# Patient Record
Sex: MALE | Race: WHITE | Employment: OTHER | URBAN - METROPOLITAN AREA
[De-identification: names, ages, dates, MRNs, and addresses within clinical notes are randomized per-mention and may not be internally consistent; named-entity substitution may affect disease eponyms.]

---

## 2017-01-03 ENCOUNTER — TELEPHONE (OUTPATIENT)
Dept: CARDIOLOGY CLINIC | Age: 82
End: 2017-01-03

## 2017-01-10 ENCOUNTER — TELEPHONE (OUTPATIENT)
Dept: CARDIOLOGY CLINIC | Age: 82
End: 2017-01-10

## 2017-01-24 ENCOUNTER — TELEPHONE (OUTPATIENT)
Dept: CARDIOLOGY CLINIC | Age: 82
End: 2017-01-24

## 2017-05-03 ENCOUNTER — OFFICE VISIT (OUTPATIENT)
Dept: CARDIOLOGY CLINIC | Age: 82
End: 2017-05-03

## 2017-05-03 ENCOUNTER — PROCEDURE VISIT (OUTPATIENT)
Dept: CARDIOLOGY CLINIC | Age: 82
End: 2017-05-03

## 2017-05-03 VITALS
HEART RATE: 64 BPM | SYSTOLIC BLOOD PRESSURE: 138 MMHG | DIASTOLIC BLOOD PRESSURE: 86 MMHG | WEIGHT: 178.2 LBS | BODY MASS INDEX: 27.01 KG/M2 | HEIGHT: 68 IN

## 2017-05-03 VITALS — SYSTOLIC BLOOD PRESSURE: 152 MMHG | DIASTOLIC BLOOD PRESSURE: 100 MMHG | HEART RATE: 64 BPM

## 2017-05-03 DIAGNOSIS — Z95.0 CARDIAC PACEMAKER: ICD-10-CM

## 2017-05-03 DIAGNOSIS — I10 ESSENTIAL HYPERTENSION: Primary | ICD-10-CM

## 2017-05-03 DIAGNOSIS — I49.5 SICK SINUS SYNDROME (HCC): ICD-10-CM

## 2017-05-03 DIAGNOSIS — E78.2 MIXED HYPERLIPIDEMIA: ICD-10-CM

## 2017-05-03 DIAGNOSIS — Z95.0 CARDIAC PACEMAKER IN SITU: Primary | ICD-10-CM

## 2017-05-03 DIAGNOSIS — I48.0 PAF (PAROXYSMAL ATRIAL FIBRILLATION) (HCC): ICD-10-CM

## 2017-05-03 DIAGNOSIS — I71.40 ABDOMINAL AORTIC ANEURYSM (AAA) WITHOUT RUPTURE: ICD-10-CM

## 2017-05-03 PROCEDURE — 99213 OFFICE O/P EST LOW 20 MIN: CPT | Performed by: INTERNAL MEDICINE

## 2017-05-23 ENCOUNTER — TELEPHONE (OUTPATIENT)
Dept: CARDIOLOGY CLINIC | Age: 82
End: 2017-05-23

## 2017-05-25 ENCOUNTER — HOSPITAL ENCOUNTER (OUTPATIENT)
Dept: ULTRASOUND IMAGING | Age: 82
Discharge: HOME | End: 2017-05-25
Attending: UROLOGY | Admitting: UROLOGY

## 2017-05-25 DIAGNOSIS — C65.9 MALIGNANT NEOPLASM OF RENAL PELVIS (HCC): ICD-10-CM

## 2017-05-25 DIAGNOSIS — C65.1 MALIGNANT NEOPLASM OF RENAL PELVIS, RIGHT (HCC): ICD-10-CM

## 2017-07-20 PROBLEM — N39.0 UTI (URINARY TRACT INFECTION): Status: ACTIVE | Noted: 2017-07-20

## 2017-09-25 ENCOUNTER — HOSPITAL ENCOUNTER (OUTPATIENT)
Dept: CT IMAGING | Age: 82
Discharge: HOME | End: 2017-09-25
Attending: THORACIC SURGERY (CARDIOTHORACIC VASCULAR SURGERY) | Admitting: THORACIC SURGERY (CARDIOTHORACIC VASCULAR SURGERY)

## 2017-09-25 DIAGNOSIS — I71.40 ABDOMINAL AORTIC ANEURYSM WITHOUT RUPTURE: ICD-10-CM

## 2017-09-25 DIAGNOSIS — I71.40 ABDOMINAL AORTIC ANEURYSM (AAA) WITHOUT RUPTURE: ICD-10-CM

## 2017-09-25 LAB
GFR AFRICAN AMERICAN: 58 ML/MIN/1.73M2
GFR NON-AFRICAN AMERICAN: 48 ML/MIN/1.73M2
POC CREATININE: 1.4 MG/DL (ref 0.9–1.3)

## 2017-09-25 RX ORDER — SODIUM CHLORIDE 0.9 % (FLUSH) 0.9 %
10 SYRINGE (ML) INJECTION ONCE
Status: COMPLETED | OUTPATIENT
Start: 2017-09-25 | End: 2017-09-25

## 2017-09-25 RX ADMIN — Medication 10 ML: at 13:20

## 2017-09-28 ENCOUNTER — TELEPHONE (OUTPATIENT)
Dept: CARDIOLOGY CLINIC | Age: 82
End: 2017-09-28

## 2017-10-19 ENCOUNTER — TELEPHONE (OUTPATIENT)
Dept: CARDIOLOGY CLINIC | Age: 82
End: 2017-10-19

## 2017-11-15 ENCOUNTER — INITIAL CONSULT (OUTPATIENT)
Dept: CARDIOLOGY CLINIC | Age: 82
End: 2017-11-15

## 2017-11-15 ENCOUNTER — PROCEDURE VISIT (OUTPATIENT)
Dept: CARDIOLOGY CLINIC | Age: 82
End: 2017-11-15

## 2017-11-15 VITALS — SYSTOLIC BLOOD PRESSURE: 102 MMHG | HEART RATE: 70 BPM | DIASTOLIC BLOOD PRESSURE: 60 MMHG

## 2017-11-15 DIAGNOSIS — T82.110A PACEMAKER LEAD MALFUNCTION, INITIAL ENCOUNTER: Primary | ICD-10-CM

## 2017-11-15 DIAGNOSIS — Z95.0 CARDIAC PACEMAKER IN SITU: ICD-10-CM

## 2017-11-15 DIAGNOSIS — R00.0 HEART RATE FAST: Primary | ICD-10-CM

## 2017-11-15 PROCEDURE — 93280 PM DEVICE PROGR EVAL DUAL: CPT | Performed by: INTERNAL MEDICINE

## 2017-11-15 PROCEDURE — 99204 OFFICE O/P NEW MOD 45 MIN: CPT | Performed by: INTERNAL MEDICINE

## 2017-11-16 ENCOUNTER — TELEPHONE (OUTPATIENT)
Dept: CARDIOLOGY CLINIC | Age: 82
End: 2017-11-16

## 2017-11-16 NOTE — TELEPHONE ENCOUNTER
Patient was seen for device check on 11/15/17. Patient has lead fracture. Dr Chuck Azar will place a new RV lead the week after thanksgiving. Message to [facility] to schedule patient for procedure. Patient is on Eliquis. I advised patient that he would get a call from [facility] and he would have to come into the office to sign consent and get instructions. Patient voiced understanding.

## 2017-11-20 ENCOUNTER — TELEPHONE (OUTPATIENT)
Dept: CARDIOLOGY CLINIC | Age: 82
End: 2017-11-20

## 2017-11-20 NOTE — TELEPHONE ENCOUNTER
Spoke with patient advised him that per Dr Tiffanie Taylor we scheduled procedure for 11/29 @ 4:30 pm arrival of 2:30 pm. Please come in on Monday 11/27 to sign consents pt voiced understanding.

## 2017-11-20 NOTE — LETTER
? Notify Dr. Nelia Bustos of abnormal lab results. ? Chest Prep> Clip hair anterior chest.        Physician Signature:________________________________Date:_____________                                       [facility] ([facility]) Informed Consent for Anesthesia/Sedation, Surgery, Invasive Procedures, and other High-risk Interventions and Medication use      *This consent is applicable for 30 days following patient signature*    Procedure(s)   I, [facility] authorize, Dr. Deborah Lane    and the associate(s) or assistant(s) of his/her choice, to perform the following procedure(s): Right Ventricular Lead Replacement    I know that unexpected conditions may require additional or different procedures than those above. I authorize the above named practitioner(s) perform these as necessary and desirable. This is based on the practitioners professional judgment. The above named practitioner has discussed the above procedure(s) with me, including:  ? Potential benefits, including likelihood of success of the procedure(s) goals  ? Risks  ? Side effects, risk of death, and risk of infection  ? Any potential problems that might occur during recuperation or healing post-procedure  ? Reasonable alternatives  ? Risks of NOT performing the procedure(s)    I acknowledge that no warranty or guarantee has been made to the results the procedure(s). I consent to the above named practitioner(s) providing additional services to me as deemed reasonable and necessary, including but not limited to:    ? Use of medications for anesthesia or sedation. ? All anesthesia and sedation carry risks. My practitioner has discussed my anticipated anesthesia and/or sedation and the risks of using, risk of not using, benefits, side effects, and alternatives. ? Use of pathology  ? I authorize [facility] ([facility]) to dispose of tissues, specimens or organs when pathology is complete. ?  Use of radiology ? A contrast agent may be required for radiology procedures. My practitioner has advised me of the risks of using, risks of not using, benefits, side effects, and alternatives. ? Observers or use of photography, video/audio recording, or televising of the procedure(s). This is for medical, scientific, or educational purposes. This includes appropriate portions of my body. My identity will not be revealed. ? I consent to release of my social security number and other identifying information to [employer] (FDA), and the supplier/, if I receive tissue, a device, or implant. This is to track the tissue, device, or implant for defect, recall, infection, etc.     ? Use of blood and/or blood products, if needed, through my hospital stay. My practitioner has advised me of the risks of using, risks of not using, benefits, side effects, and alternatives. ___ I do NOT want Blood or Blood products given. (Complete separate  refusal form)    Code Status (rossy one):  ___ I do NOT HAVE a DNR order. I am a Full-code.   I will receive CPR, intubation,  chest compressions, medications, and/or other life saving measures if I have a  cardiac or respiratory arrest.    ___ I have a Do Not Resuscitate (DNR)order.   (rossy one below)  ___  I rescind my DNR for surgery and immediate post-operative period through Phase 2 recovery. This means, for that time period, I will be a Full-code and receive CPR, intubation, chest compressions, medications, and/or other life saving measures, if I have a cardiac or respiratory arrest.    ___ I WANT to keep my DNR in effect during my procedure(s) and immediate post-operative recovery period through Phase 2 recovery. (Complete separate refusal form)     This form has been fully explained to me. I understand its contents.     Patients Signature: ___________________________Date: ________  Time: ________ If patient unable to sign, has engaged the [employer], is a minor, or has a court-appointed Guardian:  [facility] Representative Name (Print):  ____________________________________      Relationship ([place] one):    Guardian   Parent    Spouse    HCPOA   Child   Sibling  Next-of-Kin Friend    Patients Representative Signature: _______________________________________              Date: ______________  Time: __________    An  was used.  name/ID: _________________________________      [facility]) Witness________________________  Date: ________   Time: _________    Physician/Practitioner _______________________  Date: ________   Time: _________           Revision 2017          [facility]     Dr. Traci Hussein     PATIENT NAME:    [facility]                               :   3/23/1930     PROCEDURE: Right Ventricular Lead Replacement    DATE OF PROCEDURE: 17    DIAGNOSIS:   Bad Lead                       ? PLEASE CALL ABNORMAL RESULTS TO THE REQUESTING PHYSICIAN? ATTENTION PATIENTS:  You do not have to fast for the lab work. You must go to the [facility] LAB at [address] [place] [address] to have the lab work done.      Phone: [phone]   Hours: 7:00 am to 5:00 pm    7:00 am to 12:00 pm         Physician Signature:_____________________________________Date:_____________                                MARTINEZ (CREEK) [facility] PHYSICAL [facility]     Dr. Rajesh Tobin:    PROCEDURE: Right Ventricular Lead Replacement    Patient Name: Sunday Swan   : 3/23/1930   MRN# [id number]    Home Phone Number: [phone]   Weight:    Wt Readings from Last 3 Encounters:   10/14/17 178 lb (80.7 kg)   10/04/17 171 lb (77.6 kg)   17 175 lb (79.4 kg)        Insurance: Payor: Caroline Allen / Plan: Caroline Allen / Product Type: *No Product type* /     Date of Procedure: 17 Time: 4:30 Arrival Time: 2:30 pm Diagnosis:  Bad Lead   Allergies:    Allergies   Allergen Reactions    Codeine Anaphylaxis        o Call [facility] scheduling [phone]) or Instant Message  o CONFIRMED WITH:__________________________PHONE      OR INSTANT MESSAGE    o PREAUTHORIZATION NUMBER:_________________ Spoke to:____________________  o From date:_________________ expiration date:____________________                    [place] [facility]

## 2017-11-25 ASSESSMENT — ENCOUNTER SYMPTOMS
BACK PAIN: 1
COUGH: 0
CHEST TIGHTNESS: 0
VOMITING: 0
ABDOMINAL PAIN: 0
WHEEZING: 0
PHOTOPHOBIA: 0
BLOOD IN STOOL: 0
NAUSEA: 0
COLOR CHANGE: 0
EYE PAIN: 0
SHORTNESS OF BREATH: 1

## 2017-11-25 NOTE — PROGRESS NOTES
Electrophysiology Consult Note      Reason for consultation: Pacemaker problem    Chief complaint : some times muscle twitch in the right shoulder area    Referring physician:  Adarsh Hightower      Primary care physician: Caryn Rodriguez MD      History of Present Illness:     Patient with hx of complete heart block sp pacemaker presented here for device check. Patient reports off and on muscle twitches and does not happen every day. Patient has shortness of breath with exertion. Denies chest pain. Denies fever or chills  Denies syncope      Past medical history:   Past Medical History:   Diagnosis Date    AAA (abdominal aortic aneurysm) [facility])     Surgery scheduled for 7/1/2014 with Dr. Kenisha Daley.  Arthritis     generalized    AV block, 1st degree     Back pain     BPH (benign prostatic hyperplasia)     Bradycardia     Colon polyps     Colostomy in place [facility])     Full incontinence of feces     colostomy    Glaucoma     H/O cardiovascular stress test 12/27/2013    cardiolite-EF56%, apical hypokinesis is seen, cannot exlude apical Tyler ischemia    H/O echocardiogram 02/11/13    EF60% Mildly hypertropied LV. Mild MR & TR. Mild Pulm HTN. Mild Aortic insuff.  H/O echocardiogram 10/27/2016    Carotid  LV hypertrophy with moderately reduced LV systolic function in addition to diastolic dysfunction. Mil pulmonary htn, mild tricuspid regurg. Mild Mitral regurg.  History of cardiovascular stress test 3/5/10    No angina or ischemic EKG changes are noted with Lexiscan. The cardiolite study demonstrated normal perfusion in all segments of the myocardium with an intact left ventricular systolic function. The resting sestamibi dose is 10.8, stress is 32.5. EF 66%    History of chest x-ray 3/16/10    The chest is considered nonacute. There is cardiomegaly noted. COPD.     History of complete electrocardiogram 2/11/13    History of Doppler ultrasound 3/25/10    venous doppler- Technically good venous ultrasound study negative for DVT in both lower extremities. Normal compressibility,color flow doppler pattern and spectral doppler pattern demonstrated thoughout.  History of echocardiogram 3/18/10    [place]erline LV dilatation with concentric hypertrophy. Low normal systolic and abnormal diastolic function. Mild mitral and trace tricuspid regurgitation. Mild aortic root and bilateral dilatation.  Holter monitor, abnormal 11/10/10    11/10/2010- 24 HR- Abnormal holter revealing some significant brasycardia's and wenckebach phenomenon. Therefore, clinical  correlation is recommend.  Hyperlipidemia     Hypertension     Ileus ([address]     Pacemaker     PAF (paroxysmal atrial fibrillation) (HCC)     Peritonitis ([address]     Personal history of colonic polyps     Poor historian     Rectal bleeding     Skin cancer     face    Ulcerative (chronic) proctosigmoiditis (HCC)     Wears glasses        Surgical history :   Past Surgical History:   Procedure Laterality Date    ABDOMINAL AORTIC ANEURYSM REPAIR, ENDOVASCULAR  7/1/14    ABDOMINAL EXPLORATION SURGERY  2/4/2013    exp lap, left hemicolectomy, umbilectomy    APPENDECTOMY  2/4/2013    APPENDECTOMY  2/4/2013    COLONOSCOPY  2/4/2013    COLOSTOMY  2/4/2013    CYSTOSCOPY  2/03/2014    TURP    HEMORRHOID SURGERY  2011    HERNIA REPAIR Bilateral 1940 & 1958    Ing hernia    KNEE SURGERY Right 1980s    PACEMAKER PLACEMENT Right 2/25/2013       Family history:   Family History   Problem Relation Age of Onset    Stroke Mother      CVA    Heart Disease Mother     Cancer Brother      prostate    Cancer Brother      skin    Cancer Daughter      colon    Substance Abuse Son      Tobacco       Social history :  reports that he has never smoked. He has never used smokeless tobacco. He reports that he does not drink alcohol or use drugs.     Allergies   Allergen Reactions    Codeine Anaphylaxis       Current Outpatient Prescriptions on File Prior to Visit   Medication Sig Dispense Refill    apixaban (ELIQUIS) 2.5 MG TABS tablet Take 1 tablet by mouth 2 times daily 28 tablet 0    finasteride (PROSCAR) 5 MG tablet Take 5 mg by mouth nightly      diltiazem (CARDIZEM 12 HR) 120 MG extended release capsule Take 120 mg by mouth daily      polyethyl glycol-propyl glycol 0.4-0.3 % (SYSTANE) 0.4-0.3 % ophthalmic solution 1 drop as needed for Dry Eyes      lisinopril-hydrochlorothiazide (PRINZIDE;ZESTORETIC) 20-25 MG per tablet Take 0.5 tablets by mouth daily 90 tablet 3    simvastatin (ZOCOR) 40 MG tablet Take 1 tablet by mouth nightly 90 tablet 3    sertraline (ZOLOFT) 50 MG tablet Take 50 mg by mouth daily      tamsulosin (FLOMAX) 0.4 MG capsule Take 1 capsule by mouth daily 10 capsule 0    ascorbic acid (VITAMIN C) 500 MG tablet Take 500 mg by mouth daily.  Brimonidine Tartrate-Timolol (COMBIGAN OP) Place  into both eyes nightly.  Bimatoprost (LUMIGAN OP) Place  into both eyes 2 times daily.  omeprazole (PRILOSEC) 20 MG capsule Take 20 mg by mouth daily. No current facility-administered medications on file prior to visit. Review of Systems:   Review of Systems   Constitutional: Positive for activity change and fatigue. Negative for chills and fever. HENT: Negative for congestion, ear pain and tinnitus. Eyes: Negative for photophobia, pain and visual disturbance. Respiratory: Positive for shortness of breath. Negative for cough, chest tightness and wheezing. Cardiovascular: Negative for chest pain, palpitations and leg swelling. Gastrointestinal: Negative for abdominal pain, blood in stool, nausea and vomiting. Endocrine: Negative for cold intolerance and heat intolerance. Genitourinary: Negative for dysuria, flank pain and hematuria. Musculoskeletal: Positive for arthralgias and back pain. Negative for myalgias and neck stiffness. Skin: Negative for color change and rash. Allergic/Immunologic: Negative for food allergies. Neurological: Negative for dizziness, light-headedness, numbness and headaches. Hematological: Does not bruise/bleed easily. Psychiatric/Behavioral: Negative for agitation and confusion. Physical Examination:    /60   Pulse 70    Wt Readings from Last 3 Encounters:   10/14/17 178 lb (80.7 kg)   10/04/17 171 lb (77.6 kg)   07/18/17 175 lb (79.4 kg)     There is no height or weight on file to calculate BMI. Physical Exam   Constitutional: He is oriented to person, place, and time and well-developed, well-nourished, and in no distress. HENT:   Head: Normocephalic and atraumatic. Eyes: Conjunctivae and EOM are normal. Pupils are equal, round, and reactive to light. Right eye exhibits no discharge. Neck: Normal range of motion. No JVD present. No thyromegaly present. Cardiovascular: Normal rate and regular rhythm. Exam reveals no friction rub. Murmur (grade 2/6 systolic murmur) heard. Pulmonary/Chest: Effort normal and breath sounds normal. No stridor. No respiratory distress. He has no wheezes. Abdominal: Soft. Bowel sounds are normal. He exhibits no distension. There is no tenderness. Musculoskeletal: Normal range of motion. He exhibits no edema. Neurological: He is alert and oriented to person, place, and time. No cranial nerve deficit. Skin: Skin is warm and dry. No rash noted. No erythema.    Psychiatric: Mood and affect normal.           CBC:   Lab Results   Component Value Date    WBC 6.0 10/14/2017    HGB 12.4 10/14/2017    HCT 35.5 10/14/2017     10/14/2017     Lipids:   Lab Results   Component Value Date    CHOL 160 07/22/2014    TRIG 117 07/22/2014    HDL 38 (L) 07/22/2014    LDLCALC 99 07/22/2014     PT/INR:   Lab Results   Component Value Date    INR 1.08 07/17/2017        BMP:    Lab Results   Component Value Date     (L) 10/14/2017    K 3.7 10/14/2017    CL 92 (L) 10/14/2017    CO2 24 10/14/2017    BUN 22 10/14/2017     CMP:   Lab Results   Component Value Date    AST 21 10/14/2017    PROT 6.7 10/14/2017    BILITOT 0.6 10/14/2017    ALKPHOS 94 10/14/2017     TSH:  No results found for: TSH    EKGINTERPRETATION - EKG Interpretation:        IMPRESSION / RECOMMENDATIONS:     1. RV pacemaker lead malfunction  2. Moderate LV systolic dysfunction  3. Complete heart block sp pacemaker  4. HTN  5. HLD  6. PAF    Patient pacemaker is evaluated. Patient has adapta pacemaker dual chamber  Device evaluated. 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 Will get right subclavian venogram and if not obstructed then will consider upgrade to BIV pacer from Right side    Will try to schedule at the earliest and discussed risks with patient  Patient wants to wait till thanksgiving is done prior to the procedure. Unipolar capture still present. Patient understands risk      Thanks again for allowing me to participate in care of this patient. Please call me if you have any questions. With best regards.       Juan R Ferro MD

## 2017-11-27 ENCOUNTER — OFFICE VISIT (OUTPATIENT)
Dept: CARDIOLOGY CLINIC | Age: 82
End: 2017-11-27

## 2017-11-27 ENCOUNTER — HOSPITAL ENCOUNTER (OUTPATIENT)
Dept: GENERAL RADIOLOGY | Age: 82
Discharge: HOME | End: 2017-11-27
Attending: INTERNAL MEDICINE | Admitting: INTERNAL MEDICINE

## 2017-11-27 ENCOUNTER — TELEPHONE (OUTPATIENT)
Dept: CARDIOLOGY CLINIC | Age: 82
End: 2017-11-27

## 2017-11-27 VITALS
SYSTOLIC BLOOD PRESSURE: 106 MMHG | BODY MASS INDEX: 25.61 KG/M2 | DIASTOLIC BLOOD PRESSURE: 60 MMHG | HEART RATE: 78 BPM | WEIGHT: 169 LBS | HEIGHT: 68 IN

## 2017-11-27 DIAGNOSIS — I48.0 PAF (PAROXYSMAL ATRIAL FIBRILLATION) (HCC): Primary | ICD-10-CM

## 2017-11-27 DIAGNOSIS — I49.5 SICK SINUS SYNDROME (HCC): ICD-10-CM

## 2017-11-27 DIAGNOSIS — E78.2 MIXED HYPERLIPIDEMIA: ICD-10-CM

## 2017-11-27 DIAGNOSIS — Z95.0 CARDIAC PACEMAKER: ICD-10-CM

## 2017-11-27 DIAGNOSIS — Z01.818 PRE-OP EXAMINATION: ICD-10-CM

## 2017-11-27 DIAGNOSIS — I71.40 ABDOMINAL AORTIC ANEURYSM (AAA) WITHOUT RUPTURE: ICD-10-CM

## 2017-11-27 DIAGNOSIS — I10 ESSENTIAL HYPERTENSION: ICD-10-CM

## 2017-11-27 LAB
ANION GAP SERPL CALCULATED.3IONS-SCNC: 13 MMOL/L (ref 4–16)
APTT: 32.5 SECONDS (ref 21.2–33)
BUN BLDV-MCNC: 26 MG/DL (ref 6–23)
CALCIUM SERPL-MCNC: 8.9 MG/DL (ref 8.3–10.6)
CHLORIDE BLD-SCNC: 95 MMOL/L (ref 99–110)
CO2: 28 MMOL/L (ref 21–32)
CREAT SERPL-MCNC: 1.3 MG/DL (ref 0.9–1.3)
GFR AFRICAN AMERICAN: >60 ML/MIN/1.73M2
GFR NON-AFRICAN AMERICAN: 52 ML/MIN/1.73M2
GLUCOSE BLD-MCNC: 88 MG/DL (ref 70–99)
HCT VFR BLD CALC: 37.4 % (ref 42–52)
HEMOGLOBIN: 12.9 GM/DL (ref 13.5–18)
INR BLD: 1.24 INDEX
MAGNESIUM: 2.1 MG/DL (ref 1.8–2.4)
MCH RBC QN AUTO: 33.9 PG (ref 27–31)
MCHC RBC AUTO-ENTMCNC: 34.5 % (ref 32–36)
MCV RBC AUTO: 98.4 FL (ref 78–100)
PDW BLD-RTO: 13 % (ref 11.7–14.9)
PHOSPHORUS: 2.6 MG/DL (ref 2.5–4.9)
PLATELET # BLD: 213 K/CU MM (ref 140–440)
PMV BLD AUTO: 9.5 FL (ref 7.5–11.1)
POTASSIUM SERPL-SCNC: 3.8 MMOL/L (ref 3.5–5.1)
PROTHROMBIN TIME: 14.2 SECONDS (ref 9.12–12.5)
RBC # BLD: 3.8 M/CU MM (ref 4.6–6.2)
SODIUM BLD-SCNC: 136 MMOL/L (ref 135–145)
WBC # BLD: 6.1 K/CU MM (ref 4–10.5)

## 2017-11-27 PROCEDURE — 99213 OFFICE O/P EST LOW 20 MIN: CPT | Performed by: INTERNAL MEDICINE

## 2017-11-27 RX ORDER — LANOLIN ALCOHOL/MO/W.PET/CERES
500 CREAM (GRAM) TOPICAL NIGHTLY
COMMUNITY
End: 2018-03-13

## 2017-11-27 RX ORDER — LISINOPRIL AND HYDROCHLOROTHIAZIDE 12.5; 1 MG/1; MG/1
1 TABLET ORAL DAILY
COMMUNITY
End: 2019-08-26 | Stop reason: SDUPTHER

## 2017-11-27 NOTE — LETTER
Cardiology [address] [place] [address] [address] [facility] [address]  Phone: [phone]  Fax: [phone]    Julissa Munoz MD        November 27, 2017     Fallon Nina MD  [address] [place] [id number]    Patient: Mary Christensen  MR Number: [id number]  YOB: 1930  Date of Visit: 11/27/2017    Dear Dr. Fallon Nina: Thank you for the request for consultation for Jermaine Donaldson to me for the evaluation of SSS. Below are the relevant portions of my assessment and plan of care. If you have questions, please do not hesitate to call me. I look forward to following Bismark Crooks along with you.     Sincerely,        Julissa Munoz MD

## 2017-11-27 NOTE — PATIENT INSTRUCTIONS
If you have any questions, please call our office at [phone]  CAD:No   HTN:well controlled on current medical regimen, see list above. - changes in  treatment:   no   CARDIOMYOPATHY: None known    VHD: No significant VHD noted  DYSLIPIDEMIA: Patient's profile is at / near Goal.yes,                                 HDL is low                                Tolerating current medical regimen wellyes,                                                         See most recent Lab values in Labs section above. OTHER RELEVANT DIAGNOSIS:as noted in patient's active problem list:AAA F/U per   TESTS ORDERED: None this visit                                    All previously ordered tests reviewed. ARRHYTHMIAS: SSS                                Patient has H/O Atrial fibrillation                                He is rate controlled & on anticoagulation. MEDICATIONS: CPM   Office f/u in six months. Device check per protocol. Primary/secondary prevention is the goal by aggressive risk modification, healthy and therapeutic life style changes for cardiovascular risk reduction. Various goals are discussed and multiple questions answered.

## 2017-11-27 NOTE — TELEPHONE ENCOUNTER
Left message for patient need him to come in and sign consents. Ask for Gayen Court if any questions.

## 2017-11-27 NOTE — PROGRESS NOTES
Patient here in office and educated on RV lead replacement , schedule for 11/29/17 @ 4:30, with arrival @ 2;30, @ [facility]; risk explained; and consents signed. Also copy of orders given for labs and CXR due 11/27/17 at [facility]. Instruction given to patient to :  NPO after midnight the night before procedure; call hospital at [phone] to pre-register. May take rest of morning meds of procedure. Hold Eliquis evening dose the night before procedure and morning dose of the procedure. Patient voiced understanding. Copies of consent & info sheet given to [facility] for scanning.

## 2017-11-27 NOTE — TELEPHONE ENCOUNTER
Jeb Ayala calling regarding an expedited case that she has not received any clinicals on, please return her call at # [phone] Opt 3, cardiac, and then Opt 3 again for devices.

## 2017-11-27 NOTE — PROGRESS NOTES
daily      niacin 500 MG extended release capsule Take 500 mg by mouth nightly      apixaban (ELIQUIS) 2.5 MG TABS tablet Take 1 tablet by mouth 2 times daily 28 tablet 0    finasteride (PROSCAR) 5 MG tablet Take 5 mg by mouth nightly      diltiazem (CARDIZEM 12 HR) 120 MG extended release capsule Take 120 mg by mouth daily      polyethyl glycol-propyl glycol 0.4-0.3 % (SYSTANE) 0.4-0.3 % ophthalmic solution 1 drop as needed for Dry Eyes      simvastatin (ZOCOR) 40 MG tablet Take 1 tablet by mouth nightly 90 tablet 3    sertraline (ZOLOFT) 50 MG tablet Take 50 mg by mouth daily      tamsulosin (FLOMAX) 0.4 MG capsule Take 1 capsule by mouth daily 10 capsule 0    ascorbic acid (VITAMIN C) 500 MG tablet Take 500 mg by mouth daily.  Brimonidine Tartrate-Timolol (COMBIGAN OP) Place  into both eyes nightly.  Bimatoprost (LUMIGAN OP) Place  into both eyes 2 times daily.  omeprazole (PRILOSEC) 20 MG capsule Take 20 mg by mouth daily. No current facility-administered medications for this visit. Allergies: Codeine  Past Medical History:   Diagnosis Date    AAA (abdominal aortic aneurysm) [facility])     Surgery scheduled for 7/1/2014 with Dr. Marianne Mandujano.  Arthritis     generalized    AV block, 1st degree     Back pain     BPH (benign prostatic hyperplasia)     Bradycardia     Colon polyps     Colostomy in place [facility])     Full incontinence of feces     colostomy    Glaucoma     H/O cardiovascular stress test 12/27/2013    cardiolite-EF56%, apical hypokinesis is seen, cannot exlude apical Tyler ischemia    H/O echocardiogram 02/11/13    EF60% Mildly hypertropied LV. Mild MR & TR. Mild Pulm HTN. Mild Aortic insuff.  H/O echocardiogram 10/27/2016    Carotid  LV hypertrophy with moderately reduced LV systolic function in addition to diastolic dysfunction. Mil pulmonary htn, mild tricuspid regurg. Mild Mitral regurg.      History of cardiovascular stress test 3/5/10    No angina or ischemic EKG changes are noted with Lexiscan. The cardiolite study demonstrated normal perfusion in all segments of the myocardium with an intact left ventricular systolic function. The resting sestamibi dose is 10.8, stress is 32.5. EF 66%    History of chest x-ray 3/16/10    The chest is considered nonacute. There is cardiomegaly noted. COPD.  History of complete electrocardiogram 2/11/13    History of Doppler ultrasound 3/25/10    venous doppler- Technically good venous ultrasound study negative for DVT in both lower extremities. Normal compressibility,color flow doppler pattern and spectral doppler pattern demonstrated thoughout.  History of echocardiogram 3/18/10    Boarderline LV dilatation with concentric hypertrophy. Low normal systolic and abnormal diastolic function. Mild mitral and trace tricuspid regurgitation. Mild aortic root and bilateral dilatation.  Holter monitor, abnormal 11/10/10    11/10/2010- 24 HR- Abnormal holter revealing some significant brasycardia's and wenckebach phenomenon. Therefore, clinical  correlation is recommend.     Hyperlipidemia     Hypertension     Ileus ([address]     Pacemaker     PAF (paroxysmal atrial fibrillation) (HCC)     Peritonitis ([address]     Personal history of colonic polyps     Poor historian     Rectal bleeding     Skin cancer     face    Ulcerative (chronic) proctosigmoiditis ([address]     Wears glasses      Past Surgical History:   Procedure Laterality Date    ABDOMINAL AORTIC ANEURYSM REPAIR, ENDOVASCULAR  7/1/14    ABDOMINAL EXPLORATION SURGERY  2/4/2013    exp lap, left hemicolectomy, umbilectomy    APPENDECTOMY  2/4/2013    APPENDECTOMY  2/4/2013    COLONOSCOPY  2/4/2013    COLOSTOMY  2/4/2013    CYSTOSCOPY  2/03/2014    TURP    HEMORRHOID SURGERY  2011    HERNIA REPAIR Bilateral 1940 & 1958    Ing hernia    KNEE SURGERY Right 1980s    PACEMAKER PLACEMENT Right 2/25/2013      As reviewed   Family History   Problem Relation Age of Onset Abdomen  No masses, tenderness, or organomegaly. Musculoskeletal  No significant edema. No joint deformities. No muscle wasting. Neurologic  Cranial nerves II through XII are grossly intact. There were no gross focal neurologic abnormalities. Lab Review   Lab Results   Component Value Date    CKTOTAL 148 05/13/2016    TROPONINI 0.016 02/15/2013     BNP:    Lab Results   Component Value Date     02/07/2013     PT/INR:    Lab Results   Component Value Date    INR 1.08 07/17/2017     No results found for: LABA1C  Lab Results   Component Value Date    WBC 6.0 10/14/2017    HCT 35.5 (L) 10/14/2017    MCV 98.6 10/14/2017     10/14/2017     Lab Results   Component Value Date    CHOL 160 07/22/2014    TRIG 117 07/22/2014    HDL 38 (L) 07/22/2014    LDLCALC 99 07/22/2014     Lab Results   Component Value Date    ALT 14 10/14/2017    AST 21 10/14/2017     BMP:    Lab Results   Component Value Date     10/14/2017    K 3.7 10/14/2017    CL 92 10/14/2017    CO2 24 10/14/2017    BUN 22 10/14/2017    CREATININE 1.4 10/14/2017     CMP:   Lab Results   Component Value Date     10/14/2017    K 3.7 10/14/2017    CL 92 10/14/2017    CO2 24 10/14/2017    BUN 22 10/14/2017    PROT 6.7 10/14/2017    PROT 5.2 02/24/2013     TSH:  No results found for: TSH    QUALITY MEASURES REVIEWED:  1.CAD:Patient is taking anti platelet agent:No, patient on Eliquis  2. DYSLIPIDEMIA: Patient is on cholesterol lowering medication:Yes  3. Beta-Blocker therapy for CAD, if prior Myocardial Infarction:No  4. Atrial fibrillation & anticoagulation therapy Yes      Impression:    1. PAF (paroxysmal atrial fibrillation) ([facility]    2. Sick sinus syndrome ([facility]    3. Essential hypertension    4. Mixed hyperlipidemia    5. Cardiac pacemaker    6.  Abdominal aortic aneurysm (AAA) without rupture [facility])       Patient Active Problem List   Diagnosis Code    Hyperlipidemia E78.5    Hypertension I10    Perforated viscus R19.8    Anemia

## 2017-11-28 ENCOUNTER — TELEPHONE (OUTPATIENT)
Dept: CARDIOLOGY CLINIC | Age: 82
End: 2017-11-28

## 2017-11-28 NOTE — TELEPHONE ENCOUNTER
Called Patients son back, advised him I spoke with Dr Kenrick Miller and we can reschedule since patient is not feeling well. He states actually patient is eating and feels better he does not want to reschedule. Patient would like to have this done ASAP and son is due to go back to [place] soon so would like to have it done before he leaves. I advised him I would speak with Dr Kenrick Miller if there's a problem I will call him back . **Spoke with Dr Shalonda Hatfield in EP lab per Dr Kenrick Miller he would like to reschedule due to patient being dependent on device. He wants him free from viral or otherwise any kind of infection.  Patient voiced understanding and rescheduled him for 12/13 @ 8 am PATIENT NEEDS TO SIGN NEW CONSENT FOR PROCEDURE

## 2017-11-28 NOTE — TELEPHONE ENCOUNTER
Pt son called and stated pt has a very bad cold, pt is achy, he was not sure if we wanted pt to come in and be seen today to make sure he is okay for surgery tomorrow or if they should just reschedule it.    Please call son Melody Penaloza back @ [phone]

## 2017-11-30 ENCOUNTER — TELEPHONE (OUTPATIENT)
Dept: CARDIOLOGY CLINIC | Age: 82
End: 2017-11-30

## 2017-11-30 NOTE — TELEPHONE ENCOUNTER
Spoke with patients daughter advised her FMLA forms are completed she can pick them up at . She asked me to fax and I told her I could not because she had not signed them.

## 2017-12-13 PROBLEM — Z95.0 S/P BIVENTRICULAR CARDIAC PACEMAKER PROCEDURE: Status: ACTIVE | Noted: 2017-12-13

## 2017-12-22 ENCOUNTER — TELEPHONE (OUTPATIENT)
Dept: CARDIOLOGY CLINIC | Age: 82
End: 2017-12-22

## 2017-12-22 ENCOUNTER — PROCEDURE VISIT (OUTPATIENT)
Dept: CARDIOLOGY CLINIC | Age: 82
End: 2017-12-22

## 2017-12-22 VITALS — TEMPERATURE: 98.2 F

## 2017-12-22 DIAGNOSIS — Z95.0 STATUS POST PLACEMENT OF CARDIAC PACEMAKER: Primary | ICD-10-CM

## 2017-12-22 NOTE — TELEPHONE ENCOUNTER
Patient seen 12/22 for site check, status post pacer 12/13. Please schedule for 1st post device. Message to [place] to schedule.

## 2017-12-26 ENCOUNTER — TELEPHONE (OUTPATIENT)
Dept: CARDIOLOGY CLINIC | Age: 82
End: 2017-12-26

## 2018-01-05 ENCOUNTER — TELEPHONE (OUTPATIENT)
Dept: CARDIOLOGY CLINIC | Age: 83
End: 2018-01-05

## 2018-01-05 ENCOUNTER — OFFICE VISIT (OUTPATIENT)
Dept: CARDIOLOGY CLINIC | Age: 83
End: 2018-01-05

## 2018-01-05 VITALS
SYSTOLIC BLOOD PRESSURE: 128 MMHG | HEART RATE: 82 BPM | DIASTOLIC BLOOD PRESSURE: 88 MMHG | WEIGHT: 175.2 LBS | HEIGHT: 67 IN | BODY MASS INDEX: 27.5 KG/M2

## 2018-01-05 DIAGNOSIS — Z45.018 BIVENTRICULAR PACEMAKER CHECK: Primary | ICD-10-CM

## 2018-01-05 PROCEDURE — 93288 INTERROG EVL PM/LDLS PM IP: CPT | Performed by: INTERNAL MEDICINE

## 2018-01-05 PROCEDURE — 99024 POSTOP FOLLOW-UP VISIT: CPT | Performed by: INTERNAL MEDICINE

## 2018-01-05 RX ORDER — FUROSEMIDE 20 MG/1
20 TABLET ORAL DAILY
Qty: 5 TABLET | Refills: 0 | Status: SHIPPED | OUTPATIENT
Start: 2018-01-05 | End: 2018-03-13

## 2018-01-05 RX ORDER — POTASSIUM CHLORIDE 750 MG/1
10 TABLET, EXTENDED RELEASE ORAL EVERY OTHER DAY
Qty: 3 TABLET | Refills: 0 | Status: SHIPPED | OUTPATIENT
Start: 2018-01-05 | End: 2018-03-13

## 2018-01-05 NOTE — LETTER
Cardiology [address] [place] [id number]  Phone: [phone]  Fax: [phone]            January 5, 2018    [address]  [address] [id number]      Dear Leonel Rabago: This is your CAREL[facility] schedule. Please rossy your calendar with these dates. You can do your checks anytime during the scheduled day. Since we do not do reminder calls, it will be your responsibility to perform the checks on the day it is scheduled. If you have any questions or concerns, please call and ask for [place] at [phone].

## 2018-01-31 ENCOUNTER — TELEPHONE (OUTPATIENT)
Dept: CARDIOLOGY CLINIC | Age: 83
End: 2018-01-31

## 2018-01-31 NOTE — TELEPHONE ENCOUNTER
Patient brought in denial letter from [facility] [place] no [place] for BIV pacemaker upgrade. I spoke to [facility] and pulled authorizations for procedure. Valid authorization for upgrade and lead replacement. Spoke to [place] at [facility], authorization # [id number] valid 11/29/17-01/28/18 valid for procedure and claim will be reprocessed. Called patient to let him know of situation, no voicemail available. Will try patient again tomorrow morning.

## 2018-02-01 ENCOUNTER — TELEPHONE (OUTPATIENT)
Dept: CARDIOLOGY CLINIC | Age: 83
End: 2018-02-01

## 2018-02-26 NOTE — TELEPHONE ENCOUNTER
Patient stopped in to see if he could get some samples of eliquis 2.5. Told him he needs to call next time and it takes 24 hours to get them ready. Told him I would go see if anyone was able to do it now.

## 2018-02-26 NOTE — TELEPHONE ENCOUNTER
Pt given one box of Eliquis 5mg as we are out of 2.5. Explained to pt to take 1/2 tablet twice daily and call next week to see if we have 2.5 mg.  Pt verbalized understanding

## 2018-03-13 PROBLEM — K56.609 SBO (SMALL BOWEL OBSTRUCTION) (HCC): Status: ACTIVE | Noted: 2018-03-13

## 2018-03-16 ENCOUNTER — TELEPHONE (OUTPATIENT)
Dept: CARDIOLOGY CLINIC | Age: 83
End: 2018-03-16

## 2018-04-12 ENCOUNTER — TELEPHONE (OUTPATIENT)
Dept: CARDIOLOGY CLINIC | Age: 83
End: 2018-04-12

## 2018-04-12 RX ORDER — METOPROLOL SUCCINATE 25 MG/1
25 TABLET, EXTENDED RELEASE ORAL DAILY
Qty: 30 TABLET | Refills: 6 | Status: SHIPPED | OUTPATIENT
Start: 2018-04-12 | End: 2019-06-26 | Stop reason: SDUPTHER

## 2018-04-16 ENCOUNTER — TELEPHONE (OUTPATIENT)
Dept: CARDIOLOGY CLINIC | Age: 83
End: 2018-04-16

## 2018-04-18 ENCOUNTER — HOSPITAL ENCOUNTER (OUTPATIENT)
Dept: CT IMAGING | Age: 83
Discharge: HOME | End: 2018-04-18
Attending: UROLOGY | Admitting: UROLOGY

## 2018-04-18 DIAGNOSIS — R31.0 CLOT HEMATURIA: ICD-10-CM

## 2018-04-18 LAB
GFR AFRICAN AMERICAN: 57 ML/MIN/1.73M2
GFR NON-AFRICAN AMERICAN: 47 ML/MIN/1.73M2
POC CREATININE: 1.4 MG/DL (ref 0.9–1.3)

## 2018-04-18 RX ORDER — SODIUM CHLORIDE 0.9 % (FLUSH) 0.9 %
10 SYRINGE (ML) INJECTION
Status: COMPLETED | OUTPATIENT
Start: 2018-04-18 | End: 2018-04-18

## 2018-04-18 RX ADMIN — Medication 10 ML: at 12:43

## 2018-05-04 ENCOUNTER — PROCEDURE VISIT (OUTPATIENT)
Dept: CARDIOLOGY CLINIC | Age: 83
End: 2018-05-04

## 2018-05-04 ENCOUNTER — TELEPHONE (OUTPATIENT)
Dept: CARDIOLOGY CLINIC | Age: 83
End: 2018-05-04

## 2018-05-04 DIAGNOSIS — I49.5 SICK SINUS SYNDROME (HCC): ICD-10-CM

## 2018-05-04 DIAGNOSIS — Z95.0 BIVENTRICULAR CARDIAC PACEMAKER IN SITU: Primary | ICD-10-CM

## 2018-05-04 PROCEDURE — 93294 REM INTERROG EVL PM/LDLS PM: CPT | Performed by: INTERNAL MEDICINE

## 2018-05-04 PROCEDURE — 93296 REM INTERROG EVL PM/IDS: CPT | Performed by: INTERNAL MEDICINE

## 2018-05-29 ENCOUNTER — OFFICE VISIT (OUTPATIENT)
Dept: CARDIOLOGY CLINIC | Age: 83
End: 2018-05-29

## 2018-05-29 VITALS
DIASTOLIC BLOOD PRESSURE: 70 MMHG | HEIGHT: 68 IN | WEIGHT: 176.2 LBS | SYSTOLIC BLOOD PRESSURE: 126 MMHG | BODY MASS INDEX: 26.7 KG/M2 | HEART RATE: 82 BPM

## 2018-05-29 DIAGNOSIS — I49.5 SICK SINUS SYNDROME (HCC): ICD-10-CM

## 2018-05-29 DIAGNOSIS — I71.40 ABDOMINAL AORTIC ANEURYSM (AAA) WITHOUT RUPTURE: ICD-10-CM

## 2018-05-29 DIAGNOSIS — Z95.0 CARDIAC PACEMAKER: ICD-10-CM

## 2018-05-29 DIAGNOSIS — Z95.0 S/P BIVENTRICULAR CARDIAC PACEMAKER PROCEDURE: ICD-10-CM

## 2018-05-29 DIAGNOSIS — E78.2 MIXED HYPERLIPIDEMIA: ICD-10-CM

## 2018-05-29 DIAGNOSIS — I10 ESSENTIAL HYPERTENSION: Primary | ICD-10-CM

## 2018-05-29 DIAGNOSIS — R07.9 CHEST PAIN, UNSPECIFIED TYPE: ICD-10-CM

## 2018-05-29 DIAGNOSIS — I44.2 COMPLETE HEART BLOCK (HCC): ICD-10-CM

## 2018-05-29 DIAGNOSIS — I48.0 PAF (PAROXYSMAL ATRIAL FIBRILLATION) (HCC): ICD-10-CM

## 2018-05-29 PROCEDURE — 93000 ELECTROCARDIOGRAM COMPLETE: CPT | Performed by: INTERNAL MEDICINE

## 2018-05-29 PROCEDURE — 99213 OFFICE O/P EST LOW 20 MIN: CPT | Performed by: INTERNAL MEDICINE

## 2018-06-08 ENCOUNTER — TELEPHONE (OUTPATIENT)
Dept: CARDIOLOGY CLINIC | Age: 83
End: 2018-06-08

## 2018-07-23 ENCOUNTER — TELEPHONE (OUTPATIENT)
Dept: CARDIOLOGY CLINIC | Age: 83
End: 2018-07-23

## 2018-08-13 ENCOUNTER — TELEPHONE (OUTPATIENT)
Dept: CARDIOLOGY CLINIC | Age: 83
End: 2018-08-13

## 2018-08-14 ENCOUNTER — PROCEDURE VISIT (OUTPATIENT)
Dept: CARDIOLOGY CLINIC | Age: 83
End: 2018-08-14

## 2018-08-14 DIAGNOSIS — Z95.0 BIVENTRICULAR CARDIAC PACEMAKER IN SITU: Primary | ICD-10-CM

## 2018-08-14 DIAGNOSIS — I49.5 SICK SINUS SYNDROME (HCC): ICD-10-CM

## 2018-08-14 PROCEDURE — 93296 REM INTERROG EVL PM/IDS: CPT | Performed by: INTERNAL MEDICINE

## 2018-08-14 PROCEDURE — 93294 REM INTERROG EVL PM/LDLS PM: CPT | Performed by: INTERNAL MEDICINE

## 2018-08-30 ENCOUNTER — TELEPHONE (OUTPATIENT)
Dept: CARDIOLOGY CLINIC | Age: 83
End: 2018-08-30

## 2018-08-30 NOTE — TELEPHONE ENCOUNTER
Attempted to get tier exception for Eliquis 2.5 mg.  Per [facility] medicationis at lowest tier available. Medication $42.00 for 30 day supply and $135.00 for 90 day supply. Reference number [id number]. Attemted to reach patient to advise. Unable to leave message.

## 2018-09-17 ENCOUNTER — TELEPHONE (OUTPATIENT)
Dept: CARDIOLOGY CLINIC | Age: 83
End: 2018-09-17

## 2018-09-26 PROBLEM — N39.0 UTI (URINARY TRACT INFECTION): Status: RESOLVED | Noted: 2017-07-20 | Resolved: 2018-09-26

## 2018-09-27 ENCOUNTER — TELEPHONE (OUTPATIENT)
Dept: CARDIOLOGY CLINIC | Age: 83
End: 2018-09-27

## 2018-10-22 ENCOUNTER — TELEPHONE (OUTPATIENT)
Dept: CARDIOLOGY CLINIC | Age: 83
End: 2018-10-22

## 2018-11-20 ENCOUNTER — PROCEDURE VISIT (OUTPATIENT)
Dept: CARDIOLOGY CLINIC | Age: 83
End: 2018-11-20
Payer: COMMERCIAL

## 2018-11-20 ENCOUNTER — TELEPHONE (OUTPATIENT)
Dept: CARDIOLOGY CLINIC | Age: 83
End: 2018-11-20

## 2018-11-20 ENCOUNTER — PROCEDURE VISIT (OUTPATIENT)
Dept: CARDIOLOGY CLINIC | Age: 83
End: 2018-11-20

## 2018-11-20 VITALS — DIASTOLIC BLOOD PRESSURE: 74 MMHG | SYSTOLIC BLOOD PRESSURE: 132 MMHG | HEART RATE: 68 BPM

## 2018-11-20 DIAGNOSIS — Z95.0 BIVENTRICULAR CARDIAC PACEMAKER IN SITU: Primary | ICD-10-CM

## 2018-11-20 DIAGNOSIS — I49.5 SICK SINUS SYNDROME (HCC): ICD-10-CM

## 2018-11-20 PROCEDURE — 93294 REM INTERROG EVL PM/LDLS PM: CPT | Performed by: INTERNAL MEDICINE

## 2018-11-20 PROCEDURE — 93296 REM INTERROG EVL PM/IDS: CPT | Performed by: INTERNAL MEDICINE

## 2018-11-20 PROCEDURE — 93297 REM INTERROG DEV EVAL ICPMS: CPT | Performed by: INTERNAL MEDICINE

## 2018-11-20 PROCEDURE — 99999 PR OFFICE/OUTPT VISIT,PROCEDURE ONLY: CPT | Performed by: INTERNAL MEDICINE

## 2018-12-03 ENCOUNTER — APPOINTMENT (OUTPATIENT)
Dept: CT IMAGING | Age: 83
DRG: 389 | End: 2018-12-03
Payer: COMMERCIAL

## 2018-12-03 ENCOUNTER — HOSPITAL ENCOUNTER (INPATIENT)
Age: 83
LOS: 3 days | Discharge: HOME | DRG: 389 | End: 2018-12-06
Attending: EMERGENCY MEDICINE | Admitting: HOSPITALIST
Payer: COMMERCIAL

## 2018-12-03 ENCOUNTER — APPOINTMENT (OUTPATIENT)
Dept: GENERAL RADIOLOGY | Age: 83
DRG: 389 | End: 2018-12-03
Payer: COMMERCIAL

## 2018-12-03 DIAGNOSIS — K43.6 VENTRAL HERNIA WITH OBSTRUCTION AND WITHOUT GANGRENE: ICD-10-CM

## 2018-12-03 DIAGNOSIS — R53.81 MALAISE: ICD-10-CM

## 2018-12-03 DIAGNOSIS — K56.609 SBO (SMALL BOWEL OBSTRUCTION) (HCC): Primary | ICD-10-CM

## 2018-12-03 DIAGNOSIS — I71.40 ABDOMINAL AORTIC ANEURYSM (AAA) WITHOUT RUPTURE: ICD-10-CM

## 2018-12-03 DIAGNOSIS — K80.20 CALCULUS OF GALLBLADDER WITHOUT CHOLECYSTITIS WITHOUT OBSTRUCTION: ICD-10-CM

## 2018-12-03 DIAGNOSIS — R11.2 NAUSEA AND VOMITING, INTRACTABILITY OF VOMITING NOT SPECIFIED, UNSPECIFIED VOMITING TYPE: ICD-10-CM

## 2018-12-03 LAB
ALBUMIN SERPL-MCNC: 4.3 GM/DL (ref 3.4–5)
ALP BLD-CCNC: 108 IU/L (ref 40–129)
ALT SERPL-CCNC: 10 U/L (ref 10–40)
ANION GAP SERPL CALCULATED.3IONS-SCNC: 12 MMOL/L (ref 4–16)
AST SERPL-CCNC: 23 IU/L (ref 15–37)
BACTERIA: ABNORMAL /HPF
BASOPHILS ABSOLUTE: 0 K/CU MM
BASOPHILS RELATIVE PERCENT: 0.3 % (ref 0–1)
BILIRUB SERPL-MCNC: 0.7 MG/DL (ref 0–1)
BILIRUBIN URINE: NEGATIVE MG/DL
BLOOD, URINE: ABNORMAL
BUN BLDV-MCNC: 23 MG/DL (ref 6–23)
CALCIUM SERPL-MCNC: 9.3 MG/DL (ref 8.3–10.6)
CHLORIDE BLD-SCNC: 94 MMOL/L (ref 99–110)
CLARITY: CLEAR
CO2: 31 MMOL/L (ref 21–32)
COLOR: YELLOW
CREAT SERPL-MCNC: 1.1 MG/DL (ref 0.9–1.3)
DIFFERENTIAL TYPE: ABNORMAL
EOSINOPHILS ABSOLUTE: 0.1 K/CU MM
EOSINOPHILS RELATIVE PERCENT: 0.9 % (ref 0–3)
GFR AFRICAN AMERICAN: >60 ML/MIN/1.73M2
GFR NON-AFRICAN AMERICAN: >60 ML/MIN/1.73M2
GLUCOSE BLD-MCNC: 126 MG/DL (ref 70–99)
GLUCOSE, URINE: NEGATIVE MG/DL
HCT VFR BLD CALC: 43.3 % (ref 42–52)
HEMOGLOBIN: 14.6 GM/DL (ref 13.5–18)
IMMATURE NEUTROPHIL %: 0.1 % (ref 0–0.43)
KETONES, URINE: ABNORMAL MG/DL
LACTATE: 1 MMOL/L (ref 0.4–2)
LEUKOCYTE ESTERASE, URINE: ABNORMAL
LIPASE: 25 IU/L (ref 13–60)
LYMPHOCYTES ABSOLUTE: 0.5 K/CU MM
LYMPHOCYTES RELATIVE PERCENT: 6.4 % (ref 24–44)
MAGNESIUM: 1.8 MG/DL (ref 1.8–2.4)
MCH RBC QN AUTO: 33.7 PG (ref 27–31)
MCHC RBC AUTO-ENTMCNC: 33.7 % (ref 32–36)
MCV RBC AUTO: 100 FL (ref 78–100)
MONOCYTES ABSOLUTE: 0.3 K/CU MM
MONOCYTES RELATIVE PERCENT: 3.9 % (ref 0–4)
NITRITE URINE, QUANTITATIVE: NEGATIVE
NUCLEATED RBC %: 0 %
PDW BLD-RTO: 12.9 % (ref 11.7–14.9)
PH, URINE: 6 (ref 5–8)
PLATELET # BLD: 209 K/CU MM (ref 140–440)
PMV BLD AUTO: 9.6 FL (ref 7.5–11.1)
POTASSIUM SERPL-SCNC: 4.1 MMOL/L (ref 3.5–5.1)
PRO-BNP: 639.5 PG/ML
PROTEIN UA: NEGATIVE MG/DL
RBC # BLD: 4.33 M/CU MM (ref 4.6–6.2)
RBC URINE: 28 /HPF (ref 0–3)
SEGMENTED NEUTROPHILS ABSOLUTE COUNT: 6.6 K/CU MM
SEGMENTED NEUTROPHILS RELATIVE PERCENT: 88.4 % (ref 36–66)
SODIUM BLD-SCNC: 137 MMOL/L (ref 135–145)
SPECIFIC GRAVITY UA: 1.04 (ref 1–1.03)
SQUAMOUS EPITHELIAL: <1 /HPF
TOTAL CK: 110 IU/L (ref 38–174)
TOTAL IMMATURE NEUTOROPHIL: 0.01 K/CU MM
TOTAL NUCLEATED RBC: 0 K/CU MM
TOTAL PROTEIN: 7.5 GM/DL (ref 6.4–8.2)
TRICHOMONAS: ABNORMAL /HPF
TROPONIN T: <0.01 NG/ML
TROPONIN T: <0.01 NG/ML
UROBILINOGEN, URINE: NORMAL MG/DL (ref 0.2–1)
WBC # BLD: 7.5 K/CU MM (ref 4–10.5)
WBC UA: 4 /HPF (ref 0–2)

## 2018-12-03 PROCEDURE — 84484 ASSAY OF TROPONIN QUANT: CPT

## 2018-12-03 PROCEDURE — 83880 ASSAY OF NATRIURETIC PEPTIDE: CPT

## 2018-12-03 PROCEDURE — 2580000003 HC RX 258: Performed by: HOSPITALIST

## 2018-12-03 PROCEDURE — 83735 ASSAY OF MAGNESIUM: CPT

## 2018-12-03 PROCEDURE — S0028 INJECTION, FAMOTIDINE, 20 MG: HCPCS | Performed by: EMERGENCY MEDICINE

## 2018-12-03 PROCEDURE — 94761 N-INVAS EAR/PLS OXIMETRY MLT: CPT

## 2018-12-03 PROCEDURE — 6370000000 HC RX 637 (ALT 250 FOR IP): Performed by: HOSPITALIST

## 2018-12-03 PROCEDURE — 81001 URINALYSIS AUTO W/SCOPE: CPT

## 2018-12-03 PROCEDURE — 96374 THER/PROPH/DIAG INJ IV PUSH: CPT

## 2018-12-03 PROCEDURE — 2580000003 HC RX 258: Performed by: EMERGENCY MEDICINE

## 2018-12-03 PROCEDURE — 2500000003 HC RX 250 WO HCPCS: Performed by: EMERGENCY MEDICINE

## 2018-12-03 PROCEDURE — 2700000000 HC OXYGEN THERAPY PER DAY

## 2018-12-03 PROCEDURE — 6360000002 HC RX W HCPCS: Performed by: EMERGENCY MEDICINE

## 2018-12-03 PROCEDURE — 6360000002 HC RX W HCPCS: Performed by: HOSPITALIST

## 2018-12-03 PROCEDURE — 6370000000 HC RX 637 (ALT 250 FOR IP): Performed by: PHYSICIAN ASSISTANT

## 2018-12-03 PROCEDURE — 80053 COMPREHEN METABOLIC PANEL: CPT

## 2018-12-03 PROCEDURE — 2060000000 HC ICU INTERMEDIATE R&B

## 2018-12-03 PROCEDURE — 93005 ELECTROCARDIOGRAM TRACING: CPT | Performed by: EMERGENCY MEDICINE

## 2018-12-03 PROCEDURE — 85025 COMPLETE CBC W/AUTO DIFF WBC: CPT

## 2018-12-03 PROCEDURE — 96375 TX/PRO/DX INJ NEW DRUG ADDON: CPT

## 2018-12-03 PROCEDURE — 96376 TX/PRO/DX INJ SAME DRUG ADON: CPT

## 2018-12-03 PROCEDURE — 83605 ASSAY OF LACTIC ACID: CPT

## 2018-12-03 PROCEDURE — 6370000000 HC RX 637 (ALT 250 FOR IP): Performed by: EMERGENCY MEDICINE

## 2018-12-03 PROCEDURE — 74177 CT ABD & PELVIS W/CONTRAST: CPT

## 2018-12-03 PROCEDURE — 36415 COLL VENOUS BLD VENIPUNCTURE: CPT

## 2018-12-03 PROCEDURE — 93010 ELECTROCARDIOGRAM REPORT: CPT | Performed by: INTERNAL MEDICINE

## 2018-12-03 PROCEDURE — 6360000004 HC RX CONTRAST MEDICATION: Performed by: EMERGENCY MEDICINE

## 2018-12-03 PROCEDURE — 83690 ASSAY OF LIPASE: CPT

## 2018-12-03 PROCEDURE — 71045 X-RAY EXAM CHEST 1 VIEW: CPT

## 2018-12-03 PROCEDURE — 82550 ASSAY OF CK (CPK): CPT

## 2018-12-03 PROCEDURE — 99221 1ST HOSP IP/OBS SF/LOW 40: CPT | Performed by: INTERNAL MEDICINE

## 2018-12-03 PROCEDURE — 99291 CRITICAL CARE FIRST HOUR: CPT

## 2018-12-03 RX ORDER — TAMSULOSIN HYDROCHLORIDE 0.4 MG/1
0.4 CAPSULE ORAL DAILY
Status: DISCONTINUED | OUTPATIENT
Start: 2018-12-03 | End: 2018-12-06 | Stop reason: HOSPADM

## 2018-12-03 RX ORDER — SODIUM CHLORIDE 0.9 % (FLUSH) 0.9 %
10 SYRINGE (ML) INJECTION PRN
Status: DISCONTINUED | OUTPATIENT
Start: 2018-12-03 | End: 2018-12-06 | Stop reason: HOSPADM

## 2018-12-03 RX ORDER — PROMETHAZINE HYDROCHLORIDE 25 MG/ML
25 INJECTION, SOLUTION INTRAMUSCULAR; INTRAVENOUS ONCE
Status: COMPLETED | OUTPATIENT
Start: 2018-12-03 | End: 2018-12-03

## 2018-12-03 RX ORDER — POTASSIUM CHLORIDE 7.45 MG/ML
10 INJECTION INTRAVENOUS PRN
Status: DISCONTINUED | OUTPATIENT
Start: 2018-12-03 | End: 2018-12-06 | Stop reason: HOSPADM

## 2018-12-03 RX ORDER — MAGNESIUM SULFATE 1 G/100ML
1 INJECTION INTRAVENOUS PRN
Status: DISCONTINUED | OUTPATIENT
Start: 2018-12-03 | End: 2018-12-06 | Stop reason: HOSPADM

## 2018-12-03 RX ORDER — METOPROLOL TARTRATE 5 MG/5ML
2.5 INJECTION INTRAVENOUS EVERY 6 HOURS PRN
Status: DISCONTINUED | OUTPATIENT
Start: 2018-12-03 | End: 2018-12-06 | Stop reason: HOSPADM

## 2018-12-03 RX ORDER — SIMVASTATIN 40 MG
40 TABLET ORAL NIGHTLY
Status: DISCONTINUED | OUTPATIENT
Start: 2018-12-03 | End: 2018-12-06 | Stop reason: HOSPADM

## 2018-12-03 RX ORDER — OXYMETAZOLINE HYDROCHLORIDE 0.05 G/100ML
2 SPRAY NASAL ONCE
Status: DISCONTINUED | OUTPATIENT
Start: 2018-12-03 | End: 2018-12-06 | Stop reason: HOSPADM

## 2018-12-03 RX ORDER — ONDANSETRON 2 MG/ML
4 INJECTION INTRAMUSCULAR; INTRAVENOUS EVERY 30 MIN PRN
Status: DISCONTINUED | OUTPATIENT
Start: 2018-12-03 | End: 2018-12-03

## 2018-12-03 RX ORDER — 0.9 % SODIUM CHLORIDE 0.9 %
10 VIAL (ML) INJECTION PRN
Status: DISCONTINUED | OUTPATIENT
Start: 2018-12-03 | End: 2018-12-06 | Stop reason: HOSPADM

## 2018-12-03 RX ORDER — SODIUM CHLORIDE 9 MG/ML
INJECTION, SOLUTION INTRAVENOUS CONTINUOUS
Status: DISCONTINUED | OUTPATIENT
Start: 2018-12-03 | End: 2018-12-06

## 2018-12-03 RX ORDER — 0.9 % SODIUM CHLORIDE 0.9 %
1000 INTRAVENOUS SOLUTION INTRAVENOUS ONCE
Status: COMPLETED | OUTPATIENT
Start: 2018-12-03 | End: 2018-12-03

## 2018-12-03 RX ORDER — OMEPRAZOLE 20 MG/1
20 CAPSULE, DELAYED RELEASE ORAL DAILY
COMMUNITY
End: 2019-06-26 | Stop reason: SDUPTHER

## 2018-12-03 RX ORDER — ONDANSETRON 2 MG/ML
4 INJECTION INTRAMUSCULAR; INTRAVENOUS EVERY 6 HOURS PRN
Status: DISCONTINUED | OUTPATIENT
Start: 2018-12-03 | End: 2018-12-06 | Stop reason: HOSPADM

## 2018-12-03 RX ORDER — SODIUM CHLORIDE 0.9 % (FLUSH) 0.9 %
10 SYRINGE (ML) INJECTION EVERY 12 HOURS SCHEDULED
Status: DISCONTINUED | OUTPATIENT
Start: 2018-12-03 | End: 2018-12-06 | Stop reason: HOSPADM

## 2018-12-03 RX ORDER — ACETAMINOPHEN 500 MG
500 TABLET ORAL 3 TIMES DAILY
COMMUNITY
End: 2018-12-17

## 2018-12-03 RX ADMIN — ONDANSETRON 4 MG: 2 INJECTION INTRAMUSCULAR; INTRAVENOUS at 06:34

## 2018-12-03 RX ADMIN — CEFTRIAXONE 1 G: 1 INJECTION, POWDER, FOR SOLUTION INTRAMUSCULAR; INTRAVENOUS at 12:41

## 2018-12-03 RX ADMIN — ONDANSETRON 4 MG: 2 INJECTION INTRAMUSCULAR; INTRAVENOUS at 07:18

## 2018-12-03 RX ADMIN — IOPAMIDOL 75 ML: 755 INJECTION, SOLUTION INTRAVENOUS at 07:21

## 2018-12-03 RX ADMIN — FAMOTIDINE 20 MG: 10 INJECTION, SOLUTION INTRAVENOUS at 06:34

## 2018-12-03 RX ADMIN — SODIUM CHLORIDE 1000 ML: 9 INJECTION, SOLUTION INTRAVENOUS at 06:34

## 2018-12-03 RX ADMIN — TOPICAL ANESTHETIC: 200 SPRAY DENTAL; PERIODONTAL at 10:27

## 2018-12-03 RX ADMIN — PROMETHAZINE HYDROCHLORIDE 25 MG: 25 INJECTION INTRAMUSCULAR; INTRAVENOUS at 08:28

## 2018-12-03 RX ADMIN — SODIUM CHLORIDE, PRESERVATIVE FREE 10 ML: 5 INJECTION INTRAVENOUS at 12:42

## 2018-12-03 RX ADMIN — SODIUM CHLORIDE: 9 INJECTION, SOLUTION INTRAVENOUS at 12:41

## 2018-12-03 RX ADMIN — SIMVASTATIN 40 MG: 40 TABLET, FILM COATED ORAL at 20:21

## 2018-12-03 RX ADMIN — LIDOCAINE HYDROCHLORIDE: 20 JELLY TOPICAL at 10:26

## 2018-12-03 ASSESSMENT — PAIN SCALES - GENERAL
PAINLEVEL_OUTOF10: 0

## 2018-12-03 NOTE — ED PROVIDER NOTES
of cardiovascular stress test (3/5/10); History of chest x-ray (3/16/10); History of complete electrocardiogram (2/11/13); History of Doppler ultrasound (3/25/10); History of echocardiogram (3/18/10); Holter monitor, abnormal (11/10/10); Hyperlipidemia; Hypertension; Ileus ([address] Pacemaker; PAF (paroxysmal atrial fibrillation) ([address] Peritonitis ([address] Personal history of colonic polyps; Poor historian; Rectal bleeding; Skin cancer; Ulcerative (chronic) proctosigmoiditis ([address] and Wears glasses. Past surgical history:  has a past surgical history that includes hernia repair (Bilateral, 0 [facility]); Hemorrhoid surgery (2011); Colonoscopy (2/4/2013); knee surgery (Right, 1980s); Abdominal exploration surgery (2/4/2013); Appendectomy (2/4/2013); pacemaker placement (Right, 02/25/2013); Appendectomy (2/4/2013); colostomy (2/4/2013); Cystocopy (2/03/2014); AAA repair, endovascular (7/1/14); and Pacemaker insertion (Right, 12/13/2017). Home medications:   Prior to Admission medications    Medication Sig Start Date End Date Taking?  Authorizing Provider   apixaban (ELIQUIS) 2.5 MG TABS tablet Take 1 tablet by mouth 2 times daily 10/24/18   Laverne Espinoza MD   apixaban Charm Huguenin) 2.5 MG TABS tablet Take 1 tablet by mouth 2 times daily 7/23/18   Laverne Espinoza MD   ibuprofen (IBU) 600 MG tablet Take 1 tablet by mouth every 8 hours as needed for Pain 7/15/18 7/20/18  Stevie Coronado DO   metoprolol succinate (TOPROL XL) 25 MG extended release tablet Take 1 tablet by mouth daily 4/12/18   Laverne Espinoza MD   diltiazem (CARDIZEM 12 HR) 120 MG extended release capsule Take 120 mg by mouth daily    Historical Provider, MD   lisinopril-hydrochlorothiazide (PRINZIDE;ZESTORETIC) 10-12.5 MG per tablet Take 1 tablet by mouth daily    Historical Provider, MD   polyethyl glycol-propyl glycol 0.4-0.3 % (SYSTANE) 0.4-0.3 % ophthalmic solution 1 drop as needed for Dry Eyes    Historical Provider, MD   simvastatin (ZOCOR) 40 MG tablet Take 1 tablet by mouth nightly 10/21/16   Laverne Espinoza MD   sertraline (ZOLOFT) 50 MG tablet Take 50 mg by mouth daily    Historical Provider, MD   tamsulosin [facility]) 0.4 MG capsule Take 1 capsule by mouth daily 5/27/16   Angie Torres PA-C       Social history:  reports that he has never smoked. He has never used smokeless tobacco. He reports that he does not drink alcohol or use drugs. Family history:    Family History   Problem Relation Age of Onset    Stroke Mother         CVA    Heart Disease Mother     Cancer Brother         prostate    Cancer Brother         skin    Cancer Daughter         colon    Substance Abuse Son         Tobacco         Exam  BP (!) 161/98   Pulse 88   Temp 98.9 °F (37.2 °C) (Oral)   Resp 18   SpO2 96%   Nursing note and vitals reviewed. Constitutional: Well developed, well nourished. No acute distress. HENT:      Head: Normocephalic and atraumatic. Ears: External ears normal.      Nose: Nose normal.     Mouth: Membrane mucosa moist and pink. No posterior oropharynx erythema or tonsillar edema. Green bile noted on beard. Eyes: Anicteric sclera. No discharge, PERRL  Neck: Supple. Trachea midline. Cardiovascular: RRR, no murmurs, rubs, or gallops, radial pulses 2+ bilaterally. Pacemaker noted in right anterior chest wall. Pulmonary/Chest: Effort normal. No respiratory distress. CTAB. No stridor. No wheezes. No rales. Abdominal: Soft. Mild distention noted, nontender, no obvious hernia. No guarding, rebound tenderness, or evidence of ascites. : No CVA tenderness. Musculoskeletal: Moves all extremities. No gross deformity. Neurological: Alert and oriented to person, place, and time. Normal muscle tone. Skin: Warm and dry. No rash. Psychiatric: Normal mood and affect. Behavior is normal.      EKG   Please see Dr. Strauss note for EKG read.       Radiographs (if obtained):  [] The following radiograph was interpreted by

## 2018-12-03 NOTE — H&P
hours as needed for Pain 15 tablet 0    metoprolol succinate (TOPROL XL) 25 MG extended release tablet Take 1 tablet by mouth daily 30 tablet 6    diltiazem (CARDIZEM 12 HR) 120 MG extended release capsule Take 120 mg by mouth daily      lisinopril-hydrochlorothiazide (PRINZIDE;ZESTORETIC) 10-12.5 MG per tablet Take 1 tablet by mouth daily      polyethyl glycol-propyl glycol 0.4-0.3 % (SYSTANE) 0.4-0.3 % ophthalmic solution 1 drop as needed for Dry Eyes      simvastatin (ZOCOR) 40 MG tablet Take 1 tablet by mouth nightly 90 tablet 3    sertraline (ZOLOFT) 50 MG tablet Take 50 mg by mouth daily      tamsulosin (FLOMAX) 0.4 MG capsule Take 1 capsule by mouth daily 10 capsule 0         Allergies:  Codeine and Fentanyl    Social History:   Social History     Social History    Marital status:       Spouse name: N/A    Number of children: 3    Years of education: N/A     Occupational History    Retired      Social History Main Topics    Smoking status: Never Smoker    Smokeless tobacco: Never Used    Alcohol use No      Comment:      CAFFEINE: 1 Dr. Perez Doing daily    Drug use: No    Sexual activity: Yes     Partners: Female      Comment:      Other Topics Concern    Not on file     Social History Narrative    No narrative on file         Family History:   Family History   Problem Relation Age of Onset    Stroke Mother         CVA    Heart Disease Mother     Cancer Brother         prostate    Cancer Brother         skin    Cancer Daughter         colon    Substance Abuse Son         Tobacco       REVIEW OF SYSTEMS:  CONSTITUTIONAL:  negative  EYES:  negative  HEENT:  negative  RESPIRATORY:  negative  CARDIOVASCULAR:  negative  GASTROINTESTINAL:  positive for nausea and vomiting  ALLERGIC/IMMUNOLOGIC:  negative  ENDOCRINE:  negative  MUSCULOSKELETAL:  negative  NEUROLOGICAL:  negative  BEHAVIOR/PSYCH:  negative  PHYSICAL EXAM:    Vitals:  BP (!) 161/98   Pulse 88   Temp 98.9 °F (37.2 °C)

## 2018-12-03 NOTE — ED NOTES
This nurse informed patient we still needed a urine sample and he stated he would continue to try to provide a urine sample with the urinal      Kuldip Rosario RN  12/03/18 [id number]

## 2018-12-04 ENCOUNTER — APPOINTMENT (OUTPATIENT)
Dept: NUCLEAR MEDICINE | Age: 83
DRG: 389 | End: 2018-12-04
Payer: COMMERCIAL

## 2018-12-04 ENCOUNTER — APPOINTMENT (OUTPATIENT)
Dept: GENERAL RADIOLOGY | Age: 83
DRG: 389 | End: 2018-12-04
Payer: COMMERCIAL

## 2018-12-04 LAB
ANION GAP SERPL CALCULATED.3IONS-SCNC: 13 MMOL/L (ref 4–16)
BUN BLDV-MCNC: 25 MG/DL (ref 6–23)
CALCIUM SERPL-MCNC: 8.3 MG/DL (ref 8.3–10.6)
CHLORIDE BLD-SCNC: 99 MMOL/L (ref 99–110)
CO2: 29 MMOL/L (ref 21–32)
CREAT SERPL-MCNC: 1.2 MG/DL (ref 0.9–1.3)
GFR AFRICAN AMERICAN: >60 ML/MIN/1.73M2
GFR NON-AFRICAN AMERICAN: 57 ML/MIN/1.73M2
GLUCOSE BLD-MCNC: 89 MG/DL (ref 70–99)
HCT VFR BLD CALC: 37.7 % (ref 42–52)
HEMOGLOBIN: 12.7 GM/DL (ref 13.5–18)
LV EF: 48 %
LVEF MODALITY: NORMAL
MAGNESIUM: 1.9 MG/DL (ref 1.8–2.4)
MCH RBC QN AUTO: 33.8 PG (ref 27–31)
MCHC RBC AUTO-ENTMCNC: 33.7 % (ref 32–36)
MCV RBC AUTO: 100.3 FL (ref 78–100)
PDW BLD-RTO: 13.2 % (ref 11.7–14.9)
PLATELET # BLD: 183 K/CU MM (ref 140–440)
PMV BLD AUTO: 9.5 FL (ref 7.5–11.1)
POTASSIUM SERPL-SCNC: 3.5 MMOL/L (ref 3.5–5.1)
RBC # BLD: 3.76 M/CU MM (ref 4.6–6.2)
SODIUM BLD-SCNC: 141 MMOL/L (ref 135–145)
WBC # BLD: 5.2 K/CU MM (ref 4–10.5)

## 2018-12-04 PROCEDURE — 80048 BASIC METABOLIC PNL TOTAL CA: CPT

## 2018-12-04 PROCEDURE — 83735 ASSAY OF MAGNESIUM: CPT

## 2018-12-04 PROCEDURE — 78452 HT MUSCLE IMAGE SPECT MULT: CPT

## 2018-12-04 PROCEDURE — 6360000002 HC RX W HCPCS: Performed by: HOSPITALIST

## 2018-12-04 PROCEDURE — 6360000002 HC RX W HCPCS: Performed by: INTERNAL MEDICINE

## 2018-12-04 PROCEDURE — 2060000000 HC ICU INTERMEDIATE R&B

## 2018-12-04 PROCEDURE — 85027 COMPLETE CBC AUTOMATED: CPT

## 2018-12-04 PROCEDURE — 3430000000 HC RX DIAGNOSTIC RADIOPHARMACEUTICAL: Performed by: INTERNAL MEDICINE

## 2018-12-04 PROCEDURE — 2700000000 HC OXYGEN THERAPY PER DAY

## 2018-12-04 PROCEDURE — A9500 TC99M SESTAMIBI: HCPCS | Performed by: INTERNAL MEDICINE

## 2018-12-04 PROCEDURE — 74019 RADEX ABDOMEN 2 VIEWS: CPT

## 2018-12-04 PROCEDURE — 6370000000 HC RX 637 (ALT 250 FOR IP): Performed by: HOSPITALIST

## 2018-12-04 PROCEDURE — 36415 COLL VENOUS BLD VENIPUNCTURE: CPT

## 2018-12-04 PROCEDURE — 6370000000 HC RX 637 (ALT 250 FOR IP): Performed by: INTERNAL MEDICINE

## 2018-12-04 PROCEDURE — 93017 CV STRESS TEST TRACING ONLY: CPT

## 2018-12-04 PROCEDURE — 94761 N-INVAS EAR/PLS OXIMETRY MLT: CPT

## 2018-12-04 PROCEDURE — 99233 SBSQ HOSP IP/OBS HIGH 50: CPT | Performed by: INTERNAL MEDICINE

## 2018-12-04 PROCEDURE — 2580000003 HC RX 258: Performed by: HOSPITALIST

## 2018-12-04 RX ORDER — DILTIAZEM HYDROCHLORIDE 120 MG/1
120 CAPSULE, COATED, EXTENDED RELEASE ORAL DAILY
Status: DISCONTINUED | OUTPATIENT
Start: 2018-12-05 | End: 2018-12-06 | Stop reason: HOSPADM

## 2018-12-04 RX ORDER — LISINOPRIL 10 MG/1
10 TABLET ORAL DAILY
Status: DISCONTINUED | OUTPATIENT
Start: 2018-12-05 | End: 2018-12-05

## 2018-12-04 RX ORDER — METOPROLOL SUCCINATE 25 MG/1
25 TABLET, EXTENDED RELEASE ORAL DAILY
Status: DISCONTINUED | OUTPATIENT
Start: 2018-12-04 | End: 2018-12-06 | Stop reason: HOSPADM

## 2018-12-04 RX ADMIN — Medication 30 MILLICURIE: at 14:30

## 2018-12-04 RX ADMIN — SODIUM CHLORIDE: 9 INJECTION, SOLUTION INTRAVENOUS at 12:15

## 2018-12-04 RX ADMIN — REGADENOSON 0.4 MG: 0.08 INJECTION, SOLUTION INTRAVENOUS at 09:37

## 2018-12-04 RX ADMIN — TAMSULOSIN HYDROCHLORIDE 0.4 MG: 0.4 CAPSULE ORAL at 12:14

## 2018-12-04 RX ADMIN — SIMVASTATIN 40 MG: 40 TABLET, FILM COATED ORAL at 21:27

## 2018-12-04 RX ADMIN — Medication 10 MILLICURIE: at 14:31

## 2018-12-04 RX ADMIN — SODIUM CHLORIDE, PRESERVATIVE FREE 10 ML: 5 INJECTION INTRAVENOUS at 21:27

## 2018-12-04 RX ADMIN — CEFTRIAXONE 1 G: 1 INJECTION, POWDER, FOR SOLUTION INTRAMUSCULAR; INTRAVENOUS at 12:15

## 2018-12-04 RX ADMIN — METOPROLOL SUCCINATE 25 MG: 25 TABLET, EXTENDED RELEASE ORAL at 17:44

## 2018-12-04 ASSESSMENT — PAIN SCALES - GENERAL: PAINLEVEL_OUTOF10: 0

## 2018-12-04 NOTE — CONSULTS
Pt off of floor at present. Discussed care with Mariaa Meehan RN. Pt has existing ostomy for approximately 5 years, denies needs at this time.   Juan Carlos Myers RN
smoked. He has never used smokeless tobacco. He reports that he does not drink alcohol or use drugs. Family history:  As Reviewed family history includes Cancer in his brother, brother, and daughter; Heart Disease in his mother; Stroke in his mother; Substance Abuse in his son.     Allergies   Allergen Reactions    Codeine Anaphylaxis    Fentanyl Other (See Comments)     Hypotension          sodium chloride (PF) 0.9 % injection 10 mL PRN   oxymetazoline (AFRIN) 0.05 % nasal spray 2 spray Once   simvastatin (ZOCOR) tablet 40 mg Nightly   tamsulosin (FLOMAX) capsule 0.4 mg Daily   sodium chloride flush 0.9 % injection 10 mL 2 times per day   sodium chloride flush 0.9 % injection 10 mL PRN   ondansetron (ZOFRAN) injection 4 mg Q6H PRN   metoprolol (LOPRESSOR) injection 2.5 mg Q6H PRN   0.9 % sodium chloride infusion Continuous   magnesium sulfate 1 g in dextrose 5% 100 mL IVPB PRN   potassium chloride 10 mEq/100 mL IVPB (Peripheral Line) PRN   cefTRIAXone (ROCEPHIN) 1 g in dextrose 5 % 50 mL IVPB Q24H       Review of Systems:   · Constitutional: No Fever or Weight Loss  · Eyes: No Decreased Vision  · ENT: No Headaches, Hearing Loss or Vertigo  · Cardiovascular: No chest pain, dyspnea on exertion, palpitations or loss of consciousness  · Respiratory: No cough or wheezing    · Gastrointestinal: No abdominal pain, appetite loss, blood in stools, constipation, c/o diarrhea N/V  · Genitourinary: No dysuria, trouble voiding, or hematuria  · Musculoskeletal:  No gait disturbance, weakness or joint complaints  · Integumentary: No rash or pruritis  · Neurological: No TIA or stroke symptoms  · Psychiatric: No anxiety or depression  · Endocrine: No malaise, fatigue or temperature intolerance  · Hematologic/Lymphatic: No bleeding problems, blood clots or swollen lymph nodes  · Allergic/Immunologic: No nasal congestion or hives    Physical Examination:    BP (!) 145/89   Pulse 83   Temp 97.9 °F (36.6 °C) (Oral)   Resp 16
appropriate with exam  HEENT:  Has non-icteric sclerae, no JVD, NG light bilious-odorous  CTA bilaterally, with good excursion  RRR, no murmurs appreciated  ABDOMEN:  Soft, liver edge and spleen edge not palpable, NT/ND, stoma pink/viable-large parastomal hernia, soft-non-tender, stool in appliance and at os  Ext:  Warm    Imaging:  CT Scan:  Yes, today, films and report reviewed  Impression   1. Small bowel obstruction related to left ventral hernia which contains   dilated loops of small bowel.  Additionally, there is a short segment of   small bowel wall thickening raising the possibility for infectious or   inflammatory enteritis. 2. Status post partial colectomy.  Right lower quadrant colostomy is present. 3. There is right ventral hernia containing knuckle of the transverse colon. 4. Fusiform infrarenal abdominal aortic aneurysm measures approximately 6.3 x   5.2 cm with postsurgical changes from aorto bi-iliac endograft repair. 5. Cholelithiasis. Labs:  CBC:    Lab Results   Component Value Date    WBC 7.5 12/03/2018    HGB 14.6 12/03/2018    HCT 43.3 12/03/2018     12/03/2018     BMP:    Lab Results   Component Value Date     12/03/2018    K 4.1 12/03/2018    CL 94 12/03/2018    CO2 31 12/03/2018    BUN 23 12/03/2018    CREATININE 1.1 12/03/2018    CALCIUM 9.3 12/03/2018     PT/INR:    Lab Results   Component Value Date    PROTIME 14.1 03/13/2018    PROTIME 11.3 02/06/2012    INR 1.22 03/13/2018     LFT:    Lab Results   Component Value Date    LABALBU 4.3 12/03/2018    BILITOT 0.7 12/03/2018    AST 23 12/03/2018    ALT 10 12/03/2018    ALKPHOS 108 12/03/2018       Assessment and Plan:  The patient presents with signs and symptoms consistent with partial small bowel obstruction and dehydration. Patient has NG tube and will continue decompression, IV hydration, and continue to assess. If failure to improve may need exploration and parastomal hernia repair. Will follow.

## 2018-12-05 LAB
ANION GAP SERPL CALCULATED.3IONS-SCNC: 7 MMOL/L (ref 4–16)
BUN BLDV-MCNC: 20 MG/DL (ref 6–23)
CALCIUM SERPL-MCNC: 8 MG/DL (ref 8.3–10.6)
CHLORIDE BLD-SCNC: 104 MMOL/L (ref 99–110)
CO2: 31 MMOL/L (ref 21–32)
CREAT SERPL-MCNC: 1.1 MG/DL (ref 0.9–1.3)
GFR AFRICAN AMERICAN: >60 ML/MIN/1.73M2
GFR NON-AFRICAN AMERICAN: >60 ML/MIN/1.73M2
GLUCOSE BLD-MCNC: 96 MG/DL (ref 70–99)
HCT VFR BLD CALC: 35.3 % (ref 42–52)
HEMOGLOBIN: 11.6 GM/DL (ref 13.5–18)
MCH RBC QN AUTO: 33.5 PG (ref 27–31)
MCHC RBC AUTO-ENTMCNC: 32.9 % (ref 32–36)
MCV RBC AUTO: 102 FL (ref 78–100)
PDW BLD-RTO: 13.3 % (ref 11.7–14.9)
PLATELET # BLD: 162 K/CU MM (ref 140–440)
PMV BLD AUTO: 9.6 FL (ref 7.5–11.1)
POTASSIUM SERPL-SCNC: 3.8 MMOL/L (ref 3.5–5.1)
RBC # BLD: 3.46 M/CU MM (ref 4.6–6.2)
SODIUM BLD-SCNC: 142 MMOL/L (ref 135–145)
WBC # BLD: 4.7 K/CU MM (ref 4–10.5)

## 2018-12-05 PROCEDURE — 6370000000 HC RX 637 (ALT 250 FOR IP): Performed by: INTERNAL MEDICINE

## 2018-12-05 PROCEDURE — 6360000002 HC RX W HCPCS: Performed by: HOSPITALIST

## 2018-12-05 PROCEDURE — 6370000000 HC RX 637 (ALT 250 FOR IP): Performed by: HOSPITALIST

## 2018-12-05 PROCEDURE — 80048 BASIC METABOLIC PNL TOTAL CA: CPT

## 2018-12-05 PROCEDURE — 36415 COLL VENOUS BLD VENIPUNCTURE: CPT

## 2018-12-05 PROCEDURE — 2580000003 HC RX 258: Performed by: HOSPITALIST

## 2018-12-05 PROCEDURE — 85027 COMPLETE CBC AUTOMATED: CPT

## 2018-12-05 PROCEDURE — 99232 SBSQ HOSP IP/OBS MODERATE 35: CPT | Performed by: INTERNAL MEDICINE

## 2018-12-05 PROCEDURE — 94761 N-INVAS EAR/PLS OXIMETRY MLT: CPT

## 2018-12-05 PROCEDURE — 1200000000 HC SEMI PRIVATE

## 2018-12-05 PROCEDURE — 99211 OFF/OP EST MAY X REQ PHY/QHP: CPT

## 2018-12-05 RX ORDER — LISINOPRIL AND HYDROCHLOROTHIAZIDE 12.5; 1 MG/1; MG/1
1 TABLET ORAL DAILY
Status: DISCONTINUED | OUTPATIENT
Start: 2018-12-05 | End: 2018-12-06 | Stop reason: HOSPADM

## 2018-12-05 RX ADMIN — CEFTRIAXONE 1 G: 1 INJECTION, POWDER, FOR SOLUTION INTRAMUSCULAR; INTRAVENOUS at 10:02

## 2018-12-05 RX ADMIN — SERTRALINE HYDROCHLORIDE 50 MG: 50 TABLET ORAL at 12:18

## 2018-12-05 RX ADMIN — DILTIAZEM HYDROCHLORIDE 120 MG: 120 CAPSULE, COATED, EXTENDED RELEASE ORAL at 10:02

## 2018-12-05 RX ADMIN — LISINOPRIL AND HYDROCHLOROTHIAZIDE 1 TABLET: 12.5; 1 TABLET ORAL at 12:18

## 2018-12-05 RX ADMIN — APIXABAN 2.5 MG: 2.5 TABLET, FILM COATED ORAL at 20:27

## 2018-12-05 RX ADMIN — METOPROLOL SUCCINATE 25 MG: 25 TABLET, EXTENDED RELEASE ORAL at 10:02

## 2018-12-05 RX ADMIN — SIMVASTATIN 40 MG: 40 TABLET, FILM COATED ORAL at 20:27

## 2018-12-05 RX ADMIN — SODIUM CHLORIDE: 9 INJECTION, SOLUTION INTRAVENOUS at 10:01

## 2018-12-05 RX ADMIN — TAMSULOSIN HYDROCHLORIDE 0.4 MG: 0.4 CAPSULE ORAL at 10:02

## 2018-12-05 ASSESSMENT — PAIN SCALES - GENERAL
PAINLEVEL_OUTOF10: 0

## 2018-12-05 NOTE — CARE COORDINATION
Attempted to meet with patient; wound care is providing care. CM will revisit. [address]    3962 Met with patent; he is awake and able to participate. Patient reports that he is from home; independent prior to admission. He is able to drive and has working vehicle. His daughter assist with needs. He has a PCP and insurance that assist with Rx when needed. Patient denies needs at this time. Reinforced CM role; will remain available should needs arise.  Jean Paul Hu RN

## 2018-12-05 NOTE — PROGRESS NOTES
Cardiolite reveals ? IMI but no ischemia. LVEF is 48 %.
Z95.0    Pacemaker lead malfunction T82.110A    Complete heart block (HCC) I44.2    SBO (small bowel obstruction) ([facility]) K56.609    Bowel obstruction ([address] K56.609    Pre-op evaluation Z01.818       Assessment/ PLAN:  See Summary & Plans as described above.

## 2018-12-06 VITALS
OXYGEN SATURATION: 93 % | WEIGHT: 176 LBS | HEIGHT: 62 IN | DIASTOLIC BLOOD PRESSURE: 80 MMHG | HEART RATE: 78 BPM | TEMPERATURE: 97.9 F | RESPIRATION RATE: 18 BRPM | BODY MASS INDEX: 32.39 KG/M2 | SYSTOLIC BLOOD PRESSURE: 163 MMHG

## 2018-12-06 LAB
ANION GAP SERPL CALCULATED.3IONS-SCNC: 8 MMOL/L (ref 4–16)
BUN BLDV-MCNC: 19 MG/DL (ref 6–23)
CALCIUM SERPL-MCNC: 8 MG/DL (ref 8.3–10.6)
CHLORIDE BLD-SCNC: 100 MMOL/L (ref 99–110)
CO2: 29 MMOL/L (ref 21–32)
CREAT SERPL-MCNC: 1.2 MG/DL (ref 0.9–1.3)
GFR AFRICAN AMERICAN: >60 ML/MIN/1.73M2
GFR NON-AFRICAN AMERICAN: 57 ML/MIN/1.73M2
GLUCOSE BLD-MCNC: 117 MG/DL (ref 70–99)
HCT VFR BLD CALC: 35.7 % (ref 42–52)
HEMOGLOBIN: 11.4 GM/DL (ref 13.5–18)
MAGNESIUM: 1.8 MG/DL (ref 1.8–2.4)
MCH RBC QN AUTO: 33.2 PG (ref 27–31)
MCHC RBC AUTO-ENTMCNC: 31.9 % (ref 32–36)
MCV RBC AUTO: 104.1 FL (ref 78–100)
PDW BLD-RTO: 13.2 % (ref 11.7–14.9)
PLATELET # BLD: 166 K/CU MM (ref 140–440)
PMV BLD AUTO: 9.5 FL (ref 7.5–11.1)
POTASSIUM SERPL-SCNC: 3.4 MMOL/L (ref 3.5–5.1)
RBC # BLD: 3.43 M/CU MM (ref 4.6–6.2)
SODIUM BLD-SCNC: 137 MMOL/L (ref 135–145)
WBC # BLD: 4.7 K/CU MM (ref 4–10.5)

## 2018-12-06 PROCEDURE — 6370000000 HC RX 637 (ALT 250 FOR IP): Performed by: INTERNAL MEDICINE

## 2018-12-06 PROCEDURE — 2580000003 HC RX 258: Performed by: HOSPITALIST

## 2018-12-06 PROCEDURE — 6370000000 HC RX 637 (ALT 250 FOR IP): Performed by: HOSPITALIST

## 2018-12-06 PROCEDURE — 85027 COMPLETE CBC AUTOMATED: CPT

## 2018-12-06 PROCEDURE — 6360000002 HC RX W HCPCS: Performed by: HOSPITALIST

## 2018-12-06 PROCEDURE — 36415 COLL VENOUS BLD VENIPUNCTURE: CPT

## 2018-12-06 PROCEDURE — 80048 BASIC METABOLIC PNL TOTAL CA: CPT

## 2018-12-06 PROCEDURE — 83735 ASSAY OF MAGNESIUM: CPT

## 2018-12-06 PROCEDURE — 94761 N-INVAS EAR/PLS OXIMETRY MLT: CPT

## 2018-12-06 RX ADMIN — DILTIAZEM HYDROCHLORIDE 120 MG: 120 CAPSULE, COATED, EXTENDED RELEASE ORAL at 10:29

## 2018-12-06 RX ADMIN — SERTRALINE HYDROCHLORIDE 50 MG: 50 TABLET ORAL at 10:29

## 2018-12-06 RX ADMIN — APIXABAN 2.5 MG: 2.5 TABLET, FILM COATED ORAL at 10:29

## 2018-12-06 RX ADMIN — METOPROLOL SUCCINATE 25 MG: 25 TABLET, EXTENDED RELEASE ORAL at 14:31

## 2018-12-06 RX ADMIN — LISINOPRIL AND HYDROCHLOROTHIAZIDE 1 TABLET: 12.5; 1 TABLET ORAL at 14:31

## 2018-12-06 RX ADMIN — TAMSULOSIN HYDROCHLORIDE 0.4 MG: 0.4 CAPSULE ORAL at 10:29

## 2018-12-06 RX ADMIN — CEFTRIAXONE 1 G: 1 INJECTION, POWDER, FOR SOLUTION INTRAMUSCULAR; INTRAVENOUS at 10:32

## 2018-12-06 ASSESSMENT — PAIN SCALES - GENERAL: PAINLEVEL_OUTOF10: 0

## 2018-12-07 LAB
EKG ATRIAL RATE: 80 BPM
EKG DIAGNOSIS: NORMAL
EKG P AXIS: 64 DEGREES
EKG P-R INTERVAL: 176 MS
EKG Q-T INTERVAL: 446 MS
EKG QRS DURATION: 148 MS
EKG QTC CALCULATION (BAZETT): 514 MS
EKG R AXIS: 232 DEGREES
EKG T AXIS: 68 DEGREES
EKG VENTRICULAR RATE: 80 BPM

## 2018-12-17 ENCOUNTER — OFFICE VISIT (OUTPATIENT)
Dept: CARDIOLOGY CLINIC | Age: 83
End: 2018-12-17
Payer: COMMERCIAL

## 2018-12-17 VITALS
SYSTOLIC BLOOD PRESSURE: 132 MMHG | HEART RATE: 86 BPM | HEIGHT: 67 IN | DIASTOLIC BLOOD PRESSURE: 80 MMHG | BODY MASS INDEX: 27.78 KG/M2 | WEIGHT: 177 LBS

## 2018-12-17 DIAGNOSIS — I10 ESSENTIAL HYPERTENSION: ICD-10-CM

## 2018-12-17 DIAGNOSIS — E78.2 MIXED HYPERLIPIDEMIA: ICD-10-CM

## 2018-12-17 DIAGNOSIS — I50.22 CHRONIC SYSTOLIC HEART FAILURE (HCC): ICD-10-CM

## 2018-12-17 DIAGNOSIS — I48.0 PAF (PAROXYSMAL ATRIAL FIBRILLATION) (HCC): Primary | ICD-10-CM

## 2018-12-17 DIAGNOSIS — Z95.0 CARDIAC PACEMAKER: ICD-10-CM

## 2018-12-17 PROCEDURE — 99214 OFFICE O/P EST MOD 30 MIN: CPT | Performed by: NURSE PRACTITIONER

## 2018-12-17 NOTE — PROGRESS NOTES
Continue withRemote monitoring     HTN    Controlled  Current medications include: lisinopril/HCTZ   He is to continue current medications   advised low salt diet     Hyperlipidemia  At or near goal Yes per patient- labs are done with PCP  Current medications include: zocor  He is to continue current medications      Paroxysmal Atrial fib    Rate controlled yes. He is  on anticoagulation. CHADSVASC 3  Current medications include: eliquis Cardizem toprol  He is to continue current medications      Chronic Systolic HF    EF per echo 12/13/17 35-40%  Stable - appears well compenstated    Patient is encouraged to exercise even a brisk walk for 30 minutes at least 3 to 4 times a week. Lifestyle and risk factor modificatons discussed. Various goals are discussed and questions answered. Continue current medications. Appropriate prescriptions are addressed. Questions answered and patient verbalizes understanding. Call for any problems, questions, or concerns.   Ov 3 mo

## 2018-12-20 ENCOUNTER — TELEPHONE (OUTPATIENT)
Dept: CARDIOLOGY CLINIC | Age: 83
End: 2018-12-20

## 2019-01-25 DIAGNOSIS — I48.0 PAF (PAROXYSMAL ATRIAL FIBRILLATION) (HCC): Primary | ICD-10-CM

## 2019-02-19 ENCOUNTER — TELEPHONE (OUTPATIENT)
Dept: CARDIOLOGY CLINIC | Age: 84
End: 2019-02-19

## 2019-02-19 DIAGNOSIS — I48.0 PAF (PAROXYSMAL ATRIAL FIBRILLATION) (HCC): ICD-10-CM

## 2019-02-28 ENCOUNTER — PROCEDURE VISIT (OUTPATIENT)
Dept: CARDIOLOGY CLINIC | Age: 84
End: 2019-02-28
Payer: COMMERCIAL

## 2019-02-28 DIAGNOSIS — Z95.0 BIVENTRICULAR CARDIAC PACEMAKER IN SITU: Primary | ICD-10-CM

## 2019-02-28 DIAGNOSIS — I49.5 SICK SINUS SYNDROME (HCC): ICD-10-CM

## 2019-02-28 PROCEDURE — 93294 REM INTERROG EVL PM/LDLS PM: CPT | Performed by: INTERNAL MEDICINE

## 2019-02-28 PROCEDURE — 93296 REM INTERROG EVL PM/IDS: CPT | Performed by: INTERNAL MEDICINE

## 2019-03-28 DIAGNOSIS — I48.0 PAF (PAROXYSMAL ATRIAL FIBRILLATION) (HCC): ICD-10-CM

## 2019-05-03 ENCOUNTER — OFFICE VISIT (OUTPATIENT)
Dept: CARDIOLOGY CLINIC | Age: 84
End: 2019-05-03
Payer: COMMERCIAL

## 2019-05-03 VITALS
HEART RATE: 80 BPM | DIASTOLIC BLOOD PRESSURE: 82 MMHG | BODY MASS INDEX: 27.69 KG/M2 | HEIGHT: 67 IN | SYSTOLIC BLOOD PRESSURE: 130 MMHG | WEIGHT: 176.4 LBS

## 2019-05-03 DIAGNOSIS — I71.40 ABDOMINAL AORTIC ANEURYSM (AAA) WITHOUT RUPTURE: Primary | ICD-10-CM

## 2019-05-03 DIAGNOSIS — Z95.0 CARDIAC PACEMAKER: ICD-10-CM

## 2019-05-03 DIAGNOSIS — I50.22 CHRONIC SYSTOLIC HEART FAILURE (HCC): ICD-10-CM

## 2019-05-03 DIAGNOSIS — I10 ESSENTIAL HYPERTENSION: ICD-10-CM

## 2019-05-03 DIAGNOSIS — I48.0 PAF (PAROXYSMAL ATRIAL FIBRILLATION) (HCC): ICD-10-CM

## 2019-05-03 DIAGNOSIS — E78.2 MIXED HYPERLIPIDEMIA: ICD-10-CM

## 2019-05-03 PROCEDURE — 99213 OFFICE O/P EST LOW 20 MIN: CPT | Performed by: NURSE PRACTITIONER

## 2019-05-03 RX ORDER — DILTIAZEM HYDROCHLORIDE 120 MG/1
120 CAPSULE, EXTENDED RELEASE ORAL DAILY
Qty: 30 CAPSULE | Refills: 5 | Status: SHIPPED | OUTPATIENT
Start: 2019-05-03 | End: 2019-06-26 | Stop reason: SDUPTHER

## 2019-05-03 NOTE — PROGRESS NOTES
Quinten Ly  [address], [address] [facility],Third Floor  Phone: [phone]    Fax [phone]                  Delano Richey MD, Shan Denver, MD, [address] [facility], MD, Asuncion Calender, MD Francies Rathke, MD Mendel Boroughs, MD  Tomás Newtono, [facility], APRN      5/3/2019    RE: Nile Mallory  (3/23/1930)                               TO:  Dr. Woody Curry MD  The primary cardiologist is Dr. Emil Rodriguez    CC: chronic HFrEF  HTN PAF  BiVpacemaker      HPI:    Thank you for involving me in taking care of your patient Nile Mallory. He is a 80y.o. year old male with a history of chronic HFrEF  HTN PAF  Pacemaker/ BiV and is being seen in the office today. He reports that he is feeling well. There is no chest pain or SOB. He denies any PND or orthopnea. He denies any palpitations or dizziness. He is active and works in his yard. He has not taken the Caredizem or prinzide as he ran out. His right sided pacer pocket is intact. There is a good radial pulse.          Vitals:    05/03/19 1333   BP: 130/82   Pulse: 80       Current Outpatient Medications   Medication Sig Dispense Refill    apixaban (ELIQUIS) 2.5 MG TABS tablet Take 1 tablet by mouth 2 times daily 56 tablet 0    omeprazole (PRILOSEC) 20 MG delayed release capsule Take 20 mg by mouth daily      metoprolol succinate (TOPROL XL) 25 MG extended release tablet Take 1 tablet by mouth daily 30 tablet 6    polyethyl glycol-propyl glycol 0.4-0.3 % (SYSTANE) 0.4-0.3 % ophthalmic solution 1 drop as needed for Dry Eyes      simvastatin (ZOCOR) 40 MG tablet Take 1 tablet by mouth nightly 90 tablet 3    sertraline (ZOLOFT) 50 MG tablet Take 50 mg by mouth daily      tamsulosin (FLOMAX) 0.4 MG capsule Take 1 capsule by mouth daily 10 capsule 0    diltiazem (CARDIZEM 12 HR) 120 MG extended release capsule Take 120 mg by mouth daily  lisinopril-hydrochlorothiazide (PRINZIDE;ZESTORETIC) 10-12.5 MG per tablet Take 1 tablet by mouth daily       No current facility-administered medications for this visit. Allergies: Codeine and Fentanyl  Past Medical History:   Diagnosis Date    AAA (abdominal aortic aneurysm) [facility])     Surgery scheduled for 7/1/2014 with Dr. Morteza Medellin.  Arthritis     generalized    AV block, 1st degree     Back pain     BPH (benign prostatic hyperplasia)     Bradycardia     Colon polyps     Colostomy in place [facility])     Full incontinence of feces     colostomy    Glaucoma     H/O cardiovascular stress test 12/27/2013    cardiolite-EF56%, apical hypokinesis is seen, cannot exlude apical Tyler ischemia    H/O echocardiogram 02/11/13    EF60% Mildly hypertropied LV. Mild MR & TR. Mild Pulm HTN. Mild Aortic insuff.  H/O echocardiogram 10/27/2016      LV hypertrophy with moderately reduced LV systolic function in addition to diastolic dysfunction. Mil pulmonary htn, mild tricuspid regurg. Mild Mitral regurg.  History of cardiovascular stress test 3/5/10    No angina or ischemic EKG changes are noted with Lexiscan. The cardiolite study demonstrated normal perfusion in all segments of the myocardium with an intact left ventricular systolic function. The resting sestamibi dose is 10.8, stress is 32.5. EF 66%    History of chest x-ray 3/16/10    The chest is considered nonacute. There is cardiomegaly noted. COPD.  History of complete electrocardiogram 2/11/13    History of Doppler ultrasound 3/25/10    venous doppler- Technically good venous ultrasound study negative for DVT in both lower extremities. Normal compressibility,color flow doppler pattern and spectral doppler pattern demonstrated thoughout.  History of echocardiogram 3/18/10    Boarderline LV dilatation with concentric hypertrophy. Low normal systolic and abnormal diastolic function. Mild mitral and trace tricuspid regurgitation.  Mild aortic root loss, no blood in stools, constipation, or diarrhea, No heartburn  · Genitourinary: No dysuria, trouble voiding, or hematuria  · Musculoskeletal:  back pain- No: myalgia No,  Arthralgia- Yes  · Integumentary: No rash or pruritis  · Neurological: No TIA or stroke symptoms  · Psychiatric: Anxiety- No: depression-  No   · Endocrine: No malaise-No, fatigue No,- no temperature intolerance  · Hematologic/Lymphatic: No bleeding problems, blood clots or swollen lymph nodes  · Allergic/Immunologic: No nasal congestion or hives    Objective:      Physical Exam:  /82   Pulse 80   Ht 5' 7\" (1.702 m)   Wt 176 lb 6.4 oz (80 kg)   BMI 27.63 kg/m²   Wt Readings from Last 3 Encounters:   05/03/19 176 lb 6.4 oz (80 kg)   12/17/18 177 lb (80.3 kg)   12/04/18 176 lb (79.8 kg)     Body mass index is 27.63 kg/m². GENERAL - Alert, oriented, pleasant, in no apparent distress. Head unremarkable  Eyes - pupils equal and reactive to light - bilateral conjunctiva are pink: sclera are white   ENT - external ears intact, nose is intact:  tongue is midline pink and moist  Neck - Supple. No jugular venous distention noted. No carotid bruits appreciated. Cardiovascular - Normal S1 and S2:  murmur appreciated No, No gallop. Regular rate- Yes,  rhythm regular-Yes. Extremities - No cyanosis, clubbing, no edema to lower legs. Pulmonary - No respiratory distress. No wheezes or rales. Chest is clear  Pulses: Bilateral radial and pedal pulses normal  Abdomen -  Soft no tenderness, non distended   Musculoskeletal - Normal movement of all extremities   Neurologic - alert and oriented: There are no gross focal neurologic abnormalities.    Skin-  No rash: No echymosis   Affect- normal mood and pleasant     DATA:  Lab Results   Component Value Date    CKTOTAL 110 12/03/2018    TROPONINI 0.016 02/15/2013     BNP:    Lab Results   Component Value Date     02/07/2013     PT/INR:  No results found for: PTINR  No results found for: LABA1C  Lab Results   Component Value Date    CHOL 160 07/22/2014    TRIG 117 07/22/2014    HDL 38 (L) 07/22/2014    LDLCALC 99 07/22/2014     Lab Results   Component Value Date    ALT 10 12/03/2018    AST 23 12/03/2018     TSH:  No results found for: TSH      Assessment/ Plan:    Patient seen, interviewed and examined. Testing was reviewed. HTN    Controlled  Current medications include: toprol   He is to continue current medications   advised low salt diet   Testing ordered:  no    Hyperlipidemia  At or near goal Yes  Current medications include: zocor  He is to continue current medications      Atrial fib   H/o Pacemaker -doing remote monitoring  - pacer check today shows increase in atrial fib burden-    Rate controlled yes. He is  on anticoagulation. Current medications include: eliquis 2.5 mg bid  Toprol   He is to continue current medications   Will resume his Cardizem -to assist with his rate control / atrial fib burden. CHF  Appears euvolemic   Cont with  B blocker- he has not taken his ACEI/diuretic therapy -will re-initiate on next OV     Monitor weight and I/O's     Patient is encouraged to exercise. Lifestyle and risk factor modificatons discussed. Various goals are discussed and questions answered. Continue current medications. Appropriate prescriptions are addressed. Questions answered and patient verbalizes understanding. Call for any problems, questions, or concerns.

## 2019-05-22 ENCOUNTER — TELEPHONE (OUTPATIENT)
Dept: CARDIOLOGY CLINIC | Age: 84
End: 2019-05-22

## 2019-05-22 DIAGNOSIS — I48.0 PAF (PAROXYSMAL ATRIAL FIBRILLATION) (HCC): ICD-10-CM

## 2019-06-04 ENCOUNTER — PROCEDURE VISIT (OUTPATIENT)
Dept: CARDIOLOGY CLINIC | Age: 84
End: 2019-06-04
Payer: COMMERCIAL

## 2019-06-04 ENCOUNTER — TELEPHONE (OUTPATIENT)
Dept: CARDIOLOGY CLINIC | Age: 84
End: 2019-06-04

## 2019-06-04 DIAGNOSIS — I49.5 SICK SINUS SYNDROME (HCC): ICD-10-CM

## 2019-06-04 DIAGNOSIS — Z95.0 BIVENTRICULAR CARDIAC PACEMAKER IN SITU: Primary | ICD-10-CM

## 2019-06-04 PROCEDURE — 93296 REM INTERROG EVL PM/IDS: CPT | Performed by: INTERNAL MEDICINE

## 2019-06-04 PROCEDURE — 93294 REM INTERROG EVL PM/LDLS PM: CPT | Performed by: INTERNAL MEDICINE

## 2019-06-04 NOTE — LETTER
Cardiology [address]  Phone: [phone]  Fax: [phone]    6/4/2019        Austin Luque  [place] [address]            Dear Marissa Mcgowan: This is your [facility] schedule. You can rossy your calendar with these dates. Remember that your device is wireless and should automatically do these checks while you are sleeping. If for any reason I do not get your transmission then I will call you and ask that you send a manual transmission. If you have any questions or concerns, please call and ask for Arcenio Drew at [phone]. Thank you.

## 2019-06-07 RX ORDER — ACETAMINOPHEN 500 MG
500 TABLET ORAL EVERY 6 HOURS PRN
COMMUNITY
End: 2019-08-27

## 2019-06-07 RX ORDER — LATANOPROST 50 UG/ML
1 SOLUTION/ DROPS OPHTHALMIC NIGHTLY
COMMUNITY
End: 2019-06-26

## 2019-06-18 ENCOUNTER — TELEPHONE (OUTPATIENT)
Dept: CARDIOLOGY CLINIC | Age: 84
End: 2019-06-18

## 2019-06-18 DIAGNOSIS — I48.0 PAF (PAROXYSMAL ATRIAL FIBRILLATION) (HCC): ICD-10-CM

## 2019-06-26 ENCOUNTER — OFFICE VISIT (OUTPATIENT)
Dept: FAMILY MEDICINE CLINIC | Age: 84
End: 2019-06-26
Payer: COMMERCIAL

## 2019-06-26 VITALS
WEIGHT: 174 LBS | HEART RATE: 80 BPM | HEIGHT: 63 IN | BODY MASS INDEX: 30.83 KG/M2 | SYSTOLIC BLOOD PRESSURE: 120 MMHG | DIASTOLIC BLOOD PRESSURE: 78 MMHG

## 2019-06-26 DIAGNOSIS — I50.22 CHRONIC SYSTOLIC HEART FAILURE (HCC): ICD-10-CM

## 2019-06-26 DIAGNOSIS — I10 ESSENTIAL HYPERTENSION: Primary | ICD-10-CM

## 2019-06-26 DIAGNOSIS — D64.9 ANEMIA, UNSPECIFIED TYPE: ICD-10-CM

## 2019-06-26 DIAGNOSIS — E78.00 PURE HYPERCHOLESTEROLEMIA: ICD-10-CM

## 2019-06-26 DIAGNOSIS — F32.A DEPRESSION, UNSPECIFIED DEPRESSION TYPE: ICD-10-CM

## 2019-06-26 PROCEDURE — 99214 OFFICE O/P EST MOD 30 MIN: CPT | Performed by: FAMILY MEDICINE

## 2019-06-26 RX ORDER — METOPROLOL SUCCINATE 25 MG/1
25 TABLET, EXTENDED RELEASE ORAL DAILY
Qty: 90 TABLET | Refills: 1 | Status: SHIPPED | OUTPATIENT
Start: 2019-06-26 | End: 2019-07-24 | Stop reason: SDUPTHER

## 2019-06-26 RX ORDER — TAMSULOSIN HYDROCHLORIDE 0.4 MG/1
0.4 CAPSULE ORAL DAILY
Qty: 90 CAPSULE | Refills: 1 | Status: SHIPPED | OUTPATIENT
Start: 2019-06-26 | End: 2019-09-25

## 2019-06-26 RX ORDER — DILTIAZEM HYDROCHLORIDE 120 MG/1
120 CAPSULE, EXTENDED RELEASE ORAL DAILY
Qty: 90 CAPSULE | Refills: 1 | Status: ON HOLD | OUTPATIENT
Start: 2019-06-26 | End: 2019-09-06 | Stop reason: HOSPADM

## 2019-06-26 RX ORDER — SIMVASTATIN 40 MG
40 TABLET ORAL NIGHTLY
Qty: 90 TABLET | Refills: 1 | Status: SHIPPED | OUTPATIENT
Start: 2019-06-26 | End: 2019-07-24 | Stop reason: SDUPTHER

## 2019-06-26 RX ORDER — MULTIVIT WITH MINERALS/LUTEIN
1000 TABLET ORAL DAILY
COMMUNITY

## 2019-06-26 RX ORDER — OMEPRAZOLE 20 MG/1
20 CAPSULE, DELAYED RELEASE ORAL DAILY
Qty: 90 CAPSULE | Refills: 1 | Status: ON HOLD | OUTPATIENT
Start: 2019-06-26 | End: 2019-09-06 | Stop reason: HOSPADM

## 2019-06-26 RX ORDER — BRIMONIDINE TARTRATE, TIMOLOL MALEATE 2; 5 MG/ML; MG/ML
1 SOLUTION/ DROPS OPHTHALMIC EVERY 12 HOURS
COMMUNITY

## 2019-06-26 ASSESSMENT — ENCOUNTER SYMPTOMS
ABDOMINAL PAIN: 0
SHORTNESS OF BREATH: 0
EYES NEGATIVE: 1
SORE THROAT: 0
SINUS PRESSURE: 0
CHEST TIGHTNESS: 0
ALLERGIC/IMMUNOLOGIC NEGATIVE: 1
COUGH: 0
DIARRHEA: 0
RHINORRHEA: 0
GASTROINTESTINAL NEGATIVE: 1
RESPIRATORY NEGATIVE: 1
CONSTIPATION: 0

## 2019-06-26 ASSESSMENT — PATIENT HEALTH QUESTIONNAIRE - PHQ9
2. FEELING DOWN, DEPRESSED OR HOPELESS: 0
1. LITTLE INTEREST OR PLEASURE IN DOING THINGS: 0
SUM OF ALL RESPONSES TO PHQ9 QUESTIONS 1 & 2: 0
SUM OF ALL RESPONSES TO PHQ QUESTIONS 1-9: 0
SUM OF ALL RESPONSES TO PHQ QUESTIONS 1-9: 0

## 2019-06-26 NOTE — PROGRESS NOTES
6/26/2019    [address] [facility]    No chief complaint on file. HPI  History was obtained from patient. Nuria Copeland is a 80 y.o. male who presents today after 4 months. He doesn't check his blood pressures at home much, will start to take them more often. Reading in office today was great. Doesn't take any iron for his Anemia, denies any black stools or abdominal pain. He wears tall socks with elastic, helps edema. Nuria Copeland goes to the grocery store about once a week, he buys fruits and healthy foods. Admits to eating his cookies and ice cream. Other [facility]. Denies any extra salt. Follows with Cardiology for his AAA. Sertraline for Depression is doing well. Refills sent today. REVIEW OF SYMPTOMS    Review of Systems   Constitutional: Negative. Negative for chills and fever. HENT: Negative. Negative for rhinorrhea, sinus pressure and sore throat. Eyes: Negative. Respiratory: Negative. Negative for cough, chest tightness and shortness of breath. Cardiovascular: Negative. Gastrointestinal: Negative. Negative for abdominal pain, constipation and diarrhea. Endocrine: Negative. Genitourinary: Negative. Negative for dysuria and frequency. Musculoskeletal: Negative. Negative for myalgias. Skin: Negative. Allergic/Immunologic: Negative. Neurological: Negative. Hematological: Negative. Psychiatric/Behavioral: Negative. PAST MEDICAL HISTORY  Past Medical History:   Diagnosis Date    AAA [facility]abdominal aortic aneurysm[facility])     Surgery scheduled for 7/1/2014 with Dr. Bj Stroud.     Arthritis     generalized    AV block, 1st degree     Back pain     Bradycardia     Colon polyps     [facility]     Glaucoma     H/O cardiovascular stress test 12/27/2013    cardiolite-EF56%, apical hypokinesis is seen, cannot exlude apical Tyler ischemia    History of cardiovascular stress test 3/5/10    No angina or ischemic EKG changes are noted with Lexiscan. The cardiolite study demonstrated normal perfusion in all segments of the myocardium with an intact left ventricular systolic function. The resting sestamibi dose is 10.8, stress is 32.5. EF 66%    History of chest x-ray 3/16/10    The chest is considered nonacute. There is cardiomegaly noted. COPD.  History of Doppler ultrasound 3/25/10    venous doppler- Technically good venous ultrasound study negative for DVT in both lower extremities. Normal compressibility,color flow doppler pattern and spectral doppler pattern demonstrated thoughout.  History of echocardiogram 3/18/10    Boarderline LV dilatation with concentric hypertrophy. Low normal systolic and abnormal diastolic function. Mild mitral and trace tricuspid regurgitation. Mild aortic root and bilateral dilatation.  Holter monitor, abnormal 11/10/10    11/10/2010- 24 HR- Abnormal holter revealing some significant brasycardia's and wenckebach phenomenon. Therefore, clinical  correlation is recommend.  Ileus ([address]     Pacemaker     PAF (paroxysmal atrial fibrillation) (HCC)     Peritonitis ([address]     Personal history of colonic polyps     Poor historian     Skin cancer     face    Ulcerative (chronic) proctosigmoiditis ([address]     Wears glasses        FAMILY HISTORY  Family History   Problem Relation Age of Onset    Stroke Mother         CVA    Heart Disease Mother     Cancer Brother         prostate    Cancer Brother         skin    Cancer Daughter         colon    Substance Abuse Son         Tobacco       SOCIAL HISTORY  Social History     Socioeconomic History    Marital status:       Spouse name: None    Number of children: 3    Years of education: None    Highest education level: None   Occupational History    Occupation: Retired   Social Needs    Financial resource strain: None    Food insecurity:     Worry: None     Inability: None    Transportation needs:     Medical: None Skin: Skin is warm and dry. No rash noted. He is not diaphoretic. No erythema. No pallor. Psychiatric: He has a normal mood and affect. Thought content normal.   Nursing note and vitals reviewed. ASSESSMENT & PLAN    1. Essential hypertension  Continue meds. Refill meds  - diltiazem (CARDIZEM 12 HR) 120 MG extended release capsule; Take 1 capsule by mouth daily  Dispense: 90 capsule; Refill: 1  - metoprolol succinate (TOPROL XL) 25 MG extended release tablet; Take 1 tablet by mouth daily  Dispense: 90 tablet; Refill: 1    2. Chronic systolic heart failure ([address]  Patient asymptomatic without signs of excess fluid. Continue present meds. 3. Anemia, unspecified type  Will check a CBC on follow-up. 4. Pure hypercholesterolemia  We will check labs on follow-up continue the same for now. Patient is doing well  - simvastatin (ZOCOR) 40 MG tablet; Take 1 tablet by mouth nightly  Dispense: 90 tablet; Refill: 1    5. Depression, unspecified depression type  Same  - sertraline (ZOLOFT) 50 MG tablet; Take 1 tablet by mouth daily  Dispense: 90 tablet; Refill: 1        Return in about 6 months (around 12/26/2019). Electronically signed by Tianna Harrison on 6/26/2019      Scribe Authentication Statement  I, Cassy Martínez, scribed portions of this documentation for and in the presence of Vandana Keys MD on 6/26/19 at 3:02 PM.     Camryn Burroughs MD, personally performed the service described in this documentation as scribed by Cassy Martínez MA in my presence and it is both accurate and complete.

## 2019-07-15 ENCOUNTER — TELEPHONE (OUTPATIENT)
Dept: CARDIOLOGY CLINIC | Age: 84
End: 2019-07-15

## 2019-07-15 DIAGNOSIS — I48.0 PAF (PAROXYSMAL ATRIAL FIBRILLATION) (HCC): ICD-10-CM

## 2019-07-24 ENCOUNTER — TELEPHONE (OUTPATIENT)
Dept: FAMILY MEDICINE CLINIC | Age: 84
End: 2019-07-24

## 2019-07-24 DIAGNOSIS — E78.00 PURE HYPERCHOLESTEROLEMIA: ICD-10-CM

## 2019-07-24 DIAGNOSIS — I10 ESSENTIAL HYPERTENSION: ICD-10-CM

## 2019-07-24 RX ORDER — SIMVASTATIN 40 MG
40 TABLET ORAL NIGHTLY
Qty: 90 TABLET | Refills: 1 | Status: SHIPPED | OUTPATIENT
Start: 2019-07-24 | End: 2019-09-25

## 2019-07-24 RX ORDER — METOPROLOL SUCCINATE 25 MG/1
25 TABLET, EXTENDED RELEASE ORAL DAILY
Qty: 90 TABLET | Refills: 1 | Status: SHIPPED | OUTPATIENT
Start: 2019-07-24 | End: 2019-08-26 | Stop reason: SDUPTHER

## 2019-08-12 ENCOUNTER — TELEPHONE (OUTPATIENT)
Dept: CARDIOLOGY CLINIC | Age: 84
End: 2019-08-12

## 2019-08-12 DIAGNOSIS — I48.0 PAF (PAROXYSMAL ATRIAL FIBRILLATION) (HCC): ICD-10-CM

## 2019-08-16 ENCOUNTER — HOSPITAL ENCOUNTER (INPATIENT)
Age: 84
LOS: 3 days | Discharge: HOME HEALTH | DRG: 394 | End: 2019-08-19
Attending: EMERGENCY MEDICINE | Admitting: HOSPITALIST
Payer: COMMERCIAL

## 2019-08-16 ENCOUNTER — APPOINTMENT (OUTPATIENT)
Dept: CT IMAGING | Age: 84
DRG: 394 | End: 2019-08-16
Payer: COMMERCIAL

## 2019-08-16 DIAGNOSIS — N39.0 URINARY TRACT INFECTION WITHOUT HEMATURIA, SITE UNSPECIFIED: ICD-10-CM

## 2019-08-16 DIAGNOSIS — K56.609 SBO (SMALL BOWEL OBSTRUCTION) (HCC): ICD-10-CM

## 2019-08-16 DIAGNOSIS — K43.3 PARASTOMAL HERNIA WITH OBSTRUCTION AND WITHOUT GANGRENE: Primary | ICD-10-CM

## 2019-08-16 DIAGNOSIS — R11.2 NAUSEA AND VOMITING, INTRACTABILITY OF VOMITING NOT SPECIFIED, UNSPECIFIED VOMITING TYPE: ICD-10-CM

## 2019-08-16 DIAGNOSIS — I50.22 CHRONIC SYSTOLIC HEART FAILURE (HCC): ICD-10-CM

## 2019-08-16 LAB
ALBUMIN SERPL-MCNC: 4.2 GM/DL (ref 3.4–5)
ALP BLD-CCNC: 114 IU/L (ref 40–129)
ALT SERPL-CCNC: 11 U/L (ref 10–40)
ANION GAP SERPL CALCULATED.3IONS-SCNC: 12 MMOL/L (ref 4–16)
AST SERPL-CCNC: 20 IU/L (ref 15–37)
BACTERIA: ABNORMAL /HPF
BASOPHILS ABSOLUTE: 0 K/CU MM
BASOPHILS RELATIVE PERCENT: 0.2 % (ref 0–1)
BILIRUB SERPL-MCNC: 0.8 MG/DL (ref 0–1)
BILIRUBIN URINE: NEGATIVE MG/DL
BLOOD, URINE: ABNORMAL
BUN BLDV-MCNC: 27 MG/DL (ref 6–23)
CALCIUM SERPL-MCNC: 9.6 MG/DL (ref 8.3–10.6)
CHLORIDE BLD-SCNC: 97 MMOL/L (ref 99–110)
CLARITY: CLEAR
CO2: 33 MMOL/L (ref 21–32)
COLOR: YELLOW
CREAT SERPL-MCNC: 1.2 MG/DL (ref 0.9–1.3)
DIFFERENTIAL TYPE: ABNORMAL
EOSINOPHILS ABSOLUTE: 0 K/CU MM
EOSINOPHILS RELATIVE PERCENT: 0 % (ref 0–3)
GFR AFRICAN AMERICAN: >60 ML/MIN/1.73M2
GFR NON-AFRICAN AMERICAN: 57 ML/MIN/1.73M2
GLUCOSE BLD-MCNC: 171 MG/DL (ref 70–99)
GLUCOSE, URINE: NEGATIVE MG/DL
HCT VFR BLD CALC: 41.8 % (ref 42–52)
HEMOGLOBIN: 14.1 GM/DL (ref 13.5–18)
IMMATURE NEUTROPHIL %: 0.4 % (ref 0–0.43)
KETONES, URINE: ABNORMAL MG/DL
LACTATE: 1.2 MMOL/L (ref 0.4–2)
LACTATE: ABNORMAL MMOL/L (ref 0.4–2)
LACTIC ACID, SEPSIS: 1 MMOL/L (ref 0.5–1.9)
LACTIC ACID, SEPSIS: 1 MMOL/L (ref 0.5–1.9)
LEUKOCYTE ESTERASE, URINE: ABNORMAL
LIPASE: 19 IU/L (ref 13–60)
LYMPHOCYTES ABSOLUTE: 0.2 K/CU MM
LYMPHOCYTES RELATIVE PERCENT: 2.4 % (ref 24–44)
MCH RBC QN AUTO: 33.7 PG (ref 27–31)
MCHC RBC AUTO-ENTMCNC: 33.7 % (ref 32–36)
MCV RBC AUTO: 99.8 FL (ref 78–100)
MONOCYTES ABSOLUTE: 0.5 K/CU MM
MONOCYTES RELATIVE PERCENT: 4.8 % (ref 0–4)
MUCUS: ABNORMAL HPF
NITRITE URINE, QUANTITATIVE: POSITIVE
NUCLEATED RBC %: 0 %
PDW BLD-RTO: 13 % (ref 11.7–14.9)
PH, URINE: 7 (ref 5–8)
PLATELET # BLD: 205 K/CU MM (ref 140–440)
PMV BLD AUTO: 9.7 FL (ref 7.5–11.1)
POTASSIUM SERPL-SCNC: 3.5 MMOL/L (ref 3.5–5.1)
PROTEIN UA: 30 MG/DL
RBC # BLD: 4.19 M/CU MM (ref 4.6–6.2)
RBC URINE: 34 /HPF (ref 0–3)
SEGMENTED NEUTROPHILS ABSOLUTE COUNT: 8.7 K/CU MM
SEGMENTED NEUTROPHILS RELATIVE PERCENT: 92.2 % (ref 36–66)
SODIUM BLD-SCNC: 142 MMOL/L (ref 135–145)
SPECIFIC GRAVITY UA: 1.02 (ref 1–1.03)
SQUAMOUS EPITHELIAL: 1 /HPF
TOTAL IMMATURE NEUTOROPHIL: 0.04 K/CU MM
TOTAL NUCLEATED RBC: 0 K/CU MM
TOTAL PROTEIN: 7.3 GM/DL (ref 6.4–8.2)
TRICHOMONAS: ABNORMAL /HPF
TROPONIN T: <0.01 NG/ML
UROBILINOGEN, URINE: 1 MG/DL (ref 0.2–1)
WBC # BLD: 9.4 K/CU MM (ref 4–10.5)
WBC CLUMP: ABNORMAL /HPF
WBC UA: 49 /HPF (ref 0–2)

## 2019-08-16 PROCEDURE — 87077 CULTURE AEROBIC IDENTIFY: CPT

## 2019-08-16 PROCEDURE — 96361 HYDRATE IV INFUSION ADD-ON: CPT

## 2019-08-16 PROCEDURE — 2580000003 HC RX 258: Performed by: NURSE PRACTITIONER

## 2019-08-16 PROCEDURE — 74177 CT ABD & PELVIS W/CONTRAST: CPT

## 2019-08-16 PROCEDURE — 80053 COMPREHEN METABOLIC PANEL: CPT

## 2019-08-16 PROCEDURE — 6360000002 HC RX W HCPCS: Performed by: PHYSICIAN ASSISTANT

## 2019-08-16 PROCEDURE — 1200000000 HC SEMI PRIVATE

## 2019-08-16 PROCEDURE — 6370000000 HC RX 637 (ALT 250 FOR IP): Performed by: NURSE PRACTITIONER

## 2019-08-16 PROCEDURE — 93010 ELECTROCARDIOGRAM REPORT: CPT | Performed by: INTERNAL MEDICINE

## 2019-08-16 PROCEDURE — 87186 SC STD MICRODIL/AGAR DIL: CPT

## 2019-08-16 PROCEDURE — 6360000002 HC RX W HCPCS: Performed by: NURSE PRACTITIONER

## 2019-08-16 PROCEDURE — 83605 ASSAY OF LACTIC ACID: CPT

## 2019-08-16 PROCEDURE — 2580000003 HC RX 258: Performed by: PHYSICIAN ASSISTANT

## 2019-08-16 PROCEDURE — 36415 COLL VENOUS BLD VENIPUNCTURE: CPT

## 2019-08-16 PROCEDURE — 83690 ASSAY OF LIPASE: CPT

## 2019-08-16 PROCEDURE — 84484 ASSAY OF TROPONIN QUANT: CPT

## 2019-08-16 PROCEDURE — C9113 INJ PANTOPRAZOLE SODIUM, VIA: HCPCS | Performed by: NURSE PRACTITIONER

## 2019-08-16 PROCEDURE — 81001 URINALYSIS AUTO W/SCOPE: CPT

## 2019-08-16 PROCEDURE — 96365 THER/PROPH/DIAG IV INF INIT: CPT

## 2019-08-16 PROCEDURE — 87086 URINE CULTURE/COLONY COUNT: CPT

## 2019-08-16 PROCEDURE — 99285 EMERGENCY DEPT VISIT HI MDM: CPT

## 2019-08-16 PROCEDURE — 93005 ELECTROCARDIOGRAM TRACING: CPT | Performed by: PHYSICIAN ASSISTANT

## 2019-08-16 PROCEDURE — 85025 COMPLETE CBC W/AUTO DIFF WBC: CPT

## 2019-08-16 PROCEDURE — 96375 TX/PRO/DX INJ NEW DRUG ADDON: CPT

## 2019-08-16 PROCEDURE — 6360000004 HC RX CONTRAST MEDICATION: Performed by: PHYSICIAN ASSISTANT

## 2019-08-16 RX ORDER — ONDANSETRON 2 MG/ML
4 INJECTION INTRAMUSCULAR; INTRAVENOUS EVERY 30 MIN PRN
Status: DISCONTINUED | OUTPATIENT
Start: 2019-08-16 | End: 2019-08-16 | Stop reason: ALTCHOICE

## 2019-08-16 RX ORDER — LATANOPROST 50 UG/ML
1 SOLUTION/ DROPS OPHTHALMIC NIGHTLY
COMMUNITY
End: 2019-09-25

## 2019-08-16 RX ORDER — ACETAMINOPHEN 325 MG/1
650 TABLET ORAL EVERY 4 HOURS PRN
Status: DISCONTINUED | OUTPATIENT
Start: 2019-08-16 | End: 2019-08-19 | Stop reason: HOSPADM

## 2019-08-16 RX ORDER — ONDANSETRON 2 MG/ML
4 INJECTION INTRAMUSCULAR; INTRAVENOUS EVERY 6 HOURS PRN
Status: DISCONTINUED | OUTPATIENT
Start: 2019-08-16 | End: 2019-08-19 | Stop reason: HOSPADM

## 2019-08-16 RX ORDER — LATANOPROST 50 UG/ML
1 SOLUTION/ DROPS OPHTHALMIC NIGHTLY
Status: DISCONTINUED | OUTPATIENT
Start: 2019-08-16 | End: 2019-08-19 | Stop reason: HOSPADM

## 2019-08-16 RX ORDER — LISINOPRIL AND HYDROCHLOROTHIAZIDE 12.5; 1 MG/1; MG/1
1 TABLET ORAL DAILY
Status: CANCELLED | OUTPATIENT
Start: 2019-08-16

## 2019-08-16 RX ORDER — 0.9 % SODIUM CHLORIDE 0.9 %
1000 INTRAVENOUS SOLUTION INTRAVENOUS ONCE
Status: COMPLETED | OUTPATIENT
Start: 2019-08-16 | End: 2019-08-16

## 2019-08-16 RX ORDER — ASCORBIC ACID 500 MG
1000 TABLET ORAL DAILY
Status: CANCELLED | OUTPATIENT
Start: 2019-08-16

## 2019-08-16 RX ORDER — METOPROLOL SUCCINATE 25 MG/1
25 TABLET, EXTENDED RELEASE ORAL DAILY
Status: CANCELLED | OUTPATIENT
Start: 2019-08-16

## 2019-08-16 RX ORDER — PANTOPRAZOLE SODIUM 40 MG/1
40 TABLET, DELAYED RELEASE ORAL
Status: CANCELLED | OUTPATIENT
Start: 2019-08-17

## 2019-08-16 RX ORDER — SODIUM CHLORIDE 0.9 % (FLUSH) 0.9 %
10 SYRINGE (ML) INJECTION PRN
Status: DISCONTINUED | OUTPATIENT
Start: 2019-08-16 | End: 2019-08-19 | Stop reason: HOSPADM

## 2019-08-16 RX ORDER — PANTOPRAZOLE SODIUM 40 MG/10ML
40 INJECTION, POWDER, LYOPHILIZED, FOR SOLUTION INTRAVENOUS DAILY
Status: DISCONTINUED | OUTPATIENT
Start: 2019-08-16 | End: 2019-08-19 | Stop reason: HOSPADM

## 2019-08-16 RX ORDER — TAMSULOSIN HYDROCHLORIDE 0.4 MG/1
0.4 CAPSULE ORAL DAILY
Status: CANCELLED | OUTPATIENT
Start: 2019-08-16

## 2019-08-16 RX ORDER — SODIUM CHLORIDE 9 MG/ML
INJECTION, SOLUTION INTRAVENOUS CONTINUOUS
Status: DISCONTINUED | OUTPATIENT
Start: 2019-08-16 | End: 2019-08-17

## 2019-08-16 RX ORDER — SODIUM CHLORIDE 0.9 % (FLUSH) 0.9 %
10 SYRINGE (ML) INJECTION EVERY 12 HOURS SCHEDULED
Status: DISCONTINUED | OUTPATIENT
Start: 2019-08-16 | End: 2019-08-19 | Stop reason: HOSPADM

## 2019-08-16 RX ORDER — SIMVASTATIN 40 MG
40 TABLET ORAL NIGHTLY
Status: CANCELLED | OUTPATIENT
Start: 2019-08-16

## 2019-08-16 RX ORDER — DILTIAZEM HYDROCHLORIDE 60 MG/1
120 CAPSULE, EXTENDED RELEASE ORAL DAILY
Status: CANCELLED | OUTPATIENT
Start: 2019-08-16

## 2019-08-16 RX ORDER — BRIMONIDINE TARTRATE, TIMOLOL MALEATE 2; 5 MG/ML; MG/ML
1 SOLUTION/ DROPS OPHTHALMIC EVERY 12 HOURS
Status: DISCONTINUED | OUTPATIENT
Start: 2019-08-16 | End: 2019-08-16 | Stop reason: CLARIF

## 2019-08-16 RX ORDER — BRIMONIDINE TARTRATE 2 MG/ML
1 SOLUTION/ DROPS OPHTHALMIC 2 TIMES DAILY
Status: DISCONTINUED | OUTPATIENT
Start: 2019-08-16 | End: 2019-08-19 | Stop reason: HOSPADM

## 2019-08-16 RX ORDER — TIMOLOL MALEATE 5 MG/ML
1 SOLUTION/ DROPS OPHTHALMIC 2 TIMES DAILY
Status: DISCONTINUED | OUTPATIENT
Start: 2019-08-16 | End: 2019-08-19 | Stop reason: HOSPADM

## 2019-08-16 RX ADMIN — LATANOPROST 1 DROP: 50 SOLUTION OPHTHALMIC at 21:57

## 2019-08-16 RX ADMIN — BRIMONIDINE TARTRATE 1 DROP: 2 SOLUTION/ DROPS OPHTHALMIC at 21:56

## 2019-08-16 RX ADMIN — IOPAMIDOL 75 ML: 755 INJECTION, SOLUTION INTRAVENOUS at 10:52

## 2019-08-16 RX ADMIN — CEFTRIAXONE SODIUM 1 G: 1 INJECTION, POWDER, FOR SOLUTION INTRAMUSCULAR; INTRAVENOUS at 13:07

## 2019-08-16 RX ADMIN — SODIUM CHLORIDE, PRESERVATIVE FREE 10 ML: 5 INJECTION INTRAVENOUS at 18:02

## 2019-08-16 RX ADMIN — TIMOLOL MALEATE 1 DROP: 5 SOLUTION OPHTHALMIC at 21:57

## 2019-08-16 RX ADMIN — SODIUM CHLORIDE: 9 INJECTION, SOLUTION INTRAVENOUS at 18:00

## 2019-08-16 RX ADMIN — ONDANSETRON 4 MG: 2 INJECTION INTRAMUSCULAR; INTRAVENOUS at 09:56

## 2019-08-16 RX ADMIN — SODIUM CHLORIDE, PRESERVATIVE FREE 10 ML: 5 INJECTION INTRAVENOUS at 21:55

## 2019-08-16 RX ADMIN — SODIUM CHLORIDE 1000 ML: 900 INJECTION INTRAVENOUS at 09:53

## 2019-08-16 RX ADMIN — PANTOPRAZOLE SODIUM 40 MG: 40 INJECTION, POWDER, FOR SOLUTION INTRAVENOUS at 18:01

## 2019-08-16 RX ADMIN — Medication 10 ML: at 10:52

## 2019-08-16 ASSESSMENT — PAIN SCALES - GENERAL
PAINLEVEL_OUTOF10: 0

## 2019-08-16 NOTE — ED PROVIDER NOTES
eMERGENCY dEPARTMENT eNCOUnter         [facility]     PCP: Erwin Yañez MD    CHIEF COMPLAINT    Chief Complaint   Patient presents with    Emesis       HPI    Charly Herrera is a 80 y.o. male who presents with daughter from home with nausea and vomiting. Onset was prior to arrival, early this morning. Context is patient called daughter at 2 AM stating that he had 2-3 episodes of unprovoked emesis. He is now reporting nausea but has not vomited in the last 2 hours. Patient is concerned as he has a history of frequent bowel obstructions with similar vomiting symptoms. He is denying any abdominal pain. Also has had a colostomy bag in place for the last 5 years but has not noted any changes in stool but feels like there is [place] lot of gas\" in my bag. No hematemesis, coffee-ground emesis, black stools or bright red stools. No associated chest pain shortness of breath dizziness or lightheadedness. No fever or chills. No new foods or medications. Denying any recent sick travel or contacts. She is general surgeon is Dr. Sangeetha Castillo. Patient has had other multiple abdominal surgeries including hernia repair, AAA repair, appendectomy. REVIEW OF SYSTEMS    Constitutional:  Denies fever, chills, weight loss or weakness   HENT:  Denies sore throat or ear pain   Cardiovascular:  Denies chest pain, palpitations or swelling   Respiratory:  Denies cough or shortness of breath   GI:  See HPI above  : No hematuria or dysuria. Musculoskeletal:  Denies back pain or groin pain or masses. No pain or swelling of extremities.   Skin:  Denies rash  Neurologic:  Denies headache, focal weakness or sensory changes   Endocrine:  Denies polyuria or polydypsia   Lymphatic:  Denies swollen glands     All other review of systems are negative  See HPI and nursing notes for additional information     [address]    Past Medical dual-paced rhythm  Abnormal ECG  When compared with ECG of 03-DEC-2018 06:47,  No significant change was found          RADIOLOGY/PROCEDURES        CT ABDOMEN PELVIS W IV CONTRAST (Preliminary result)   Result time 08/16/19 11:34:59   Preliminary result by Barrie Dawson MD (08/16/19 11:34:59)                Impression:    1. A left lower quadrant peristomal hernia is present, which causes  small-bowel obstruction.  A transition point is suspected as the small-bowel  exits the hernia sac. 2. Small ventral abdominal wall hernia which contains a loop of transverse  colon, without obstruction. 3. Moderate to severe enlargement of the prostate, with changes in the  bladder compatible with chronic bladder outlet obstruction. 4. Postoperative changes of [id number]-wd-[id number] endograft placement, with stable  size of the excluded aneurysm sac. Narrative:    EXAMINATION:  CT OF THE ABDOMEN AND PELVIS WITH CONTRAST 8/16/2019 10:42 am    TECHNIQUE:  CT of the abdomen and pelvis was performed with the administration of  intravenous contrast. Multiplanar reformatted images are provided for review. Dose modulation, iterative reconstruction, and/or weight based adjustment of  the mA/kV was utilized to reduce the radiation dose to as low as reasonably  achievable. COMPARISON:  12/03/2018, 12/04/2018    HISTORY:  ORDERING SYSTEM PROVIDED HISTORY: history of SBO, vomiting, colostomy  TECHNOLOGIST PROVIDED HISTORY:  IV contrast only. Thank you. Reason for Exam: history of SBO, vomiting, colostomy  Acuity: Acute  Type of Exam: Initial  Relevant Medical/Surgical History: 75 ML ISOVUE 370 USED    FINDINGS:  Lower Chest:  Visualized portion of the lower chest demonstrates no acute  abnormality.  Pacemaker wires. Organs:  The liver, gallbladder, spleen, adrenals, pancreas, bile ducts, and  stomach are without acute process.  No suspicious renal lesions with  subcentimeter hypodensities too small to characterize.  Slight distention of  the stomach.  The ureters are nondilated.  The bladder has increased  trabeculations and several diverticula.  There is moderate to severe  enlargement of the prostate. GI/Bowel: Postoperative change of distal colectomy with a Vini's pouch. A ventral abdominal wall hernia is present, which contains fat and a loop of  transverse colon, without obstruction.  A peristomal hernia is also present  which contains the distal portion of the colon as well as dilated loops of  small bowel.  The distal small bowel is decompressed. Pelvis: No acute abnormality. Peritoneum/Retroperitoneum: Postoperative change of [id number] endograft  placement.  Metallic embolization coils are present within the excluded  aneurysm sac.  The excluded aneurysm sac measures 6.8 x 5.4 cm, which is  stable from the prior exam when measured at a similar level.  Ectatic  dilation of the bilateral common iliac arteries is unchanged.  No  lymphadenopathy.  No free air or free fluid. Bones/Soft Tissues: No acute abnormality.                Preliminary result by Jimmie Arriaga MD (08/16/19 11:17:39)                Impression:    1. A left lower quadrant peristomal hernia is present, which causes  small-bowel obstruction.  A transition point is suspected as the small-bowel  exits the hernia sac. 2. Small ventral abdominal wall hernia which contains a loop of transverse  colon, without obstruction. 3. Moderate to severe enlargement of the prostate, with changes in the  bladder compatible with chronic bladder outlet obstruction. 4. Postoperative changes of aorto bi-iliac endograft placement, with stable  size of the excluded aneurysm sac.                      EKG Interpretation  Please see ED physician's note for EKG interpretation      ED COURSE & MEDICAL DECISION MAKING      Vital signs and nursing notes reviewed during ED course. I have independently evaluated this patient .   Supervising MD - Dr Sahnda Ulloa - Imaging results were discussed with patient. Continues to remain asymptomatic for abdominal pain without further episodes of nausea or vomiting at this time. We will plan to consult with his general surgeon for further recommendations. UA does show findings for UTI with positive nitrites, large leukocytes with many bacteria and 49 white blood cells. Pending urine culture and started on empiric IV Rocephin. I did consult with Dr. Froilan Prescott - General Surgery - and discussed patient's history, ED presentation/course including any pertinent laboratory findings and imaging study findings. He/she recommends admission to hospitalist medicine for UTI management. She is very familiar with this patient and states that he has declined surgical management for similar bowel obstructions in the past.  Will likely continue conservative care. Is requesting NG tube be placed at this time with low intermittent suction. I then spoke with hospitalist  Lamar Johnson NP who agrees to admit patient for further evaluation and care. Patient is admitted to the hospital in stable condition. I did discuss this patient's history, ED presentation/course with my attending physician - Dr. Mike Hernandez - who has also seen and evaluated this patient. He/she does agree that admission is reasonable at this time. Please see his/her note for additional details of their evaluation. Comment: Please note this report has been produced using speech recognition software and may contain errors related to that system including errors in grammar, punctuation, and spelling, as well as words and phrases that may be inappropriate. If there are any questions or concerns please feel free to contact the dictating provider for clarification.           Julian Delgadillo PA-C  08/16/19 [id number]

## 2019-08-16 NOTE — PROGRESS NOTES
Medication History  [facility]    Patient Name: Sam Smith 3/23/1930     Medication history has been completed by: Brenton Rodas CPhT    Source(s) of information: Patient and Insurance claims     Primary Care Physician: Dalton Butler MD     Pharmacy: [facility]    Allergies as of 08/16/2019 - Review Complete 08/16/2019   Allergen Reaction Noted    Codeine Anaphylaxis 02/06/2012    Fentanyl Other (See Comments) 12/13/2017        Prior to Admission medications    Medication Sig Start Date End Date Taking?  Authorizing Provider   bimatoprost (LUMIGAN) 0.01 % SOLN ophthalmic drops Place 1 drop into both eyes nightly   Yes Historical Provider, MD   latanoprost (XALATAN) 0.005 % ophthalmic solution Place 1 drop into both eyes nightly   Yes Historical Provider, MD   apixaban (ELIQUIS) 2.5 MG TABS tablet Take 1 tablet by mouth 2 times daily 8/12/19  Yes Chris Stinson MD   simvastatin (ZOCOR) 40 MG tablet Take 1 tablet by mouth nightly 7/24/19 10/22/19 Yes Dalton Butler MD   metoprolol succinate (TOPROL XL) 25 MG extended release tablet Take 1 tablet by mouth daily 7/24/19 10/22/19 Yes Dalton Butler MD   Ascorbic Acid (VITAMIN C) 1000 MG tablet Take 1,000 mg by mouth daily   Yes Historical Provider, MD   brimonidine-timolol (COMBIGAN) 0.2-0.5 % ophthalmic solution Place 1 drop into both eyes every 12 hours   Yes Historical Provider, MD   diltiazem (CARDIZEM 12 HR) 120 MG extended release capsule Take 1 capsule by mouth daily 6/26/19 9/24/19 Yes Dalton Butler MD   sertraline (ZOLOFT) 50 MG tablet Take 1 tablet by mouth daily 6/26/19 9/24/19 Yes Dalton Butler MD   tamsulosin [facility]) 0.4 MG capsule Take 1 capsule by mouth daily 6/26/19 9/24/19 Yes Dalton Butler MD   omeprazole (PRILOSEC) 20 MG delayed release capsule Take 1 capsule by mouth daily 6/26/19 9/24/19 Yes Dalton Butler MD   Glucosamine-MSM-Hyaluronic Acd (JOINT

## 2019-08-17 ENCOUNTER — APPOINTMENT (OUTPATIENT)
Dept: GENERAL RADIOLOGY | Age: 84
DRG: 394 | End: 2019-08-17
Payer: COMMERCIAL

## 2019-08-17 LAB
ANION GAP SERPL CALCULATED.3IONS-SCNC: 11 MMOL/L (ref 4–16)
APTT: 29.9 SECONDS (ref 21.2–33)
BASOPHILS ABSOLUTE: 0 K/CU MM
BASOPHILS RELATIVE PERCENT: 0.3 % (ref 0–1)
BUN BLDV-MCNC: 28 MG/DL (ref 6–23)
CALCIUM SERPL-MCNC: 8.7 MG/DL (ref 8.3–10.6)
CHLORIDE BLD-SCNC: 105 MMOL/L (ref 99–110)
CO2: 31 MMOL/L (ref 21–32)
CREAT SERPL-MCNC: 1.3 MG/DL (ref 0.9–1.3)
DIFFERENTIAL TYPE: ABNORMAL
EOSINOPHILS ABSOLUTE: 0.1 K/CU MM
EOSINOPHILS RELATIVE PERCENT: 1 % (ref 0–3)
GFR AFRICAN AMERICAN: >60 ML/MIN/1.73M2
GFR NON-AFRICAN AMERICAN: 52 ML/MIN/1.73M2
GLUCOSE BLD-MCNC: 91 MG/DL (ref 70–99)
HCT VFR BLD CALC: 39.1 % (ref 42–52)
HCT VFR BLD CALC: 39.5 % (ref 42–52)
HEMOGLOBIN: 12.7 GM/DL (ref 13.5–18)
HEMOGLOBIN: 12.9 GM/DL (ref 13.5–18)
IMMATURE NEUTROPHIL %: 0.2 % (ref 0–0.43)
LYMPHOCYTES ABSOLUTE: 0.6 K/CU MM
LYMPHOCYTES RELATIVE PERCENT: 9.6 % (ref 24–44)
MAGNESIUM: 1.8 MG/DL (ref 1.8–2.4)
MCH RBC QN AUTO: 33.4 PG (ref 27–31)
MCH RBC QN AUTO: 33.5 PG (ref 27–31)
MCHC RBC AUTO-ENTMCNC: 32.5 % (ref 32–36)
MCHC RBC AUTO-ENTMCNC: 32.7 % (ref 32–36)
MCV RBC AUTO: 102.6 FL (ref 78–100)
MCV RBC AUTO: 102.9 FL (ref 78–100)
MONOCYTES ABSOLUTE: 0.5 K/CU MM
MONOCYTES RELATIVE PERCENT: 7.8 % (ref 0–4)
NUCLEATED RBC %: 0 %
PDW BLD-RTO: 13.4 % (ref 11.7–14.9)
PDW BLD-RTO: 13.5 % (ref 11.7–14.9)
PLATELET # BLD: 187 K/CU MM (ref 140–440)
PLATELET # BLD: 190 K/CU MM (ref 140–440)
PMV BLD AUTO: 9.6 FL (ref 7.5–11.1)
PMV BLD AUTO: 9.6 FL (ref 7.5–11.1)
POTASSIUM SERPL-SCNC: 3.5 MMOL/L (ref 3.5–5.1)
RBC # BLD: 3.8 M/CU MM (ref 4.6–6.2)
RBC # BLD: 3.85 M/CU MM (ref 4.6–6.2)
SEGMENTED NEUTROPHILS ABSOLUTE COUNT: 4.7 K/CU MM
SEGMENTED NEUTROPHILS RELATIVE PERCENT: 81.1 % (ref 36–66)
SODIUM BLD-SCNC: 147 MMOL/L (ref 135–145)
TOTAL IMMATURE NEUTOROPHIL: 0.01 K/CU MM
TOTAL NUCLEATED RBC: 0 K/CU MM
WBC # BLD: 5.6 K/CU MM (ref 4–10.5)
WBC # BLD: 5.7 K/CU MM (ref 4–10.5)

## 2019-08-17 PROCEDURE — 87040 BLOOD CULTURE FOR BACTERIA: CPT

## 2019-08-17 PROCEDURE — 80048 BASIC METABOLIC PNL TOTAL CA: CPT

## 2019-08-17 PROCEDURE — 36415 COLL VENOUS BLD VENIPUNCTURE: CPT

## 2019-08-17 PROCEDURE — 2580000003 HC RX 258: Performed by: NURSE PRACTITIONER

## 2019-08-17 PROCEDURE — C9113 INJ PANTOPRAZOLE SODIUM, VIA: HCPCS | Performed by: NURSE PRACTITIONER

## 2019-08-17 PROCEDURE — 74018 RADEX ABDOMEN 1 VIEW: CPT

## 2019-08-17 PROCEDURE — 6370000000 HC RX 637 (ALT 250 FOR IP): Performed by: SURGERY

## 2019-08-17 PROCEDURE — 6360000002 HC RX W HCPCS: Performed by: NURSE PRACTITIONER

## 2019-08-17 PROCEDURE — 1200000000 HC SEMI PRIVATE

## 2019-08-17 PROCEDURE — 85730 THROMBOPLASTIN TIME PARTIAL: CPT

## 2019-08-17 PROCEDURE — 6360000002 HC RX W HCPCS: Performed by: SURGERY

## 2019-08-17 PROCEDURE — 85025 COMPLETE CBC W/AUTO DIFF WBC: CPT

## 2019-08-17 PROCEDURE — 6360000002 HC RX W HCPCS: Performed by: HOSPITALIST

## 2019-08-17 PROCEDURE — 85027 COMPLETE CBC AUTOMATED: CPT

## 2019-08-17 PROCEDURE — 94761 N-INVAS EAR/PLS OXIMETRY MLT: CPT

## 2019-08-17 PROCEDURE — 83735 ASSAY OF MAGNESIUM: CPT

## 2019-08-17 RX ORDER — HEPARIN SODIUM 1000 [USP'U]/ML
80 INJECTION, SOLUTION INTRAVENOUS; SUBCUTANEOUS PRN
Status: DISCONTINUED | OUTPATIENT
Start: 2019-08-17 | End: 2019-08-17

## 2019-08-17 RX ORDER — HEPARIN SODIUM 1000 [USP'U]/ML
40 INJECTION, SOLUTION INTRAVENOUS; SUBCUTANEOUS PRN
Status: DISCONTINUED | OUTPATIENT
Start: 2019-08-17 | End: 2019-08-17

## 2019-08-17 RX ORDER — SUCRALFATE 1 G/1
1 TABLET ORAL 3 TIMES DAILY
Status: DISCONTINUED | OUTPATIENT
Start: 2019-08-17 | End: 2019-08-18

## 2019-08-17 RX ORDER — HEPARIN SODIUM 10000 [USP'U]/100ML
18 INJECTION, SOLUTION INTRAVENOUS CONTINUOUS
Status: DISCONTINUED | OUTPATIENT
Start: 2019-08-17 | End: 2019-08-17

## 2019-08-17 RX ORDER — HEPARIN SODIUM 1000 [USP'U]/ML
80 INJECTION, SOLUTION INTRAVENOUS; SUBCUTANEOUS ONCE
Status: COMPLETED | OUTPATIENT
Start: 2019-08-17 | End: 2019-08-17

## 2019-08-17 RX ORDER — POTASSIUM CHLORIDE AND SODIUM CHLORIDE 450; 150 MG/100ML; MG/100ML
INJECTION, SOLUTION INTRAVENOUS CONTINUOUS
Status: DISCONTINUED | OUTPATIENT
Start: 2019-08-17 | End: 2019-08-18

## 2019-08-17 RX ORDER — SUCRALFATE ORAL 1 G/10ML
1 SUSPENSION ORAL 3 TIMES DAILY
Status: DISCONTINUED | OUTPATIENT
Start: 2019-08-17 | End: 2019-08-17 | Stop reason: CLARIF

## 2019-08-17 RX ADMIN — SUCRALFATE 1 G: 1 TABLET ORAL at 10:33

## 2019-08-17 RX ADMIN — HEPARIN SODIUM 6140 UNITS: 1000 INJECTION, SOLUTION INTRAVENOUS; SUBCUTANEOUS at 15:00

## 2019-08-17 RX ADMIN — BRIMONIDINE TARTRATE 1 DROP: 2 SOLUTION/ DROPS OPHTHALMIC at 21:55

## 2019-08-17 RX ADMIN — CEFTRIAXONE 1 G: 1 INJECTION, POWDER, FOR SOLUTION INTRAMUSCULAR; INTRAVENOUS at 10:33

## 2019-08-17 RX ADMIN — SODIUM CHLORIDE, PRESERVATIVE FREE 10 ML: 5 INJECTION INTRAVENOUS at 08:42

## 2019-08-17 RX ADMIN — POTASSIUM CHLORIDE AND SODIUM CHLORIDE: 450; 150 INJECTION, SOLUTION INTRAVENOUS at 19:56

## 2019-08-17 RX ADMIN — TIMOLOL MALEATE 1 DROP: 5 SOLUTION OPHTHALMIC at 21:55

## 2019-08-17 RX ADMIN — PANTOPRAZOLE SODIUM 40 MG: 40 INJECTION, POWDER, FOR SOLUTION INTRAVENOUS at 08:41

## 2019-08-17 RX ADMIN — POTASSIUM CHLORIDE AND SODIUM CHLORIDE: 450; 150 INJECTION, SOLUTION INTRAVENOUS at 08:45

## 2019-08-17 RX ADMIN — ENOXAPARIN SODIUM 80 MG: 80 INJECTION SUBCUTANEOUS at 21:55

## 2019-08-17 RX ADMIN — SODIUM CHLORIDE, PRESERVATIVE FREE 10 ML: 5 INJECTION INTRAVENOUS at 21:56

## 2019-08-17 RX ADMIN — TIMOLOL MALEATE 1 DROP: 5 SOLUTION OPHTHALMIC at 10:34

## 2019-08-17 RX ADMIN — HEPARIN SODIUM AND DEXTROSE 18 UNITS/KG/HR: 10000; 5 INJECTION INTRAVENOUS at 15:01

## 2019-08-17 RX ADMIN — LATANOPROST 1 DROP: 50 SOLUTION OPHTHALMIC at 21:54

## 2019-08-17 RX ADMIN — SUCRALFATE 1 G: 1 TABLET ORAL at 21:54

## 2019-08-17 RX ADMIN — BRIMONIDINE TARTRATE 1 DROP: 2 SOLUTION/ DROPS OPHTHALMIC at 10:34

## 2019-08-17 ASSESSMENT — PAIN SCALES - GENERAL
PAINLEVEL_OUTOF10: 0

## 2019-08-17 NOTE — PROGRESS NOTES
Patient awake and alert  Denies nausea, is getting hungry  N / 970ml  Good UOP  Stool: 100ml and flatus    PE:  Vitals:    19 1332 19 1433 19 2200 19 0351   BP: 126/78 (!) 143/73 (!) 145/77 135/69   Pulse: 82 80 82 87   Resp:    Temp: 98 °F (36.7 °C) 98.4 °F (36.9 °C) 98.2 °F (36.8 °C) 98.4 °F (36.9 °C)   TempSrc: Oral Oral Oral Oral   SpO2: 96% 96% 98% 94%   Weight:   169 lb (76.7 kg) 169 lb (76.7 kg)   Height:         Abd:  Soft, hernia palpable, partial reduction again, non-tender unless reducing hernia, no peritoneal signs    X-ray: report pending, films with gaseous distention      A/P:  -change IVF  -ambulate  -on protonix, pt with some gastric irritation with NG will add carafate

## 2019-08-17 NOTE — PROGRESS NOTES
Night Shift RN Notes:  Telemetry: This RN called Telemetry tech Sonia for parameters, which include things that needs to be monitoring @ 1926. NGT Maintenance Care:  Low intermittent wall suction maintained.   Placed Sumit valve and irrigated with 30 ml of water

## 2019-08-18 ENCOUNTER — APPOINTMENT (OUTPATIENT)
Dept: GENERAL RADIOLOGY | Age: 84
DRG: 394 | End: 2019-08-18
Payer: COMMERCIAL

## 2019-08-18 LAB
ANION GAP SERPL CALCULATED.3IONS-SCNC: 14 MMOL/L (ref 4–16)
BUN BLDV-MCNC: 31 MG/DL (ref 6–23)
CALCIUM SERPL-MCNC: 8 MG/DL (ref 8.3–10.6)
CHLORIDE BLD-SCNC: 102 MMOL/L (ref 99–110)
CO2: 23 MMOL/L (ref 21–32)
CREAT SERPL-MCNC: 1.1 MG/DL (ref 0.9–1.3)
CULTURE: ABNORMAL
CULTURE: ABNORMAL
GFR AFRICAN AMERICAN: >60 ML/MIN/1.73M2
GFR NON-AFRICAN AMERICAN: >60 ML/MIN/1.73M2
GLUCOSE BLD-MCNC: 66 MG/DL (ref 70–99)
HCT VFR BLD CALC: 36.2 % (ref 42–52)
HEMOGLOBIN: 11.7 GM/DL (ref 13.5–18)
Lab: ABNORMAL
MAGNESIUM: 1.8 MG/DL (ref 1.8–2.4)
MCH RBC QN AUTO: 33.3 PG (ref 27–31)
MCHC RBC AUTO-ENTMCNC: 32.3 % (ref 32–36)
MCV RBC AUTO: 103.1 FL (ref 78–100)
PDW BLD-RTO: 13.3 % (ref 11.7–14.9)
PHOSPHORUS: 2.7 MG/DL (ref 2.5–4.9)
PLATELET # BLD: 172 K/CU MM (ref 140–440)
PMV BLD AUTO: 10.1 FL (ref 7.5–11.1)
POTASSIUM SERPL-SCNC: 3.6 MMOL/L (ref 3.5–5.1)
RBC # BLD: 3.51 M/CU MM (ref 4.6–6.2)
SODIUM BLD-SCNC: 139 MMOL/L (ref 135–145)
SPECIMEN: ABNORMAL
TOTAL COLONY COUNT: ABNORMAL
WBC # BLD: 5.3 K/CU MM (ref 4–10.5)

## 2019-08-18 PROCEDURE — 94761 N-INVAS EAR/PLS OXIMETRY MLT: CPT

## 2019-08-18 PROCEDURE — 6370000000 HC RX 637 (ALT 250 FOR IP): Performed by: INTERNAL MEDICINE

## 2019-08-18 PROCEDURE — 36415 COLL VENOUS BLD VENIPUNCTURE: CPT

## 2019-08-18 PROCEDURE — 83735 ASSAY OF MAGNESIUM: CPT

## 2019-08-18 PROCEDURE — C9113 INJ PANTOPRAZOLE SODIUM, VIA: HCPCS | Performed by: NURSE PRACTITIONER

## 2019-08-18 PROCEDURE — 51798 US URINE CAPACITY MEASURE: CPT

## 2019-08-18 PROCEDURE — 84100 ASSAY OF PHOSPHORUS: CPT

## 2019-08-18 PROCEDURE — 80048 BASIC METABOLIC PNL TOTAL CA: CPT

## 2019-08-18 PROCEDURE — 2580000003 HC RX 258: Performed by: NURSE PRACTITIONER

## 2019-08-18 PROCEDURE — 1200000000 HC SEMI PRIVATE

## 2019-08-18 PROCEDURE — 74018 RADEX ABDOMEN 1 VIEW: CPT

## 2019-08-18 PROCEDURE — 6360000002 HC RX W HCPCS: Performed by: NURSE PRACTITIONER

## 2019-08-18 PROCEDURE — 85027 COMPLETE CBC AUTOMATED: CPT

## 2019-08-18 PROCEDURE — 6360000002 HC RX W HCPCS: Performed by: SURGERY

## 2019-08-18 RX ORDER — LISINOPRIL 10 MG/1
10 TABLET ORAL DAILY
Status: DISCONTINUED | OUTPATIENT
Start: 2019-08-19 | End: 2019-08-19 | Stop reason: HOSPADM

## 2019-08-18 RX ORDER — CIPROFLOXACIN 500 MG/1
500 TABLET, FILM COATED ORAL EVERY 12 HOURS SCHEDULED
Status: DISCONTINUED | OUTPATIENT
Start: 2019-08-18 | End: 2019-08-19 | Stop reason: HOSPADM

## 2019-08-18 RX ORDER — LISINOPRIL 10 MG/1
10 TABLET ORAL DAILY
Status: DISCONTINUED | OUTPATIENT
Start: 2019-08-18 | End: 2019-08-18

## 2019-08-18 RX ORDER — METOPROLOL SUCCINATE 25 MG/1
25 TABLET, EXTENDED RELEASE ORAL DAILY
Status: DISCONTINUED | OUTPATIENT
Start: 2019-08-18 | End: 2019-08-19 | Stop reason: HOSPADM

## 2019-08-18 RX ORDER — HYDROCHLOROTHIAZIDE 12.5 MG/1
12.5 TABLET ORAL DAILY
Status: DISCONTINUED | OUTPATIENT
Start: 2019-08-18 | End: 2019-08-18

## 2019-08-18 RX ORDER — TAMSULOSIN HYDROCHLORIDE 0.4 MG/1
0.4 CAPSULE ORAL DAILY
Status: DISCONTINUED | OUTPATIENT
Start: 2019-08-18 | End: 2019-08-19 | Stop reason: HOSPADM

## 2019-08-18 RX ORDER — SIMVASTATIN 40 MG
40 TABLET ORAL NIGHTLY
Status: DISCONTINUED | OUTPATIENT
Start: 2019-08-18 | End: 2019-08-19 | Stop reason: HOSPADM

## 2019-08-18 RX ORDER — HYDROCHLOROTHIAZIDE 12.5 MG/1
12.5 TABLET ORAL DAILY
Status: DISCONTINUED | OUTPATIENT
Start: 2019-08-19 | End: 2019-08-19 | Stop reason: HOSPADM

## 2019-08-18 RX ORDER — CIPROFLOXACIN 2 MG/ML
400 INJECTION, SOLUTION INTRAVENOUS EVERY 12 HOURS
Status: DISCONTINUED | OUTPATIENT
Start: 2019-08-18 | End: 2019-08-18

## 2019-08-18 RX ORDER — DILTIAZEM HYDROCHLORIDE 120 MG/1
120 CAPSULE, COATED, EXTENDED RELEASE ORAL DAILY
Status: DISCONTINUED | OUTPATIENT
Start: 2019-08-18 | End: 2019-08-19 | Stop reason: HOSPADM

## 2019-08-18 RX ADMIN — POTASSIUM CHLORIDE AND SODIUM CHLORIDE: 450; 150 INJECTION, SOLUTION INTRAVENOUS at 07:10

## 2019-08-18 RX ADMIN — ENOXAPARIN SODIUM 80 MG: 80 INJECTION SUBCUTANEOUS at 09:20

## 2019-08-18 RX ADMIN — BRIMONIDINE TARTRATE 1 DROP: 2 SOLUTION/ DROPS OPHTHALMIC at 09:27

## 2019-08-18 RX ADMIN — BRIMONIDINE TARTRATE 1 DROP: 2 SOLUTION/ DROPS OPHTHALMIC at 22:39

## 2019-08-18 RX ADMIN — ENOXAPARIN SODIUM 80 MG: 80 INJECTION SUBCUTANEOUS at 22:40

## 2019-08-18 RX ADMIN — LATANOPROST 1 DROP: 50 SOLUTION OPHTHALMIC at 22:39

## 2019-08-18 RX ADMIN — SIMVASTATIN 40 MG: 40 TABLET, FILM COATED ORAL at 22:40

## 2019-08-18 RX ADMIN — METOPROLOL SUCCINATE 25 MG: 25 TABLET, EXTENDED RELEASE ORAL at 17:02

## 2019-08-18 RX ADMIN — SODIUM CHLORIDE, PRESERVATIVE FREE 10 ML: 5 INJECTION INTRAVENOUS at 22:38

## 2019-08-18 RX ADMIN — TAMSULOSIN HYDROCHLORIDE 0.4 MG: 0.4 CAPSULE ORAL at 17:02

## 2019-08-18 RX ADMIN — TIMOLOL MALEATE 1 DROP: 5 SOLUTION OPHTHALMIC at 09:27

## 2019-08-18 RX ADMIN — PANTOPRAZOLE SODIUM 40 MG: 40 INJECTION, POWDER, FOR SOLUTION INTRAVENOUS at 09:20

## 2019-08-18 RX ADMIN — SERTRALINE HYDROCHLORIDE 50 MG: 50 TABLET ORAL at 17:02

## 2019-08-18 RX ADMIN — CEFTRIAXONE 1 G: 1 INJECTION, POWDER, FOR SOLUTION INTRAMUSCULAR; INTRAVENOUS at 09:20

## 2019-08-18 RX ADMIN — CIPROFLOXACIN HYDROCHLORIDE 500 MG: 500 TABLET, FILM COATED ORAL at 22:40

## 2019-08-18 RX ADMIN — TIMOLOL MALEATE 1 DROP: 5 SOLUTION OPHTHALMIC at 22:39

## 2019-08-18 RX ADMIN — DILTIAZEM HYDROCHLORIDE 120 MG: 120 CAPSULE, COATED, EXTENDED RELEASE ORAL at 17:02

## 2019-08-18 RX ADMIN — SODIUM CHLORIDE, PRESERVATIVE FREE 10 ML: 5 INJECTION INTRAVENOUS at 09:20

## 2019-08-18 ASSESSMENT — PAIN SCALES - GENERAL
PAINLEVEL_OUTOF10: 0

## 2019-08-18 NOTE — PROGRESS NOTES
Hospitalist Progress Note      PCP: Alex Escobar MD    Date of Admission: 2019    Chief Complaint on Admission: abd pain, n/v    Pt Seen/Examined and Chart Reviewed. Admitting dx SBO    SUBJECTIVE:   Tolerating clears, no abd pain, no n/v, moving bowels      OBJECTIVE:   Vitals    TEMPERATURE:  Current - Temp: 98.3 °F (36.8 °C); Max - Temp  Av.5 °F (36.9 °C)  Min: 98.3 °F (36.8 °C)  Max: 98.6 °F (37 °C)  RESPIRATIONS RANGE: Resp  Avg: 15.5  Min: 11  Max: 18  PULSE RANGE: Pulse  Av  Min: 76  Max: 80  BLOOD PRESSURE RANGE:  Systolic (60AVY), JBN:607 , Min:128 , NVI:377   ; Diastolic (38YCB), TNU:25, Min:74, Max:93    PULSE OXIMETRY RANGE: SpO2  Av %  Min: 95 %  Max: 98 %  24HR INTAKE/OUTPUT:      Intake/Output Summary (Last 24 hours) at 2019 1609  Last data filed at 2019 1553  Gross per 24 hour   Intake 2205 ml   Output 1700 ml   Net 505 ml       Exam:    General appearance: Well, no apparent distress, appears stated age and cooperative. HEENT Normal cephalic, atraumatic without obvious deformity. Pupils equal, round, and reactive to light. Extra ocular muscles intact. Conjunctivae/corneas clear. Neck: Supple, No jugular venous distention/bruits. Trachea midline without thyromegaly or adenopathy with full range of motion. Lungs: Clear to ascultation, bilaterally without Rales/Wheezes/Rhonchi with good respiratory effort. Heart: Regular rate and rhythm with Normal S1/S2 without  murmurs, rubs or gallops, point of maximum impulse non-displaced  Abdomen: Soft, non-tender or non-distended without rigidity or guarding and positive bowel sounds all four quadrants. Ostomy in place  Extremities: No clubbing, cyanosis, or edema bilaterally. Full range of motion without deformity and normal gait intact. Skin: Skin color, texture, turgor normal. No rashes or lesions.   Neurologic: Alert and oriented X 3,  neurovascularly intact with sensory/motor intact upper extremities/lower

## 2019-08-19 VITALS
WEIGHT: 169 LBS | SYSTOLIC BLOOD PRESSURE: 122 MMHG | OXYGEN SATURATION: 94 % | DIASTOLIC BLOOD PRESSURE: 100 MMHG | BODY MASS INDEX: 26.53 KG/M2 | HEIGHT: 67 IN | TEMPERATURE: 97.8 F | HEART RATE: 82 BPM | RESPIRATION RATE: 18 BRPM

## 2019-08-19 LAB
ANION GAP SERPL CALCULATED.3IONS-SCNC: 9 MMOL/L (ref 4–16)
BASOPHILS ABSOLUTE: 0 K/CU MM
BASOPHILS RELATIVE PERCENT: 0.4 % (ref 0–1)
BUN BLDV-MCNC: 25 MG/DL (ref 6–23)
CALCIUM SERPL-MCNC: 8.1 MG/DL (ref 8.3–10.6)
CHLORIDE BLD-SCNC: 104 MMOL/L (ref 99–110)
CO2: 26 MMOL/L (ref 21–32)
CREAT SERPL-MCNC: 1.1 MG/DL (ref 0.9–1.3)
DIFFERENTIAL TYPE: ABNORMAL
EOSINOPHILS ABSOLUTE: 0.2 K/CU MM
EOSINOPHILS RELATIVE PERCENT: 3.8 % (ref 0–3)
GFR AFRICAN AMERICAN: >60 ML/MIN/1.73M2
GFR NON-AFRICAN AMERICAN: >60 ML/MIN/1.73M2
GLUCOSE BLD-MCNC: 97 MG/DL (ref 70–99)
HCT VFR BLD CALC: 34.8 % (ref 42–52)
HEMOGLOBIN: 11.4 GM/DL (ref 13.5–18)
IMMATURE NEUTROPHIL %: 0.4 % (ref 0–0.43)
LYMPHOCYTES ABSOLUTE: 1 K/CU MM
LYMPHOCYTES RELATIVE PERCENT: 18.2 % (ref 24–44)
MAGNESIUM: 2.1 MG/DL (ref 1.8–2.4)
MCH RBC QN AUTO: 33.3 PG (ref 27–31)
MCHC RBC AUTO-ENTMCNC: 32.8 % (ref 32–36)
MCV RBC AUTO: 101.8 FL (ref 78–100)
MONOCYTES ABSOLUTE: 0.5 K/CU MM
MONOCYTES RELATIVE PERCENT: 8.8 % (ref 0–4)
NUCLEATED RBC %: 0 %
PDW BLD-RTO: 13 % (ref 11.7–14.9)
PLATELET # BLD: 158 K/CU MM (ref 140–440)
PMV BLD AUTO: 9.8 FL (ref 7.5–11.1)
POTASSIUM SERPL-SCNC: 3.5 MMOL/L (ref 3.5–5.1)
RBC # BLD: 3.42 M/CU MM (ref 4.6–6.2)
SEGMENTED NEUTROPHILS ABSOLUTE COUNT: 3.6 K/CU MM
SEGMENTED NEUTROPHILS RELATIVE PERCENT: 68.4 % (ref 36–66)
SODIUM BLD-SCNC: 139 MMOL/L (ref 135–145)
TOTAL IMMATURE NEUTOROPHIL: 0.02 K/CU MM
TOTAL NUCLEATED RBC: 0 K/CU MM
WBC # BLD: 5.2 K/CU MM (ref 4–10.5)

## 2019-08-19 PROCEDURE — 80048 BASIC METABOLIC PNL TOTAL CA: CPT

## 2019-08-19 PROCEDURE — C9113 INJ PANTOPRAZOLE SODIUM, VIA: HCPCS | Performed by: NURSE PRACTITIONER

## 2019-08-19 PROCEDURE — 6360000002 HC RX W HCPCS: Performed by: NURSE PRACTITIONER

## 2019-08-19 PROCEDURE — 99211 OFF/OP EST MAY X REQ PHY/QHP: CPT

## 2019-08-19 PROCEDURE — 36415 COLL VENOUS BLD VENIPUNCTURE: CPT

## 2019-08-19 PROCEDURE — 83735 ASSAY OF MAGNESIUM: CPT

## 2019-08-19 PROCEDURE — 6360000002 HC RX W HCPCS: Performed by: SURGERY

## 2019-08-19 PROCEDURE — 85025 COMPLETE CBC W/AUTO DIFF WBC: CPT

## 2019-08-19 PROCEDURE — 2580000003 HC RX 258: Performed by: NURSE PRACTITIONER

## 2019-08-19 PROCEDURE — 6370000000 HC RX 637 (ALT 250 FOR IP): Performed by: INTERNAL MEDICINE

## 2019-08-19 RX ORDER — CIPROFLOXACIN 500 MG/1
500 TABLET, FILM COATED ORAL EVERY 12 HOURS SCHEDULED
Qty: 14 TABLET | Refills: 0 | Status: SHIPPED | OUTPATIENT
Start: 2019-08-19 | End: 2019-08-26 | Stop reason: ALTCHOICE

## 2019-08-19 RX ORDER — FINASTERIDE 5 MG/1
5 TABLET, FILM COATED ORAL DAILY
Qty: 30 TABLET | Refills: 3 | Status: SHIPPED | OUTPATIENT
Start: 2019-08-20 | End: 2019-09-25

## 2019-08-19 RX ORDER — FINASTERIDE 5 MG/1
5 TABLET, FILM COATED ORAL DAILY
Status: DISCONTINUED | OUTPATIENT
Start: 2019-08-19 | End: 2019-08-19 | Stop reason: HOSPADM

## 2019-08-19 RX ADMIN — LISINOPRIL 10 MG: 10 TABLET ORAL at 10:36

## 2019-08-19 RX ADMIN — SODIUM CHLORIDE, PRESERVATIVE FREE 10 ML: 5 INJECTION INTRAVENOUS at 10:36

## 2019-08-19 RX ADMIN — DILTIAZEM HYDROCHLORIDE 120 MG: 120 CAPSULE, COATED, EXTENDED RELEASE ORAL at 10:36

## 2019-08-19 RX ADMIN — SERTRALINE HYDROCHLORIDE 50 MG: 50 TABLET ORAL at 10:36

## 2019-08-19 RX ADMIN — TIMOLOL MALEATE 1 DROP: 5 SOLUTION OPHTHALMIC at 10:37

## 2019-08-19 RX ADMIN — TAMSULOSIN HYDROCHLORIDE 0.4 MG: 0.4 CAPSULE ORAL at 10:36

## 2019-08-19 RX ADMIN — PANTOPRAZOLE SODIUM 40 MG: 40 INJECTION, POWDER, FOR SOLUTION INTRAVENOUS at 11:57

## 2019-08-19 RX ADMIN — SODIUM CHLORIDE, PRESERVATIVE FREE 10 ML: 5 INJECTION INTRAVENOUS at 11:58

## 2019-08-19 RX ADMIN — CIPROFLOXACIN HYDROCHLORIDE 500 MG: 500 TABLET, FILM COATED ORAL at 10:36

## 2019-08-19 RX ADMIN — FINASTERIDE 5 MG: 5 TABLET, FILM COATED ORAL at 10:36

## 2019-08-19 RX ADMIN — BRIMONIDINE TARTRATE 1 DROP: 2 SOLUTION/ DROPS OPHTHALMIC at 10:37

## 2019-08-19 RX ADMIN — HYDROCHLOROTHIAZIDE 12.5 MG: 12.5 TABLET ORAL at 10:36

## 2019-08-19 RX ADMIN — ENOXAPARIN SODIUM 80 MG: 80 INJECTION SUBCUTANEOUS at 10:41

## 2019-08-19 RX ADMIN — METOPROLOL SUCCINATE 25 MG: 25 TABLET, EXTENDED RELEASE ORAL at 10:36

## 2019-08-19 NOTE — CARE COORDINATION
[address] Transitions Interview     2019    Patient: Mike Galicia Patient : 3/23/1930   MRN: [id number]  Reason for Admission:   Left lower abdominal swelling/ protrusion  RARS: Readmission Risk Score: 21         Spoke with:   Patient and his daughter      Readmission Risk  Patient Active Problem List   Diagnosis    Pure hypercholesterolemia    Essential hypertension    Perforated viscus    Anemia    Ileus following gastrointestinal surgery ([facility] [facility]    Sick sinus syndrome (HCC)    Hypokalemia    Cardiac pacemaker    Hypertrophy of prostate with urinary obstruction and other lower urinary tract symptoms (LUTS)    Gross hematuria    AAA (abdominal aortic aneurysm) (HCC)    PAF (paroxysmal atrial fibrillation) ([facility] [address] [facility]    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    Leukocytosis    Partial small bowel obstruction (HCC)    S/P biventricular cardiac pacemaker procedure    Pacemaker lead malfunction    Complete heart block (HCC)    SBO (small bowel obstruction) (HCC)    Chronic systolic heart failure (HCC)    Small bowel obstruction (HCC)    Urinary tract infection without hematuria       Inpatient Assessment  Care Transitions Summary    Care Transitions Inpatient Review  Medication Review  Are you able to afford your medications?:  With Assistance  What assistance programs is the patient using?:  [employer]/Vouchers  How often do you have difficulty taking your medications?:  I always take them as prescribed. Housing Review  Who do you live with?:  Alone  Are you an active caregiver in your home?:  No  Social Support  Do you have a ?:  No  Do you have a [address] [place] [address]?:  No  Durable Medical Equipment  Patient DME:  Straight cane, Walker, Wheelchair  Functional Review  Ability to seek help/take action for Emergent/Urgent situations i.e. fire, crime, inclement weather or health crisis. :  Independent  Ability handle personal hygiene needs

## 2019-08-19 NOTE — DISCHARGE SUMMARY
General appearance: No apparent distress, appears stated age and cooperative. HEENT Normal cephalic, atraumatic without obvious deformity. Pupils equal, round, and reactive to light. Extra ocular muscles intact. Conjunctivae/corneas clear. Neck: Supple, No jugular venous distention/bruits. Trachea midline without thyromegaly or adenopathy with full range of motion. Lungs: Clear to ascultation, bilaterally without Rales/Wheezes/Rhonchi with good respiratory effort. Heart: Regular rate and rhythm with Normal S1/S2 without  murmurs, rubs or gallops, point of maximum impulse non-displaced  Abdomen: Soft, non-tender or non-distended without rigidity or guarding and positive bowel sounds all four quadrants. Extremities: No clubbing, cyanosis, or edema bilaterally. Full range of motion without deformity and normal gait intact. Skin: Skin color, texture, turgor normal.  No rashes or lesions. Neurologic: Alert and oriented X 3,  neurovascularly intact with sensory/motor intact upper extremities/lower extremities, bilaterally. Cranial nerves:II-XII intact, grossly non-focal.  Mental status: Alert, oriented, thought content appropriate    Labs: For convenience and continuity at follow-up the following most recent labs are provided:    CBC:   Lab Results   Component Value Date    WBC 5.2 08/19/2019    HGB 11.4 08/19/2019    HCT 34.8 08/19/2019     08/19/2019       RENAL:   Lab Results   Component Value Date     08/19/2019    K 3.5 08/19/2019     08/19/2019    CO2 26 08/19/2019    BUN 25 08/19/2019    CREATININE 1.1 08/19/2019           Discharge Medications:    Lena Buchanan   Home Medication Instructions WDN:[id number]    Printed on:08/19/19 1127   Medication Information                      acetaminophen (TYLENOL) 500 MG tablet  Take 500 mg by mouth every 6 hours as needed for Pain 2 tabs by mouth three times a day as needed.              apixaban (ELIQUIS) 2.5 MG TABS tablet  Take 1 tablet by mouth 2 times daily             Ascorbic Acid (VITAMIN C) 1000 MG tablet  Take 1,000 mg by mouth daily             bimatoprost (LUMIGAN) 0.01 % SOLN ophthalmic drops  Place 1 drop into both eyes nightly             brimonidine-timolol (COMBIGAN) 0.2-0.5 % ophthalmic solution  Place 1 drop into both eyes every 12 hours             ciprofloxacin (CIPRO) 500 MG tablet  Take 1 tablet by mouth every 12 hours for 7 days             diltiazem (CARDIZEM 12 HR) 120 MG extended release capsule  Take 1 capsule by mouth daily             finasteride (PROSCAR) 5 MG tablet  Take 1 tablet by mouth daily             Glucosamine-MSM-Hyaluronic Acd (JOINT HEALTH PO)  Take 1 each by mouth daily              latanoprost (XALATAN) 0.005 % ophthalmic solution  Place 1 drop into both eyes nightly             lisinopril-hydrochlorothiazide (PRINZIDE;ZESTORETIC) 10-12.5 MG per tablet  Take 1 tablet by mouth daily             metoprolol succinate (TOPROL XL) 25 MG extended release tablet  Take 1 tablet by mouth daily             omeprazole (PRILOSEC) 20 MG delayed release capsule  Take 1 capsule by mouth daily             sertraline (ZOLOFT) 50 MG tablet  Take 1 tablet by mouth daily             simvastatin (ZOCOR) 40 MG tablet  Take 1 tablet by mouth nightly             tamsulosin (FLOMAX) 0.4 MG capsule  Take 1 capsule by mouth daily                    Time Spent on discharge was 45 minutes in the examination, evaluation, counseling and review of medications and discharge plan. Signed:  Gisela Kraus MD   8/19/2019      Thank you Deysi Darden MD for the opportunity to be involved in this patient's care. If you have any questions or concerns please feel free to contact me at [phone].

## 2019-08-19 NOTE — CONSULTS
contrast only. Thank you. Reason for Exam: history of SBO, vomiting, colostomy Acuity: Acute Type of Exam: Initial Relevant Medical/Surgical History: 75 ML ISOVUE 370 USED FINDINGS: Lower Chest:  Visualized portion of the lower chest demonstrates no acute abnormality. Pacemaker wires. Organs: The liver, gallbladder, spleen, adrenals, pancreas, bile ducts, and stomach are without acute process. No suspicious renal lesions with subcentimeter hypodensities too small to characterize. Slight distention of the stomach. The ureters are nondilated. The bladder has increased trabeculations and several diverticula. There is moderate to severe enlargement of the prostate. GI/Bowel: Postoperative change of distal colectomy with a Vini's pouch. A ventral abdominal wall hernia is present, which contains fat and a loop of transverse colon, without obstruction. A peristomal hernia is also present which contains the distal portion of the colon as well as dilated loops of small bowel. The distal small bowel is decompressed. Pelvis: No acute abnormality. Peritoneum/Retroperitoneum: Postoperative change of [id number] endograft placement. Metallic embolization coils are present within the excluded aneurysm sac. The excluded aneurysm sac measures 6.8 x 5.4 cm, which is stable from the prior exam when measured at a similar level. Ectatic dilation of the bilateral common iliac arteries is unchanged. No lymphadenopathy. No free air or free fluid. Bones/Soft Tissues: No acute abnormality. 1. A left lower quadrant peristomal hernia is present, which causes small-bowel obstruction. A transition point is suspected as the small-bowel exits the hernia sac. 2. Small ventral abdominal wall hernia which contains a loop of transverse colon, without obstruction. 3. Moderate to severe enlargement of the prostate, with changes in the bladder compatible with chronic bladder outlet obstruction.  4. Postoperative changes of

## 2019-08-20 ENCOUNTER — TELEPHONE (OUTPATIENT)
Dept: FAMILY MEDICINE CLINIC | Age: 84
End: 2019-08-20

## 2019-08-20 ENCOUNTER — CARE COORDINATION (OUTPATIENT)
Dept: CASE MANAGEMENT | Age: 84
End: 2019-08-20

## 2019-08-20 DIAGNOSIS — K56.609 SBO (SMALL BOWEL OBSTRUCTION) (HCC): Primary | ICD-10-CM

## 2019-08-20 LAB
EKG ATRIAL RATE: 79 BPM
EKG DIAGNOSIS: NORMAL
EKG P AXIS: 24 DEGREES
EKG P-R INTERVAL: 176 MS
EKG Q-T INTERVAL: 454 MS
EKG QRS DURATION: 144 MS
EKG QTC CALCULATION (BAZETT): 520 MS
EKG R AXIS: 235 DEGREES
EKG T AXIS: 73 DEGREES
EKG VENTRICULAR RATE: 79 BPM

## 2019-08-20 PROCEDURE — 1111F DSCHRG MED/CURRENT MED MERGE: CPT | Performed by: FAMILY MEDICINE

## 2019-08-21 ENCOUNTER — TELEPHONE (OUTPATIENT)
Dept: FAMILY MEDICINE CLINIC | Age: 84
End: 2019-08-21

## 2019-08-22 LAB
CULTURE: NORMAL
Lab: NORMAL
SPECIMEN: NORMAL

## 2019-08-23 ENCOUNTER — CARE COORDINATION (OUTPATIENT)
Dept: CASE MANAGEMENT | Age: 84
End: 2019-08-23

## 2019-08-23 NOTE — CARE COORDINATION
[address] Transitions Follow Up Call    2019    Patient: Ana Paula Michaels  Patient : 3/23/1930   MRN: [id number]  Reason for Admission: SBO; UTI; CHF  Discharge Date: 19 RARS: Readmission Risk Score: 23    Spoke with: Patient; [facility]    Care Transitions Subsequent and Final Call    Subsequent and Final Calls  Are you currently active with any services?:  Home Health  Care Transitions Interventions   Home Care Waiver:  Completed      Medication Assistance Program:  Completed     Other Interventions: Follow Up:   200 Spoke w/ Patient for Care Transition. Reports doing well. Denies abdominal pain, n/v; reports +bm, wnl. Voiding qs, clear urine; no burning, urgency, mucous or blood in urine, fever, chills. Denies sob, edema, cough, orthopnea, chest discomfort. Reports taking meds as directed and denies questions or rx needs. Had voiced concern about cost of rx copays. Informed him Med Assist application was mailed early this week. Reinforced importance of completing and returning asap in order to be considered for program eligibility. Active w/ [facility], [facility]. Uses cane, walker, w/c; has ERS. Reports current level of services meeting healthcare needs; denies additional dme or in home support needs. Confirmed transportation to 19 9:15am appt w/ Dr Thana Kocher. Patient to have imaging this am at [facility]. Reviewed CHF Zone Mgt. Patient not weighing daily. Discussed importance of daily wts, keeping wt log and when to contact MD. Confirmed Patient does have working scale at home. Informed of last documented wt of 169# on 19 as baseline. Informed Patient we will review wt log/CHF upon next call. Mailed the following education to home along w/ Med Assist pamphlet/reminder to complete application:  -CHF Zone Mgt  -HF Care Instructions  -Learning About HF  -Limiting Sodium and Fluids w/ HF  -Self Care for HF    Agreeable to continued CT f/u. Encouraged to contact [facility] 24/7 re: health concerns, change in condition. 1240 Incoming call from Patient concerned/questioning [facility] visit schedule. States Pia Draper got it all messed up I think\". Nicole Meier [address] T/C [facility]; Confirmed following current [facility] upcoming schedule:  8/23 PT Home Visit  8/25 RN Phone Visit  8/27 RN Home Visit  8/31 RN Phone Visit    1250 T/C Patient. Reviewed above [facility] schedule, voices understanding. No further questions, concerns.      Future Appointments   Date Time Provider Arthur Estrada   8/26/2019  9:15 AM Sky Viveros MD [facility]   9/27/2019  4:00 PM Dandre Robles MD [facility]   12/27/2019  1:30 PM Charlie Son MD [address] [facility], RN

## 2019-08-26 ENCOUNTER — OFFICE VISIT (OUTPATIENT)
Dept: FAMILY MEDICINE CLINIC | Age: 84
End: 2019-08-26
Payer: COMMERCIAL

## 2019-08-26 VITALS
BODY MASS INDEX: 30.58 KG/M2 | OXYGEN SATURATION: 91 % | DIASTOLIC BLOOD PRESSURE: 68 MMHG | SYSTOLIC BLOOD PRESSURE: 122 MMHG | HEIGHT: 63 IN | WEIGHT: 172.6 LBS | HEART RATE: 92 BPM | TEMPERATURE: 98.8 F

## 2019-08-26 DIAGNOSIS — I49.5 SICK SINUS SYNDROME (HCC): ICD-10-CM

## 2019-08-26 DIAGNOSIS — N40.1 BENIGN PROSTATIC HYPERPLASIA WITH LOWER URINARY TRACT SYMPTOMS, SYMPTOM DETAILS UNSPECIFIED: Primary | ICD-10-CM

## 2019-08-26 DIAGNOSIS — I10 ESSENTIAL HYPERTENSION: ICD-10-CM

## 2019-08-26 DIAGNOSIS — N39.0 URINARY TRACT INFECTION WITHOUT HEMATURIA, SITE UNSPECIFIED: ICD-10-CM

## 2019-08-26 DIAGNOSIS — K56.609 SMALL BOWEL OBSTRUCTION (HCC): ICD-10-CM

## 2019-08-26 DIAGNOSIS — I48.0 PAF (PAROXYSMAL ATRIAL FIBRILLATION) (HCC): ICD-10-CM

## 2019-08-26 PROCEDURE — 99214 OFFICE O/P EST MOD 30 MIN: CPT | Performed by: FAMILY MEDICINE

## 2019-08-26 RX ORDER — LISINOPRIL AND HYDROCHLOROTHIAZIDE 12.5; 1 MG/1; MG/1
1 TABLET ORAL DAILY
Qty: 90 TABLET | Refills: 0 | Status: ON HOLD | OUTPATIENT
Start: 2019-08-26 | End: 2019-09-06 | Stop reason: HOSPADM

## 2019-08-26 RX ORDER — METOPROLOL SUCCINATE 25 MG/1
25 TABLET, EXTENDED RELEASE ORAL DAILY
Qty: 90 TABLET | Refills: 0 | Status: SHIPPED | OUTPATIENT
Start: 2019-08-26 | End: 2019-11-04 | Stop reason: SDUPTHER

## 2019-08-26 ASSESSMENT — ENCOUNTER SYMPTOMS
CHEST TIGHTNESS: 0
RESPIRATORY NEGATIVE: 1
ABDOMINAL PAIN: 0
SHORTNESS OF BREATH: 0
WHEEZING: 0
COUGH: 0

## 2019-08-26 NOTE — PROGRESS NOTES
1958    Ing hernia    KNEE SURGERY Right 1980s    PACEMAKER INSERTION Right 12/13/2017    BIV PPM Medtronic Percepta Quad CRT-P MRI SureScan Pacemaker    PACEMAKER PLACEMENT Right 02/25/2013    Explanted 12/13/2017       CURRENT MEDICATIONS  Current Outpatient Medications   Medication Sig Dispense Refill    Probiotic Product (PROBIOTIC DAILY PO) Take by mouth daily      lisinopril-hydrochlorothiazide (PRINZIDE;ZESTORETIC) 10-12.5 MG per tablet Take 1 tablet by mouth daily 90 tablet 0    metoprolol succinate (TOPROL XL) 25 MG extended release tablet Take 1 tablet by mouth daily 90 tablet 0    finasteride (PROSCAR) 5 MG tablet Take 1 tablet by mouth daily 30 tablet 3    bimatoprost (LUMIGAN) 0.01 % SOLN ophthalmic drops Place 1 drop into both eyes nightly      latanoprost (XALATAN) 0.005 % ophthalmic solution Place 1 drop into both eyes nightly      apixaban (ELIQUIS) 2.5 MG TABS tablet Take 1 tablet by mouth 2 times daily 56 tablet 0    simvastatin (ZOCOR) 40 MG tablet Take 1 tablet by mouth nightly 90 tablet 1    Ascorbic Acid (VITAMIN C) 1000 MG tablet Take 1,000 mg by mouth daily      brimonidine-timolol (COMBIGAN) 0.2-0.5 % ophthalmic solution Place 1 drop into both eyes every 12 hours      diltiazem (CARDIZEM 12 HR) 120 MG extended release capsule Take 1 capsule by mouth daily 90 capsule 1    sertraline (ZOLOFT) 50 MG tablet Take 1 tablet by mouth daily 90 tablet 1    tamsulosin (FLOMAX) 0.4 MG capsule Take 1 capsule by mouth daily 90 capsule 1    omeprazole (PRILOSEC) 20 MG delayed release capsule Take 1 capsule by mouth daily 90 capsule 1    ciprofloxacin (CIPRO) 500 MG tablet Take 1 tablet by mouth every 12 hours for 7 days (Patient not taking: Reported on 8/26/2019) 14 tablet 0    acetaminophen (TYLENOL) 500 MG tablet Take 500 mg by mouth every 6 hours as needed for Pain 2 tabs by mouth three times a day as needed.       Glucosamine-MSM-Hyaluronic Acd (JOINT HEALTH PO) Take 1 each by mouth daily        No current facility-administered medications for this visit. ALLERGIES  Allergies   Allergen Reactions    Codeine Anaphylaxis    Fentanyl Other (See Comments)     Hypotension        PHYSICAL EXAM    /68 (Site: Left Upper Arm, Position: Sitting, Cuff Size: Medium Adult)   Pulse 92   Temp 98.8 °F (37.1 °C)   Ht 5' 3\" (1.6 m)   Wt 172 lb 9.6 oz (78.3 kg)   SpO2 91% Comment: RA  BMI 30.57 kg/m²     Physical Exam   Constitutional: He is oriented to person, place, and time. He appears well-developed and well-nourished. HENT:   Right Ear: External ear normal.   Left Ear: External ear normal.   Nose: Nose normal.   Mouth/Throat: Oropharynx is clear and moist.   Eyes: Conjunctivae are normal.   Neck: No thyromegaly present. Cardiovascular: Normal rate, normal heart sounds and intact distal pulses. Pulmonary/Chest: Effort normal and breath sounds normal. No respiratory distress. He has no wheezes. He has no rales. Abdominal: Soft. He exhibits no distension and no mass. There is no tenderness. There is no guarding. Musculoskeletal: He exhibits edema. Lymphadenopathy:     He has no cervical adenopathy. Neurological: He is alert and oriented to person, place, and time. Skin: Skin is warm and dry. Psychiatric: He has a normal mood and affect. Nursing note and vitals reviewed. Reviewed the patient's hospital records testing and discharge summary and reconciled his medications    ASSESSMENT:    Lovey Kanner was seen today for follow-up from hospital, discuss medications and medication refill. Diagnoses and all orders for this visit:    Benign prostatic hyperplasia with lower urinary tract symptoms, symptom details unspecified    Essential hypertension  -     metoprolol succinate (TOPROL XL) 25 MG extended release tablet;  Take 1 tablet by mouth daily  -     ANTONIA - Hoa Gonzalez, CNP, Urology, [place]    Sick sinus syndrome [facility])    Small bowel obstruction [facility])    Urinary tract

## 2019-08-27 ENCOUNTER — HOSPITAL ENCOUNTER (INPATIENT)
Age: 84
LOS: 10 days | Discharge: NURSING, REHAB OR LONG-TERM CARE | DRG: 329 | End: 2019-09-06
Attending: FAMILY MEDICINE | Admitting: INTERNAL MEDICINE
Payer: COMMERCIAL

## 2019-08-27 ENCOUNTER — APPOINTMENT (OUTPATIENT)
Dept: GENERAL RADIOLOGY | Age: 84
DRG: 329 | End: 2019-08-27
Payer: COMMERCIAL

## 2019-08-27 ENCOUNTER — APPOINTMENT (OUTPATIENT)
Dept: CT IMAGING | Age: 84
DRG: 329 | End: 2019-08-27
Payer: COMMERCIAL

## 2019-08-27 DIAGNOSIS — K46.9 PERISTOMAL HERNIA: Primary | ICD-10-CM

## 2019-08-27 DIAGNOSIS — K56.609 SMALL BOWEL OBSTRUCTION (HCC): ICD-10-CM

## 2019-08-27 DIAGNOSIS — K56.609 SBO (SMALL BOWEL OBSTRUCTION) (HCC): ICD-10-CM

## 2019-08-27 LAB
ALBUMIN SERPL-MCNC: 3.9 GM/DL (ref 3.4–5)
ALP BLD-CCNC: 99 IU/L (ref 40–129)
ALT SERPL-CCNC: 14 U/L (ref 10–40)
ANION GAP SERPL CALCULATED.3IONS-SCNC: 11 MMOL/L (ref 4–16)
APTT: 32 SECONDS (ref 21.2–33)
AST SERPL-CCNC: 23 IU/L (ref 15–37)
BASOPHILS ABSOLUTE: 0 K/CU MM
BASOPHILS RELATIVE PERCENT: 0.6 % (ref 0–1)
BILIRUB SERPL-MCNC: 0.6 MG/DL (ref 0–1)
BUN BLDV-MCNC: 18 MG/DL (ref 6–23)
CALCIUM SERPL-MCNC: 9.1 MG/DL (ref 8.3–10.6)
CHLORIDE BLD-SCNC: 98 MMOL/L (ref 99–110)
CO2: 29 MMOL/L (ref 21–32)
CREAT SERPL-MCNC: 1.2 MG/DL (ref 0.9–1.3)
DIFFERENTIAL TYPE: ABNORMAL
EOSINOPHILS ABSOLUTE: 0.2 K/CU MM
EOSINOPHILS RELATIVE PERCENT: 2.3 % (ref 0–3)
GFR AFRICAN AMERICAN: >60 ML/MIN/1.73M2
GFR NON-AFRICAN AMERICAN: 57 ML/MIN/1.73M2
GLUCOSE BLD-MCNC: 120 MG/DL (ref 70–99)
HCT VFR BLD CALC: 39.3 % (ref 42–52)
HEMOGLOBIN: 13.4 GM/DL (ref 13.5–18)
IMMATURE NEUTROPHIL %: 0.3 % (ref 0–0.43)
LACTATE: 1 MMOL/L (ref 0.4–2)
LIPASE: 27 IU/L (ref 13–60)
LYMPHOCYTES ABSOLUTE: 0.6 K/CU MM
LYMPHOCYTES RELATIVE PERCENT: 9.8 % (ref 24–44)
MCH RBC QN AUTO: 33.7 PG (ref 27–31)
MCHC RBC AUTO-ENTMCNC: 34.1 % (ref 32–36)
MCV RBC AUTO: 98.7 FL (ref 78–100)
MONOCYTES ABSOLUTE: 0.3 K/CU MM
MONOCYTES RELATIVE PERCENT: 5 % (ref 0–4)
NUCLEATED RBC %: 0 %
PDW BLD-RTO: 13 % (ref 11.7–14.9)
PLATELET # BLD: 228 K/CU MM (ref 140–440)
PMV BLD AUTO: 9.7 FL (ref 7.5–11.1)
POTASSIUM SERPL-SCNC: 3.3 MMOL/L (ref 3.5–5.1)
RBC # BLD: 3.98 M/CU MM (ref 4.6–6.2)
SEGMENTED NEUTROPHILS ABSOLUTE COUNT: 5.3 K/CU MM
SEGMENTED NEUTROPHILS RELATIVE PERCENT: 82 % (ref 36–66)
SODIUM BLD-SCNC: 138 MMOL/L (ref 135–145)
TOTAL IMMATURE NEUTOROPHIL: 0.02 K/CU MM
TOTAL NUCLEATED RBC: 0 K/CU MM
TOTAL PROTEIN: 6.9 GM/DL (ref 6.4–8.2)
WBC # BLD: 6.4 K/CU MM (ref 4–10.5)

## 2019-08-27 PROCEDURE — 96365 THER/PROPH/DIAG IV INF INIT: CPT

## 2019-08-27 PROCEDURE — 99284 EMERGENCY DEPT VISIT MOD MDM: CPT

## 2019-08-27 PROCEDURE — 87040 BLOOD CULTURE FOR BACTERIA: CPT

## 2019-08-27 PROCEDURE — 74177 CT ABD & PELVIS W/CONTRAST: CPT

## 2019-08-27 PROCEDURE — 85730 THROMBOPLASTIN TIME PARTIAL: CPT

## 2019-08-27 PROCEDURE — 80053 COMPREHEN METABOLIC PANEL: CPT

## 2019-08-27 PROCEDURE — 83690 ASSAY OF LIPASE: CPT

## 2019-08-27 PROCEDURE — 36415 COLL VENOUS BLD VENIPUNCTURE: CPT

## 2019-08-27 PROCEDURE — 83605 ASSAY OF LACTIC ACID: CPT

## 2019-08-27 PROCEDURE — 96375 TX/PRO/DX INJ NEW DRUG ADDON: CPT

## 2019-08-27 PROCEDURE — 1200000000 HC SEMI PRIVATE

## 2019-08-27 PROCEDURE — 85025 COMPLETE CBC W/AUTO DIFF WBC: CPT

## 2019-08-27 PROCEDURE — 74018 RADEX ABDOMEN 1 VIEW: CPT

## 2019-08-27 PROCEDURE — 6360000002 HC RX W HCPCS: Performed by: FAMILY MEDICINE

## 2019-08-27 PROCEDURE — 2580000003 HC RX 258: Performed by: FAMILY MEDICINE

## 2019-08-27 PROCEDURE — 6360000004 HC RX CONTRAST MEDICATION: Performed by: FAMILY MEDICINE

## 2019-08-27 RX ORDER — 0.9 % SODIUM CHLORIDE 0.9 %
1000 INTRAVENOUS SOLUTION INTRAVENOUS ONCE
Status: COMPLETED | OUTPATIENT
Start: 2019-08-27 | End: 2019-08-27

## 2019-08-27 RX ORDER — TIMOLOL MALEATE 5 MG/ML
1 SOLUTION/ DROPS OPHTHALMIC 2 TIMES DAILY
Status: DISCONTINUED | OUTPATIENT
Start: 2019-08-27 | End: 2019-09-06 | Stop reason: HOSPADM

## 2019-08-27 RX ORDER — HEPARIN SODIUM 1000 [USP'U]/ML
60 INJECTION, SOLUTION INTRAVENOUS; SUBCUTANEOUS PRN
Status: DISCONTINUED | OUTPATIENT
Start: 2019-08-27 | End: 2019-08-28

## 2019-08-27 RX ORDER — KETOROLAC TROMETHAMINE 30 MG/ML
15 INJECTION, SOLUTION INTRAMUSCULAR; INTRAVENOUS EVERY 6 HOURS PRN
Status: DISCONTINUED | OUTPATIENT
Start: 2019-08-27 | End: 2019-08-28

## 2019-08-27 RX ORDER — PANTOPRAZOLE SODIUM 40 MG/10ML
40 INJECTION, POWDER, LYOPHILIZED, FOR SOLUTION INTRAVENOUS DAILY
Status: DISCONTINUED | OUTPATIENT
Start: 2019-08-28 | End: 2019-09-01

## 2019-08-27 RX ORDER — LATANOPROST 50 UG/ML
1 SOLUTION/ DROPS OPHTHALMIC NIGHTLY
Status: DISCONTINUED | OUTPATIENT
Start: 2019-08-27 | End: 2019-09-06 | Stop reason: HOSPADM

## 2019-08-27 RX ORDER — MORPHINE SULFATE 4 MG/ML
4 INJECTION, SOLUTION INTRAMUSCULAR; INTRAVENOUS EVERY 4 HOURS PRN
Status: DISCONTINUED | OUTPATIENT
Start: 2019-08-27 | End: 2019-08-29

## 2019-08-27 RX ORDER — BRIMONIDINE TARTRATE, TIMOLOL MALEATE 2; 5 MG/ML; MG/ML
1 SOLUTION/ DROPS OPHTHALMIC EVERY 12 HOURS
Status: DISCONTINUED | OUTPATIENT
Start: 2019-08-27 | End: 2019-08-27 | Stop reason: CLARIF

## 2019-08-27 RX ORDER — POTASSIUM CHLORIDE 7.45 MG/ML
10 INJECTION INTRAVENOUS ONCE
Status: COMPLETED | OUTPATIENT
Start: 2019-08-27 | End: 2019-08-28

## 2019-08-27 RX ORDER — SODIUM CHLORIDE 0.9 % (FLUSH) 0.9 %
10 SYRINGE (ML) INJECTION EVERY 12 HOURS SCHEDULED
Status: DISCONTINUED | OUTPATIENT
Start: 2019-08-27 | End: 2019-09-06 | Stop reason: HOSPADM

## 2019-08-27 RX ORDER — HYDROMORPHONE HCL 110MG/55ML
0.5 PATIENT CONTROLLED ANALGESIA SYRINGE INTRAVENOUS ONCE
Status: COMPLETED | OUTPATIENT
Start: 2019-08-27 | End: 2019-08-27

## 2019-08-27 RX ORDER — KETOROLAC TROMETHAMINE 30 MG/ML
15 INJECTION, SOLUTION INTRAMUSCULAR; INTRAVENOUS ONCE
Status: COMPLETED | OUTPATIENT
Start: 2019-08-27 | End: 2019-08-27

## 2019-08-27 RX ORDER — METOCLOPRAMIDE HYDROCHLORIDE 5 MG/ML
10 INJECTION INTRAMUSCULAR; INTRAVENOUS ONCE
Status: COMPLETED | OUTPATIENT
Start: 2019-08-27 | End: 2019-08-27

## 2019-08-27 RX ORDER — ONDANSETRON 2 MG/ML
8 INJECTION INTRAMUSCULAR; INTRAVENOUS ONCE
Status: COMPLETED | OUTPATIENT
Start: 2019-08-27 | End: 2019-08-27

## 2019-08-27 RX ORDER — HEPARIN SODIUM 10000 [USP'U]/100ML
12 INJECTION, SOLUTION INTRAVENOUS CONTINUOUS
Status: DISCONTINUED | OUTPATIENT
Start: 2019-08-27 | End: 2019-08-28

## 2019-08-27 RX ORDER — HEPARIN SODIUM 1000 [USP'U]/ML
60 INJECTION, SOLUTION INTRAVENOUS; SUBCUTANEOUS ONCE
Status: DISCONTINUED | OUTPATIENT
Start: 2019-08-27 | End: 2019-08-27 | Stop reason: ALTCHOICE

## 2019-08-27 RX ORDER — SODIUM CHLORIDE 0.9 % (FLUSH) 0.9 %
10 SYRINGE (ML) INJECTION PRN
Status: DISCONTINUED | OUTPATIENT
Start: 2019-08-27 | End: 2019-09-06 | Stop reason: HOSPADM

## 2019-08-27 RX ORDER — ONDANSETRON 2 MG/ML
4 INJECTION INTRAMUSCULAR; INTRAVENOUS EVERY 6 HOURS PRN
Status: DISCONTINUED | OUTPATIENT
Start: 2019-08-27 | End: 2019-08-29

## 2019-08-27 RX ORDER — ACETAMINOPHEN 10 MG/ML
1000 INJECTION, SOLUTION INTRAVENOUS ONCE
Status: COMPLETED | OUTPATIENT
Start: 2019-08-27 | End: 2019-08-27

## 2019-08-27 RX ORDER — HEPARIN SODIUM 1000 [USP'U]/ML
30 INJECTION, SOLUTION INTRAVENOUS; SUBCUTANEOUS PRN
Status: DISCONTINUED | OUTPATIENT
Start: 2019-08-27 | End: 2019-08-28

## 2019-08-27 RX ORDER — BRIMONIDINE TARTRATE 2 MG/ML
1 SOLUTION/ DROPS OPHTHALMIC 2 TIMES DAILY
Status: DISCONTINUED | OUTPATIENT
Start: 2019-08-27 | End: 2019-09-06 | Stop reason: HOSPADM

## 2019-08-27 RX ADMIN — HYDROMORPHONE HYDROCHLORIDE 2 MG: 2 INJECTION, SOLUTION INTRAMUSCULAR; INTRAVENOUS; SUBCUTANEOUS at 20:16

## 2019-08-27 RX ADMIN — KETOROLAC TROMETHAMINE 15 MG: 30 INJECTION, SOLUTION INTRAMUSCULAR; INTRAVENOUS at 19:07

## 2019-08-27 RX ADMIN — METOCLOPRAMIDE 10 MG: 5 INJECTION, SOLUTION INTRAMUSCULAR; INTRAVENOUS at 19:07

## 2019-08-27 RX ADMIN — ACETAMINOPHEN 1000 MG: 10 INJECTION, SOLUTION INTRAVENOUS at 20:27

## 2019-08-27 RX ADMIN — SODIUM CHLORIDE 1000 ML: 9 INJECTION, SOLUTION INTRAVENOUS at 19:11

## 2019-08-27 RX ADMIN — IOPAMIDOL 80 ML: 755 INJECTION, SOLUTION INTRAVENOUS at 19:48

## 2019-08-27 RX ADMIN — ONDANSETRON 8 MG: 2 INJECTION INTRAMUSCULAR; INTRAVENOUS at 19:06

## 2019-08-27 ASSESSMENT — PAIN SCALES - GENERAL
PAINLEVEL_OUTOF10: 6
PAINLEVEL_OUTOF10: 3

## 2019-08-27 ASSESSMENT — PAIN DESCRIPTION - LOCATION: LOCATION: ABDOMEN

## 2019-08-27 ASSESSMENT — PAIN DESCRIPTION - PAIN TYPE: TYPE: ACUTE PAIN

## 2019-08-28 ENCOUNTER — ANESTHESIA EVENT (OUTPATIENT)
Dept: OPERATING ROOM | Age: 84
DRG: 329 | End: 2019-08-28
Payer: COMMERCIAL

## 2019-08-28 ENCOUNTER — ANESTHESIA (OUTPATIENT)
Dept: OPERATING ROOM | Age: 84
DRG: 329 | End: 2019-08-28
Payer: COMMERCIAL

## 2019-08-28 LAB
ABO/RH: NORMAL
ANION GAP SERPL CALCULATED.3IONS-SCNC: 8 MMOL/L (ref 4–16)
ANTIBODY SCREEN: NEGATIVE
APTT: 69.9 SECONDS (ref 21.2–33)
BACTERIA: NEGATIVE /HPF
BASOPHILS ABSOLUTE: 0 K/CU MM
BASOPHILS RELATIVE PERCENT: 0.3 % (ref 0–1)
BILIRUBIN URINE: NEGATIVE MG/DL
BLOOD, URINE: NEGATIVE
BUN BLDV-MCNC: 17 MG/DL (ref 6–23)
CALCIUM SERPL-MCNC: 8.6 MG/DL (ref 8.3–10.6)
CHLORIDE BLD-SCNC: 100 MMOL/L (ref 99–110)
CLARITY: CLEAR
CO2: 33 MMOL/L (ref 21–32)
COLOR: YELLOW
CREAT SERPL-MCNC: 1.2 MG/DL (ref 0.9–1.3)
DIFFERENTIAL TYPE: ABNORMAL
EOSINOPHILS ABSOLUTE: 0 K/CU MM
EOSINOPHILS RELATIVE PERCENT: 0.3 % (ref 0–3)
GFR AFRICAN AMERICAN: >60 ML/MIN/1.73M2
GFR NON-AFRICAN AMERICAN: 57 ML/MIN/1.73M2
GLUCOSE BLD-MCNC: 116 MG/DL (ref 70–99)
GLUCOSE, URINE: NEGATIVE MG/DL
HCT VFR BLD CALC: 38.1 % (ref 42–52)
HCT VFR BLD CALC: 38.4 % (ref 42–52)
HEMOGLOBIN: 12.5 GM/DL (ref 13.5–18)
HEMOGLOBIN: 12.6 GM/DL (ref 13.5–18)
IMMATURE NEUTROPHIL %: 0.3 % (ref 0–0.43)
KETONES, URINE: NEGATIVE MG/DL
LACTATE: 1 MMOL/L (ref 0.4–2)
LEUKOCYTE ESTERASE, URINE: NEGATIVE
LYMPHOCYTES ABSOLUTE: 0.7 K/CU MM
LYMPHOCYTES RELATIVE PERCENT: 7 % (ref 24–44)
MCH RBC QN AUTO: 33.1 PG (ref 27–31)
MCH RBC QN AUTO: 33.2 PG (ref 27–31)
MCHC RBC AUTO-ENTMCNC: 32.8 % (ref 32–36)
MCHC RBC AUTO-ENTMCNC: 32.8 % (ref 32–36)
MCV RBC AUTO: 100.8 FL (ref 78–100)
MCV RBC AUTO: 101.1 FL (ref 78–100)
MONOCYTES ABSOLUTE: 0.5 K/CU MM
MONOCYTES RELATIVE PERCENT: 5.3 % (ref 0–4)
NITRITE URINE, QUANTITATIVE: NEGATIVE
NUCLEATED RBC %: 0 %
PDW BLD-RTO: 13.1 % (ref 11.7–14.9)
PDW BLD-RTO: 13.1 % (ref 11.7–14.9)
PH, URINE: 7 (ref 5–8)
PLATELET # BLD: 187 K/CU MM (ref 140–440)
PLATELET # BLD: 211 K/CU MM (ref 140–440)
PMV BLD AUTO: 9.4 FL (ref 7.5–11.1)
PMV BLD AUTO: 9.7 FL (ref 7.5–11.1)
POTASSIUM SERPL-SCNC: 4 MMOL/L (ref 3.5–5.1)
PROTEIN UA: NEGATIVE MG/DL
RBC # BLD: 3.77 M/CU MM (ref 4.6–6.2)
RBC # BLD: 3.81 M/CU MM (ref 4.6–6.2)
RBC URINE: NORMAL /HPF (ref 0–3)
SEGMENTED NEUTROPHILS ABSOLUTE COUNT: 8.2 K/CU MM
SEGMENTED NEUTROPHILS RELATIVE PERCENT: 86.8 % (ref 36–66)
SODIUM BLD-SCNC: 141 MMOL/L (ref 135–145)
SPECIFIC GRAVITY UA: 1.03 (ref 1–1.03)
SPECIFIC GRAVITY UA: NORMAL (ref 1–1.03)
TOTAL IMMATURE NEUTOROPHIL: 0.03 K/CU MM
TOTAL NUCLEATED RBC: 0 K/CU MM
TRICHOMONAS: NORMAL /HPF
UROBILINOGEN, URINE: NORMAL MG/DL (ref 0.2–1)
WBC # BLD: 8.7 K/CU MM (ref 4–10.5)
WBC # BLD: 9.5 K/CU MM (ref 4–10.5)
WBC UA: <1 /HPF (ref 0–2)

## 2019-08-28 PROCEDURE — 2580000003 HC RX 258: Performed by: NURSE ANESTHETIST, CERTIFIED REGISTERED

## 2019-08-28 PROCEDURE — 36415 COLL VENOUS BLD VENIPUNCTURE: CPT

## 2019-08-28 PROCEDURE — 3600000013 HC SURGERY LEVEL 3 ADDTL 15MIN: Performed by: SURGERY

## 2019-08-28 PROCEDURE — 0WQF0ZZ REPAIR ABDOMINAL WALL, OPEN APPROACH: ICD-10-PCS | Performed by: SURGERY

## 2019-08-28 PROCEDURE — 85027 COMPLETE CBC AUTOMATED: CPT

## 2019-08-28 PROCEDURE — 6370000000 HC RX 637 (ALT 250 FOR IP): Performed by: NURSE PRACTITIONER

## 2019-08-28 PROCEDURE — 0DSM0ZZ REPOSITION DESCENDING COLON, OPEN APPROACH: ICD-10-PCS | Performed by: SURGERY

## 2019-08-28 PROCEDURE — 2580000003 HC RX 258: Performed by: HOSPITALIST

## 2019-08-28 PROCEDURE — 6360000002 HC RX W HCPCS: Performed by: SURGERY

## 2019-08-28 PROCEDURE — 2720000010 HC SURG SUPPLY STERILE: Performed by: SURGERY

## 2019-08-28 PROCEDURE — 3700000001 HC ADD 15 MINUTES (ANESTHESIA): Performed by: SURGERY

## 2019-08-28 PROCEDURE — 80048 BASIC METABOLIC PNL TOTAL CA: CPT

## 2019-08-28 PROCEDURE — C1781 MESH (IMPLANTABLE): HCPCS | Performed by: SURGERY

## 2019-08-28 PROCEDURE — 6360000002 HC RX W HCPCS: Performed by: NURSE PRACTITIONER

## 2019-08-28 PROCEDURE — 83605 ASSAY OF LACTIC ACID: CPT

## 2019-08-28 PROCEDURE — 6360000002 HC RX W HCPCS: Performed by: NURSE ANESTHETIST, CERTIFIED REGISTERED

## 2019-08-28 PROCEDURE — 2500000003 HC RX 250 WO HCPCS: Performed by: SURGERY

## 2019-08-28 PROCEDURE — 0WUF0JZ SUPPLEMENT ABDOMINAL WALL WITH SYNTHETIC SUBSTITUTE, OPEN APPROACH: ICD-10-PCS | Performed by: SURGERY

## 2019-08-28 PROCEDURE — 86850 RBC ANTIBODY SCREEN: CPT

## 2019-08-28 PROCEDURE — 85730 THROMBOPLASTIN TIME PARTIAL: CPT

## 2019-08-28 PROCEDURE — 6370000000 HC RX 637 (ALT 250 FOR IP): Performed by: SURGERY

## 2019-08-28 PROCEDURE — 86900 BLOOD TYPING SEROLOGIC ABO: CPT

## 2019-08-28 PROCEDURE — 88300 SURGICAL PATH GROSS: CPT

## 2019-08-28 PROCEDURE — C9113 INJ PANTOPRAZOLE SODIUM, VIA: HCPCS | Performed by: NURSE PRACTITIONER

## 2019-08-28 PROCEDURE — 0WPF0JZ REMOVAL OF SYNTHETIC SUBSTITUTE FROM ABDOMINAL WALL, OPEN APPROACH: ICD-10-PCS | Performed by: SURGERY

## 2019-08-28 PROCEDURE — 88307 TISSUE EXAM BY PATHOLOGIST: CPT

## 2019-08-28 PROCEDURE — 3600000003 HC SURGERY LEVEL 3 BASE: Performed by: SURGERY

## 2019-08-28 PROCEDURE — 85025 COMPLETE CBC W/AUTO DIFF WBC: CPT

## 2019-08-28 PROCEDURE — 1200000000 HC SEMI PRIVATE

## 2019-08-28 PROCEDURE — 7100000001 HC PACU RECOVERY - ADDTL 15 MIN: Performed by: SURGERY

## 2019-08-28 PROCEDURE — 7100000000 HC PACU RECOVERY - FIRST 15 MIN: Performed by: SURGERY

## 2019-08-28 PROCEDURE — 0DNV0ZZ RELEASE MESENTERY, OPEN APPROACH: ICD-10-PCS | Performed by: SURGERY

## 2019-08-28 PROCEDURE — 81001 URINALYSIS AUTO W/SCOPE: CPT

## 2019-08-28 PROCEDURE — 86901 BLOOD TYPING SEROLOGIC RH(D): CPT

## 2019-08-28 PROCEDURE — 3700000000 HC ANESTHESIA ATTENDED CARE: Performed by: SURGERY

## 2019-08-28 PROCEDURE — 2709999900 HC NON-CHARGEABLE SUPPLY: Performed by: SURGERY

## 2019-08-28 PROCEDURE — 2580000003 HC RX 258: Performed by: NURSE PRACTITIONER

## 2019-08-28 PROCEDURE — 0DTA0ZZ RESECTION OF JEJUNUM, OPEN APPROACH: ICD-10-PCS | Performed by: SURGERY

## 2019-08-28 DEVICE — IMPLANTABLE DEVICE: Type: IMPLANTABLE DEVICE | Site: ABDOMEN | Status: FUNCTIONAL

## 2019-08-28 RX ORDER — MORPHINE SULFATE 2 MG/ML
2 INJECTION, SOLUTION INTRAMUSCULAR; INTRAVENOUS EVERY 5 MIN PRN
Status: CANCELLED | OUTPATIENT
Start: 2019-08-28

## 2019-08-28 RX ORDER — POTASSIUM CHLORIDE 1.5 G/1.77G
40 POWDER, FOR SOLUTION ORAL PRN
Status: DISCONTINUED | OUTPATIENT
Start: 2019-08-28 | End: 2019-08-29

## 2019-08-28 RX ORDER — ONDANSETRON 2 MG/ML
4 INJECTION INTRAMUSCULAR; INTRAVENOUS
Status: CANCELLED | OUTPATIENT
Start: 2019-08-28 | End: 2019-08-28

## 2019-08-28 RX ORDER — POTASSIUM CHLORIDE 7.45 MG/ML
10 INJECTION INTRAVENOUS PRN
Status: DISCONTINUED | OUTPATIENT
Start: 2019-08-28 | End: 2019-08-29

## 2019-08-28 RX ORDER — SUCRALFATE 1 G/1
1 TABLET ORAL EVERY 6 HOURS SCHEDULED
Status: DISCONTINUED | OUTPATIENT
Start: 2019-08-28 | End: 2019-08-29

## 2019-08-28 RX ORDER — HYDROMORPHONE HCL 110MG/55ML
0.5 PATIENT CONTROLLED ANALGESIA SYRINGE INTRAVENOUS EVERY 5 MIN PRN
Status: CANCELLED | OUTPATIENT
Start: 2019-08-28

## 2019-08-28 RX ORDER — SODIUM CHLORIDE, SODIUM LACTATE, POTASSIUM CHLORIDE, CALCIUM CHLORIDE 600; 310; 30; 20 MG/100ML; MG/100ML; MG/100ML; MG/100ML
INJECTION, SOLUTION INTRAVENOUS CONTINUOUS PRN
Status: DISCONTINUED | OUTPATIENT
Start: 2019-08-28 | End: 2019-08-29 | Stop reason: SDUPTHER

## 2019-08-28 RX ORDER — HYDRALAZINE HYDROCHLORIDE 20 MG/ML
5 INJECTION INTRAMUSCULAR; INTRAVENOUS EVERY 10 MIN PRN
Status: CANCELLED | OUTPATIENT
Start: 2019-08-28

## 2019-08-28 RX ORDER — POTASSIUM CHLORIDE 20 MEQ/1
40 TABLET, EXTENDED RELEASE ORAL PRN
Status: DISCONTINUED | OUTPATIENT
Start: 2019-08-28 | End: 2019-08-29

## 2019-08-28 RX ORDER — SODIUM CHLORIDE 9 MG/ML
INJECTION, SOLUTION INTRAVENOUS CONTINUOUS PRN
Status: DISCONTINUED | OUTPATIENT
Start: 2019-08-28 | End: 2019-08-29 | Stop reason: SDUPTHER

## 2019-08-28 RX ORDER — PROPOFOL 10 MG/ML
INJECTION, EMULSION INTRAVENOUS PRN
Status: DISCONTINUED | OUTPATIENT
Start: 2019-08-28 | End: 2019-08-29 | Stop reason: SDUPTHER

## 2019-08-28 RX ORDER — FENTANYL CITRATE 50 UG/ML
25 INJECTION, SOLUTION INTRAMUSCULAR; INTRAVENOUS EVERY 5 MIN PRN
Status: CANCELLED | OUTPATIENT
Start: 2019-08-28

## 2019-08-28 RX ORDER — FENTANYL CITRATE 50 UG/ML
INJECTION, SOLUTION INTRAMUSCULAR; INTRAVENOUS PRN
Status: DISCONTINUED | OUTPATIENT
Start: 2019-08-28 | End: 2019-08-29 | Stop reason: SDUPTHER

## 2019-08-28 RX ORDER — PHENYLEPHRINE HYDROCHLORIDE 10 MG/ML
INJECTION INTRAVENOUS PRN
Status: DISCONTINUED | OUTPATIENT
Start: 2019-08-28 | End: 2019-08-29 | Stop reason: SDUPTHER

## 2019-08-28 RX ORDER — DEXTROSE AND SODIUM CHLORIDE 5; .45 G/100ML; G/100ML
INJECTION, SOLUTION INTRAVENOUS CONTINUOUS
Status: DISCONTINUED | OUTPATIENT
Start: 2019-08-28 | End: 2019-08-29

## 2019-08-28 RX ORDER — CEFAZOLIN SODIUM 2 G/100ML
2 INJECTION, SOLUTION INTRAVENOUS EVERY 8 HOURS
Status: DISCONTINUED | OUTPATIENT
Start: 2019-08-28 | End: 2019-08-29

## 2019-08-28 RX ORDER — LIDOCAINE HYDROCHLORIDE 20 MG/ML
INJECTION, SOLUTION INTRAVENOUS PRN
Status: DISCONTINUED | OUTPATIENT
Start: 2019-08-28 | End: 2019-08-29 | Stop reason: SDUPTHER

## 2019-08-28 RX ORDER — SUCCINYLCHOLINE/SOD CL,ISO/PF 100 MG/5ML
SYRINGE (ML) INTRAVENOUS PRN
Status: DISCONTINUED | OUTPATIENT
Start: 2019-08-28 | End: 2019-08-29 | Stop reason: SDUPTHER

## 2019-08-28 RX ORDER — HYDROMORPHONE HCL 110MG/55ML
0.25 PATIENT CONTROLLED ANALGESIA SYRINGE INTRAVENOUS EVERY 5 MIN PRN
Status: CANCELLED | OUTPATIENT
Start: 2019-08-28

## 2019-08-28 RX ORDER — LABETALOL 20 MG/4 ML (5 MG/ML) INTRAVENOUS SYRINGE
5 EVERY 10 MIN PRN
Status: CANCELLED | OUTPATIENT
Start: 2019-08-28

## 2019-08-28 RX ORDER — ROCURONIUM BROMIDE 10 MG/ML
INJECTION, SOLUTION INTRAVENOUS PRN
Status: DISCONTINUED | OUTPATIENT
Start: 2019-08-28 | End: 2019-08-29 | Stop reason: SDUPTHER

## 2019-08-28 RX ADMIN — PROPOFOL 50 MG: 10 INJECTION, EMULSION INTRAVENOUS at 21:56

## 2019-08-28 RX ADMIN — SODIUM CHLORIDE, PRESERVATIVE FREE 10 ML: 5 INJECTION INTRAVENOUS at 21:02

## 2019-08-28 RX ADMIN — TIMOLOL MALEATE 1 DROP: 5 SOLUTION OPHTHALMIC at 11:04

## 2019-08-28 RX ADMIN — PHENYLEPHRINE HYDROCHLORIDE 50 MCG: 10 INJECTION INTRAVENOUS at 23:02

## 2019-08-28 RX ADMIN — BRIMONIDINE TARTRATE 1 DROP: 2 SOLUTION/ DROPS OPHTHALMIC at 21:01

## 2019-08-28 RX ADMIN — PHENYLEPHRINE HYDROCHLORIDE 50 MCG: 10 INJECTION INTRAVENOUS at 23:43

## 2019-08-28 RX ADMIN — ROCURONIUM BROMIDE 10 MG: 10 INJECTION INTRAVENOUS at 22:48

## 2019-08-28 RX ADMIN — POTASSIUM CHLORIDE 10 MEQ: 7.46 INJECTION, SOLUTION INTRAVENOUS at 00:55

## 2019-08-28 RX ADMIN — SODIUM CHLORIDE, PRESERVATIVE FREE 10 ML: 5 INJECTION INTRAVENOUS at 11:00

## 2019-08-28 RX ADMIN — FENTANYL CITRATE 100 MCG: 50 INJECTION INTRAMUSCULAR; INTRAVENOUS at 21:56

## 2019-08-28 RX ADMIN — PHENYLEPHRINE HYDROCHLORIDE 50 MCG: 10 INJECTION INTRAVENOUS at 23:47

## 2019-08-28 RX ADMIN — ROCURONIUM BROMIDE 10 MG: 10 INJECTION INTRAVENOUS at 23:55

## 2019-08-28 RX ADMIN — CEFAZOLIN SODIUM 2 G: 2 INJECTION, SOLUTION INTRAVENOUS at 22:29

## 2019-08-28 RX ADMIN — SODIUM CHLORIDE: 9 INJECTION, SOLUTION INTRAVENOUS at 21:50

## 2019-08-28 RX ADMIN — Medication 100 MG: at 21:56

## 2019-08-28 RX ADMIN — LIDOCAINE HYDROCHLORIDE 100 MG: 20 INJECTION, SOLUTION INTRAVENOUS at 21:56

## 2019-08-28 RX ADMIN — PHENYLEPHRINE HYDROCHLORIDE 50 MCG: 10 INJECTION INTRAVENOUS at 23:24

## 2019-08-28 RX ADMIN — LATANOPROST 1 DROP: 50 SOLUTION/ DROPS OPHTHALMIC at 20:54

## 2019-08-28 RX ADMIN — PANTOPRAZOLE SODIUM 40 MG: 40 INJECTION, POWDER, FOR SOLUTION INTRAVENOUS at 10:59

## 2019-08-28 RX ADMIN — ROCURONIUM BROMIDE 50 MG: 10 INJECTION INTRAVENOUS at 22:08

## 2019-08-28 RX ADMIN — ROCURONIUM BROMIDE 10 MG: 10 INJECTION INTRAVENOUS at 23:39

## 2019-08-28 RX ADMIN — PHENYLEPHRINE HYDROCHLORIDE 50 MCG: 10 INJECTION INTRAVENOUS at 23:50

## 2019-08-28 RX ADMIN — METRONIDAZOLE 500 MG: 500 INJECTION, SOLUTION INTRAVENOUS at 22:02

## 2019-08-28 RX ADMIN — SODIUM CHLORIDE, PRESERVATIVE FREE 10 ML: 5 INJECTION INTRAVENOUS at 00:56

## 2019-08-28 RX ADMIN — PHENYLEPHRINE HYDROCHLORIDE 50 MCG: 10 INJECTION INTRAVENOUS at 22:31

## 2019-08-28 RX ADMIN — BRIMONIDINE TARTRATE 1 DROP: 2 SOLUTION/ DROPS OPHTHALMIC at 11:04

## 2019-08-28 RX ADMIN — PHENYLEPHRINE HYDROCHLORIDE 100 MCG: 10 INJECTION INTRAVENOUS at 23:13

## 2019-08-28 RX ADMIN — HEPARIN SODIUM 12 UNITS/KG/HR: 10000 INJECTION, SOLUTION INTRAVENOUS at 00:54

## 2019-08-28 RX ADMIN — SODIUM CHLORIDE, POTASSIUM CHLORIDE, SODIUM LACTATE AND CALCIUM CHLORIDE: 600; 310; 30; 20 INJECTION, SOLUTION INTRAVENOUS at 23:59

## 2019-08-28 RX ADMIN — SUCRALFATE 1 G: 1 TABLET ORAL at 18:25

## 2019-08-28 RX ADMIN — SODIUM CHLORIDE, POTASSIUM CHLORIDE, SODIUM LACTATE AND CALCIUM CHLORIDE: 600; 310; 30; 20 INJECTION, SOLUTION INTRAVENOUS at 22:55

## 2019-08-28 RX ADMIN — DEXTROSE AND SODIUM CHLORIDE: 5; 450 INJECTION, SOLUTION INTRAVENOUS at 20:13

## 2019-08-28 RX ADMIN — TIMOLOL MALEATE 1 DROP: 5 SOLUTION OPHTHALMIC at 20:41

## 2019-08-28 ASSESSMENT — PULMONARY FUNCTION TESTS
PIF_VALUE: 14
PIF_VALUE: 18
PIF_VALUE: 19
PIF_VALUE: 15
PIF_VALUE: 14
PIF_VALUE: 15
PIF_VALUE: 14
PIF_VALUE: 14
PIF_VALUE: 2
PIF_VALUE: 15
PIF_VALUE: 15
PIF_VALUE: 16
PIF_VALUE: 15
PIF_VALUE: 15
PIF_VALUE: 13
PIF_VALUE: 18
PIF_VALUE: 20
PIF_VALUE: 1
PIF_VALUE: 20
PIF_VALUE: 18
PIF_VALUE: 19
PIF_VALUE: 16
PIF_VALUE: 15
PIF_VALUE: 13
PIF_VALUE: 15
PIF_VALUE: 18
PIF_VALUE: 19
PIF_VALUE: 17
PIF_VALUE: 14
PIF_VALUE: 13
PIF_VALUE: 14
PIF_VALUE: 1
PIF_VALUE: 15
PIF_VALUE: 13
PIF_VALUE: 13
PIF_VALUE: 18
PIF_VALUE: 17
PIF_VALUE: 14
PIF_VALUE: 14
PIF_VALUE: 15
PIF_VALUE: 16
PIF_VALUE: 13
PIF_VALUE: 14
PIF_VALUE: 18
PIF_VALUE: 18
PIF_VALUE: 13
PIF_VALUE: 14
PIF_VALUE: 17
PIF_VALUE: 13
PIF_VALUE: 14
PIF_VALUE: 13
PIF_VALUE: 15
PIF_VALUE: 15
PIF_VALUE: 14
PIF_VALUE: 16
PIF_VALUE: 14
PIF_VALUE: 15
PIF_VALUE: 13
PIF_VALUE: 17
PIF_VALUE: 13
PIF_VALUE: 15
PIF_VALUE: 18
PIF_VALUE: 16
PIF_VALUE: 15
PIF_VALUE: 20
PIF_VALUE: 13
PIF_VALUE: 15
PIF_VALUE: 13
PIF_VALUE: 15
PIF_VALUE: 17
PIF_VALUE: 14
PIF_VALUE: 15
PIF_VALUE: 16
PIF_VALUE: 15
PIF_VALUE: 19
PIF_VALUE: 18
PIF_VALUE: 18
PIF_VALUE: 17
PIF_VALUE: 15
PIF_VALUE: 1
PIF_VALUE: 14
PIF_VALUE: 1
PIF_VALUE: 20
PIF_VALUE: 2
PIF_VALUE: 14
PIF_VALUE: 14
PIF_VALUE: 15
PIF_VALUE: 18
PIF_VALUE: 15
PIF_VALUE: 18
PIF_VALUE: 14
PIF_VALUE: 16
PIF_VALUE: 13
PIF_VALUE: 1
PIF_VALUE: 17
PIF_VALUE: 15
PIF_VALUE: 2
PIF_VALUE: 14
PIF_VALUE: 20
PIF_VALUE: 13
PIF_VALUE: 14
PIF_VALUE: 14
PIF_VALUE: 20
PIF_VALUE: 14
PIF_VALUE: 17
PIF_VALUE: 17
PIF_VALUE: 1
PIF_VALUE: 13
PIF_VALUE: 14
PIF_VALUE: 16
PIF_VALUE: 19
PIF_VALUE: 13
PIF_VALUE: 14
PIF_VALUE: 14
PIF_VALUE: 16
PIF_VALUE: 13
PIF_VALUE: 18
PIF_VALUE: 17
PIF_VALUE: 15
PIF_VALUE: 16

## 2019-08-28 NOTE — PROGRESS NOTES
ondansetron 4 mg Q6H PRN   morphine 4 mg Q4H PRN       Data/Labs:     Recent Labs     08/27/19 1847 08/28/19  0450   WBC 6.4 9.5   HGB 13.4* 12.5*   HCT 39.3* 38.1*    187      Recent Labs     08/27/19  1847 08/28/19  0450    141   K 3.3* 4.0   CL 98* 100   CO2 29 33*   BUN 18 17   CREATININE 1.2 1.2     Recent Labs     08/27/19 1847   AST 23   ALT 14   BILITOT 0.6   ALKPHOS 99     No results for input(s): INR in the last 72 hours. No results for input(s): CKTOTAL, CKMB, CKMBINDEX, TROPONINT in the last 72 hours. HgBA1c: No results found for: LABA1C  CALCIUM:  8.6/33 (08/28 0450)    I/O last 3 completed shifts:   In: 0   Out: 850 [Urine:400; Emesis/NG output:450]    Intake/Output Summary (Last 24 hours) at 8/28/2019 1541  Last data filed at 8/28/2019 0438  Gross per 24 hour   Intake 0 ml   Output 850 ml   Net -850 ml

## 2019-08-28 NOTE — ED PROVIDER NOTES
Triage Chief Complaint:   Abdominal Pain and Emesis    [place]:  Aliyah Herrmann is a 80 y.o. male that presents complaining of pain in the left lower quadrant near his ostomy, swelling as well, and several episodes of vomiting in the last half an hour the smell like stool. No fevers chills. Ostomy continues to put some stool out. No dysuria or frequency. Patient admitted to the hospital with exactly the same symptom complex approximately 10 days ago with a small bowel obstruction. ROS:  General:  No fevers, no chills, no weakness  Eyes:  No recent vison changes, no discharge  ENT:  No sore throat, no nasal congestion, no hearing changes  Cardiovascular:  No chest pain, no palpitations  Respiratory:  No shortness of breath, no cough, no wheezing  Gastrointestinal: As above  Musculoskeletal:  No muscle pain, no joint pain  Skin:  No rash, no pruritis, no easy bruising  Neurologic:  No speech problems, no headache, no extremity numbness, no extremity tingling, no extremity weakness  Psychiatric:  No anxiety, no hallucinations or delusions, no suicidal or homicidal ideation  Genitourinary:  No dysuria, no hematuria  Endocrine:  No unexpected weight gain, no unexpected weight loss  Extremities:  no edema, no pain    Past Medical History:   Diagnosis Date    AAA (abdominal aortic aneurysm) ([facility] [address]     Surgery scheduled for 7/1/2014 with Dr. Josefa العراقي.  Arthritis     generalized    AV block, 1st degree     Back pain     Bradycardia     Colon polyps     Colostomy in place ([facility] [address]     Glaucoma     H/O cardiovascular stress test 12/27/2013    cardiolite-EF56%, apical hypokinesis is seen, cannot exlude apical Tyler ischemia    History of cardiovascular stress test 3/5/10    No angina or ischemic EKG changes are noted with Lexiscan. The cardiolite study demonstrated normal perfusion in all segments of the myocardium with an intact left ventricular systolic function. The resting sestamibi dose is 10.8, stress is 32.5. Gurvinder, APRN - NP        pantoprazole (PROTONIX) injection 40 mg  40 mg Intravenous Daily Pau Fought, APRN - NP        morphine sulfate (PF) injection 4 mg  4 mg Intravenous Q4H PRN Pau Fought, APRN - NP        ketorolac (TORADOL) injection 15 mg  15 mg Intravenous Q6H PRN Pau Fought, APRN - NP        heparin (porcine) injection 4,690 Units  60 Units/kg Intravenous PRN Pau Fought, APRN - NP        heparin (porcine) injection 2,340 Units  30 Units/kg Intravenous PRN Pau Fought, APRN - NP        heparin 25,000 units in dextrose 5% 250 mL infusion  12 Units/kg/hr Intravenous Continuous Pau Fought, APRN - NP 9.4 mL/hr at 08/28/19 0054 12 Units/kg/hr at 08/28/19 0054    latanoprost (XALATAN) 0.005 % ophthalmic solution 1 drop  1 drop Both Eyes Nightly Pau Fought, APRN - NP        brimonidine (ALPHAGAN) 0.2 % ophthalmic solution 1 drop  1 drop Both Eyes BID Pau Fought, APRN - NP        And    timolol (TIMOPTIC) 0.5 % ophthalmic solution 1 drop  1 drop Both Eyes BID Pau Fought, APRN - NP         Allergies   Allergen Reactions    Codeine Anaphylaxis    Fentanyl Other (See Comments)     Hypotension        Nursing Notes Reviewed    Physical Exam:  ED Triage Vitals   Enc Vitals Group      BP 08/27/19 1829 (!) 181/139      Pulse 08/27/19 1829 79      Resp 08/27/19 1829 18      Temp 08/27/19 1829 98.7 °F (37.1 °C)      Temp Source 08/27/19 1829 Oral      SpO2 08/27/19 1829 99 %      Weight 08/27/19 2041 172 lb (78 kg)      Height 08/27/19 2041 5' 3\" (1.6 m)      Head Circumference --       Peak Flow --       Pain Score --       Pain Loc --       Pain Edu? --       Excl. in [address]? --        My pulse ox interpretation is - normal    General appearance: Comfortable, vomiting, hypertensive  Skin:  Warm. Dry. No petechiae or purpura. Eye:  Extraocular movements intact. PERRLA  Ears, nose, mouth and throat:  Oral mucosa moist, no trismus.   Tympanic Contrast    Result Date: 8/27/2019  EXAMINATION: CT OF THE ABDOMEN AND PELVIS WITH CONTRAST 8/27/2019 7:48 pm TECHNIQUE: CT of the abdomen and pelvis was performed with the administration of intravenous contrast. Multiplanar reformatted images are provided for review. Dose modulation, iterative reconstruction, and/or weight based adjustment of the mA/kV was utilized to reduce the radiation dose to as low as reasonably achievable. COMPARISON: 08/16/2019 HISTORY: ORDERING SYSTEM PROVIDED HISTORY: n/v/abd/elderly TECHNOLOGIST PROVIDED HISTORY: IV contrast only. Thank you. Reason for Exam: n/v/abd/elderly Acuity: Acute Type of Exam: Initial Relevant Medical/Surgical History: 80ML [id number] FINDINGS: Lower Chest: Coronary atherosclerosis. Pacemaker wires. No acute process. Organs: Gallstones. The liver, spleen, adrenals, pancreas, bile ducts, and stomach are without acute process. The stomach is mildly distended. The kidneys appear mildly atrophic. No suspicious renal lesions. The ureters are nondilated. Increased bladder trabeculations with small bladder diverticula. Moderate to severe enlargement of the prostate. GI/Bowel: A Vini's pouch is present. Postoperative changes of distal colectomy with a left lower quadrant ostomy. A peristomal hernia is present, with multiple dilated small bowel loops. Small amount of adjacent free fluid is present. Pelvis: See findings above. Peritoneum/Retroperitoneum: Postoperative change of aorto bi-iliac endograft placement. Embolization coils are present within the aneurysm sac. No evident endoleak, with the aneurysm sac size stable at 6.8 cm. No lymphadenopathy. No free air. A inferior ventral abdominal wall hernia contains loops of small bowel, without obstruction. A small rectus hernia is present involving the mid transverse colon. Bones/Soft Tissues: No acute abnormality. 1. Left lower quadrant parastomal hernia, with incarceration/small-bowel obstruction. as dilated loops of small bowel. The distal small bowel is decompressed. Pelvis: No acute abnormality. Peritoneum/Retroperitoneum: Postoperative change of [id number] endograft placement. Metallic embolization coils are present within the excluded aneurysm sac. The excluded aneurysm sac measures 6.8 x 5.4 cm, which is stable from the prior exam when measured at a similar level. Ectatic dilation of the bilateral common iliac arteries is unchanged. No lymphadenopathy. No free air or free fluid. Bones/Soft Tissues: No acute abnormality. 1. A left lower quadrant peristomal hernia is present, which causes small-bowel obstruction. A transition point is suspected as the small-bowel exits the hernia sac. 2. Small ventral abdominal wall hernia which contains a loop of transverse colon, without obstruction. 3. Moderate to severe enlargement of the prostate, with changes in the bladder compatible with chronic bladder outlet obstruction. 4. Postoperative changes of [id number] endograft placement, with stable size of the excluded aneurysm sac. Xr Abdomen For Ng/og/ne Tube Placement    Result Date: 8/27/2019  EXAMINATION: ONE SUPINE XRAY VIEW(S) OF THE ABDOMEN 8/27/2019 6:52 pm COMPARISON: August 18, 2019. HISTORY: ORDERING SYSTEM PROVIDED HISTORY: for NG tube placement TECHNOLOGIST PROVIDED HISTORY: Abd KUB Reason for exam:->for NG tube placement Portable? ->Yes Reason for Exam:  ng placement Acuity: Acute Type of Exam: Initial Additional signs and symptoms: na Relevant Medical/Surgical History: na FINDINGS: Tip and side port of the nasogastric tube is within the proximal stomach. Aortic stent graft is noted, with multiple cores. This appears reasonably similar to the previous examination on August 18, 2019. Mildly distended bowel loops within upper abdomen are partially imaged and evaluated. Enlarged cardiomediastinal silhouette with findings suggestive of pulmonary interstitial edema.   Left basilar

## 2019-08-28 NOTE — H&P
atrial fibrillation) ([facility] [address]     Peritonitis ([facility] [address]     Personal history of colonic polyps     Poor historian     Skin cancer     face    Ulcerative (chronic) proctosigmoiditis ([facility] [address]     Wears glasses      PSHX:  has a past surgical history that includes hernia repair (Bilateral, [address]); Hemorrhoid surgery (2011); Colonoscopy (2/4/2013); knee surgery (Right, 1980s); Abdominal exploration surgery (2/4/2013); Appendectomy (2/4/2013); pacemaker placement (Right, 02/25/2013); Appendectomy (2/4/2013); colostomy (2/4/2013); Cystocopy (2/03/2014); AAA repair, endovascular (7/1/14); and Pacemaker insertion (Right, 12/13/2017). Allergies: Allergies   Allergen Reactions    Codeine Anaphylaxis    Fentanyl Other (See Comments)     Hypotension        FAM HX: family history includes Cancer in his brother, brother, and daughter; Heart Disease in his mother; Stroke in his mother; Substance Abuse in his son. Soc HX:   Social History     Socioeconomic History    Marital status:       Spouse name: None    Number of children: 3    Years of education: None    Highest education level: None   Occupational History    Occupation: Retired   Social Needs    Financial resource strain: None    Food insecurity:     Worry: None     Inability: None    Transportation needs:     Medical: None     Non-medical: None   Tobacco Use    Smoking status: Never Smoker    Smokeless tobacco: Never Used   Substance and Sexual Activity    Alcohol use: No     Alcohol/week: 0.0 standard drinks    Drug use: No    Sexual activity: Yes     Partners: Female     Comment:    Lifestyle    Physical activity:     Days per week: None     Minutes per session: None    Stress: None   Relationships    Social connections:     Talks on phone: None     Gets together: None     Attends Rastafarian service: None     Active member of club or organization: None     Attends meetings of clubs or organizations: None     Relationship status: None  Intimate partner violence:     Fear of current or ex partner: None     Emotionally abused: None     Physically abused: None     Forced sexual activity: None   Other Topics Concern    None   Social History Narrative    None       Medications:   Medications:    Probiotic Product (PROBIOTIC DAILY PO) Take by mouth daily Historical Provider, MD Needs Review   lisinopril-hydrochlorothiazide (PRINZIDE;ZESTORETIC) 10-12.5 MG per tablet Take 1 tablet by mouth daily George Amado MD Needs Review   metoprolol succinate (TOPROL XL) 25 MG extended release tablet Take 1 tablet by mouth daily George Amado MD Needs Review   finasteride (PROSCAR) 5 MG tablet Take 1 tablet by mouth daily Mariaelena Trammell MD Needs Review   bimatoprost (LUMIGAN) 0.01 % SOLN ophthalmic drops Place 1 drop into both eyes nightly Historical Provider, MD Needs Review   latanoprost (XALATAN) 0.005 % ophthalmic solution Place 1 drop into both eyes nightly Historical Provider, MD Needs Review   apixaban (ELIQUIS) 2.5 MG TABS tablet Take 1 tablet by mouth 2 times daily Vince Shahid MD Needs Review   simvastatin (ZOCOR) 40 MG tablet Take 1 tablet by mouth nightly Valeriano Fairchild MD Needs Review   Ascorbic Acid (VITAMIN C) 1000 MG tablet Take 1,000 mg by mouth daily Historical MD Tomas Needs Review   brimonidine-timolol (COMBIGAN) 0.2-0.5 % ophthalmic solution Place 1 drop into both eyes every 12 hours Historical MD Tomas Needs Review   diltiazem (CARDIZEM 12 HR) 120 MG extended release capsule Take 1 capsule by mouth daily Valeriano Fairchild MD Needs Review   sertraline (ZOLOFT) 50 MG tablet Take 1 tablet by mouth daily Valeriano Fairchild MD Needs Review   tamsulosin (FLOMAX) 0.4 MG capsule Take 1 capsule by mouth daily Valeriano Fairchild MD Needs Review   omeprazole (PRILOSEC) 20 MG delayed release capsule Take 1 capsule by mouth daily Valeriano Fairchild MD Needs Review   acetaminophen (TYLENOL) 500 MG tablet Take 500 mg by mouth every 6 hours as needed for Pain 2 tabs by mouth three times a day as needed. Historical Provider, MD Needs Review   Glucosamine-MSM-Hyaluronic Acd (JOINT HEALTH PO) Take 1 each by mouth daily  Historical Provider, MD Needs Review       Data:     XR ABDOMEN FOR NG/OG/NE TUBE PLACEMENT [[id number]] Collected: 08/27/19 2059      Order Status: Completed Updated: 08/27/19 2105     Narrative:       EXAMINATION:  ONE SUPINE XRAY VIEW(S) OF THE ABDOMEN    8/27/2019 6:52 pm    COMPARISON:  August 18, 2019. HISTORY:  ORDERING SYSTEM PROVIDED HISTORY: for NG tube placement  TECHNOLOGIST PROVIDED HISTORY:  Abd KUB  Reason for exam:->for NG tube placement  Portable? ->Yes  Reason for Exam:  ng placement  Acuity: Acute  Type of Exam: Initial  Additional signs and symptoms: na  Relevant Medical/Surgical History: na    FINDINGS:  Tip and side port of the nasogastric tube is within the proximal stomach. Aortic stent graft is noted, with multiple cores.  This appears reasonably  similar to the previous examination on August 18, 2019. Mildly distended bowel loops within upper abdomen are partially imaged and  evaluated. Enlarged cardiomediastinal silhouette with findings suggestive of pulmonary  interstitial edema.  Left basilar pulmonary opacities likely atelectasis  and/or scarring.  No evidence of focal right lung pulmonary opacity.  No  evidence of pneumothorax. No evidence of acute osseous abnormalities.     Impression:       Tip and side port of the nasogastric tube is within the proximal stomach.     CT ABDOMEN PELVIS W IV CONTRAST [[id number]] Collected: 08/27/19 2002     Order Status: Completed Updated: 08/27/19 2013     Narrative:       EXAMINATION:  CT OF THE ABDOMEN AND PELVIS WITH CONTRAST 8/27/2019 7:48 pm    TECHNIQUE:  CT of the abdomen and pelvis was performed with the administration of  intravenous contrast. Multiplanar reformatted images are provided for review.   Dose modulation, iterative reconstruction, and/or weight based adjustment of  the mA/kV was utilized to reduce the radiation dose to as low as reasonably  achievable. COMPARISON:  08/16/2019    HISTORY:  ORDERING SYSTEM PROVIDED HISTORY: n/v/abd/elderly  TECHNOLOGIST PROVIDED HISTORY:  IV contrast only. Thank you. Reason for Exam: n/v/abd/elderly  Acuity: Acute  Type of Exam: Initial  Relevant Medical/Surgical History: 80ML [id number]    FINDINGS:  Lower Chest: Coronary atherosclerosis.  Pacemaker wires.  No acute process. Organs: Gallstones.  The liver, spleen, adrenals, pancreas, bile ducts, and  stomach are without acute process.  The stomach is mildly distended.  The  kidneys appear mildly atrophic.  No suspicious renal lesions.  The ureters  are nondilated.  Increased bladder trabeculations with small bladder  diverticula.  Moderate to severe enlargement of the prostate. GI/Bowel: A Vini's pouch is present.  Postoperative changes of distal  colectomy with a left lower quadrant ostomy.  A peristomal hernia is present,  with multiple dilated small bowel loops.  Small amount of adjacent free fluid  is present. Pelvis: See findings above. Peritoneum/Retroperitoneum: Postoperative change of aorto bi-iliac endograft  placement.  Embolization coils are present within the aneurysm sac.  No  evident endoleak, with the aneurysm sac size stable at 6.8 cm.  No  lymphadenopathy.  No free air.  A inferior ventral abdominal wall hernia  contains loops of small bowel, without obstruction.  A small rectus hernia is  present involving the mid transverse colon. Bones/Soft Tissues: No acute abnormality.     Impression:       1.  Left lower quadrant parastomal hernia, with incarceration/small-bowel  obstruction.  The degree of dilation of small bowel loops within the hernia  sac appears mildly increased from prior exam.  A small amount of ascites is  present adjacent to the dilated small bowel loops, which is new from the  prior exam.  2. Mild distention of the stomach.   3. Multiple ventral abdominal wall hernias which contain loops of small bowel  and transverse colon, without obstruction.     XR ABDOMEN (KUB) (SINGLE AP VIEW) [[id number]] Updated: 08/27/19 1852     Order Status: Canceled            Electronically signed by LIA Casiano NP on 8/27/2019 at 9:14 PM

## 2019-08-29 ENCOUNTER — APPOINTMENT (OUTPATIENT)
Dept: GENERAL RADIOLOGY | Age: 84
DRG: 329 | End: 2019-08-29
Payer: COMMERCIAL

## 2019-08-29 VITALS
DIASTOLIC BLOOD PRESSURE: 76 MMHG | OXYGEN SATURATION: 100 % | SYSTOLIC BLOOD PRESSURE: 130 MMHG | RESPIRATION RATE: 26 BRPM | TEMPERATURE: 98.6 F

## 2019-08-29 LAB
ALBUMIN SERPL-MCNC: 2.9 GM/DL (ref 3.4–5)
ALP BLD-CCNC: 62 IU/L (ref 40–129)
ALT SERPL-CCNC: 9 U/L (ref 10–40)
ANION GAP SERPL CALCULATED.3IONS-SCNC: 10 MMOL/L (ref 4–16)
AST SERPL-CCNC: 20 IU/L (ref 15–37)
BILIRUB SERPL-MCNC: 0.9 MG/DL (ref 0–1)
BILIRUBIN DIRECT: 0.4 MG/DL (ref 0–0.3)
BILIRUBIN, INDIRECT: 0.5 MG/DL (ref 0–0.7)
BUN BLDV-MCNC: 16 MG/DL (ref 6–23)
CALCIUM SERPL-MCNC: 7.6 MG/DL (ref 8.3–10.6)
CHLORIDE BLD-SCNC: 102 MMOL/L (ref 99–110)
CO2: 25 MMOL/L (ref 21–32)
CREAT SERPL-MCNC: 1 MG/DL (ref 0.9–1.3)
GFR AFRICAN AMERICAN: >60 ML/MIN/1.73M2
GFR NON-AFRICAN AMERICAN: >60 ML/MIN/1.73M2
GLUCOSE BLD-MCNC: 151 MG/DL (ref 70–99)
HCT VFR BLD CALC: 34.6 % (ref 42–52)
HEMOGLOBIN: 11.3 GM/DL (ref 13.5–18)
MAGNESIUM: 1.7 MG/DL (ref 1.8–2.4)
MCH RBC QN AUTO: 33.9 PG (ref 27–31)
MCHC RBC AUTO-ENTMCNC: 32.7 % (ref 32–36)
MCV RBC AUTO: 103.9 FL (ref 78–100)
PDW BLD-RTO: 13.2 % (ref 11.7–14.9)
PHOSPHORUS: 2.7 MG/DL (ref 2.5–4.9)
PLATELET # BLD: 174 K/CU MM (ref 140–440)
PMV BLD AUTO: 9.9 FL (ref 7.5–11.1)
POTASSIUM SERPL-SCNC: 3.7 MMOL/L (ref 3.5–5.1)
RBC # BLD: 3.33 M/CU MM (ref 4.6–6.2)
SODIUM BLD-SCNC: 137 MMOL/L (ref 135–145)
TOTAL PROTEIN: 4.6 GM/DL (ref 6.4–8.2)
WBC # BLD: 12.2 K/CU MM (ref 4–10.5)

## 2019-08-29 PROCEDURE — P9045 ALBUMIN (HUMAN), 5%, 250 ML: HCPCS | Performed by: NURSE ANESTHETIST, CERTIFIED REGISTERED

## 2019-08-29 PROCEDURE — 2000000000 HC ICU R&B

## 2019-08-29 PROCEDURE — 6360000002 HC RX W HCPCS: Performed by: SURGERY

## 2019-08-29 PROCEDURE — 6370000000 HC RX 637 (ALT 250 FOR IP): Performed by: SURGERY

## 2019-08-29 PROCEDURE — C9113 INJ PANTOPRAZOLE SODIUM, VIA: HCPCS | Performed by: SURGERY

## 2019-08-29 PROCEDURE — 2500000003 HC RX 250 WO HCPCS: Performed by: SURGERY

## 2019-08-29 PROCEDURE — 2580000003 HC RX 258: Performed by: SURGERY

## 2019-08-29 PROCEDURE — 36415 COLL VENOUS BLD VENIPUNCTURE: CPT

## 2019-08-29 PROCEDURE — 83735 ASSAY OF MAGNESIUM: CPT

## 2019-08-29 PROCEDURE — 6360000002 HC RX W HCPCS: Performed by: ANESTHESIOLOGY

## 2019-08-29 PROCEDURE — C1751 CATH, INF, PER/CENT/MIDLINE: HCPCS

## 2019-08-29 PROCEDURE — 6360000002 HC RX W HCPCS

## 2019-08-29 PROCEDURE — 2580000003 HC RX 258: Performed by: NURSE ANESTHETIST, CERTIFIED REGISTERED

## 2019-08-29 PROCEDURE — 05JY3ZZ INSPECTION OF UPPER VEIN, PERCUTANEOUS APPROACH: ICD-10-PCS | Performed by: SURGERY

## 2019-08-29 PROCEDURE — 6360000002 HC RX W HCPCS: Performed by: NURSE ANESTHETIST, CERTIFIED REGISTERED

## 2019-08-29 PROCEDURE — 85027 COMPLETE CBC AUTOMATED: CPT

## 2019-08-29 PROCEDURE — 71045 X-RAY EXAM CHEST 1 VIEW: CPT

## 2019-08-29 PROCEDURE — 36569 INSJ PICC 5 YR+ W/O IMAGING: CPT

## 2019-08-29 PROCEDURE — 76937 US GUIDE VASCULAR ACCESS: CPT

## 2019-08-29 PROCEDURE — 84100 ASSAY OF PHOSPHORUS: CPT

## 2019-08-29 PROCEDURE — 2500000003 HC RX 250 WO HCPCS: Performed by: NURSE ANESTHETIST, CERTIFIED REGISTERED

## 2019-08-29 PROCEDURE — 82248 BILIRUBIN DIRECT: CPT

## 2019-08-29 PROCEDURE — 88304 TISSUE EXAM BY PATHOLOGIST: CPT

## 2019-08-29 PROCEDURE — 80053 COMPREHEN METABOLIC PANEL: CPT

## 2019-08-29 RX ORDER — LIDOCAINE HYDROCHLORIDE 10 MG/ML
5 INJECTION, SOLUTION EPIDURAL; INFILTRATION; INTRACAUDAL; PERINEURAL ONCE
Status: DISCONTINUED | OUTPATIENT
Start: 2019-08-29 | End: 2019-08-29

## 2019-08-29 RX ORDER — VASOPRESSIN 20 U/ML
INJECTION PARENTERAL PRN
Status: DISCONTINUED | OUTPATIENT
Start: 2019-08-29 | End: 2019-08-29 | Stop reason: SDUPTHER

## 2019-08-29 RX ORDER — MORPHINE SULFATE 4 MG/ML
4 INJECTION, SOLUTION INTRAMUSCULAR; INTRAVENOUS
Status: DISCONTINUED | OUTPATIENT
Start: 2019-08-29 | End: 2019-09-02

## 2019-08-29 RX ORDER — MORPHINE SULFATE 4 MG/ML
INJECTION, SOLUTION INTRAMUSCULAR; INTRAVENOUS
Status: COMPLETED
Start: 2019-08-29 | End: 2019-08-29

## 2019-08-29 RX ORDER — ONDANSETRON 2 MG/ML
INJECTION INTRAMUSCULAR; INTRAVENOUS PRN
Status: DISCONTINUED | OUTPATIENT
Start: 2019-08-29 | End: 2019-08-29 | Stop reason: SDUPTHER

## 2019-08-29 RX ORDER — MAGNESIUM HYDROXIDE 1200 MG/15ML
LIQUID ORAL CONTINUOUS PRN
Status: COMPLETED | OUTPATIENT
Start: 2019-08-29 | End: 2019-08-29

## 2019-08-29 RX ORDER — CEFAZOLIN SODIUM 2 G/50ML
2 SOLUTION INTRAVENOUS EVERY 8 HOURS
Status: COMPLETED | OUTPATIENT
Start: 2019-08-29 | End: 2019-08-29

## 2019-08-29 RX ORDER — ACETAMINOPHEN 10 MG/ML
1000 INJECTION, SOLUTION INTRAVENOUS ONCE
Status: COMPLETED | OUTPATIENT
Start: 2019-08-29 | End: 2019-08-29

## 2019-08-29 RX ORDER — SUCRALFATE 1 G/1
1 TABLET ORAL EVERY 6 HOURS SCHEDULED
Status: DISCONTINUED | OUTPATIENT
Start: 2019-08-29 | End: 2019-09-01

## 2019-08-29 RX ORDER — METOPROLOL TARTRATE 5 MG/5ML
5 INJECTION INTRAVENOUS EVERY 6 HOURS
Status: DISCONTINUED | OUTPATIENT
Start: 2019-08-29 | End: 2019-09-01

## 2019-08-29 RX ORDER — MAGNESIUM SULFATE IN WATER 40 MG/ML
2 INJECTION, SOLUTION INTRAVENOUS ONCE
Status: COMPLETED | OUTPATIENT
Start: 2019-08-29 | End: 2019-08-29

## 2019-08-29 RX ORDER — SODIUM CHLORIDE 0.9 % (FLUSH) 0.9 %
10 SYRINGE (ML) INJECTION PRN
Status: DISCONTINUED | OUTPATIENT
Start: 2019-08-29 | End: 2019-09-06 | Stop reason: HOSPADM

## 2019-08-29 RX ORDER — ALBUMIN, HUMAN INJ 5% 5 %
SOLUTION INTRAVENOUS PRN
Status: DISCONTINUED | OUTPATIENT
Start: 2019-08-29 | End: 2019-08-29 | Stop reason: SDUPTHER

## 2019-08-29 RX ORDER — SODIUM CHLORIDE 0.9 % (FLUSH) 0.9 %
10 SYRINGE (ML) INJECTION EVERY 12 HOURS SCHEDULED
Status: DISCONTINUED | OUTPATIENT
Start: 2019-08-29 | End: 2019-09-06 | Stop reason: HOSPADM

## 2019-08-29 RX ORDER — DEXAMETHASONE SODIUM PHOSPHATE 4 MG/ML
INJECTION, SOLUTION INTRA-ARTICULAR; INTRALESIONAL; INTRAMUSCULAR; INTRAVENOUS; SOFT TISSUE PRN
Status: DISCONTINUED | OUTPATIENT
Start: 2019-08-29 | End: 2019-08-29 | Stop reason: SDUPTHER

## 2019-08-29 RX ORDER — MORPHINE SULFATE 4 MG/ML
2 INJECTION, SOLUTION INTRAMUSCULAR; INTRAVENOUS
Status: DISCONTINUED | OUTPATIENT
Start: 2019-08-29 | End: 2019-08-30

## 2019-08-29 RX ORDER — SODIUM CHLORIDE, SODIUM LACTATE, POTASSIUM CHLORIDE, CALCIUM CHLORIDE 600; 310; 30; 20 MG/100ML; MG/100ML; MG/100ML; MG/100ML
INJECTION, SOLUTION INTRAVENOUS CONTINUOUS
Status: DISCONTINUED | OUTPATIENT
Start: 2019-08-29 | End: 2019-08-30

## 2019-08-29 RX ORDER — ONDANSETRON 2 MG/ML
4 INJECTION INTRAMUSCULAR; INTRAVENOUS EVERY 4 HOURS PRN
Status: DISCONTINUED | OUTPATIENT
Start: 2019-08-29 | End: 2019-09-06 | Stop reason: HOSPADM

## 2019-08-29 RX ORDER — MAGNESIUM SULFATE 1 G/100ML
1 INJECTION INTRAVENOUS PRN
Status: DISCONTINUED | OUTPATIENT
Start: 2019-08-29 | End: 2019-08-29

## 2019-08-29 RX ADMIN — SODIUM CHLORIDE, PRESERVATIVE FREE 10 ML: 5 INJECTION INTRAVENOUS at 22:25

## 2019-08-29 RX ADMIN — MORPHINE SULFATE 2 MG: 4 INJECTION INTRAVENOUS at 05:22

## 2019-08-29 RX ADMIN — PHENYLEPHRINE HYDROCHLORIDE 100 MCG: 10 INJECTION INTRAVENOUS at 00:20

## 2019-08-29 RX ADMIN — METRONIDAZOLE 500 MG: 500 INJECTION, SOLUTION INTRAVENOUS at 14:18

## 2019-08-29 RX ADMIN — ROCURONIUM BROMIDE 10 MG: 10 INJECTION INTRAVENOUS at 01:46

## 2019-08-29 RX ADMIN — PHENYLEPHRINE HYDROCHLORIDE 200 MCG: 10 INJECTION INTRAVENOUS at 02:02

## 2019-08-29 RX ADMIN — ALBUMIN (HUMAN) 250 ML: 12.5 INJECTION, SOLUTION INTRAVENOUS at 01:10

## 2019-08-29 RX ADMIN — DEXAMETHASONE SODIUM PHOSPHATE 8 MG: 4 INJECTION, SOLUTION INTRAMUSCULAR; INTRAVENOUS at 01:46

## 2019-08-29 RX ADMIN — SUCRALFATE 1 G: 1 TABLET ORAL at 06:02

## 2019-08-29 RX ADMIN — METRONIDAZOLE 500 MG: 500 INJECTION, SOLUTION INTRAVENOUS at 06:01

## 2019-08-29 RX ADMIN — BRIMONIDINE TARTRATE 1 DROP: 2 SOLUTION/ DROPS OPHTHALMIC at 08:19

## 2019-08-29 RX ADMIN — ROCURONIUM BROMIDE 20 MG: 10 INJECTION INTRAVENOUS at 00:19

## 2019-08-29 RX ADMIN — SODIUM CHLORIDE, PRESERVATIVE FREE 10 ML: 5 INJECTION INTRAVENOUS at 22:24

## 2019-08-29 RX ADMIN — ONDANSETRON 4 MG: 2 INJECTION INTRAMUSCULAR; INTRAVENOUS at 03:50

## 2019-08-29 RX ADMIN — SODIUM CHLORIDE, PRESERVATIVE FREE 10 ML: 5 INJECTION INTRAVENOUS at 08:20

## 2019-08-29 RX ADMIN — SODIUM CHLORIDE, POTASSIUM CHLORIDE, SODIUM LACTATE AND CALCIUM CHLORIDE: 600; 310; 30; 20 INJECTION, SOLUTION INTRAVENOUS at 01:47

## 2019-08-29 RX ADMIN — MORPHINE SULFATE 2 MG: 4 INJECTION, SOLUTION INTRAMUSCULAR; INTRAVENOUS at 03:17

## 2019-08-29 RX ADMIN — VASOPRESSIN 1 UNITS: 20 INJECTION INTRAVENOUS at 01:14

## 2019-08-29 RX ADMIN — SODIUM CHLORIDE, POTASSIUM CHLORIDE, SODIUM LACTATE AND CALCIUM CHLORIDE: 600; 310; 30; 20 INJECTION, SOLUTION INTRAVENOUS at 06:02

## 2019-08-29 RX ADMIN — METRONIDAZOLE 500 MG: 500 INJECTION, SOLUTION INTRAVENOUS at 22:24

## 2019-08-29 RX ADMIN — TIMOLOL MALEATE 1 DROP: 5 SOLUTION OPHTHALMIC at 22:23

## 2019-08-29 RX ADMIN — MORPHINE SULFATE 4 MG: 4 INJECTION, SOLUTION INTRAMUSCULAR; INTRAVENOUS at 10:37

## 2019-08-29 RX ADMIN — PHENYLEPHRINE HYDROCHLORIDE 200 MCG: 10 INJECTION INTRAVENOUS at 01:50

## 2019-08-29 RX ADMIN — VASOPRESSIN 1 UNITS: 20 INJECTION INTRAVENOUS at 01:10

## 2019-08-29 RX ADMIN — PHENYLEPHRINE HYDROCHLORIDE 100 MCG: 10 INJECTION INTRAVENOUS at 00:29

## 2019-08-29 RX ADMIN — MORPHINE SULFATE 2 MG: 4 INJECTION INTRAVENOUS at 13:40

## 2019-08-29 RX ADMIN — ONDANSETRON 4 MG: 2 INJECTION INTRAMUSCULAR; INTRAVENOUS at 08:17

## 2019-08-29 RX ADMIN — BRIMONIDINE TARTRATE 1 DROP: 2 SOLUTION/ DROPS OPHTHALMIC at 22:23

## 2019-08-29 RX ADMIN — SODIUM CHLORIDE, POTASSIUM CHLORIDE, SODIUM LACTATE AND CALCIUM CHLORIDE: 600; 310; 30; 20 INJECTION, SOLUTION INTRAVENOUS at 22:30

## 2019-08-29 RX ADMIN — MORPHINE SULFATE 2 MG: 4 INJECTION INTRAVENOUS at 03:17

## 2019-08-29 RX ADMIN — TIMOLOL MALEATE 1 DROP: 5 SOLUTION OPHTHALMIC at 08:19

## 2019-08-29 RX ADMIN — MAGNESIUM SULFATE HEPTAHYDRATE 2 G: 40 INJECTION, SOLUTION INTRAVENOUS at 08:30

## 2019-08-29 RX ADMIN — VASOPRESSIN 1 UNITS: 20 INJECTION INTRAVENOUS at 01:06

## 2019-08-29 RX ADMIN — CEFAZOLIN SODIUM 2 G: 2 SOLUTION INTRAVENOUS at 13:41

## 2019-08-29 RX ADMIN — VASOPRESSIN 1 UNITS: 20 INJECTION INTRAVENOUS at 01:07

## 2019-08-29 RX ADMIN — MORPHINE SULFATE 4 MG: 4 INJECTION, SOLUTION INTRAMUSCULAR; INTRAVENOUS at 08:17

## 2019-08-29 RX ADMIN — ONDANSETRON 4 MG: 2 INJECTION INTRAMUSCULAR; INTRAVENOUS at 01:46

## 2019-08-29 RX ADMIN — PHENYLEPHRINE HYDROCHLORIDE 200 MCG: 10 INJECTION INTRAVENOUS at 00:34

## 2019-08-29 RX ADMIN — METOPROLOL TARTRATE 5 MG: 5 INJECTION INTRAVENOUS at 22:24

## 2019-08-29 RX ADMIN — MORPHINE SULFATE 2 MG: 4 INJECTION INTRAVENOUS at 18:17

## 2019-08-29 RX ADMIN — SUCRALFATE 1 G: 1 TABLET ORAL at 18:16

## 2019-08-29 RX ADMIN — ENOXAPARIN SODIUM 40 MG: 40 INJECTION SUBCUTANEOUS at 10:37

## 2019-08-29 RX ADMIN — PHENYLEPHRINE HYDROCHLORIDE 100 MCG: 10 INJECTION INTRAVENOUS at 00:51

## 2019-08-29 RX ADMIN — ROCURONIUM BROMIDE 10 MG: 10 INJECTION INTRAVENOUS at 01:00

## 2019-08-29 RX ADMIN — ACETAMINOPHEN 1000 MG: 10 INJECTION, SOLUTION INTRAVENOUS at 00:52

## 2019-08-29 RX ADMIN — PHENYLEPHRINE HYDROCHLORIDE 200 MCG: 10 INJECTION INTRAVENOUS at 02:24

## 2019-08-29 RX ADMIN — LATANOPROST 1 DROP: 50 SOLUTION/ DROPS OPHTHALMIC at 22:23

## 2019-08-29 RX ADMIN — VASOPRESSIN 1 UNITS: 20 INJECTION INTRAVENOUS at 01:05

## 2019-08-29 RX ADMIN — SUGAMMADEX 200 MG: 100 INJECTION, SOLUTION INTRAVENOUS at 02:29

## 2019-08-29 RX ADMIN — METOPROLOL TARTRATE 5 MG: 5 INJECTION INTRAVENOUS at 18:16

## 2019-08-29 RX ADMIN — PHENYLEPHRINE HYDROCHLORIDE 200 MCG: 10 INJECTION INTRAVENOUS at 01:19

## 2019-08-29 RX ADMIN — POTASSIUM PHOSPHATE, MONOBASIC AND POTASSIUM PHOSPHATE, DIBASIC 10 MMOL: 224; 236 INJECTION, SOLUTION INTRAVENOUS at 14:51

## 2019-08-29 RX ADMIN — CEFAZOLIN SODIUM 2 G: 2 SOLUTION INTRAVENOUS at 22:24

## 2019-08-29 RX ADMIN — PHENYLEPHRINE HYDROCHLORIDE 100 MCG: 10 INJECTION INTRAVENOUS at 00:10

## 2019-08-29 RX ADMIN — PANTOPRAZOLE SODIUM 40 MG: 40 INJECTION, POWDER, FOR SOLUTION INTRAVENOUS at 08:17

## 2019-08-29 RX ADMIN — SUCRALFATE 1 G: 1 TABLET ORAL at 12:51

## 2019-08-29 RX ADMIN — CEFAZOLIN SODIUM 2 G: 2 SOLUTION INTRAVENOUS at 05:30

## 2019-08-29 RX ADMIN — PHENYLEPHRINE HYDROCHLORIDE 200 MCG: 10 INJECTION INTRAVENOUS at 00:35

## 2019-08-29 RX ADMIN — PHENYLEPHRINE HYDROCHLORIDE 100 MCG: 10 INJECTION INTRAVENOUS at 00:24

## 2019-08-29 RX ADMIN — SODIUM CHLORIDE, POTASSIUM CHLORIDE, SODIUM LACTATE AND CALCIUM CHLORIDE: 600; 310; 30; 20 INJECTION, SOLUTION INTRAVENOUS at 00:47

## 2019-08-29 RX ADMIN — PHENYLEPHRINE HYDROCHLORIDE 100 MCG: 10 INJECTION INTRAVENOUS at 00:47

## 2019-08-29 ASSESSMENT — PULMONARY FUNCTION TESTS
PIF_VALUE: 14
PIF_VALUE: 13
PIF_VALUE: 12
PIF_VALUE: 13
PIF_VALUE: 14
PIF_VALUE: 12
PIF_VALUE: 14
PIF_VALUE: 12
PIF_VALUE: 13
PIF_VALUE: 13
PIF_VALUE: 15
PIF_VALUE: 14
PIF_VALUE: 16
PIF_VALUE: 14
PIF_VALUE: 13
PIF_VALUE: 15
PIF_VALUE: 13
PIF_VALUE: 14
PIF_VALUE: 15
PIF_VALUE: 13
PIF_VALUE: 1
PIF_VALUE: 14
PIF_VALUE: 14
PIF_VALUE: 12
PIF_VALUE: -2
PIF_VALUE: 13
PIF_VALUE: 15
PIF_VALUE: 13
PIF_VALUE: 13
PIF_VALUE: 14
PIF_VALUE: -2
PIF_VALUE: 13
PIF_VALUE: 14
PIF_VALUE: 14
PIF_VALUE: -1
PIF_VALUE: 13
PIF_VALUE: 14
PIF_VALUE: 15
PIF_VALUE: 12
PIF_VALUE: 13
PIF_VALUE: 16
PIF_VALUE: 13
PIF_VALUE: -2
PIF_VALUE: 14
PIF_VALUE: 14
PIF_VALUE: 15
PIF_VALUE: 13
PIF_VALUE: 12
PIF_VALUE: 14
PIF_VALUE: 13
PIF_VALUE: 13
PIF_VALUE: 14
PIF_VALUE: 13
PIF_VALUE: 14
PIF_VALUE: 12
PIF_VALUE: 13
PIF_VALUE: 13
PIF_VALUE: 14
PIF_VALUE: 13
PIF_VALUE: 13
PIF_VALUE: 15
PIF_VALUE: 14
PIF_VALUE: 14
PIF_VALUE: 15
PIF_VALUE: 12
PIF_VALUE: 13
PIF_VALUE: 14
PIF_VALUE: 13
PIF_VALUE: 14
PIF_VALUE: -2
PIF_VALUE: 13
PIF_VALUE: 12
PIF_VALUE: 15
PIF_VALUE: 14
PIF_VALUE: 13
PIF_VALUE: -2
PIF_VALUE: 13
PIF_VALUE: 14
PIF_VALUE: 14
PIF_VALUE: 16
PIF_VALUE: 13
PIF_VALUE: 14
PIF_VALUE: 13
PIF_VALUE: 15
PIF_VALUE: 13
PIF_VALUE: 14
PIF_VALUE: 13
PIF_VALUE: 15
PIF_VALUE: 13
PIF_VALUE: 14
PIF_VALUE: 12
PIF_VALUE: 14
PIF_VALUE: 13
PIF_VALUE: 15
PIF_VALUE: 13
PIF_VALUE: 13
PIF_VALUE: 12
PIF_VALUE: 14
PIF_VALUE: 13
PIF_VALUE: 13
PIF_VALUE: 15
PIF_VALUE: 14
PIF_VALUE: 13
PIF_VALUE: 15
PIF_VALUE: 13
PIF_VALUE: 14
PIF_VALUE: 14
PIF_VALUE: 13
PIF_VALUE: -2
PIF_VALUE: 15
PIF_VALUE: 13
PIF_VALUE: 14
PIF_VALUE: 13
PIF_VALUE: 14
PIF_VALUE: -2
PIF_VALUE: 13
PIF_VALUE: 14
PIF_VALUE: 12
PIF_VALUE: 13
PIF_VALUE: 15
PIF_VALUE: 13
PIF_VALUE: 15
PIF_VALUE: 11
PIF_VALUE: 14
PIF_VALUE: 15
PIF_VALUE: 15
PIF_VALUE: -3
PIF_VALUE: 14
PIF_VALUE: 13
PIF_VALUE: 12
PIF_VALUE: 11
PIF_VALUE: 16
PIF_VALUE: 13
PIF_VALUE: 15
PIF_VALUE: 13
PIF_VALUE: -2
PIF_VALUE: 14

## 2019-08-29 ASSESSMENT — PAIN DESCRIPTION - PROGRESSION
CLINICAL_PROGRESSION: NOT CHANGED
CLINICAL_PROGRESSION: NOT CHANGED
CLINICAL_PROGRESSION: GRADUALLY WORSENING

## 2019-08-29 ASSESSMENT — PAIN SCALES - GENERAL
PAINLEVEL_OUTOF10: 0
PAINLEVEL_OUTOF10: 4
PAINLEVEL_OUTOF10: 8
PAINLEVEL_OUTOF10: 5
PAINLEVEL_OUTOF10: 0
PAINLEVEL_OUTOF10: 8
PAINLEVEL_OUTOF10: 4
PAINLEVEL_OUTOF10: 6
PAINLEVEL_OUTOF10: 4
PAINLEVEL_OUTOF10: 0
PAINLEVEL_OUTOF10: 0
PAINLEVEL_OUTOF10: 5
PAINLEVEL_OUTOF10: 6

## 2019-08-29 ASSESSMENT — PAIN DESCRIPTION - LOCATION
LOCATION: ABDOMEN

## 2019-08-29 ASSESSMENT — PAIN DESCRIPTION - FREQUENCY
FREQUENCY: INTERMITTENT

## 2019-08-29 ASSESSMENT — PAIN DESCRIPTION - PAIN TYPE
TYPE: SURGICAL PAIN

## 2019-08-29 ASSESSMENT — PAIN - FUNCTIONAL ASSESSMENT
PAIN_FUNCTIONAL_ASSESSMENT: PREVENTS OR INTERFERES SOME ACTIVE ACTIVITIES AND ADLS

## 2019-08-29 ASSESSMENT — PAIN DESCRIPTION - ORIENTATION
ORIENTATION: MID;UPPER
ORIENTATION: MID;UPPER
ORIENTATION: MID

## 2019-08-29 ASSESSMENT — PAIN DESCRIPTION - DESCRIPTORS
DESCRIPTORS: JABBING
DESCRIPTORS: ACHING
DESCRIPTORS: CRAMPING;JABBING

## 2019-08-29 ASSESSMENT — PAIN DESCRIPTION - DIRECTION
RADIATING_TOWARDS: RIBCAGE
RADIATING_TOWARDS: LEFT RIBCAGE

## 2019-08-29 NOTE — ANESTHESIA PRE PROCEDURE
Date    PO2ART 106 02/06/2013    ZES2OFX 44.0 02/06/2013    CPP2OTP 25.4 02/06/2013    BEART 0 02/06/2013        Type & Screen (If Applicable):  No results found for: LABABO, [address]    Anesthesia Evaluation  Patient summary reviewed and Nursing notes reviewed  Airway: Mallampati: IV  TM distance: >3 FB   Neck ROM: full  Mouth opening: < 3 FB Dental:    (+) edentulous      Pulmonary: breath sounds clear to auscultation                             Cardiovascular:  Exercise tolerance: poor (<4 METS),   (+) hypertension: moderate, pacemaker: pacemaker, dysrhythmias: atrial fibrillation,         Rhythm: regular  Rate: normal                    Neuro/Psych:               GI/Hepatic/Renal:   (+) PUD, renal disease: kidney stones,           Endo/Other:                     Abdominal:           Vascular:   + PVD, aortic or cerebral, . Anesthesia Plan      general     ASA 3       Induction: intravenous. MIPS: Postoperative opioids intended and Prophylactic antiemetics administered. Anesthetic plan and risks discussed with patient.                       Estefania Patel MD   8/28/2019

## 2019-08-30 ENCOUNTER — APPOINTMENT (OUTPATIENT)
Dept: GENERAL RADIOLOGY | Age: 84
DRG: 329 | End: 2019-08-30
Payer: COMMERCIAL

## 2019-08-30 ENCOUNTER — APPOINTMENT (OUTPATIENT)
Dept: INTERVENTIONAL RADIOLOGY/VASCULAR | Age: 84
DRG: 329 | End: 2019-08-30
Payer: COMMERCIAL

## 2019-08-30 LAB
ANION GAP SERPL CALCULATED.3IONS-SCNC: 7 MMOL/L (ref 4–16)
BUN BLDV-MCNC: 15 MG/DL (ref 6–23)
CALCIUM IONIZED: 4.24 MG/DL (ref 4.48–5.28)
CALCIUM SERPL-MCNC: 7.2 MG/DL (ref 8.3–10.6)
CHLORIDE BLD-SCNC: 105 MMOL/L (ref 99–110)
CO2: 28 MMOL/L (ref 21–32)
CREAT SERPL-MCNC: 1 MG/DL (ref 0.9–1.3)
GFR AFRICAN AMERICAN: >60 ML/MIN/1.73M2
GFR NON-AFRICAN AMERICAN: >60 ML/MIN/1.73M2
GLUCOSE BLD-MCNC: 93 MG/DL (ref 70–99)
HCT VFR BLD CALC: 27.4 % (ref 42–52)
HCT VFR BLD CALC: 28.7 % (ref 42–52)
HCT VFR BLD CALC: 29.2 % (ref 42–52)
HEMOGLOBIN: 9.2 GM/DL (ref 13.5–18)
HEMOGLOBIN: 9.3 GM/DL (ref 13.5–18)
HEMOGLOBIN: ABNORMAL GM/DL (ref 13.5–18)
IONIZED CA: 1.06 MMOL/L (ref 1.12–1.32)
MAGNESIUM: 2 MG/DL (ref 1.8–2.4)
MCH RBC QN AUTO: 33.6 PG (ref 27–31)
MCHC RBC AUTO-ENTMCNC: 32.1 % (ref 32–36)
MCV RBC AUTO: 104.6 FL (ref 78–100)
PDW BLD-RTO: 13.7 % (ref 11.7–14.9)
PHOSPHORUS: 2.4 MG/DL (ref 2.5–4.9)
PLATELET # BLD: 164 K/CU MM (ref 140–440)
PMV BLD AUTO: 9.6 FL (ref 7.5–11.1)
POTASSIUM SERPL-SCNC: 3.5 MMOL/L (ref 3.5–5.1)
PRO-BNP: 1170 PG/ML
RBC # BLD: 2.62 M/CU MM (ref 4.6–6.2)
SODIUM BLD-SCNC: 140 MMOL/L (ref 135–145)
WBC # BLD: 6.7 K/CU MM (ref 4–10.5)

## 2019-08-30 PROCEDURE — 2700000000 HC OXYGEN THERAPY PER DAY

## 2019-08-30 PROCEDURE — 2580000003 HC RX 258: Performed by: SURGERY

## 2019-08-30 PROCEDURE — 83880 ASSAY OF NATRIURETIC PEPTIDE: CPT

## 2019-08-30 PROCEDURE — 6360000002 HC RX W HCPCS: Performed by: SURGERY

## 2019-08-30 PROCEDURE — 2000000000 HC ICU R&B

## 2019-08-30 PROCEDURE — 80048 BASIC METABOLIC PNL TOTAL CA: CPT

## 2019-08-30 PROCEDURE — C9113 INJ PANTOPRAZOLE SODIUM, VIA: HCPCS | Performed by: SURGERY

## 2019-08-30 PROCEDURE — C1751 CATH, INF, PER/CENT/MIDLINE: HCPCS

## 2019-08-30 PROCEDURE — B548ZZA ULTRASONOGRAPHY OF SUPERIOR VENA CAVA, GUIDANCE: ICD-10-PCS | Performed by: SURGERY

## 2019-08-30 PROCEDURE — 36592 COLLECT BLOOD FROM PICC: CPT

## 2019-08-30 PROCEDURE — 71045 X-RAY EXAM CHEST 1 VIEW: CPT

## 2019-08-30 PROCEDURE — 76937 US GUIDE VASCULAR ACCESS: CPT

## 2019-08-30 PROCEDURE — 36556 INSERT NON-TUNNEL CV CATH: CPT

## 2019-08-30 PROCEDURE — 6370000000 HC RX 637 (ALT 250 FOR IP): Performed by: SURGERY

## 2019-08-30 PROCEDURE — 94761 N-INVAS EAR/PLS OXIMETRY MLT: CPT

## 2019-08-30 PROCEDURE — 02HV33Z INSERTION OF INFUSION DEVICE INTO SUPERIOR VENA CAVA, PERCUTANEOUS APPROACH: ICD-10-PCS | Performed by: SURGERY

## 2019-08-30 PROCEDURE — 82330 ASSAY OF CALCIUM: CPT

## 2019-08-30 PROCEDURE — C1894 INTRO/SHEATH, NON-LASER: HCPCS

## 2019-08-30 PROCEDURE — 2709999900 HC NON-CHARGEABLE SUPPLY

## 2019-08-30 PROCEDURE — 2500000003 HC RX 250 WO HCPCS: Performed by: SURGERY

## 2019-08-30 PROCEDURE — 85027 COMPLETE CBC AUTOMATED: CPT

## 2019-08-30 PROCEDURE — 84100 ASSAY OF PHOSPHORUS: CPT

## 2019-08-30 PROCEDURE — 85018 HEMOGLOBIN: CPT

## 2019-08-30 PROCEDURE — 85014 HEMATOCRIT: CPT

## 2019-08-30 PROCEDURE — 83735 ASSAY OF MAGNESIUM: CPT

## 2019-08-30 RX ORDER — POTASSIUM CHLORIDE AND SODIUM CHLORIDE 900; 300 MG/100ML; MG/100ML
INJECTION, SOLUTION INTRAVENOUS CONTINUOUS
Status: DISCONTINUED | OUTPATIENT
Start: 2019-08-30 | End: 2019-08-31

## 2019-08-30 RX ORDER — MORPHINE SULFATE 4 MG/ML
2 INJECTION, SOLUTION INTRAMUSCULAR; INTRAVENOUS
Status: DISCONTINUED | OUTPATIENT
Start: 2019-08-30 | End: 2019-09-02

## 2019-08-30 RX ORDER — FUROSEMIDE 10 MG/ML
20 INJECTION INTRAMUSCULAR; INTRAVENOUS ONCE
Status: COMPLETED | OUTPATIENT
Start: 2019-08-30 | End: 2019-08-30

## 2019-08-30 RX ADMIN — SUCRALFATE 1 G: 1 TABLET ORAL at 00:32

## 2019-08-30 RX ADMIN — SUCRALFATE 1 G: 1 TABLET ORAL at 05:59

## 2019-08-30 RX ADMIN — SODIUM CHLORIDE, POTASSIUM CHLORIDE, SODIUM LACTATE AND CALCIUM CHLORIDE: 600; 310; 30; 20 INJECTION, SOLUTION INTRAVENOUS at 07:44

## 2019-08-30 RX ADMIN — MORPHINE SULFATE 4 MG: 4 INJECTION, SOLUTION INTRAMUSCULAR; INTRAVENOUS at 04:41

## 2019-08-30 RX ADMIN — PANTOPRAZOLE SODIUM 40 MG: 40 INJECTION, POWDER, FOR SOLUTION INTRAVENOUS at 08:54

## 2019-08-30 RX ADMIN — SODIUM CHLORIDE, PRESERVATIVE FREE 10 ML: 5 INJECTION INTRAVENOUS at 21:10

## 2019-08-30 RX ADMIN — MORPHINE SULFATE 4 MG: 4 INJECTION, SOLUTION INTRAMUSCULAR; INTRAVENOUS at 10:27

## 2019-08-30 RX ADMIN — METOPROLOL TARTRATE 5 MG: 5 INJECTION INTRAVENOUS at 16:23

## 2019-08-30 RX ADMIN — METOPROLOL TARTRATE 5 MG: 5 INJECTION INTRAVENOUS at 04:41

## 2019-08-30 RX ADMIN — MORPHINE SULFATE 4 MG: 4 INJECTION, SOLUTION INTRAMUSCULAR; INTRAVENOUS at 00:40

## 2019-08-30 RX ADMIN — SUCRALFATE 1 G: 1 TABLET ORAL at 14:31

## 2019-08-30 RX ADMIN — POTASSIUM CHLORIDE AND SODIUM CHLORIDE: 900; 300 INJECTION, SOLUTION INTRAVENOUS at 08:54

## 2019-08-30 RX ADMIN — POTASSIUM PHOSPHATE, MONOBASIC AND POTASSIUM PHOSPHATE, DIBASIC 15 MMOL: 224; 236 INJECTION, SOLUTION INTRAVENOUS at 14:31

## 2019-08-30 RX ADMIN — SODIUM CHLORIDE, PRESERVATIVE FREE 10 ML: 5 INJECTION INTRAVENOUS at 08:55

## 2019-08-30 RX ADMIN — CALCIUM GLUCONATE 2 G: 98 INJECTION, SOLUTION INTRAVENOUS at 08:56

## 2019-08-30 RX ADMIN — LATANOPROST 1 DROP: 50 SOLUTION/ DROPS OPHTHALMIC at 21:11

## 2019-08-30 RX ADMIN — SODIUM CHLORIDE, PRESERVATIVE FREE 10 ML: 5 INJECTION INTRAVENOUS at 08:56

## 2019-08-30 RX ADMIN — TIMOLOL MALEATE 1 DROP: 5 SOLUTION OPHTHALMIC at 21:11

## 2019-08-30 RX ADMIN — POTASSIUM CHLORIDE AND SODIUM CHLORIDE: 900; 300 INJECTION, SOLUTION INTRAVENOUS at 19:52

## 2019-08-30 RX ADMIN — METOPROLOL TARTRATE 5 MG: 5 INJECTION INTRAVENOUS at 09:11

## 2019-08-30 RX ADMIN — MORPHINE SULFATE 4 MG: 4 INJECTION, SOLUTION INTRAMUSCULAR; INTRAVENOUS at 21:09

## 2019-08-30 RX ADMIN — METOPROLOL TARTRATE 5 MG: 5 INJECTION INTRAVENOUS at 21:09

## 2019-08-30 RX ADMIN — BRIMONIDINE TARTRATE 1 DROP: 2 SOLUTION/ DROPS OPHTHALMIC at 08:54

## 2019-08-30 RX ADMIN — FUROSEMIDE 20 MG: 10 INJECTION, SOLUTION INTRAVENOUS at 08:54

## 2019-08-30 RX ADMIN — SUCRALFATE 1 G: 1 TABLET ORAL at 18:16

## 2019-08-30 RX ADMIN — BRIMONIDINE TARTRATE 1 DROP: 2 SOLUTION/ DROPS OPHTHALMIC at 21:11

## 2019-08-30 RX ADMIN — TIMOLOL MALEATE 1 DROP: 5 SOLUTION OPHTHALMIC at 08:54

## 2019-08-30 ASSESSMENT — PAIN DESCRIPTION - ORIENTATION
ORIENTATION: MID

## 2019-08-30 ASSESSMENT — PAIN DESCRIPTION - PAIN TYPE
TYPE: SURGICAL PAIN

## 2019-08-30 ASSESSMENT — PAIN SCALES - GENERAL
PAINLEVEL_OUTOF10: 0
PAINLEVEL_OUTOF10: 5
PAINLEVEL_OUTOF10: 0
PAINLEVEL_OUTOF10: 3
PAINLEVEL_OUTOF10: 0
PAINLEVEL_OUTOF10: 2
PAINLEVEL_OUTOF10: 8
PAINLEVEL_OUTOF10: 0
PAINLEVEL_OUTOF10: 7
PAINLEVEL_OUTOF10: 8
PAINLEVEL_OUTOF10: 9
PAINLEVEL_OUTOF10: 0
PAINLEVEL_OUTOF10: 9
PAINLEVEL_OUTOF10: 0
PAINLEVEL_OUTOF10: 0

## 2019-08-30 ASSESSMENT — PAIN DESCRIPTION - DESCRIPTORS
DESCRIPTORS: ACHING;SORE
DESCRIPTORS: ACHING
DESCRIPTORS: ACHING;SORE
DESCRIPTORS: ACHING
DESCRIPTORS: ACHING;SORE
DESCRIPTORS: SORE

## 2019-08-30 ASSESSMENT — PAIN DESCRIPTION - FREQUENCY
FREQUENCY: INTERMITTENT

## 2019-08-30 ASSESSMENT — PAIN DESCRIPTION - PROGRESSION
CLINICAL_PROGRESSION: GRADUALLY WORSENING
CLINICAL_PROGRESSION: NOT CHANGED
CLINICAL_PROGRESSION: GRADUALLY WORSENING
CLINICAL_PROGRESSION: GRADUALLY WORSENING

## 2019-08-30 ASSESSMENT — PAIN DESCRIPTION - LOCATION
LOCATION: ABDOMEN

## 2019-08-30 ASSESSMENT — PAIN - FUNCTIONAL ASSESSMENT
PAIN_FUNCTIONAL_ASSESSMENT: PREVENTS OR INTERFERES WITH MANY ACTIVE NOT PASSIVE ACTIVITIES
PAIN_FUNCTIONAL_ASSESSMENT: PREVENTS OR INTERFERES WITH ALL ACTIVE AND SOME PASSIVE ACTIVITIES
PAIN_FUNCTIONAL_ASSESSMENT: PREVENTS OR INTERFERES WITH ALL ACTIVE AND SOME PASSIVE ACTIVITIES
PAIN_FUNCTIONAL_ASSESSMENT: PREVENTS OR INTERFERES WITH MANY ACTIVE NOT PASSIVE ACTIVITIES
PAIN_FUNCTIONAL_ASSESSMENT: PREVENTS OR INTERFERES WITH ALL ACTIVE AND SOME PASSIVE ACTIVITIES

## 2019-08-30 NOTE — ANESTHESIA POSTPROCEDURE EVALUATION
Department of Anesthesiology  Postprocedure Note    Patient: Bridget Thomas  MRN: [id number]  YOB: 1930  Date of evaluation: 8/30/2019  Time:  9:23 AM     Procedure Summary     Date:  08/28/19 Room / Location:  [address] OR    Anesthesia Start:  2150 Anesthesia Stop:  08/29/19 0248    Procedure:  EXPLORATORY LAPAROTOMY, SMALL BOWEL RESECTION, LYSIS OF ADHESIONS, NEW COLOSTOMY, AND HERNIA REPAIR WITH XENMATRIX MESH (N/A ) Diagnosis:  (PARASTOMAL HERNIA)    Surgeon:  Johnna Steinberg MD Responsible Provider:  Royal Batres MD    Anesthesia Type:  general ASA Status:  3          Anesthesia Type: general    Eddie Phase I: Eddie Score: 8    Eddie Phase II:      Last vitals: Reviewed and per EMR flowsheets.        Anesthesia Post Evaluation    Patient location during evaluation: ICU  Patient participation: complete - patient participated  Level of consciousness: awake and sleepy but conscious  Pain score: 2  Airway patency: patent  Nausea & Vomiting: no nausea and no vomiting  Complications: no  Cardiovascular status: blood pressure returned to baseline  Respiratory status: acceptable  Hydration status: euvolemic

## 2019-08-31 ENCOUNTER — APPOINTMENT (OUTPATIENT)
Dept: GENERAL RADIOLOGY | Age: 84
DRG: 329 | End: 2019-08-31
Payer: COMMERCIAL

## 2019-08-31 LAB
ANION GAP SERPL CALCULATED.3IONS-SCNC: 9 MMOL/L (ref 4–16)
BUN BLDV-MCNC: 16 MG/DL (ref 6–23)
CALCIUM IONIZED: 4.24 MG/DL (ref 4.48–5.28)
CALCIUM SERPL-MCNC: 7.8 MG/DL (ref 8.3–10.6)
CHLORIDE BLD-SCNC: 104 MMOL/L (ref 99–110)
CO2: 30 MMOL/L (ref 21–32)
CREAT SERPL-MCNC: 1 MG/DL (ref 0.9–1.3)
GFR AFRICAN AMERICAN: >60 ML/MIN/1.73M2
GFR NON-AFRICAN AMERICAN: >60 ML/MIN/1.73M2
GLUCOSE BLD-MCNC: 81 MG/DL (ref 70–99)
HCT VFR BLD CALC: 29.7 % (ref 42–52)
HEMOGLOBIN: 9.3 GM/DL (ref 13.5–18)
IONIZED CA: 1.06 MMOL/L (ref 1.12–1.32)
MCH RBC QN AUTO: 33.5 PG (ref 27–31)
MCHC RBC AUTO-ENTMCNC: 31.3 % (ref 32–36)
MCV RBC AUTO: 106.8 FL (ref 78–100)
PDW BLD-RTO: 13.6 % (ref 11.7–14.9)
PHOSPHORUS: 2.2 MG/DL (ref 2.5–4.9)
PLATELET # BLD: 185 K/CU MM (ref 140–440)
PMV BLD AUTO: 9.7 FL (ref 7.5–11.1)
POTASSIUM SERPL-SCNC: 4.3 MMOL/L (ref 3.5–5.1)
RBC # BLD: 2.78 M/CU MM (ref 4.6–6.2)
SODIUM BLD-SCNC: 143 MMOL/L (ref 135–145)
WBC # BLD: 7 K/CU MM (ref 4–10.5)

## 2019-08-31 PROCEDURE — 82330 ASSAY OF CALCIUM: CPT

## 2019-08-31 PROCEDURE — 85027 COMPLETE CBC AUTOMATED: CPT

## 2019-08-31 PROCEDURE — 6360000002 HC RX W HCPCS: Performed by: SURGERY

## 2019-08-31 PROCEDURE — 71045 X-RAY EXAM CHEST 1 VIEW: CPT

## 2019-08-31 PROCEDURE — 2580000003 HC RX 258: Performed by: SURGERY

## 2019-08-31 PROCEDURE — 2000000000 HC ICU R&B

## 2019-08-31 PROCEDURE — 84100 ASSAY OF PHOSPHORUS: CPT

## 2019-08-31 PROCEDURE — 2500000003 HC RX 250 WO HCPCS: Performed by: SURGERY

## 2019-08-31 PROCEDURE — 6370000000 HC RX 637 (ALT 250 FOR IP): Performed by: SURGERY

## 2019-08-31 PROCEDURE — 80048 BASIC METABOLIC PNL TOTAL CA: CPT

## 2019-08-31 PROCEDURE — C9113 INJ PANTOPRAZOLE SODIUM, VIA: HCPCS | Performed by: SURGERY

## 2019-08-31 RX ORDER — POTASSIUM CHLORIDE AND SODIUM CHLORIDE 450; 150 MG/100ML; MG/100ML
INJECTION, SOLUTION INTRAVENOUS CONTINUOUS
Status: DISPENSED | OUTPATIENT
Start: 2019-08-31 | End: 2019-08-31

## 2019-08-31 RX ORDER — POTASSIUM CHLORIDE AND SODIUM CHLORIDE 450; 150 MG/100ML; MG/100ML
INJECTION, SOLUTION INTRAVENOUS CONTINUOUS
Status: DISCONTINUED | OUTPATIENT
Start: 2019-08-31 | End: 2019-09-01

## 2019-08-31 RX ADMIN — POTASSIUM CHLORIDE AND SODIUM CHLORIDE: 900; 300 INJECTION, SOLUTION INTRAVENOUS at 04:40

## 2019-08-31 RX ADMIN — SODIUM CHLORIDE, PRESERVATIVE FREE 10 ML: 5 INJECTION INTRAVENOUS at 19:55

## 2019-08-31 RX ADMIN — SODIUM CHLORIDE, PRESERVATIVE FREE 10 ML: 5 INJECTION INTRAVENOUS at 09:59

## 2019-08-31 RX ADMIN — POTASSIUM CHLORIDE AND SODIUM CHLORIDE: 450; 150 INJECTION, SOLUTION INTRAVENOUS at 20:12

## 2019-08-31 RX ADMIN — POTASSIUM CHLORIDE AND SODIUM CHLORIDE: 450; 150 INJECTION, SOLUTION INTRAVENOUS at 10:01

## 2019-08-31 RX ADMIN — LATANOPROST 1 DROP: 50 SOLUTION/ DROPS OPHTHALMIC at 21:25

## 2019-08-31 RX ADMIN — METOPROLOL TARTRATE 5 MG: 5 INJECTION INTRAVENOUS at 04:35

## 2019-08-31 RX ADMIN — METOPROLOL TARTRATE 5 MG: 5 INJECTION INTRAVENOUS at 16:11

## 2019-08-31 RX ADMIN — MORPHINE SULFATE 4 MG: 4 INJECTION, SOLUTION INTRAMUSCULAR; INTRAVENOUS at 00:40

## 2019-08-31 RX ADMIN — SUCRALFATE 1 G: 1 TABLET ORAL at 00:31

## 2019-08-31 RX ADMIN — SUCRALFATE 1 G: 1 TABLET ORAL at 13:07

## 2019-08-31 RX ADMIN — METOPROLOL TARTRATE 5 MG: 5 INJECTION INTRAVENOUS at 21:23

## 2019-08-31 RX ADMIN — SUCRALFATE 1 G: 1 TABLET ORAL at 04:35

## 2019-08-31 RX ADMIN — SUCRALFATE 1 G: 1 TABLET ORAL at 23:45

## 2019-08-31 RX ADMIN — ENOXAPARIN SODIUM 30 MG: 30 INJECTION SUBCUTANEOUS at 21:23

## 2019-08-31 RX ADMIN — ENOXAPARIN SODIUM 30 MG: 30 INJECTION SUBCUTANEOUS at 10:01

## 2019-08-31 RX ADMIN — SODIUM PHOSPHATE, MONOBASIC, MONOHYDRATE 10 MMOL: 276; 142 INJECTION, SOLUTION INTRAVENOUS at 10:04

## 2019-08-31 RX ADMIN — TIMOLOL MALEATE 1 DROP: 5 SOLUTION OPHTHALMIC at 10:02

## 2019-08-31 RX ADMIN — BRIMONIDINE TARTRATE 1 DROP: 2 SOLUTION/ DROPS OPHTHALMIC at 10:02

## 2019-08-31 RX ADMIN — SUCRALFATE 1 G: 1 TABLET ORAL at 17:55

## 2019-08-31 RX ADMIN — ASCORBIC ACID, VITAMIN A PALMITATE, CHOLECALCIFEROL, THIAMINE HYDROCHLORIDE, RIBOFLAVIN-5 PHOSPHATE SODIUM, PYRIDOXINE HYDROCHLORIDE, NIACINAMIDE, DEXPANTHENOL, ALPHA-TOCOPHEROL ACETATE, VITAMIN K1, FOLIC ACID, BIOTIN, CYANOCOBALAMIN: 200; 3300; 200; 6; 3.6; 6; 40; 15; 10; 150; 600; 60; 5 INJECTION, SOLUTION INTRAVENOUS at 17:55

## 2019-08-31 RX ADMIN — BRIMONIDINE TARTRATE 1 DROP: 2 SOLUTION/ DROPS OPHTHALMIC at 21:25

## 2019-08-31 RX ADMIN — MORPHINE SULFATE 4 MG: 4 INJECTION, SOLUTION INTRAMUSCULAR; INTRAVENOUS at 19:55

## 2019-08-31 RX ADMIN — PANTOPRAZOLE SODIUM 40 MG: 40 INJECTION, POWDER, FOR SOLUTION INTRAVENOUS at 09:59

## 2019-08-31 RX ADMIN — METOPROLOL TARTRATE 5 MG: 5 INJECTION INTRAVENOUS at 09:59

## 2019-08-31 RX ADMIN — SODIUM CHLORIDE, PRESERVATIVE FREE 10 ML: 5 INJECTION INTRAVENOUS at 21:24

## 2019-08-31 RX ADMIN — TIMOLOL MALEATE 1 DROP: 5 SOLUTION OPHTHALMIC at 21:24

## 2019-08-31 ASSESSMENT — PAIN DESCRIPTION - PAIN TYPE
TYPE: SURGICAL PAIN

## 2019-08-31 ASSESSMENT — PAIN SCALES - GENERAL
PAINLEVEL_OUTOF10: 0
PAINLEVEL_OUTOF10: 7
PAINLEVEL_OUTOF10: 0
PAINLEVEL_OUTOF10: 10
PAINLEVEL_OUTOF10: 0
PAINLEVEL_OUTOF10: 7
PAINLEVEL_OUTOF10: 0
PAINLEVEL_OUTOF10: 0

## 2019-08-31 ASSESSMENT — PAIN DESCRIPTION - LOCATION
LOCATION: ABDOMEN

## 2019-08-31 ASSESSMENT — PAIN - FUNCTIONAL ASSESSMENT
PAIN_FUNCTIONAL_ASSESSMENT: PREVENTS OR INTERFERES WITH ALL ACTIVE AND SOME PASSIVE ACTIVITIES
PAIN_FUNCTIONAL_ASSESSMENT: PREVENTS OR INTERFERES WITH MANY ACTIVE NOT PASSIVE ACTIVITIES
PAIN_FUNCTIONAL_ASSESSMENT: PREVENTS OR INTERFERES WITH MANY ACTIVE NOT PASSIVE ACTIVITIES

## 2019-08-31 ASSESSMENT — PAIN DESCRIPTION - PROGRESSION
CLINICAL_PROGRESSION: GRADUALLY WORSENING
CLINICAL_PROGRESSION: NOT CHANGED
CLINICAL_PROGRESSION: GRADUALLY WORSENING

## 2019-08-31 ASSESSMENT — PAIN DESCRIPTION - FREQUENCY
FREQUENCY: INTERMITTENT

## 2019-08-31 ASSESSMENT — PAIN DESCRIPTION - DESCRIPTORS
DESCRIPTORS: ACHING;SORE

## 2019-08-31 ASSESSMENT — PAIN DESCRIPTION - ORIENTATION
ORIENTATION: MID

## 2019-08-31 NOTE — PROGRESS NOTES
Diet NPO Effective Now Exceptions are: Ice Chips, Popsicles  PN-Adult Premix  4.25/10 - Standard Electrolytes - Peripheral Line  Code Status: Full Code   Home O2: none  Ambulation: independent with cane      Dispo:  -as per surgery    RENNY GOMEZ MD  8/31/2019    Meds:   Meds:    enoxaparin  30 mg Subcutaneous BID    [START ON 9/2/2019] fat emulsion  500 mL Intravenous Once per day on Mon Thu    sucralfate  1 g Oral 4 times per day    sodium chloride flush  10 mL Intravenous 2 times per day    metoprolol  5 mg Intravenous Q6H    sodium chloride flush  10 mL Intravenous 2 times per day    pantoprazole  40 mg Intravenous Daily    latanoprost  1 drop Both Eyes Nightly    brimonidine  1 drop Both Eyes BID    And    timolol  1 drop Both Eyes BID      Infusions:    0.45 % NaCl with KCl 20 mEq 100 mL/hr at 08/31/19 1001    PN-Adult Premix  4.25/10 - Standard Electrolytes - Peripheral Line      0.45 % NaCl with KCl 20 mEq       PRN Meds:     morphine 2 mg Q2H PRN   ondansetron 4 mg Q4H PRN   sodium chloride flush 10 mL PRN   morphine 4 mg Q2H PRN   sodium chloride flush 10 mL PRN       Data/Labs:     Recent Labs     08/29/19  0522 08/30/19  0400 08/30/19  1430 08/30/19  1830 08/31/19  0430   WBC 12.2* 6.7  --   --  7.0   HGB 11.3* 8.8  HGB DECREASE CALLED TO CARLINE RIVERO RN ON 8/30/19 AT 0455 BY ARIANA PEREZ CLS  RESULTS READ BACK  * 9.2* 9.3* 9.3*   HCT 34.6* 27.4* 28.7* 29.2* 29.7*    164  --   --  185      Recent Labs     08/29/19  0522 08/30/19  0400 08/31/19  0430    140 143   K 3.7 3.5 4.3    105 104   CO2 25 28 30   PHOS 2.7 2.4* 2.2*   BUN 16 15 16   CREATININE 1.0 1.0 1.0     Recent Labs     08/29/19  0522   AST 20   ALT 9*   BILIDIR 0.4*   BILITOT 0.9   ALKPHOS 62     No results for input(s): INR in the last 72 hours. No results for input(s): CKTOTAL, CKMB, CKMBINDEX, TROPONINT in the last 72 hours.   HgBA1c: No results found for: LABA1C  CALCIUM:  7.8/30 (08/31 0430)    I/O last 3

## 2019-08-31 NOTE — PROGRESS NOTES
Nutrition Assessment (Parenteral Nutrition)    Type and Reason for Visit: Initial    Nutrition Recommendations:   · Please start an oral diet as soon as possible    Nutrition Assessment: Pt assessed due to new TPN being started. Pt has had bowel surgery and is post op day 3. Pt is hungry but there is not flatulents. Please start a full liquid diet as soon as possible in order to avoid an translocated bacteria, microvilli death, and to reduce the risk of an ileus. Malnutrition Assessment:  · Malnutrition Status: At risk for malnutrition  · Context: Acute illness or injury  · Findings of the 6 clinical characteristics of malnutrition (Minimum of 2 out of 6 clinical characteristics is required to make the diagnosis of moderate or severe Protein Calorie Malnutrition based on AND/ASPEN Guidelines):  1. Energy Intake-Less than or equal to 50% of estimated energy requirement, Greater than or equal to 5 days    2. Weight Loss-No significant weight loss, in 3 months  3. Fat Loss-No significant subcutaneous fat loss, Orbital  4. Muscle Loss-No significant muscle mass loss, Clavicles (pectoralis and deltoids)  5. Fluid Accumulation-No significant fluid accumulation, Extremities  6.   Strength-Not measured    Nutrition Risk Level: High    Nutrient Needs:  · Estimated Daily Total Kcal: 3607-4467 based on MSJ  · Estimated Daily Protein (g): 70-84 based on 1-1.2 g/kg/IBW  · Estimated Daily Total Fluid (ml/day): 8721-0619 based on 1 mL/kcal    Nutrition Diagnosis:   · Problem: Inadequate oral intake  · Etiology: related to Alteration in GI function     Signs and symptoms:  as evidenced by NPO status due to medical condition    Objective Information:  · Wound Type: Surgical Wound  · Current Nutrition Therapies:  · Oral Diet Orders: NPO   · Parenteral Nutrition Orders:  · Type and Formula: 2-in-1 Standard(4.25/10)   · Lipids: 250ml, Two times weekly  · Rate/Volume: 42  · Duration: Continuous  · Current PN Order Provides: 799 kcal and 43g of protein per day  · Anthropometric Measures:  · Ht: 5' 8\" (172.7 cm)   · Current Body Wt: 185 lb (83.9 kg)  · Admission Body Wt: 185 lb (83.9 kg)  · Usual Body Wt: 169 lb (76.7 kg)  · % Weight Change: none noted  · Ideal Body Wt: 154 lb (69.9 kg), % Ideal Body 120%  · BMI Classification: BMI 25.0 - 29.9 Overweight    Nutrition Interventions:   Start oral diet, Start ONS, Discontinue Parenteral Nutrition  Continued Inpatient Monitoring, Education Not Indicated, Coordination of Care    Nutrition Evaluation:   · Evaluation: Goals set   · Goals: pt will have an oral diet started in the next 24-48 hours   · Monitoring: Nutrition Progression, Pertinent Labs, Weight, Wound Healing      Electronically signed by Sarika Breaux RD, LD on 9/63/68 at 2:28 PM    Contact Number: [phone]

## 2019-09-01 LAB
ALBUMIN SERPL-MCNC: 2.6 GM/DL (ref 3.4–5)
ALP BLD-CCNC: 56 IU/L (ref 40–129)
ALT SERPL-CCNC: 6 U/L (ref 10–40)
ANION GAP SERPL CALCULATED.3IONS-SCNC: 5 MMOL/L (ref 4–16)
AST SERPL-CCNC: 19 IU/L (ref 15–37)
BILIRUB SERPL-MCNC: 0.5 MG/DL (ref 0–1)
BILIRUBIN DIRECT: 0.2 MG/DL (ref 0–0.3)
BILIRUBIN, INDIRECT: 0.3 MG/DL (ref 0–0.7)
BUN BLDV-MCNC: 25 MG/DL (ref 6–23)
CALCIUM SERPL-MCNC: 7.5 MG/DL (ref 8.3–10.6)
CHLORIDE BLD-SCNC: 103 MMOL/L (ref 99–110)
CO2: 31 MMOL/L (ref 21–32)
CREAT SERPL-MCNC: 0.9 MG/DL (ref 0.9–1.3)
CULTURE: NORMAL
FERRITIN: 296 NG/ML (ref 30–400)
FOLATE: 16 NG/ML (ref 3.1–17.5)
GFR AFRICAN AMERICAN: >60 ML/MIN/1.73M2
GFR NON-AFRICAN AMERICAN: >60 ML/MIN/1.73M2
GLUCOSE BLD-MCNC: 116 MG/DL (ref 70–99)
HCT VFR BLD CALC: 25.9 % (ref 42–52)
HEMOGLOBIN: 8.2 GM/DL (ref 13.5–18)
IRON: 33 UG/DL (ref 59–158)
Lab: NORMAL
MAGNESIUM: 1.8 MG/DL (ref 1.8–2.4)
MCH RBC QN AUTO: 33.6 PG (ref 27–31)
MCHC RBC AUTO-ENTMCNC: 31.7 % (ref 32–36)
MCV RBC AUTO: 106.1 FL (ref 78–100)
PCT TRANSFERRIN: 25 % (ref 10–44)
PDW BLD-RTO: 13.4 % (ref 11.7–14.9)
PHOSPHORUS: 2 MG/DL (ref 2.5–4.9)
PLATELET # BLD: 180 K/CU MM (ref 140–440)
PMV BLD AUTO: 9.6 FL (ref 7.5–11.1)
POTASSIUM SERPL-SCNC: 4.1 MMOL/L (ref 3.5–5.1)
PREALBUMIN: ABNORMAL MG/DL (ref 20–40)
PRO-BNP: 2032 PG/ML
RBC # BLD: 2.44 M/CU MM (ref 4.6–6.2)
SODIUM BLD-SCNC: 139 MMOL/L (ref 135–145)
SPECIMEN: NORMAL
TOTAL IRON BINDING CAPACITY: 133 UG/DL (ref 250–450)
TOTAL PROTEIN: 4.3 GM/DL (ref 6.4–8.2)
UNSATURATED IRON BINDING CAPACITY: 100 UG/DL (ref 110–370)
VITAMIN B-12: 497.3 PG/ML (ref 211–911)
WBC # BLD: 5.8 K/CU MM (ref 4–10.5)

## 2019-09-01 PROCEDURE — 82607 VITAMIN B-12: CPT

## 2019-09-01 PROCEDURE — 2500000003 HC RX 250 WO HCPCS: Performed by: SURGERY

## 2019-09-01 PROCEDURE — 83735 ASSAY OF MAGNESIUM: CPT

## 2019-09-01 PROCEDURE — 94761 N-INVAS EAR/PLS OXIMETRY MLT: CPT

## 2019-09-01 PROCEDURE — 83550 IRON BINDING TEST: CPT

## 2019-09-01 PROCEDURE — 6360000002 HC RX W HCPCS: Performed by: SURGERY

## 2019-09-01 PROCEDURE — 83540 ASSAY OF IRON: CPT

## 2019-09-01 PROCEDURE — 85027 COMPLETE CBC AUTOMATED: CPT

## 2019-09-01 PROCEDURE — 6370000000 HC RX 637 (ALT 250 FOR IP): Performed by: SURGERY

## 2019-09-01 PROCEDURE — 82746 ASSAY OF FOLIC ACID SERUM: CPT

## 2019-09-01 PROCEDURE — C9113 INJ PANTOPRAZOLE SODIUM, VIA: HCPCS | Performed by: SURGERY

## 2019-09-01 PROCEDURE — 2580000003 HC RX 258: Performed by: SURGERY

## 2019-09-01 PROCEDURE — 2000000000 HC ICU R&B

## 2019-09-01 PROCEDURE — 83880 ASSAY OF NATRIURETIC PEPTIDE: CPT

## 2019-09-01 PROCEDURE — 84100 ASSAY OF PHOSPHORUS: CPT

## 2019-09-01 PROCEDURE — 2580000003 HC RX 258: Performed by: INTERNAL MEDICINE

## 2019-09-01 PROCEDURE — 82248 BILIRUBIN DIRECT: CPT

## 2019-09-01 PROCEDURE — 82728 ASSAY OF FERRITIN: CPT

## 2019-09-01 PROCEDURE — 80053 COMPREHEN METABOLIC PANEL: CPT

## 2019-09-01 PROCEDURE — 84134 ASSAY OF PREALBUMIN: CPT

## 2019-09-01 PROCEDURE — 2500000003 HC RX 250 WO HCPCS: Performed by: INTERNAL MEDICINE

## 2019-09-01 PROCEDURE — 6360000002 HC RX W HCPCS: Performed by: HOSPITALIST

## 2019-09-01 RX ORDER — MORPHINE SULFATE 15 MG/1
15 TABLET ORAL EVERY 4 HOURS PRN
Status: DISCONTINUED | OUTPATIENT
Start: 2019-09-01 | End: 2019-09-02

## 2019-09-01 RX ORDER — PANTOPRAZOLE SODIUM 40 MG/10ML
40 INJECTION, POWDER, LYOPHILIZED, FOR SOLUTION INTRAVENOUS DAILY
Status: DISCONTINUED | OUTPATIENT
Start: 2019-09-01 | End: 2019-09-06 | Stop reason: HOSPADM

## 2019-09-01 RX ORDER — LISINOPRIL AND HYDROCHLOROTHIAZIDE 12.5; 1 MG/1; MG/1
1 TABLET ORAL DAILY
Status: DISCONTINUED | OUTPATIENT
Start: 2019-09-02 | End: 2019-09-01

## 2019-09-01 RX ORDER — SIMVASTATIN 40 MG
40 TABLET ORAL NIGHTLY
Status: DISCONTINUED | OUTPATIENT
Start: 2019-09-01 | End: 2019-09-01

## 2019-09-01 RX ORDER — TAMSULOSIN HYDROCHLORIDE 0.4 MG/1
0.4 CAPSULE ORAL DAILY
Status: DISCONTINUED | OUTPATIENT
Start: 2019-09-01 | End: 2019-09-01

## 2019-09-01 RX ORDER — PANTOPRAZOLE SODIUM 40 MG/1
40 TABLET, DELAYED RELEASE ORAL
Status: DISCONTINUED | OUTPATIENT
Start: 2019-09-02 | End: 2019-09-01

## 2019-09-01 RX ORDER — FINASTERIDE 5 MG/1
5 TABLET, FILM COATED ORAL DAILY
Status: DISCONTINUED | OUTPATIENT
Start: 2019-09-01 | End: 2019-09-01

## 2019-09-01 RX ORDER — METOPROLOL SUCCINATE 25 MG/1
25 TABLET, EXTENDED RELEASE ORAL DAILY
Status: DISCONTINUED | OUTPATIENT
Start: 2019-09-01 | End: 2019-09-01

## 2019-09-01 RX ORDER — DILTIAZEM HYDROCHLORIDE 60 MG/1
120 CAPSULE, EXTENDED RELEASE ORAL DAILY
Status: DISCONTINUED | OUTPATIENT
Start: 2019-09-01 | End: 2019-09-01

## 2019-09-01 RX ORDER — LISINOPRIL AND HYDROCHLOROTHIAZIDE 12.5; 1 MG/1; MG/1
1 TABLET ORAL DAILY
Status: DISCONTINUED | OUTPATIENT
Start: 2019-09-01 | End: 2019-09-01

## 2019-09-01 RX ORDER — FUROSEMIDE 10 MG/ML
40 INJECTION INTRAMUSCULAR; INTRAVENOUS ONCE
Status: COMPLETED | OUTPATIENT
Start: 2019-09-01 | End: 2019-09-01

## 2019-09-01 RX ADMIN — PANTOPRAZOLE SODIUM 40 MG: 40 INJECTION, POWDER, FOR SOLUTION INTRAVENOUS at 12:57

## 2019-09-01 RX ADMIN — LATANOPROST 1 DROP: 50 SOLUTION/ DROPS OPHTHALMIC at 20:47

## 2019-09-01 RX ADMIN — SODIUM CHLORIDE, PRESERVATIVE FREE 10 ML: 5 INJECTION INTRAVENOUS at 10:55

## 2019-09-01 RX ADMIN — MORPHINE SULFATE 4 MG: 4 INJECTION, SOLUTION INTRAMUSCULAR; INTRAVENOUS at 04:28

## 2019-09-01 RX ADMIN — POTASSIUM CHLORIDE: 2 INJECTION, SOLUTION, CONCENTRATE INTRAVENOUS at 18:11

## 2019-09-01 RX ADMIN — SODIUM CHLORIDE, PRESERVATIVE FREE 10 ML: 5 INJECTION INTRAVENOUS at 10:56

## 2019-09-01 RX ADMIN — ENOXAPARIN SODIUM 30 MG: 30 INJECTION SUBCUTANEOUS at 10:34

## 2019-09-01 RX ADMIN — BRIMONIDINE TARTRATE 1 DROP: 2 SOLUTION/ DROPS OPHTHALMIC at 10:35

## 2019-09-01 RX ADMIN — BRIMONIDINE TARTRATE 1 DROP: 2 SOLUTION/ DROPS OPHTHALMIC at 20:50

## 2019-09-01 RX ADMIN — MORPHINE SULFATE 4 MG: 4 INJECTION, SOLUTION INTRAMUSCULAR; INTRAVENOUS at 00:02

## 2019-09-01 RX ADMIN — ENOXAPARIN SODIUM 30 MG: 30 INJECTION SUBCUTANEOUS at 20:49

## 2019-09-01 RX ADMIN — MORPHINE SULFATE 4 MG: 4 INJECTION, SOLUTION INTRAMUSCULAR; INTRAVENOUS at 20:49

## 2019-09-01 RX ADMIN — MORPHINE SULFATE 2 MG: 4 INJECTION, SOLUTION INTRAMUSCULAR; INTRAVENOUS at 14:38

## 2019-09-01 RX ADMIN — TIMOLOL MALEATE 1 DROP: 5 SOLUTION OPHTHALMIC at 10:35

## 2019-09-01 RX ADMIN — SODIUM CHLORIDE, PRESERVATIVE FREE 10 ML: 5 INJECTION INTRAVENOUS at 20:50

## 2019-09-01 RX ADMIN — METOPROLOL TARTRATE 5 MG: 1 INJECTION, SOLUTION INTRAVENOUS at 18:17

## 2019-09-01 RX ADMIN — SUCRALFATE 1 G: 1 TABLET ORAL at 06:02

## 2019-09-01 RX ADMIN — SODIUM PHOSPHATE, MONOBASIC, MONOHYDRATE 10 MMOL: 276; 142 INJECTION, SOLUTION INTRAVENOUS at 10:35

## 2019-09-01 RX ADMIN — FUROSEMIDE 40 MG: 10 INJECTION, SOLUTION INTRAMUSCULAR; INTRAVENOUS at 10:34

## 2019-09-01 RX ADMIN — METOPROLOL TARTRATE 5 MG: 5 INJECTION INTRAVENOUS at 04:28

## 2019-09-01 RX ADMIN — TIMOLOL MALEATE 1 DROP: 5 SOLUTION OPHTHALMIC at 20:51

## 2019-09-01 ASSESSMENT — PAIN SCALES - GENERAL
PAINLEVEL_OUTOF10: 5
PAINLEVEL_OUTOF10: 5
PAINLEVEL_OUTOF10: 0
PAINLEVEL_OUTOF10: 8
PAINLEVEL_OUTOF10: 0
PAINLEVEL_OUTOF10: 8
PAINLEVEL_OUTOF10: 0
PAINLEVEL_OUTOF10: 8
PAINLEVEL_OUTOF10: 0

## 2019-09-01 ASSESSMENT — PAIN DESCRIPTION - ORIENTATION
ORIENTATION: MID

## 2019-09-01 ASSESSMENT — PAIN DESCRIPTION - PROGRESSION
CLINICAL_PROGRESSION: GRADUALLY WORSENING
CLINICAL_PROGRESSION: GRADUALLY WORSENING
CLINICAL_PROGRESSION: NOT CHANGED
CLINICAL_PROGRESSION: NOT CHANGED

## 2019-09-01 ASSESSMENT — PAIN DESCRIPTION - LOCATION
LOCATION: ABDOMEN

## 2019-09-01 ASSESSMENT — PAIN - FUNCTIONAL ASSESSMENT
PAIN_FUNCTIONAL_ASSESSMENT: PREVENTS OR INTERFERES WITH MANY ACTIVE NOT PASSIVE ACTIVITIES
PAIN_FUNCTIONAL_ASSESSMENT: PREVENTS OR INTERFERES WITH ALL ACTIVE AND SOME PASSIVE ACTIVITIES
PAIN_FUNCTIONAL_ASSESSMENT: PREVENTS OR INTERFERES WITH ALL ACTIVE AND SOME PASSIVE ACTIVITIES
PAIN_FUNCTIONAL_ASSESSMENT: PREVENTS OR INTERFERES WITH MANY ACTIVE NOT PASSIVE ACTIVITIES

## 2019-09-01 ASSESSMENT — PAIN DESCRIPTION - DESCRIPTORS
DESCRIPTORS: ACHING;SORE

## 2019-09-01 ASSESSMENT — PAIN DESCRIPTION - FREQUENCY
FREQUENCY: INTERMITTENT

## 2019-09-01 ASSESSMENT — PAIN DESCRIPTION - PAIN TYPE
TYPE: SURGICAL PAIN

## 2019-09-01 ASSESSMENT — PAIN DESCRIPTION - ONSET: ONSET: GRADUAL

## 2019-09-01 NOTE — PROGRESS NOTES
Hospitalist Progress Note      PCP: Elisa Franco MD    Date of Admission: 2019    Chief Complaint on Admission: abd pain    Pt Seen/Examined and Chart Reviewed. Admitting dx SBO    SUBJECTIVE:     abd pain well controlled, no nausea, OOB      OBJECTIVE:   Vitals    TEMPERATURE:  Current - Temp: 97.8 °F (36.6 °C); Max - Temp  Av °F (36.7 °C)  Min: 97.8 °F (36.6 °C)  Max: 98.6 °F (37 °C)  RESPIRATIONS RANGE: Resp  Av.3  Min: 10  Max: 33  PULSE RANGE: Pulse  Av.5  Min: 79  Max: 97  BLOOD PRESSURE RANGE:  Systolic (79LSR), NGT:931 , Min:107 , DCR:728   ; Diastolic (94OPR), IWC:84, Min:63, Max:100    PULSE OXIMETRY RANGE: SpO2  Av.7 %  Min: 85 %  Max: 100 %  24HR INTAKE/OUTPUT:      Intake/Output Summary (Last 24 hours) at 2019 1639  Last data filed at 2019 0602  Gross per 24 hour   Intake 1498 ml   Output 900 ml   Net 598 ml       Exam:    General appearance: Well, no apparent distress, appears stated age and cooperative. HEENT Normal cephalic, atraumatic without obvious deformity. Pupils equal, round, and reactive to light. Extra ocular muscles intact. Conjunctivae/corneas clear. Neck: Supple, No jugular venous distention/bruits. Trachea midline without thyromegaly or adenopathy with full range of motion. Lungs: Clear to ascultation, bilaterally without Rales/Wheezes/Rhonchi with good respiratory effort. Heart: Regular rate and rhythm with Normal S1/S2 without  murmurs, rubs or gallops, point of maximum impulse non-displaced  Abdomen: Soft, non-distended without rigidity or guarding and positive bowel sounds all four quadrants. Stoma pink, flatus  Extremities: No clubbing, cyanosis, or edema bilaterally. Full range of motion without deformity and normal gait intact. Skin: Skin color, texture, turgor normal. No rashes or lesions. Neurologic: Alert and oriented X 3,  neurovascularly intact with sensory/motor intact upper extremities/lower extremities, bilaterally.

## 2019-09-01 NOTE — PLAN OF CARE
Problem: Activity:  Goal: Able to perform physical activity  9/1/2019 0920 by Lorene Sahu RN  Outcome: Ongoing  9/1/2019 0916 by Lorene Sahu RN  Outcome: Ongoing  8/31/2019 2021 by Riddhi Pena RN  Outcome: Ongoing     Problem: FALL RISK  Goal: Absence of falls  9/1/2019 0920 by Lorene Sahu RN  Outcome: Ongoing  9/1/2019 0916 by Lorene Sahu RN  Outcome: Ongoing  8/31/2019 2021 by Riddhi Pena RN  Outcome: Ongoing  Goal: Absence of falls  9/1/2019 0920 by Lorene Sahu RN  Outcome: Ongoing  9/1/2019 0916 by Lorene Sahu RN  Outcome: Ongoing  8/31/2019 2021 by Riddhi Pena RN  Outcome: Ongoing     Problem: Pain Control  Goal: Pain control  Description  Patient will demonstrate personal actions to control pain. 9/1/2019 0920 by Lorene Sahu RN  Outcome: Ongoing  9/1/2019 0916 by Lorene Sahu RN  Outcome: Ongoing  8/31/2019 2021 by Riddhi Pena RN  Outcome: Ongoing     Problem: Pain:  Description  Pain management should include both nonpharmacologic and pharmacologic interventions.   Goal: Pain level will decrease  Description  Pain level will decrease  9/1/2019 0920 by Lorene Sahu RN  Outcome: Ongoing  9/1/2019 0916 by Lorene Sahu RN  Outcome: Ongoing  8/31/2019 2021 by Riddhi Pena RN  Outcome: Ongoing  Goal: Control of acute pain  Description  Control of acute pain  9/1/2019 0920 by Lorene Sahu RN  Outcome: Ongoing  9/1/2019 0916 by Lorene Sahu RN  Outcome: Ongoing  8/31/2019 2021 by Riddhi Pena RN  Outcome: Ongoing  Goal: Control of chronic pain  Description  Control of chronic pain  9/1/2019 0920 by Lorene Sahu RN  Outcome: Ongoing  9/1/2019 0916 by Lorene Sahu RN  Outcome: Ongoing  8/31/2019 2021 by Riddhi Pena RN  Outcome: Ongoing     Problem: Falls - Risk of:  Goal: Will remain free from falls  Description  Will remain free from falls  9/1/2019 0920 by Lorene Sahu RN  Outcome: Ongoing  9/1/2019 0916 by Lorene Sahu RN  Outcome: Ongoing  8/31/2019 2021 by Aniceto Sen RN  Outcome: Ongoing  Goal: Absence of physical injury  Description  Absence of physical injury  9/1/2019 0920 by Kedar Parsons RN  Outcome: Ongoing  9/1/2019 0916 by Kedar Parsons RN  Outcome: Ongoing  8/31/2019 2021 by Aniceto Sen RN  Outcome: Ongoing     Problem: Infection - Surgical Site:  Goal: Will show no infection signs and symptoms  Description  Will show no infection signs and symptoms  9/1/2019 0920 by Kedar Parsons RN  Outcome: Ongoing  9/1/2019 0916 by Kedar Parsons RN  Outcome: Ongoing  8/31/2019 2021 by Aniceto Sen RN  Outcome: Ongoing     Problem: Urinary Elimination:  Goal: Signs and symptoms of infection will decrease  Description  Signs and symptoms of infection will decrease  9/1/2019 0920 by Kedar Parsons RN  Outcome: Ongoing  9/1/2019 0916 by Kedar Parsons RN  Outcome: Ongoing  8/31/2019 2021 by Aniceto Sen RN  Outcome: Ongoing  Goal: Complications related to the disease process, condition or treatment will be avoided or minimized  Description  Complications related to the disease process, condition or treatment will be avoided or minimized  9/1/2019 0920 by Kedar Parsons RN  Outcome: Ongoing  9/1/2019 0916 by Kedar Parsons RN  Outcome: Ongoing  8/31/2019 2021 by Aniceto Sen RN  Outcome: Ongoing     Problem: Risk for Impaired Skin Integrity  Goal: Tissue integrity - skin and mucous membranes  Description  Structural intactness and normal physiological function of skin and  mucous membranes.   9/1/2019 0920 by Kedar Parsons RN  Outcome: Ongoing  9/1/2019 0916 by Kedar Parsons RN  Outcome: Ongoing  8/31/2019 2021 by Aniceto Sen RN  Outcome: Ongoing     Problem: Nutrition  Goal: Optimal nutrition therapy  9/1/2019 0920 by Kedar Parsons RN  Outcome: Ongoing  9/1/2019 0916 by Kedar Parsons RN  Outcome: Ongoing  8/31/2019 2021 by Aniceto Sen RN  Outcome: Ongoing

## 2019-09-01 NOTE — PLAN OF CARE
Ongoing  8/31/2019 2021 by Sarbjit Augustin RN  Outcome: Ongoing     Problem: Urinary Elimination:  Goal: Signs and symptoms of infection will decrease  Description  Signs and symptoms of infection will decrease  9/1/2019 0916 by Anmol Rayo RN  Outcome: Ongoing  8/31/2019 2021 by Sarbjit Augustin RN  Outcome: Ongoing  Goal: Complications related to the disease process, condition or treatment will be avoided or minimized  Description  Complications related to the disease process, condition or treatment will be avoided or minimized  9/1/2019 0916 by Anmol Rayo RN  Outcome: Ongoing  8/31/2019 2021 by Sarbjit Augustin RN  Outcome: Ongoing     Problem: Risk for Impaired Skin Integrity  Goal: Tissue integrity - skin and mucous membranes  Description  Structural intactness and normal physiological function of skin and  mucous membranes.   9/1/2019 0916 by Anmol Rayo RN  Outcome: Ongoing  8/31/2019 2021 by Sarbjit Augustin RN  Outcome: Ongoing     Problem: Nutrition  Goal: Optimal nutrition therapy  9/1/2019 0916 by Anmol Rayo RN  Outcome: Ongoing  8/31/2019 2021 by Sarbjit Augustin RN  Outcome: Ongoing

## 2019-09-02 LAB
ANION GAP SERPL CALCULATED.3IONS-SCNC: 7 MMOL/L (ref 4–16)
BUN BLDV-MCNC: 24 MG/DL (ref 6–23)
CALCIUM IONIZED: 4.36 MG/DL (ref 4.48–5.28)
CALCIUM SERPL-MCNC: 8 MG/DL (ref 8.3–10.6)
CHLORIDE BLD-SCNC: 98 MMOL/L (ref 99–110)
CO2: 33 MMOL/L (ref 21–32)
CREAT SERPL-MCNC: 0.9 MG/DL (ref 0.9–1.3)
GFR AFRICAN AMERICAN: >60 ML/MIN/1.73M2
GFR NON-AFRICAN AMERICAN: >60 ML/MIN/1.73M2
GLUCOSE BLD-MCNC: 146 MG/DL (ref 70–99)
HCT VFR BLD CALC: 26.9 % (ref 42–52)
HEMOGLOBIN: 8.5 GM/DL (ref 13.5–18)
IONIZED CA: 1.09 MMOL/L (ref 1.12–1.32)
MCH RBC QN AUTO: 33.2 PG (ref 27–31)
MCHC RBC AUTO-ENTMCNC: 31.6 % (ref 32–36)
MCV RBC AUTO: 105.1 FL (ref 78–100)
PDW BLD-RTO: 13 % (ref 11.7–14.9)
PLATELET # BLD: 209 K/CU MM (ref 140–440)
PMV BLD AUTO: 9.7 FL (ref 7.5–11.1)
POTASSIUM SERPL-SCNC: 3.7 MMOL/L (ref 3.5–5.1)
RBC # BLD: 2.56 M/CU MM (ref 4.6–6.2)
SODIUM BLD-SCNC: 138 MMOL/L (ref 135–145)
WBC # BLD: 5.5 K/CU MM (ref 4–10.5)

## 2019-09-02 PROCEDURE — 2580000003 HC RX 258: Performed by: SURGERY

## 2019-09-02 PROCEDURE — 80048 BASIC METABOLIC PNL TOTAL CA: CPT

## 2019-09-02 PROCEDURE — 85027 COMPLETE CBC AUTOMATED: CPT

## 2019-09-02 PROCEDURE — 2500000003 HC RX 250 WO HCPCS: Performed by: SURGERY

## 2019-09-02 PROCEDURE — 2060000000 HC ICU INTERMEDIATE R&B

## 2019-09-02 PROCEDURE — C9113 INJ PANTOPRAZOLE SODIUM, VIA: HCPCS | Performed by: SURGERY

## 2019-09-02 PROCEDURE — 36592 COLLECT BLOOD FROM PICC: CPT

## 2019-09-02 PROCEDURE — 6370000000 HC RX 637 (ALT 250 FOR IP): Performed by: SURGERY

## 2019-09-02 PROCEDURE — 2500000003 HC RX 250 WO HCPCS: Performed by: INTERNAL MEDICINE

## 2019-09-02 PROCEDURE — 6360000002 HC RX W HCPCS: Performed by: SURGERY

## 2019-09-02 PROCEDURE — 2580000003 HC RX 258: Performed by: INTERNAL MEDICINE

## 2019-09-02 PROCEDURE — 82330 ASSAY OF CALCIUM: CPT

## 2019-09-02 RX ORDER — FINASTERIDE 5 MG/1
5 TABLET, FILM COATED ORAL DAILY
Status: DISCONTINUED | OUTPATIENT
Start: 2019-09-02 | End: 2019-09-06 | Stop reason: HOSPADM

## 2019-09-02 RX ORDER — METOPROLOL SUCCINATE 25 MG/1
25 TABLET, EXTENDED RELEASE ORAL DAILY
Status: DISCONTINUED | OUTPATIENT
Start: 2019-09-02 | End: 2019-09-06 | Stop reason: HOSPADM

## 2019-09-02 RX ORDER — DILTIAZEM HYDROCHLORIDE 120 MG/1
120 CAPSULE, COATED, EXTENDED RELEASE ORAL DAILY
Status: DISCONTINUED | OUTPATIENT
Start: 2019-09-02 | End: 2019-09-06 | Stop reason: HOSPADM

## 2019-09-02 RX ORDER — FUROSEMIDE 10 MG/ML
20 INJECTION INTRAMUSCULAR; INTRAVENOUS ONCE
Status: COMPLETED | OUTPATIENT
Start: 2019-09-02 | End: 2019-09-02

## 2019-09-02 RX ORDER — MORPHINE SULFATE 4 MG/ML
2 INJECTION, SOLUTION INTRAMUSCULAR; INTRAVENOUS
Status: DISCONTINUED | OUTPATIENT
Start: 2019-09-02 | End: 2019-09-03

## 2019-09-02 RX ORDER — MORPHINE SULFATE 4 MG/ML
1 INJECTION, SOLUTION INTRAMUSCULAR; INTRAVENOUS
Status: DISCONTINUED | OUTPATIENT
Start: 2019-09-02 | End: 2019-09-03

## 2019-09-02 RX ORDER — TAMSULOSIN HYDROCHLORIDE 0.4 MG/1
0.4 CAPSULE ORAL DAILY
Status: DISCONTINUED | OUTPATIENT
Start: 2019-09-02 | End: 2019-09-06 | Stop reason: HOSPADM

## 2019-09-02 RX ADMIN — METOPROLOL SUCCINATE 25 MG: 25 TABLET, EXTENDED RELEASE ORAL at 10:51

## 2019-09-02 RX ADMIN — I.V. FAT EMULSION 500 ML: 20 EMULSION INTRAVENOUS at 18:57

## 2019-09-02 RX ADMIN — DILTIAZEM HYDROCHLORIDE 120 MG: 120 CAPSULE, COATED, EXTENDED RELEASE ORAL at 10:51

## 2019-09-02 RX ADMIN — MORPHINE SULFATE 2 MG: 4 INJECTION, SOLUTION INTRAMUSCULAR; INTRAVENOUS at 15:23

## 2019-09-02 RX ADMIN — TIMOLOL MALEATE 1 DROP: 5 SOLUTION OPHTHALMIC at 21:13

## 2019-09-02 RX ADMIN — MORPHINE SULFATE 2 MG: 4 INJECTION, SOLUTION INTRAMUSCULAR; INTRAVENOUS at 21:08

## 2019-09-02 RX ADMIN — METOPROLOL TARTRATE 5 MG: 1 INJECTION, SOLUTION INTRAVENOUS at 00:05

## 2019-09-02 RX ADMIN — SODIUM CHLORIDE, PRESERVATIVE FREE 10 ML: 5 INJECTION INTRAVENOUS at 09:10

## 2019-09-02 RX ADMIN — SODIUM CHLORIDE, PRESERVATIVE FREE 10 ML: 5 INJECTION INTRAVENOUS at 21:08

## 2019-09-02 RX ADMIN — LATANOPROST 1 DROP: 50 SOLUTION/ DROPS OPHTHALMIC at 21:12

## 2019-09-02 RX ADMIN — ENOXAPARIN SODIUM 30 MG: 30 INJECTION SUBCUTANEOUS at 09:09

## 2019-09-02 RX ADMIN — MORPHINE SULFATE 4 MG: 4 INJECTION, SOLUTION INTRAMUSCULAR; INTRAVENOUS at 04:46

## 2019-09-02 RX ADMIN — BRIMONIDINE TARTRATE 1 DROP: 2 SOLUTION/ DROPS OPHTHALMIC at 09:13

## 2019-09-02 RX ADMIN — FUROSEMIDE 20 MG: 10 INJECTION, SOLUTION INTRAVENOUS at 09:09

## 2019-09-02 RX ADMIN — TAMSULOSIN HYDROCHLORIDE 0.4 MG: 0.4 CAPSULE ORAL at 10:51

## 2019-09-02 RX ADMIN — PANTOPRAZOLE SODIUM 40 MG: 40 INJECTION, POWDER, FOR SOLUTION INTRAVENOUS at 06:14

## 2019-09-02 RX ADMIN — ENOXAPARIN SODIUM 30 MG: 30 INJECTION SUBCUTANEOUS at 21:07

## 2019-09-02 RX ADMIN — POTASSIUM CHLORIDE: 2 INJECTION, SOLUTION, CONCENTRATE INTRAVENOUS at 18:57

## 2019-09-02 RX ADMIN — SERTRALINE 50 MG: 50 TABLET, FILM COATED ORAL at 10:51

## 2019-09-02 RX ADMIN — TIMOLOL MALEATE 1 DROP: 5 SOLUTION OPHTHALMIC at 09:13

## 2019-09-02 RX ADMIN — FINASTERIDE 5 MG: 5 TABLET, FILM COATED ORAL at 10:51

## 2019-09-02 RX ADMIN — METOPROLOL TARTRATE 5 MG: 1 INJECTION, SOLUTION INTRAVENOUS at 05:08

## 2019-09-02 RX ADMIN — BRIMONIDINE TARTRATE 1 DROP: 2 SOLUTION/ DROPS OPHTHALMIC at 21:13

## 2019-09-02 ASSESSMENT — PAIN SCALES - GENERAL
PAINLEVEL_OUTOF10: 4
PAINLEVEL_OUTOF10: 0
PAINLEVEL_OUTOF10: 8
PAINLEVEL_OUTOF10: 5
PAINLEVEL_OUTOF10: 7

## 2019-09-02 ASSESSMENT — PAIN DESCRIPTION - LOCATION
LOCATION: ABDOMEN
LOCATION: ABDOMEN

## 2019-09-02 ASSESSMENT — PAIN DESCRIPTION - ORIENTATION
ORIENTATION: MID
ORIENTATION: MID

## 2019-09-02 ASSESSMENT — PAIN DESCRIPTION - PAIN TYPE
TYPE: SURGICAL PAIN
TYPE: SURGICAL PAIN

## 2019-09-02 ASSESSMENT — PAIN DESCRIPTION - DESCRIPTORS
DESCRIPTORS: ACHING;DISCOMFORT
DESCRIPTORS: ACHING;SORE

## 2019-09-02 ASSESSMENT — PAIN - FUNCTIONAL ASSESSMENT
PAIN_FUNCTIONAL_ASSESSMENT: PREVENTS OR INTERFERES SOME ACTIVE ACTIVITIES AND ADLS
PAIN_FUNCTIONAL_ASSESSMENT: PREVENTS OR INTERFERES WITH MANY ACTIVE NOT PASSIVE ACTIVITIES

## 2019-09-02 ASSESSMENT — PAIN DESCRIPTION - ONSET
ONSET: GRADUAL
ONSET: SUDDEN

## 2019-09-02 ASSESSMENT — PAIN DESCRIPTION - FREQUENCY
FREQUENCY: INTERMITTENT
FREQUENCY: INTERMITTENT

## 2019-09-02 ASSESSMENT — PAIN DESCRIPTION - PROGRESSION
CLINICAL_PROGRESSION: RAPIDLY WORSENING
CLINICAL_PROGRESSION: NOT CHANGED

## 2019-09-02 NOTE — PROGRESS NOTES
Patient arrived to the room from ICU. Harvey Postal the RN at the bedside. Skin assessment completed by this RN and Manuel Joseph. Surgical incision to the mid abdomen, wound vac in place. Dressing clean, dry, and intact. All belongings with the patient including upper and lower dentures, glasses, and cane. Oriented patient to the unit and educated on how to used call button. All fall intervention in place, call light within reach.

## 2019-09-02 NOTE — PROGRESS NOTES
Attempted to walk in room. Patient very unsteady standing and unable to take any more than one step. States he feels very weak. Sat in chair at this time. Vs stable . Assessment stable.

## 2019-09-02 NOTE — PROGRESS NOTES
Hospitalist Progress Note      PCP: Erwin Yañez MD    Date of Admission: 2019    Chief Complaint on Admission: abd pain    Pt Seen/Examined and Chart Reviewed. Admitting dx SBO    SUBJECTIVE:   -denies any abdominal pain, no nausea/vomiting. On room air when seen this morning      OBJECTIVE:   Vitals    TEMPERATURE:  Current - Temp: 98.6 °F (37 °C); Max - Temp  Av °F (36.7 °C)  Min: 97.8 °F (36.6 °C)  Max: 98.6 °F (37 °C)  RESPIRATIONS RANGE: Resp  Av.9  Min: 10  Max: 21  PULSE RANGE: Pulse  Av.3  Min: 78  Max: 91  BLOOD PRESSURE RANGE:  Systolic (81SCJ), MRK:798 , Min:98 , RNO:334   ; Diastolic (52COF), YAD:00, Min:59, Max:87    PULSE OXIMETRY RANGE: SpO2  Av.7 %  Min: 91 %  Max: 97 %  24HR INTAKE/OUTPUT:      Intake/Output Summary (Last 24 hours) at 2019 1139  Last data filed at 2019 0600  Gross per 24 hour   Intake 966 ml   Output 1900 ml   Net -934 ml       Exam:    General appearance: Well, no apparent distress, appears stated age and cooperative. HEENT Normal cephalic, atraumatic without obvious deformity. Pupils equal, round, and reactive to light. Extra ocular muscles intact. Conjunctivae/corneas clear. Neck: Supple, No jugular venous distention/bruits. Trachea midline without thyromegaly or adenopathy with full range of motion. Lungs: Clear to ascultation, bilaterally without Rales/Wheezes/Rhonchi with good respiratory effort. Heart: Regular rate and rhythm with Normal S1/S2 without  murmurs, rubs or gallops, point of maximum impulse non-displaced  Abdomen: Soft, non-distended without rigidity or guarding and positive bowel sounds all four quadrants. Stoma pink, flatus  Extremities: No clubbing, cyanosis, or edema bilaterally. Full range of motion without deformity and normal gait intact. Skin: Skin color, texture, turgor normal. No rashes or lesions.   Neurologic: Alert and oriented X 3,  neurovascularly intact with sensory/motor intact upper

## 2019-09-03 ENCOUNTER — CARE COORDINATION (OUTPATIENT)
Dept: CASE MANAGEMENT | Age: 84
End: 2019-09-03

## 2019-09-03 LAB
ANION GAP SERPL CALCULATED.3IONS-SCNC: 10 MMOL/L (ref 4–16)
BUN BLDV-MCNC: 22 MG/DL (ref 6–23)
CALCIUM SERPL-MCNC: 7.9 MG/DL (ref 8.3–10.6)
CHLORIDE BLD-SCNC: 101 MMOL/L (ref 99–110)
CO2: 31 MMOL/L (ref 21–32)
CREAT SERPL-MCNC: 0.9 MG/DL (ref 0.9–1.3)
GFR AFRICAN AMERICAN: >60 ML/MIN/1.73M2
GFR NON-AFRICAN AMERICAN: >60 ML/MIN/1.73M2
GLUCOSE BLD-MCNC: 142 MG/DL (ref 70–99)
LV EF: 50 %
LVEF MODALITY: NORMAL
MAGNESIUM: 1.9 MG/DL (ref 1.8–2.4)
PHOSPHORUS: 1.9 MG/DL (ref 2.5–4.9)
POTASSIUM SERPL-SCNC: 3.4 MMOL/L (ref 3.5–5.1)
SODIUM BLD-SCNC: 142 MMOL/L (ref 135–145)

## 2019-09-03 PROCEDURE — 2580000003 HC RX 258: Performed by: SURGERY

## 2019-09-03 PROCEDURE — C9113 INJ PANTOPRAZOLE SODIUM, VIA: HCPCS | Performed by: SURGERY

## 2019-09-03 PROCEDURE — 93306 TTE W/DOPPLER COMPLETE: CPT

## 2019-09-03 PROCEDURE — 80048 BASIC METABOLIC PNL TOTAL CA: CPT

## 2019-09-03 PROCEDURE — 83735 ASSAY OF MAGNESIUM: CPT

## 2019-09-03 PROCEDURE — 6370000000 HC RX 637 (ALT 250 FOR IP): Performed by: SURGERY

## 2019-09-03 PROCEDURE — 6360000002 HC RX W HCPCS: Performed by: SURGERY

## 2019-09-03 PROCEDURE — 97116 GAIT TRAINING THERAPY: CPT

## 2019-09-03 PROCEDURE — 97163 PT EVAL HIGH COMPLEX 45 MIN: CPT

## 2019-09-03 PROCEDURE — 6360000002 HC RX W HCPCS

## 2019-09-03 PROCEDURE — 97530 THERAPEUTIC ACTIVITIES: CPT

## 2019-09-03 PROCEDURE — 2500000003 HC RX 250 WO HCPCS: Performed by: SURGERY

## 2019-09-03 PROCEDURE — 2060000000 HC ICU INTERMEDIATE R&B

## 2019-09-03 PROCEDURE — 84100 ASSAY OF PHOSPHORUS: CPT

## 2019-09-03 RX ORDER — DIPHENHYDRAMINE HYDROCHLORIDE 50 MG/ML
INJECTION INTRAMUSCULAR; INTRAVENOUS
Status: COMPLETED
Start: 2019-09-03 | End: 2019-09-03

## 2019-09-03 RX ORDER — MORPHINE SULFATE 4 MG/ML
2 INJECTION, SOLUTION INTRAMUSCULAR; INTRAVENOUS
Status: DISCONTINUED | OUTPATIENT
Start: 2019-09-03 | End: 2019-09-06 | Stop reason: HOSPADM

## 2019-09-03 RX ORDER — DIPHENHYDRAMINE HYDROCHLORIDE 50 MG/ML
12.5 INJECTION INTRAMUSCULAR; INTRAVENOUS ONCE
Status: COMPLETED | OUTPATIENT
Start: 2019-09-03 | End: 2019-09-03

## 2019-09-03 RX ORDER — TRAMADOL HYDROCHLORIDE 50 MG/1
25 TABLET ORAL EVERY 6 HOURS PRN
Status: DISCONTINUED | OUTPATIENT
Start: 2019-09-03 | End: 2019-09-06 | Stop reason: HOSPADM

## 2019-09-03 RX ORDER — MAGNESIUM SULFATE IN WATER 40 MG/ML
2 INJECTION, SOLUTION INTRAVENOUS ONCE
Status: COMPLETED | OUTPATIENT
Start: 2019-09-03 | End: 2019-09-03

## 2019-09-03 RX ADMIN — LATANOPROST 1 DROP: 50 SOLUTION/ DROPS OPHTHALMIC at 20:22

## 2019-09-03 RX ADMIN — DILTIAZEM HYDROCHLORIDE 120 MG: 120 CAPSULE, COATED, EXTENDED RELEASE ORAL at 10:09

## 2019-09-03 RX ADMIN — POTASSIUM PHOSPHATE, MONOBASIC AND POTASSIUM PHOSPHATE, DIBASIC 15 MMOL: 224; 236 INJECTION, SOLUTION INTRAVENOUS at 12:10

## 2019-09-03 RX ADMIN — DIPHENHYDRAMINE HYDROCHLORIDE 12.5 MG: 50 INJECTION INTRAMUSCULAR; INTRAVENOUS at 02:17

## 2019-09-03 RX ADMIN — MORPHINE SULFATE 2 MG: 4 INJECTION, SOLUTION INTRAMUSCULAR; INTRAVENOUS at 04:45

## 2019-09-03 RX ADMIN — TRAMADOL HYDROCHLORIDE 25 MG: 50 TABLET, FILM COATED ORAL at 14:31

## 2019-09-03 RX ADMIN — BRIMONIDINE TARTRATE 1 DROP: 2 SOLUTION/ DROPS OPHTHALMIC at 20:29

## 2019-09-03 RX ADMIN — SODIUM CHLORIDE, PRESERVATIVE FREE 10 ML: 5 INJECTION INTRAVENOUS at 08:28

## 2019-09-03 RX ADMIN — ENOXAPARIN SODIUM 30 MG: 30 INJECTION SUBCUTANEOUS at 10:20

## 2019-09-03 RX ADMIN — METOPROLOL SUCCINATE 25 MG: 25 TABLET, EXTENDED RELEASE ORAL at 10:09

## 2019-09-03 RX ADMIN — ENOXAPARIN SODIUM 30 MG: 30 INJECTION SUBCUTANEOUS at 20:28

## 2019-09-03 RX ADMIN — FINASTERIDE 5 MG: 5 TABLET, FILM COATED ORAL at 10:09

## 2019-09-03 RX ADMIN — SODIUM CHLORIDE, PRESERVATIVE FREE 10 ML: 5 INJECTION INTRAVENOUS at 08:26

## 2019-09-03 RX ADMIN — SERTRALINE 50 MG: 50 TABLET, FILM COATED ORAL at 10:09

## 2019-09-03 RX ADMIN — Medication 10 ML: at 04:44

## 2019-09-03 RX ADMIN — TAMSULOSIN HYDROCHLORIDE 0.4 MG: 0.4 CAPSULE ORAL at 10:09

## 2019-09-03 RX ADMIN — SODIUM CHLORIDE, PRESERVATIVE FREE 10 ML: 5 INJECTION INTRAVENOUS at 20:29

## 2019-09-03 RX ADMIN — POTASSIUM CHLORIDE: 2 INJECTION, SOLUTION, CONCENTRATE INTRAVENOUS at 18:10

## 2019-09-03 RX ADMIN — BRIMONIDINE TARTRATE 1 DROP: 2 SOLUTION/ DROPS OPHTHALMIC at 10:13

## 2019-09-03 RX ADMIN — TIMOLOL MALEATE 1 DROP: 5 SOLUTION OPHTHALMIC at 10:13

## 2019-09-03 RX ADMIN — PANTOPRAZOLE SODIUM 40 MG: 40 INJECTION, POWDER, FOR SOLUTION INTRAVENOUS at 08:26

## 2019-09-03 RX ADMIN — MAGNESIUM SULFATE HEPTAHYDRATE 2 G: 40 INJECTION, SOLUTION INTRAVENOUS at 14:18

## 2019-09-03 RX ADMIN — TIMOLOL MALEATE 1 DROP: 5 SOLUTION OPHTHALMIC at 20:28

## 2019-09-03 ASSESSMENT — PAIN SCALES - GENERAL
PAINLEVEL_OUTOF10: 5
PAINLEVEL_OUTOF10: 4
PAINLEVEL_OUTOF10: 5
PAINLEVEL_OUTOF10: 4
PAINLEVEL_OUTOF10: 0
PAINLEVEL_OUTOF10: 7
PAINLEVEL_OUTOF10: 0

## 2019-09-03 ASSESSMENT — PAIN DESCRIPTION - PROGRESSION: CLINICAL_PROGRESSION: RAPIDLY WORSENING

## 2019-09-03 ASSESSMENT — PAIN DESCRIPTION - ONSET: ONSET: SUDDEN

## 2019-09-03 ASSESSMENT — PAIN DESCRIPTION - PAIN TYPE
TYPE: SURGICAL PAIN

## 2019-09-03 ASSESSMENT — PAIN DESCRIPTION - ORIENTATION
ORIENTATION: MID
ORIENTATION: MID

## 2019-09-03 ASSESSMENT — PAIN DESCRIPTION - LOCATION
LOCATION: ABDOMEN

## 2019-09-03 ASSESSMENT — PAIN DESCRIPTION - DESCRIPTORS
DESCRIPTORS: ACHING;DISCOMFORT
DESCRIPTORS: ACHING;DISCOMFORT

## 2019-09-03 ASSESSMENT — PAIN - FUNCTIONAL ASSESSMENT: PAIN_FUNCTIONAL_ASSESSMENT: PREVENTS OR INTERFERES SOME ACTIVE ACTIVITIES AND ADLS

## 2019-09-03 ASSESSMENT — PAIN DESCRIPTION - FREQUENCY: FREQUENCY: INTERMITTENT

## 2019-09-03 NOTE — PROGRESS NOTES
walker, Alert Button  ADL Assistance: Independent  Homemaking Assistance: Needs assistance(children help with cleaning, MOWs, pt will complete his own laundry)  Homemaking Responsibilities: Yes  Ambulation Assistance: Independent(uses cane PRN \"When I think of it\" )  Transfer Assistance: Independent  Active : Yes  Occupation: Retired  Type of occupation: Grocery   Leisure & Hobbies: TV   Additional Comments: Children stop in 2-3x per week to check in. No report of recent falls     Examination of body systems (includes body structures/functions, activity/participation limitations):  · Observation:  Supine in bed upon arrival   · Vision:  Glasses for reading   · Hearing:  [place]   · Cardiopulmonary:  2L O2 98% upon arrival, remained >90% at rest on room air with drop to low 80s with activity. · Orientation: [facility]     Musculoskeletal  · ROM R/L:  WFL BLEs    · Strength R/L:  BLEs 5/5, Good in function and endurance. Mobility/treatment:  · Rolling L/R:  Salty to R, facilitation at shoulder   · Supine to sit:  modA needing inc assist with trunk facilitation. Inc time and effort. Cues for sequencing and use of UEs   · Transfers:   · Sit to stand: from EOB CGA   · Stand to sit: to recliner Salty for eccentric control. · Step pivot: CGA with RW   · Sitting balance:  SBA from EOB static and light dynamic     · Standing balance:  CGA at RW   · Gait: 100ft with RW CGA. Dec pace, dec step length, good foot clearance. No major LOB. Cues for body positioning on turns. · Educated pt on POC, role of PT, DME, discharge recommendations, log roll, abdominal protection. VCs for sequencing, posture, weight shift, balance, UE/LE placement to inc safety and independence with mobility. [facility] 6 Clicks Inpatient Mobility:  AM-PAC Inpatient Mobility Raw Score : 17    Safety: patient left in chair, call light within reach, RN notified, gait belt used. Assessment:   Body structures, Functions, Activity limitations: Decreased

## 2019-09-03 NOTE — PROGRESS NOTES
Patient is doing well  He's tired, lying in bed currently    PE:    Vitals:    09/03/19 0600 09/03/19 0802 09/03/19 1009 09/03/19 1102   BP: 116/69 (!) 143/95 131/68 129/80   Pulse: 80 79 81 81   Resp: 17 17 17   Temp:  98.6 °F (37 °C)  97.8 °F (36.6 °C)   TempSrc:  Oral  Oral   SpO2: 100% 100% 93%    Weight:       Height:         Abd: stoma viable, stool in appliance, left sided dressing removed, healing well, midline VAC in place    A/P:  -replace phos and potassium  -TPN today  -advance to regular diet  -increase activity  -clifford out in am  -resume eliquis tomorrow and stop lovenox tonight  -evaluate for SNF

## 2019-09-03 NOTE — PROGRESS NOTES
Hospitalist Progress Note      PCP: Hyla Alpers, MD    Date of Admission: 2019    Chief Complaint on Admission: abd pain    Pt Seen/Examined and Chart Reviewed. Admitting dx SBO    SUBJECTIVE:   -reports some abdominal discomfort, no nausea. No shortness of breath/chest pain    OBJECTIVE:   Vitals    TEMPERATURE:  Current - Temp: 97.8 °F (36.6 °C); Max - Temp  Av.4 °F (36.9 °C)  Min: 97.8 °F (36.6 °C)  Max: 99 °F (37.2 °C)  RESPIRATIONS RANGE: Resp  Av.3  Min: 9  Max: 24  PULSE RANGE: Pulse  Av  Min: 77  Max: 101  BLOOD PRESSURE RANGE:  Systolic (67XTW), DQE:385 , Min:104 , GNQ:052   ; Diastolic (10XKR), ACJ:44, Min:47, Max:95    PULSE OXIMETRY RANGE: SpO2  Av.9 %  Min: 92 %  Max: 100 %  24HR INTAKE/OUTPUT:      Intake/Output Summary (Last 24 hours) at 9/3/2019 1200  Last data filed at 9/3/2019 1009  Gross per 24 hour   Intake 1540 ml   Output 2500 ml   Net -960 ml       Exam:    General appearance: Well, no apparent distress, appears stated age and cooperative. HEENT Normal cephalic, atraumatic without obvious deformity. Pupils equal, round, and reactive to light. Extra ocular muscles intact. Conjunctivae/corneas clear. Neck: Supple, No jugular venous distention/bruits. Trachea midline without thyromegaly or adenopathy with full range of motion. Lungs: Clear to ascultation, bilaterally without Rales/Wheezes/Rhonchi with good respiratory effort. Heart: Regular rate and rhythm with Normal S1/S2 without  murmurs, rubs or gallops, point of maximum impulse non-displaced  Abdomen: Soft, non-distended without rigidity or guarding and positive bowel sounds all four quadrants. Stoma pink, flatus  Extremities: No clubbing, cyanosis, or edema bilaterally. Full range of motion without deformity and normal gait intact. Skin: Skin color, texture, turgor normal. No rashes or lesions.   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 gen surg managing, TPN, oral diet to be advanced per surgery. 2. Acute resp failure-- due to #3-improved, wean oxygen as tolerated  3. [facility] on chronic LV [facility] 35%, IV lasix prn    4. Acute postop blood loss anemia--no ongoing bleeding clinically, h/h stable, check iron studies  -no gross bleeding. Monitor. 5. HTN  Stable. Holding home meds due to NPO status; will need to restart once tolerating PO- ACEi, HCTZ, BB, CCB, IV beta blocker  6.  Afib w/ pacemaker  IV metoprolol  -resume AC when ok with surgery- on lovenox for DVT prophylaxis      PT/OT Eval Status: ordered    DVT Prophylaxis: lovenox, holding home eliquis  Diet: PN-Adult Premix 4.25/10 - Peripheral Line  DIET FULL LIQUID;  PN-Adult Premix 4.25/10 - Peripheral Line  Code Status: Full Code      Dispo - step down    Rola Ortiz MD

## 2019-09-03 NOTE — OP NOTE
sewn closed with a 3-0 silk suture in a  running locking stitch. Of note, the patient's parastomal hernia was  quite large and very firm. His abdomen was then prepped and draped in a  sterile fashion. A midline incision was made through his previous scar  site. He had multiple incisional hernias along the length of his  midline incision and the hernia sacs were excised upon entering the  abdomen. Once I entered the abdomen, there was moderate amount of  ascites that was bloody in coloration. The defect of the abdominal wall  at the parastomal hernia was not variable. It appears to have been  repaired in the past with either biological mesh that has become rock  hard and was around the stoma. There was a very small opening where the  small bowel had come through. I gently decompressed most of the small  bowel out of the hernia sac; however, _____ mesh and repair of the  parastomal hernia in the past and I sent this as specimen and labeled  this foreign body. Once this was done, I was better able to _____ the  entire small bowel from the hernia sac. I was able to _____ most of the  hernia sac and deliver the small bowel. The small bowel was viable. I  then ran the small bowel distally down to the cecum and found some  adhesions in the pelvis that I did lyse as he did appear to have some  chronic obstruction in the pelvis and then I ran the bowel proximally at  the ligament of Treitz that was markedly distended, dusky in coloration  and had dense adhesions in the left upper quadrant. These were  mobilized sharply. Upon reducing the bowel, it is still markedly  distended. There was a small segment that was questionable if it  remained viable. I did run the bowel again proximally to distally. I  did continue to lyse the adhesions in doing so and completely resolve  the bowel obstruction.   Re-inspection of the proximal jejunum, there was  an area of small intestine that had some serosal injury and did

## 2019-09-04 LAB
ANION GAP SERPL CALCULATED.3IONS-SCNC: 8 MMOL/L (ref 4–16)
BUN BLDV-MCNC: 19 MG/DL (ref 6–23)
CALCIUM SERPL-MCNC: 7.7 MG/DL (ref 8.3–10.6)
CHLORIDE BLD-SCNC: 101 MMOL/L (ref 99–110)
CO2: 30 MMOL/L (ref 21–32)
CREAT SERPL-MCNC: 0.8 MG/DL (ref 0.9–1.3)
GFR AFRICAN AMERICAN: >60 ML/MIN/1.73M2
GFR NON-AFRICAN AMERICAN: >60 ML/MIN/1.73M2
GLUCOSE BLD-MCNC: 136 MG/DL (ref 70–99)
MAGNESIUM: 2.1 MG/DL (ref 1.8–2.4)
PHOSPHORUS: 2.7 MG/DL (ref 2.5–4.9)
POTASSIUM SERPL-SCNC: 3.7 MMOL/L (ref 3.5–5.1)
SODIUM BLD-SCNC: 139 MMOL/L (ref 135–145)

## 2019-09-04 PROCEDURE — 2060000000 HC ICU INTERMEDIATE R&B

## 2019-09-04 PROCEDURE — 6370000000 HC RX 637 (ALT 250 FOR IP): Performed by: SURGERY

## 2019-09-04 PROCEDURE — C9113 INJ PANTOPRAZOLE SODIUM, VIA: HCPCS | Performed by: SURGERY

## 2019-09-04 PROCEDURE — 2700000000 HC OXYGEN THERAPY PER DAY

## 2019-09-04 PROCEDURE — 36592 COLLECT BLOOD FROM PICC: CPT

## 2019-09-04 PROCEDURE — 97530 THERAPEUTIC ACTIVITIES: CPT

## 2019-09-04 PROCEDURE — 80048 BASIC METABOLIC PNL TOTAL CA: CPT

## 2019-09-04 PROCEDURE — 6360000002 HC RX W HCPCS: Performed by: SURGERY

## 2019-09-04 PROCEDURE — 94150 VITAL CAPACITY TEST: CPT

## 2019-09-04 PROCEDURE — 2580000003 HC RX 258: Performed by: SURGERY

## 2019-09-04 PROCEDURE — 94761 N-INVAS EAR/PLS OXIMETRY MLT: CPT

## 2019-09-04 PROCEDURE — 2500000003 HC RX 250 WO HCPCS: Performed by: SURGERY

## 2019-09-04 PROCEDURE — 83735 ASSAY OF MAGNESIUM: CPT

## 2019-09-04 PROCEDURE — 97166 OT EVAL MOD COMPLEX 45 MIN: CPT

## 2019-09-04 PROCEDURE — 84100 ASSAY OF PHOSPHORUS: CPT

## 2019-09-04 PROCEDURE — 97116 GAIT TRAINING THERAPY: CPT

## 2019-09-04 PROCEDURE — 97535 SELF CARE MNGMENT TRAINING: CPT

## 2019-09-04 RX ADMIN — Medication 10 ML: at 06:29

## 2019-09-04 RX ADMIN — SODIUM CHLORIDE, PRESERVATIVE FREE 10 ML: 5 INJECTION INTRAVENOUS at 09:15

## 2019-09-04 RX ADMIN — SODIUM CHLORIDE, PRESERVATIVE FREE 10 ML: 5 INJECTION INTRAVENOUS at 20:22

## 2019-09-04 RX ADMIN — TAMSULOSIN HYDROCHLORIDE 0.4 MG: 0.4 CAPSULE ORAL at 09:14

## 2019-09-04 RX ADMIN — PANTOPRAZOLE SODIUM 40 MG: 40 INJECTION, POWDER, FOR SOLUTION INTRAVENOUS at 06:29

## 2019-09-04 RX ADMIN — LATANOPROST 1 DROP: 50 SOLUTION/ DROPS OPHTHALMIC at 20:42

## 2019-09-04 RX ADMIN — TIMOLOL MALEATE 1 DROP: 5 SOLUTION OPHTHALMIC at 20:42

## 2019-09-04 RX ADMIN — POTASSIUM CHLORIDE: 2 INJECTION, SOLUTION, CONCENTRATE INTRAVENOUS at 18:11

## 2019-09-04 RX ADMIN — SERTRALINE 50 MG: 50 TABLET, FILM COATED ORAL at 09:14

## 2019-09-04 RX ADMIN — FINASTERIDE 5 MG: 5 TABLET, FILM COATED ORAL at 09:14

## 2019-09-04 RX ADMIN — TIMOLOL MALEATE 1 DROP: 5 SOLUTION OPHTHALMIC at 09:17

## 2019-09-04 RX ADMIN — APIXABAN 2.5 MG: 2.5 TABLET, FILM COATED ORAL at 09:14

## 2019-09-04 RX ADMIN — BRIMONIDINE TARTRATE 1 DROP: 2 SOLUTION/ DROPS OPHTHALMIC at 09:16

## 2019-09-04 RX ADMIN — BRIMONIDINE TARTRATE 1 DROP: 2 SOLUTION/ DROPS OPHTHALMIC at 20:42

## 2019-09-04 RX ADMIN — METOPROLOL SUCCINATE 25 MG: 25 TABLET, EXTENDED RELEASE ORAL at 09:14

## 2019-09-04 RX ADMIN — APIXABAN 2.5 MG: 2.5 TABLET, FILM COATED ORAL at 20:41

## 2019-09-04 RX ADMIN — DILTIAZEM HYDROCHLORIDE 120 MG: 120 CAPSULE, COATED, EXTENDED RELEASE ORAL at 09:14

## 2019-09-04 ASSESSMENT — PAIN DESCRIPTION - LOCATION: LOCATION: ABDOMEN

## 2019-09-04 ASSESSMENT — PAIN DESCRIPTION - PAIN TYPE: TYPE: SURGICAL PAIN

## 2019-09-04 ASSESSMENT — PAIN DESCRIPTION - DESCRIPTORS: DESCRIPTORS: ACHING;DISCOMFORT

## 2019-09-04 ASSESSMENT — PAIN DESCRIPTION - ORIENTATION: ORIENTATION: MID

## 2019-09-04 ASSESSMENT — PAIN SCALES - GENERAL
PAINLEVEL_OUTOF10: 3
PAINLEVEL_OUTOF10: 0

## 2019-09-04 NOTE — PLAN OF CARE
Nutrition Problem: Inadequate oral intake  Intervention: Food and/or Nutrient Delivery: Continue current diet, Start ONS, Discontinue Parenteral Nutrition  Nutritional Goals: pt will consume greater than 75% of his meals and supplements

## 2019-09-04 NOTE — PROGRESS NOTES
groups BL UEs  · Sensation: WFL (denies numbness/tingling)  · Tone: Normal  · Coordination: WFL  · Perception: WNL    Activities of Daily Living (ADLs):  · Feeding: Independent  · Grooming: CGA (completed facial hygiene and oral hygiene task of rinsing in standing at sink; min cues for safe RW placement at sink)  · UB bathing: SBA   · LB bathing: Mod A (reaching distal LEs/bottom)  · UB dressing: CGA (light dynamic sitting balance with donning robe EOB)  · LB dressing: Max A (dependent with donning BL socks; able to participate in mgmt of clothing to hips)  · Toileting: Dependent (Pak and Colostomy at this time)    Cognitive and Psychosocial Functioning:  · Overall cognitive status: WFL (mildly decreased insight/safety awareness)  · Affect: Normal     Balance:   · Sitting: SBA static sitting, CGA light dynamic sitting EOB  · Standing: CGA with RW and with ADLs at sink    Functional Mobility:  · Bed Mobility: Min A rolling to Rt side (min cues for bending Lt knee/reaching for bed rail), Min A supine to sitting EOB via log roll technique  · Transfers: CGA to and from bed and chair (min cues for safe UE/LE placement)  · Ambulation: CGA with RW to/from bathroom for sink-level ADLs (See PT note for full gait assessment)      AM-PAC 6 click short form for inpatient daily activity:   How much help from another person does the patient currently need. .. Unable  Dep A Lot  Max A A Lot   Mod A A Little  Min A A Little   CGA  SBA None   Mod I  Indep  Sup   1. Putting on and taking off regular lower body clothing? [] 1    [x] 2   [] 2   [] 3   [] 3   [] 4      2. Bathing (including washing, rinsing, drying)? [] 1   [] 2   [x] 2 [] 3 [] 3 [] 4   3. Toileting, which includes using toilet, bedpan, or urinal? [x] 1    [] 2   [] 2   [] 3   [] 3   [] 4     4. Putting on and taking off regular upper body clothing? [] 1   [] 2   [] 2   [] 3   [x] 3    [] 4      5. Taking care of personal grooming such as brushing teeth?  [] 1   [] 2

## 2019-09-05 LAB
HCT VFR BLD CALC: 22.6 % (ref 42–52)
HCT VFR BLD CALC: 27.4 % (ref 42–52)
HEMOGLOBIN: 7.1 GM/DL (ref 13.5–18)
HEMOGLOBIN: 8.5 GM/DL (ref 13.5–18)
MCH RBC QN AUTO: 33.2 PG (ref 27–31)
MCHC RBC AUTO-ENTMCNC: 31.4 % (ref 32–36)
MCV RBC AUTO: 105.6 FL (ref 78–100)
PDW BLD-RTO: 13.4 % (ref 11.7–14.9)
PLATELET # BLD: 224 K/CU MM (ref 140–440)
PMV BLD AUTO: 9.7 FL (ref 7.5–11.1)
RBC # BLD: 2.14 M/CU MM (ref 4.6–6.2)
WBC # BLD: 5.9 K/CU MM (ref 4–10.5)

## 2019-09-05 PROCEDURE — 85027 COMPLETE CBC AUTOMATED: CPT

## 2019-09-05 PROCEDURE — 85018 HEMOGLOBIN: CPT

## 2019-09-05 PROCEDURE — 2580000003 HC RX 258: Performed by: SURGERY

## 2019-09-05 PROCEDURE — 86850 RBC ANTIBODY SCREEN: CPT

## 2019-09-05 PROCEDURE — 36430 TRANSFUSION BLD/BLD COMPNT: CPT

## 2019-09-05 PROCEDURE — 86900 BLOOD TYPING SEROLOGIC ABO: CPT

## 2019-09-05 PROCEDURE — 86901 BLOOD TYPING SEROLOGIC RH(D): CPT

## 2019-09-05 PROCEDURE — 6360000002 HC RX W HCPCS: Performed by: SURGERY

## 2019-09-05 PROCEDURE — P9016 RBC LEUKOCYTES REDUCED: HCPCS

## 2019-09-05 PROCEDURE — 2060000000 HC ICU INTERMEDIATE R&B

## 2019-09-05 PROCEDURE — 6370000000 HC RX 637 (ALT 250 FOR IP): Performed by: SURGERY

## 2019-09-05 PROCEDURE — C9113 INJ PANTOPRAZOLE SODIUM, VIA: HCPCS | Performed by: SURGERY

## 2019-09-05 PROCEDURE — 85014 HEMATOCRIT: CPT

## 2019-09-05 PROCEDURE — 86922 COMPATIBILITY TEST ANTIGLOB: CPT

## 2019-09-05 RX ORDER — 0.9 % SODIUM CHLORIDE 0.9 %
250 INTRAVENOUS SOLUTION INTRAVENOUS ONCE
Status: COMPLETED | OUTPATIENT
Start: 2019-09-05 | End: 2019-09-05

## 2019-09-05 RX ADMIN — SODIUM CHLORIDE 250 ML: 9 INJECTION, SOLUTION INTRAVENOUS at 12:59

## 2019-09-05 RX ADMIN — LATANOPROST 1 DROP: 50 SOLUTION/ DROPS OPHTHALMIC at 20:47

## 2019-09-05 RX ADMIN — METOPROLOL SUCCINATE 25 MG: 25 TABLET, EXTENDED RELEASE ORAL at 08:12

## 2019-09-05 RX ADMIN — SODIUM CHLORIDE, PRESERVATIVE FREE 10 ML: 5 INJECTION INTRAVENOUS at 20:47

## 2019-09-05 RX ADMIN — DILTIAZEM HYDROCHLORIDE 120 MG: 120 CAPSULE, COATED, EXTENDED RELEASE ORAL at 08:12

## 2019-09-05 RX ADMIN — SODIUM CHLORIDE, PRESERVATIVE FREE 10 ML: 5 INJECTION INTRAVENOUS at 08:12

## 2019-09-05 RX ADMIN — SERTRALINE 50 MG: 50 TABLET, FILM COATED ORAL at 08:12

## 2019-09-05 RX ADMIN — FINASTERIDE 5 MG: 5 TABLET, FILM COATED ORAL at 08:12

## 2019-09-05 RX ADMIN — BRIMONIDINE TARTRATE 1 DROP: 2 SOLUTION/ DROPS OPHTHALMIC at 20:47

## 2019-09-05 RX ADMIN — BRIMONIDINE TARTRATE 1 DROP: 2 SOLUTION/ DROPS OPHTHALMIC at 08:18

## 2019-09-05 RX ADMIN — TRAMADOL HYDROCHLORIDE 25 MG: 50 TABLET, FILM COATED ORAL at 20:54

## 2019-09-05 RX ADMIN — TAMSULOSIN HYDROCHLORIDE 0.4 MG: 0.4 CAPSULE ORAL at 08:12

## 2019-09-05 RX ADMIN — TIMOLOL MALEATE 1 DROP: 5 SOLUTION OPHTHALMIC at 20:47

## 2019-09-05 RX ADMIN — PANTOPRAZOLE SODIUM 40 MG: 40 INJECTION, POWDER, FOR SOLUTION INTRAVENOUS at 06:27

## 2019-09-05 RX ADMIN — TIMOLOL MALEATE 1 DROP: 5 SOLUTION OPHTHALMIC at 08:18

## 2019-09-05 ASSESSMENT — PAIN SCALES - GENERAL
PAINLEVEL_OUTOF10: 2
PAINLEVEL_OUTOF10: 4

## 2019-09-05 ASSESSMENT — PAIN DESCRIPTION - PROGRESSION: CLINICAL_PROGRESSION: GRADUALLY WORSENING

## 2019-09-05 ASSESSMENT — PAIN DESCRIPTION - LOCATION: LOCATION: KNEE

## 2019-09-05 ASSESSMENT — PAIN DESCRIPTION - DESCRIPTORS: DESCRIPTORS: DISCOMFORT

## 2019-09-05 ASSESSMENT — PAIN DESCRIPTION - FREQUENCY: FREQUENCY: INTERMITTENT

## 2019-09-05 ASSESSMENT — PAIN DESCRIPTION - ONSET: ONSET: GRADUAL

## 2019-09-05 ASSESSMENT — PAIN DESCRIPTION - ORIENTATION: ORIENTATION: RIGHT

## 2019-09-05 ASSESSMENT — PAIN DESCRIPTION - PAIN TYPE: TYPE: ACUTE PAIN

## 2019-09-05 NOTE — PROGRESS NOTES
extremities/lower extremities, bilaterally. Cranial nerves:II-XII intact, grossly non-focal.  Mental status: Alert, oriented, thought content appropriate. Data    Recent Labs     09/05/19  0629   WBC 5.9   HGB 7.1*   HCT 22.6*         Recent Labs     09/03/19  0400 09/04/19  0510    139   K 3.4* 3.7    101   CO2 31 30   PHOS 1.9* 2.7   BUN 22 19   CREATININE 0.9 0.8*     No results for input(s): AST, ALT, ALB, BILIDIR, BILITOT, ALKPHOS in the last 72 hours. No results for input(s): INR in the last 72 hours. No results for input(s): CKTOTAL, CKMB, CKMBINDEX, TROPONINI in the last 72 hours. Imaging/Test Results    Albumin 2.6  BNP elevated  Hb 8.2  CXR with pulm edema, left pleural effusion  Echo reviewed 12/17 EF 35%        Consults:     IP CONSULT TO GENERAL SURGERY  IP CONSULT TO HOSPITALIST  IP CONSULT TO INTERVENTIONAL RADIOLOGY  IP CONSULT TO CASE MANAGEMENT  IP CONSULT TO GI    Allergies  Codeine and Fentanyl    Medications      Scheduled Meds:   sodium chloride  250 mL Intravenous Once    finasteride  5 mg Oral Daily    tamsulosin  0.4 mg Oral Daily    sertraline  50 mg Oral Daily    diltiazem  120 mg Oral Daily    metoprolol succinate  25 mg Oral Daily    pantoprazole  40 mg Intravenous Daily    sodium chloride flush  10 mL Intravenous 2 times per day    sodium chloride flush  10 mL Intravenous 2 times per day    latanoprost  1 drop Both Eyes Nightly    brimonidine  1 drop Both Eyes BID    And    timolol  1 drop Both Eyes BID       Infusions:      PRN Meds:  morphine, traMADol, ondansetron, sodium chloride flush, sodium chloride flush    ASSESSMENT AND PLAN      1. 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 gen surg managing, off TPN, oral diet to be advanced per surgery.   2. Acute resp failure-- due to

## 2019-09-05 NOTE — CONSULTS
[address], [address] [facility]                                  CONSULTATION    PATIENT NAME: Marichuy Chin                  :        1930  MED REC NO:   [id number]                          ROOM:       2030  ACCOUNT NO:   [de-identified]                           ADMIT DATE: 2019  PROVIDER:     Nohelia Lucero MD    CONSULT DATE:  2019    PRIMARY PROVIDER:  Unruly Mccord MD    CHIEF COMPLAINT:  History of anemia with melenic stools in the colostomy  bag; rule out GI bleeding. HISTORY OF PRESENT ILLNESS:  The patient is an 35-year-old white  gentleman who was being followed up by Dr. Irene Bryant from  Gastroenterology in the past with past medical history significant for  ulcerative proctosigmoiditis/IBD, paroxysmal atrial fibrillation on  Eliquis, coronary artery disease with the pacemaker in place, history of  colon polyps, abdominal aortic aneurysm, and osteoarthritis who was  admitted to the hospital on 2019 with abdominal pain and  distention and was noted to have small bowel obstruction. The patient  was seen by Dr. Wilma Voss on 2019 and the patient was noted to have  clinical picture consistent with small bowel obstruction at the  parastomal hernial site. NG tube was inserted and the patient underwent  surgery on 2019 and the patient had parastomal hernia with  incarcerated small bowel and obstruction along with incisional hernia  and multiple intra-abdominal adhesions were noted as well and the  patient had lysis of adhesions, small bowel resection, removal of  foreign body, new colostomy was made on the right side and incisional  hernia repair was done with mobilization of the splenic flexure. The  patient did well postoperatively, but for the past couple of days the  patient has started passing some blackish stools per colostomy bag.   The  patient's hemoglobin upon admission was 13.4

## 2019-09-05 NOTE — CARE COORDINATION
Met with patient for continued discharge planning. Patient is awake and able to participate. Patient stated that he is concerned that he will not be able to go home after hospitalization. He voiced that he may need SNF short term rehab. Reinforced that CM will follow for possible SNF needs.  Mary Flores RN
Spoke with Lakisha from [facility].  Patient approved for admission when medically stable and ready for discharge
crisis. :  Independent  Ability handle personal hygiene needs (bathing/dressing/grooming): Independent  Ability to manage medications: Independent  Ability to prepare food:  Independent  Ability to maintain home (clean home, laundry):  Needs Assistance  Ability to drive and/or has transportation:  Independent  Ability to do shopping:  Needs Assistance  Ability to manage finances: Independent  Is patient able to live independently?:  Yes  Hearing and Vision  Visual Impairment:  Reading glasses  Hearing Impairment:  None  Care Transitions Interventions   Home Care Waiver:  Completed      Medication Assistance Program:  Completed            Follow Up: Follow up visit made. Patient reports that he feels \"ok\". Reports  Increased weakness today. Per chart; decreased H&H noted. D/c on hold. No change in discharge plan. Patient denies any questions. Agreeable to continued Care Transition. CTN contact information confirmed. Encouraged call back if needs arise. Future Appointments   Date Time Provider Arthur Estrada   9/27/2019  4:00 PM Miguel Artis MD [facility]   12/27/2019  1:30 PM Jignesh Campuzano MD [facility]  There are no preventive care reminders to display for this patient.     Kofi Meyer RN
is considering surgery. Confirmed CTN contact information. Encouraged call back if needs arise. Future Appointments   Date Time Provider Arthur Estrada   8/29/2019 10:15 AM [facility] PORTABLE 2 SRMZ RAD [facility] Radiolo   9/27/2019  4:00 PM Benji Mayes MD [facility]   12/27/2019  1:30 PM [place]ahid Yun [place] [facility]  There are no preventive care reminders to display for this patient.     Sheila Rayo RN

## 2019-09-06 ENCOUNTER — APPOINTMENT (OUTPATIENT)
Dept: CT IMAGING | Age: 84
DRG: 329 | End: 2019-09-06
Payer: COMMERCIAL

## 2019-09-06 VITALS
HEIGHT: 68 IN | TEMPERATURE: 98.6 F | SYSTOLIC BLOOD PRESSURE: 143 MMHG | RESPIRATION RATE: 20 BRPM | OXYGEN SATURATION: 95 % | WEIGHT: 167.55 LBS | DIASTOLIC BLOOD PRESSURE: 79 MMHG | BODY MASS INDEX: 25.39 KG/M2 | HEART RATE: 81 BPM

## 2019-09-06 LAB
ABO/RH: NORMAL
ALBUMIN SERPL-MCNC: 2.6 GM/DL (ref 3.4–5)
ALP BLD-CCNC: 105 IU/L (ref 40–128)
ALT SERPL-CCNC: 12 U/L (ref 10–40)
AMYLASE: 46 U/L (ref 25–115)
ANION GAP SERPL CALCULATED.3IONS-SCNC: 8 MMOL/L (ref 4–16)
ANTIBODY SCREEN: NEGATIVE
APTT: 32.2 SECONDS (ref 21.2–33)
AST SERPL-CCNC: 23 IU/L (ref 15–37)
BASOPHILS ABSOLUTE: 0 K/CU MM
BASOPHILS RELATIVE PERCENT: 0.6 % (ref 0–1)
BILIRUB SERPL-MCNC: 0.5 MG/DL (ref 0–1)
BUN BLDV-MCNC: 20 MG/DL (ref 6–23)
CALCIUM SERPL-MCNC: 7.8 MG/DL (ref 8.3–10.6)
CHLORIDE BLD-SCNC: 104 MMOL/L (ref 99–110)
CO2: 28 MMOL/L (ref 21–32)
COMPONENT: NORMAL
CREAT SERPL-MCNC: 0.9 MG/DL (ref 0.9–1.3)
CROSSMATCH RESULT: NORMAL
DIFFERENTIAL TYPE: ABNORMAL
EOSINOPHILS ABSOLUTE: 0.3 K/CU MM
EOSINOPHILS RELATIVE PERCENT: 5.1 % (ref 0–3)
GFR AFRICAN AMERICAN: >60 ML/MIN/1.73M2
GFR NON-AFRICAN AMERICAN: >60 ML/MIN/1.73M2
GLUCOSE BLD-MCNC: 99 MG/DL (ref 70–99)
HCT VFR BLD CALC: 26.5 % (ref 42–52)
HEMOGLOBIN: 8.5 GM/DL (ref 13.5–18)
IMMATURE NEUTROPHIL %: 1.1 % (ref 0–0.43)
INR BLD: 1.09 INDEX
LIPASE: 60 IU/L (ref 13–60)
LYMPHOCYTES ABSOLUTE: 0.8 K/CU MM
LYMPHOCYTES RELATIVE PERCENT: 15.5 % (ref 24–44)
MCH RBC QN AUTO: 32 PG (ref 27–31)
MCHC RBC AUTO-ENTMCNC: 32.1 % (ref 32–36)
MCV RBC AUTO: 99.6 FL (ref 78–100)
MONOCYTES ABSOLUTE: 0.4 K/CU MM
MONOCYTES RELATIVE PERCENT: 8.1 % (ref 0–4)
NUCLEATED RBC %: 0 %
PDW BLD-RTO: 16.4 % (ref 11.7–14.9)
PLATELET # BLD: 235 K/CU MM (ref 140–440)
PMV BLD AUTO: 9.7 FL (ref 7.5–11.1)
POTASSIUM SERPL-SCNC: 3.9 MMOL/L (ref 3.5–5.1)
PROTHROMBIN TIME: 12.4 SECONDS (ref 9.12–12.5)
RBC # BLD: 2.66 M/CU MM (ref 4.6–6.2)
SEGMENTED NEUTROPHILS ABSOLUTE COUNT: 3.7 K/CU MM
SEGMENTED NEUTROPHILS RELATIVE PERCENT: 69.6 % (ref 36–66)
SODIUM BLD-SCNC: 140 MMOL/L (ref 135–145)
STATUS: NORMAL
TOTAL IMMATURE NEUTOROPHIL: 0.06 K/CU MM
TOTAL NUCLEATED RBC: 0 K/CU MM
TOTAL PROTEIN: 4.7 GM/DL (ref 6.4–8.2)
TRANSFUSION STATUS: NORMAL
UNIT DIVISION: 0
UNIT NUMBER: NORMAL
WBC # BLD: 5.3 K/CU MM (ref 4–10.5)

## 2019-09-06 PROCEDURE — 97530 THERAPEUTIC ACTIVITIES: CPT

## 2019-09-06 PROCEDURE — 97116 GAIT TRAINING THERAPY: CPT

## 2019-09-06 PROCEDURE — 2580000003 HC RX 258: Performed by: SURGERY

## 2019-09-06 PROCEDURE — 85610 PROTHROMBIN TIME: CPT

## 2019-09-06 PROCEDURE — 85730 THROMBOPLASTIN TIME PARTIAL: CPT

## 2019-09-06 PROCEDURE — 6360000002 HC RX W HCPCS: Performed by: SURGERY

## 2019-09-06 PROCEDURE — 83690 ASSAY OF LIPASE: CPT

## 2019-09-06 PROCEDURE — 80053 COMPREHEN METABOLIC PANEL: CPT

## 2019-09-06 PROCEDURE — C9113 INJ PANTOPRAZOLE SODIUM, VIA: HCPCS | Performed by: SURGERY

## 2019-09-06 PROCEDURE — 6370000000 HC RX 637 (ALT 250 FOR IP): Performed by: SURGERY

## 2019-09-06 PROCEDURE — 85025 COMPLETE CBC W/AUTO DIFF WBC: CPT

## 2019-09-06 PROCEDURE — 72192 CT PELVIS W/O DYE: CPT

## 2019-09-06 PROCEDURE — 82150 ASSAY OF AMYLASE: CPT

## 2019-09-06 RX ORDER — DILTIAZEM HYDROCHLORIDE 120 MG/1
120 CAPSULE, COATED, EXTENDED RELEASE ORAL DAILY
Qty: 30 CAPSULE | Refills: 3 | Status: SHIPPED | OUTPATIENT
Start: 2019-09-07 | End: 2020-05-08

## 2019-09-06 RX ORDER — PANTOPRAZOLE SODIUM 40 MG/1
20 TABLET, DELAYED RELEASE ORAL DAILY
Qty: 30 TABLET | Refills: 0 | Status: SHIPPED | OUTPATIENT
Start: 2019-09-06 | End: 2019-09-25

## 2019-09-06 RX ORDER — TRAMADOL HYDROCHLORIDE 50 MG/1
25 TABLET ORAL EVERY 6 HOURS PRN
Qty: 12 TABLET | Refills: 0 | Status: SHIPPED | OUTPATIENT
Start: 2019-09-06 | End: 2019-09-11

## 2019-09-06 RX ORDER — ONDANSETRON 4 MG/1
4 TABLET, FILM COATED ORAL 3 TIMES DAILY PRN
Qty: 30 TABLET | Refills: 0 | Status: SHIPPED | OUTPATIENT
Start: 2019-09-06 | End: 2019-09-25

## 2019-09-06 RX ADMIN — SODIUM CHLORIDE, PRESERVATIVE FREE 10 ML: 5 INJECTION INTRAVENOUS at 09:40

## 2019-09-06 RX ADMIN — TIMOLOL MALEATE 1 DROP: 5 SOLUTION OPHTHALMIC at 09:40

## 2019-09-06 RX ADMIN — METOPROLOL SUCCINATE 25 MG: 25 TABLET, EXTENDED RELEASE ORAL at 09:41

## 2019-09-06 RX ADMIN — FINASTERIDE 5 MG: 5 TABLET, FILM COATED ORAL at 09:41

## 2019-09-06 RX ADMIN — BRIMONIDINE TARTRATE 1 DROP: 2 SOLUTION/ DROPS OPHTHALMIC at 09:40

## 2019-09-06 RX ADMIN — TAMSULOSIN HYDROCHLORIDE 0.4 MG: 0.4 CAPSULE ORAL at 09:41

## 2019-09-06 RX ADMIN — SODIUM CHLORIDE, PRESERVATIVE FREE 10 ML: 5 INJECTION INTRAVENOUS at 19:58

## 2019-09-06 RX ADMIN — SERTRALINE 50 MG: 50 TABLET, FILM COATED ORAL at 09:41

## 2019-09-06 RX ADMIN — SODIUM CHLORIDE, PRESERVATIVE FREE 10 ML: 5 INJECTION INTRAVENOUS at 09:41

## 2019-09-06 RX ADMIN — DILTIAZEM HYDROCHLORIDE 120 MG: 120 CAPSULE, COATED, EXTENDED RELEASE ORAL at 09:41

## 2019-09-06 RX ADMIN — PANTOPRAZOLE SODIUM 40 MG: 40 INJECTION, POWDER, FOR SOLUTION INTRAVENOUS at 06:09

## 2019-09-06 ASSESSMENT — PAIN SCALES - GENERAL
PAINLEVEL_OUTOF10: 0
PAINLEVEL_OUTOF10: 0

## 2019-09-06 NOTE — PROGRESS NOTES
Nutrition Assessment    Type and Reason for Visit: Reassess    Nutrition Recommendations:   · Continue current diet    Nutrition Assessment: Pt is now consuming % of his meals on a general diet. Pt is now at moderate risk    Malnutrition Assessment:  · Malnutrition Status: At risk for malnutrition  · Context: Acute illness or injury  · Findings of the 6 clinical characteristics of malnutrition (Minimum of 2 out of 6 clinical characteristics is required to make the diagnosis of moderate or severe Protein Calorie Malnutrition based on AND/ASPEN Guidelines):  1. Energy Intake-Greater than 75% of estimated energy requirement, Greater than or equal to 7 days    2. Weight Loss-No significant weight loss, in 3 months  3. Fat Loss-No significant subcutaneous fat loss, Orbital  4. Muscle Loss-No significant muscle mass loss, Clavicles (pectoralis and deltoids)  5. Fluid Accumulation-No significant fluid accumulation, Extremities  6.   Strength-Not measured    Nutrition Risk Level: High    Nutrient Needs:  · Estimated Daily Total Kcal: 4767-8038 based on MSJ  · Estimated Daily Protein (g): 70-84 based on 1-1.2 g/kg/IBW  · Estimated Daily Total Fluid (ml/day): 3773-6761 based on 1 mL/kcal    Nutrition Diagnosis:   · Problem: Increased nutrient needs  · Etiology: related to Alteration in GI function     Signs and symptoms:  as evidenced by Presence of wounds    Objective Information:  · Wound Type: Surgical Wound  · Current Nutrition Therapies:  · Oral Diet Orders: General   · Oral Diet intake: %  · Oral Nutrition Supplement (ONS) Orders: None  · Anthropometric Measures:  · Ht: 5' 8\" (172.7 cm)   · Current Body Wt: 167 lb (75.8 kg)  · Admission Body Wt: 185 lb (83.9 kg)  · Usual Body Wt: 169 lb (76.7 kg)  · % Weight Change: weight flucuations during los  · Ideal Body Wt: 154 lb (69.9 kg), % Ideal Body 108%  · BMI Classification: BMI 25.0 - 29.9 Overweight    Nutrition Interventions:   Continue current

## 2019-09-06 NOTE — DISCHARGE SUMMARY
[]Home, []HHC, [x]SNF, []Acute Rehab, []Hospice   Condition on discharge: Stable    Discharge Medications:      [facility]   Home Medication Instructions [facility]:[id number]    Printed on:09/06/19 1220   Medication Information                      apixaban (ELIQUIS) 2.5 MG TABS tablet  Take 1 tablet by mouth 2 times daily             Ascorbic Acid (VITAMIN C) 1000 MG tablet  Take 1,000 mg by mouth daily             bimatoprost (LUMIGAN) 0.01 % SOLN ophthalmic drops  Place 1 drop into both eyes nightly             brimonidine-timolol (COMBIGAN) 0.2-0.5 % ophthalmic solution  Place 1 drop into both eyes every 12 hours             diltiazem (CARDIZEM CD) 120 MG extended release capsule  Take 1 capsule by mouth daily             finasteride (PROSCAR) 5 MG tablet  Take 1 tablet by mouth daily             Glucosamine-MSM-Hyaluronic Acd (JOINT HEALTH PO)  Take 1 each by mouth daily              latanoprost (XALATAN) 0.005 % ophthalmic solution  Place 1 drop into both eyes nightly             metoprolol succinate (TOPROL XL) 25 MG extended release tablet  Take 1 tablet by mouth daily             ondansetron (ZOFRAN) 4 MG tablet  Take 1 tablet by mouth 3 times daily as needed for Nausea or Vomiting             pantoprazole (PROTONIX) 40 MG tablet  Take 1 tablet by mouth daily             Probiotic Product (PROBIOTIC DAILY PO)  Take by mouth daily             sertraline (ZOLOFT) 50 MG tablet  Take 1 tablet by mouth daily             simvastatin (ZOCOR) 40 MG tablet  Take 1 tablet by mouth nightly             tamsulosin (FLOMAX) 0.4 MG capsule  Take 1 capsule by mouth daily             traMADol (ULTRAM) 50 MG tablet  Take 0.5 tablets by mouth every 6 hours as needed for Pain for up to 5 days.                  Objective Findings at Discharge:   BP (!) 151/86   Pulse 87   Temp 98.3 °F (36.8 °C) (Oral)   Resp 19   Ht 5' 8\" (1.727 m)   Wt 167 lb 8.8 oz (76 kg)   SpO2 95%   BMI 25.48 kg/m²            PHYSICAL EXAM

## 2019-09-06 NOTE — PROGRESS NOTES
Report called for pt transport to [address] [place] [address]. All questions answered. Nurse aware of, colostomy bag, incisions, and  transport time of 9:30pm. Family was at bedside when transport was arranged and is also aware of transport time.

## 2019-09-06 NOTE — PROGRESS NOTES
Grocery   Leisure & Hobbies: TV   Additional Comments: Children stop in 2-3x per week to check in. No report of recent falls   Short term goals  Time Frame for Short term goals: 1 week   Short term goal 1: Pt will perform sit><supine SBA   Short term goal 2: Pt will roll bilat SBA   Short term goal 3: Pt will transfer to bed/recliner SBA   Short term goal 4: Pt will ambulate 150ft with LRAD SBA    Electronically signed by:     Jennifer Apple PTA  9/6/2019, 10:32 AM

## 2019-09-09 ENCOUNTER — HOSPITAL ENCOUNTER (OUTPATIENT)
Age: 84
Discharge: HOME | End: 2019-09-09

## 2019-09-09 LAB
ALBUMIN SERPL-MCNC: 2.7 GM/DL (ref 3.4–5)
ALP BLD-CCNC: 114 IU/L (ref 40–128)
ALT SERPL-CCNC: 13 U/L (ref 10–40)
ANION GAP SERPL CALCULATED.3IONS-SCNC: 10 MMOL/L (ref 4–16)
AST SERPL-CCNC: 20 IU/L (ref 15–37)
BASOPHILS ABSOLUTE: 0 K/CU MM
BASOPHILS RELATIVE PERCENT: 0.7 % (ref 0–1)
BILIRUB SERPL-MCNC: 0.4 MG/DL (ref 0–1)
BUN BLDV-MCNC: 21 MG/DL (ref 6–23)
CALCIUM SERPL-MCNC: 7.8 MG/DL (ref 8.3–10.6)
CHLORIDE BLD-SCNC: 102 MMOL/L (ref 99–110)
CO2: 26 MMOL/L (ref 21–32)
CREAT SERPL-MCNC: 1.1 MG/DL (ref 0.9–1.3)
DIFFERENTIAL TYPE: ABNORMAL
EOSINOPHILS ABSOLUTE: 0.4 K/CU MM
EOSINOPHILS RELATIVE PERCENT: 7 % (ref 0–3)
GFR AFRICAN AMERICAN: >60 ML/MIN/1.73M2
GFR NON-AFRICAN AMERICAN: >60 ML/MIN/1.73M2
GLUCOSE BLD-MCNC: 88 MG/DL (ref 70–99)
HCT VFR BLD CALC: 26.8 % (ref 42–52)
HEMOGLOBIN: 8.3 GM/DL (ref 13.5–18)
IMMATURE NEUTROPHIL %: 0.7 % (ref 0–0.43)
LYMPHOCYTES ABSOLUTE: 0.8 K/CU MM
LYMPHOCYTES RELATIVE PERCENT: 13.6 % (ref 24–44)
MCH RBC QN AUTO: 31.6 PG (ref 27–31)
MCHC RBC AUTO-ENTMCNC: 31 % (ref 32–36)
MCV RBC AUTO: 101.9 FL (ref 78–100)
MONOCYTES ABSOLUTE: 0.4 K/CU MM
MONOCYTES RELATIVE PERCENT: 7.9 % (ref 0–4)
NUCLEATED RBC %: 0 %
PDW BLD-RTO: 14.8 % (ref 11.7–14.9)
PLATELET # BLD: 284 K/CU MM (ref 140–440)
PMV BLD AUTO: 9.5 FL (ref 7.5–11.1)
POTASSIUM SERPL-SCNC: 3.8 MMOL/L (ref 3.5–5.1)
RBC # BLD: 2.63 M/CU MM (ref 4.6–6.2)
SEGMENTED NEUTROPHILS ABSOLUTE COUNT: 3.9 K/CU MM
SEGMENTED NEUTROPHILS RELATIVE PERCENT: 70.1 % (ref 36–66)
SODIUM BLD-SCNC: 138 MMOL/L (ref 135–145)
TOTAL IMMATURE NEUTOROPHIL: 0.04 K/CU MM
TOTAL NUCLEATED RBC: 0 K/CU MM
TOTAL PROTEIN: 4.7 GM/DL (ref 6.4–8.2)
WBC # BLD: 5.6 K/CU MM (ref 4–10.5)

## 2019-09-09 PROCEDURE — 36415 COLL VENOUS BLD VENIPUNCTURE: CPT

## 2019-09-09 PROCEDURE — 80053 COMPREHEN METABOLIC PANEL: CPT

## 2019-09-09 PROCEDURE — 85025 COMPLETE CBC W/AUTO DIFF WBC: CPT

## 2019-09-10 ENCOUNTER — HOSPITAL ENCOUNTER (OUTPATIENT)
Age: 84
Setting detail: SPECIMEN
Discharge: HOME | End: 2019-09-10
Payer: COMMERCIAL

## 2019-09-10 LAB
HCT VFR BLD CALC: 25.8 % (ref 42–52)
HEMOGLOBIN: 8.2 GM/DL (ref 13.5–18)
MCH RBC QN AUTO: 32.4 PG (ref 27–31)
MCHC RBC AUTO-ENTMCNC: 31.8 % (ref 32–36)
MCV RBC AUTO: 102 FL (ref 78–100)
PDW BLD-RTO: 14.7 % (ref 11.7–14.9)
PLATELET # BLD: 271 K/CU MM (ref 140–440)
PMV BLD AUTO: 9.6 FL (ref 7.5–11.1)
RBC # BLD: 2.53 M/CU MM (ref 4.6–6.2)
WBC # BLD: 5.2 K/CU MM (ref 4–10.5)

## 2019-09-10 PROCEDURE — 85027 COMPLETE CBC AUTOMATED: CPT

## 2019-09-10 PROCEDURE — 36415 COLL VENOUS BLD VENIPUNCTURE: CPT

## 2019-09-12 ENCOUNTER — TELEPHONE (OUTPATIENT)
Dept: CARDIOLOGY CLINIC | Age: 84
End: 2019-09-12

## 2019-09-13 ENCOUNTER — HOSPITAL ENCOUNTER (OUTPATIENT)
Age: 84
Setting detail: SPECIMEN
Discharge: HOME | End: 2019-09-13
Payer: COMMERCIAL

## 2019-09-13 LAB
HCT VFR BLD CALC: 25.2 % (ref 42–52)
HEMOGLOBIN: 7.6 GM/DL (ref 13.5–18)

## 2019-09-13 PROCEDURE — 36415 COLL VENOUS BLD VENIPUNCTURE: CPT

## 2019-09-13 PROCEDURE — 85014 HEMATOCRIT: CPT

## 2019-09-13 PROCEDURE — 85018 HEMOGLOBIN: CPT

## 2019-09-16 ENCOUNTER — HOSPITAL ENCOUNTER (OUTPATIENT)
Age: 84
Discharge: HOME | End: 2019-09-16

## 2019-09-16 ENCOUNTER — CARE COORDINATION (OUTPATIENT)
Dept: CASE MANAGEMENT | Age: 84
End: 2019-09-16

## 2019-09-16 LAB
HCT VFR BLD CALC: 27.4 % (ref 42–52)
HEMOGLOBIN: 8.3 GM/DL (ref 13.5–18)

## 2019-09-16 PROCEDURE — 85014 HEMATOCRIT: CPT

## 2019-09-16 PROCEDURE — 85018 HEMOGLOBIN: CPT

## 2019-09-16 PROCEDURE — 36415 COLL VENOUS BLD VENIPUNCTURE: CPT

## 2019-09-17 NOTE — CARE COORDINATION
[facility] Update Call    2019    Patient: Mike Galicia Patient : 3/23/1930   MRN: <[id number]>  Reason for Admission: SBO  Discharge Date: 19 RARS: Readmission Risk Score: 25    Outreach to [facility] Cervantes for care transitions post-acute update, per RN request.      LM for the  requesting return call regarding Katharine Roca. Contact information provided.    Care Transitions Post Acute Facility Update    Care Transitions Interventions  Post Acute Facility:  [facility] [address] Update

## 2019-09-19 ENCOUNTER — HOSPITAL ENCOUNTER (OUTPATIENT)
Age: 84
Discharge: HOME | End: 2019-09-19

## 2019-09-19 LAB
HCT VFR BLD CALC: 26.7 % (ref 42–52)
HEMOGLOBIN: 8.2 GM/DL (ref 13.5–18)

## 2019-09-19 PROCEDURE — 36415 COLL VENOUS BLD VENIPUNCTURE: CPT

## 2019-09-19 PROCEDURE — 85014 HEMATOCRIT: CPT

## 2019-09-19 PROCEDURE — 85018 HEMOGLOBIN: CPT

## 2019-09-23 ENCOUNTER — TELEPHONE (OUTPATIENT)
Dept: FAMILY MEDICINE CLINIC | Age: 84
End: 2019-09-23

## 2019-09-25 ENCOUNTER — OFFICE VISIT (OUTPATIENT)
Dept: FAMILY MEDICINE CLINIC | Age: 84
End: 2019-09-25
Payer: COMMERCIAL

## 2019-09-25 ENCOUNTER — CARE COORDINATION (OUTPATIENT)
Dept: CASE MANAGEMENT | Age: 84
End: 2019-09-25

## 2019-09-25 VITALS
BODY MASS INDEX: 30.23 KG/M2 | WEIGHT: 170.6 LBS | OXYGEN SATURATION: 97 % | SYSTOLIC BLOOD PRESSURE: 98 MMHG | HEART RATE: 74 BPM | DIASTOLIC BLOOD PRESSURE: 58 MMHG | HEIGHT: 63 IN

## 2019-09-25 DIAGNOSIS — K56.609 SMALL BOWEL OBSTRUCTION (HCC): Primary | ICD-10-CM

## 2019-09-25 DIAGNOSIS — I50.22 CHRONIC SYSTOLIC HEART FAILURE (HCC): Primary | ICD-10-CM

## 2019-09-25 DIAGNOSIS — I10 ESSENTIAL HYPERTENSION: ICD-10-CM

## 2019-09-25 PROCEDURE — 99214 OFFICE O/P EST MOD 30 MIN: CPT | Performed by: PHYSICIAN ASSISTANT

## 2019-09-25 PROCEDURE — 1111F DSCHRG MED/CURRENT MED MERGE: CPT | Performed by: FAMILY MEDICINE

## 2019-09-25 RX ORDER — TAMSULOSIN HYDROCHLORIDE 0.4 MG/1
0.4 CAPSULE ORAL DAILY
COMMUNITY
End: 2019-11-04 | Stop reason: SDUPTHER

## 2019-09-25 RX ORDER — SUCRALFATE 1 G/1
1 TABLET ORAL 4 TIMES DAILY
COMMUNITY
End: 2019-11-04 | Stop reason: SDUPTHER

## 2019-09-25 RX ORDER — ATORVASTATIN CALCIUM 20 MG/1
20 TABLET, FILM COATED ORAL DAILY
COMMUNITY
End: 2019-09-25

## 2019-09-25 RX ORDER — LISINOPRIL AND HYDROCHLOROTHIAZIDE 12.5; 1 MG/1; MG/1
1 TABLET ORAL DAILY
COMMUNITY
End: 2019-09-25 | Stop reason: SDUPTHER

## 2019-09-25 RX ORDER — LISINOPRIL AND HYDROCHLOROTHIAZIDE 12.5; 1 MG/1; MG/1
0.5 TABLET ORAL DAILY
Qty: 15 TABLET | Refills: 0
Start: 2019-09-25 | End: 2019-11-04 | Stop reason: SDUPTHER

## 2019-09-25 RX ORDER — RANITIDINE 150 MG/1
150 TABLET ORAL 2 TIMES DAILY
COMMUNITY
End: 2020-08-03

## 2019-09-25 RX ORDER — ATORVASTATIN CALCIUM 20 MG/1
20 TABLET, FILM COATED ORAL DAILY
COMMUNITY
End: 2019-10-11 | Stop reason: SDUPTHER

## 2019-09-25 ASSESSMENT — ENCOUNTER SYMPTOMS
SHORTNESS OF BREATH: 0
NAUSEA: 0
ABDOMINAL PAIN: 0
VOMITING: 0
BLOOD IN STOOL: 0

## 2019-09-25 NOTE — PROGRESS NOTES
9/25/2019    Rhett Waggoner    Chief Complaint   Patient presents with    Follow-Up from [facility]. D/C 9/20/19. PER PT, WAS IN THE HOSPITAL FOR SURGERY.  Discuss Medications     PT HAS 2 CHOL MEDS: ATOVASTATIN AND SIMVASTATIN. PT IS TAKING ZANTAC INSTEAD OF OMPRAZOLE BUT THERE ARE BOTTLES/PILL PACKS FOR BOTH. PT HAS BEEN TAKING LISINOPRIL INSTEAD OF DILTIAZEM. THERE ARE BOTH BOTTLES AND PILL PACKS FOR BOTH. PT IS TAKING BOTH FINASTERIDE AND FLOMAX. HPI  History was obtained from the patient and his daughter. Emil Mills is a 80 y.o. male who presents today for hospital follow-up and nursing home follow-up. Patient had presented to the emergency department with left lower quadrant pain and was found to have an incarcerated hernia of his ostomy as well as small bowel obstruction. He ended up having partial bowel removal and they moved the ostomy to the right side of the abdomen. He is following up with Dr. Roxana Abbott and is recovering well. Bowel movements have been normal, no blood in his stool, no abdominal pain. He is having some irritation of the skin around the ostomy which Dr. Roxana Abbott already gave him an ointment for but he cannot remember if it was a steroid, antibiotic, or antifungal.    Daughter is concerned because patient has been extremely fatigued. His blood pressure is running lower, today is 98/58. There has been some confusion with his medications. Last year his diltiazem was stopped by Dr. Brayden Barnett and when patient saw cardiology this year patient told the cardiologist that he had just run out of the medication so the cardiologist just refilled it. So, he is not taking the diltiazem, metoprolol, and losartan/hydrochlorothiazide. There is also confusion over several of his other medications that came from the nursing home. REVIEW OF SYMPTOMS    Review of Systems   Constitutional: Positive for fatigue. Negative for chills and fever.    Respiratory: Negative his AVS for them to reference. 2. Essential hypertension  Reduce lisinopril/hydrochlorothiazide to half tablet daily and continue on the diltiazem and metoprolol. This was discussed with Dr. Zofia Stinson due to low blood pressure readings and fatigue. Patient is to call in 1 week and give blood pressure readings and pulse readings either myself or Dr. Zofia Stinson. - lisinopril-hydrochlorothiazide (PRINZIDE;ZESTORETIC) 10-12.5 MG per tablet; Take 0.5 tablets by mouth daily  Dispense: 15 tablet; Refill: 0           Electronically signed by Annmarie Hu PA-C on 9/25/2019    Please note that this chart was generated using dragon dictation software. Although every effort was made to ensure the accuracy of this automated transcription, some errors in transcription may have occurred.

## 2019-10-10 ENCOUNTER — TELEPHONE (OUTPATIENT)
Dept: FAMILY MEDICINE CLINIC | Age: 84
End: 2019-10-10

## 2019-10-10 DIAGNOSIS — I48.0 PAF (PAROXYSMAL ATRIAL FIBRILLATION) (HCC): ICD-10-CM

## 2019-10-11 ENCOUNTER — TELEPHONE (OUTPATIENT)
Dept: FAMILY MEDICINE CLINIC | Age: 84
End: 2019-10-11

## 2019-10-11 RX ORDER — ATORVASTATIN CALCIUM 20 MG/1
20 TABLET, FILM COATED ORAL DAILY
Qty: 30 TABLET | Refills: 1 | Status: SHIPPED | OUTPATIENT
Start: 2019-10-11 | End: 2019-11-20 | Stop reason: SDUPTHER

## 2019-10-17 ENCOUNTER — OFFICE VISIT (OUTPATIENT)
Dept: CARDIOLOGY CLINIC | Age: 84
End: 2019-10-17
Payer: COMMERCIAL

## 2019-10-17 VITALS
WEIGHT: 171.2 LBS | HEART RATE: 84 BPM | SYSTOLIC BLOOD PRESSURE: 122 MMHG | HEIGHT: 67 IN | BODY MASS INDEX: 26.87 KG/M2 | DIASTOLIC BLOOD PRESSURE: 80 MMHG

## 2019-10-17 DIAGNOSIS — E78.00 PURE HYPERCHOLESTEROLEMIA: ICD-10-CM

## 2019-10-17 DIAGNOSIS — I48.0 PAF (PAROXYSMAL ATRIAL FIBRILLATION) (HCC): ICD-10-CM

## 2019-10-17 DIAGNOSIS — Z95.0 CARDIAC PACEMAKER: ICD-10-CM

## 2019-10-17 DIAGNOSIS — I50.22 CHRONIC SYSTOLIC HEART FAILURE (HCC): Primary | ICD-10-CM

## 2019-10-17 PROCEDURE — 99214 OFFICE O/P EST MOD 30 MIN: CPT | Performed by: NURSE PRACTITIONER

## 2019-10-24 ENCOUNTER — HOSPITAL ENCOUNTER (OUTPATIENT)
Dept: CT IMAGING | Age: 84
Discharge: HOME | End: 2019-10-24
Payer: COMMERCIAL

## 2019-10-24 DIAGNOSIS — I71.21 ASCENDING AORTIC ANEURYSM: ICD-10-CM

## 2019-10-24 LAB
BASE EXCESS MIXED: 7.4 (ref 0–1.2)
BASE EXCESS: ABNORMAL (ref 0–3.3)
CO2: 35 MMOL/L (ref 21–32)
GFR AFRICAN AMERICAN: >60 ML/MIN/1.73M2
GFR NON-AFRICAN AMERICAN: >60 ML/MIN/1.73M2
GLUCOSE BLD-MCNC: 102 MG/DL (ref 70–99)
HCO3 ARTERIAL: 33.2 MMOL/L (ref 18–23)
HCT VFR BLD CALC: 36 % (ref 42–52)
HEMOGLOBIN: 12.2 GM/DL (ref 13.5–18)
O2 SATURATION: 18.8 % (ref 96–97)
PCO2 ARTERIAL: 50.9 MMHG (ref 32–45)
PH BLOOD: 7.42 (ref 7.34–7.45)
PO2 ARTERIAL: 14.7 MMHG (ref 75–100)
POC CALCIUM: 1.22 MMOL/L (ref 1.12–1.32)
POC CHLORIDE: 103 MMOL/L (ref 98–109)
POC CREATININE: 1 MG/DL (ref 0.9–1.3)
POTASSIUM SERPL-SCNC: 3.9 MMOL/L (ref 3.5–4.5)
SODIUM BLD-SCNC: 141 MMOL/L (ref 138–146)
SOURCE, BLOOD GAS: ABNORMAL

## 2019-10-24 PROCEDURE — 74174 CTA ABD&PLVS W/CONTRAST: CPT

## 2019-10-24 PROCEDURE — 6360000004 HC RX CONTRAST MEDICATION: Performed by: THORACIC SURGERY (CARDIOTHORACIC VASCULAR SURGERY)

## 2019-10-24 RX ADMIN — IOPAMIDOL 75 ML: 755 INJECTION, SOLUTION INTRAVENOUS at 11:44

## 2019-10-29 PROBLEM — E78.49 OTHER HYPERLIPIDEMIA: Status: ACTIVE | Noted: 2019-10-29

## 2019-11-01 ENCOUNTER — TELEPHONE (OUTPATIENT)
Dept: FAMILY MEDICINE CLINIC | Age: 84
End: 2019-11-01

## 2019-11-01 ENCOUNTER — TELEPHONE (OUTPATIENT)
Dept: CARDIOLOGY CLINIC | Age: 84
End: 2019-11-01

## 2019-11-01 DIAGNOSIS — I48.0 PAF (PAROXYSMAL ATRIAL FIBRILLATION) (HCC): ICD-10-CM

## 2019-11-04 ENCOUNTER — TELEPHONE (OUTPATIENT)
Dept: FAMILY MEDICINE CLINIC | Age: 84
End: 2019-11-04

## 2019-11-04 DIAGNOSIS — I10 ESSENTIAL HYPERTENSION: ICD-10-CM

## 2019-11-04 DIAGNOSIS — I48.0 PAF (PAROXYSMAL ATRIAL FIBRILLATION) (HCC): ICD-10-CM

## 2019-11-04 RX ORDER — SUCRALFATE 1 G/1
1 TABLET ORAL 4 TIMES DAILY
Qty: 120 TABLET | Refills: 1 | Status: SHIPPED | OUTPATIENT
Start: 2019-11-04 | End: 2020-01-14

## 2019-11-04 RX ORDER — LISINOPRIL AND HYDROCHLOROTHIAZIDE 12.5; 1 MG/1; MG/1
0.5 TABLET ORAL DAILY
Qty: 15 TABLET | Refills: 1 | Status: SHIPPED | OUTPATIENT
Start: 2019-11-04 | End: 2020-02-13

## 2019-11-04 RX ORDER — METOPROLOL SUCCINATE 25 MG/1
25 TABLET, EXTENDED RELEASE ORAL DAILY
Qty: 30 TABLET | Refills: 1 | Status: SHIPPED | OUTPATIENT
Start: 2019-11-04 | End: 2020-03-13

## 2019-11-04 RX ORDER — TAMSULOSIN HYDROCHLORIDE 0.4 MG/1
0.4 CAPSULE ORAL DAILY
Qty: 30 CAPSULE | Refills: 1 | Status: SHIPPED | OUTPATIENT
Start: 2019-11-04 | End: 2020-03-13

## 2019-11-20 ENCOUNTER — OFFICE VISIT (OUTPATIENT)
Dept: FAMILY MEDICINE CLINIC | Age: 84
End: 2019-11-20
Payer: COMMERCIAL

## 2019-11-20 VITALS
HEART RATE: 64 BPM | DIASTOLIC BLOOD PRESSURE: 62 MMHG | WEIGHT: 168 LBS | BODY MASS INDEX: 29.77 KG/M2 | HEIGHT: 63 IN | SYSTOLIC BLOOD PRESSURE: 110 MMHG

## 2019-11-20 DIAGNOSIS — I10 ESSENTIAL HYPERTENSION: Primary | ICD-10-CM

## 2019-11-20 DIAGNOSIS — Z23 NEEDS FLU SHOT: ICD-10-CM

## 2019-11-20 DIAGNOSIS — I50.22 CHRONIC SYSTOLIC HEART FAILURE (HCC): ICD-10-CM

## 2019-11-20 DIAGNOSIS — E78.00 PURE HYPERCHOLESTEROLEMIA: ICD-10-CM

## 2019-11-20 DIAGNOSIS — M17.11 PRIMARY OSTEOARTHRITIS OF RIGHT KNEE: ICD-10-CM

## 2019-11-20 PROCEDURE — 99214 OFFICE O/P EST MOD 30 MIN: CPT | Performed by: FAMILY MEDICINE

## 2019-11-20 PROCEDURE — 90653 IIV ADJUVANT VACCINE IM: CPT | Performed by: FAMILY MEDICINE

## 2019-11-20 PROCEDURE — G0008 ADMIN INFLUENZA VIRUS VAC: HCPCS | Performed by: FAMILY MEDICINE

## 2019-11-20 RX ORDER — ATORVASTATIN CALCIUM 20 MG/1
TABLET, FILM COATED ORAL
Qty: 30 TABLET | Refills: 1 | Status: SHIPPED | OUTPATIENT
Start: 2019-11-20 | End: 2019-12-20

## 2019-11-20 ASSESSMENT — ENCOUNTER SYMPTOMS
DIARRHEA: 0
ABDOMINAL PAIN: 0
CHEST TIGHTNESS: 0
SORE THROAT: 0
CONSTIPATION: 0
SINUS PRESSURE: 0
COUGH: 0
RHINORRHEA: 0
SHORTNESS OF BREATH: 0

## 2019-12-17 ENCOUNTER — PROCEDURE VISIT (OUTPATIENT)
Dept: CARDIOLOGY CLINIC | Age: 84
End: 2019-12-17
Payer: COMMERCIAL

## 2019-12-17 DIAGNOSIS — Z95.0 BIVENTRICULAR CARDIAC PACEMAKER IN SITU: Primary | ICD-10-CM

## 2019-12-17 DIAGNOSIS — I49.5 SICK SINUS SYNDROME (HCC): ICD-10-CM

## 2019-12-17 PROCEDURE — 93296 REM INTERROG EVL PM/IDS: CPT | Performed by: INTERNAL MEDICINE

## 2019-12-17 PROCEDURE — 93294 REM INTERROG EVL PM/LDLS PM: CPT | Performed by: INTERNAL MEDICINE

## 2019-12-17 PROCEDURE — 93297 REM INTERROG DEV EVAL ICPMS: CPT | Performed by: INTERNAL MEDICINE

## 2019-12-18 ENCOUNTER — HOSPITAL ENCOUNTER (OUTPATIENT)
Dept: INTERVENTIONAL RADIOLOGY/VASCULAR | Age: 84
Discharge: HOME | End: 2019-12-18
Payer: COMMERCIAL

## 2019-12-18 VITALS
RESPIRATION RATE: 16 BRPM | HEART RATE: 80 BPM | TEMPERATURE: 97.5 F | DIASTOLIC BLOOD PRESSURE: 87 MMHG | SYSTOLIC BLOOD PRESSURE: 174 MMHG | WEIGHT: 168 LBS | HEIGHT: 67 IN | OXYGEN SATURATION: 97 % | BODY MASS INDEX: 26.37 KG/M2

## 2019-12-18 DIAGNOSIS — S30.1XXA ABDOMINAL WALL SEROMA, INITIAL ENCOUNTER: ICD-10-CM

## 2019-12-18 LAB
APTT: 35.4 SECONDS (ref 25.1–37.1)
HCT VFR BLD CALC: 34 % (ref 42–52)
HEMOGLOBIN: 10.2 GM/DL (ref 13.5–18)
INR BLD: 1.16 INDEX
MCH RBC QN AUTO: 27.3 PG (ref 27–31)
MCHC RBC AUTO-ENTMCNC: 30 % (ref 32–36)
MCV RBC AUTO: 90.9 FL (ref 78–100)
PDW BLD-RTO: 15.5 % (ref 11.7–14.9)
PLATELET # BLD: 246 K/CU MM (ref 140–440)
PMV BLD AUTO: 9.2 FL (ref 7.5–11.1)
PROTHROMBIN TIME: 14 SECONDS (ref 11.7–14.5)
RBC # BLD: 3.74 M/CU MM (ref 4.6–6.2)
WBC # BLD: 5.2 K/CU MM (ref 4–10.5)

## 2019-12-18 PROCEDURE — 7100000010 HC PHASE II RECOVERY - FIRST 15 MIN

## 2019-12-18 PROCEDURE — 7100000011 HC PHASE II RECOVERY - ADDTL 15 MIN

## 2019-12-18 PROCEDURE — 85730 THROMBOPLASTIN TIME PARTIAL: CPT

## 2019-12-18 PROCEDURE — C1769 GUIDE WIRE: HCPCS

## 2019-12-18 PROCEDURE — 85027 COMPLETE CBC AUTOMATED: CPT

## 2019-12-18 PROCEDURE — 2580000003 HC RX 258: Performed by: RADIOLOGY

## 2019-12-18 PROCEDURE — C1894 INTRO/SHEATH, NON-LASER: HCPCS

## 2019-12-18 PROCEDURE — 2709999900 HC NON-CHARGEABLE SUPPLY

## 2019-12-18 PROCEDURE — 85610 PROTHROMBIN TIME: CPT

## 2019-12-18 PROCEDURE — 10030 IMG GID FLU COLL DRG SFT TIS: CPT

## 2019-12-18 RX ORDER — SODIUM CHLORIDE 0.9 % (FLUSH) 0.9 %
10 SYRINGE (ML) INJECTION 2 TIMES DAILY
Status: DISCONTINUED | OUTPATIENT
Start: 2019-12-18 | End: 2019-12-19 | Stop reason: HOSPADM

## 2019-12-18 RX ADMIN — Medication 10 ML: at 12:34

## 2019-12-18 ASSESSMENT — PAIN - FUNCTIONAL ASSESSMENT: PAIN_FUNCTIONAL_ASSESSMENT: 0-10

## 2019-12-18 ASSESSMENT — PAIN SCALES - GENERAL: PAINLEVEL_OUTOF10: 0

## 2020-01-07 ENCOUNTER — HOSPITAL ENCOUNTER (OUTPATIENT)
Dept: INTERVENTIONAL RADIOLOGY/VASCULAR | Age: 85
Discharge: HOME | End: 2020-01-07
Payer: COMMERCIAL

## 2020-01-07 VITALS
TEMPERATURE: 97.1 F | HEART RATE: 80 BPM | HEIGHT: 67 IN | WEIGHT: 168 LBS | OXYGEN SATURATION: 99 % | BODY MASS INDEX: 26.37 KG/M2 | RESPIRATION RATE: 16 BRPM | DIASTOLIC BLOOD PRESSURE: 72 MMHG | SYSTOLIC BLOOD PRESSURE: 154 MMHG

## 2020-01-07 PROCEDURE — 7100000010 HC PHASE II RECOVERY - FIRST 15 MIN

## 2020-01-07 PROCEDURE — 20501 NJX SINUS TRACT DIAGNOSTIC: CPT

## 2020-01-07 PROCEDURE — 2709999900 HC NON-CHARGEABLE SUPPLY

## 2020-01-07 PROCEDURE — 2580000003 HC RX 258: Performed by: RADIOLOGY

## 2020-01-07 PROCEDURE — 76080 X-RAY EXAM OF FISTULA: CPT

## 2020-01-07 PROCEDURE — 6360000004 HC RX CONTRAST MEDICATION: Performed by: SURGERY

## 2020-01-07 PROCEDURE — 7100000011 HC PHASE II RECOVERY - ADDTL 15 MIN

## 2020-01-07 RX ORDER — SODIUM CHLORIDE 0.9 % (FLUSH) 0.9 %
10 SYRINGE (ML) INJECTION 2 TIMES DAILY
Status: DISCONTINUED | OUTPATIENT
Start: 2020-01-07 | End: 2020-01-08 | Stop reason: HOSPADM

## 2020-01-07 RX ADMIN — Medication 10 ML: at 13:00

## 2020-01-07 RX ADMIN — IOPAMIDOL 20 ML: 755 INJECTION, SOLUTION INTRAVENOUS at 15:36

## 2020-01-07 ASSESSMENT — PAIN SCALES - GENERAL: PAINLEVEL_OUTOF10: 0

## 2020-01-07 ASSESSMENT — PAIN - FUNCTIONAL ASSESSMENT: PAIN_FUNCTIONAL_ASSESSMENT: 0-10

## 2020-01-07 NOTE — PROGRESS NOTES
Dr Sherrie Dietrich here to talk to pt and family member   Discharge instructions given   Measuring cups sent with pt    Pt states he knows how to empty and measure BRIDGET drainage

## 2020-01-07 NOTE — H&P
Date:1/7/2020  Name:Rachid Gomes   XLM:[id number]   #:[id number]    SEX:male   Referring Physician:  Dr. Verena Sorto  Chief Complaint:  Abd wall seroma  History of Present Illness:   Patient with an anterior abd wall seroma s/p drain placement on 12/18/19 presents for a sinogram.    HISTORY AND PHYSICAL  Contusion of abdominal wall, subsequent encounter [S30.1XXD]    Past Medical History:  Past Medical History:   Diagnosis Date    AAA (abdominal aortic aneurysm) [facility])     Surgery scheduled for 7/1/2014 with Dr. Naman Dudley.  Arthritis     generalized    Asthma     AV block, 1st degree     Back pain     Bradycardia     Colon polyps     [facility] in [facility] [address]     Glaucoma     H/O cardiovascular stress test 12/27/2013    cardiolite-EF56%, apical hypokinesis is seen, cannot exlude apical Tyler ischemia    History of blood transfusion     History of cardiovascular stress test 3/5/10    No angina or ischemic EKG changes are noted with Lexiscan. The cardiolite study demonstrated normal perfusion in all segments of the myocardium with an intact left ventricular systolic function. The resting sestamibi dose is 10.8, stress is 32.5. EF 66%    History of chest x-ray 3/16/10    The chest is considered nonacute. There is cardiomegaly noted. COPD.  History of Doppler ultrasound 3/25/10    venous doppler- Technically good venous ultrasound study negative for DVT in both lower extremities. Normal compressibility,color flow doppler pattern and spectral doppler pattern demonstrated thoughout.  History of echocardiogram 3/18/10    Boarderline LV dilatation with concentric hypertrophy. Low normal systolic and abnormal diastolic function. Mild mitral and trace tricuspid regurgitation. Mild aortic root and bilateral dilatation.  Holter monitor, abnormal 11/10/10    11/10/2010- 24 HR- Abnormal holter revealing some significant brasycardia's and wenckebach phenomenon.  Therefore, clinical  correlation is 30 capsule 3    bimatoprost (LUMIGAN) 0.01 % SOLN ophthalmic drops Place 1 drop into both eyes nightly      Ascorbic Acid (VITAMIN C) 1000 MG tablet Take 1,000 mg by mouth daily      brimonidine-timolol (COMBIGAN) 0.2-0.5 % ophthalmic solution Place 1 drop into both eyes every 12 hours      Glucosamine-MSM-Hyaluronic Acd (JOINT HEALTH PO) Take 1 each by mouth daily        No current facility-administered medications on file prior to encounter. Vital Signs:  @FLOWDT(6:last)@ @FLOWSTATM(6:24)@ @FLOWDT(5:last)@ @FLOWDT(8:last)@ @FLOWDT(9:last)@ @FLOWDT(10:last)@   @FLOWDT(14:first)@  @FLOWDT(14:last)@  Body mass index is 26.31 kg/m². Laboratory:  No results for input(s): WBC, HEMOGLOBIN, NA, CL, CO2, BUN, CREATININE, GLUCOSE, INR, PTT, CKMB in the last 72 hours. Invalid input(s): HEMATOCRIT, PLATELETS, POTASSIUM, CA, PT, CK1, TROP  INR @LABR24(INR)@    Physical Exam:  GENERAL:Well developed, well nourished in NAD      Impression:  Active Problems:    * No active hospital problems. *  Resolved Problems:    * No resolved hospital problems.  *    Sinogram, possible repositioning of drain    PLAN OF CARE/PLANNED PROCEDURE    IR INJ SINUS DIAGNOSTIC [20501]

## 2020-01-12 ENCOUNTER — HOSPITAL ENCOUNTER (OUTPATIENT)
Age: 85
Setting detail: OBSERVATION
Discharge: HOME | End: 2020-01-13
Attending: EMERGENCY MEDICINE | Admitting: INTERNAL MEDICINE
Payer: COMMERCIAL

## 2020-01-12 ENCOUNTER — APPOINTMENT (OUTPATIENT)
Dept: CT IMAGING | Age: 85
End: 2020-01-12
Payer: COMMERCIAL

## 2020-01-12 ENCOUNTER — APPOINTMENT (OUTPATIENT)
Dept: ULTRASOUND IMAGING | Age: 85
End: 2020-01-12
Payer: COMMERCIAL

## 2020-01-12 PROBLEM — R11.2 INTRACTABLE NAUSEA AND VOMITING: Status: ACTIVE | Noted: 2020-01-12

## 2020-01-12 LAB
ALBUMIN SERPL-MCNC: 3.6 GM/DL (ref 3.4–5)
ALP BLD-CCNC: 148 IU/L (ref 40–129)
ALT SERPL-CCNC: 10 U/L (ref 10–40)
ANION GAP SERPL CALCULATED.3IONS-SCNC: 13 MMOL/L (ref 4–16)
AST SERPL-CCNC: 18 IU/L (ref 15–37)
BACTERIA: NEGATIVE /HPF
BASOPHILS ABSOLUTE: 0 K/CU MM
BASOPHILS RELATIVE PERCENT: 0.8 % (ref 0–1)
BILIRUB SERPL-MCNC: 0.5 MG/DL (ref 0–1)
BILIRUBIN URINE: NEGATIVE MG/DL
BLOOD, URINE: NEGATIVE
BUN BLDV-MCNC: 19 MG/DL (ref 6–23)
CALCIUM SERPL-MCNC: 8.8 MG/DL (ref 8.3–10.6)
CHLORIDE BLD-SCNC: 101 MMOL/L (ref 99–110)
CLARITY: CLEAR
CO2: 27 MMOL/L (ref 21–32)
COLOR: ABNORMAL
CREAT SERPL-MCNC: 1 MG/DL (ref 0.9–1.3)
DIFFERENTIAL TYPE: ABNORMAL
EOSINOPHILS ABSOLUTE: 0.1 K/CU MM
EOSINOPHILS RELATIVE PERCENT: 2.3 % (ref 0–3)
GFR AFRICAN AMERICAN: >60 ML/MIN/1.73M2
GFR NON-AFRICAN AMERICAN: >60 ML/MIN/1.73M2
GLUCOSE BLD-MCNC: 91 MG/DL (ref 70–99)
GLUCOSE, URINE: NEGATIVE MG/DL
HCT VFR BLD CALC: 36.1 % (ref 42–52)
HEMOGLOBIN: 10.9 GM/DL (ref 13.5–18)
IMMATURE NEUTROPHIL %: 0.2 % (ref 0–0.43)
KETONES, URINE: NEGATIVE MG/DL
LEUKOCYTE ESTERASE, URINE: ABNORMAL
LYMPHOCYTES ABSOLUTE: 0.6 K/CU MM
LYMPHOCYTES RELATIVE PERCENT: 12.1 % (ref 24–44)
MCH RBC QN AUTO: 27.6 PG (ref 27–31)
MCHC RBC AUTO-ENTMCNC: 30.2 % (ref 32–36)
MCV RBC AUTO: 91.4 FL (ref 78–100)
MONOCYTES ABSOLUTE: 0.2 K/CU MM
MONOCYTES RELATIVE PERCENT: 4.9 % (ref 0–4)
MUCUS: ABNORMAL HPF
NITRITE URINE, QUANTITATIVE: NEGATIVE
NUCLEATED RBC %: 0 %
PDW BLD-RTO: 17 % (ref 11.7–14.9)
PH, URINE: 7 (ref 5–8)
PLATELET # BLD: 239 K/CU MM (ref 140–440)
PMV BLD AUTO: 9.4 FL (ref 7.5–11.1)
POTASSIUM SERPL-SCNC: 3.5 MMOL/L (ref 3.5–5.1)
PROTEIN UA: NEGATIVE MG/DL
RBC # BLD: 3.95 M/CU MM (ref 4.6–6.2)
RBC URINE: 6 /HPF (ref 0–3)
SEGMENTED NEUTROPHILS ABSOLUTE COUNT: 3.8 K/CU MM
SEGMENTED NEUTROPHILS RELATIVE PERCENT: 79.7 % (ref 36–66)
SODIUM BLD-SCNC: 141 MMOL/L (ref 135–145)
SPECIFIC GRAVITY UA: 1.01 (ref 1–1.03)
SQUAMOUS EPITHELIAL: <1 /HPF
TOTAL IMMATURE NEUTOROPHIL: 0.01 K/CU MM
TOTAL NUCLEATED RBC: 0 K/CU MM
TOTAL PROTEIN: 7.4 GM/DL (ref 6.4–8.2)
TRICHOMONAS: ABNORMAL /HPF
UROBILINOGEN, URINE: NORMAL MG/DL (ref 0.2–1)
WBC # BLD: 4.7 K/CU MM (ref 4–10.5)
WBC UA: 2 /HPF (ref 0–2)

## 2020-01-12 PROCEDURE — 6360000004 HC RX CONTRAST MEDICATION: Performed by: EMERGENCY MEDICINE

## 2020-01-12 PROCEDURE — 80053 COMPREHEN METABOLIC PANEL: CPT

## 2020-01-12 PROCEDURE — 96374 THER/PROPH/DIAG INJ IV PUSH: CPT

## 2020-01-12 PROCEDURE — 6360000002 HC RX W HCPCS: Performed by: EMERGENCY MEDICINE

## 2020-01-12 PROCEDURE — 96375 TX/PRO/DX INJ NEW DRUG ADDON: CPT

## 2020-01-12 PROCEDURE — 2580000003 HC RX 258: Performed by: NURSE PRACTITIONER

## 2020-01-12 PROCEDURE — 2500000003 HC RX 250 WO HCPCS: Performed by: NURSE PRACTITIONER

## 2020-01-12 PROCEDURE — G0378 HOSPITAL OBSERVATION PER HR: HCPCS

## 2020-01-12 PROCEDURE — 96361 HYDRATE IV INFUSION ADD-ON: CPT

## 2020-01-12 PROCEDURE — 85025 COMPLETE CBC W/AUTO DIFF WBC: CPT

## 2020-01-12 PROCEDURE — 74177 CT ABD & PELVIS W/CONTRAST: CPT

## 2020-01-12 PROCEDURE — 96372 THER/PROPH/DIAG INJ SC/IM: CPT

## 2020-01-12 PROCEDURE — 2580000003 HC RX 258: Performed by: EMERGENCY MEDICINE

## 2020-01-12 PROCEDURE — 36415 COLL VENOUS BLD VENIPUNCTURE: CPT

## 2020-01-12 PROCEDURE — 6370000000 HC RX 637 (ALT 250 FOR IP): Performed by: NURSE PRACTITIONER

## 2020-01-12 PROCEDURE — 99285 EMERGENCY DEPT VISIT HI MDM: CPT

## 2020-01-12 PROCEDURE — 76705 ECHO EXAM OF ABDOMEN: CPT

## 2020-01-12 PROCEDURE — 81001 URINALYSIS AUTO W/SCOPE: CPT

## 2020-01-12 RX ORDER — PROMETHAZINE HYDROCHLORIDE 25 MG/ML
25 INJECTION, SOLUTION INTRAMUSCULAR; INTRAVENOUS ONCE
Status: COMPLETED | OUTPATIENT
Start: 2020-01-12 | End: 2020-01-12

## 2020-01-12 RX ORDER — TAMSULOSIN HYDROCHLORIDE 0.4 MG/1
0.4 CAPSULE ORAL DAILY
Status: DISCONTINUED | OUTPATIENT
Start: 2020-01-12 | End: 2020-01-13 | Stop reason: HOSPADM

## 2020-01-12 RX ORDER — ACETAMINOPHEN 325 MG/1
650 TABLET ORAL EVERY 4 HOURS PRN
Status: DISCONTINUED | OUTPATIENT
Start: 2020-01-12 | End: 2020-01-13 | Stop reason: HOSPADM

## 2020-01-12 RX ORDER — DILTIAZEM HYDROCHLORIDE 120 MG/1
120 CAPSULE, COATED, EXTENDED RELEASE ORAL DAILY
Status: DISCONTINUED | OUTPATIENT
Start: 2020-01-12 | End: 2020-01-13 | Stop reason: HOSPADM

## 2020-01-12 RX ORDER — POTASSIUM CHLORIDE 20 MEQ/1
40 TABLET, EXTENDED RELEASE ORAL PRN
Status: DISCONTINUED | OUTPATIENT
Start: 2020-01-12 | End: 2020-01-13 | Stop reason: HOSPADM

## 2020-01-12 RX ORDER — SODIUM CHLORIDE 0.9 % (FLUSH) 0.9 %
10 SYRINGE (ML) INJECTION PRN
Status: DISCONTINUED | OUTPATIENT
Start: 2020-01-12 | End: 2020-01-13 | Stop reason: HOSPADM

## 2020-01-12 RX ORDER — SODIUM CHLORIDE 9 MG/ML
INJECTION, SOLUTION INTRAVENOUS CONTINUOUS
Status: DISCONTINUED | OUTPATIENT
Start: 2020-01-12 | End: 2020-01-13

## 2020-01-12 RX ORDER — ONDANSETRON 2 MG/ML
4 INJECTION INTRAMUSCULAR; INTRAVENOUS EVERY 6 HOURS PRN
Status: DISCONTINUED | OUTPATIENT
Start: 2020-01-12 | End: 2020-01-13 | Stop reason: HOSPADM

## 2020-01-12 RX ORDER — PROMETHAZINE HYDROCHLORIDE 25 MG/ML
12.5 INJECTION, SOLUTION INTRAMUSCULAR; INTRAVENOUS EVERY 4 HOURS PRN
Status: DISCONTINUED | OUTPATIENT
Start: 2020-01-12 | End: 2020-01-13 | Stop reason: HOSPADM

## 2020-01-12 RX ORDER — 0.9 % SODIUM CHLORIDE 0.9 %
1000 INTRAVENOUS SOLUTION INTRAVENOUS ONCE
Status: COMPLETED | OUTPATIENT
Start: 2020-01-12 | End: 2020-01-12

## 2020-01-12 RX ORDER — SODIUM CHLORIDE 0.9 % (FLUSH) 0.9 %
10 SYRINGE (ML) INJECTION EVERY 12 HOURS SCHEDULED
Status: DISCONTINUED | OUTPATIENT
Start: 2020-01-12 | End: 2020-01-13 | Stop reason: HOSPADM

## 2020-01-12 RX ORDER — MAGNESIUM SULFATE 1 G/100ML
1 INJECTION INTRAVENOUS PRN
Status: DISCONTINUED | OUTPATIENT
Start: 2020-01-12 | End: 2020-01-13 | Stop reason: HOSPADM

## 2020-01-12 RX ORDER — POTASSIUM CHLORIDE 7.45 MG/ML
10 INJECTION INTRAVENOUS PRN
Status: DISCONTINUED | OUTPATIENT
Start: 2020-01-12 | End: 2020-01-13 | Stop reason: HOSPADM

## 2020-01-12 RX ORDER — ONDANSETRON 2 MG/ML
4 INJECTION INTRAMUSCULAR; INTRAVENOUS ONCE
Status: COMPLETED | OUTPATIENT
Start: 2020-01-12 | End: 2020-01-12

## 2020-01-12 RX ADMIN — ONDANSETRON 4 MG: 2 INJECTION INTRAMUSCULAR; INTRAVENOUS at 13:33

## 2020-01-12 RX ADMIN — PROMETHAZINE HYDROCHLORIDE 25 MG: 25 INJECTION INTRAMUSCULAR; INTRAVENOUS at 14:31

## 2020-01-12 RX ADMIN — FAMOTIDINE 20 MG: 10 INJECTION, SOLUTION INTRAVENOUS at 21:06

## 2020-01-12 RX ADMIN — SODIUM CHLORIDE, PRESERVATIVE FREE 10 ML: 5 INJECTION INTRAVENOUS at 21:05

## 2020-01-12 RX ADMIN — IOPAMIDOL 80 ML: 755 INJECTION, SOLUTION INTRAVENOUS at 17:40

## 2020-01-12 RX ADMIN — SERTRALINE HYDROCHLORIDE 50 MG: 50 TABLET ORAL at 21:05

## 2020-01-12 RX ADMIN — SODIUM CHLORIDE: 9 INJECTION, SOLUTION INTRAVENOUS at 21:02

## 2020-01-12 RX ADMIN — TAMSULOSIN HYDROCHLORIDE 0.4 MG: 0.4 CAPSULE ORAL at 21:05

## 2020-01-12 RX ADMIN — DILTIAZEM HYDROCHLORIDE 120 MG: 120 CAPSULE, COATED, EXTENDED RELEASE ORAL at 21:05

## 2020-01-12 RX ADMIN — SODIUM CHLORIDE 1000 ML: 9 INJECTION, SOLUTION INTRAVENOUS at 13:33

## 2020-01-12 RX ADMIN — APIXABAN 2.5 MG: 2.5 TABLET, FILM COATED ORAL at 21:05

## 2020-01-12 ASSESSMENT — PAIN SCALES - GENERAL
PAINLEVEL_OUTOF10: 0
PAINLEVEL_OUTOF10: 0

## 2020-01-12 NOTE — ED PROVIDER NOTES
[facility]  eMERGENCYdEPARTMENT eNCOUnter      Pt Name: Domo Thomas  MRN: [id number]  [place] 3/23/1930  Date of evaluation: 1/12/2020  Provider:Anival Mitchell MD    [address] [place] [address]       Chief Complaint   Patient presents with    Emesis         HISTORY OF PRESENT ILLNESS    Domo Thomas is a 80 y.o. male who presents to the emergency department with emesis. Onset yesterday. No fevers, no abdominal pain, normal stools. Hasn't been able to tolerate medications this morning. Nursing Notes were reviewed. REVIEW OF SYSTEMS       Review of Systems    10 point review of systems was performed and was negative exceptas specifically noted in the HPI. PAST MEDICAL HISTORY     Past Medical History:   Diagnosis Date    AAA (abdominal aortic aneurysm) [facility])     Surgery scheduled for 7/1/2014 with Dr. Sonia Kay.  Arthritis     generalized    Asthma     AV block, 1st degree     Back pain     Bradycardia     Colon polyps     Colostomy in place ([address]     Glaucoma     H/O cardiovascular stress test 12/27/2013    cardiolite-EF56%, apical hypokinesis is seen, cannot exlude apical Tyler ischemia    History of blood transfusion     History of cardiovascular stress test 3/5/10    No angina or ischemic EKG changes are noted with Lexiscan. The cardiolite study demonstrated normal perfusion in all segments of the myocardium with an intact left ventricular systolic function. The resting sestamibi dose is 10.8, stress is 32.5. EF 66%    History of chest x-ray 3/16/10    The chest is considered nonacute. There is cardiomegaly noted. COPD.  History of Doppler ultrasound 3/25/10    venous doppler- Technically good venous ultrasound study negative for DVT in both lower extremities. Normal compressibility,color flow doppler pattern and spectral doppler pattern demonstrated thoughout.     History of echocardiogram 3/18/10    Boarderline LV dilatation with concentric hypertrophy. Low normal systolic and abnormal diastolic function. Mild mitral and trace tricuspid regurgitation. Mild aortic root and bilateral dilatation.  Holter monitor, abnormal 11/10/10    11/10/2010- 24 HR- Abnormal holter revealing some significant brasycardia's and wenckebach phenomenon. Therefore, clinical  correlation is recommend.     Hx of blood clots     Hypertension     Pacemaker     PAF (paroxysmal atrial fibrillation) ([address]     Peritonitis ([address]     Personal history of colonic polyps     Poor historian     Skin cancer     face    Ulcerative (chronic) proctosigmoiditis ([address]     Wears glasses          SURGICAL HISTORY       Past Surgical History:   Procedure Laterality Date    ABDOMINAL AORTIC ANEURYSM REPAIR, ENDOVASCULAR  7/1/14    ABDOMINAL EXPLORATION SURGERY  2/4/2013    exp lap, left hemicolectomy, umbilectomy    APPENDECTOMY  2/4/2013    APPENDECTOMY  2/4/2013    COLONOSCOPY  2/4/2013    COLOSTOMY  2/4/2013    CYSTOSCOPY  2/03/2014    TURP    HEMORRHOID SURGERY  2011    HERNIA REPAIR Bilateral [address] hernia    KNEE SURGERY Right 1980s    PACEMAKER INSERTION Right 12/13/2017    BIV PPM Medtronic Percepta Quad CRT-P MRI SureScan Pacemaker    PACEMAKER PLACEMENT Right 02/25/2013    Explanted 12/13/2017    SMALL INTESTINE SURGERY N/A 8/28/2019    EXPLORATORY LAPAROTOMY, SMALL BOWEL RESECTION, LYSIS OF ADHESIONS, NEW COLOSTOMY, AND HERNIA REPAIR WITH XENMATRIX MESH performed by Lawrence Bowden MD at [facility]       Previous Medications    ASCORBIC ACID (VITAMIN C) 1000 MG TABLET    Take 1,000 mg by mouth daily    ATORVASTATIN (LIPITOR) 20 MG TABLET    TAKE 1 TABLET BY MOUTH ONCE DAILY    BIMATOPROST (LUMIGAN) 0.01 % SOLN OPHTHALMIC DROPS    Place 1 drop into both eyes nightly    BRIMONIDINE-TIMOLOL (COMBIGAN) 0.2-0.5 % OPHTHALMIC SOLUTION    Place 1 drop into both eyes every 12 hours    DILTIAZEM (CARDIZEM CD) 120 MG EXTENDED RELEASE CAPSULE    Take 1 capsule by mouth daily    ELIQUIS 2.5 MG TABS TABLET    TAKE 1 TABLET BY MOUTH TWO TIMES DAILY    GLUCOSAMINE-MSM-HYALURONIC ACD (JOINT HEALTH PO)    Take 1 each by mouth daily     LISINOPRIL-HYDROCHLOROTHIAZIDE (PRINZIDE;ZESTORETIC) 10-12.5 MG PER TABLET    Take 0.5 tablets by mouth daily    METOPROLOL SUCCINATE (TOPROL XL) 25 MG EXTENDED RELEASE TABLET    Take 1 tablet by mouth daily    RANITIDINE (ZANTAC) 150 MG TABLET    Take 150 mg by mouth 2 times daily    SERTRALINE (ZOLOFT) 50 MG TABLET    Take 1 tablet by mouth daily    SUCRALFATE (CARAFATE) 1 GM TABLET    Take 1 tablet by mouth 4 times daily    TAMSULOSIN (FLOMAX) 0.4 MG CAPSULE    Take 1 capsule by mouth daily       ALLERGIES     Codeine and Fentanyl    FAMILY HISTORY       Family History   Problem Relation Age of Onset    Stroke Mother         CVA    Heart Disease Mother     Cancer Brother         prostate    Cancer Brother         skin    Cancer Daughter         colon    Substance Abuse Son         Tobacco          SOCIAL HISTORY       Social History     Socioeconomic History    Marital status:       Spouse name: None    Number of children: 3    Years of education: None    Highest education level: None   Occupational History    Occupation: Retired   Social Needs    Financial resource strain: None    Food insecurity:     Worry: None     Inability: None    Transportation needs:     Medical: None     Non-medical: None   Tobacco Use    Smoking status: Never Smoker    Smokeless tobacco: Never Used   Substance and Sexual Activity    Alcohol use: No     Alcohol/week: 0.0 standard drinks    Drug use: No    Sexual activity: Yes     Partners: Female     Comment:    Lifestyle    Physical activity:     Days per week: None     Minutes per session: None    Stress: None   Relationships    Social connections:     Talks on phone: None     Gets together: None     Attends Voodoo service: None percutaneous drainage catheter in place. 4. Status post aorto bi-iliac stent graft repair unchanged from 10/24/2019. US ABDOMEN LIMITED   Final Result   1. Cholelithiasis without evidence of acute cholecystitis or other acute   process in the right upper quadrant. 2. Increased right renal cortical echogenicity as can be seen with chronic   medical renal disease. EDBEDSIDE ULTRASOUND:   Performed by Anastasia Damon - none    LABS:  Labs Reviewed   CBC WITH AUTO DIFFERENTIAL - Abnormal; Notable for the following components:       Result Value    RBC 3.95 (*)     Hemoglobin 10.9 (*)     Hematocrit 36.1 (*)     MCHC 30.2 (*)     RDW 17.0 (*)     Segs Relative 79.7 (*)     Lymphocytes % 12.1 (*)     Monocytes % 4.9 (*)     All other components within normal limits   COMPREHENSIVE METABOLIC PANEL - Abnormal; Notable for the following components:    Alkaline Phosphatase 148 (*)     All other components within normal limits   URINALYSIS WITH MICROSCOPIC - Abnormal; Notable for the following components:    Color, UA STRAW (*)     Leukocyte Esterase, Urine SMALL (*)     RBC, UA 6 (*)     Mucus, UA RARE (*)     All other components within normal limits       All other labs were within normal range or not returned as of this dictation. EMERGENCY DEPARTMENT COURSE and DIFFERENTIAL DIAGNOSIS/MDM:   Vitals:    Vitals:    01/12/20 1320 01/12/20 1456 01/12/20 1611 01/12/20 1752   BP:  (!) 151/81 131/87 (!) 146/82   Pulse: 78 80 82 80   Resp:  16 16 16   Temp:  98 °F (36.7 °C)  98.3 °F (36.8 °C)   TempSrc:  Oral  Oral   SpO2:  96% 93% 97%       MDM  Patient presents with emesis. Vital signs are stable. Examination shows colostomy on the right, no abdominal tenderness palpation. Patient not actively vomiting at this time. I will give the patient Zofran, IV fluids, will get labs. Pt still nauseous, will get RUQ US. Right upper quadrant ultrasound shows gallstones, no cholecystitis.   We will get CT,

## 2020-01-12 NOTE — H&P
Other (See Comments)     Hypotension        Home Medications:     Prior to Admission medications    Medication Sig Start Date End Date Taking?  Authorizing Provider   atorvastatin (LIPITOR) 20 MG tablet TAKE 1 TABLET BY MOUTH ONCE DAILY 12/20/19   Malcolm Tucker MD   ELIQUIS 2.5 MG TABS tablet TAKE 1 TABLET BY MOUTH TWO TIMES DAILY 12/20/19   Malcolm Tucker MD   tamsulosin [facility]) 0.4 MG capsule Take 1 capsule by mouth daily 11/4/19 12/4/19  Malcolm Tucker MD   sucralfate (CARAFATE) 1 GM tablet Take 1 tablet by mouth 4 times daily 11/4/19 12/4/19  Malcolm Tucker MD   sertraline (ZOLOFT) 50 MG tablet Take 1 tablet by mouth daily 11/4/19 12/4/19  Malcolm Tucker MD   lisinopril-hydrochlorothiazide [facility]) 10-12.5 MG per tablet Take 0.5 tablets by mouth daily 11/4/19 12/4/19  Malcolm Tucker MD   metoprolol succinate (TOPROL XL) 25 MG extended release tablet Take 1 tablet by mouth daily 11/4/19 12/4/19  Malcolm Tucker MD   ranitidine (ZANTAC) 150 MG tablet Take 150 mg by mouth 2 times daily    Historical Provider, MD   diltiazem (CARDIZEM CD) 120 MG extended release capsule Take 1 capsule by mouth daily 9/7/19   Marc Solis MD   bimatoprost (LUMIGAN) 0.01 % SOLN ophthalmic drops Place 1 drop into both eyes nightly    Historical Provider, MD   Ascorbic Acid (VITAMIN C) 1000 MG tablet Take 1,000 mg by mouth daily    Historical Provider, MD   brimonidine-timolol (COMBIGAN) 0.2-0.5 % ophthalmic solution Place 1 drop into both eyes every 12 hours    Historical Provider, MD   Glucosamine-MSM-Hyaluronic Acd ([facility]") Take 1 each by mouth daily     Historical Provider, MD         Medications:   Medications:    Infusions:   PRN Meds:     Data:     Laboratory this visit:  Reviewed  Recent Labs     01/12/20  1305   WBC 4.7   HGB 10.9*   HCT 36.1*         Recent Labs     01/12/20  1305      K 3.5      CO2 27   BUN 19   CREATININE

## 2020-01-13 VITALS
HEIGHT: 65 IN | OXYGEN SATURATION: 99 % | DIASTOLIC BLOOD PRESSURE: 58 MMHG | RESPIRATION RATE: 16 BRPM | TEMPERATURE: 98.3 F | WEIGHT: 161.9 LBS | SYSTOLIC BLOOD PRESSURE: 117 MMHG | BODY MASS INDEX: 26.98 KG/M2 | HEART RATE: 80 BPM

## 2020-01-13 LAB
ALBUMIN SERPL-MCNC: 3 GM/DL (ref 3.4–5)
ALP BLD-CCNC: 112 IU/L (ref 40–128)
ALT SERPL-CCNC: 8 U/L (ref 10–40)
ANION GAP SERPL CALCULATED.3IONS-SCNC: 10 MMOL/L (ref 4–16)
AST SERPL-CCNC: 16 IU/L (ref 15–37)
BASOPHILS ABSOLUTE: 0 K/CU MM
BASOPHILS RELATIVE PERCENT: 0.9 % (ref 0–1)
BILIRUB SERPL-MCNC: 0.4 MG/DL (ref 0–1)
BUN BLDV-MCNC: 16 MG/DL (ref 6–23)
CALCIUM SERPL-MCNC: 7.8 MG/DL (ref 8.3–10.6)
CHLORIDE BLD-SCNC: 107 MMOL/L (ref 99–110)
CO2: 28 MMOL/L (ref 21–32)
CREAT SERPL-MCNC: 1.1 MG/DL (ref 0.9–1.3)
DIFFERENTIAL TYPE: ABNORMAL
EOSINOPHILS ABSOLUTE: 0.2 K/CU MM
EOSINOPHILS RELATIVE PERCENT: 3.9 % (ref 0–3)
GFR AFRICAN AMERICAN: >60 ML/MIN/1.73M2
GFR NON-AFRICAN AMERICAN: >60 ML/MIN/1.73M2
GLUCOSE BLD-MCNC: 86 MG/DL (ref 70–99)
HCT VFR BLD CALC: 30.4 % (ref 42–52)
HEMOGLOBIN: 9 GM/DL (ref 13.5–18)
IMMATURE NEUTROPHIL %: 0.2 % (ref 0–0.43)
LYMPHOCYTES ABSOLUTE: 0.8 K/CU MM
LYMPHOCYTES RELATIVE PERCENT: 18.6 % (ref 24–44)
MAGNESIUM: 1.8 MG/DL (ref 1.8–2.4)
MCH RBC QN AUTO: 27.2 PG (ref 27–31)
MCHC RBC AUTO-ENTMCNC: 29.6 % (ref 32–36)
MCV RBC AUTO: 91.8 FL (ref 78–100)
MONOCYTES ABSOLUTE: 0.4 K/CU MM
MONOCYTES RELATIVE PERCENT: 9 % (ref 0–4)
NUCLEATED RBC %: 0 %
PDW BLD-RTO: 17.2 % (ref 11.7–14.9)
PLATELET # BLD: 194 K/CU MM (ref 140–440)
PMV BLD AUTO: 9.6 FL (ref 7.5–11.1)
POTASSIUM SERPL-SCNC: 3.6 MMOL/L (ref 3.5–5.1)
RBC # BLD: 3.31 M/CU MM (ref 4.6–6.2)
SEGMENTED NEUTROPHILS ABSOLUTE COUNT: 2.9 K/CU MM
SEGMENTED NEUTROPHILS RELATIVE PERCENT: 67.4 % (ref 36–66)
SODIUM BLD-SCNC: 145 MMOL/L (ref 135–145)
TOTAL IMMATURE NEUTOROPHIL: 0.01 K/CU MM
TOTAL NUCLEATED RBC: 0 K/CU MM
TOTAL PROTEIN: 5.4 GM/DL (ref 6.4–8.2)
WBC # BLD: 4.4 K/CU MM (ref 4–10.5)

## 2020-01-13 PROCEDURE — 80053 COMPREHEN METABOLIC PANEL: CPT

## 2020-01-13 PROCEDURE — 94761 N-INVAS EAR/PLS OXIMETRY MLT: CPT

## 2020-01-13 PROCEDURE — 83735 ASSAY OF MAGNESIUM: CPT

## 2020-01-13 PROCEDURE — 6370000000 HC RX 637 (ALT 250 FOR IP): Performed by: NURSE PRACTITIONER

## 2020-01-13 PROCEDURE — 36415 COLL VENOUS BLD VENIPUNCTURE: CPT

## 2020-01-13 PROCEDURE — 2580000003 HC RX 258: Performed by: NURSE PRACTITIONER

## 2020-01-13 PROCEDURE — 96376 TX/PRO/DX INJ SAME DRUG ADON: CPT

## 2020-01-13 PROCEDURE — 85025 COMPLETE CBC W/AUTO DIFF WBC: CPT

## 2020-01-13 PROCEDURE — 2500000003 HC RX 250 WO HCPCS: Performed by: NURSE PRACTITIONER

## 2020-01-13 PROCEDURE — G0378 HOSPITAL OBSERVATION PER HR: HCPCS

## 2020-01-13 RX ADMIN — SODIUM CHLORIDE, PRESERVATIVE FREE 10 ML: 5 INJECTION INTRAVENOUS at 08:27

## 2020-01-13 RX ADMIN — DILTIAZEM HYDROCHLORIDE 120 MG: 120 CAPSULE, COATED, EXTENDED RELEASE ORAL at 08:28

## 2020-01-13 RX ADMIN — SODIUM CHLORIDE: 9 INJECTION, SOLUTION INTRAVENOUS at 08:41

## 2020-01-13 RX ADMIN — TAMSULOSIN HYDROCHLORIDE 0.4 MG: 0.4 CAPSULE ORAL at 08:28

## 2020-01-13 RX ADMIN — SERTRALINE HYDROCHLORIDE 50 MG: 50 TABLET ORAL at 08:28

## 2020-01-13 RX ADMIN — FAMOTIDINE 20 MG: 10 INJECTION, SOLUTION INTRAVENOUS at 08:27

## 2020-01-13 RX ADMIN — APIXABAN 2.5 MG: 2.5 TABLET, FILM COATED ORAL at 08:28

## 2020-01-13 ASSESSMENT — PAIN SCALES - GENERAL
PAINLEVEL_OUTOF10: 0
PAINLEVEL_OUTOF10: 0

## 2020-01-13 NOTE — CARE COORDINATION
[address] Transitions Interview     2020    Patient: Gulshan Citizen of [place] Patient : 3/23/1930   MRN: [id number]  Reason for Admission: Intractable N/V; Abdominal wall seroma s/p drain placement by IR 20; Hx- CHF  RARS: Readmission Risk Score: 22    Spoke with: Patient    Readmission Risk  Patient Active Problem List   Diagnosis    Pure hypercholesterolemia    Essential hypertension    Perforated viscus    Anemia    Ileus following gastrointestinal surgery ([facility]    Sick sinus syndrome (HCC)    Hypokalemia    Cardiac pacemaker    Hypertrophy of prostate with urinary obstruction and other lower urinary tract symptoms (LUTS)    Gross hematuria    AAA (abdominal aortic aneurysm) (HCC)    PAF (paroxysmal atrial fibrillation) ([facility]    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    Leukocytosis    Partial small bowel obstruction (HCC)    S/P biventricular cardiac pacemaker procedure    Pacemaker lead malfunction    Complete heart block (HCC)    Chronic systolic heart failure (HCC)    Small bowel obstruction (HCC)    Urinary tract infection without hematuria    Intractable nausea and vomiting       Inpatient Assessment  Care Transitions Summary    Care Transitions Inpatient Review  Medication Review  Do you have all of your prescriptions and are they filled?:  Yes   Are you able to afford your medications?:  No  What assistance programs is the patient using?:  [employer]/Vouchers  How often do you have difficulty taking your medications?:  I always take them as prescribed.   Housing Review  Who do you live with?:  Alone  Are you an active caregiver in your home?:  No  Social Support  Do you have a ?:  No  Do you have a [address] [place] [address]?:  No  Durable Medical Equipment  Patient DME:  Straight cane, Walker, Wheelchair, Other, Commode  Other Patient DME:  built in shower, grabbars  Functional Review  Ability to seek help/take action for once home. Update to Kate Hernandez, .    Mailed the following to Patient home:  -Elderly Support [address]  -Where to Turn Guide  T/C Dr July Miller office; scheduled hospital follow up appt for 1/17/20 2pm w/ Dr Samm Barrera, noted on AVS.        Future Appointments   Date Time Provider Arthur Estrada   1/21/2020 11:30 AM [facility] IR ROOM 1 [address] [facility] Radiolo   3/20/2020  2:30 PM Bobby Mensah MD [address] [facility] [place] [facility]   4/17/2020  3:30 PM Brittany Brizuela MD [facility] [address], RN

## 2020-01-13 NOTE — ED NOTES
Report called and given to [facility] at this time     Grzegorz Fernando, FLORY  01/12/20 1921

## 2020-01-13 NOTE — DISCHARGE SUMMARY
Discharge Summary    Name:  Olamide Graves /Age/Sex: 3/23/1930  (80 y.o. male)   MRN & CSN:  [id number] & [id number] Admission Date/Time: 2020  1:00 PM   Attending:  Edgardo Amaya MD Discharging Physician: Gunner Chong MD     HPI and Hospital Course:   Olamide Graves is a 80 y.o.  male  who presents with Emesis    HPI- as per H and [address]  1-Probable gastroenteritis- symptomatic with N/V - improved. CT non-acute but with cholelithiasis- also has recent abdominal wall seroma for which still has drain in place- advance diet as tolerated and consulted surgery for input who saw him, advanced diet, cleared for discharge if tolerating.  -stopped IVF given hx of chronic systolic HF       Other issues  -S/P parastomal hernia repair and colostomy revision  d/t incarceration-Colostomy care   -PAF on chronic AC- eliquis   -chronic diastolic HF  -HTN         The patient expressed appropriate understanding of and agreement with the discharge recommendations, medications, and plan.      Consults this admission:  IP CONSULT TO HOSPITALIST  IP CONSULT TO GENERAL SURGERY    Discharge Instruction:   Follow up appointments:   Primary care physician:  within 2 weeks    Diet:  General/cardiac/ADA/as tolerated  Activity: {discharge activity: as tolerated  Disposition: Discharged to:   [x]Home, []C, []SNF, []Acute Rehab, []Hospice   Condition on discharge: Stable    Discharge Medications:      Chelsey Goes   Home Medication Instructions BERNARDO:[id number]    Printed on:20 0632   Medication Information                      Ascorbic Acid (VITAMIN C) 1000 MG tablet  Take 1,000 mg by mouth daily             atorvastatin (LIPITOR) 20 MG tablet  TAKE 1 TABLET BY MOUTH ONCE DAILY             bimatoprost (LUMIGAN) 0.01 % SOLN ophthalmic drops  Place 1 drop into both eyes nightly             brimonidine-timolol (COMBIGAN) 0.2-0.5 % ophthalmic solution  Place 1 drop into both eyes every 12 hours             diltiazem (CARDIZEM CD) 120 MG extended release capsule  Take 1 capsule by mouth daily             ELIQUIS 2.5 MG TABS tablet  TAKE 1 TABLET BY MOUTH TWO TIMES DAILY             Glucosamine-MSM-Hyaluronic Acd (JOINT HEALTH PO)  Take 1 each by mouth daily              lisinopril-hydrochlorothiazide (PRINZIDE;ZESTORETIC) 10-12.5 MG per tablet  Take 0.5 tablets by mouth daily             metoprolol succinate (TOPROL XL) 25 MG extended release tablet  Take 1 tablet by mouth daily             ranitidine (ZANTAC) 150 MG tablet  Take 150 mg by mouth 2 times daily             sertraline (ZOLOFT) 50 MG tablet  Take 1 tablet by mouth daily             sucralfate (CARAFATE) 1 GM tablet  Take 1 tablet by mouth 4 times daily             tamsulosin (FLOMAX) 0.4 MG capsule  Take 1 capsule by mouth daily                 Objective Findings at Discharge:   BP (!) 117/58   Pulse 80   Temp 98.3 °F (36.8 °C) (Oral)   Resp 16   Ht 5' 5\" (1.651 m)   Wt 161 lb 14.4 oz (73.4 kg)   SpO2 99%   BMI 26.94 kg/m²            PHYSICAL EXAM   GEN Awake male, laying in bed in no apparent distress. EYES Pupils are equally round. No scleral discharge  HENT Atraumatic and symmetric head  NECK No apparent thyromegaly  RESP Symmetric chest movement while on room air. CARDIO/VASC Peripheral pulses equal bilaterally and palpable. GI Abdomen is not distended. Rectal exam deferred.  Pak catheter is not present. HEME/LYMPH No petechiae or ecchymoses. MSK Spontaneous movement of BL upper extremities  SKIN Normal coloration, warm, dry. NEURO Cranial nerves appear grossly intact  PSYCH Awake, alert.     BMP/CBC  Recent Labs     01/12/20  1305 01/13/20  0412    145   K 3.5 3.6    107   CO2 27 28   BUN 19 16   CREATININE 1.0 1.1   WBC 4.7 4.4   HCT 36.1* 30.4*    194     SIGNIFICANT IMAGING AND LABS:      Discharge Time of 31 minutes    Electronically signed by Albert Lovell MD on 1/13/2020 at 3:43 PM

## 2020-01-13 NOTE — CONSULTS
The chest is considered nonacute. There is cardiomegaly noted. COPD.  History of Doppler ultrasound 3/25/10    venous doppler- Technically good venous ultrasound study negative for DVT in both lower extremities. Normal compressibility,color flow doppler pattern and spectral doppler pattern demonstrated thoughout.  History of echocardiogram 3/18/10    Boarderline LV dilatation with concentric hypertrophy. Low normal systolic and abnormal diastolic function. Mild mitral and trace tricuspid regurgitation. Mild aortic root and bilateral dilatation.  Holter monitor, abnormal 11/10/10    11/10/2010- 24 HR- Abnormal holter revealing some significant brasycardia's and wenckebach phenomenon. Therefore, clinical  correlation is recommend.     Hx of blood clots     Hypertension     Pacemaker     PAF (paroxysmal atrial fibrillation) ([address]     Peritonitis ([address]     Personal history of colonic polyps     Poor historian     Skin cancer     face    Ulcerative (chronic) proctosigmoiditis ([address]     Wears glasses        Past Surgical History:   Procedure Laterality Date    ABDOMINAL AORTIC ANEURYSM REPAIR, ENDOVASCULAR  7/1/14    ABDOMINAL EXPLORATION SURGERY  2/4/2013    exp lap, left hemicolectomy, umbilectomy    APPENDECTOMY  2/4/2013    APPENDECTOMY  2/4/2013    COLONOSCOPY  2/4/2013    COLOSTOMY  2/4/2013    CYSTOSCOPY  2/03/2014    TURP    HEMORRHOID SURGERY  2011    HERNIA REPAIR Bilateral [address] hernia    KNEE SURGERY Right 1980s    PACEMAKER INSERTION Right 12/13/2017    BIV PPM Medtronic Percepta Quad CRT-P MRI SureScan Pacemaker    PACEMAKER PLACEMENT Right 02/25/2013    Explanted 12/13/2017    SMALL INTESTINE SURGERY N/A 8/28/2019    EXPLORATORY LAPAROTOMY, SMALL BOWEL RESECTION, LYSIS OF ADHESIONS, NEW COLOSTOMY, AND HERNIA REPAIR WITH XENMATRIX MESH performed by Messi Vick MD at [facility] OR       Allergies   Allergen Reactions    Codeine Anaphylaxis    Fentanyl Other (See

## 2020-01-13 NOTE — PLAN OF CARE
Problem: Nausea/Vomiting:  Goal: Absence of nausea/vomiting  Description  Absence of nausea/vomiting  1/13/2020 1607 by Radha Geller RN  Outcome: Completed     Problem: Nausea/Vomiting:  Goal: Able to drink  Description  Able to drink  1/13/2020 1607 by Radha Geller RN  Outcome: Completed     Problem: Nausea/Vomiting:  Goal: Able to eat  Description  Able to eat  1/13/2020 1607 by Radha Geller RN  Outcome: Completed     Problem: Nausea/Vomiting:  Goal: Ability to achieve adequate nutritional intake will improve  Description  Ability to achieve adequate nutritional intake will improve  1/13/2020 1607 by Radha Geller RN  Outcome: Completed     Problem: Falls - Risk of:  Goal: Will remain free from falls  Description  Will remain free from falls  1/13/2020 1607 by Radha Geller RN  Outcome: Completed     Problem: Falls - Risk of:  Goal: Absence of physical injury  Description  Absence of physical injury  1/13/2020 1607 by Radha Geller RN  Outcome: Completed

## 2020-01-14 ENCOUNTER — CARE COORDINATION (OUTPATIENT)
Dept: CASE MANAGEMENT | Age: 85
End: 2020-01-14

## 2020-01-14 PROCEDURE — 1111F DSCHRG MED/CURRENT MED MERGE: CPT | Performed by: FAMILY MEDICINE

## 2020-01-16 ENCOUNTER — CARE COORDINATION (OUTPATIENT)
Dept: CASE MANAGEMENT | Age: 85
End: 2020-01-16

## 2020-01-16 NOTE — CARE COORDINATION
[facility] Transitions Follow Up Call    2020    Patient: Adalid Dorado  Patient : 3/23/1930   MRN: [id number]  Reason for Admission: emesis  Discharge Date: 20 RARS: Readmission Risk Score: 25         Spoke with:   patient    Care Transitions Subsequent and Final Call    Schedule Follow Up Appointment with PCP:  Declined  Subsequent and Final Calls  Have your medications changed?:  No  Do you have any questions related to your medications?:  No  Do you currently have any active services?:  No  Are you currently active with any services?:  Home Health  Do you have any needs or concerns that I can assist you with?:  No  Identified Barriers:  None  Care Transitions Interventions   Home Care Waiver:  Completed            Medication Assistance Program:  Completed                 Other Interventions: Follow Up:  Spoke with patient. Reports that he is feeling well. Patient denies nausea, stomach pain, emesis, vomiting or fever. Reports that his colostomy is functioning without issue. Patient has a follow up with surgeon to assess drain/ potential for removal.    Appetite is good; tolerating diet as directed. Patient denies increased SOB, cough or swelling to extremities. Reports that he only weighs himself once weekly. Encouraged daily weights/ reviewed CHF zone management tool and s/s to report to MD.    Patient reports that he is taking his medications as directed. Reports that his family lives close and they support his care as needed. Patient continues to receive MOW from [employer]. Denies further support needs. Patient denies any questions, equipment or resource needs. Agreeable to continued Care Transition. Confirmed CTN contact information. Encouraged call back if needs arise.    Future Appointments   Date Time Provider Arthur Estrada   2020  2:00 PM Pb Eason MD [facility]   2020 11:30 AM [facility] IR ROOM 1 [address] [facility] Radiolo   3/20/2020  2:30 PM Valentín Marsh MD [facility]   4/17/2020  3:30 PM Ollie Dorado MD [facility]       Lennox Reins, RN

## 2020-01-17 ENCOUNTER — OFFICE VISIT (OUTPATIENT)
Dept: FAMILY MEDICINE CLINIC | Age: 85
End: 2020-01-17
Payer: COMMERCIAL

## 2020-01-17 VITALS
DIASTOLIC BLOOD PRESSURE: 72 MMHG | SYSTOLIC BLOOD PRESSURE: 122 MMHG | HEIGHT: 67 IN | WEIGHT: 167 LBS | HEART RATE: 82 BPM | BODY MASS INDEX: 26.21 KG/M2

## 2020-01-17 PROCEDURE — 99213 OFFICE O/P EST LOW 20 MIN: CPT | Performed by: FAMILY MEDICINE

## 2020-01-17 ASSESSMENT — PATIENT HEALTH QUESTIONNAIRE - PHQ9
SUM OF ALL RESPONSES TO PHQ9 QUESTIONS 1 & 2: 0
1. LITTLE INTEREST OR PLEASURE IN DOING THINGS: 0
SUM OF ALL RESPONSES TO PHQ QUESTIONS 1-9: 0
SUM OF ALL RESPONSES TO PHQ QUESTIONS 1-9: 0
2. FEELING DOWN, DEPRESSED OR HOPELESS: 0

## 2020-01-17 NOTE — PROGRESS NOTES
next week  I told him to check and see if surgeon wanted to go off the Eliquis  He is otherwise continue his same medication    Return in about 3 months (around 4/17/2020). Electronically signed by Barb Valente MD on 1/17/2020    Please note that this chart was generated using dragon dictation software. Although every effort was made to ensure the accuracy of this automated transcription, some errors in transcription may have occurred.

## 2020-01-21 ENCOUNTER — HOSPITAL ENCOUNTER (OUTPATIENT)
Dept: INTERVENTIONAL RADIOLOGY/VASCULAR | Age: 85
Discharge: HOME | End: 2020-01-21
Payer: COMMERCIAL

## 2020-01-21 VITALS
DIASTOLIC BLOOD PRESSURE: 80 MMHG | HEART RATE: 79 BPM | HEIGHT: 67 IN | WEIGHT: 167 LBS | OXYGEN SATURATION: 96 % | RESPIRATION RATE: 16 BRPM | BODY MASS INDEX: 26.21 KG/M2 | SYSTOLIC BLOOD PRESSURE: 149 MMHG | TEMPERATURE: 97.6 F

## 2020-01-21 PROCEDURE — 2500000003 HC RX 250 WO HCPCS: Performed by: RADIOLOGY

## 2020-01-21 PROCEDURE — 20501 NJX SINUS TRACT DIAGNOSTIC: CPT

## 2020-01-21 PROCEDURE — 2709999900 HC NON-CHARGEABLE SUPPLY

## 2020-01-21 PROCEDURE — 7100000011 HC PHASE II RECOVERY - ADDTL 15 MIN

## 2020-01-21 PROCEDURE — 76080 X-RAY EXAM OF FISTULA: CPT

## 2020-01-21 PROCEDURE — 7100000010 HC PHASE II RECOVERY - FIRST 15 MIN

## 2020-01-21 PROCEDURE — 2580000003 HC RX 258: Performed by: RADIOLOGY

## 2020-01-21 RX ORDER — SODIUM CHLORIDE 0.9 % (FLUSH) 0.9 %
10 SYRINGE (ML) INJECTION PRN
Status: DISCONTINUED | OUTPATIENT
Start: 2020-01-21 | End: 2020-01-22 | Stop reason: HOSPADM

## 2020-01-21 RX ADMIN — DOXYCYCLINE: 100 INJECTION, POWDER, LYOPHILIZED, FOR SOLUTION INTRAVENOUS at 12:39

## 2020-01-21 ASSESSMENT — PAIN - FUNCTIONAL ASSESSMENT: PAIN_FUNCTIONAL_ASSESSMENT: 0-10

## 2020-01-21 NOTE — PROGRESS NOTES
[facility] Liaison spoke with Brianna Self and  pt is agreeable to [facility] at discharge. Dr. Anurag Edwards gave Brianna Self verbal order for c. HHC initiated. Pt lives alone.

## 2020-01-21 NOTE — PROGRESS NOTES
1145   Received in room from hospitals. Informed consent obtained per Dr Marylin Souza. Assisted to IR table and monitors applied. Vital signs taken and stable. 1200   Sinogram performed. 1206   Contrast injected. Images taken. 1210   Dr Marylin Souza spoke with pt. Not other procedure done at this time. Procedure completed. Vital signs remain stable. Pt tolerated procedure well.

## 2020-01-22 ENCOUNTER — CARE COORDINATION (OUTPATIENT)
Dept: CASE MANAGEMENT | Age: 85
End: 2020-01-22

## 2020-01-22 NOTE — CARE COORDINATION
[facility] Transitions Follow Up Call    2020    Patient: Cordell Mckenzie  Patient : 3/23/1930   MRN: [id number]  Reason for Admission: Probable Gastroenteritis; Recent abdominal wall seroma w/ drain; Hx- CHF  Discharge Date: 20 RARS: Readmission Risk Score: 25    Spoke with: Patient    Care Transitions Subsequent and Final Call    Schedule Follow Up Appointment with PCP:  Declined  Subsequent and Final Calls  Do you have any ongoing symptoms?:  No  Have your medications changed?:  No  Do you have any questions related to your medications?:  No  Do you currently have any active services?:  Yes  Are you currently active with any services?:  Home Health  Do you have any needs or concerns that I can assist you with?:  No  Identified Barriers: Other  Care Transitions Interventions   Home Care Waiver:  Completed            Medication Assistance Program:  Completed                 Other Interventions: Follow Up:  Per note review: Had post-op seroma aspirated/drained per Dr Alhaji Perez on 20, with area appearing loculated and may need IR percutaneous drainage if fluid collection persists. Patient to f/u w/ Dr Alhaji Perez in 1 month. Had sinogram performed on 20 with plan to return to IR Procedures 20. Spoke w/ Patient for follow up Care Transition. When asked about procedure Patient states \"I guess I had something done this week\" however uncertain as to details. Discussed procedures and need to return to [facility] information desk on 20 for procedure. Voices understanding, reports he will have transportation. Reports he still has abdominal drain in place and continues to empty  bulb drain 2-3 times/day. Describes serous drainage. Denies erythema, pain, odor, fever. Reports he is able to manage drain. Reviewed COPD Zone Mgt, voices understanding. Denies sob, wheezing, cough, distress. Reports taking meds as directed.      Now active w/ [address] [place] [address] Rd and was seen today for initial

## 2020-01-27 ENCOUNTER — CARE COORDINATION (OUTPATIENT)
Dept: CASE MANAGEMENT | Age: 85
End: 2020-01-27

## 2020-01-27 NOTE — CARE COORDINATION
[facility] Transitions Follow Up Call    2020    Patient: Maykel Knutson  Patient : 3/23/1930   MRN: [id number]  Reason for Admission:   emesis  Discharge Date: 20 RARS: Readmission Risk Score: 25         Spoke with:   patient    Care Transitions Subsequent and Final Call    Subsequent and Final Calls  Have your medications changed?:  No  Do you have any questions related to your medications?:  No  Do you currently have any active services?:  Yes  Are you currently active with any services?:  Home Health  Do you have any needs or concerns that I can assist you with?:  No  Identified Barriers:  None  Care Transitions Interventions   Home Care Waiver:  Completed            Medication Assistance Program:  Completed                 Other Interventions: Follow Up:  Return call received from patient. Reports that he is feeling well. Confirms that he has followed up with surgeon/ reviewed appointment with IR . Patient reports decreased output from abdominal drain; small amount of serous drainage reported. Instructed patient to report increased drainage, pain , swelling or redness to MD.    Patient reports that he is \"doing well with his breathing\". Denies increased SOB, cough, wheezing. Reports that his daughter visits twice weekly, and assists him with medication management. Patient reports that he is taking his medications as directed:   Reports that he \"gets a little confused at times\"; but his \"daughter always helps him stay directed\". Patient denies need for CTN contact with his daughter. Reports that he receives MOW and goes out for meals when he is able. Patient reports that he is currently out for breakfast.      Confirms services with [facility]. Reminded patient of the  availability of a nurse on call for any change in patient condition. Patient verbalized understanding. Denies any questions. Patient is agreeable to final transition call next week.   Confirmed

## 2020-01-29 ENCOUNTER — HOSPITAL ENCOUNTER (OUTPATIENT)
Dept: CT IMAGING | Age: 85
Discharge: HOME | End: 2020-01-29
Payer: COMMERCIAL

## 2020-01-29 ENCOUNTER — HOSPITAL ENCOUNTER (OUTPATIENT)
Dept: INTERVENTIONAL RADIOLOGY/VASCULAR | Age: 85
Discharge: HOME | End: 2020-01-29
Payer: COMMERCIAL

## 2020-01-29 ENCOUNTER — TELEPHONE (OUTPATIENT)
Dept: FAMILY MEDICINE CLINIC | Age: 85
End: 2020-01-29

## 2020-01-29 VITALS
OXYGEN SATURATION: 98 % | TEMPERATURE: 97.1 F | HEIGHT: 67 IN | WEIGHT: 167 LBS | SYSTOLIC BLOOD PRESSURE: 136 MMHG | DIASTOLIC BLOOD PRESSURE: 80 MMHG | BODY MASS INDEX: 26.21 KG/M2 | RESPIRATION RATE: 16 BRPM | HEART RATE: 80 BPM

## 2020-01-29 PROCEDURE — 76380 CAT SCAN FOLLOW-UP STUDY: CPT

## 2020-01-29 PROCEDURE — 2500000003 HC RX 250 WO HCPCS: Performed by: RADIOLOGY

## 2020-01-29 PROCEDURE — 2580000003 HC RX 258: Performed by: RADIOLOGY

## 2020-01-29 PROCEDURE — 7100000010 HC PHASE II RECOVERY - FIRST 15 MIN

## 2020-01-29 PROCEDURE — 7100000011 HC PHASE II RECOVERY - ADDTL 15 MIN

## 2020-01-29 RX ORDER — SODIUM CHLORIDE 0.9 % (FLUSH) 0.9 %
10 SYRINGE (ML) INJECTION PRN
Status: DISCONTINUED | OUTPATIENT
Start: 2020-01-29 | End: 2020-01-30 | Stop reason: HOSPADM

## 2020-01-29 RX ADMIN — DOXYCYCLINE: 100 INJECTION, POWDER, LYOPHILIZED, FOR SOLUTION INTRAVENOUS at 10:32

## 2020-01-29 ASSESSMENT — PAIN SCALES - GENERAL
PAINLEVEL_OUTOF10: 0

## 2020-01-29 NOTE — H&P
week: Not on file     Minutes per session: Not on file    Stress: Not on file   Relationships    Social connections:     Talks on phone: Not on file     Gets together: Not on file     Attends Mandaen service: Not on file     Active member of club or organization: Not on file     Attends meetings of clubs or organizations: Not on file     Relationship status: Not on file    Intimate partner violence:     Fear of current or ex partner: Not on file     Emotionally abused: Not on file     Physically abused: Not on file     Forced sexual activity: Not on file   Other Topics Concern    Not on file   Social History Narrative    Not on file       Family History:  Family History   Problem Relation Age of Onset    Stroke Mother         CVA    Heart Disease Mother     Cancer Brother         prostate    Cancer Brother         skin    Cancer Daughter         colon    Substance Abuse Son         Tobacco       Allergies:   Allergies   Allergen Reactions    Codeine Anaphylaxis    Fentanyl Other (See Comments)     Hypotension        Medications:  Current Outpatient Medications on File Prior to Encounter   Medication Sig Dispense Refill    sucralfate (CARAFATE) 1 GM tablet TAKE 1 TABLET BY MOUTH FOUR TIMES DAILY 120 tablet 5    atorvastatin (LIPITOR) 20 MG tablet TAKE 1 TABLET BY MOUTH ONCE DAILY 60 tablet 3    ELIQUIS 2.5 MG TABS tablet TAKE 1 TABLET BY MOUTH TWO TIMES DAILY 60 tablet 3    tamsulosin (FLOMAX) 0.4 MG capsule Take 1 capsule by mouth daily 30 capsule 1    sertraline (ZOLOFT) 50 MG tablet Take 1 tablet by mouth daily 30 tablet 1    lisinopril-hydrochlorothiazide (PRINZIDE;ZESTORETIC) 10-12.5 MG per tablet Take 0.5 tablets by mouth daily 15 tablet 1    metoprolol succinate (TOPROL XL) 25 MG extended release tablet Take 1 tablet by mouth daily 30 tablet 1    ranitidine (ZANTAC) 150 MG tablet Take 150 mg by mouth 2 times daily      diltiazem (CARDIZEM CD) 120 MG extended release capsule Take 1 capsule by mouth daily 30 capsule 3    bimatoprost (LUMIGAN) 0.01 % SOLN ophthalmic drops Place 1 drop into both eyes nightly      Ascorbic Acid (VITAMIN C) 1000 MG tablet Take 1,000 mg by mouth daily      brimonidine-timolol (COMBIGAN) 0.2-0.5 % ophthalmic solution Place 1 drop into both eyes every 12 hours      Glucosamine-MSM-Hyaluronic Acd (JOINT HEALTH PO) Take 1 each by mouth daily        No current facility-administered medications on file prior to encounter. Vital Signs:  @FLOWDT(6:last)@ @FLOWSTATM(6:24)@ @FLOWDT(5:last)@ @FLOWDT(8:last)@ @FLOWDT(9:last)@ @FLOWDT(10:last)@     Body mass index is 26.16 kg/m². Laboratory:  No results for input(s): WBC, HEMOGLOBIN, NA, CL, CO2, BUN, CREATININE, GLUCOSE, INR, PTT, CKMB in the last 72 hours. Invalid input(s): HEMATOCRIT, PLATELETS, POTASSIUM, CA, PT, CK1, TROP  INR @LABR24(INR)@    Physical Exam:  GENERAL:Well developed, well nourished in NAD  NECK: Neck exam - No JVD,HJR or carotid bruit, no thyromegaly   RESPIRATORY:Clear to auscultation  HEART:RRR,no murmer, gallop or friction rub  ABDOMEN: Bowel sounds present, no tenderness to palpation.  No organomegaly  EXTREMITIES: no edema or cyanosis  VASCULAR:  no ischemic changes  SKIN: no erythema, rubor or lesions noted  NEURO/PSYCH:Alert and oriented    EKG:    Imaging:    Chest: CTA    Heart: S1, S1    Plan:    Sclerotherapy    Mallampati Score 2  ASA class 2    PLAN OF CARE/PLANNED PROCEDURE    Electronically signed by Tim Springer MD on 1/29/2020 at 7:57 AM

## 2020-01-29 NOTE — PROGRESS NOTES
Patient in CT 2 for Abdominal sinogram/ sclerotherapy. Monitors applied.  scans performed. Dr. Sara Lizama paged to radiology. 1030 Doxycycline injected via existing drain by Dr. Sara Lizama and capped. IR to aspirate agent in 2 hours at 1230 pm    Patient moved back to cart. IR RN to transport . Report at [facility] RN.

## 2020-01-31 NOTE — PROCEDURES
[facility] Interventional Radiology      Procedure: Abdominal fluid collection Sclerotherapy    Pro-procedure Imaging: Decreasing size of RLQ collection    Agent Used: 500mg Doxycline in 50ml of NS    Sclerosing Time: 2.5hrs (all 50cc removed by aspiration)    Disp: Pt is stable no acute complications

## 2020-02-03 ENCOUNTER — CARE COORDINATION (OUTPATIENT)
Dept: CASE MANAGEMENT | Age: 85
End: 2020-02-03

## 2020-02-03 NOTE — CARE COORDINATION
[facility] Transitions Follow Up Call    2/3/2020    Patient: Domo Thomas  Patient : 3/23/1930   MRN: [id number]  Reason for Admission:   Emesis  Discharge Date: 20 RARS: Readmission Risk Score: 25         Spoke with:  patient    Care Transitions Subsequent and Final Call    Subsequent and Final Calls  Have your medications changed?:  No  Do you have any questions related to your medications?:  No  Do you currently have any active services?:  Yes  Are you currently active with any services?:  Home Health  Do you have any needs or concerns that I can assist you with?:  No  Identified Barriers:  None  Care Transitions Interventions   Home Care Waiver:  Completed            Medication Assistance Program:  Completed                 Other Interventions: Follow Up:  Spoke with patient. Reports that he is feeling better. Denies nausea, stomach pain , nausea/ emesis. Reports that his drain is intact with serous drainage noted. Denies increased drainage/ s/s bleeding. Reports that is \"is ok as far as he knows\". Patient confirms that he has a follow up appointment with his surgeon tomorrow. Patient reports that he is taking his medications as directed. Reports that his daughter provides support with his healthcare needs. Requests CTN follow up with his daughter. Patient denies needs. Agreeable to CTN follow up with his daughter. Spoke with patient' s daughter. Reports that patient is doing well. Patient's daughter reports that she checks on patient frequently and that he will call her if he needs anything. Confirms plan for follow up with surgeon tomorrow. Patient's daughter to provide transportation. Patient's daughter reports that patient says that his \"drain is doing the same\". Reports that she has not really looked at it as patient \"is very private about it\". Instructed on worsening s/s that should be reported to patient's surgeon.     Patient's daughter confirms that patient is active with [facility] and that the RN visits weekly on Wednesday. Reminded her of the 24/7 availability of a nurse on call for any change in patient condition. Patient's daughter denies any questions, equipment or resource needs. Agreeable to final transition call next week. Confirmed CTN contact information. Encouraged call back if patient needs arise.        Future Appointments   Date Time Provider Arthur Estrada   2/4/2020 10:15 AM Conor Diaz MD [facility]   2/5/2020  1:15 PM Valentín Marsh MD [facility]   3/20/2020  2:30 PM Valentín Marsh MD [facility]   4/17/2020  3:30 PM Ollie Dorado MD [facility]       Lennox Reins, RN

## 2020-02-05 ENCOUNTER — OFFICE VISIT (OUTPATIENT)
Dept: FAMILY MEDICINE CLINIC | Age: 85
End: 2020-02-05
Payer: COMMERCIAL

## 2020-02-05 VITALS
DIASTOLIC BLOOD PRESSURE: 82 MMHG | BODY MASS INDEX: 28.88 KG/M2 | HEART RATE: 80 BPM | SYSTOLIC BLOOD PRESSURE: 110 MMHG | WEIGHT: 163 LBS | HEIGHT: 63 IN

## 2020-02-05 DIAGNOSIS — R41.3 MEMORY LOSS: ICD-10-CM

## 2020-02-05 DIAGNOSIS — D64.9 ANEMIA, UNSPECIFIED TYPE: ICD-10-CM

## 2020-02-05 DIAGNOSIS — I10 ESSENTIAL HYPERTENSION: ICD-10-CM

## 2020-02-05 LAB
A/G RATIO: 1.5 (ref 1.1–2.2)
ALBUMIN SERPL-MCNC: 4.1 G/DL (ref 3.4–5)
ALP BLD-CCNC: 137 U/L (ref 40–129)
ALT SERPL-CCNC: 11 U/L (ref 10–40)
ANION GAP SERPL CALCULATED.3IONS-SCNC: 13 MMOL/L (ref 3–16)
AST SERPL-CCNC: 20 U/L (ref 15–37)
BASOPHILS ABSOLUTE: 0 K/UL (ref 0–0.2)
BASOPHILS RELATIVE PERCENT: 1.3 %
BILIRUB SERPL-MCNC: 0.4 MG/DL (ref 0–1)
BUN BLDV-MCNC: 23 MG/DL (ref 7–20)
CALCIUM SERPL-MCNC: 8.9 MG/DL (ref 8.3–10.6)
CHLORIDE BLD-SCNC: 98 MMOL/L (ref 99–110)
CO2: 29 MMOL/L (ref 21–32)
CREAT SERPL-MCNC: 1 MG/DL (ref 0.8–1.3)
EOSINOPHILS ABSOLUTE: 0.2 K/UL (ref 0–0.6)
EOSINOPHILS RELATIVE PERCENT: 5.1 %
FERRITIN: 47.8 NG/ML (ref 30–400)
GFR AFRICAN AMERICAN: >60
GFR NON-AFRICAN AMERICAN: >60
GLOBULIN: 2.8 G/DL
GLUCOSE BLD-MCNC: 95 MG/DL (ref 70–99)
HCT VFR BLD CALC: 34.6 % (ref 40.5–52.5)
HEMOGLOBIN: 11.2 G/DL (ref 13.5–17.5)
IRON SATURATION: 26 % (ref 20–50)
IRON: 88 UG/DL (ref 59–158)
LYMPHOCYTES ABSOLUTE: 0.6 K/UL (ref 1–5.1)
LYMPHOCYTES RELATIVE PERCENT: 17.3 %
MCH RBC QN AUTO: 28.4 PG (ref 26–34)
MCHC RBC AUTO-ENTMCNC: 32.2 G/DL (ref 31–36)
MCV RBC AUTO: 88.1 FL (ref 80–100)
MONOCYTES ABSOLUTE: 0.3 K/UL (ref 0–1.3)
MONOCYTES RELATIVE PERCENT: 6.9 %
NEUTROPHILS ABSOLUTE: 2.5 K/UL (ref 1.7–7.7)
NEUTROPHILS RELATIVE PERCENT: 69.4 %
PDW BLD-RTO: 19.5 % (ref 12.4–15.4)
PLATELET # BLD: 210 K/UL (ref 135–450)
PMV BLD AUTO: 8.6 FL (ref 5–10.5)
POTASSIUM SERPL-SCNC: 4.2 MMOL/L (ref 3.5–5.1)
RBC # BLD: 3.93 M/UL (ref 4.2–5.9)
SODIUM BLD-SCNC: 140 MMOL/L (ref 136–145)
TOTAL IRON BINDING CAPACITY: 336 UG/DL (ref 260–445)
TOTAL PROTEIN: 6.9 G/DL (ref 6.4–8.2)
TSH SERPL DL<=0.05 MIU/L-ACNC: 2.99 UIU/ML (ref 0.27–4.2)
VITAMIN B-12: 464 PG/ML (ref 211–911)
WBC # BLD: 3.7 K/UL (ref 4–11)

## 2020-02-05 PROCEDURE — 99214 OFFICE O/P EST MOD 30 MIN: CPT | Performed by: FAMILY MEDICINE

## 2020-02-05 ASSESSMENT — ENCOUNTER SYMPTOMS
SHORTNESS OF BREATH: 0
RHINORRHEA: 0
SORE THROAT: 0
COUGH: 0
CONSTIPATION: 0
CHEST TIGHTNESS: 0
DIARRHEA: 0
SINUS PRESSURE: 0
ABDOMINAL PAIN: 0

## 2020-02-05 NOTE — PROGRESS NOTES
status:       Spouse name: Not on file    Number of children: 3    Years of education: Not on file    Highest education level: Not on file   Occupational History    Occupation: Retired   Social Needs    Financial resource strain: Not on file    Food insecurity:     Worry: Not on file     Inability: Not on file   [email or web address] needs:     Medical: Not on file     Non-medical: Not on file   Tobacco Use    Smoking status: Never Smoker    Smokeless tobacco: Never Used   Substance and Sexual Activity    Alcohol use: No     Alcohol/week: 0.0 standard drinks    Drug use: No    Sexual activity: Yes     Partners: Female     Comment:    Lifestyle    Physical activity:     Days per week: Not on file     Minutes per session: Not on file    Stress: Not on file   Relationships    Social connections:     Talks on phone: Not on file     Gets together: Not on file     Attends Zoroastrian service: Not on file     Active member of club or organization: Not on file     Attends meetings of clubs or organizations: Not on file     Relationship status: Not on file    Intimate partner violence:     Fear of current or ex partner: Not on file     Emotionally abused: Not on file     Physically abused: Not on file     Forced sexual activity: Not on file   Other Topics Concern    Not on file   Social History Narrative    Not on file        SURGICAL HISTORY  Past Surgical History:   Procedure Laterality Date    ABDOMINAL AORTIC ANEURYSM REPAIR, ENDOVASCULAR  7/1/14    ABDOMINAL EXPLORATION SURGERY  2/4/2013    exp lap, left hemicolectomy, umbilectomy    APPENDECTOMY  2/4/2013    APPENDECTOMY  2/4/2013    COLONOSCOPY  2/4/2013    COLOSTOMY  2/4/2013    CYSTOSCOPY  2/03/2014    TURP    HEMORRHOID SURGERY  2011    HERNIA REPAIR Bilateral 1940 & 1958    Ing hernia    KNEE SURGERY Right 1980s    PACEMAKER INSERTION Right 12/13/2017    BIV PPM Medtronic Percepta Quad CRT-P MRI SureScan Pacemaker    PACEMAKER PLACEMENT Right 02/25/2013    Explanted 12/13/2017    SMALL INTESTINE SURGERY N/A 8/28/2019    EXPLORATORY LAPAROTOMY, SMALL BOWEL RESECTION, LYSIS OF ADHESIONS, NEW COLOSTOMY, AND HERNIA REPAIR WITH XENMATRIX MESH performed by Agusto Neal MD at [address] [place] [address]  Current Outpatient Medications   Medication Sig Dispense Refill    sucralfate (CARAFATE) 1 GM tablet TAKE 1 TABLET BY MOUTH FOUR TIMES DAILY 120 tablet 5    atorvastatin (LIPITOR) 20 MG tablet TAKE 1 TABLET BY MOUTH ONCE DAILY 60 tablet 3    ELIQUIS 2.5 MG TABS tablet TAKE 1 TABLET BY MOUTH TWO TIMES DAILY 60 tablet 3    tamsulosin (FLOMAX) 0.4 MG capsule Take 1 capsule by mouth daily 30 capsule 1    sertraline (ZOLOFT) 50 MG tablet Take 1 tablet by mouth daily 30 tablet 1    lisinopril-hydrochlorothiazide (PRINZIDE;ZESTORETIC) 10-12.5 MG per tablet Take 0.5 tablets by mouth daily 15 tablet 1    metoprolol succinate (TOPROL XL) 25 MG extended release tablet Take 1 tablet by mouth daily 30 tablet 1    ranitidine (ZANTAC) 150 MG tablet Take 150 mg by mouth 2 times daily      diltiazem (CARDIZEM CD) 120 MG extended release capsule Take 1 capsule by mouth daily 30 capsule 3    bimatoprost (LUMIGAN) 0.01 % SOLN ophthalmic drops Place 1 drop into both eyes nightly      Ascorbic Acid (VITAMIN C) 1000 MG tablet Take 1,000 mg by mouth daily      brimonidine-timolol (COMBIGAN) 0.2-0.5 % ophthalmic solution Place 1 drop into both eyes every 12 hours      Glucosamine-MSM-Hyaluronic Acd (JOINT HEALTH PO) Take 1 each by mouth daily        No current facility-administered medications for this visit. ALLERGIES  Allergies   Allergen Reactions    Codeine Anaphylaxis    Fentanyl Other (See Comments)     Hypotension        PHYSICAL EXAM  /82   Pulse 80   Ht 5' 3\" (1.6 m)   Wt 163 lb (73.9 kg)   BMI 28.87 kg/m²     Physical Exam  Constitutional:       Appearance: He is well-developed. HENT:      Head: Normocephalic.

## 2020-02-07 ENCOUNTER — CARE COORDINATION (OUTPATIENT)
Dept: CASE MANAGEMENT | Age: 85
End: 2020-02-07

## 2020-02-13 RX ORDER — LISINOPRIL AND HYDROCHLOROTHIAZIDE 12.5; 1 MG/1; MG/1
TABLET ORAL
Qty: 15 TABLET | Refills: 0 | Status: SHIPPED | OUTPATIENT
Start: 2020-02-13 | End: 2020-03-13

## 2020-03-26 ENCOUNTER — PROCEDURE VISIT (OUTPATIENT)
Dept: CARDIOLOGY CLINIC | Age: 85
End: 2020-03-26
Payer: COMMERCIAL

## 2020-03-26 PROCEDURE — 93296 REM INTERROG EVL PM/IDS: CPT | Performed by: INTERNAL MEDICINE

## 2020-03-26 PROCEDURE — 93294 REM INTERROG EVL PM/LDLS PM: CPT | Performed by: INTERNAL MEDICINE

## 2020-04-07 RX ORDER — APIXABAN 2.5 MG/1
TABLET, FILM COATED ORAL
Qty: 60 TABLET | Refills: 3 | Status: SHIPPED | OUTPATIENT
Start: 2020-04-07 | End: 2020-07-31

## 2020-04-07 NOTE — TELEPHONE ENCOUNTER
peggy 02/05/20  Requested Prescriptions     Signed Prescriptions Disp Refills    ELIQUIS 2.5 MG TABS tablet 60 tablet 3     Sig: TAKE 1 TABLET BY MOUTH TWO TIMES DAILY     Authorizing Provider: Luis Daniel Hu     Ordering User: [employer]

## 2020-05-08 RX ORDER — DILTIAZEM HYDROCHLORIDE 120 MG/1
CAPSULE, COATED, EXTENDED RELEASE ORAL
Qty: 30 CAPSULE | Refills: 2 | Status: SHIPPED | OUTPATIENT
Start: 2020-05-08 | End: 2020-08-03 | Stop reason: SDUPTHER

## 2020-06-22 ENCOUNTER — HOSPITAL ENCOUNTER (OUTPATIENT)
Dept: ULTRASOUND IMAGING | Age: 85
Discharge: HOME | End: 2020-06-22
Payer: COMMERCIAL

## 2020-06-22 PROCEDURE — 76775 US EXAM ABDO BACK WALL LIM: CPT

## 2020-06-30 ENCOUNTER — TELEPHONE (OUTPATIENT)
Dept: CARDIOLOGY CLINIC | Age: 85
End: 2020-06-30

## 2020-07-01 RX ORDER — SUCRALFATE 1 G/1
TABLET ORAL
Qty: 120 TABLET | Refills: 0 | Status: SHIPPED | OUTPATIENT
Start: 2020-07-01 | End: 2020-08-03 | Stop reason: SDUPTHER

## 2020-07-02 ENCOUNTER — TELEPHONE (OUTPATIENT)
Dept: CARDIOLOGY CLINIC | Age: 85
End: 2020-07-02

## 2020-07-02 ENCOUNTER — PROCEDURE VISIT (OUTPATIENT)
Dept: CARDIOLOGY CLINIC | Age: 85
End: 2020-07-02
Payer: COMMERCIAL

## 2020-07-02 PROCEDURE — 93294 REM INTERROG EVL PM/LDLS PM: CPT | Performed by: INTERNAL MEDICINE

## 2020-07-02 PROCEDURE — 93296 REM INTERROG EVL PM/IDS: CPT | Performed by: INTERNAL MEDICINE

## 2020-07-02 NOTE — LETTER
Cardiology [address]  [address]  Phone: [phone]  Fax: [phone]    7/2/2020        Daniel Howard  [facility] [address]            Dear Josselyn Ball: This is your [facility] schedule. You can rossy your calendar with these dates. Remember that your device is wireless and should automatically do these checks while you are sleeping. If for any reason I do not get your transmission then I will call you and ask that you send a manual transmission. If you have any questions or concerns, please call and ask for [employer] at [phone]. Thank you.

## 2020-07-16 ENCOUNTER — TELEPHONE (OUTPATIENT)
Dept: FAMILY MEDICINE CLINIC | Age: 85
End: 2020-07-16

## 2020-07-16 NOTE — TELEPHONE ENCOUNTER
Stoma-hesive Powder -----Patient's daughter Tequila Ibarra) is having a hard time finding a store that carries this -----do you have any ideas where they can find this or is there any alternative. Patient will be using the last of this powder within 3 or 4 days, so they really need to buy some more or an alternative.

## 2020-07-28 RX ORDER — APIXABAN 2.5 MG/1
TABLET, FILM COATED ORAL
Qty: 60 TABLET | Refills: 2 | OUTPATIENT
Start: 2020-07-28

## 2020-07-29 RX ORDER — APIXABAN 2.5 MG/1
TABLET, FILM COATED ORAL
Qty: 60 TABLET | Refills: 2 | OUTPATIENT
Start: 2020-07-29

## 2020-07-31 RX ORDER — APIXABAN 2.5 MG/1
TABLET, FILM COATED ORAL
Qty: 60 TABLET | Refills: 0 | Status: SHIPPED | OUTPATIENT
Start: 2020-07-31 | End: 2020-08-03 | Stop reason: SDUPTHER

## 2020-07-31 NOTE — TELEPHONE ENCOUNTER
Requested Prescriptions     Signed Prescriptions Disp Refills    ELIQUIS 2.5 MG TABS tablet 60 tablet 0     Sig: TAKE ONE (1) TABLET BY MOUTH TWO TIMES DAILY     Authorizing Provider: Ezequiel Reyes     Ordering User: Nieves Garcia

## 2020-08-03 ENCOUNTER — OFFICE VISIT (OUTPATIENT)
Dept: FAMILY MEDICINE CLINIC | Age: 85
End: 2020-08-03
Payer: COMMERCIAL

## 2020-08-03 VITALS
HEIGHT: 63 IN | WEIGHT: 173 LBS | DIASTOLIC BLOOD PRESSURE: 68 MMHG | BODY MASS INDEX: 30.65 KG/M2 | TEMPERATURE: 98.4 F | HEART RATE: 79 BPM | OXYGEN SATURATION: 97 % | SYSTOLIC BLOOD PRESSURE: 108 MMHG

## 2020-08-03 PROCEDURE — 99214 OFFICE O/P EST MOD 30 MIN: CPT | Performed by: FAMILY MEDICINE

## 2020-08-03 RX ORDER — METOPROLOL SUCCINATE 25 MG/1
TABLET, EXTENDED RELEASE ORAL
Qty: 30 TABLET | Refills: 5 | Status: SHIPPED | OUTPATIENT
Start: 2020-08-03 | End: 2021-02-08

## 2020-08-03 RX ORDER — ATORVASTATIN CALCIUM 20 MG/1
TABLET, FILM COATED ORAL
Qty: 60 TABLET | Refills: 5 | Status: CANCELLED | OUTPATIENT
Start: 2020-08-03 | End: 2020-09-03

## 2020-08-03 RX ORDER — ATORVASTATIN CALCIUM 20 MG/1
20 TABLET, FILM COATED ORAL DAILY
Qty: 30 TABLET | Refills: 5 | Status: SHIPPED | OUTPATIENT
Start: 2020-08-03 | End: 2021-02-08

## 2020-08-03 RX ORDER — SUCRALFATE 1 G/1
TABLET ORAL
Qty: 60 TABLET | Refills: 5 | Status: SHIPPED | OUTPATIENT
Start: 2020-08-03 | End: 2021-02-08

## 2020-08-03 RX ORDER — TAMSULOSIN HYDROCHLORIDE 0.4 MG/1
CAPSULE ORAL
Qty: 30 CAPSULE | Refills: 5 | Status: SHIPPED | OUTPATIENT
Start: 2020-08-03 | End: 2021-02-08

## 2020-08-03 RX ORDER — LISINOPRIL AND HYDROCHLOROTHIAZIDE 12.5; 1 MG/1; MG/1
TABLET ORAL
Qty: 30 TABLET | Refills: 5 | Status: SHIPPED | OUTPATIENT
Start: 2020-08-03 | End: 2021-04-05 | Stop reason: SDUPTHER

## 2020-08-03 RX ORDER — DILTIAZEM HYDROCHLORIDE 120 MG/1
CAPSULE, COATED, EXTENDED RELEASE ORAL
Qty: 30 CAPSULE | Refills: 5 | Status: SHIPPED | OUTPATIENT
Start: 2020-08-03 | End: 2021-02-08

## 2020-08-03 NOTE — PROGRESS NOTES
8/3/2020    Simi Ruff    Chief Complaint   Patient presents with    6 Month Follow-Up    Fall     on 7/31/20 pt reached down to pick something up and fell (in the kitchen). no apparent injuries    Fatigue       HPI    Kylee Arroyo is a 80 y.o. male who presents today with follow-up. Daughter notes significant flaking thick skin lesions on both arms. Patient notes they have been for there for a while. Patient picks at them. He is willing to see a dermatologist.    Patient acknowledges being hard of hearing. He is unsure whether he is interested in hearing aids. Daughter is very interested in getting hearing aids. Denies orthostasis. Pt without side effects of his bp meds. Notes compliance with bp meds. Patient does not have a list of home blood pressures. REVIEW OF SYSTEMS    Constitutional:  Denies fever, chills, weight loss or weakness  Eyes:  no photophobia or discharge  ENT:  no sore throat or ear pain  Cardiovascular:  Denies chest pain, palpitations or swelling  Respiratory:  Denies cough or shortness of breath  GI:  no abdominal pain, nausea, vomiting, or diarrhea  Musculoskeletal:  no back pain  Skin:  No rashes  Neurologic:  no headache, focal weakness, or sensory changes  Endocrine:  no polyuria or polydipsia      PAST MEDICAL HISTORY  Past Medical History:   Diagnosis Date    AAA (abdominal aortic aneurysm) ([facility] [address]     Surgery scheduled for 7/1/2014 with Dr. Lucia Reese.  Arthritis     generalized    Asthma     AV block, 1st degree     Back pain     Bradycardia     Colon polyps     Colostomy in place ([address]     Glaucoma     H/O cardiovascular stress test 12/27/2013    cardiolite-EF56%, apical hypokinesis is seen, cannot exlude apical Tyler ischemia    History of blood transfusion     History of cardiovascular stress test 3/5/10    No angina or ischemic EKG changes are noted with Lexiscan.  The cardiolite study demonstrated normal perfusion in all segments of the myocardium with an intact left ventricular systolic function. The resting sestamibi dose is 10.8, stress is 32.5. EF 66%    History of chest x-ray 3/16/10    The chest is considered nonacute. There is cardiomegaly noted. COPD.  History of Doppler ultrasound 3/25/10    venous doppler- Technically good venous ultrasound study negative for DVT in both lower extremities. Normal compressibility,color flow doppler pattern and spectral doppler pattern demonstrated thoughout.  History of echocardiogram 3/18/10    Boarderline LV dilatation with concentric hypertrophy. Low normal systolic and abnormal diastolic function. Mild mitral and trace tricuspid regurgitation. Mild aortic root and bilateral dilatation.  Holter monitor, abnormal 11/10/10    11/10/2010- 24 HR- Abnormal holter revealing some significant brasycardia's and wenckebach phenomenon. Therefore, clinical  correlation is recommend.  Hx of blood clots     Pacemaker     PAF (paroxysmal atrial fibrillation) (HCC)     Peritonitis ([address]     Personal history of colonic polyps     Poor historian     Skin cancer     face    Ulcerative (chronic) proctosigmoiditis ([address]     Wears glasses        FAMILY HISTORY  Family History   Problem Relation Age of Onset    Stroke Mother         CVA    Heart Disease Mother     Cancer Brother         prostate    Cancer Brother         skin    Cancer Daughter         colon    Substance Abuse Son         Tobacco       SOCIAL HISTORY  Social History     Socioeconomic History    Marital status:       Spouse name: Not on file    Number of children: 3    Years of education: Not on file    Highest education level: Not on file   Occupational History    Occupation: Retired   Social Needs    Financial resource strain: Not on file    Food insecurity     Worry: Not on file     Inability: Not on file   [employer] needs     Medical: Not on file     Non-medical: Not on file   Tobacco Use    Smoking status: Never Smoker    Smokeless tobacco: Never Used   Substance and Sexual Activity    Alcohol use: No     Alcohol/week: 0.0 standard drinks    Drug use: No    Sexual activity: Yes     Partners: Female     Comment:    Lifestyle    Physical activity     Days per week: Not on file     Minutes per session: Not on file    Stress: Not on file   Relationships    Social connections     Talks on phone: Not on file     Gets together: Not on file     Attends Restorationism service: Not on file     Active member of club or organization: Not on file     Attends meetings of clubs or organizations: Not on file     Relationship status: Not on file    Intimate partner violence     Fear of current or ex partner: Not on file     Emotionally abused: Not on file     Physically abused: Not on file     Forced sexual activity: Not on file   Other Topics Concern    Not on file   Social History Narrative    Not on file        SURGICAL HISTORY  Past Surgical History:   Procedure Laterality Date    ABDOMINAL AORTIC ANEURYSM REPAIR, ENDOVASCULAR  7/1/14    ABDOMINAL EXPLORATION SURGERY  2/4/2013    exp lap, left hemicolectomy, umbilectomy    APPENDECTOMY  2/4/2013    APPENDECTOMY  2/4/2013    COLONOSCOPY  2/4/2013    COLOSTOMY  2/4/2013    CYSTOSCOPY  2/03/2014    TURP    HEMORRHOID SURGERY  2011    HERNIA REPAIR Bilateral 1940 & 1958    Ing hernia    KNEE SURGERY Right 1980s    PACEMAKER INSERTION Right 12/13/2017    BIV PPM Medtronic Percepta Quad CRT-P MRI SureScan Pacemaker    PACEMAKER PLACEMENT Right 02/25/2013    Explanted 12/13/2017    SMALL INTESTINE SURGERY N/A 8/28/2019    EXPLORATORY LAPAROTOMY, SMALL BOWEL RESECTION, LYSIS OF ADHESIONS, NEW COLOSTOMY, AND HERNIA REPAIR WITH XENMATRIX MESH performed by Hugo Hinds MD at [facility]  Current Outpatient Medications   Medication Sig Dispense Refill    sertraline (ZOLOFT) 50 MG tablet Take 50 mg by mouth daily      dilTIAZem (CARDIZEM CD) 120 MG extended release capsule TAKE ONE (1) CAPSULE BY MOUTH ONCE DAILY 30 capsule 5    apixaban (ELIQUIS) 2.5 MG TABS tablet TAKE ONE (1) TABLET BY MOUTH TWO TIMES DAILY 60 tablet 5    lisinopril-hydroCHLOROthiazide (PRINZIDE;ZESTORETIC) 10-12.5 MG per tablet TAKE ONE-HALF (1/2) TABLET BY MOUTH ONCE DAILY 30 tablet 5    metoprolol succinate (TOPROL XL) 25 MG extended release tablet TAKE 1 TABLET BY MOUTH ONCE DAILY 30 tablet 5    sucralfate (CARAFATE) 1 GM tablet Take 1 tab 2 times a day 60 tablet 5    tamsulosin (FLOMAX) 0.4 MG capsule TAKE 1 CAPSULE BY MOUTH ONCE DAILY 30 capsule 5    atorvastatin (LIPITOR) 20 MG tablet Take 1 tablet by mouth daily 30 tablet 5    bimatoprost (LUMIGAN) 0.01 % SOLN ophthalmic drops Place 1 drop into both eyes nightly      Ascorbic Acid (VITAMIN C) 1000 MG tablet Take 1,000 mg by mouth daily      brimonidine-timolol (COMBIGAN) 0.2-0.5 % ophthalmic solution Place 1 drop into both eyes every 12 hours      Glucosamine-MSM-Hyaluronic Acd (JOINT HEALTH PO) Take 1 each by mouth daily        No current facility-administered medications for this visit. ALLERGIES  Allergies   Allergen Reactions    Codeine Anaphylaxis    Fentanyl Other (See Comments)     Hypotension        PHYSICAL EXAM    /68   Pulse 79   Temp 98.4 °F (36.9 °C)   Ht 5' 3\" (1.6 m)   Wt 173 lb (78.5 kg)   SpO2 97%   BMI 30.65 kg/m²     Constitutional:  Well developed, well nourished  HEENT:  Normocephalic, atraumatic, bilateral external ears normal, oropharynx moist, nose normal  Neck:  Normal range of motion, no tenderness, supple  Lymphatic:  No lymphadenopathy noted  Cardiovascular:  Normal heart rate, S1S2 nl  Thorax & Lungs:  Normal breath sounds, no respiratory distress, no wheezing  Skin:  Warm, dry, no erythema, no rash  Back:  straight  Extremities:  No edema, no tenderness, no cyanosis  Musculoskeletal:  Good range of motion   Neurologic:  Alert & oriented X 3      ASSESSMENT & PLAN    1. Essential hypertension  Issue controlled. Continue meds. Refilled meds. - dilTIAZem (CARDIZEM CD) 120 MG extended release capsule; TAKE ONE (1) CAPSULE BY MOUTH ONCE DAILY  Dispense: 30 capsule; Refill: 5  - lisinopril-hydroCHLOROthiazide (PRINZIDE;ZESTORETIC) 10-12.5 MG per tablet; TAKE ONE-HALF (1/2) TABLET BY MOUTH ONCE DAILY  Dispense: 30 tablet; Refill: 5  - metoprolol succinate (TOPROL XL) 25 MG extended release tablet; TAKE 1 TABLET BY MOUTH ONCE DAILY  Dispense: 30 tablet; Refill: 5    2. Chronic systolic heart failure (HCC)  Issue controlled. Continue meds. Refilled meds. 3. Abdominal aortic aneurysm (AAA) without rupture ([facility] [address]  Follow-up per Dr. Paulina Moran neurovascular    4. PAF (paroxysmal atrial fibrillation) (HCC)  Issue controlled. Continue meds. Refilled meds. - apixaban (ELIQUIS) 2.5 MG TABS tablet; TAKE ONE (1) TABLET BY MOUTH TWO TIMES DAILY  Dispense: 60 tablet; Refill: 5    5. 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  Bilateral hearing loss, unspecified hearing loss type  - External Referral To Audiology           Electronically signed by Everlyn Essex, MD on 8/3/2020

## 2020-08-26 ENCOUNTER — PATIENT MESSAGE (OUTPATIENT)
Dept: FAMILY MEDICINE CLINIC | Age: 85
End: 2020-08-26

## 2020-08-27 NOTE — TELEPHONE ENCOUNTER
From: Aldair Martinez  To: Rosario Quinonez MD  Sent: 2020 4:18 PM EDT  Subject: Non-Urgent Medical Question    Hello. My name is carson Christine and I would like to know if you could schedule another appointment with the dermatologist for my dad, Audra Brennan. You can call me for scheduling and I will let him know. [phone]  My dad cancelled it because he didnt think he needed to go. He has squamous cell cancers on his arms that Dr Adrianna Luna said needed to be removed. Thank you. Robert Witt.

## 2020-08-27 NOTE — TELEPHONE ENCOUNTER
The referral was just sent on 8/2/2020, they should still have the referral, they just need to call and reschedule I believe. If they truly need it resent we can refax.

## 2020-09-02 ENCOUNTER — OFFICE VISIT (OUTPATIENT)
Dept: CARDIOLOGY CLINIC | Age: 85
End: 2020-09-02
Payer: COMMERCIAL

## 2020-09-02 VITALS
HEART RATE: 80 BPM | BODY MASS INDEX: 30.94 KG/M2 | HEIGHT: 63 IN | DIASTOLIC BLOOD PRESSURE: 76 MMHG | SYSTOLIC BLOOD PRESSURE: 118 MMHG | WEIGHT: 174.6 LBS

## 2020-09-02 PROCEDURE — 99214 OFFICE O/P EST MOD 30 MIN: CPT | Performed by: INTERNAL MEDICINE

## 2020-09-02 RX ORDER — APIXABAN 2.5 MG/1
2.5 TABLET, FILM COATED ORAL DAILY
COMMUNITY
Start: 2020-08-24 | End: 2021-03-11

## 2020-09-02 NOTE — TELEPHONE ENCOUNTER
Spoke with carson, confirmed pt was able to reschedule with [facility] dermatology

## 2020-09-02 NOTE — PATIENT INSTRUCTIONS
CAD:Non known  HTN:well controlled on current medical regimen, see list above. - changes in  treatment:   no   CARDIOMYOPATHY: None known   CONGESTIVE HEART FAILURE: NO KNOWN HISTORY.     VHD: No significant VHD noted  DYSLIPIDEMIA: Patient's profile is at / near Goal.yes,                                 HDL is low                                Tolerating current medical regimen wellyes,                                                               See most recent Lab values in Labs section above. OTHER RELEVANT DIAGNOSIS:as noted in patient's active problem list:  TESTS ORDERED: None this visit                                      All previously ordered tests reviewed. ARRHYTHMIAS:  Known H/O CHB                                Patient has H/O P. Atrial fibrillation                                He is rate controlled & on anticoagulation. MEDICATIONS: CPM   Office f/u in six months. Device check per protocol. Primary/secondary prevention is the goal by aggressive risk modification, healthy and therapeutic life style changes for cardiovascular risk reduction. Various goals are discussed and multiple questions answered.

## 2020-09-02 NOTE — PROGRESS NOTES
[id number] Score for Atrial Fibrillation Stroke Risk   Risk   Factors  Component Value   C CHF No 0   H HTN Yes 1   A2 Age >= 76 Yes,  (80 y.o.) 2   D DM No 0   S2 Prior Stroke/TIA No 0   V Vascular Disease No 0   A Age 74-69 No,  (80 y.o.) 0   Sc Sex male 0    [id number]  Score  3   Score last updated 9/2/20 1:17 PM EDT

## 2020-09-02 NOTE — LETTER
[facility]  [address] [address]  Phone: [phone]  Fax: [phone]    Jose Thomas MD        September 2, 2020     Wojciech Barberr Tyresedeepthi Matthew    Patient: Laurie Mcintyre  MR Number: [id number]  YOB: 1930  Date of Visit: 9/2/2020    Dear Dr. Wojciech Aldridge: Thank you for the request for consultation for Barbara العلي to me for the evaluation of SSS S/P PPM. Below are the relevant portions of my assessment and plan of care. If you have questions, please do not hesitate to call me. I look forward to following Lori Hutson along with you.     Sincerely,        Jose Thomas MD

## 2020-09-02 NOTE — PROGRESS NOTES
Winston Smith is a 80 y.o. male who has    CHIEF COMPLAINT AS FOLLOWS:   CHEST PAIN: No C/O chest pain. SOB: No C/O SOB at this time. LEG EDEMA: B/L Lower extremity edema is present but no change over previous. PALPITATIONS: Denies any C/O Palpitations                                 DIZZINESS: No C/O Dizziness                          SYNCOPE: None   OTHER:                                     HPI: Patient is here for F/U on his PAF, HTN & Dyslipidemia problems. He does not have any complaints at this time.     Aldair Martinez has the following history recorded in care path:  Patient Active Problem List    Diagnosis Date Noted    Pacemaker lead malfunction      Priority: High    Complete heart block ([facility]      Priority: High    S/P biventricular cardiac pacemaker procedure 12/13/2017     Priority: High    Partial small bowel obstruction ([address] 07/04/2016     Priority: High    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 06/16/2016     Priority: High    Leukocytosis 06/16/2016     Priority: High    Sick sinus syndrome ([address] 02/21/2013     Priority: High    Ileus following gastrointestinal surgery ([address] 02/18/2013     Priority: High    Perforated viscus 02/04/2013     Priority: High    PAF (paroxysmal atrial fibrillation) (HCC)      Priority: Medium    Essential hypertension      Priority: Medium    AAA (abdominal aortic aneurysm) (HCC)      Priority: Low    Hypertrophy of prostate with urinary obstruction and other lower urinary tract symptoms (LUTS) 02/03/2014     Priority: Low    Gross hematuria 02/03/2014     Priority: Low    Cardiac pacemaker 04/24/2013     Priority: Low    Hypokalemia 02/25/2013     Priority: Low    Anemia 02/05/2013     Priority: Low    Pure hypercholesterolemia      Priority: Low    Intractable nausea and vomiting 01/12/2020    Small bowel obstruction ([address] 08/16/2019    Urinary tract infection without hematuria 08/16/2019    Chronic systolic heart failure ([facility] [address] 12/17/2018     Current Outpatient Medications   Medication Sig Dispense Refill    ELIQUIS 2.5 MG TABS tablet Take 2.5 mg by mouth daily      sertraline (ZOLOFT) 50 MG tablet Take 50 mg by mouth daily      dilTIAZem (CARDIZEM CD) 120 MG extended release capsule TAKE ONE (1) CAPSULE BY MOUTH ONCE DAILY 30 capsule 5    lisinopril-hydroCHLOROthiazide (PRINZIDE;ZESTORETIC) 10-12.5 MG per tablet TAKE ONE-HALF (1/2) TABLET BY MOUTH ONCE DAILY 30 tablet 5    metoprolol succinate (TOPROL XL) 25 MG extended release tablet TAKE 1 TABLET BY MOUTH ONCE DAILY 30 tablet 5    sucralfate (CARAFATE) 1 GM tablet Take 1 tab 2 times a day 60 tablet 5    tamsulosin (FLOMAX) 0.4 MG capsule TAKE 1 CAPSULE BY MOUTH ONCE DAILY 30 capsule 5    atorvastatin (LIPITOR) 20 MG tablet Take 1 tablet by mouth daily 30 tablet 5    bimatoprost (LUMIGAN) 0.01 % SOLN ophthalmic drops Place 1 drop into both eyes nightly      Ascorbic Acid (VITAMIN C) 1000 MG tablet Take 1,000 mg by mouth daily      brimonidine-timolol (COMBIGAN) 0.2-0.5 % ophthalmic solution Place 1 drop into both eyes every 12 hours      Glucosamine-MSM-Hyaluronic Acd (JOINT HEALTH PO) Take 1 each by mouth daily        No current facility-administered medications for this visit. Allergies: Codeine and Fentanyl  Past Medical History:   Diagnosis Date    AAA (abdominal aortic aneurysm) [facility])     Surgery scheduled for 7/1/2014 with Dr. Edis Dodge.     Arthritis     generalized    Asthma     AV block, 1st degree     Back pain     Bradycardia     Colon polyps     Colostomy in place ([facility] [address]     Glaucoma     H/O cardiovascular stress test 12/27/2013    cardiolite-EF56%, apical hypokinesis is seen, cannot exlude apical Tyler ischemia    History of blood transfusion     History of cardiovascular stress test 3/5/10    No angina or ischemic EKG changes are noted with Adriana Howard. The cardiolite study demonstrated normal perfusion in all segments of the myocardium with an intact left ventricular systolic function. The resting sestamibi dose is 10.8, stress is 32.5. EF 66%    History of chest x-ray 3/16/10    The chest is considered nonacute. There is cardiomegaly noted. COPD.  History of Doppler ultrasound 3/25/10    venous doppler- Technically good venous ultrasound study negative for DVT in both lower extremities. Normal compressibility,color flow doppler pattern and spectral doppler pattern demonstrated thoughout.  History of echocardiogram 3/18/10    Boarderline LV dilatation with concentric hypertrophy. Low normal systolic and abnormal diastolic function. Mild mitral and trace tricuspid regurgitation. Mild aortic root and bilateral dilatation.  Holter monitor, abnormal 11/10/10    11/10/2010- 24 HR- Abnormal holter revealing some significant brasycardia's and wenckebach phenomenon. Therefore, clinical  correlation is recommend.     Hx of blood clots     Pacemaker     PAF (paroxysmal atrial fibrillation) (HCC)     Peritonitis ([address]     Personal history of colonic polyps     Poor historian     Skin cancer     face    Ulcerative (chronic) proctosigmoiditis ([address]     Wears glasses      Past Surgical History:   Procedure Laterality Date    ABDOMINAL AORTIC ANEURYSM REPAIR, ENDOVASCULAR  7/1/14    ABDOMINAL EXPLORATION SURGERY  2/4/2013    exp lap, left hemicolectomy, umbilectomy    APPENDECTOMY  2/4/2013    APPENDECTOMY  2/4/2013    COLONOSCOPY  2/4/2013    COLOSTOMY  2/4/2013    CYSTOSCOPY  2/03/2014    TURP    HEMORRHOID SURGERY  2011    HERNIA REPAIR Bilateral 1940 & 1958    Ing hernia    KNEE SURGERY Right 1980s    PACEMAKER INSERTION Right 12/13/2017    BIV PPM Medtronic Percepta Quad CRT-P MRI SureScan Pacemaker    PACEMAKER PLACEMENT Right 02/25/2013    Explanted 12/13/2017    SMALL INTESTINE SURGERY N/A 8/28/2019    EXPLORATORY LAPAROTOMY, SMALL BOWEL RESECTION, LYSIS OF ADHESIONS, NEW COLOSTOMY, AND HERNIA REPAIR WITH XENMATRIX MESH performed by Philip Gitelman, MD at [address]      As reviewed   Family History   Problem Relation Age of Onset    Stroke Mother         CVA    Heart Disease Mother     Cancer Brother         prostate    Cancer Brother         skin    Cancer Daughter         colon    Substance Abuse Son         Tobacco     Social History     Tobacco Use    Smoking status: Never Smoker    Smokeless tobacco: Never Used   Substance Use Topics    Alcohol use: No     Alcohol/week: 0.0 standard drinks      Review of Systems:    Constitutional: Negative for diaphoresis and fatigue  Psychological:Negative for anxiety or depression  HENT: Negative for headaches, nasal congestion, sinus pain or vertigo  Eyes: Negative for visual disturbance. Endocrine: Negative for polydipsia/polyuria  Respiratory: Negative for shortness of breath  Cardiovascular: Negative for chest pain, dyspnea on exertion, claudication, edema, irregular heartbeat, murmur, palpitations or shortness of breath  Gastrointestinal: Negative for abdominal pain or heartburn  Genito-Urinary: Negative for urinary frequency/urgency  Musculoskeletal: Negative for muscle pain, muscular weakness, negative for pain in arm and leg or swelling in foot and leg  Neurological: Negative for dizziness, headaches, memory loss, numbness/tingling, visual changes, syncope  Dermatological: Negative for rash    Objective:  /76   Pulse 80   Ht 5' 3\" (1.6 m)   Wt 174 lb 9.6 oz (79.2 kg)   BMI 30.93 kg/m²   Wt Readings from Last 3 Encounters:   09/02/20 174 lb 9.6 oz (79.2 kg)   08/03/20 173 lb (78.5 kg)   02/20/20 168 lb (76.2 kg)     Body mass index is 30.93 kg/m². No flowsheet data found. Vitals:    09/02/20 1318   BP: 118/76   Pulse: 80   Weight: 174 lb 9.6 oz (79.2 kg)   Height: 5' 3\" (1.6 m)      GENERAL - Alert, oriented, pleasant, in no apparent distress.   EYES: No jaundice, no conjunctival pallor. SKIN: It is warm & dry. No rashes. No Echhymosis    HEENT - No clinically significant abnormalities seen. Neck - Supple. No jugular venous distention noted. No carotid bruits. Cardiovascular - Normal S1 and S2 without obvious murmur or gallop. Extremities - No cyanosis, clubbing, or significant edema. Pulmonary - No respiratory distress. No wheezes or rales. Abdomen - No masses, tenderness, or organomegaly. Musculoskeletal - No significant edema. No joint deformities. No muscle wasting. Neurologic - Cranial nerves II through XII are grossly intact. There were no gross focal neurologic abnormalities.     Lab Review   Lab Results   Component Value Date    CKTOTAL 110 12/03/2018    CKTOTAL 148 05/13/2016    TROPONINT <0.010 08/16/2019    TROPONINT <0.010 12/03/2018     BNP:    Lab Results   Component Value Date     02/07/2013     PT/INR:    Lab Results   Component Value Date    INR 1.16 12/18/2019    INR 1.09 09/06/2019     No results found for: LABA1C  Lab Results   Component Value Date    WBC 3.7 (L) 02/05/2020    WBC 4.4 01/13/2020    HCT 34.6 (L) 02/05/2020    HCT 30.4 (L) 01/13/2020    MCV 88.1 02/05/2020    MCV 91.8 01/13/2020     02/05/2020     01/13/2020     Lab Results   Component Value Date    CHOL 160 07/22/2014    CHOL 140 11/26/2012    TRIG 117 07/22/2014    TRIG 122 11/26/2012    HDL 38 (L) 07/22/2014    HDL 47 11/26/2012    LDLCALC 99 07/22/2014    LDLCALC 69 11/26/2012     Lab Results   Component Value Date    ALT 11 02/05/2020    ALT 8 (L) 01/13/2020    AST 20 02/05/2020    AST 16 01/13/2020     BMP:    Lab Results   Component Value Date     02/05/2020     01/13/2020    K 4.2 02/05/2020    K 3.6 01/13/2020    CL 98 02/05/2020     01/13/2020    CO2 29 02/05/2020    CO2 28 01/13/2020    BUN 23 02/05/2020    BUN 16 01/13/2020    CREATININE 1.0 02/05/2020    CREATININE 1.1 01/13/2020     CMP:   Lab Results   Component Value Date  02/05/2020     01/13/2020    K 4.2 02/05/2020    K 3.6 01/13/2020    CL 98 02/05/2020     01/13/2020    CO2 29 02/05/2020    CO2 28 01/13/2020    BUN 23 02/05/2020    BUN 16 01/13/2020    PROT 6.9 02/05/2020    PROT 5.4 01/13/2020    PROT 5.2 02/24/2013    PROT 5.5 02/04/2013     TSH:    Lab Results   Component Value Date    TSH 2.99 02/05/2020    TSHHS 1.950 03/23/2016    TSHHS 3.673 02/21/2013       QUALITY MEASURES REVIEWED:  1.CAD:Patient is taking anti platelet agent:No  2. DYSLIPIDEMIA: Patient is on cholesterol lowering medication:Yes  3. Beta-Blocker therapy for CAD, if prior Myocardial Infarction:Yes  4. Atrial fibrillation & anticoagulation therapy Yes  5. Discussed weight management strategies. Impression:    1. Sick sinus syndrome ([address]    2. S/P biventricular cardiac pacemaker procedure    3. Essential hypertension    4. PAF (paroxysmal atrial fibrillation) ([address]    5. Pure hypercholesterolemia    6. Abdominal aortic aneurysm (AAA) without rupture ([address]    7.  Chronic systolic heart failure (HCC)       Patient Active Problem List   Diagnosis Code    Pure hypercholesterolemia E78.00    Essential hypertension I10    Perforated viscus R19.8    Anemia D64.9    Ileus following gastrointestinal surgery ([address] K91.89, K56.7    Sick sinus syndrome ([address] I49.5    Hypokalemia E87.6    Cardiac pacemaker Z95.0    Hypertrophy of prostate with urinary obstruction and other lower urinary tract symptoms (LUTS) N40.1    Gross hematuria R31.0    AAA (abdominal aortic aneurysm) (HCC) I71.4    PAF [facility]oxysmal [facility] fibrillation) [facility] I48.0    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    Leukocytosis D72.829    Partial small bowel obstruction (HCC) K56.600    S/P biventricular cardiac pacemaker procedure Z95.0    Pacemaker lead malfunction T82.110A    Complete heart block (HCC) I44.2    Chronic systolic heart failure (HCC) I50.22    Small bowel obstruction (HCC) K56.609    Urinary tract infection without hematuria N39.0    Intractable nausea and vomiting R11.2       Assessment & Plan:    CAD:Non known  HTN:well controlled on current medical regimen, see list above. - changes in  treatment:   no   CARDIOMYOPATHY: None known   CONGESTIVE HEART FAILURE: NO KNOWN HISTORY.     VHD: No significant VHD noted  DYSLIPIDEMIA: Patient's profile is at / near Goal.yes,                                 HDL is low                                Tolerating current medical regimen wellyes,                                                               See most recent Lab values in Labs section above. OTHER RELEVANT DIAGNOSIS:as noted in patient's active problem list:  TESTS ORDERED: None this visit                                      All previously ordered tests reviewed. ARRHYTHMIAS:  Known H/O CHB                                Patient has H/O P. Atrial fibrillation                                He is rate controlled & on anticoagulation. MEDICATIONS: CPM   Office f/u in six months. Device check per protocol. Primary/secondary prevention is the goal by aggressive risk modification, healthy and therapeutic life style changes for cardiovascular risk reduction. Various goals are discussed and multiple questions answered.

## 2020-09-22 NOTE — TELEPHONE ENCOUNTER
Requested Prescriptions     Signed Prescriptions Disp Refills    sertraline (ZOLOFT) 50 MG tablet 30 tablet 2     Sig: TAKE ONE (1) TABLET BY MOUTH ONCE DAILY     Authorizing Provider: Melania Saab     Ordering User: Argelia Peralta

## 2020-10-08 PROCEDURE — 93297 REM INTERROG DEV EVAL ICPMS: CPT | Performed by: INTERNAL MEDICINE

## 2020-10-08 PROCEDURE — 93296 REM INTERROG EVL PM/IDS: CPT | Performed by: INTERNAL MEDICINE

## 2020-10-08 PROCEDURE — 93294 REM INTERROG EVL PM/LDLS PM: CPT | Performed by: INTERNAL MEDICINE

## 2020-10-16 ENCOUNTER — PROCEDURE VISIT (OUTPATIENT)
Dept: CARDIOLOGY CLINIC | Age: 85
End: 2020-10-16
Payer: COMMERCIAL

## 2020-12-03 ENCOUNTER — OFFICE VISIT (OUTPATIENT)
Dept: FAMILY MEDICINE CLINIC | Age: 85
End: 2020-12-03
Payer: COMMERCIAL

## 2020-12-03 VITALS
TEMPERATURE: 97.8 F | HEIGHT: 62 IN | WEIGHT: 177 LBS | HEART RATE: 76 BPM | SYSTOLIC BLOOD PRESSURE: 130 MMHG | BODY MASS INDEX: 32.57 KG/M2 | DIASTOLIC BLOOD PRESSURE: 78 MMHG

## 2020-12-03 DIAGNOSIS — I10 ESSENTIAL HYPERTENSION: ICD-10-CM

## 2020-12-03 DIAGNOSIS — E78.00 PURE HYPERCHOLESTEROLEMIA: ICD-10-CM

## 2020-12-03 DIAGNOSIS — R41.3 MEMORY LOSS: ICD-10-CM

## 2020-12-03 LAB
A/G RATIO: 1.8 (ref 1.1–2.2)
ALBUMIN SERPL-MCNC: 4.1 G/DL (ref 3.4–5)
ALP BLD-CCNC: 137 U/L (ref 40–129)
ALT SERPL-CCNC: 9 U/L (ref 10–40)
ANION GAP SERPL CALCULATED.3IONS-SCNC: 14 MMOL/L (ref 3–16)
AST SERPL-CCNC: 22 U/L (ref 15–37)
BASOPHILS ABSOLUTE: 0 K/UL (ref 0–0.2)
BASOPHILS RELATIVE PERCENT: 1 %
BILIRUB SERPL-MCNC: 0.5 MG/DL (ref 0–1)
BUN BLDV-MCNC: 19 MG/DL (ref 7–20)
CALCIUM SERPL-MCNC: 8.9 MG/DL (ref 8.3–10.6)
CHLORIDE BLD-SCNC: 104 MMOL/L (ref 99–110)
CHOLESTEROL, TOTAL: 140 MG/DL (ref 0–199)
CO2: 26 MMOL/L (ref 21–32)
CREAT SERPL-MCNC: 1.1 MG/DL (ref 0.8–1.3)
EOSINOPHILS ABSOLUTE: 0.2 K/UL (ref 0–0.6)
EOSINOPHILS RELATIVE PERCENT: 3.5 %
GFR AFRICAN AMERICAN: >60
GFR NON-AFRICAN AMERICAN: >60
GLOBULIN: 2.3 G/DL
GLUCOSE BLD-MCNC: 124 MG/DL (ref 70–99)
HCT VFR BLD CALC: 38.4 % (ref 40.5–52.5)
HDLC SERPL-MCNC: 39 MG/DL (ref 40–60)
HEMOGLOBIN: 13 G/DL (ref 13.5–17.5)
LDL CHOLESTEROL CALCULATED: 71 MG/DL
LYMPHOCYTES ABSOLUTE: 0.8 K/UL (ref 1–5.1)
LYMPHOCYTES RELATIVE PERCENT: 18.1 %
MCH RBC QN AUTO: 32.5 PG (ref 26–34)
MCHC RBC AUTO-ENTMCNC: 33.9 G/DL (ref 31–36)
MCV RBC AUTO: 95.7 FL (ref 80–100)
MONOCYTES ABSOLUTE: 0.3 K/UL (ref 0–1.3)
MONOCYTES RELATIVE PERCENT: 6.2 %
NEUTROPHILS ABSOLUTE: 3.1 K/UL (ref 1.7–7.7)
NEUTROPHILS RELATIVE PERCENT: 71.2 %
PDW BLD-RTO: 14.3 % (ref 12.4–15.4)
PLATELET # BLD: 187 K/UL (ref 135–450)
PMV BLD AUTO: 8.9 FL (ref 5–10.5)
POTASSIUM SERPL-SCNC: 4.1 MMOL/L (ref 3.5–5.1)
RBC # BLD: 4.02 M/UL (ref 4.2–5.9)
SODIUM BLD-SCNC: 144 MMOL/L (ref 136–145)
TOTAL PROTEIN: 6.4 G/DL (ref 6.4–8.2)
TRIGL SERPL-MCNC: 151 MG/DL (ref 0–150)
TSH SERPL DL<=0.05 MIU/L-ACNC: 5.18 UIU/ML (ref 0.27–4.2)
VLDLC SERPL CALC-MCNC: 30 MG/DL
WBC # BLD: 4.3 K/UL (ref 4–11)

## 2020-12-03 PROCEDURE — 99214 OFFICE O/P EST MOD 30 MIN: CPT | Performed by: FAMILY MEDICINE

## 2020-12-03 ASSESSMENT — PATIENT HEALTH QUESTIONNAIRE - PHQ9
2. FEELING DOWN, DEPRESSED OR HOPELESS: 0
SUM OF ALL RESPONSES TO PHQ QUESTIONS 1-9: 0
1. LITTLE INTEREST OR PLEASURE IN DOING THINGS: 0
SUM OF ALL RESPONSES TO PHQ9 QUESTIONS 1 & 2: 0

## 2020-12-03 NOTE — PROGRESS NOTES
12/5/2020    [facility]    Chief Complaint   Patient presents with   [facility] Other     4 months    Memory Loss     increased memory loss    Fatigue       JYOTI Blanco is a 80 y.o. male who presents today with follow-up. Denies orthostasis. Pt without side effects of his bp meds. Notes compliance with bp meds. Patient and daughter remarks that there AAA is being followed by Dr. Zaira Groves and they are up-to-date on follow-up. Daughter again notes memory loss. We discussed the risks and benefits of medication for such. We discussed the need to look for reversible reasons for cognitive decline. The review of systems for that is negative. There is no shortness of breath or weight gain or edema. Patient and daughter note compliance with current medications for CHF. They deny side effects. They note compliance with cholesterol medication and denies side effects. They are willing to get it checked when fasting. REVIEW OF SYSTEMS    Constitutional:  Denies fever, chills, weight loss or weakness  Eyes:  no photophobia or discharge  ENT:  no sore throat or ear pain  Cardiovascular:  Denies chest pain, palpitations or swelling  Respiratory:  Denies cough or shortness of breath  GI:  no abdominal pain, nausea, vomiting, or diarrhea  Musculoskeletal:  no back pain  Skin:  No rashes  Neurologic:  no headache, focal weakness, or sensory changes  Endocrine:  no polyuria or polydipsia      PAST MEDICAL HISTORY  Past Medical History:   Diagnosis Date    AAA (abdominal aortic aneurysm) ([facility] [address]     Surgery scheduled for 7/1/2014 with Dr. Arminda Craig.     Arthritis     generalized    Asthma     AV block, 1st degree     Back pain     Borderline hypothyroidism 12/4/2020    Bradycardia     Colon polyps     Colostomy in place ([facility] [address]     Glaucoma     H/O cardiovascular stress test 12/27/2013    cardiolite-EF56%, apical hypokinesis is seen, cannot exlude apical Tyler ischemia    History of blood transfusion     History of cardiovascular stress test 3/5/10    No angina or ischemic EKG changes are noted with Lexiscan. The cardiolite study demonstrated normal perfusion in all segments of the myocardium with an intact left ventricular systolic function. The resting sestamibi dose is 10.8, stress is 32.5. EF 66%    History of chest x-ray 3/16/10    The chest is considered nonacute. There is cardiomegaly noted. COPD.  History of Doppler ultrasound 3/25/10    venous doppler- Technically good venous ultrasound study negative for DVT in both lower extremities. Normal compressibility,color flow doppler pattern and spectral doppler pattern demonstrated thoughout.  History of echocardiogram 3/18/10    Boarderline LV dilatation with concentric hypertrophy. Low normal systolic and abnormal diastolic function. Mild mitral and trace tricuspid regurgitation. Mild aortic root and bilateral dilatation.  Holter monitor, abnormal 11/10/10    11/10/2010- 24 HR- Abnormal holter revealing some significant brasycardia's and wenckebach phenomenon. Therefore, clinical  correlation is recommend.  Hx of blood clots     Pacemaker     PAF (paroxysmal atrial fibrillation) (HCC)     Peritonitis ([address]     Personal history of colonic polyps     Poor historian     Skin cancer     face    Ulcerative (chronic) proctosigmoiditis ([address]     Wears glasses        FAMILY HISTORY  Family History   Problem Relation Age of Onset    Stroke Mother         CVA    Heart Disease Mother     Cancer Brother         prostate    Cancer Brother         skin    Cancer Daughter         colon    Substance Abuse Son         Tobacco       SOCIAL HISTORY  Social History     Socioeconomic History    Marital status:       Spouse name: Not on file    Number of children: 3    Years of education: Not on file    Highest education level: Not on file   Occupational History    Occupation: Retired   Social Needs    Financial resource strain: Not on file    Food insecurity     Worry: Not on file     Inability: Not on file    Transportation needs     Medical: Not on file     Non-medical: Not on file   Tobacco Use    Smoking status: Never Smoker    Smokeless tobacco: Never Used   Substance and Sexual Activity    Alcohol use: No     Alcohol/week: 0.0 standard drinks    Drug use: No    Sexual activity: Yes     Partners: Female     Comment:    Lifestyle    Physical activity     Days per week: Not on file     Minutes per session: Not on file    Stress: Not on file   Relationships    Social connections     Talks on phone: Not on file     Gets together: Not on file     Attends Sabianist service: Not on file     Active member of club or organization: Not on file     Attends meetings of clubs or organizations: Not on file     Relationship status: Not on file    Intimate partner violence     Fear of current or ex partner: Not on file     Emotionally abused: Not on file     Physically abused: Not on file     Forced sexual activity: Not on file   Other Topics Concern    Not on file   Social History Narrative    Not on file        SURGICAL HISTORY  Past Surgical History:   Procedure Laterality Date    ABDOMINAL AORTIC ANEURYSM REPAIR, ENDOVASCULAR  7/1/14    ABDOMINAL EXPLORATION SURGERY  2/4/2013    exp lap, left hemicolectomy, umbilectomy    APPENDECTOMY  2/4/2013    APPENDECTOMY  2/4/2013    COLONOSCOPY  2/4/2013    COLOSTOMY  2/4/2013    CYSTOSCOPY  2/03/2014    TURP    HEMORRHOID SURGERY  2011    HERNIA REPAIR Bilateral 1940 & 1958    Ing hernia    KNEE SURGERY Right 1980s    PACEMAKER INSERTION Right 12/13/2017    BIV PPM Medtronic Percepta Quad CRT-P MRI SureScan Pacemaker    PACEMAKER PLACEMENT Right 02/25/2013    Explanted 12/13/2017    SMALL INTESTINE SURGERY N/A 8/28/2019    EXPLORATORY LAPAROTOMY, SMALL BOWEL RESECTION, LYSIS OF ADHESIONS, NEW COLOSTOMY, AND HERNIA REPAIR WITH XENMATRIX MESH performed by Hang Navarrete MD at [place] [address] CURRENT MEDICATIONS  Current Outpatient Medications   Medication Sig Dispense Refill    sertraline (ZOLOFT) 50 MG tablet TAKE ONE (1) TABLET BY MOUTH ONCE DAILY 30 tablet 2    ELIQUIS 2.5 MG TABS tablet Take 2.5 mg by mouth daily      dilTIAZem (CARDIZEM CD) 120 MG extended release capsule TAKE ONE (1) CAPSULE BY MOUTH ONCE DAILY 30 capsule 5    lisinopril-hydroCHLOROthiazide (PRINZIDE;ZESTORETIC) 10-12.5 MG per tablet TAKE ONE-HALF (1/2) TABLET BY MOUTH ONCE DAILY 30 tablet 5    metoprolol succinate (TOPROL XL) 25 MG extended release tablet TAKE 1 TABLET BY MOUTH ONCE DAILY 30 tablet 5    sucralfate (CARAFATE) 1 GM tablet Take 1 tab 2 times a day 60 tablet 5    tamsulosin (FLOMAX) 0.4 MG capsule TAKE 1 CAPSULE BY MOUTH ONCE DAILY 30 capsule 5    atorvastatin (LIPITOR) 20 MG tablet Take 1 tablet by mouth daily 30 tablet 5    bimatoprost (LUMIGAN) 0.01 % SOLN ophthalmic drops Place 1 drop into both eyes nightly      Ascorbic Acid (VITAMIN C) 1000 MG tablet Take 1,000 mg by mouth daily      brimonidine-timolol (COMBIGAN) 0.2-0.5 % ophthalmic solution Place 1 drop into both eyes every 12 hours      Glucosamine-MSM-Hyaluronic Acd (JOINT HEALTH PO) Take 1 each by mouth daily        No current facility-administered medications for this visit.         ALLERGIES  Allergies   Allergen Reactions    Codeine Anaphylaxis    Fentanyl Other (See Comments)     Hypotension        PHYSICAL EXAM    /78   Pulse 76   Temp 97.8 °F (36.6 °C)   Ht 5' 2\" (1.575 m)   Wt 177 lb (80.3 kg)   BMI 32.37 kg/m²     Constitutional:  Well developed, well nourished  HEENT:  Normocephalic, atraumatic, bilateral external ears normal, oropharynx moist, nose normal  Neck:  Normal range of motion, no tenderness, supple  Lymphatic:  No lymphadenopathy noted  Cardiovascular:  Normal heart rate, S1S2 nl  Thorax & Lungs:  Normal breath sounds, no respiratory distress, no wheezing  Skin:  Warm, dry, no erythema, no rash  Back: straight  Extremities:  No edema, no tenderness, no cyanosis  Musculoskeletal:  Good range of motion   Neurologic:  Alert & oriented X 3      ASSESSMENT & PLAN    1. Memory loss  Issue is stable check labs today. Adjust medication off of lab results.  - TSH without Reflex; Future  - Culture, Urine    2. Pure hypercholesterolemia  Issue is stable check labs today. Adjust medication off of lab results. - Comprehensive Metabolic Panel; Future  - Lipid Panel; Future    3. Essential hypertension  Issue controlled. Continue meds. Refilled meds. - CBC Auto Differential; Future    4.  Abdominal aortic aneurysm (AAA) without rupture [facility])  Continue follow-up with cardiology    Follow-up 4 to 6 months       Electronically signed by Benito Stauffer MD on 12/5/2020

## 2020-12-04 PROBLEM — E03.9 BORDERLINE HYPOTHYROIDISM: Status: ACTIVE | Noted: 2020-12-04

## 2020-12-04 LAB — URINE CULTURE, ROUTINE: NORMAL

## 2021-02-06 DIAGNOSIS — I10 ESSENTIAL HYPERTENSION: ICD-10-CM

## 2021-02-08 RX ORDER — ATORVASTATIN CALCIUM 20 MG/1
TABLET, FILM COATED ORAL
Qty: 30 TABLET | Refills: 5 | Status: SHIPPED | OUTPATIENT
Start: 2021-02-08 | End: 2021-08-24

## 2021-02-08 RX ORDER — METOPROLOL SUCCINATE 25 MG/1
TABLET, EXTENDED RELEASE ORAL
Qty: 30 TABLET | Refills: 5 | Status: SHIPPED | OUTPATIENT
Start: 2021-02-08 | End: 2021-09-22 | Stop reason: SDUPTHER

## 2021-02-08 RX ORDER — DILTIAZEM HYDROCHLORIDE 120 MG/1
CAPSULE, COATED, EXTENDED RELEASE ORAL
Qty: 30 CAPSULE | Refills: 5 | Status: SHIPPED | OUTPATIENT
Start: 2021-02-08 | End: 2021-05-20

## 2021-02-08 RX ORDER — TAMSULOSIN HYDROCHLORIDE 0.4 MG/1
CAPSULE ORAL
Qty: 30 CAPSULE | Refills: 5 | Status: SHIPPED | OUTPATIENT
Start: 2021-02-08 | End: 2021-09-22 | Stop reason: SDUPTHER

## 2021-02-08 RX ORDER — SUCRALFATE 1 G/1
TABLET ORAL
Qty: 60 TABLET | Refills: 5 | Status: SHIPPED | OUTPATIENT
Start: 2021-02-08 | End: 2021-07-27

## 2021-02-15 ENCOUNTER — APPOINTMENT (OUTPATIENT)
Dept: CT IMAGING | Age: 86
End: 2021-02-15
Payer: COMMERCIAL

## 2021-02-15 ENCOUNTER — HOSPITAL ENCOUNTER (EMERGENCY)
Age: 86
Discharge: HOME | End: 2021-02-15
Attending: EMERGENCY MEDICINE
Payer: COMMERCIAL

## 2021-02-15 VITALS
OXYGEN SATURATION: 95 % | DIASTOLIC BLOOD PRESSURE: 92 MMHG | SYSTOLIC BLOOD PRESSURE: 139 MMHG | RESPIRATION RATE: 17 BRPM | BODY MASS INDEX: 33.13 KG/M2 | WEIGHT: 180 LBS | HEART RATE: 78 BPM | TEMPERATURE: 98.3 F | HEIGHT: 62 IN

## 2021-02-15 DIAGNOSIS — R11.0 NAUSEA: Primary | ICD-10-CM

## 2021-02-15 DIAGNOSIS — R31.9 URINARY TRACT INFECTION WITH HEMATURIA, SITE UNSPECIFIED: ICD-10-CM

## 2021-02-15 DIAGNOSIS — S30.1XXA ABDOMINAL WALL SEROMA, INITIAL ENCOUNTER: ICD-10-CM

## 2021-02-15 DIAGNOSIS — N39.0 URINARY TRACT INFECTION WITH HEMATURIA, SITE UNSPECIFIED: ICD-10-CM

## 2021-02-15 LAB
ALBUMIN SERPL-MCNC: 3.3 GM/DL (ref 3.4–5)
ALP BLD-CCNC: 108 IU/L (ref 40–129)
ALT SERPL-CCNC: 9 U/L (ref 10–40)
ANION GAP SERPL CALCULATED.3IONS-SCNC: 8 MMOL/L (ref 4–16)
AST SERPL-CCNC: 17 IU/L (ref 15–37)
BACTERIA: ABNORMAL /HPF
BASOPHILS ABSOLUTE: 0.1 K/CU MM
BASOPHILS RELATIVE PERCENT: 0.9 % (ref 0–1)
BILIRUB SERPL-MCNC: 0.5 MG/DL (ref 0–1)
BILIRUBIN URINE: NEGATIVE MG/DL
BLOOD, URINE: ABNORMAL
BUN BLDV-MCNC: 16 MG/DL (ref 6–23)
CALCIUM SERPL-MCNC: 8.6 MG/DL (ref 8.3–10.6)
CHLORIDE BLD-SCNC: 99 MMOL/L (ref 99–110)
CLARITY: ABNORMAL
CO2: 28 MMOL/L (ref 21–32)
COLOR: YELLOW
CREAT SERPL-MCNC: 1.1 MG/DL (ref 0.9–1.3)
DIFFERENTIAL TYPE: ABNORMAL
EOSINOPHILS ABSOLUTE: 0.2 K/CU MM
EOSINOPHILS RELATIVE PERCENT: 2.9 % (ref 0–3)
GFR AFRICAN AMERICAN: >60 ML/MIN/1.73M2
GFR NON-AFRICAN AMERICAN: >60 ML/MIN/1.73M2
GLUCOSE BLD-MCNC: 103 MG/DL (ref 70–99)
GLUCOSE, URINE: NEGATIVE MG/DL
HCT VFR BLD CALC: 41.8 % (ref 42–52)
HEMOGLOBIN: 13.6 GM/DL (ref 13.5–18)
IMMATURE NEUTROPHIL %: 1.3 % (ref 0–0.43)
KETONES, URINE: NEGATIVE MG/DL
LEUKOCYTE ESTERASE, URINE: ABNORMAL
LIPASE: 51 IU/L (ref 13–60)
LYMPHOCYTES ABSOLUTE: 0.7 K/CU MM
LYMPHOCYTES RELATIVE PERCENT: 12.2 % (ref 24–44)
MCH RBC QN AUTO: 32.2 PG (ref 27–31)
MCHC RBC AUTO-ENTMCNC: 32.5 % (ref 32–36)
MCV RBC AUTO: 99.1 FL (ref 78–100)
MONOCYTES ABSOLUTE: 0.3 K/CU MM
MONOCYTES RELATIVE PERCENT: 5.8 % (ref 0–4)
MUCUS: ABNORMAL HPF
NITRITE URINE, QUANTITATIVE: NEGATIVE
NUCLEATED RBC %: 0 %
PDW BLD-RTO: 13.9 % (ref 11.7–14.9)
PH, URINE: 7 (ref 5–8)
PLATELET # BLD: 202 K/CU MM (ref 140–440)
PMV BLD AUTO: 10.4 FL (ref 7.5–11.1)
POTASSIUM SERPL-SCNC: 3.8 MMOL/L (ref 3.5–5.1)
PROTEIN UA: NEGATIVE MG/DL
RBC # BLD: 4.22 M/CU MM (ref 4.6–6.2)
RBC URINE: 78 /HPF (ref 0–3)
SEGMENTED NEUTROPHILS ABSOLUTE COUNT: 4.2 K/CU MM
SEGMENTED NEUTROPHILS RELATIVE PERCENT: 76.9 % (ref 36–66)
SODIUM BLD-SCNC: 135 MMOL/L (ref 135–145)
SPECIFIC GRAVITY UA: 1.01 (ref 1–1.03)
SQUAMOUS EPITHELIAL: <1 /HPF
TOTAL IMMATURE NEUTOROPHIL: 0.07 K/CU MM
TOTAL NUCLEATED RBC: 0 K/CU MM
TOTAL PROTEIN: 7 GM/DL (ref 6.4–8.2)
TRICHOMONAS: ABNORMAL /HPF
UROBILINOGEN, URINE: NEGATIVE MG/DL (ref 0.2–1)
WBC # BLD: 5.5 K/CU MM (ref 4–10.5)
WBC CLUMP: ABNORMAL /HPF
WBC UA: 94 /HPF (ref 0–2)

## 2021-02-15 PROCEDURE — 2580000003 HC RX 258: Performed by: EMERGENCY MEDICINE

## 2021-02-15 PROCEDURE — 99285 EMERGENCY DEPT VISIT HI MDM: CPT

## 2021-02-15 PROCEDURE — 6360000002 HC RX W HCPCS: Performed by: EMERGENCY MEDICINE

## 2021-02-15 PROCEDURE — 6360000004 HC RX CONTRAST MEDICATION: Performed by: EMERGENCY MEDICINE

## 2021-02-15 PROCEDURE — 93005 ELECTROCARDIOGRAM TRACING: CPT | Performed by: EMERGENCY MEDICINE

## 2021-02-15 PROCEDURE — 96375 TX/PRO/DX INJ NEW DRUG ADDON: CPT

## 2021-02-15 PROCEDURE — 87086 URINE CULTURE/COLONY COUNT: CPT

## 2021-02-15 PROCEDURE — 96365 THER/PROPH/DIAG IV INF INIT: CPT

## 2021-02-15 PROCEDURE — 80053 COMPREHEN METABOLIC PANEL: CPT

## 2021-02-15 PROCEDURE — 81001 URINALYSIS AUTO W/SCOPE: CPT

## 2021-02-15 PROCEDURE — 85025 COMPLETE CBC W/AUTO DIFF WBC: CPT

## 2021-02-15 PROCEDURE — 93010 ELECTROCARDIOGRAM REPORT: CPT | Performed by: INTERNAL MEDICINE

## 2021-02-15 PROCEDURE — 83690 ASSAY OF LIPASE: CPT

## 2021-02-15 PROCEDURE — 74177 CT ABD & PELVIS W/CONTRAST: CPT

## 2021-02-15 RX ORDER — 0.9 % SODIUM CHLORIDE 0.9 %
10 VIAL (ML) INJECTION
Status: COMPLETED | OUTPATIENT
Start: 2021-02-15 | End: 2021-02-15

## 2021-02-15 RX ORDER — CEPHALEXIN 500 MG/1
500 CAPSULE ORAL 2 TIMES DAILY
Qty: 20 CAPSULE | Refills: 0 | Status: SHIPPED | OUTPATIENT
Start: 2021-02-15 | End: 2021-02-22

## 2021-02-15 RX ORDER — ONDANSETRON 2 MG/ML
4 INJECTION INTRAMUSCULAR; INTRAVENOUS ONCE
Status: COMPLETED | OUTPATIENT
Start: 2021-02-15 | End: 2021-02-15

## 2021-02-15 RX ORDER — ONDANSETRON 4 MG/1
4 TABLET, ORALLY DISINTEGRATING ORAL 3 TIMES DAILY PRN
Qty: 21 TABLET | Refills: 0 | Status: SHIPPED | OUTPATIENT
Start: 2021-02-15 | End: 2021-04-05

## 2021-02-15 RX ADMIN — ONDANSETRON 4 MG: 2 INJECTION INTRAMUSCULAR; INTRAVENOUS at 06:30

## 2021-02-15 RX ADMIN — SODIUM CHLORIDE INJ 0.9% 10 ML: 0.9 SOLUTION at 07:54

## 2021-02-15 RX ADMIN — IOPAMIDOL 75 ML: 755 INJECTION, SOLUTION INTRAVENOUS at 07:53

## 2021-02-15 RX ADMIN — CEFTRIAXONE 1000 MG: 1 INJECTION, POWDER, FOR SOLUTION INTRAMUSCULAR; INTRAVENOUS at 09:34

## 2021-02-15 ASSESSMENT — ENCOUNTER SYMPTOMS
BACK PAIN: 0
COUGH: 0
SORE THROAT: 0
RHINORRHEA: 0
NAUSEA: 1
ABDOMINAL PAIN: 1
EYE PAIN: 0
SHORTNESS OF BREATH: 0
EYE DISCHARGE: 0
VOMITING: 0

## 2021-02-15 ASSESSMENT — PAIN DESCRIPTION - LOCATION: LOCATION: ABDOMEN

## 2021-02-15 ASSESSMENT — PAIN DESCRIPTION - PAIN TYPE: TYPE: ACUTE PAIN

## 2021-02-15 ASSESSMENT — PAIN SCALES - GENERAL: PAINLEVEL_OUTOF10: 3

## 2021-02-15 NOTE — ED NOTES
Bed: ED-15  Expected date:   Expected time:   Means of arrival:   Comments:  EMS     Alejandrina Rose RN  02/15/21 [id number]

## 2021-02-15 NOTE — ED NOTES
Patient presents to Ed with complaints of abdominal pain. Patient has colostomy to right lower quad. Reports nausea.         Donato Knott RN  02/15/21 [id number]

## 2021-02-15 NOTE — ED PROVIDER NOTES
[address] ENCOUNTER      Pt Name: Alix Buenrostro  MRN: [id number]  [place] 3/23/1930  Date of evaluation: 2/15/2021  Provider: Gerardo Yancey MD    CHIEF COMPLAINT       Chief Complaint   Patient presents with    Abdominal Pain         HISTORY OF PRESENT ILLNESS      Alix Buenrostro is a 80 y.o. male who presents to the emergency department  for   Chief Complaint   Patient presents with    Abdominal Pain       72-year-old male presents reporting some nausea and abdominal pain. He states he comes from home. He states he does live alone. He has a history of abdominal obstruction, colostomy and parastomal hernia. He states he has had a colostomy for a long time. Denies any significant change in his output from his colostomy. He reports that he called his daughter this morning because he was not feeling well. His daughter called squad and he presents the emergency department for evaluation. He does endorse feeling a bit nauseated. He states he just does not feel \"well. \"  Denies any respiratory symptoms. No cough or sputum production. No chest pain. Denies any fever or chills. Denies any dysuria. He is alert and oriented and answering questions appropriately. GCS of 15. Nursing Notes, Triage Notes & Vital Signs were reviewed. REVIEW OF SYSTEMS    (2-9 systems for level 4, 10 or more for level 5)     Review of Systems   Constitutional: Negative for chills and fever. HENT: Negative for congestion, rhinorrhea and sore throat. Eyes: Negative for pain and discharge. Respiratory: Negative for cough and shortness of breath. Cardiovascular: Negative for chest pain and palpitations. Gastrointestinal: Positive for abdominal pain and nausea. Negative for vomiting. Endocrine: Negative for polydipsia and polyuria. Genitourinary: Negative for dysuria and flank pain.    Musculoskeletal: Negative for back pain and neck pain. Skin: Negative for pallor and wound. Neurological: Negative for dizziness, facial asymmetry, light-headedness, numbness and headaches. Psychiatric/Behavioral: Negative for confusion. Except as noted above the remainder of the review of systems was reviewed and negative. PAST MEDICAL HISTORY     Past Medical History:   Diagnosis Date    AAA (abdominal aortic aneurysm) [facility])     Surgery scheduled for 7/1/2014 with Dr. Felipe Nevarez.  Arthritis     generalized    Asthma     AV block, 1st degree     Back pain     Borderline hypothyroidism 12/4/2020    Bradycardia     Colon polyps     Colostomy in place [facility])     Glaucoma     H/O cardiovascular stress test 12/27/2013    cardiolite-EF56%, apical hypokinesis is seen, cannot exlude apical Tyler ischemia    History of blood transfusion     History of cardiovascular stress test 3/5/10    No angina or ischemic EKG changes are noted with Lexiscan. The cardiolite study demonstrated normal perfusion in all segments of the myocardium with an intact left ventricular systolic function. The resting sestamibi dose is 10.8, stress is 32.5. EF 66%    History of chest x-ray 3/16/10    The chest is considered nonacute. There is cardiomegaly noted. COPD.  History of Doppler ultrasound 3/25/10    venous doppler- Technically good venous ultrasound study negative for DVT in both lower extremities. Normal compressibility,color flow doppler pattern and spectral doppler pattern demonstrated thoughout.  History of echocardiogram 3/18/10    [place]line LV dilatation with concentric hypertrophy. Low normal systolic and abnormal diastolic function. Mild mitral and trace tricuspid regurgitation. Mild aortic root and bilateral dilatation.  Holter monitor, abnormal 11/10/10    11/10/2010- 24 HR- Abnormal holter revealing some significant brasycardia's and wenckebach phenomenon. Therefore, clinical  correlation is recommend.     Hx of blood clots     Pacemaker     PAF (paroxysmal atrial fibrillation) (HCC)     Peritonitis ([facility] [address]     Personal history of colonic polyps     Poor historian     Skin cancer     face    Ulcerative (chronic) proctosigmoiditis ([facility] [address]     Wears glasses        Prior to Admission medications    Medication Sig Start Date End Date Taking?  Authorizing Provider   cephALEXin (KEFLEX) 500 MG capsule Take 1 capsule by mouth 2 times daily for 10 days 2/15/21 2/25/21 Yes Sherryle Amel, MD   atorvastatin (LIPITOR) 20 MG tablet TAKE 1 TABLET BY MOUTH ONCE DAILY 2/8/21   Kimmy Enriquez MD   dilTIAZem [facility] CD) 120 MG extended release capsule TAKE 1 CAPSULE BY MOUTH ONCE DAILY 2/8/21   Kimmy Enriquez MD   metoprolol succinate (TOPROL XL) 25 MG extended release tablet TAKE 1 TABLET BY MOUTH ONCE DAILY 2/8/21   Kimmy Enriquez MD   sucralfate (CARAFATE) 1 GM tablet TAKE 1 TABLET BY MOUTH TWO TIMES DAILY 2/8/21   Kimmy Enriquez MD   tamsulosin [facility]) 0.4 MG capsule TAKE 1 CAPSULE BY MOUTH ONCE DAILY 2/8/21   Kimmy Enriquez MD   sertraline (ZOLOFT) 50 MG tablet TAKE ONE (1) TABLET BY MOUTH ONCE DAILY 12/11/20 1/10/21  Kimmy Enriquez MD   ELIQUIS 2.5 MG TABS tablet Take 2.5 mg by mouth daily 8/24/20   Historical Provider, MD   lisinopril-hydroCHLOROthiazide (PRINZIDE;ZESTORETIC) 10-12.5 MG per tablet TAKE ONE-HALF (1/2) TABLET BY MOUTH ONCE DAILY 8/3/20 12/3/20  Kimmy Enriquez MD   bimatoprost (LUMIGAN) 0.01 % SOLN ophthalmic drops Place 1 drop into both eyes nightly    Historical Provider, MD   Ascorbic Acid (VITAMIN C) 1000 MG tablet Take 1,000 mg by mouth daily    Historical Provider, MD   brimonidine-timolol (COMBIGAN) 0.2-0.5 % ophthalmic solution Place 1 drop into both eyes every 12 hours    Historical Provider, MD   Glucosamine-MSM-Hyaluronic Acd ([facility] PO) Take 1 each by mouth daily     Historical Provider, MD        Patient Active Problem List   Diagnosis    Pure hypercholesterolemia    Essential hypertension    Perforated viscus    Anemia    Ileus following gastrointestinal surgery ([facility] [address]    Sick sinus syndrome (HCC)    Hypokalemia    Cardiac pacemaker    Hypertrophy of prostate with urinary obstruction and other lower urinary tract symptoms (LUTS)    Gross hematuria    AAA (abdominal aortic aneurysm) (HCC)    PAF (paroxysmal atrial fibrillation) (HCC)    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    Leukocytosis    Partial small bowel obstruction (HCC)    S/P biventricular cardiac pacemaker procedure    Pacemaker lead malfunction    Complete heart block (HCC)    Chronic systolic heart failure (HCC)    Small bowel obstruction (HCC)    Urinary tract infection without hematuria    Intractable nausea and vomiting    Borderline hypothyroidism         SURGICAL HISTORY       Past Surgical History:   Procedure Laterality Date    ABDOMINAL AORTIC ANEURYSM REPAIR, ENDOVASCULAR  7/1/14    ABDOMINAL EXPLORATION SURGERY  2/4/2013    exp lap, left hemicolectomy, umbilectomy    APPENDECTOMY  2/4/2013    APPENDECTOMY  2/4/2013    COLONOSCOPY  2/4/2013    COLOSTOMY  2/4/2013    CYSTOSCOPY  2/03/2014    TURP    HEMORRHOID SURGERY  2011    HERNIA REPAIR Bilateral 1940 & 1958    Ing hernia    KNEE SURGERY Right 1980s    PACEMAKER INSERTION Right 12/13/2017    BIV PPM Medtronic Percepta Quad CRT-P MRI SureScan Pacemaker    PACEMAKER PLACEMENT Right 02/25/2013    Explanted 12/13/2017    SMALL INTESTINE SURGERY N/A 8/28/2019    EXPLORATORY LAPAROTOMY, SMALL BOWEL RESECTION, LYSIS OF ADHESIONS, NEW COLOSTOMY, AND HERNIA REPAIR WITH XENMATRIX MESH performed by Shreya Ramos MD at [address] [facility]       Previous Medications    ASCORBIC ACID (VITAMIN C) 1000 MG TABLET    Take 1,000 mg by mouth daily    ATORVASTATIN (LIPITOR) 20 MG TABLET    TAKE 1 TABLET BY MOUTH ONCE DAILY    BIMATOPROST (LUMIGAN) 0.01 % SOLN OPHTHALMIC DROPS    Place 1 drop into both eyes nightly    BRIMONIDINE-TIMOLOL (COMBIGAN) 0.2-0.5 % OPHTHALMIC SOLUTION    Place 1 drop into both eyes every 12 hours    DILTIAZEM (CARDIZEM CD) 120 MG EXTENDED RELEASE CAPSULE    TAKE 1 CAPSULE BY MOUTH ONCE DAILY    ELIQUIS 2.5 MG TABS TABLET    Take 2.5 mg by mouth daily    GLUCOSAMINE-MSM-HYALURONIC ACD (JO[facility] PO)    Take 1 each by mouth daily     LISINOPRIL-HYDROCHLOROTHIAZIDE (PRINZIDE;ZESTORETIC) 10-12.5 MG PER TABLET    TAKE ONE-HALF (1/2) TABLET BY MOUTH ONCE DAILY    METOPROLOL SUCCINATE (TOPROL XL) 25 MG EXTENDED RELEASE TABLET    TAKE 1 TABLET BY MOUTH ONCE DAILY    SERTRALINE (ZOLOFT) 50 MG TABLET    TAKE ONE (1) TABLET BY MOUTH ONCE DAILY    SUCRALFATE (CARAFATE) 1 GM TABLET    TAKE 1 TABLET BY MOUTH TWO TIMES DAILY    TAMSULOSIN (FLOMAX) 0.4 MG CAPSULE    TAKE 1 CAPSULE BY MOUTH ONCE DAILY       ALLERGIES     Codeine and Fentanyl    FAMILY HISTORY       Family History   Problem Relation Age of Onset    Stroke Mother         CVA    Heart Disease Mother     Cancer Brother         prostate    Cancer Brother         skin    Cancer Daughter         colon    Substance Abuse Son         Tobacco          SOCIAL HISTORY       Social History     Socioeconomic History    Marital status:       Spouse name: None    Number of children: 3    Years of education: None    Highest education level: None   Occupational History    Occupation: Retired   Social Needs    Financial resource strain: None    Food insecurity     Worry: None     Inability: None    Transportation needs     Medical: None     Non-medical: None   Tobacco Use    Smoking status: Never Smoker    Smokeless tobacco: Never Used   Substance and Sexual Activity    Alcohol use: No     Alcohol/week: 0.0 standard drinks    Drug use: No    Sexual activity: Yes     Partners: Female     Comment:    Lifestyle    Physical activity     Days per week: None     Minutes per session: None    Capillary Refill: Capillary refill takes less than 2 seconds. Findings: No erythema. Neurological:      General: No focal deficit present. Mental Status: He is alert. Comments: Neuro exam nonfocal  GCS 15; moving all extremities spontanoeusly         DIAGNOSTIC RESULTS     Labs Reviewed   CBC WITH AUTO DIFFERENTIAL - Abnormal; Notable for the following components:       Result Value    RBC 4.22 (*)     Hematocrit 41.8 (*)     MCH 32.2 (*)     Segs Relative 76.9 (*)     Lymphocytes % 12.2 (*)     Monocytes % 5.8 (*)     Immature Neutrophil % 1.3 (*)     All other components within normal limits   COMPREHENSIVE METABOLIC PANEL - Abnormal; Notable for the following components:    Glucose 103 (*)     Albumin 3.3 (*)     ALT 9 (*)     All other components within normal limits   URINE RT REFLEX TO CULTURE - Abnormal; Notable for the following components:    Clarity, UA HAZY (*)     Blood, Urine MODERATE (*)     Leukocyte Esterase, Urine LARGE (*)     RBC, UA 78 (*)     WBC, UA 94 (*)     Bacteria, UA RARE (*)     Mucus, UA RARE (*)     All other components within normal limits   CULTURE, URINE   LIPASE          EKG: All EKG's are interpreted by the Emergency Department Physician who either signs or Co-signs this chart in the absence of a cardiologist.       EKG Interpretation    Interpreted by emergency department physician    EKG is interpreted by me. EKG shows sinus rhythm 81 bpm, left axis deviation, QRS complexes are widened, rhythm is paced, unremarkable ST segment elevations, T waves appear overall unremarkable, WA interval 176, QRS duration 148, . Final impression, nonspecific EKG. Yoon Maki     RADIOLOGY:     Non-plain film images such as CT, Ultrasound and MRI are read by the radiologist. Plain radiographic images are visualized and preliminarily interpreted by the emergency physician.        Interpretation per the Radiologist below, if available at the time of this note:    CT ABDOMEN PELVIS W IV CONTRAST Additional Contrast? None   Preliminary Result   1. Diffuse bladder wall thickening and perivesical induration suspicious for   cystitis. 2. Distended gallbladder. This may be due to prolonged fasting. Small   gallstones versus gallbladder wall calcifications. 3. Anterior abdominal wall hernia mesh with surrounding fluid collection   significantly increased compared to prior study, now about 12 x 10 x 7 mm. This may represent chronic seroma, however superimposed infection cannot be   excluded based on imaging. 4. Prostatomegaly. Correlation with PSA recommended. 5. Status post subtotal colectomy with right lower quadrant colostomy. 6. Status post [id number] endograft repair. Stable size of the aneurysm   sac. ED BEDSIDE ULTRASOUND:   Performed by ED Physician Christel Boykin MD       LABS:  Labs Reviewed   CBC WITH AUTO DIFFERENTIAL - Abnormal; Notable for the following components:       Result Value    RBC 4.22 (*)     Hematocrit 41.8 (*)     MCH 32.2 (*)     Segs Relative 76.9 (*)     Lymphocytes % 12.2 (*)     Monocytes % 5.8 (*)     Immature Neutrophil % 1.3 (*)     All other components within normal limits   COMPREHENSIVE METABOLIC PANEL - Abnormal; Notable for the following components:    Glucose 103 (*)     Albumin 3.3 (*)     ALT 9 (*)     All other components within normal limits   URINE RT REFLEX TO CULTURE - Abnormal; Notable for the following components:    Clarity, UA HAZY (*)     Blood, Urine MODERATE (*)     Leukocyte Esterase, Urine LARGE (*)     RBC, UA 78 (*)     WBC, UA 94 (*)     Bacteria, UA RARE (*)     Mucus, UA RARE (*)     All other components within normal limits   CULTURE, URINE   LIPASE       All other labs were within normal range or not returned as of this dictation.     EMERGENCY DEPARTMENT COURSE and DIFFERENTIAL DIAGNOSIS/MDM:   Vitals:    Vitals:    02/15/21 0800 02/15/21 0830 02/15/21 0900 02/15/21 0930 BP: (!) 145/87 128/77 (!) 138/91 (!) 144/103   Pulse: 80 81 80 80   Resp: 20 24 16 14   Temp:       TempSrc:       SpO2: 96% 94% 95% 97%   Weight:       Height:               MDM  Number of Diagnoses or Management Options  Abdominal wall seroma, initial encounter  Nausea  Urinary tract infection with hematuria, site unspecified  Diagnosis management comments: 25-year-old male presents from home with report of nausea and some abdominal pain. He has a history of colostomy. He denies any change in his colostomy output. He states that he called his daughter this morning because he felt unwell. His daughter called squad to come to the emergency department. He is not had any vomiting. Does endorse some nausea. Denies any other remarkable symptoms. He is alert and oriented answer questions appropriately. His blood pressure is elevated. Vitals otherwise unremarkable. His abdomen is soft and nonperitoneal.  He does have a colostomy in place with soft formed stool in it. Labs and CT scan are obtained. Urinalysis is consistent with urinary tract infection. Abdominal CT scan does show some bladder wall thickening consistent with cystitis. Also does show return of an abdominal wall seroma. From view of records, he has had an abdominal seroma drained previously. He does follow with Dr. Claudine Mcarthur. I did speak with Dr. Claudine Mcarthur. She did not have any acute recommendations. He is given a dose of ceftriaxone in the emergency department for the urinary tract infection. We did discuss disposition options. At this time his nausea is improved. He would like to be discharged home and try course of outpatient therapy for the urinary tract infection and nausea. Given the overall unremarkable vitals and work-up, this is reasonable. He demonstrates understanding of his symptoms and shows appropriate capacity. He is discharged home on a course of cephalexin. Also prescribed nausea medicine for home.   He will follow-up outpatient. Amount and/or Complexity of Data Reviewed  Clinical lab tests: reviewed  Tests in the radiology section of CPT®: reviewed  Tests in the medicine section of CPT®: reviewed  Decide to obtain previous medical records or to obtain history from someone other than the patient: yes            -  Patient seen and evaluated in the emergency department. -  Triage and nursing notes reviewed and incorporated. -  Old chart records reviewed and incorporated. -  Work-up included:  See above  -  Results discussed with patient. CONSULTS:  IP CONSULT TO GENERAL SURGERY    PROCEDURES:  None performed unless otherwise noted below     Procedures        FINAL IMPRESSION      1. Nausea    2. Abdominal wall seroma, initial encounter    3. Urinary tract infection with hematuria, site unspecified          DISPOSITION/PLAN   DISPOSITION        PATIENT REFERRED TO:  Juanis Dey MD  [place] [address]  [phone]    Schedule an appointment as soon as possible for a visit       Meño Euceda MD  [address] [place] [address] [address] [place] [address]  [phone]    Schedule an appointment as soon as possible for a visit         DISCHARGE MEDICATIONS:  New Prescriptions    CEPHALEXIN (KEFLEX) 500 MG CAPSULE    Take 1 capsule by mouth 2 times daily for 10 days       ED Provider Disposition Time  DISPOSITION        Appropriate personal protective equipment was worn during the patient's evaluation. These included surgical, eye protection, surgical mask or in 95 respirator and gloves. The patient was also placed in a surgical mask for the prevention of possible spread of respiratory viral illnesses. The Patient was instructed to read the package inserts with any medication that was prescribed. Major potential reactions and medication interactions were discussed. The Patient understands that there are numerous possible adverse reactions not covered.     The patient was also instructed to arrange follow-up with his or her primary care provider for review of any pending labwork or incidental findings on any radiology results that were obtained. All efforts were made to discuss any incidental findings that require further monitoring. Controlled Substances Monitoring:     No flowsheet data found.     (Please note that portions of this note were completed with a voice recognition program.  Efforts were made to edit the dictations but occasionally words are mis-transcribed.)    David Vides MD (electronically signed)  Attending Emergency Physician           David Vides MD  02/25/21 [id number]

## 2021-02-16 ENCOUNTER — CARE COORDINATION (OUTPATIENT)
Dept: CARE COORDINATION | Age: 86
End: 2021-02-16

## 2021-02-16 LAB
CULTURE: NORMAL
Lab: NORMAL
SPECIMEN: NORMAL

## 2021-02-17 ENCOUNTER — CARE COORDINATION (OUTPATIENT)
Dept: CARE COORDINATION | Age: 86
End: 2021-02-17

## 2021-02-17 LAB
EKG ATRIAL RATE: 81 BPM
EKG DIAGNOSIS: NORMAL
EKG P AXIS: 78 DEGREES
EKG P-R INTERVAL: 176 MS
EKG Q-T INTERVAL: 446 MS
EKG QRS DURATION: 148 MS
EKG QTC CALCULATION (BAZETT): 518 MS
EKG R AXIS: 218 DEGREES
EKG T AXIS: 78 DEGREES
EKG VENTRICULAR RATE: 81 BPM

## 2021-02-17 NOTE — CARE COORDINATION
will be further monitored by COVID Loop Team based on severity of symptoms and risk factors. [email or web address]

## 2021-02-22 ENCOUNTER — OFFICE VISIT (OUTPATIENT)
Dept: FAMILY MEDICINE CLINIC | Age: 86
End: 2021-02-22
Payer: COMMERCIAL

## 2021-02-22 VITALS
DIASTOLIC BLOOD PRESSURE: 64 MMHG | HEART RATE: 72 BPM | HEIGHT: 63 IN | BODY MASS INDEX: 30.83 KG/M2 | TEMPERATURE: 96.9 F | SYSTOLIC BLOOD PRESSURE: 118 MMHG | WEIGHT: 174 LBS

## 2021-02-22 DIAGNOSIS — R82.90 ABNORMAL URINALYSIS: ICD-10-CM

## 2021-02-22 DIAGNOSIS — R11.2 INTRACTABLE NAUSEA AND VOMITING: Primary | ICD-10-CM

## 2021-02-22 LAB
COMMENT UA: ABNORMAL
EPITHELIAL CELLS, UA: 11 /HPF (ref 0–5)
RBC UA: ABNORMAL /HPF (ref 0–4)
RENAL EPITHELIAL, UA: ABNORMAL /HPF (ref 0–1)
URINE TYPE: ABNORMAL
WBC UA: 14 /HPF (ref 0–5)

## 2021-02-22 PROCEDURE — 99214 OFFICE O/P EST MOD 30 MIN: CPT | Performed by: FAMILY MEDICINE

## 2021-02-22 NOTE — PROGRESS NOTES
2/22/2021    Tiffani Lion    Chief Complaint   Patient presents with   Ernie Ray Other     er f/u stomach pain    Other     no c/o's       HPI    Amber Dueñas is a 80 y.o. male who presents today with follow-up. Patient is a follow-up in the emergency room in which she called his daughter at 4 in the morning a week ago. The evaluation was only remarkable for an active urine sediments as the urine culture grew nothing out. An enlarged prostate although no urinary symptoms and he is 80years old and a 1 cm seroma on the abdominal wall. It has been a week and patient symptoms have since left. He had been placed on antibiotics but again urine culture did not grow anything out. Patient is present with daughter Shreya Velasquez. REVIEW OF SYSTEMS    Constitutional:  Denies fever, chills, weight loss or weakness  Eyes:  no photophobia or discharge  ENT:  no sore throat or ear pain  Cardiovascular:  Denies chest pain, palpitations or swelling  Respiratory:  Denies cough or shortness of breath  GI:  no abdominal pain, nausea, vomiting, or diarrhea  Musculoskeletal:  no back pain  Skin:  No rashes  Neurologic:  no headache, focal weakness, or sensory changes  Endocrine:  no polyuria or polydipsia      PAST MEDICAL HISTORY  Past Medical History:   Diagnosis Date    AAA (abdominal aortic aneurysm) ([facility] [address]     Surgery scheduled for 7/1/2014 with Dr. Lele Navarrete.    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 The cardiolite study demonstrated normal perfusion in all segments of the myocardium with an intact left ventricular systolic function. The resting sestamibi dose is 10.8, stress is 32.5. EF 66%    History of chest x-ray 3/16/10    The chest is considered nonacute. There is cardiomegaly noted. COPD.  History of Doppler ultrasound 3/25/10    venous doppler- Technically good venous ultrasound study negative for DVT in both lower extremities. Normal compressibility,color flow doppler pattern and spectral doppler pattern demonstrated thoughout.  History of echocardiogram 3/18/10    Boarderline LV dilatation with concentric hypertrophy. Low normal systolic and abnormal diastolic function. Mild mitral and trace tricuspid regurgitation. Mild aortic root and bilateral dilatation.  Holter monitor, abnormal 11/10/10    11/10/2010- 24 HR- Abnormal holter revealing some significant brasycardia's and wenckebach phenomenon. Therefore, clinical  correlation is recommend.  Hx of blood clots     Pacemaker     PAF (paroxysmal atrial fibrillation) (HCC)     Peritonitis ([address]     Personal history of colonic polyps     Poor historian     Skin cancer     face    Ulcerative (chronic) proctosigmoiditis ([address]     Wears glasses        FAMILY HISTORY  Family History   Problem Relation Age of Onset    Stroke Mother         CVA    Heart Disease Mother     Cancer Brother         prostate    Cancer Brother         skin    Cancer Daughter         colon    Substance Abuse Son         Tobacco       SOCIAL HISTORY  Social History     Socioeconomic History    Marital status:       Spouse name: Not on file    Number of children: 3    Years of education: Not on file    Highest education level: Not on file   Occupational History    Occupation: Retired   Social Needs    Financial resource strain: Not on file    Food insecurity     Worry: Not on file Inability: Not on file    Transportation needs     Medical: Not on file     Non-medical: Not on file   Tobacco Use    Smoking status: Never Smoker    Smokeless tobacco: Never Used   Substance and Sexual Activity    Alcohol use: No     Alcohol/week: 0.0 standard drinks    Drug use: No    Sexual activity: Yes     Partners: Female     Comment:    Lifestyle    Physical activity     Days per week: Not on file     Minutes per session: Not on file    Stress: Not on file   Relationships    Social connections     Talks on phone: Not on file     Gets together: Not on file     Attends Evangelical service: Not on file     Active member of club or organization: Not on file     Attends meetings of clubs or organizations: Not on file     Relationship status: Not on file    Intimate partner violence     Fear of current or ex partner: Not on file     Emotionally abused: Not on file     Physically abused: Not on file     Forced sexual activity: Not on file   Other Topics Concern    Not on file   Social History Narrative    Not on file        SURGICAL HISTORY  Past Surgical History:   Procedure Laterality Date    ABDOMINAL AORTIC ANEURYSM REPAIR, ENDOVASCULAR  7/1/14    ABDOMINAL EXPLORATION SURGERY  2/4/2013    exp lap, left hemicolectomy, umbilectomy    APPENDECTOMY  2/4/2013    APPENDECTOMY  2/4/2013    COLONOSCOPY  2/4/2013    COLOSTOMY  2/4/2013    CYSTOSCOPY  2/03/2014    TURP    HEMORRHOID SURGERY  2011    HERNIA REPAIR Bilateral 1940 & 1958    Ing hernia    KNEE SURGERY Right 1980s    PACEMAKER INSERTION Right 12/13/2017    BIV PPM Medtronic Percepta Quad CRT-P MRI SureScan Pacemaker    PACEMAKER PLACEMENT Right 02/25/2013    Explanted 12/13/2017    SMALL INTESTINE SURGERY N/A 8/28/2019    EXPLORATORY LAPAROTOMY, SMALL BOWEL RESECTION, LYSIS OF ADHESIONS, NEW COLOSTOMY, AND HERNIA REPAIR WITH XENMATRIX MESH performed by Loki Martinez MD at [address] [place] [address] Current Outpatient Medications   Medication Sig Dispense Refill    ondansetron (ZOFRAN-ODT) 4 MG disintegrating tablet Take 1 tablet by mouth 3 times daily as needed for Nausea or Vomiting 21 tablet 0    dilTIAZem (CARDIZEM CD) 120 MG extended release capsule TAKE 1 CAPSULE BY MOUTH ONCE DAILY 30 capsule 5    metoprolol succinate (TOPROL XL) 25 MG extended release tablet TAKE 1 TABLET BY MOUTH ONCE DAILY 30 tablet 5    sucralfate (CARAFATE) 1 GM tablet TAKE 1 TABLET BY MOUTH TWO TIMES DAILY 60 tablet 5    tamsulosin (FLOMAX) 0.4 MG capsule TAKE 1 CAPSULE BY MOUTH ONCE DAILY 30 capsule 5    sertraline (ZOLOFT) 50 MG tablet TAKE ONE (1) TABLET BY MOUTH ONCE DAILY 90 tablet 1    ELIQUIS 2.5 MG TABS tablet Take 2.5 mg by mouth daily      lisinopril-hydroCHLOROthiazide (PRINZIDE;ZESTORETIC) 10-12.5 MG per tablet TAKE ONE-HALF (1/2) TABLET BY MOUTH ONCE DAILY 30 tablet 5    Ascorbic Acid (VITAMIN C) 1000 MG tablet Take 1,000 mg by mouth daily      brimonidine-timolol (COMBIGAN) 0.2-0.5 % ophthalmic solution Place 1 drop into both eyes every 12 hours      Glucosamine-MSM-Hyaluronic Acd (JOINT HEALTH PO) Take 1 each by mouth daily       atorvastatin (LIPITOR) 20 MG tablet TAKE 1 TABLET BY MOUTH ONCE DAILY 30 tablet 5     No current facility-administered medications for this visit. ALLERGIES  Allergies   Allergen Reactions    Codeine Anaphylaxis    Fentanyl Other (See Comments)     Hypotension        PHYSICAL EXAM    /64   Pulse 72   Temp 96.9 °F (36.1 °C)   Ht 5' 3\" (1.6 m)   Wt 174 lb (78.9 kg)   BMI 30.82 kg/m²       ASSESSMENT & PLAN    1. Intractable nausea and vomiting  Patient has a history of recurrent small bowel obstruction that has improved spontaneously daily in the past and that seems to be the most likely cause of his prior discomfort which is no longer present. Patient to see Dr. Maria G Franks about the seroma tomorrow. I made a copy of the written results of the scan for her to review. 2. Abnormal urinalysis  Rule out persisting kidney disease or a missed urinary tract infection.  - Microscopic Urinalysis; Future  - Culture, Urine;  Future    Patient follow-up with his normal appointment       Electronically signed by Pebbles Cruz MD on 2/22/2021

## 2021-02-23 LAB — URINE CULTURE, ROUTINE: NORMAL

## 2021-02-25 PROBLEM — I73.9 PVD (PERIPHERAL VASCULAR DISEASE) (HCC): Status: ACTIVE | Noted: 2021-02-25

## 2021-02-25 PROBLEM — E78.49 OTHER HYPERLIPIDEMIA: Status: ACTIVE | Noted: 2021-02-25

## 2021-03-02 ENCOUNTER — TELEPHONE (OUTPATIENT)
Dept: CARDIOLOGY CLINIC | Age: 86
End: 2021-03-02

## 2021-03-02 ENCOUNTER — OFFICE VISIT (OUTPATIENT)
Dept: CARDIOLOGY CLINIC | Age: 86
End: 2021-03-02
Payer: COMMERCIAL

## 2021-03-02 VITALS
HEART RATE: 84 BPM | DIASTOLIC BLOOD PRESSURE: 80 MMHG | WEIGHT: 176.4 LBS | SYSTOLIC BLOOD PRESSURE: 120 MMHG | HEIGHT: 63 IN | BODY MASS INDEX: 31.25 KG/M2

## 2021-03-02 DIAGNOSIS — I50.22 CHRONIC SYSTOLIC HEART FAILURE (HCC): ICD-10-CM

## 2021-03-02 DIAGNOSIS — Z95.0 BIVENTRICULAR CARDIAC PACEMAKER IN SITU: ICD-10-CM

## 2021-03-02 DIAGNOSIS — I73.9 PVD (PERIPHERAL VASCULAR DISEASE) (HCC): ICD-10-CM

## 2021-03-02 DIAGNOSIS — I49.5 SICK SINUS SYNDROME (HCC): ICD-10-CM

## 2021-03-02 DIAGNOSIS — I44.2 COMPLETE HEART BLOCK (HCC): ICD-10-CM

## 2021-03-02 DIAGNOSIS — E78.00 PURE HYPERCHOLESTEROLEMIA: ICD-10-CM

## 2021-03-02 DIAGNOSIS — I48.0 PAF (PAROXYSMAL ATRIAL FIBRILLATION) (HCC): Primary | ICD-10-CM

## 2021-03-02 DIAGNOSIS — I10 ESSENTIAL HYPERTENSION: ICD-10-CM

## 2021-03-02 DIAGNOSIS — I71.40 ABDOMINAL AORTIC ANEURYSM (AAA) WITHOUT RUPTURE: ICD-10-CM

## 2021-03-02 PROCEDURE — 99214 OFFICE O/P EST MOD 30 MIN: CPT | Performed by: INTERNAL MEDICINE

## 2021-03-02 PROCEDURE — 93281 PM DEVICE PROGR EVAL MULTI: CPT | Performed by: INTERNAL MEDICINE

## 2021-03-02 NOTE — PROGRESS NOTES
Nitesh Rolle is a 80 y.o. male who has    CHIEF COMPLAINT AS FOLLOWS:  CHEST PAIN: No C/O chest pain.   SOB: No C/O SOB at this time.               LEG EDEMA: B/L Lower extremity edema is present but no change over previous.   PALPITATIONS: Denies any C/O Palpitations                                 DIZZINESS: No C/O Dizziness                          SYNCOPE: None   OTHER:                                     HPI: Patient is here for F/U on his PAF- Arrhythmia, HTN & Dyslipidemia problems. Arrhythmia: Patient has known Hx of PAF. HTN: Patient has known Hx of essential HTN. Has been treated with guideline recommended medical / physical/ diet therapy as stated below. Dyslipidemia: Patient has known Hx of mixed dyslipidemia. Has been treated with guideline recommended medical / physical/ diet therapy as stated below. He does not have any new complaints at this time.     Current Outpatient Medications   Medication Sig Dispense Refill    ondansetron (ZOFRAN-ODT) 4 MG disintegrating tablet Take 1 tablet by mouth 3 times daily as needed for Nausea or Vomiting 21 tablet 0    atorvastatin (LIPITOR) 20 MG tablet TAKE 1 TABLET BY MOUTH ONCE DAILY 30 tablet 5    dilTIAZem (CARDIZEM CD) 120 MG extended release capsule TAKE 1 CAPSULE BY MOUTH ONCE DAILY 30 capsule 5    metoprolol succinate (TOPROL XL) 25 MG extended release tablet TAKE 1 TABLET BY MOUTH ONCE DAILY 30 tablet 5    sucralfate (CARAFATE) 1 GM tablet TAKE 1 TABLET BY MOUTH TWO TIMES DAILY 60 tablet 5    tamsulosin (FLOMAX) 0.4 MG capsule TAKE 1 CAPSULE BY MOUTH ONCE DAILY 30 capsule 5    sertraline (ZOLOFT) 50 MG tablet TAKE ONE (1) TABLET BY MOUTH ONCE DAILY 90 tablet 1    ELIQUIS 2.5 MG TABS tablet Take 2.5 mg by mouth daily      lisinopril-hydroCHLOROthiazide (PRINZIDE;ZESTORETIC) 10-12.5 MG per tablet TAKE ONE-HALF (1/2) TABLET BY MOUTH ONCE DAILY 30 tablet 5  Ascorbic Acid (VITAMIN C) 1000 MG tablet Take 1,000 mg by mouth daily      brimonidine-timolol (COMBIGAN) 0.2-0.5 % ophthalmic solution Place 1 drop into both eyes every 12 hours      Glucosamine-MSM-Hyaluronic Acd (JOINT HEALTH PO) Take 1 each by mouth daily        No current facility-administered medications for this visit. Allergies: Codeine and Fentanyl  Review of Systems:    Constitutional: Negative for diaphoresis and fatigue  Respiratory: Negative for shortness of breath  Cardiovascular: Negative for chest pain, dyspnea on exertion, claudication, edema, irregular heartbeat, murmur, palpitations or shortness of breath  Musculoskeletal: Negative for muscle pain, muscular weakness, negative for pain in arm and leg or swelling in foot and leg    Objective:  /80   Pulse 84   Ht 5' 3\" (1.6 m)   Wt 176 lb 6.4 oz (80 kg)   BMI 31.25 kg/m²   Wt Readings from Last 3 Encounters:   03/02/21 176 lb 6.4 oz (80 kg)   02/25/21 177 lb (80.3 kg)   02/23/21 174 lb (78.9 kg)     Body mass index is 31.25 kg/m². GENERAL - Alert, oriented, pleasant, in no apparent distress. EYES: No jaundice, no conjunctival pallor. Neck - Supple. No jugular venous distention noted. No carotid bruits. Cardiovascular  Normal S1 and S2 without obvious murmur or gallop. Extremities - No cyanosis, clubbing, or significant edema. Pulmonary  No respiratory distress. No wheezes or rales.       Lab Review   Lab Results   Component Value Date    TROPONINT <0.010 08/16/2019    TROPONINT <0.010 12/03/2018     Lab Results   Component Value Date     02/07/2013    PROBNP 2,032 09/01/2019    PROBNP 1,170 08/30/2019     Lab Results   Component Value Date    INR 1.16 12/18/2019    INR 1.09 09/06/2019     No results found for: LABA1C  Lab Results   Component Value Date    WBC 5.5 02/15/2021    WBC 4.3 12/03/2020    HCT 41.8 (L) 02/15/2021    HCT 38.4 (L) 12/03/2020    MCV 99.1 02/15/2021    MCV 95.7 12/03/2020  02/15/2021     12/03/2020     Lab Results   Component Value Date    CHOL 140 12/03/2020    CHOL 160 07/22/2014    TRIG 151 (H) 12/03/2020    TRIG 117 07/22/2014    HDL 39 (L) 12/03/2020    HDL 38 (L) 07/22/2014    LDLCALC 71 12/03/2020    LDLCALC 99 07/22/2014     Lab Results   Component Value Date    ALT 9 (L) 02/15/2021    ALT 9 (L) 12/03/2020    AST 17 02/15/2021    AST 22 12/03/2020     BMP:    Lab Results   Component Value Date     02/15/2021     12/03/2020    K 3.8 02/15/2021    K 4.1 12/03/2020    CL 99 02/15/2021     12/03/2020    CO2 28 02/15/2021    CO2 26 12/03/2020    BUN 16 02/15/2021    BUN 19 12/03/2020    CREATININE 1.1 02/15/2021    CREATININE 1.1 12/03/2020     CMP:   Lab Results   Component Value Date     02/15/2021     12/03/2020    K 3.8 02/15/2021    K 4.1 12/03/2020    CL 99 02/15/2021     12/03/2020    CO2 28 02/15/2021    CO2 26 12/03/2020    BUN 16 02/15/2021    BUN 19 12/03/2020    CREATININE 1.1 02/15/2021    CREATININE 1.1 12/03/2020    PROT 7.0 02/15/2021    PROT 6.4 12/03/2020    PROT 5.2 02/24/2013    PROT 5.5 02/04/2013     Lab Results   Component Value Date    TSH 5.18 12/03/2020    TSH 2.99 02/05/2020    TSHHS 1.950 03/23/2016    TSHHS 3.673 02/21/2013     Pacer analysis is reviewed and filed in the pacer chart. Analysis is consistent with normal DDDR device function with stable leads and appropriate battery status for the age of the device. Remaining battery life 5.5 years. Patient is using pacer 98.8 % V-paced. Recommend continued every three month check and follow up office visit as scheduled. CARDIOLITE 12/2018   IMI of a medium sized territory. No reversible Ischemia.    Other areas perfuse normally.    Inferior wall Hypokinesis with borderline reduction of LV function     ECHO 8/2019   Technically difficult examination. Done in supine. Patient could not turn. Recent bowel surgery. Suboptimal subcostal window. Left ventricular function is normal, EF is estimated at 50%. Grade I diastolic dysfunction. Mild left ventricular hypertrophy. Mild to moderate aortic and mitral regurgitation noted   Moderate tricuspid regurgitation . No evidence of pericardial effusion. QUALITY MEASURES REVIEWED:  1.CAD:Patient is taking anti platelet agent:No  Patient does not have Hx of documented CAD  2. DYSLIPIDEMIA: Patient is on cholesterol lowering medication:Yes   3. Beta-Blocker therapy for CAD, if prior Myocardial Infarction:Yes   4. Counselled regarding smoking cessation. No   Patient does not Smoke. 5.Anticoagulation therapy (for A.Fib) Yes   Does Not have A.Fib.  6.Discussed weight management strategies. Assessment & Plan:    Primary / Secondary prevention is the goal by aggressive risk modification, healthy and therapeutic life style changes for cardiovascular risk reduction. Various goals are discussed and multiple questions answered. CAD:None known  HTN:well controlled on current medical regimen, see list above.              - changes in  treatment:   no   CARDIOMYOPATHY: None known   CONGESTIVE HEART FAILURE: NO KNOWN HISTORY.     VHD: No significant VHD noted  DYSLIPIDEMIA: Patient's profile is at / near [facility] is low                                Tolerating current medical regimen wellyes,                                                               See most recent Lab values in Labs section above. OTHER RELEVANT DIAGNOSIS:as noted in patient's active problem list:  TESTS ORDERED: None this visit                                      All previously ordered tests reviewed.   ARRHYTHMIAS:  Known H/O CHB                                Patient has H/O P. Atrial fibrillation                                He is rate controlled & on anticoagulation.                              MEDICATIONS: CPM   Office f/u in six months. Device check per protocol.

## 2021-03-02 NOTE — PROGRESS NOTES
[id number] Score for Atrial Fibrillation Stroke Risk   Risk   Factors  Component Value   C CHF No 0   H HTN Yes 1   A2 Age >= 76 Yes,  (80 y.o.) 2   D DM No 0   S2 Prior Stroke/TIA No 0   V Vascular Disease No 0   A Age 74-69 No,  (80 y.o.) 0   Sc Sex male 0    [id number]  Score  3   Score last updated 3/2/21 9:57 PM EST    Click here for a link to the UpToDate guideline \"Atrial Fibrillation: Anticoagulation therapy to prevent embolization    Disclaimer: Risk Score calculation is dependent on accuracy of patient problem list and past encounter diagnosis.

## 2021-03-02 NOTE — LETTER
Patient Name: Shlomo Mendez  : 3/23/1930  MRN# [id number]    REASON FOR VISIT:       Current Outpatient Medications   Medication Sig Dispense Refill    ondansetron (ZOFRAN-ODT) 4 MG disintegrating tablet Take 1 tablet by mouth 3 times daily as needed for Nausea or Vomiting 21 tablet 0    atorvastatin (LIPITOR) 20 MG tablet TAKE 1 TABLET BY MOUTH ONCE DAILY 30 tablet 5    dilTIAZem (CARDIZEM CD) 120 MG extended release capsule TAKE 1 CAPSULE BY MOUTH ONCE DAILY 30 capsule 5    metoprolol succinate (TOPROL XL) 25 MG extended release tablet TAKE 1 TABLET BY MOUTH ONCE DAILY 30 tablet 5    sucralfate (CARAFATE) 1 GM tablet TAKE 1 TABLET BY MOUTH TWO TIMES DAILY 60 tablet 5    tamsulosin (FLOMAX) 0.4 MG capsule TAKE 1 CAPSULE BY MOUTH ONCE DAILY 30 capsule 5    sertraline (ZOLOFT) 50 MG tablet TAKE ONE (1) TABLET BY MOUTH ONCE DAILY 90 tablet 1    ELIQUIS 2.5 MG TABS tablet Take 2.5 mg by mouth daily      lisinopril-hydroCHLOROthiazide (PRINZIDE;ZESTORETIC) 10-12.5 MG per tablet TAKE ONE-HALF (1/2) TABLET BY MOUTH ONCE DAILY 30 tablet 5    Ascorbic Acid (VITAMIN C) 1000 MG tablet Take 1,000 mg by mouth daily      brimonidine-timolol (COMBIGAN) 0.2-0.5 % ophthalmic solution Place 1 drop into both eyes every 12 hours      Glucosamine-MSM-Hyaluronic Acd (JOINT HEALTH PO) Take 1 each by mouth daily        No current facility-administered medications for this visit. STRESS TEST:  12/3/2018  Abnormal Study.    IMI of a medium sized territory. No reversible Ischemia.    Other areas perfuse normally.    Inferior wall Hypokinesis with borderline reduction of LV function           ECHO: 9/3/2019  Technically difficult examination. Done in supine. Patient could not turn. Recent bowel surgery. Suboptimal subcostal window. Left ventricular function is normal, EF is estimated at 50%. Grade I diastolic dysfunction. Mild left ventricular hypertrophy.

## 2021-03-02 NOTE — LETTER
[address]  [address] [address]  Phone: [phone]  Fax: [phone]    Deangelo Finney MD        March 2, 2021     MD [address]    Patient: Zell Boas  MR Number: [id number]  YOB: 1930  Date of Visit: 3/2/2021  PAF  Dear Dr. Adelfo Cohn: Thank you for the request for consultation for Leonard Linda to me for the evaluation of PAF. Below are the relevant portions of my assessment and plan of care. If you have questions, please do not hesitate to call me. I look forward to following Rita Quinones along with you.     Sincerely,        Deangelo Finney MD

## 2021-03-02 NOTE — TELEPHONE ENCOUNTER
Returned call and spoke with Bette Aquino. She said that she has tried several times to send a transmission and it keeps saying the battery. I told her that the battery may not be charging anymore because patient has had the monitor since 2018 and battery may not hold a charge. I gave her the number to call medtronic stay connected service to order a new monitor. She thanked me.

## 2021-03-02 NOTE — PATIENT INSTRUCTIONS
CAD:None known  HTN:well controlled on current medical regimen, see list above.              - changes in  treatment:   no   CARDIOMYOPATHY: None known   CONGESTIVE HEART FAILURE: NO KNOWN HISTORY.     VHD: No significant VHD noted  DYSLIPIDEMIA: Patient's profile is at / near [facility] is low                                Tolerating current medical regimen wellyes,                                                               See most recent Lab values in Labs section above. OTHER RELEVANT DIAGNOSIS:as noted in patient's active problem list:  TESTS ORDERED: None this visit                                      All previously ordered tests reviewed.   ARRHYTHMIAS:  Known H/O CHB                                Patient has H/O P. Atrial fibrillation                                He is rate controlled & on anticoagulation.                              MEDICATIONS: CPM   Office f/u in six months. Device check per protocol.

## 2021-03-04 ENCOUNTER — TELEPHONE (OUTPATIENT)
Dept: CARDIOLOGY CLINIC | Age: 86
End: 2021-03-04

## 2021-03-04 NOTE — LETTER
[facility]  [address]  Phone: [phone]  Fax: [phone]    3/4/2021        Lisa Valle  [facility] [address]            Dear Allie Mcknight: This is your Care[facility] schedule. You can rossy your calendar with these dates. Remember that your device is wireless and should automatically do these checks while you are sleeping. If for any reason I do not get your transmission then I will call you and ask that you send a manual transmission. If you have any questions or concerns, please call and ask for Shannan Ruff at [phone]. Thank you.

## 2021-03-11 RX ORDER — APIXABAN 2.5 MG/1
TABLET, FILM COATED ORAL
Qty: 60 TABLET | Refills: 0 | Status: SHIPPED | OUTPATIENT
Start: 2021-03-11 | End: 2021-04-06

## 2021-03-13 PROCEDURE — 93294 REM INTERROG EVL PM/LDLS PM: CPT | Performed by: INTERNAL MEDICINE

## 2021-03-13 PROCEDURE — 93297 REM INTERROG DEV EVAL ICPMS: CPT | Performed by: INTERNAL MEDICINE

## 2021-03-13 PROCEDURE — 93296 REM INTERROG EVL PM/IDS: CPT | Performed by: INTERNAL MEDICINE

## 2021-03-16 ENCOUNTER — PROCEDURE VISIT (OUTPATIENT)
Dept: CARDIOLOGY CLINIC | Age: 86
End: 2021-03-16
Payer: COMMERCIAL

## 2021-03-16 ENCOUNTER — TELEPHONE (OUTPATIENT)
Dept: CARDIOLOGY CLINIC | Age: 86
End: 2021-03-16

## 2021-03-16 DIAGNOSIS — I49.5 SICK SINUS SYNDROME (HCC): ICD-10-CM

## 2021-03-16 DIAGNOSIS — Z95.0 BIVENTRICULAR CARDIAC PACEMAKER IN SITU: Primary | ICD-10-CM

## 2021-03-16 NOTE — LETTER
[facility] [address]  [address]  Phone: [phone]  Fax: [phone]    3/16/2021        Easton Smalls  [facility] [address]            Dear Earle Wong: This is your Carelink schedule. You can rossy your calendar with these dates. Remember that your device is wireless and should automatically do these checks while you are sleeping. If for any reason I do not get your transmission then I will call you and ask that you send a manual transmission. If you have any questions or concerns, please call and ask for Farida at [phone]. Thank you.

## 2021-04-05 ENCOUNTER — OFFICE VISIT (OUTPATIENT)
Dept: FAMILY MEDICINE CLINIC | Age: 86
End: 2021-04-05
Payer: COMMERCIAL

## 2021-04-05 VITALS
WEIGHT: 178 LBS | HEART RATE: 80 BPM | DIASTOLIC BLOOD PRESSURE: 74 MMHG | HEIGHT: 63 IN | BODY MASS INDEX: 31.54 KG/M2 | SYSTOLIC BLOOD PRESSURE: 130 MMHG | TEMPERATURE: 97.5 F

## 2021-04-05 DIAGNOSIS — M54.41 CHRONIC RIGHT-SIDED LOW BACK PAIN WITH RIGHT-SIDED SCIATICA: ICD-10-CM

## 2021-04-05 DIAGNOSIS — I71.40 ABDOMINAL AORTIC ANEURYSM (AAA) WITHOUT RUPTURE: ICD-10-CM

## 2021-04-05 DIAGNOSIS — E03.9 BORDERLINE HYPOTHYROIDISM: ICD-10-CM

## 2021-04-05 DIAGNOSIS — I48.0 PAF (PAROXYSMAL ATRIAL FIBRILLATION) (HCC): ICD-10-CM

## 2021-04-05 DIAGNOSIS — M17.11 PRIMARY OSTEOARTHRITIS OF RIGHT KNEE: Primary | ICD-10-CM

## 2021-04-05 DIAGNOSIS — I73.9 PVD (PERIPHERAL VASCULAR DISEASE) (HCC): ICD-10-CM

## 2021-04-05 DIAGNOSIS — I10 ESSENTIAL HYPERTENSION: ICD-10-CM

## 2021-04-05 DIAGNOSIS — G89.29 CHRONIC RIGHT-SIDED LOW BACK PAIN WITH RIGHT-SIDED SCIATICA: ICD-10-CM

## 2021-04-05 DIAGNOSIS — I44.2 COMPLETE HEART BLOCK (HCC): ICD-10-CM

## 2021-04-05 LAB
T4 FREE: 0.6 NG/DL (ref 0.9–1.8)
TSH REFLEX FT4: 70.82 UIU/ML (ref 0.27–4.2)

## 2021-04-05 PROCEDURE — 99214 OFFICE O/P EST MOD 30 MIN: CPT | Performed by: FAMILY MEDICINE

## 2021-04-05 RX ORDER — LISINOPRIL AND HYDROCHLOROTHIAZIDE 12.5; 1 MG/1; MG/1
TABLET ORAL
Qty: 30 TABLET | Refills: 5 | Status: SHIPPED | OUTPATIENT
Start: 2021-04-05 | End: 2021-05-20 | Stop reason: SDUPTHER

## 2021-04-05 NOTE — PROGRESS NOTES
4/5/2021    [facility] Hemp    Chief Complaint   Patient presents with   [facility] Other     4 mth f/u    Lower Back Pain    Hip Pain     right hip pain    Knee Pain     right knee pain       HPI    Leta Chin is a 80 y.o. male who presents today with follow-up. Apparently patient complains about low back pain and right knee pain a lot. Daughter I asked that he ask about it today is she cares for him. REVIEW OF SYSTEMS    Constitutional:  Denies fever, chills, weight loss or weakness  Eyes:  no photophobia or discharge  ENT:  no sore throat or ear pain  Cardiovascular:  Denies chest pain, palpitations or swelling  Respiratory:  Denies cough or shortness of breath  GI:  no abdominal pain, nausea, vomiting, or diarrhea  Musculoskeletal:  no back pain  Skin:  No rashes  Neurologic:  no headache, focal weakness, or sensory changes  Endocrine:  no polyuria or polydipsia      PAST MEDICAL HISTORY  Past Medical History:   Diagnosis Date    AAA (abdominal aortic aneurysm) ([facility] [address]     Surgery scheduled for 7/1/2014 with Dr. Lily Duncan.  Arthritis     generalized    Asthma     AV block, 1st degree     Back pain     Borderline hypothyroidism 12/4/2020    Bradycardia     Colon polyps     Colostomy [facility] place [facility])     Glaucoma     H/O cardiovascular stress test 12/27/2013    cardiolite-EF56%, apical hypokinesis is seen, cannot exlude apical Tyler ischemia    History of blood transfusion     History of cardiovascular stress test 3/5/10    No angina or ischemic EKG changes are noted with Lexiscan. The cardiolite study demonstrated normal perfusion in all segments of the myocardium with an intact left ventricular systolic function. The resting sestamibi dose is 10.8, stress is 32.5. EF 66%    History of chest x-ray 3/16/10    The chest is considered nonacute. There is cardiomegaly noted. COPD.     History of Doppler ultrasound 3/25/10    venous doppler- Technically good venous ultrasound study negative for DVT in both lower extremities. Normal compressibility,color flow doppler pattern and spectral doppler pattern demonstrated thoughout.  History of echocardiogram 3/18/10    Boarderline LV dilatation with concentric hypertrophy. Low normal systolic and abnormal diastolic function. Mild mitral and trace tricuspid regurgitation. Mild aortic root and bilateral dilatation.  Holter monitor, abnormal 11/10/10    11/10/2010- 24 HR- Abnormal holter revealing some significant brasycardia's and wenckebach phenomenon. Therefore, clinical  correlation is recommend.  Hx of blood clots     Pacemaker     PAF (paroxysmal atrial fibrillation) (HCC)     Peritonitis ([address]     Personal history of colonic polyps     Poor historian     Skin cancer     face    Ulcerative (chronic) proctosigmoiditis ([address]     Wears glasses        FAMILY HISTORY  Family History   Problem Relation Age of Onset    Stroke Mother         CVA    Heart Disease Mother     Cancer Brother         prostate    Cancer Brother         skin    Cancer Daughter         colon    Substance Abuse Son         Tobacco       SOCIAL HISTORY  Social History     Socioeconomic History    Marital status:       Spouse name: Not on file    Number of children: 3    Years of education: Not on file    Highest education level: Not on file   Occupational History    Occupation: Retired   Social Needs    Financial resource strain: Not on file    Food insecurity     Worry: Not on file     Inability: Not on file   [facility] needs     Medical: Not on file     Non-medical: Not on file   Tobacco Use    Smoking status: Never Smoker    Smokeless tobacco: Never Used   Substance and Sexual Activity    Alcohol use: No     Alcohol/week: 0.0 standard drinks    Drug use: No    Sexual activity: Yes     Partners: Female     Comment:    Lifestyle    Physical activity     Days per week: Not on file     Minutes per session: Not on file    Stress: Not on file   Relationships  Social connections     Talks on phone: Not on file     Gets together: Not on file     Attends Religion service: Not on file     Active member of club or organization: Not on file     Attends meetings of clubs or organizations: Not on file     Relationship status: Not on file    Intimate partner violence     Fear of current or ex partner: Not on file     Emotionally abused: Not on file     Physically abused: Not on file     Forced sexual activity: Not on file   Other Topics Concern    Not on file   Social History Narrative    Not on file        SURGICAL HISTORY  Past Surgical History:   Procedure Laterality Date    ABDOMINAL AORTIC ANEURYSM REPAIR, ENDOVASCULAR  7/1/14    ABDOMINAL EXPLORATION SURGERY  2/4/2013    exp lap, left hemicolectomy, umbilectomy    APPENDECTOMY  2/4/2013    APPENDECTOMY  2/4/2013    COLONOSCOPY  2/4/2013    COLOSTOMY  2/4/2013    CYSTOSCOPY  2/03/2014    TURP    HEMORRHOID SURGERY  2011    HERNIA REPAIR Bilateral [address] hernia    KNEE SURGERY Right 1980s    PACEMAKER INSERTION Right 12/13/2017    BIV PPM Medtronic Percepta Quad CRT-P MRI SureScan Pacemaker    PACEMAKER PLACEMENT Right 02/25/2013    Explanted 12/13/2017    SMALL INTESTINE SURGERY N/A 8/28/2019    EXPLORATORY LAPAROTOMY, SMALL BOWEL RESECTION, LYSIS OF ADHESIONS, NEW COLOSTOMY, AND HERNIA REPAIR WITH XENMATRIX MESH performed by Chito Guzman MD at [facility]  Current Outpatient Medications   Medication Sig Dispense Refill    lisinopril-hydroCHLOROthiazide (PRINZIDE;ZESTORETIC) 10-12.5 MG per tablet TAKE ONE-HALF (1/2) TABLET BY MOUTH ONCE DAILY 30 tablet 5    ELIQUIS 2.5 MG TABS tablet TAKE 1 TABLET BY MOUTH TWO TIMES DAILY 60 tablet 0    atorvastatin (LIPITOR) 20 MG tablet TAKE 1 TABLET BY MOUTH ONCE DAILY 30 tablet 5    dilTIAZem (CARDIZEM CD) 120 MG extended release capsule TAKE 1 CAPSULE BY MOUTH ONCE DAILY 30 capsule 5    metoprolol succinate (TOPROL XL) 25 MG extended release tablet TAKE 1 TABLET BY MOUTH ONCE DAILY 30 tablet 5    sucralfate (CARAFATE) 1 GM tablet TAKE 1 TABLET BY MOUTH TWO TIMES DAILY 60 tablet 5    tamsulosin (FLOMAX) 0.4 MG capsule TAKE 1 CAPSULE BY MOUTH ONCE DAILY 30 capsule 5    sertraline (ZOLOFT) 50 MG tablet TAKE ONE (1) TABLET BY MOUTH ONCE DAILY 90 tablet 1    Ascorbic Acid (VITAMIN C) 1000 MG tablet Take 1,000 mg by mouth daily      brimonidine-timolol (COMBIGAN) 0.2-0.5 % ophthalmic solution Place 1 drop into both eyes every 12 hours      Glucosamine-MSM-Hyaluronic Acd (JOINT Helpr PO) Take 1 each by mouth daily        No current facility-administered medications for this visit. ALLERGIES  Allergies   Allergen Reactions    Codeine Anaphylaxis    Fentanyl Other (See Comments)     Hypotension        PHYSICAL EXAM  /74   Pulse 80   Temp 97.5 °F (36.4 °C)   Ht 5' 3\" (1.6 m)   Wt 178 lb (80.7 kg)   BMI 31.53 kg/m²     ASSESSMENT & PLAN    1. Primary osteoarthritis of right knee  Recommended ice packs 30 minutes twice a day to assist knee inflammation    2. Chronic right-sided low back pain with right-sided sciatica  Recommended heating pad and Tylenol up to 3 g daily    3. PAF (paroxysmal atrial fibrillation) ([facility])  Issue controlled. Continue meds. Refilled meds. 4. PVD (peripheral vascular disease) ([facility] [address]  Issue controlled. Continue meds. Refilled meds. 5. Abdominal aortic aneurysm (AAA) without rupture [facility])  Patient follows with cardiovascular surgeon. Diagnosis annotated    6. Complete heart block (HCC)  Stable with pacemaker    7. Essential hypertension  Issue controlled. Continue meds. Refilled meds. - lisinopril-hydroCHLOROthiazide (PRINZIDE;ZESTORETIC) 10-12.5 MG per tablet; TAKE ONE-HALF (1/2) TABLET BY MOUTH ONCE DAILY  Dispense: 30 tablet; Refill: 5    8. Borderline hypothyroidism  Recheck labs.   Start if medication not on track.  - TSH WITH REFLEX TO FT4; Future    Follow-up 6 months Electronically signed by Dee Rolon MD on 4/5/2021

## 2021-04-06 PROBLEM — E03.9 ACQUIRED HYPOTHYROIDISM: Status: ACTIVE | Noted: 2021-04-06

## 2021-04-06 RX ORDER — APIXABAN 2.5 MG/1
TABLET, FILM COATED ORAL
Qty: 60 TABLET | Refills: 5 | Status: SHIPPED | OUTPATIENT
Start: 2021-04-06 | End: 2021-05-06

## 2021-04-06 NOTE — TELEPHONE ENCOUNTER
Requested Prescriptions     Signed Prescriptions Disp Refills    ELIQUIS 2.5 MG TABS tablet 60 tablet 5     Sig: TAKE ONE (1) TABLET BY MOUTH TWO TIMES DAILY     Authorizing Provider: Ryan Lima     Ordering User: Aisha Sifuentes

## 2021-04-07 RX ORDER — LEVOTHYROXINE SODIUM 0.1 MG/1
100 TABLET ORAL DAILY
Qty: 30 TABLET | Refills: 5 | Status: SHIPPED | OUTPATIENT
Start: 2021-04-07 | End: 2021-08-24

## 2021-04-09 ENCOUNTER — APPOINTMENT (OUTPATIENT)
Dept: CT IMAGING | Age: 86
DRG: 444 | End: 2021-04-09
Payer: COMMERCIAL

## 2021-04-09 ENCOUNTER — APPOINTMENT (OUTPATIENT)
Dept: ULTRASOUND IMAGING | Age: 86
DRG: 444 | End: 2021-04-09
Payer: COMMERCIAL

## 2021-04-09 ENCOUNTER — HOSPITAL ENCOUNTER (INPATIENT)
Age: 86
LOS: 3 days | Discharge: HOME | DRG: 444 | End: 2021-04-12
Attending: INTERNAL MEDICINE | Admitting: INTERNAL MEDICINE
Payer: COMMERCIAL

## 2021-04-09 DIAGNOSIS — S30.1XXA ABDOMINAL WALL SEROMA, INITIAL ENCOUNTER: ICD-10-CM

## 2021-04-09 DIAGNOSIS — K43.9 VENTRAL HERNIA WITHOUT OBSTRUCTION OR GANGRENE: ICD-10-CM

## 2021-04-09 DIAGNOSIS — K85.90 ACUTE PANCREATITIS, UNSPECIFIED COMPLICATION STATUS, UNSPECIFIED PANCREATITIS TYPE: Primary | ICD-10-CM

## 2021-04-09 DIAGNOSIS — R74.01 TRANSAMINITIS: ICD-10-CM

## 2021-04-09 DIAGNOSIS — R93.5 ABNORMAL ABDOMINAL CT SCAN: ICD-10-CM

## 2021-04-09 DIAGNOSIS — I71.40 ABDOMINAL AORTIC ANEURYSM (AAA) WITHOUT RUPTURE: ICD-10-CM

## 2021-04-09 LAB
ALBUMIN SERPL-MCNC: 3.9 GM/DL (ref 3.4–5)
ALP BLD-CCNC: 193 IU/L (ref 40–128)
ALT SERPL-CCNC: 110 U/L (ref 10–40)
AMMONIA: 18 UMOL/L (ref 16–60)
ANION GAP SERPL CALCULATED.3IONS-SCNC: 10 MMOL/L (ref 4–16)
APTT: 27 SECONDS (ref 25.1–37.1)
AST SERPL-CCNC: 212 IU/L (ref 15–37)
BACTERIA: NEGATIVE /HPF
BANDED NEUTROPHILS ABSOLUTE COUNT: 0.16 K/CU MM
BANDED NEUTROPHILS RELATIVE PERCENT: 2 % (ref 5–11)
BASOPHILS ABSOLUTE: 0.1 K/CU MM
BASOPHILS RELATIVE PERCENT: 1 % (ref 0–1)
BILIRUB SERPL-MCNC: 1.1 MG/DL (ref 0–1)
BILIRUBIN URINE: NEGATIVE MG/DL
BLOOD, URINE: NEGATIVE
BUN BLDV-MCNC: 24 MG/DL (ref 6–23)
CALCIUM SERPL-MCNC: 8.7 MG/DL (ref 8.3–10.6)
CHLORIDE BLD-SCNC: 99 MMOL/L (ref 99–110)
CLARITY: CLEAR
CO2: 27 MMOL/L (ref 21–32)
COLOR: YELLOW
CREAT SERPL-MCNC: 1.3 MG/DL (ref 0.9–1.3)
DIFFERENTIAL TYPE: ABNORMAL
GFR AFRICAN AMERICAN: >60 ML/MIN/1.73M2
GFR NON-AFRICAN AMERICAN: 52 ML/MIN/1.73M2
GLUCOSE BLD-MCNC: 155 MG/DL (ref 70–99)
GLUCOSE, URINE: NEGATIVE MG/DL
HCT VFR BLD CALC: 40.4 % (ref 42–52)
HEMOGLOBIN: 13.8 GM/DL (ref 13.5–18)
INR BLD: 1.06 INDEX
KETONES, URINE: NEGATIVE MG/DL
LACTATE: 1.7 MMOL/L (ref 0.4–2)
LEUKOCYTE ESTERASE, URINE: NEGATIVE
LIPASE: >3000 IU/L (ref 13–60)
LIPASE: >3000 IU/L (ref 13–60)
LYMPHOCYTES ABSOLUTE: 0.4 K/CU MM
LYMPHOCYTES RELATIVE PERCENT: 5 % (ref 24–44)
MCH RBC QN AUTO: 33.6 PG (ref 27–31)
MCHC RBC AUTO-ENTMCNC: 34.2 % (ref 32–36)
MCV RBC AUTO: 98.3 FL (ref 78–100)
MONOCYTES ABSOLUTE: 0.2 K/CU MM
MONOCYTES RELATIVE PERCENT: 2 % (ref 0–4)
MUCUS: ABNORMAL HPF
NITRITE URINE, QUANTITATIVE: NEGATIVE
PDW BLD-RTO: 14.2 % (ref 11.7–14.9)
PH, URINE: 6 (ref 5–8)
PLATELET # BLD: 174 K/CU MM (ref 140–440)
PMV BLD AUTO: 9.9 FL (ref 7.5–11.1)
POTASSIUM SERPL-SCNC: 3.8 MMOL/L (ref 3.5–5.1)
PROTEIN UA: NEGATIVE MG/DL
PROTHROMBIN TIME: 12.8 SECONDS (ref 11.7–14.5)
RBC # BLD: 4.11 M/CU MM (ref 4.6–6.2)
RBC # BLD: ABNORMAL 10*6/UL
RBC URINE: 1 /HPF (ref 0–3)
SEGMENTED NEUTROPHILS ABSOLUTE COUNT: 7.1 K/CU MM
SEGMENTED NEUTROPHILS RELATIVE PERCENT: 90 % (ref 36–66)
SODIUM BLD-SCNC: 136 MMOL/L (ref 135–145)
SPECIFIC GRAVITY UA: 1.01 (ref 1–1.03)
TOTAL PROTEIN: 7.2 GM/DL (ref 6.4–8.2)
TRICHOMONAS: ABNORMAL /HPF
TRIGL SERPL-MCNC: 87 MG/DL
UROBILINOGEN, URINE: NEGATIVE MG/DL (ref 0.2–1)
WBC # BLD: 8 K/CU MM (ref 4–10.5)
WBC UA: 1 /HPF (ref 0–2)

## 2021-04-09 PROCEDURE — 76705 ECHO EXAM OF ABDOMEN: CPT

## 2021-04-09 PROCEDURE — 6360000002 HC RX W HCPCS: Performed by: PHYSICIAN ASSISTANT

## 2021-04-09 PROCEDURE — 2580000003 HC RX 258: Performed by: NURSE PRACTITIONER

## 2021-04-09 PROCEDURE — 1200000000 HC SEMI PRIVATE

## 2021-04-09 PROCEDURE — 36415 COLL VENOUS BLD VENIPUNCTURE: CPT

## 2021-04-09 PROCEDURE — 83690 ASSAY OF LIPASE: CPT

## 2021-04-09 PROCEDURE — 94761 N-INVAS EAR/PLS OXIMETRY MLT: CPT

## 2021-04-09 PROCEDURE — 85730 THROMBOPLASTIN TIME PARTIAL: CPT

## 2021-04-09 PROCEDURE — 83605 ASSAY OF LACTIC ACID: CPT

## 2021-04-09 PROCEDURE — 82140 ASSAY OF AMMONIA: CPT

## 2021-04-09 PROCEDURE — 74177 CT ABD & PELVIS W/CONTRAST: CPT

## 2021-04-09 PROCEDURE — 85027 COMPLETE CBC AUTOMATED: CPT

## 2021-04-09 PROCEDURE — 80053 COMPREHEN METABOLIC PANEL: CPT

## 2021-04-09 PROCEDURE — 99285 EMERGENCY DEPT VISIT HI MDM: CPT

## 2021-04-09 PROCEDURE — 2580000003 HC RX 258: Performed by: PHYSICIAN ASSISTANT

## 2021-04-09 PROCEDURE — 85610 PROTHROMBIN TIME: CPT

## 2021-04-09 PROCEDURE — 81001 URINALYSIS AUTO W/SCOPE: CPT

## 2021-04-09 PROCEDURE — 84478 ASSAY OF TRIGLYCERIDES: CPT

## 2021-04-09 PROCEDURE — 6360000004 HC RX CONTRAST MEDICATION: Performed by: PHYSICIAN ASSISTANT

## 2021-04-09 PROCEDURE — 85007 BL SMEAR W/DIFF WBC COUNT: CPT

## 2021-04-09 RX ORDER — TIMOLOL MALEATE 5 MG/ML
1 SOLUTION/ DROPS OPHTHALMIC 2 TIMES DAILY
Status: DISCONTINUED | OUTPATIENT
Start: 2021-04-09 | End: 2021-04-12 | Stop reason: HOSPADM

## 2021-04-09 RX ORDER — SODIUM CHLORIDE 0.9 % (FLUSH) 0.9 %
5-40 SYRINGE (ML) INJECTION EVERY 12 HOURS SCHEDULED
Status: DISCONTINUED | OUTPATIENT
Start: 2021-04-09 | End: 2021-04-12 | Stop reason: HOSPADM

## 2021-04-09 RX ORDER — POLYETHYLENE GLYCOL 3350 17 G/17G
17 POWDER, FOR SOLUTION ORAL DAILY PRN
Status: DISCONTINUED | OUTPATIENT
Start: 2021-04-09 | End: 2021-04-12 | Stop reason: HOSPADM

## 2021-04-09 RX ORDER — ONDANSETRON 2 MG/ML
4 INJECTION INTRAMUSCULAR; INTRAVENOUS EVERY 6 HOURS PRN
Status: DISCONTINUED | OUTPATIENT
Start: 2021-04-09 | End: 2021-04-12 | Stop reason: HOSPADM

## 2021-04-09 RX ORDER — LISINOPRIL AND HYDROCHLOROTHIAZIDE 12.5; 1 MG/1; MG/1
1 TABLET ORAL DAILY
Status: DISCONTINUED | OUTPATIENT
Start: 2021-04-10 | End: 2021-04-12 | Stop reason: HOSPADM

## 2021-04-09 RX ORDER — BRIMONIDINE TARTRATE 2 MG/ML
1 SOLUTION/ DROPS OPHTHALMIC 2 TIMES DAILY
Status: DISCONTINUED | OUTPATIENT
Start: 2021-04-09 | End: 2021-04-12 | Stop reason: HOSPADM

## 2021-04-09 RX ORDER — TAMSULOSIN HYDROCHLORIDE 0.4 MG/1
0.4 CAPSULE ORAL DAILY
Status: DISCONTINUED | OUTPATIENT
Start: 2021-04-10 | End: 2021-04-12 | Stop reason: HOSPADM

## 2021-04-09 RX ORDER — LEVOTHYROXINE SODIUM 0.1 MG/1
100 TABLET ORAL DAILY
Status: DISCONTINUED | OUTPATIENT
Start: 2021-04-10 | End: 2021-04-12 | Stop reason: HOSPADM

## 2021-04-09 RX ORDER — SODIUM CHLORIDE 0.9 % (FLUSH) 0.9 %
10 SYRINGE (ML) INJECTION PRN
Status: DISCONTINUED | OUTPATIENT
Start: 2021-04-09 | End: 2021-04-12 | Stop reason: HOSPADM

## 2021-04-09 RX ORDER — 0.9 % SODIUM CHLORIDE 0.9 %
1000 INTRAVENOUS SOLUTION INTRAVENOUS ONCE
Status: COMPLETED | OUTPATIENT
Start: 2021-04-09 | End: 2021-04-09

## 2021-04-09 RX ORDER — ACETAMINOPHEN 650 MG/1
650 SUPPOSITORY RECTAL EVERY 6 HOURS PRN
Status: DISCONTINUED | OUTPATIENT
Start: 2021-04-09 | End: 2021-04-12 | Stop reason: HOSPADM

## 2021-04-09 RX ORDER — PROMETHAZINE HYDROCHLORIDE 25 MG/1
12.5 TABLET ORAL EVERY 6 HOURS PRN
Status: DISCONTINUED | OUTPATIENT
Start: 2021-04-09 | End: 2021-04-12 | Stop reason: HOSPADM

## 2021-04-09 RX ORDER — DILTIAZEM HYDROCHLORIDE 120 MG/1
120 CAPSULE, COATED, EXTENDED RELEASE ORAL DAILY
Status: DISCONTINUED | OUTPATIENT
Start: 2021-04-10 | End: 2021-04-12 | Stop reason: HOSPADM

## 2021-04-09 RX ORDER — ACETAMINOPHEN 325 MG/1
650 TABLET ORAL EVERY 6 HOURS PRN
Status: DISCONTINUED | OUTPATIENT
Start: 2021-04-09 | End: 2021-04-12 | Stop reason: HOSPADM

## 2021-04-09 RX ORDER — METOPROLOL SUCCINATE 25 MG/1
25 TABLET, EXTENDED RELEASE ORAL DAILY
Status: DISCONTINUED | OUTPATIENT
Start: 2021-04-10 | End: 2021-04-12 | Stop reason: HOSPADM

## 2021-04-09 RX ORDER — SODIUM CHLORIDE 9 MG/ML
25 INJECTION, SOLUTION INTRAVENOUS PRN
Status: DISCONTINUED | OUTPATIENT
Start: 2021-04-09 | End: 2021-04-12 | Stop reason: HOSPADM

## 2021-04-09 RX ORDER — HYDROMORPHONE HCL 110MG/55ML
1 PATIENT CONTROLLED ANALGESIA SYRINGE INTRAVENOUS ONCE
Status: COMPLETED | OUTPATIENT
Start: 2021-04-09 | End: 2021-04-09

## 2021-04-09 RX ORDER — MORPHINE SULFATE 4 MG/ML
2 INJECTION, SOLUTION INTRAMUSCULAR; INTRAVENOUS EVERY 4 HOURS PRN
Status: CANCELLED | OUTPATIENT
Start: 2021-04-09

## 2021-04-09 RX ORDER — ONDANSETRON 2 MG/ML
4 INJECTION INTRAMUSCULAR; INTRAVENOUS EVERY 30 MIN PRN
Status: DISCONTINUED | OUTPATIENT
Start: 2021-04-09 | End: 2021-04-12 | Stop reason: HOSPADM

## 2021-04-09 RX ORDER — HYDROMORPHONE HCL 110MG/55ML
0.5 PATIENT CONTROLLED ANALGESIA SYRINGE INTRAVENOUS EVERY 4 HOURS PRN
Status: DISCONTINUED | OUTPATIENT
Start: 2021-04-09 | End: 2021-04-12 | Stop reason: HOSPADM

## 2021-04-09 RX ORDER — SODIUM CHLORIDE, SODIUM LACTATE, POTASSIUM CHLORIDE, CALCIUM CHLORIDE 600; 310; 30; 20 MG/100ML; MG/100ML; MG/100ML; MG/100ML
INJECTION, SOLUTION INTRAVENOUS CONTINUOUS
Status: DISPENSED | OUTPATIENT
Start: 2021-04-09 | End: 2021-04-10

## 2021-04-09 RX ORDER — BRIMONIDINE TARTRATE, TIMOLOL MALEATE 2; 5 MG/ML; MG/ML
1 SOLUTION/ DROPS OPHTHALMIC EVERY 12 HOURS
Status: DISCONTINUED | OUTPATIENT
Start: 2021-04-09 | End: 2021-04-09 | Stop reason: CLARIF

## 2021-04-09 RX ADMIN — HYDROMORPHONE HYDROCHLORIDE 1 MG: 2 INJECTION, SOLUTION INTRAMUSCULAR; INTRAVENOUS; SUBCUTANEOUS at 07:49

## 2021-04-09 RX ADMIN — ONDANSETRON 4 MG: 2 INJECTION INTRAMUSCULAR; INTRAVENOUS at 07:49

## 2021-04-09 RX ADMIN — SODIUM CHLORIDE, POTASSIUM CHLORIDE, SODIUM LACTATE AND CALCIUM CHLORIDE: 600; 310; 30; 20 INJECTION, SOLUTION INTRAVENOUS at 22:27

## 2021-04-09 RX ADMIN — SODIUM CHLORIDE, POTASSIUM CHLORIDE, SODIUM LACTATE AND CALCIUM CHLORIDE: 600; 310; 30; 20 INJECTION, SOLUTION INTRAVENOUS at 15:34

## 2021-04-09 RX ADMIN — IOPAMIDOL 75 ML: 755 INJECTION, SOLUTION INTRAVENOUS at 09:28

## 2021-04-09 RX ADMIN — SODIUM CHLORIDE 1000 ML: 9 INJECTION, SOLUTION INTRAVENOUS at 07:53

## 2021-04-09 ASSESSMENT — PAIN SCALES - GENERAL: PAINLEVEL_OUTOF10: 4

## 2021-04-09 ASSESSMENT — PAIN DESCRIPTION - FREQUENCY: FREQUENCY: CONTINUOUS

## 2021-04-09 ASSESSMENT — PAIN DESCRIPTION - DESCRIPTORS: DESCRIPTORS: ACHING

## 2021-04-09 ASSESSMENT — PAIN DESCRIPTION - PAIN TYPE: TYPE: ACUTE PAIN

## 2021-04-09 NOTE — PROGRESS NOTES
Pt has a pacemaker. The pacemaker process for MRI has started. However, the earliest this patient's MRI exam can be done will be on Monday, provided all steps are completed and coordinated with each other. Aura Starr has been perfect served and message has read at [phone], but no return message had been sent at this time.

## 2021-04-09 NOTE — H&P
History and Physical      Name:  Cory Elias /Age/Sex: 3/23/1930  (80 y.o. male)   MRN & CSN:  [id number] & [id number] Admission Date/Time: 2021  7:36 AM   Location:  ED30/ED-30 PCP: Jose Jose MD       Discussed patient with Dr. Brock Hawk and Plan:     Cory Elias is a 80 y.o.  male  who presents with abdominal pain, n/v    - Pancreatitis  Lipase > 3000  CT: stranding c/w pancreatitis; ? Distal stone  GEN surg, Dr Trixie Lock, consulted by ED  Check MRCP wo contrast- per RAD, will not be completed until   NPO with IVF /hr  Check triglycerides, pain control, trend lipase & LFTs    - Chronic left anterior abdominal wall seroma  CT: slight increase in size from prior; 12.8 cm  CT surg consulted by ED, Dr Rasheeda Machuca    DVT ppx: SCDs pending GEN SURG eval; holding eliquis, last dose     Chronic  - HTN- Cont lisinopril-HCTZ, dilt, bb  - HLD- Cont statin   - AAA hx- s/p endovasc repair, 6.6 cm  - PAF- holding eliquis pending workup by CT surg/gen surg; has PPM  - Hypothyroidism- cont synthroid  - BPH- cont flomax    Discussed patient with ER physician     Diet NPO    DVT Prophylaxis [] Lovenox, []  Heparin, [x] SCDs, [] Ambulation   GI Prophylaxis [] PPI,  [] H2 Blocker,  [] Carafate,  [] Diet/Tube Feeds   Code Status Full   Disposition Patient requires continued admission due to abdominal pain   MDM [] Low, [x] Moderate,[]  High  Patient's risk as above due to      [] One or more chronic illnesses with exacerbation progression      [x] Two or more stable chronic illnesses      [x] Undiagnosed new problem with uncertain prognosis      [] Elective major surgery      []Prescription drug management       History of Present Illness:     Chief Complaint: abdominal pain, emesis     Cory Elias is a 80 y.o.  male  who presents with the above complaints, onset this morning, around 2-300 AM.  Pt has hx of colostomy in 2013/2 SBO.   He has chronic seroma of left abdominal wall, for which he has seen Dr Monica Ayon in the past.  Reports non-bloody emesis this morning, with abdominal pain- non-radiating, not associated with SOB. He reports normal output with ostomy and denies melanotic or gross blood in output. He denies fever/chills, new  back pain. Confirms FULL  code status. Denies regular alcohol use. He lives independently; daughter assists him. Ten point ROS reviewed negative, unless as noted above    Objective:     No intake or output data in the 24 hours ending 04/09/21 1128     Vitals:   Vitals:    04/09/21 1000   BP: 135/82   Pulse: 81   Resp: 23   Temp: 98.4   SpO2: 100       Physical Exam:     GEN Awake male, sitting upright in bed in no apparent distress. Appears given age. EYES Pupils are equally round. No scleral erythema, discharge, or conjunctivitis. HENT Mucous membranes are moist. Oral pharynx without exudates, no evidence of thrush. NECK Supple, no apparent thyromegaly or masses. RESP Clear to auscultation, no wheezes, rales or rhonchi. Symmetric chest movement while on room air. CARDIO/VASC S1/S2 auscultated. Regular rate without appreciable murmurs, rubs, or gallops. No JVD or carotid bruits. Peripheral pulses equal bilaterally and palpable. No peripheral edema. GI Abdomen is soft and mildly tender without masses or guarding. Ostomy present with non-bloody output. Bowel sounds are normoactive. Rectal exam deferred.  No costovertebral angle tenderness. Pak catheter is not present. HEME/LYMPH No palpable cervical lymphadenopathy and no hepatosplenomegaly. No petechiae or ecchymoses. MSK No gross joint deformities. Strength equal in BLE and BUE  SKIN Normal coloration, warm, dry. NEURO Cranial nerves appear grossly intact, normal speech, no lateralizing weakness. PSYCH Awake, alert, oriented x 3. Affect appropriate.     Past Medical History:        Past Medical History:   Diagnosis Date    AAA (abdominal aortic aneurysm) ([facility] [address] Surgery scheduled for 7/1/2014 with Dr. Julia Schrader.  Arthritis     generalized    Asthma     AV block, 1st degree     Back pain     Borderline hypothyroidism 12/4/2020    Bradycardia     Colon polyps     Col[facility] in place [facility])     Glaucoma     H/O cardiovascular stress test 12/27/2013    cardiolite-EF56%, apical hypokinesis is seen, cannot exlude apical Tyler ischemia    History of blood transfusion     History of cardiovascular stress test 3/5/10    No angina or ischemic EKG changes are noted with Lexiscan. The cardiolite study demonstrated normal perfusion in all segments of the myocardium with an intact left ventricular systolic function. The resting sestamibi dose is 10.8, stress is 32.5. EF 66%    History of chest x-ray 3/16/10    The chest is considered nonacute. There is cardiomegaly noted. COPD.  History of Doppler ultrasound 3/25/10    venous doppler- Technically good venous ultrasound study negative for DVT in both lower extremities. Normal compressibility,color flow doppler pattern and spectral doppler pattern demonstrated thoughout.  History of echocardiogram 3/18/10    Boarderline LV dilatation with concentric hypertrophy. Low normal systolic and abnormal diastolic function. Mild mitral and trace tricuspid regurgitation. Mild aortic root and bilateral dilatation.  Holter monitor, abnormal 11/10/10    11/10/2010- 24 HR- Abnormal holter revealing some significant brasycardia's and wenckebach phenomenon. Therefore, clinical  correlation is recommend.  Hx of blood clots     Pacemaker     PAF (paroxysmal atrial fibrillation) (HCC)     Peritonitis ([address]     Personal history of colonic polyps     Poor historian     Skin cancer     face    Ulcerative (chronic) proctosigmoiditis ([address]     Wears glasses        PSHX:  has a past surgical history that includes hernia repair (Bilateral, [address]); Hemorrhoid surgery (2011);  Colonoscopy (2/4/2013); knee surgery (Right, 1980s); DAILY, First dose on Sat 4/10/21 at 0900   Ascorbic Acid (VITAMIN C) 1000 MG tablet Take 1,000 mg by mouth daily Kathleen Held, APRN - NP Not Ordered   brimonidine-timolol (COMBIGAN) 0.2-0.5 % ophthalmic solution Place 1 drop into both eyes every 12 hours Kathleen Held, APRN - NP Reordered       Data:   CT ABDOMEN PELVIS W IV CONTRAST Additional Contrast? None [[id number]] Collected: 04/09/21 0949      Order Status: Completed Updated: 04/09/21 1005     Narrative:       EXAMINATION:   CT OF THE ABDOMEN AND PELVIS WITH CONTRAST 4/9/2021 9:25 am     TECHNIQUE:   CT of the abdomen and pelvis was performed with the administration of   intravenous contrast. Multiplanar reformatted images are provided for review. Dose modulation, iterative reconstruction, and/or weight based adjustment of   the mA/kV was utilized to reduce the radiation dose to as low as reasonably   achievable. COMPARISON:   02/15/2021, 01/12/2020     HISTORY:   ORDERING SYSTEM PROVIDED HISTORY: lower abdominal pain, NV, colostomy   TECHNOLOGIST PROVIDED HISTORY:   Reason for exam:->lower abdominal pain, NV, colostomy   Additional Contrast?->None   Decision Support Exception->Emergency Medical Condition (MA)   Reason for Exam: lower abdominal pain, NV, colostomy   Acuity: Acute   Type of Exam: Initial     FINDINGS:   Lower Chest: Bibasilar atelectasis.  Cardiomegaly. Organs: Liver, spleen, and adrenal glands appear unremarkable.  Few   radiodense gallstones.  New fluid and fat stranding in the peripancreatic   region has appearance of acute pancreatitis.  In addition, there is increase   extrahepatic biliary dilation compared to the previous exam.  No focal fluid   collection.  Atrophic kidneys.  Stable left kidney cyst.     GI/Bowel: No bowel obstruction.  Worsening left and right paramidline ventral   hernias containing short segments of small bowel without obstruction.  Right   lower abdominal ostomy appears grossly unchanged. Pelvis: Urinary bladder with multiple diverticula unchanged.  Enlarged   prostate unchanged. Peritoneum/Retroperitoneum: No free air.  No lymphadenopathy. Redemonstration of a large left anterior abdominal wall fluid collection   measuring up to 12.8 cm in transverse dimension compared to 11.9 cm on the   previous study containing mesh which may represent seroma. Vascular: Previous endovascular repair of abdominal aortic aneurysm with   current aneurysm sac size of approximately 6.6 cm compared to 5.9 cm on   01/12/2020. Bones/Soft Tissues: Degenerative changes lower thoracic and lumbar spine      Impression:       New fluid and fat stranding in the peripancreatic region is suggestive of   acute pancreatitis.  In addition, increased extrahepatic biliary dilation   compared to the previous exam.  Cannot exclude obstructing stone distally. Correlation with LFTs advised.  Of note, MRCP may be quite limited in this   patient due to presence of extensive metallic materials in the adjacent   aortic aneurysm sac. Slight increase in size of left anterior abdominal wall fluid collection   measuring up to 12.8 cm in transverse dimension containing internal mash   which may represent seroma with other etiologies not excluded. Slight worsening of left and right paramidline ventral hernias containing   short segments of small bowel without obstruction. Previous endovascular repair of abdominal aortic aneurysm with current   aneurysm sac size of approximately 6.6 cm compared to 5.9 cm on 01/12/2020.    Findings suggestive of underlying endoleak although this exam is not tailored   to evaluate for endoleak.          Electronically signed by LIA Ojeda NP on 4/9/2021 at 11:28 AM

## 2021-04-09 NOTE — CARE COORDINATION
CM met with patient to begin discharge planning and ACP discussion. CM introduced self and CM role. Patient's daughter Surendra Gamino also at bedside. Permission obtained to speak freely. Patient states he presents to ER with c/o abdominal pain intermittently for \"awhile\", right knee pain and back pain. Patient reports he has had several surgical procedures on abdomen over the years. Patient lives alone in a single story home in [place]. Patient has a PCP and insurance which assists with medication affordability. Patient denies [facility] at this time. Patient states he would be interested in [facility] upon discharge. CM provided patient and Surendra Gamino with [facility] list. The patient's individualized treatment goals were discussed and they are in agreement with the transitional plan of care. The patient was provided with a choice in provider and understands the freedom of choice list that was provided to them. The patient and/or family verbalize understanding of treatment preferences and quality data associated with providers. Patient uses the following DME: cane, walker and life alert button. Patient active with meals on wheels. Patient states he no longer drives, but depends on Surendra Gamino for transportation to and from [address] [place] [address]. Patient reports he is independent with ADLs, but his daughter Surendra Gamino assists if needed. Patient plan upon discharge is to return home with [facility]. CM did talk to patient about a POA and LW and explained either of these could be completed here in hospital at this time with no cost. CM also explained that [place] law specifies the primary decision maker in the following descending order for priority:  Guardian  Spouse  [de-identified] of adult Children  Parents  [de-identified] of adult Siblings  Nearest Relative not described above. Patient noted he has LW, but it is at home. CM instructed patient to bring document for scanning into chart.  CM did state anyone can bring copy of advance care directives/documents to ED (11a- 11p) so they can be scanned into chart. Just need to ask for CM. Patient chose his daughter as his primary decision maker. Patient reports he has multiple adult children. CM explained that if there are multiple children siblings, again there needs to be a majority of consensus for decision to be made and patient voiced understanding. CM suggesting POA be completed naming patient's decision for primary decision maker. This POA will meet state-specific requirements to allow them to act on the patient's behalf when appropriate. Patient did choose to have POA completed at this time reflecting above agent(s).

## 2021-04-09 NOTE — ED NOTES
[address] paged hospitalist     José Miguel Ghosh  04/09/21 105    [address] mid level with [employer] returned call       José Miguel Ghosh  04/09/21 3130

## 2021-04-09 NOTE — ED PROVIDER NOTES
eMERGENCY dEPARTMENT eNCOUnter         [facility] EMERGENCY DEPARTMENT     PCP: Manolo Alejandre MD    [facility]    Chief Complaint   Patient presents with    Nausea    Emesis       HPI    Ani Santos is a 80 y.o. male who presents via EMS from home for lower abdominal pain nausea and vomiting. Onset was at 2 AM this morning. Context is patient has a history of colostomy since 2013 secondary to reported bowel obstruction. Has also had appendectomy, hemorrhoid surgery as well as bilateral hernia repair. States he began feeling nauseated this morning with nonbilious/nonbloody vomiting with constant 4/10 cramping pain across lower abdomen. Has had normal stool output into his colostomy bag, no bright red blood or black tarry stools. No fever or chills. No dysuria hematuria or flank pain. Denies any chest pain or shortness of breath cough, sore throat or rash. Patient currently lives at home by himself. His daughter does present to the emergency department. States patient was seen in the ED several weeks ago for similar abdominal pain and states he was diagnosed with an \"obstruction\" was discharged home with outpatient follow-up with his surgeon, Dr. Raymond Rios. Since initial colostomy bag placement, patient has had chronic lower abdominal pain but today's pain is worse. No other changes in baseline mental status per daughter. REVIEW OF SYSTEMS    Constitutional:  Denies fever, chills, weight loss or weakness   HENT:  Denies sore throat or ear pain   Cardiovascular:  Denies chest pain, palpitations or swelling   Respiratory:  Denies cough or shortness of breath   GI:  See HPI above  : No hematuria or dysuria. Musculoskeletal:  Denies back pain or groin pain or masses. No pain or swelling of extremities.   Skin:  Denies rash  Neurologic:  Denies headache, focal weakness or sensory changes   Endocrine:  Denies polyuria or polydypsia   Lymphatic:  Denies swollen glands     All other review of systems are negative  See HPI and nursing notes for additional information     PAST MEDICAL & SURGICAL HISTORY    Past Medical History:   Diagnosis Date    AAA (abdominal aortic aneurysm) [facility])     Surgery scheduled for 7/1/2014 with Dr. Edwige Hinton. 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)     Glaucoma     H/O cardiovascular stress test 12/27/2013    cardiolite-EF56%, apical hypokinesis is seen, cannot exlude apical Tyler ischemia    History of blood transfusion     History of cardiovascular stress test 3/5/10    No angina or ischemic EKG changes are noted with Lexiscan. The cardiolite study demonstrated normal perfusion in all segments of the myocardium with an intact left ventricular systolic function. The resting sestamibi dose is 10.8, stress is 32.5. EF 66%    History of chest x-ray 3/16/10    The chest is considered nonacute. There is cardiomegaly noted. COPD.  History of Doppler ultrasound 3/25/10    venous doppler- Technically good venous ultrasound study negative for DVT in both lower extremities. Normal compressibility,color flow doppler pattern and spectral doppler pattern demonstrated thoughout.  History of echocardiogram 3/18/10    Boarderline LV dilatation with concentric hypertrophy. Low normal systolic and abnormal diastolic function. Mild mitral and trace tricuspid regurgitation. Mild aortic root and bilateral dilatation.  Holter monitor, abnormal 11/10/10    11/10/2010- 24 HR- Abnormal holter revealing some significant brasycardia's and wenckebach phenomenon. Therefore, clinical  correlation is recommend.     Hx of blood clots     Pacemaker     PAF (paroxysmal atrial fibrillation) (HCC)     Peritonitis ([facility] [address]     Personal history of colonic polyps     Poor historian     Skin cancer     face    Ulcerative (chronic) proctosigmoiditis ([facility] [address]     Wears glasses      Past Surgical History:   Procedure Laterality Date    ABDOMINAL AORTIC ANEURYSM REPAIR, ENDOVASCULAR  7/1/14    ABDOMINAL EXPLORATION SURGERY  2/4/2013    exp lap, left hemicolectomy, umbilectomy    APPENDECTOMY  2/4/2013    APPENDECTOMY  2/4/2013    COLONOSCOPY  2/4/2013    COLOSTOMY  2/4/2013    CYSTOSCOPY  2/03/2014    TURP    HEMORRHOID SURGERY  2011    HERNIA REPAIR Bilateral 1940 & 1958    Ing hernia    KNEE SURGERY Right 1980s    PACEMAKER INSERTION Right 12/13/2017    BIV PPM Medtronic Percepta Quad CRT-P MRI SureScan Pacemaker    PACEMAKER PLACEMENT Right 02/25/2013    Explanted 12/13/2017    SMALL INTESTINE SURGERY N/A 8/28/2019    EXPLORATORY LAPAROTOMY, SMALL BOWEL RESECTION, LYSIS OF ADHESIONS, NEW COLOSTOMY, AND HERNIA REPAIR WITH XENMATRIX MESH performed by Zeinab Norton MD at [facility]    Current Outpatient Rx   Medication Sig Dispense Refill    levothyroxine (SYNTHROID) 100 MCG tablet Take 1 tablet by mouth daily 30 tablet 5    ELIQUIS 2.5 MG TABS tablet TAKE ONE (1) TABLET BY MOUTH TWO TIMES DAILY 60 tablet 5    lisinopril-hydroCHLOROthiazide (PRINZIDE;ZESTORETIC) 10-12.5 MG per tablet TAKE ONE-HALF (1/2) TABLET BY MOUTH ONCE DAILY 30 tablet 5    atorvastatin (LIPITOR) 20 MG tablet TAKE 1 TABLET BY MOUTH ONCE DAILY 30 tablet 5    dilTIAZem (CARDIZEM CD) 120 MG extended release capsule TAKE 1 CAPSULE BY MOUTH ONCE DAILY 30 capsule 5    metoprolol succinate (TOPROL XL) 25 MG extended release tablet TAKE 1 TABLET BY MOUTH ONCE DAILY 30 tablet 5    sucralfate (CARAFATE) 1 GM tablet TAKE 1 TABLET BY MOUTH TWO TIMES DAILY 60 tablet 5    tamsulosin (FLOMAX) 0.4 MG capsule TAKE 1 CAPSULE BY MOUTH ONCE DAILY 30 capsule 5    sertraline (ZOLOFT) 50 MG tablet TAKE ONE (1) TABLET BY MOUTH ONCE DAILY 90 tablet 1    Ascorbic Acid (VITAMIN C) 1000 MG tablet Take 1,000 mg by mouth daily      brimonidine-timolol (COMBIGAN) 0.2-0.5 % ophthalmic solution Place 1 drop into both eyes every 12 hours      Glucosamine-MSM-Hyaluronic Acd (JOINT HEALTH PO) Take 1 each by mouth daily          ALLERGIES    Allergies   Allergen Reactions    Codeine Anaphylaxis    Fentanyl Other (See Comments)     Hypotension        SOCIAL AND FAMILY HISTORY    Social History     Socioeconomic History    Marital status:       Spouse name: None    Number of children: 3    Years of education: None    Highest education level: None   Occupational History    Occupation: Retired   Social Needs    Financial resource strain: None    Food insecurity     Worry: None     Inability: None    Transportation needs     Medical: None     Non-medical: None   Tobacco Use    Smoking status: Never Smoker    Smokeless tobacco: Never Used   Substance and Sexual Activity    Alcohol use: No     Alcohol/week: 0.0 standard drinks    Drug use: No    Sexual activity: Yes     Partners: Female     Comment:    Lifestyle    Physical activity     Days per week: None     Minutes per session: None    Stress: None   Relationships    Social connections     Talks on phone: None     Gets together: None     Attends Confucianist service: None     Active member of club or organization: None     Attends meetings of clubs or organizations: None     Relationship status: None    Intimate partner violence     Fear of current or ex partner: None     Emotionally abused: None     Physically abused: None     Forced sexual activity: None   Other Topics Concern    None   Social History Narrative    None     Family History   Problem Relation Age of Onset    Stroke Mother         CVA    Heart Disease Mother     Cancer Brother         prostate    Cancer Brother         skin    Cancer Daughter         colon    Substance Abuse Son         Tobacco       PHYSICAL EXAM    VITAL SIGNS: /82   Pulse 81   Temp 98.4 °F (36.9 °C) (Oral)   Resp 23   Ht 5' 3\" (1.6 m)   Wt 178 lb (80.7 kg)   SpO2 (!) 89%   BMI 31.53 kg/m²   General:  Well developed, elevated BMI, resting comfortably, uncomfortable but nontoxic. Eyes:  Sclera nonicteric, Conjunctiva moist, No discharge  Head:  Normocephalic, Atramautic  Neck/Lymphatics: Supple, no JVD, no swollen nodes  Respiratory:  Clear to ausculation bilaterally, No retractions, Non labored breathing  Cardiovascular:  RRR, normal S1/S2. GI:   No gross discoloration. Hypoactive bowel sounds present in all quadrants, No audible bruits. Soft,  Nondistended. + Colostomy bag noted to the right mid abdomen with soft formed stools within the bag. No surrounding redness or warmth. Generalized tenderness with guarding across the lower abdomen, no rebound tenderness.     Back:  No CVA tenderness to percussion bilaterally  Musculoskeletal:  No edema, No deformity  Peripheral Vascular: Distal pulses 2+ equal bilaterally  Integument: No rash, Normal turgor  Neurologic:  Alert & oriented, Normal speech  Psychiatric: Cooperative, pleasant affect       I have reviewed and interpreted all of the currently available lab results from this visit (if applicable):  Results for orders placed or performed during the hospital encounter of 04/09/21   CBC auto diff   Result Value Ref Range    WBC 8.0 4.0 - 10.5 K/CU MM    RBC 4.11 (L) 4.6 - 6.2 M/CU MM    Hemoglobin 13.8 13.5 - 18.0 GM/DL    Hematocrit 40.4 (L) 42 - 52 %    MCV 98.3 78 - 100 FL    MCH 33.6 (H) 27 - 31 PG    MCHC 34.2 32.0 - 36.0 %    RDW 14.2 11.7 - 14.9 %    Platelets 300 563 - 593 K/CU MM    MPV 9.9 7.5 - 11.1 FL    Bands Relative 2 (L) 5 - 11 %    Segs Relative 90.0 (H) 36 - 66 %    Basophils % 1.0 0 - 1 %    Lymphocytes % 5.0 (L) 24 - 44 %    Monocytes % 2.0 0 - 4 %    Bands Absolute 0.16 K/CU MM    Segs Absolute 7.1 K/CU MM    Basophils Absolute 0.1 K/CU MM    Lymphocytes Absolute 0.4 K/CU MM    Monocytes Absolute 0.2 K/CU MM    Differential Type MANUAL DIFFERENTIAL     RBC Morphology RBC MORPHOLOGY NORMAL    CMP   Result Value Ref Range    Sodium 136 135 - 145 MMOL/L    Potassium 3.8 3.5 - 5.1 MMOL/L    Chloride 99 99 - 110 mMol/L    CO2 27 21 - 32 MMOL/L    BUN 24 (H) 6 - 23 MG/DL    CREATININE 1.3 0.9 - 1.3 MG/DL    Glucose 155 (H) 70 - 99 MG/DL    Calcium 8.7 8.3 - 10.6 MG/DL    Albumin 3.9 3.4 - 5.0 GM/DL    Total Protein 7.2 6.4 - 8.2 GM/DL    Total Bilirubin 1.1 (H) 0.0 - 1.0 MG/DL     (H) 10 - 40 U/L     (H) 15 - 37 IU/L    Alkaline Phosphatase 193 (H) 40 - 128 IU/L    GFR Non- 52 (L) >60 mL/min/1.73m2    GFR African American >60 >60 mL/min/1.73m2    Anion Gap 10 4 - 16   Lipase   Result Value Ref Range    Lipase >3,000 (H) 13 - 60 IU/L   Urinalysis   Result Value Ref Range    Color, UA YELLOW YELLOW    Clarity, UA CLEAR CLEAR    Glucose, Urine NEGATIVE NEGATIVE MG/DL    Bilirubin Urine NEGATIVE NEGATIVE MG/DL    Ketones, Urine NEGATIVE NEGATIVE MG/DL    Specific Gravity, UA 1.014 1.001 - 1.035    Blood, Urine NEGATIVE NEGATIVE    pH, Urine 6.0 5.0 - 8.0    Protein, UA NEGATIVE NEGATIVE MG/DL    Urobilinogen, Urine NEGATIVE 0.2 - 1.0 MG/DL    Nitrite Urine, Quantitative NEGATIVE NEGATIVE    Leukocyte Esterase, Urine NEGATIVE NEGATIVE    RBC, UA 1 0 - 3 /HPF    WBC, UA 1 0 - 2 /HPF    Bacteria, UA NEGATIVE NEGATIVE /HPF    Mucus, UA RARE (A) NEGATIVE HPF    Trichomonas, UA NONE SEEN NONE SEEN /HPF   Lactic Acid, Plasma   Result Value Ref Range    Lactate 1.7 0.4 - 2.0 mMOL/L   Protime/INR & PTT   Result Value Ref Range    Protime 12.8 11.7 - 14.5 SECONDS    INR 1.06 INDEX    aPTT 27.0 25.1 - 37.1 SECONDS   Ammonia Level   Result Value Ref Range    Ammonia 18 16 - 60 UMOL/L        RADIOLOGY/PROCEDURES        CT ABDOMEN PELVIS W IV CONTRAST Additional Contrast? None (Final result)  Result time 04/09/21 10:02:38  Final result by Makenna Bridges MD (04/09/21 10:02:38)                Impression:    New fluid and fat stranding in the peripancreatic region is suggestive of   acute pancreatitis.  In addition, increased extrahepatic biliary dilation   compared to the previous exam.  Cannot exclude obstructing stone distally. Correlation with LFTs advised.  Of note, MRCP may be quite limited in this   patient due to presence of extensive metallic materials in the adjacent   aortic aneurysm sac. Slight increase in size of left anterior abdominal wall fluid collection   measuring up to 12.8 cm in transverse dimension containing internal mash   which may represent seroma with other etiologies not excluded. Slight worsening of left and right paramidline ventral hernias containing   short segments of small bowel without obstruction. Previous endovascular repair of abdominal aortic aneurysm with current   aneurysm sac size of approximately 6.6 cm compared to 5.9 cm on 01/12/2020. Findings suggestive of underlying endoleak although this exam is not tailored   to evaluate for endoleak. Narrative:    EXAMINATION:   CT OF THE ABDOMEN AND PELVIS WITH CONTRAST 4/9/2021 9:25 am     TECHNIQUE:   CT of the abdomen and pelvis was performed with the administration of   intravenous contrast. Multiplanar reformatted images are provided for review. Dose modulation, iterative reconstruction, and/or weight based adjustment of   the mA/kV was utilized to reduce the radiation dose to as low as reasonably   achievable. COMPARISON:   02/15/2021, 01/12/2020     HISTORY:   ORDERING SYSTEM PROVIDED HISTORY: lower abdominal pain, NV, colostomy   TECHNOLOGIST PROVIDED HISTORY:   Reason for exam:->lower abdominal pain, NV, colostomy   Additional Contrast?->None   Decision Support Exception->Emergency Medical Condition (MA)   Reason for Exam: lower abdominal pain, NV, colostomy   Acuity: Acute   Type of Exam: Initial     FINDINGS:   Lower Chest: Bibasilar atelectasis.  Cardiomegaly.      Organs: Liver, spleen, and adrenal glands appear unremarkable.  Few   radiodense gallstones.  New fluid and fat stranding in the peripancreatic   region has appearance of acute pancreatitis.  In addition, there is increase   extrahepatic biliary dilation compared to the previous exam.  No focal fluid   collection.  Atrophic kidneys.  Stable left kidney cyst.     GI/Bowel: No bowel obstruction.  Worsening left and right paramidline ventral   hernias containing short segments of small bowel without obstruction.  Right   lower abdominal ostomy appears grossly unchanged. Pelvis: Urinary bladder with multiple diverticula unchanged.  Enlarged   prostate unchanged. Peritoneum/Retroperitoneum: No free air.  No lymphadenopathy. Redemonstration of a large left anterior abdominal wall fluid collection   measuring up to 12.8 cm in transverse dimension compared to 11.9 cm on the   previous study containing mesh which may represent seroma. Vascular: Previous endovascular repair of abdominal aortic aneurysm with   current aneurysm sac size of approximately 6.6 cm compared to 5.9 cm on   01/12/2020. Bones/Soft Tissues: Degenerative changes lower thoracic and lumbar spine                           ED COURSE & MEDICAL DECISION MAKING      Vital signs and nursing notes reviewed during ED course. I have independently evaluated this patient . Supervising MD - Dr Yosi Poole - present in the Emergency Department, available for consultation, throughout entirety of  patient care. All pertinent Lab data and radiographic results reviewed with patient at bedside. The patient and / or the family were informed of the results of any tests, a time was given to answer questions, a plan was proposed and they agreed with plan. Differential diagnosis: Abdominal Aortic Aneurysm, Ischemic Bowel, Bowel Obstruction, Acute Cholecystitis, Acute Appendicitis, other    Clinical  IMPRESSION    1. Acute pancreatitis, unspecified complication status, unspecified pancreatitis type    2.  Abdominal aortic aneurysm (AAA) without rupture ([address]    3. Abnormal abdominal CT scan    4. Transaminitis    5. Abdominal wall seroma, initial encounter    6. Ventral hernia without obstruction or gangrene          Patient presents via EMS from home with daughter for lower abdominal pain nausea and vomiting, has a history of colostomy. On exam, nontoxic but uncomfortable appearing 43-year-old male, nontoxic and afebrile. Noted colostomy bag in the right mid abdomen with soft formed stools. Abdomen with hypoactive bowel sounds and tenderness across lower abdomen with guarding. No other rebound tenderness. No CVA tenderness to percussion. Patient was kept n.p.o., start IV fluids, Dilaudid and Zofran. On chart review, patient was seen on 2/15/2021 and was diagnosed with an abdominal wall seroma as well as acute cystitis, seroma was discussed with patient surgeon, Dr. Julio Cesar Morgan as he has had reoccurring abdominal seroma and did not recommend for emergent management and patient was treated for UTI at that time and discharged home. MBC with normal white count and hemoglobin. CMP does show elevated bilirubin LFTs and alk phos with lipase is over 3000. Renal creatinine function, BUN is 24. No other electrolyte disturbance. Normal lactic acid. UA is unremarkable. Ammonia level is normal.  CT abdomen pelvis with IV contrast shows new fluid and fat stranding in the peripancreatic region concerning for acute pancreatitis with increased extrahepatic biliary dilatation concerning for an obstructive stone distally however MRCP may be quite limited due to extensive metallic materials in the adjacent aortic aneurysm sac. Also increased size of the left anterior abdominal wall fluid collection now measuring 12.8 cm containing internal mesh which may represent seroma. Also worsening left and right paramidline ventral hernias with short segments of small bowel but no obstruction.   Also incidental finding for endovascular repair of AAA with increased sac size of approximately 6.6 cm compared to 5.9 cm in January 2020 and concerning for underlying endoleak. Did consult with patient's cardiothoracic surgeon, Dr. Aruna Chin given possibility for underlying endoleak and increasing aneurysmal sac who will come to evaluate patient emergency department but does not require any emergent intervention at this time and will likely require elective repair discussion. On chart review, does appear that initial endovascular AAA repair was performed in 2014 with Dr. Lore Fletcher. Also did consult with Dr. Cici Rutherford, on-call for his general surgeon Dr. Emmy Ruby. Reviewed imaging studies and concern for pancreatitis versus choledocholithiasis as well as abdominal wall seroma. General surgery does not recommend any additional antibiotics empirically at this time and continue pancreatitis management with fluids and pain control. Is requesting that I speak again with cardiothoracic surgeon for approval for MRCP given the endovascular repair. I again spoke with CT surgeon who does agree patient is a candidate for MRCP. I did consult with Rober Leggett NP - hospitalist - and discussed patient's history, ED presentation/course including any pertinent laboratory findings and imaging study findings. He/she agrees to hospital admission. Patient is admitted to the hospital in stable condition for 2 pain control, IV fluids and CT surgeon/general surgery evaluation. .    In light of current events, I did utilize appropriate PPE (including N95 face mask, safety glasses, gloves as recommended by the health facility/national standard best practice, during my bedside interactions with the patient. Patient was masked throughout ED course. Full droplet precaution as well as full PPE was followed throughout patient's ED course and evaluation.        In consideration of current COVID19 pandemic, with effort to minimize unnecessary provider exposure, this patient was seen at bedside by me independently. However, in compliance with current hospital VIOLA/ED protocol, prior to admission I did discuss this patient case with emergency department physician, Dr. Yosi Poole, who did agree with ED workup/evaluation and plan for admission. Of note, this Pt was NOT admitted to the ICU. Comment: Please note this report has been produced using speech recognition software and may contain errors related to that system including errors in grammar, punctuation, and spelling, as well as words and phrases that may be inappropriate. If there are any questions or concerns please feel free to contact the dictating provider for clarification.           Giancarlo Best PA-C  04/09/21 [id number]

## 2021-04-09 NOTE — ED NOTES
Arnaldo Mccoy called Dr Karen Thakkar covering Dr Kelsy Clay  04/09/21 1026  1040 called Dr Jeanna Brandon  04/09/21 1047

## 2021-04-09 NOTE — CONSULTS
Department of Cardiovascular & Thoracic Surgery   Consult Note    Reason for Consult:  AAA    PCP: Constance Loyd MD    Admitting Physician: Stanley Preciado,*     Date of Consult: 4/9/21    History Obtained From:  patient     HISTORY OF PRESENT ILLNESS:    The patient is a 80 y.o. y.o. male who presented with N/V this AM.  He denies nausea at this time. Admits to back pain \"for a long time\". Has some abdominal which he states improves with PO intake. He has known AAA s/p EVAR 2014. He also underwent treatment of Endoleak in [place]. He was recently seen in office and duplex at that time revealed no evidence of endoleak. CT today showed question of increased sac size, but was not EVAR protocol. Past Medical History:    Past Medical History:   Diagnosis Date    AAA (abdominal aortic aneurysm) [facility])     Surgery scheduled for 7/1/2014 with Dr. Ashwin Cerna.  Arthritis     generalized    Asthma     AV block, 1st degree     Back pain     Borderline hypothyroidism 12/4/2020    Bradycardia     Colon polyps     Colostomy in place [facility])     Glaucoma     H/O cardiovascular stress test 12/27/2013    cardiolite-EF56%, apical hypokinesis is seen, cannot exlude apical Tyler ischemia    History of blood transfusion     History of cardiovascular stress test 3/5/10    No angina or ischemic EKG changes are noted with Lexiscan. The cardiolite study demonstrated normal perfusion in all segments of the myocardium with an intact left ventricular systolic function. The resting sestamibi dose is 10.8, stress is 32.5. EF 66%    History of chest x-ray 3/16/10    The chest is considered nonacute. There is cardiomegaly noted. COPD.  History of Doppler ultrasound 3/25/10    venous doppler- Technically good venous ultrasound study negative for DVT in both lower extremities. Normal compressibility,color flow doppler pattern and spectral doppler pattern demonstrated thoughout.     History of echocardiogram 3/18/10    Boarderline LV dilatation with concentric hypertrophy. Low normal systolic and abnormal diastolic function. Mild mitral and trace tricuspid regurgitation. Mild aortic root and bilateral dilatation.  Holter monitor, abnormal 11/10/10    11/10/2010- 24 HR- Abnormal holter revealing some significant brasycardia's and wenckebach phenomenon. Therefore, clinical  correlation is recommend.    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 [facility]     Wears glasses          Past Surgical History:    Past Surgical History:   Procedure Laterality Date    ABDOMINAL AORTIC ANEURYSM REPAIR, ENDOVASCULAR  7/1/14    ABDOMINAL EXPLORATION SURGERY  2/4/2013    exp lap, left hemicolectomy, umbilectomy    APPENDECTOMY  2/4/2013    APPENDECTOMY  2/4/2013    COLONOSCOPY  2/4/2013    COLOSTOMY  2/4/2013    CYSTOSCOPY  2/03/2014    TURP    HEMORRHOID SURGERY  2011    HERNIA REPAIR Bilateral [address] hernia    KNEE SURGERY Right 1980s    PACEMAKER INSERTION Right 12/13/2017    BIV PPM Medtronic Percepta Quad CRT-P MRI SureScan Pacemaker    PACEMAKER PLACEMENT Right 02/25/2013    Explanted 12/13/2017    SMALL INTESTINE SURGERY N/A 8/28/2019    EXPLORATORY LAPAROTOMY, SMALL BOWEL RESECTION, LYSIS OF ADHESIONS, NEW COLOSTOMY, AND HERNIA REPAIR WITH XENMATRIX MESH performed by Janneth Worthy MD at [facility] OR       Current Medications:   Current Facility-Administered Medications   Medication Dose Route Frequency Provider Last Rate Last Admin    ondansetron (ZOFRAN) injection 4 mg  4 mg Intravenous Q30 Min PRN Korinne Lusterio, PA-C   4 mg at 04/09/21 0749    sodium chloride flush 0.9 % injection 5-40 mL  5-40 mL Intravenous 2 times per day LIA Plasencia - NP        sodium chloride flush 0.9 % injection 10 mL (1) TABLET BY MOUTH TWO TIMES DAILY 60 tablet 5    lisinopril-hydroCHLOROthiazide (PRINZIDE;ZESTORETIC) 10-12.5 MG per tablet TAKE ONE-HALF (1/2) TABLET BY MOUTH ONCE DAILY 30 tablet 5    atorvastatin (LIPITOR) 20 MG tablet TAKE 1 TABLET BY MOUTH ONCE DAILY 30 tablet 5    dilTIAZem (CARDIZEM CD) 120 MG extended release capsule TAKE 1 CAPSULE BY MOUTH ONCE DAILY 30 capsule 5    metoprolol succinate (TOPROL XL) 25 MG extended release tablet TAKE 1 TABLET BY MOUTH ONCE DAILY 30 tablet 5    sucralfate (CARAFATE) 1 GM tablet TAKE 1 TABLET BY MOUTH TWO TIMES DAILY 60 tablet 5    tamsulosin (FLOMAX) 0.4 MG capsule TAKE 1 CAPSULE BY MOUTH ONCE DAILY 30 capsule 5    sertraline (ZOLOFT) 50 MG tablet TAKE ONE (1) TABLET BY MOUTH ONCE DAILY 90 tablet 1    Ascorbic Acid (VITAMIN C) 1000 MG tablet Take 1,000 mg by mouth daily      brimonidine-timolol (COMBIGAN) 0.2-0.5 % ophthalmic solution Place 1 drop into both eyes every 12 hours      Glucosamine-MSM-Hyaluronic Acd (JOINT HEALTH PO) Take 1 each by mouth daily        Allergies: Allergies   Allergen Reactions    Codeine Anaphylaxis    Fentanyl Other (See Comments)     Hypotension        Social History:   Social History     Socioeconomic History    Marital status:       Spouse name: Not on file    Number of children: 3    Years of education: Not on file    Highest education level: Not on file   Occupational History    Occupation: Retired   Social Needs    Financial resource strain: Not on file    Food insecurity     Worry: Not on file     Inability: Not on file   [employer] needs     Medical: Not on file     Non-medical: Not on file   Tobacco Use    Smoking status: Never Smoker    Smokeless tobacco: Never Used   Substance and Sexual Activity    Alcohol use: No     Alcohol/week: 0.0 standard drinks    Drug use: No    Sexual activity: Yes     Partners: Female     Comment:    Lifestyle    Physical activity     Days per week: Not on file Minutes per session: Not on file    Stress: Not on file   Relationships    Social connections     Talks on phone: Not on file     Gets together: Not on file     Attends Islam service: Not on file     Active member of club or organization: Not on file     Attends meetings of clubs or organizations: Not on file     Relationship status: Not on file    Intimate partner violence     Fear of current or ex partner: Not on file     Emotionally abused: Not on file     Physically abused: Not on file     Forced sexual activity: Not on file   Other Topics Concern    Not on file   Social History Narrative    Not on file       Family History:    Family History   Problem Relation Age of Onset    Stroke Mother         CVA    Heart Disease Mother     Cancer Brother         prostate    Cancer Brother         skin    Cancer Daughter         colon    Substance Abuse Son         Tobacco       REVIEW OF SYSTEMS:  ROS: Is as noted above in HPI. Complete system review is done and is negative except as noted above. EXAM:  Vitals:    04/09/21 1000   BP: 135/82   Pulse: 81   Resp: 23   Temp:    SpO2: (!) 89%     General appearance: No apparent distress, appears stated age and cooperative. Skin: unremarkable, warm, dry  HEENT Normocephalic, atraumatic without obvious deformity. EOMI, conjunctiva nl, hearing intact  Neck: Supple, Trachea midline   Lungs: Good respiratory effort. Clear to auscultation,  Heart: Regular rate/ rhythm   Abdomen: Soft, obese, epigrastic tenderness or non-distended   Extremities: no edema warm  well perfused, no deformities  Neurologic: Alert, grossly intact, sensation intact  Mental status: normal affect    DATA:  Images Reviewed  CT--  Ct Abdomen Pelvis W Iv Contrast Additional Contrast? None    Result Date: 4/9/2021  Acute pancreatitis.   Increased extrahepatic biliary dilation   Slight increase in size of left anterior abdominal wall fluid collection measuring up to 12.8 cm in transverse dimension containing internal mash which may represent seroma with other etiologies not excluded. Slight worsening of left and right paramidline ventral hernias containing short segments of small bowel without obstruction. 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    Leukocytosis    Partial small bowel obstruction (HCC)    S/P biventricular cardiac pacemaker procedure    Pacemaker lead malfunction    Complete heart block (HCC)    Chronic systolic heart failure (HCC)    Small bowel obstruction (HCC)    Urinary tract infection without hematuria    Intractable nausea and vomiting    Other hyperlipidemia    PVD (peripheral vascular disease) ([address]    Acquired hypothyroidism    Pancreatitis, unspecified pancreatitis type     AAA S/P EVAR    RECOMMENDATIONS:    He has already been treated for endoleak and recent duplex did not show any evidence of endoleak. Will have centerline recons done of the last 3 CTs and reassess. If no significant change, continue current management. If there is a change, will plan follow imaging. Recommend follow up as outpatient once recovered from current episode. Discussed with patient and daughter. Thank you for the opportunity to assist in the care of this patient.

## 2021-04-09 NOTE — ED NOTES
2015 [facility] called Dr Tracey Ramirez for Obed MACHUCA.       Kenroy Michael  04/09/21 1046

## 2021-04-09 NOTE — CONSULTS
Pt seen and examined. Consult to be dictated. Pt well known to our group. No acute surgical issues. W/U for pancreatitis. Hold eliquis. Proceed with conservative mgnt.

## 2021-04-10 LAB
ALBUMIN SERPL-MCNC: 3.2 GM/DL (ref 3.4–5)
ALP BLD-CCNC: 148 IU/L (ref 40–129)
ALT SERPL-CCNC: 58 U/L (ref 10–40)
ANION GAP SERPL CALCULATED.3IONS-SCNC: 6 MMOL/L (ref 4–16)
AST SERPL-CCNC: 63 IU/L (ref 15–37)
BASOPHILS ABSOLUTE: 0 K/CU MM
BASOPHILS RELATIVE PERCENT: 0.1 % (ref 0–1)
BILIRUB SERPL-MCNC: 0.6 MG/DL (ref 0–1)
BILIRUBIN DIRECT: 0.2 MG/DL (ref 0–0.3)
BILIRUBIN, INDIRECT: 0.4 MG/DL (ref 0–0.7)
BUN BLDV-MCNC: 20 MG/DL (ref 6–23)
CALCIUM SERPL-MCNC: 7.6 MG/DL (ref 8.3–10.6)
CHLORIDE BLD-SCNC: 103 MMOL/L (ref 99–110)
CO2: 29 MMOL/L (ref 21–32)
CREAT SERPL-MCNC: 1 MG/DL (ref 0.9–1.3)
DIFFERENTIAL TYPE: ABNORMAL
EOSINOPHILS ABSOLUTE: 0 K/CU MM
EOSINOPHILS RELATIVE PERCENT: 0 % (ref 0–3)
GFR AFRICAN AMERICAN: >60 ML/MIN/1.73M2
GFR NON-AFRICAN AMERICAN: >60 ML/MIN/1.73M2
GLUCOSE BLD-MCNC: 87 MG/DL (ref 70–99)
HCT VFR BLD CALC: 37.6 % (ref 42–52)
HEMOGLOBIN: 12.1 GM/DL (ref 13.5–18)
IMMATURE NEUTROPHIL %: 0.7 % (ref 0–0.43)
LIPASE: 1027 IU/L (ref 13–60)
LIPASE: 2000 IU/L (ref 13–60)
LYMPHOCYTES ABSOLUTE: 0.4 K/CU MM
LYMPHOCYTES RELATIVE PERCENT: 4.5 % (ref 24–44)
MAGNESIUM: 1.9 MG/DL (ref 1.8–2.4)
MCH RBC QN AUTO: 32.6 PG (ref 27–31)
MCHC RBC AUTO-ENTMCNC: 32.2 % (ref 32–36)
MCV RBC AUTO: 101.3 FL (ref 78–100)
MONOCYTES ABSOLUTE: 0.5 K/CU MM
MONOCYTES RELATIVE PERCENT: 5.5 % (ref 0–4)
NUCLEATED RBC %: 0 %
PDW BLD-RTO: 14.4 % (ref 11.7–14.9)
PLATELET # BLD: 158 K/CU MM (ref 140–440)
PMV BLD AUTO: 10.3 FL (ref 7.5–11.1)
POTASSIUM SERPL-SCNC: 3.5 MMOL/L (ref 3.5–5.1)
RBC # BLD: 3.71 M/CU MM (ref 4.6–6.2)
SEGMENTED NEUTROPHILS ABSOLUTE COUNT: 8 K/CU MM
SEGMENTED NEUTROPHILS RELATIVE PERCENT: 89.2 % (ref 36–66)
SODIUM BLD-SCNC: 138 MMOL/L (ref 135–145)
TOTAL IMMATURE NEUTOROPHIL: 0.06 K/CU MM
TOTAL NUCLEATED RBC: 0 K/CU MM
TOTAL PROTEIN: 5.4 GM/DL (ref 6.4–8.2)
WBC # BLD: 8.9 K/CU MM (ref 4–10.5)

## 2021-04-10 PROCEDURE — 82248 BILIRUBIN DIRECT: CPT

## 2021-04-10 PROCEDURE — 85025 COMPLETE CBC W/AUTO DIFF WBC: CPT

## 2021-04-10 PROCEDURE — 36415 COLL VENOUS BLD VENIPUNCTURE: CPT

## 2021-04-10 PROCEDURE — 94761 N-INVAS EAR/PLS OXIMETRY MLT: CPT

## 2021-04-10 PROCEDURE — 80053 COMPREHEN METABOLIC PANEL: CPT

## 2021-04-10 PROCEDURE — 6360000002 HC RX W HCPCS: Performed by: NURSE PRACTITIONER

## 2021-04-10 PROCEDURE — 6370000000 HC RX 637 (ALT 250 FOR IP): Performed by: NURSE PRACTITIONER

## 2021-04-10 PROCEDURE — 2580000003 HC RX 258: Performed by: NURSE PRACTITIONER

## 2021-04-10 PROCEDURE — 83735 ASSAY OF MAGNESIUM: CPT

## 2021-04-10 PROCEDURE — 1200000000 HC SEMI PRIVATE

## 2021-04-10 PROCEDURE — 2700000000 HC OXYGEN THERAPY PER DAY

## 2021-04-10 PROCEDURE — 83690 ASSAY OF LIPASE: CPT

## 2021-04-10 RX ADMIN — BRIMONIDINE TARTRATE 1 DROP: 2 SOLUTION OPHTHALMIC at 09:16

## 2021-04-10 RX ADMIN — TIMOLOL MALEATE 1 DROP: 5 SOLUTION OPHTHALMIC at 02:46

## 2021-04-10 RX ADMIN — HYDROMORPHONE HYDROCHLORIDE 0.5 MG: 2 INJECTION, SOLUTION INTRAMUSCULAR; INTRAVENOUS; SUBCUTANEOUS at 05:15

## 2021-04-10 RX ADMIN — SODIUM CHLORIDE, PRESERVATIVE FREE 10 ML: 5 INJECTION INTRAVENOUS at 22:40

## 2021-04-10 RX ADMIN — LISINOPRIL AND HYDROCHLOROTHIAZIDE 1 TABLET: 12.5; 1 TABLET ORAL at 09:15

## 2021-04-10 RX ADMIN — BRIMONIDINE TARTRATE 1 DROP: 2 SOLUTION OPHTHALMIC at 22:40

## 2021-04-10 RX ADMIN — METOPROLOL SUCCINATE 25 MG: 25 TABLET, EXTENDED RELEASE ORAL at 09:15

## 2021-04-10 RX ADMIN — TIMOLOL MALEATE 1 DROP: 5 SOLUTION OPHTHALMIC at 22:40

## 2021-04-10 RX ADMIN — BRIMONIDINE TARTRATE 1 DROP: 2 SOLUTION OPHTHALMIC at 02:47

## 2021-04-10 RX ADMIN — SODIUM CHLORIDE, POTASSIUM CHLORIDE, SODIUM LACTATE AND CALCIUM CHLORIDE: 600; 310; 30; 20 INJECTION, SOLUTION INTRAVENOUS at 02:46

## 2021-04-10 RX ADMIN — SERTRALINE HYDROCHLORIDE 50 MG: 50 TABLET ORAL at 09:15

## 2021-04-10 RX ADMIN — DILTIAZEM HYDROCHLORIDE 120 MG: 120 CAPSULE, COATED, EXTENDED RELEASE ORAL at 09:16

## 2021-04-10 RX ADMIN — LEVOTHYROXINE SODIUM 100 MCG: 0.1 TABLET ORAL at 09:15

## 2021-04-10 RX ADMIN — TAMSULOSIN HYDROCHLORIDE 0.4 MG: 0.4 CAPSULE ORAL at 09:15

## 2021-04-10 RX ADMIN — SODIUM CHLORIDE, PRESERVATIVE FREE 10 ML: 5 INJECTION INTRAVENOUS at 09:16

## 2021-04-10 RX ADMIN — SODIUM CHLORIDE, POTASSIUM CHLORIDE, SODIUM LACTATE AND CALCIUM CHLORIDE: 600; 310; 30; 20 INJECTION, SOLUTION INTRAVENOUS at 09:18

## 2021-04-10 RX ADMIN — TIMOLOL MALEATE 1 DROP: 5 SOLUTION OPHTHALMIC at 09:16

## 2021-04-10 NOTE — PROGRESS NOTES
Hospitalist Progress Note      Name:  Kasey Eric /Age/Sex: 3/23/1930  (80 y.o. male)   MRN & CSN:  [id number] & [id number] Admission Date/Time: 2021  7:36 AM   Location:  [address] [place] [address]- PCP: Ramin Ansari MD         Hospital Day: 2    Assessment and Plan:   Kasey Eric is a 80 y.o.  male  who presents with abdominal pain, n/v     1) Acute Pancreatitis; likely gallstone  -Admitted with Lipase > 3000  -CT A/P: stranding c/w pancreatitis; ? Distal stone  -Abdominal ultrasound noted cholelithiasis  -Initially n.p.o. and but currently on clear liquid diet. Will advance as tolerated  -Awaiting MRCP  -Gen surg on board, will likely need cholecystectomy when pancreatitis improves     2) Chronic left anterior abdominal wall seroma  -CT A/P: slight increase in size from prior; 12.8 cm  -CTS on board for recommendation     Other chronic medical conditions, medication reviewed unless contraindicated  -Essential HTN- Cont lisinopril-HCTZ, dilt, bb  - HLD- Cont statin   - AAA hx- s/p endovasc repair, 6.6 cm  - PAF- holding eliquis pending workup by CT surg/gen surg; has PPM  - Hypothyroidism- cont synthroid  - BPH- cont flomax  -Status post colostomy       Diet DIET CLEAR LIQUID;   DVT Prophylaxis [] Lovenox, []  Heparin, [] SCDs, [] Ambulation   GI Prophylaxis [] PPI,  [] H2 Blocker,  [] Carafate,  [] Diet/Tube Feeds   Code Status Full Code   Disposition TBD   MDM      History of Present Illness:     Patient was seen and examined  Denied any worsening abdominal pain  No fever or chills  No nausea or vomiting  Claimed that he feels better today    Ten point ROS reviewed negative, unless as noted above    Objective:        Intake/Output Summary (Last 24 hours) at 4/10/2021 1148  Last data filed at 4/10/2021 0745  Gross per 24 hour   Intake --   Output 500 ml   Net -500 ml      Vitals:   Vitals:    04/10/21 0915   BP: 128/71   Pulse: 78   Resp: 16   Temp: 98.4 °F (36.9 °C)   SpO2: 94%     Physical Q4H PRN          Electronically signed by Iraida Correa MD on 4/10/2021 at 11:48 AM

## 2021-04-11 LAB
ALBUMIN SERPL-MCNC: 2.7 GM/DL (ref 3.4–5)
ALP BLD-CCNC: 125 IU/L (ref 40–129)
ALT SERPL-CCNC: 35 U/L (ref 10–40)
AST SERPL-CCNC: 35 IU/L (ref 15–37)
BILIRUB SERPL-MCNC: 0.8 MG/DL (ref 0–1)
BILIRUBIN DIRECT: 0.3 MG/DL (ref 0–0.3)
BILIRUBIN, INDIRECT: 0.5 MG/DL (ref 0–0.7)
TOTAL PROTEIN: 4.7 GM/DL (ref 6.4–8.2)

## 2021-04-11 PROCEDURE — 6370000000 HC RX 637 (ALT 250 FOR IP): Performed by: NURSE PRACTITIONER

## 2021-04-11 PROCEDURE — 36415 COLL VENOUS BLD VENIPUNCTURE: CPT

## 2021-04-11 PROCEDURE — 6360000002 HC RX W HCPCS: Performed by: NURSE PRACTITIONER

## 2021-04-11 PROCEDURE — 80076 HEPATIC FUNCTION PANEL: CPT

## 2021-04-11 PROCEDURE — 1200000000 HC SEMI PRIVATE

## 2021-04-11 PROCEDURE — 2580000003 HC RX 258: Performed by: NURSE PRACTITIONER

## 2021-04-11 PROCEDURE — 94761 N-INVAS EAR/PLS OXIMETRY MLT: CPT

## 2021-04-11 PROCEDURE — 2700000000 HC OXYGEN THERAPY PER DAY

## 2021-04-11 RX ADMIN — SODIUM CHLORIDE, PRESERVATIVE FREE 10 ML: 5 INJECTION INTRAVENOUS at 09:01

## 2021-04-11 RX ADMIN — LEVOTHYROXINE SODIUM 100 MCG: 0.1 TABLET ORAL at 09:01

## 2021-04-11 RX ADMIN — TAMSULOSIN HYDROCHLORIDE 0.4 MG: 0.4 CAPSULE ORAL at 09:01

## 2021-04-11 RX ADMIN — DILTIAZEM HYDROCHLORIDE 120 MG: 120 CAPSULE, COATED, EXTENDED RELEASE ORAL at 09:01

## 2021-04-11 RX ADMIN — ONDANSETRON 4 MG: 2 INJECTION INTRAMUSCULAR; INTRAVENOUS at 14:28

## 2021-04-11 RX ADMIN — BRIMONIDINE TARTRATE 1 DROP: 2 SOLUTION OPHTHALMIC at 20:40

## 2021-04-11 RX ADMIN — SODIUM CHLORIDE, PRESERVATIVE FREE 10 ML: 5 INJECTION INTRAVENOUS at 20:39

## 2021-04-11 RX ADMIN — METOPROLOL SUCCINATE 25 MG: 25 TABLET, EXTENDED RELEASE ORAL at 09:01

## 2021-04-11 RX ADMIN — HYDROMORPHONE HYDROCHLORIDE 0.5 MG: 2 INJECTION, SOLUTION INTRAMUSCULAR; INTRAVENOUS; SUBCUTANEOUS at 05:44

## 2021-04-11 RX ADMIN — TIMOLOL MALEATE 1 DROP: 5 SOLUTION OPHTHALMIC at 20:40

## 2021-04-11 RX ADMIN — SERTRALINE HYDROCHLORIDE 50 MG: 50 TABLET ORAL at 09:01

## 2021-04-11 RX ADMIN — BRIMONIDINE TARTRATE 1 DROP: 2 SOLUTION OPHTHALMIC at 09:02

## 2021-04-11 RX ADMIN — TIMOLOL MALEATE 1 DROP: 5 SOLUTION OPHTHALMIC at 09:02

## 2021-04-11 RX ADMIN — LISINOPRIL AND HYDROCHLOROTHIAZIDE 1 TABLET: 12.5; 1 TABLET ORAL at 09:01

## 2021-04-11 NOTE — PLAN OF CARE
Problem: Falls - Risk of:  Goal: Will remain free from falls  Description: Will remain free from falls  Outcome: Ongoing  Goal: Absence of physical injury  Description: Absence of physical injury  Outcome: Ongoing     Problem: Coping:  Goal: Ability to verbalize feelings will improve  Description: Ability to verbalize feelings will improve  Outcome: Ongoing  Goal: Level of anxiety will decrease  Description: Level of anxiety will decrease  Outcome: Ongoing     Problem: Fluid Volume:  Goal: Will maintain adequate fluid volume  Description: Will maintain adequate fluid volume  Outcome: Ongoing     Problem: Health Behavior:  Goal: Ability to identify changes in lifestyle to reduce recurrence of condition will improve  Description: Ability to identify changes in lifestyle to reduce recurrence of condition will improve  Outcome: Ongoing     Problem: Nutritional:  Goal: Ability to achieve adequate nutritional intake will improve  Description: Ability to achieve adequate nutritional intake will improve  Outcome: Ongoing     Problem: Physical Regulation:  Goal: Complications related to the disease process, condition or treatment will be avoided or minimized  Description: Complications related to the disease process, condition or treatment will be avoided or minimized  Outcome: Ongoing  Goal: Hemodynamic stability will improve  Description: Hemodynamic stability will improve  Outcome: Ongoing     Problem: Respiratory:  Goal: Ability to achieve and maintain a regular respiratory rate will improve  Description: Ability to achieve and maintain a regular respiratory rate will improve  Outcome: Ongoing     Problem: Sensory:  Goal: General experience of comfort will improve  Description: General experience of comfort will improve  Outcome: Ongoing     Problem: Skin Integrity:  Goal: Skin integrity will be maintained  Description: Skin integrity will be maintained  Outcome: Ongoing

## 2021-04-11 NOTE — CARE COORDINATION
CM into see pt to initiate a safe discharge plan. Cm  introduced self and explained role of CM. Pt is kind, alert and oriented. Pt lives alone. Pt is very well supported by dtr,  [employer]. Pt shared that he has three children, two local. Pt DME includes a walker, cane, shower chr. He has a life line and cell phone. Pts dtr provides groceries, transportation. Pt is receiving MOW. Pt uses [employer] and they deliver. Pt has had no recent falls. dtr feels that a home discharge would be safe for pt. CM discussed options. They are agreeable to home care and would like [address] [place] [address] Rd to follow. Discharge plan is home alone. Supported very well by Mariaelena Randall. [employer] would like to be called, as pt can be forgetful. [phone]. [address] [place] [address] Rd to follow. CM provided card and encouraged to call for any needs or concern. CM is available if any needs arise. [address] [place] [address] Rd faxed face sheet and notes.

## 2021-04-11 NOTE — PROGRESS NOTES
GEN Awake male, laying in bed in no apparent distress. Appears given age. EYES Pupils are equally round. No scleral erythema, discharge, or conjunctivitis. HENT Mucous membranes are moist. Oral pharynx without exudates, no evidence of thrush. NECK Supple, no apparent thyromegaly or masses. RESP Clear to auscultation, no wheezes, rales or rhonchi. Symmetric chest movement while on 2 L of O2.  CARDIO/VASC S1/S2 auscultated. Regular rate without appreciable murmurs, rubs, or gallops. No JVD or carotid bruits. Peripheral pulses equal bilaterally and palpable. No peripheral edema. GI Abdomen is soft without significant tenderness, masses, or guarding. Bowel sounds are normoactive. Rectal exam deferred.  No costovertebral angle tenderness. Normal appearing external genitalia. Pak catheter is not present. HEME/LYMPH No palpable cervical lymphadenopathy and no hepatosplenomegaly. No petechiae or ecchymoses. MSK No gross joint deformities. SKIN Normal coloration, warm, dry. NEURO Cranial nerves appear grossly intact, normal speech, no lateralizing weakness. PSYCH Awake, alert, oriented x 4. Affect appropriate.     Medications:   Medications:    sodium chloride flush  5-40 mL Intravenous 2 times per day    dilTIAZem  120 mg Oral Daily    levothyroxine  100 mcg Oral Daily    lisinopril-hydroCHLOROthiazide  1 tablet Oral Daily    metoprolol succinate  25 mg Oral Daily    sertraline  50 mg Oral Daily    tamsulosin  0.4 mg Oral Daily    brimonidine  1 drop Both Eyes BID    And    timolol  1 drop Both Eyes BID      Infusions:    sodium chloride       PRN Meds: ondansetron, 4 mg, Q30 Min PRN  sodium chloride flush, 10 mL, PRN  sodium chloride, 25 mL, PRN  promethazine, 12.5 mg, Q6H PRN    Or  ondansetron, 4 mg, Q6H PRN  polyethylene glycol, 17 g, Daily PRN  acetaminophen, 650 mg, Q6H PRN    Or  acetaminophen, 650 mg, Q6H PRN  HYDROmorphone, 0.5 mg, Q4H PRN          Electronically signed by Dario Mello Karissa Beavers MD on 4/11/2021 at 11:56 AM

## 2021-04-11 NOTE — DISCHARGE SUMMARY
surgery, bilateral inguinal  hernia repairs, knee surgery, and permanent pacemaker placement. Small  bowel resection, lysis of adhesions repositioning of colostomy in right  lower quadrant along with hernia repair with a matrix mesh in August 2019. CURRENT MEDICATIONS:  Please refer to medication reconciliation sheet. ALLERGIES:  CODEINE, FENTANYL. SOCIAL HISTORY:  He is , three children. No history of tobacco,  alcohol, or IV drug abuse. FAMILY HISTORY:  Noncontributory. REVIEW OF SYSTEMS:  Ten point review of systems otherwise negative as  stated as above in history of present illness. PHYSICAL EXAMINATION:  GENERAL:  No acute distress. Awake, alert and oriented x3. VITAL SIGNS:  Temperature is 98.3, respiratory rate 16, pulse is 80,  blood pressure _____, O2 sat is 94% first on room air. HEENT:  Head is normocephalic, atraumatic. Pupils are equal, round,  reactive to light. Extraocular muscles intact. Trachea is midline. No  lymphadenopathy. Anicteric sclerae. Moist mucous membranes. NECK:  Supple. HEART:  Positive S1 and S2. Normal sinus rhythm. No murmurs or  gallops. Permanent pacemaker is in the left upper chest.  CHEST:  Clear to auscultation bilaterally. Air entry is bilaterally  equal.  No rales, rhonchi, wheezes or crackles. No use of accessory  muscles for respiration. ABDOMEN:  His abdomen is nondistended. No rebound or guarding. Mid  abdominal tenderness with deep palpation. No pulsations or fluid  shifts. Right lower quadrant colostomy with pericolostomy hernia, which  is reducible. Midline incisional hernias which are also reducible with  some slight pressure. No overlying skin changes. Negative Cabrera sign. No inguinal lymphadenopathy. EXTREMITIES:  Negative for erythema, edema, cellulitis or cyanosis.     ASSESSMENT AND PLAN:  The patient is a 20-year-old  male who  presents with an elevated lipase of 3000 and CT findings consistent with acute pancreatitis. At this time, there is a possibility that he has  passed a gallstone and we will go ahead and try to eventually get an  MRCP based on the protocol, because of his permanent pacemaker. I will  go ahead and repeat a right upper quadrant ultrasound to further  evaluate the gallbladder and see if they can identify any possible  filling defects within the common bile duct compared to an ultrasound  may have the gallbladder back in January 2020. We will continue with IV  fluid resuscitation. We will do serial labs, checking his pancreatic  lipase and his liver function tests. We will make him n.p.o. Follow  serial abdominal examinations. He does not have any acute surgical  issues to address at this time. We will continue the workup to find out  the proposed etiology to his pancreatitis. I would like to thank the hospitalist service for giving me the  opportunity to assist in his surgical care and evaluation.         Meli Knott MD    D: 04/10/2021 19:32:18       T: 04/10/2021 19:43:32     [email or web address]  Job#: [id number]     Doc#: [id number]    CC:

## 2021-04-11 NOTE — PROGRESS NOTES
Patient seen and examined. Tolerated clrs. No acute events overnight. AF VSS. Still reporting some mid abdominal pain. Patient reports that it has lessened. He also has chronic mid back pain which is unchanged. Denies n/v/f/c. Pancreatic lipase is now 1027, am lab is pending and trending down along with his LFTs. Adequate UOP. Colostomy with output. Abd: Soft, nondistended, no rebound or guarding. Mid abdominal pain with deep palpation which is mild. No CVA tenderness. Colostomy with parastomal hernia and right lower quadrant. Abdominal wall seroma which is palpable with no overlying skin changes. Await for MRCP to further evaluate for possible CBD stone. Plan for tomorrow due to Pacemaker protocol. Pancreatitis could be due to gallstone passage. Continue to follow trend of pancreatic enzymes and abdominal exams. CTS note reviewed for assessment of AAA EVAR. PAF-continue to hold Eliquis. Essential hypertension-patient on home medications.

## 2021-04-12 ENCOUNTER — APPOINTMENT (OUTPATIENT)
Dept: GENERAL RADIOLOGY | Age: 86
DRG: 444 | End: 2021-04-12
Payer: COMMERCIAL

## 2021-04-12 ENCOUNTER — TELEPHONE (OUTPATIENT)
Dept: FAMILY MEDICINE CLINIC | Age: 86
End: 2021-04-12

## 2021-04-12 VITALS
WEIGHT: 180.3 LBS | OXYGEN SATURATION: 94 % | DIASTOLIC BLOOD PRESSURE: 64 MMHG | TEMPERATURE: 98.3 F | HEART RATE: 79 BPM | HEIGHT: 63 IN | SYSTOLIC BLOOD PRESSURE: 141 MMHG | BODY MASS INDEX: 31.95 KG/M2 | RESPIRATION RATE: 16 BRPM

## 2021-04-12 LAB
ALBUMIN SERPL-MCNC: 2.9 GM/DL (ref 3.4–5)
ALP BLD-CCNC: 147 IU/L (ref 40–128)
ALT SERPL-CCNC: 27 U/L (ref 10–40)
ANION GAP SERPL CALCULATED.3IONS-SCNC: 5 MMOL/L (ref 4–16)
AST SERPL-CCNC: 24 IU/L (ref 15–37)
BILIRUB SERPL-MCNC: 0.7 MG/DL (ref 0–1)
BUN BLDV-MCNC: 19 MG/DL (ref 6–23)
CALCIUM SERPL-MCNC: 7.7 MG/DL (ref 8.3–10.6)
CHLORIDE BLD-SCNC: 98 MMOL/L (ref 99–110)
CO2: 32 MMOL/L (ref 21–32)
CREAT SERPL-MCNC: 1.2 MG/DL (ref 0.9–1.3)
GFR AFRICAN AMERICAN: >60 ML/MIN/1.73M2
GFR NON-AFRICAN AMERICAN: 57 ML/MIN/1.73M2
GLUCOSE BLD-MCNC: 106 MG/DL (ref 70–99)
LIPASE: 105 IU/L (ref 13–60)
POTASSIUM SERPL-SCNC: 3.1 MMOL/L (ref 3.5–5.1)
SODIUM BLD-SCNC: 135 MMOL/L (ref 135–145)
TOTAL PROTEIN: 5.2 GM/DL (ref 6.4–8.2)

## 2021-04-12 PROCEDURE — 94761 N-INVAS EAR/PLS OXIMETRY MLT: CPT

## 2021-04-12 PROCEDURE — 94760 N-INVAS EAR/PLS OXIMETRY 1: CPT

## 2021-04-12 PROCEDURE — 71045 X-RAY EXAM CHEST 1 VIEW: CPT

## 2021-04-12 PROCEDURE — 83690 ASSAY OF LIPASE: CPT

## 2021-04-12 PROCEDURE — 6370000000 HC RX 637 (ALT 250 FOR IP): Performed by: NURSE PRACTITIONER

## 2021-04-12 PROCEDURE — 80053 COMPREHEN METABOLIC PANEL: CPT

## 2021-04-12 PROCEDURE — 36415 COLL VENOUS BLD VENIPUNCTURE: CPT

## 2021-04-12 PROCEDURE — 2580000003 HC RX 258: Performed by: NURSE PRACTITIONER

## 2021-04-12 RX ORDER — POTASSIUM CHLORIDE 20 MEQ/1
40 TABLET, EXTENDED RELEASE ORAL ONCE
Status: DISCONTINUED | OUTPATIENT
Start: 2021-04-12 | End: 2021-04-12 | Stop reason: HOSPADM

## 2021-04-12 RX ADMIN — SODIUM CHLORIDE, PRESERVATIVE FREE 10 ML: 5 INJECTION INTRAVENOUS at 01:58

## 2021-04-12 RX ADMIN — LISINOPRIL AND HYDROCHLOROTHIAZIDE 1 TABLET: 12.5; 1 TABLET ORAL at 09:25

## 2021-04-12 RX ADMIN — BRIMONIDINE TARTRATE 1 DROP: 2 SOLUTION OPHTHALMIC at 09:30

## 2021-04-12 RX ADMIN — TAMSULOSIN HYDROCHLORIDE 0.4 MG: 0.4 CAPSULE ORAL at 09:26

## 2021-04-12 RX ADMIN — TIMOLOL MALEATE 1 DROP: 5 SOLUTION OPHTHALMIC at 09:30

## 2021-04-12 RX ADMIN — METOPROLOL SUCCINATE 25 MG: 25 TABLET, EXTENDED RELEASE ORAL at 09:26

## 2021-04-12 RX ADMIN — SODIUM CHLORIDE, PRESERVATIVE FREE 10 ML: 5 INJECTION INTRAVENOUS at 09:26

## 2021-04-12 RX ADMIN — SERTRALINE HYDROCHLORIDE 50 MG: 50 TABLET ORAL at 09:26

## 2021-04-12 RX ADMIN — DILTIAZEM HYDROCHLORIDE 120 MG: 120 CAPSULE, COATED, EXTENDED RELEASE ORAL at 09:26

## 2021-04-12 RX ADMIN — LEVOTHYROXINE SODIUM 100 MCG: 0.1 TABLET ORAL at 05:48

## 2021-04-12 NOTE — PROGRESS NOTES
Pt had a pacer replace in 2017 with a metronic percepta which is mri conditional.The pt has a capped lead from his previous pacer which make his pacer system not compatible to have an mri  Per metronic-mri canceled

## 2021-04-12 NOTE — PROGRESS NOTES
O2 trial done while sitting in chair. Dropped to 81%. Put pt back on 1 L of O2 and oxygen went back up. PS Dr. Samantha Zuñiga to make him aware.

## 2021-04-12 NOTE — PROGRESS NOTES
Patient seen and examined. Tolerated LFD. No acute events overnight. AF VSS. Denies n/v/f/c. No abd pain now. Pancreatic lipase has been trending down, recheck this am. Adequate UOP. Colostomy with output. Abd: Soft, nondistended, no rebound or guarding. No pain.  No CVA tenderness.  Colostomy with parastomal hernia and right lower quadrant.  Abdominal wall seroma which is palpable with no overlying skin changes.    PPM isn't compatible for MRI. Cancelled this am..  Pancreatitis could be due to gallstone passage.  Continue to follow trend of pancreatic enzymes and abdominal exams. I feel it has resolved. CTS note reviewed for assessment of AAA EVAR. PAF-ok to restart AC. Essential hypertension-patient on home medications. Recommending conservative mgnt and wouldn't do a cholecystectomy based on his age, co-morbidities and extensive abd surgeries. Discussed with hospitalist. Erica Ceja to discharge from my standpoint.

## 2021-04-12 NOTE — PROGRESS NOTES
4/12/2021 1:09 PM  Patient Room #: 1491/2946-S  Patient Name: Tamia Platt    (Step 1 Done by RN if possible otherwise call Pulmonary Diagnostics)  1. Place patient on room air at rest for at least 30 minutes. If patient falls below 88% before 30 minutes then you can record the level and stop. Record room air saturation level _81_ %. If patient is at 88% or below, they will qualify for home oxygen and you can stop. If level does not fall below 88%, fill in level above. If indicated continue to Step 2. Signature:_Neville Coker RRT____ Date: _04/12/2021__  (Step 2&3 Done by P)  2. Ambulate patient on room air until saturation falls below 89%. Record level of room air saturation with ambulation___ %. Next, place patient back on ___lpm oxygen and ambulate, record level __%. (Note:  this level must show improvement from room air level done with ambulation.)  If patients saturation on room air with ambulation is 88% or below AND patient shows improvement with oxygen during ambulation, they will qualify for home oxygen and you can stop. If patient does not drop below 89%, then patient should have an overnight oximetry trending on room air to see if level falls below 88%. Complete level in Step 3 below. 3. Room air overnight oximetry level 88 % for___  cumulative minutes. If patients room air oxygen level is < 89% for at least 5 cumulative minutes, patient will qualify for home oxygen and you can stop. (Attach Night Trending Report)    Complete order below: Diagnosis:_CHF___  Home oxygen at:  Length of Need: ?X Lifetime ?  3 Months     _2__lpm or __%   via  [x] nasal cannula  []mask  [] other:___         [x]continuous [x]  with activity  [x]  Nocturnal   [x] Portable Tanks [x]  Concentrator        Therapist Signature:__Neville Coker RRT_____     Date:  _04/12/2021__  Physician Signature:  __Electronically Signed in EMR_    Date:___  Physician Printed Name:  Hari Fernando MD DORCAS NPI:  [id number]    [x] Patient Qualifies      [] Patient Does NOT qualify

## 2021-04-12 NOTE — PROGRESS NOTES
Home O2 Eval:  Faxed and called [facility]. Please do not let patient leave hospital without home oxygen.

## 2021-04-12 NOTE — CARE COORDINATION
Reviewed chart and spoke with pt/daughter about discharge needs/plans. Pt plan remains to return home alone with [facility]. He does not have home O2, asked nursing to eval for possible need, then make appropriate referral if if oxygen level drops significantly off O2. Deann Mendoza has [phone] set up.

## 2021-04-12 NOTE — PROGRESS NOTES
[facility] Liaison aware of discharge & will initiate Shama Aguiar.

## 2021-04-12 NOTE — DISCHARGE SUMMARY
Discharge Summary    Name:  Mark Bueno /Age/Sex: 3/23/1930  (80 y.o. male)   MRN & CSN:  [id number] & [id number] Admission Date/Time: 2021  7:36 AM   Attending:  Princess Flanagan MD Discharging Physician: Galen Altamirano MD     Hospital Course:   Chrissie Brooks a 80 y.o.  male  who presents with abdominal pain, n/v    Patient was seen and examined  Denied any abdominal pain, nausea or vomiting  No dizziness, palpitations  Tolerating oral food     Assessment and Plan  1) Acute Pancreatitis; likely due to gallstone  -Admitted with Lipase > 3000  -CT A/P: stranding c/w pancreatitis; ? Distal stone  -Abdominal ultrasound noted cholelithiasis  -MRCP could not be done to pace maker  -Gen surg on board, recommended conservative management for now due to patient's comorbid conditions. OP follow up     2) Chronic left anterior abdominal wall seroma  -CT A/P: slight increase in size from prior; 12.8 cm  -CTS on board; appreciate recommendation     3)Acute hypoxic respiratory failure  -Likely due to acute on chronic diastolic heart failure  -Most likely exacerbated due to fluid overload from IV rehydration which has been stopped  -Continue diuresis at home; patient already on hydrochlorothiazide  -Home O2 eval done and patient qualifies    Other chronic medical conditions, medication reviewed unless contraindicated  -Essential HTN- Cont lisinopril-HCTZ, dilt, bb  - HLD- Cont statin   - AAA hx- s/p endovasc repair, 6.6 cm  - PAF- holding eliquis pending workup by Gen surg; has PPM  - Hypothyroidism- cont synthroid  - BPH- cont flomax  -Status post colostomy       Patient was seen in hospital for CHF . I am prescribing oxygen because the diagnosis and testing requires the patient to have oxygen in the home. Conditions will improve or be benefited by oxygen use. The patient is able to perform good mobility and therefore requires the use of a portable oxygen system for ambulation.         The patient expressed appropriate understanding of and agreement with the discharge recommendations, medications, and plan.      Consults this admission:  IP CONSULT TO [address] [place] [address] [place] CONSULT TO GENERAL SURGERY  IP CONSULT TO HOSPITALIST    Discharge Instruction:   Follow up appointments: Gen Surg and CTS in 2 weeks  Primary care physician:  within 2 weeks    Diet:  cardiac diet   Activity: activity as tolerated  Disposition: Discharged to:   []Home, [x][facility], []SNF, []Acute Rehab, []Hospice   Condition on discharge: Stable    Discharge Medications:      Hillary Agent   Home Medication Instructions FQR:[id number]    Printed on:04/12/21 1122   Medication Information                      Ascorbic Acid (VITAMIN C) 1000 MG tablet  Take 1,000 mg by mouth daily             atorvastatin (LIPITOR) 20 MG tablet  TAKE 1 TABLET BY MOUTH ONCE DAILY             brimonidine-timolol (COMBIGAN) 0.2-0.5 % ophthalmic solution  Place 1 drop into both eyes every 12 hours             dilTIAZem (CARDIZEM CD) 120 MG extended release capsule  TAKE 1 CAPSULE BY MOUTH ONCE DAILY             ELIQUIS 2.5 MG TABS tablet  TAKE ONE (1) TABLET BY MOUTH TWO TIMES DAILY             Glucosamine-MSM-Hyaluronic Acd (JOINT HEALTH PO)  Take 1 each by mouth daily              levothyroxine (SYNTHROID) 100 MCG tablet  Take 1 tablet by mouth daily             lisinopril-hydroCHLOROthiazide (PRINZIDE;ZESTORETIC) 10-12.5 MG per tablet  TAKE ONE-HALF (1/2) TABLET BY MOUTH ONCE DAILY             metoprolol succinate (TOPROL XL) 25 MG extended release tablet  TAKE 1 TABLET BY MOUTH ONCE DAILY             sertraline (ZOLOFT) 50 MG tablet  TAKE ONE (1) TABLET BY MOUTH ONCE DAILY             sucralfate (CARAFATE) 1 GM tablet  TAKE 1 TABLET BY MOUTH TWO TIMES DAILY             tamsulosin (FLOMAX) 0.4 MG capsule  TAKE 1 CAPSULE BY MOUTH ONCE DAILY                 Objective Findings at Discharge:   BP (!) 141/64   Pulse 79   Temp 98.3 °F (36.8 °C) (Oral) Resp 16   Ht 5' 3\" (1.6 m)   Wt 180 lb 4.8 oz (81.8 kg)   SpO2 94%   BMI 31.94 kg/m²            PHYSICAL EXAM   GEN Awake male, sitting upright in bed in no apparent distress. Appears given age. EYES Pupils are equally round. No scleral erythema, discharge, or conjunctivitis. HENT Mucous membranes are moist. Oral pharynx without exudates, no evidence of thrush. NECK Supple, no apparent thyromegaly or masses. RESP Clear to auscultation, no wheezes, rales or rhonchi. Symmetric chest movement while on room air. CARDIO/VASC S1/S2 auscultated. Regular rate without appreciable murmurs, rubs, or gallops. No JVD or carotid bruits. Peripheral pulses equal bilaterally and palpable. No peripheral edema. GI Abdomen is soft without significant tenderness, masses, or guarding. Bowel sounds are normoactive. Rectal exam deferred.  No costovertebral angle tenderness. Pak catheter is not present. HEME/LYMPH No palpable cervical lymphadenopathy and no hepatosplenomegaly. No petechiae or ecchymoses. MSK No gross joint deformities. SKIN Normal coloration, warm, dry. NEURO Cranial nerves appear grossly intact, normal speech, no lateralizing weakness. PSYCH Awake, alert, oriented x 4. Affect appropriate.     BMP/CBC  Recent Labs     04/10/21  0655 04/12/21  0727    135   K 3.5 3.1*    98*   CO2 29 32   BUN 20 19   CREATININE 1.0 1.2   WBC 8.9  --    HCT 37.6*  --      --        IMAGING:  As above    Discharge Time of 20 minutes    Electronically signed by Angelina Pink MD on 4/12/2021 at 11:23 AM

## 2021-04-12 NOTE — PROGRESS NOTES
Pt qualifies for home oxygen. Waiting for physician to co-sign Home Oxygen Evaluation and copy and paste Progress Note. Physician has been notified via [address] [place] [address].

## 2021-04-14 ENCOUNTER — OFFICE VISIT (OUTPATIENT)
Dept: FAMILY MEDICINE CLINIC | Age: 86
End: 2021-04-14
Payer: COMMERCIAL

## 2021-04-14 ENCOUNTER — TELEPHONE (OUTPATIENT)
Dept: FAMILY MEDICINE CLINIC | Age: 86
End: 2021-04-14

## 2021-04-14 VITALS
WEIGHT: 178.2 LBS | BODY MASS INDEX: 31.57 KG/M2 | HEART RATE: 62 BPM | SYSTOLIC BLOOD PRESSURE: 140 MMHG | TEMPERATURE: 97.1 F | OXYGEN SATURATION: 100 % | HEIGHT: 63 IN | DIASTOLIC BLOOD PRESSURE: 68 MMHG

## 2021-04-14 DIAGNOSIS — K85.90 ACUTE PANCREATITIS, UNSPECIFIED COMPLICATION STATUS, UNSPECIFIED PANCREATITIS TYPE: Primary | ICD-10-CM

## 2021-04-14 DIAGNOSIS — E03.9 HYPOTHYROIDISM, UNSPECIFIED TYPE: ICD-10-CM

## 2021-04-14 DIAGNOSIS — R53.83 FATIGUE, UNSPECIFIED TYPE: ICD-10-CM

## 2021-04-14 PROCEDURE — 99214 OFFICE O/P EST MOD 30 MIN: CPT | Performed by: PHYSICIAN ASSISTANT

## 2021-04-14 NOTE — TELEPHONE ENCOUNTER
Emerald Silverio from [address] called and she is at the patients home to start home care services. Needs to know if Dr Nilo Boothe will follow patient and sign orders.  Call Lucia with verbal

## 2021-04-14 NOTE — PROGRESS NOTES
Gastrointestinal: Negative for diarrhea and vomiting. PAST MEDICAL HISTORY  Past Medical History:   Diagnosis Date    AAA (abdominal aortic aneurysm) [facility])     Surgery scheduled for 7/1/2014 with Dr. Plascencia.  Arthritis     generalized    Asthma     AV block, 1st degree     Back pain     Borderline hypothyroidism 12/4/2020    Bradycardia     Colon polyps     Colostomy in place [facility])     Glaucoma     H/O cardiovascular stress test 12/27/2013    cardiolite-EF56%, apical hypokinesis is seen, cannot exlude apical Tyler ischemia    History of blood transfusion     History of cardiovascular stress test 3/5/10    No angina or ischemic EKG changes are noted with Lexiscan. The cardiolite study demonstrated normal perfusion in all segments of the myocardium with an intact left ventricular systolic function. The resting sestamibi dose is 10.8, stress is 32.5. EF 66%    History of chest x-ray 3/16/10    The chest is considered nonacute. There is cardiomegaly noted. COPD.  History of Doppler ultrasound 3/25/10    venous doppler- Technically good venous ultrasound study negative for DVT in both lower extremities. Normal compressibility,color flow doppler pattern and spectral doppler pattern demonstrated thoughout.  History of echocardiogram 3/18/10    Boarderline LV dilatation with concentric hypertrophy. Low normal systolic and abnormal diastolic function. Mild mitral and trace tricuspid regurgitation. Mild aortic root and bilateral dilatation.  Holter monitor, abnormal 11/10/10    11/10/2010- 24 HR- Abnormal holter revealing some significant brasycardia's and wenckebach phenomenon. Therefore, clinical  correlation is recommend.     Hx of blood clots     Pacemaker     PAF (paroxysmal atrial fibrillation) (HCC)     Peritonitis (HCC)     Personal history of colonic polyps     Poor historian     Skin cancer     face    Ulcerative (chronic) proctosigmoiditis (HCC)     Wears glasses        FAMILY HISTORY  Family History   Problem Relation Age of Onset    Stroke Mother         CVA    Heart Disease Mother     Cancer Brother         prostate    Cancer Brother         skin    Cancer Daughter         colon    Substance Abuse Son         Tobacco       SOCIAL HISTORY  Social History     Socioeconomic History    Marital status:       Spouse name: Not on file    Number of children: 3    Years of education: Not on file    Highest education level: Not on file   Occupational History    Occupation: Retired   Social Needs    Financial resource strain: Not on file    Food insecurity     Worry: Not on file     Inability: Not on file   [employer] needs     Medical: Not on file     Non-medical: Not on file   Tobacco Use    Smoking status: Never Smoker    Smokeless tobacco: Never Used   Substance and Sexual Activity    Alcohol use: No     Alcohol/week: 0.0 standard drinks    Drug use: No    Sexual activity: Yes     Partners: Female     Comment:    Lifestyle    Physical activity     Days per week: Not on file     Minutes per session: Not on file    Stress: Not on file   Relationships    Social connections     Talks on phone: Not on file     Gets together: Not on file     Attends Tenriism service: Not on file     Active member of club or organization: Not on file     Attends meetings of clubs or organizations: Not on file     Relationship status: Not on file    Intimate partner violence     Fear of current or ex partner: Not on file     Emotionally abused: Not on file     Physically abused: Not on file     Forced sexual activity: Not on file   Other Topics Concern    Not on file   Social History Narrative    Not on file        SURGICAL HISTORY  Past Surgical History:   Procedure Laterality Date    ABDOMINAL AORTIC ANEURYSM REPAIR, ENDOVASCULAR  7/1/14    ABDOMINAL EXPLORATION SURGERY  2/4/2013    exp lap, left hemicolectomy, umbilectomy    APPENDECTOMY  2/4/2013    APPENDECTOMY

## 2021-04-21 DIAGNOSIS — E03.9 HYPOTHYROIDISM, UNSPECIFIED TYPE: ICD-10-CM

## 2021-04-21 DIAGNOSIS — K85.90 ACUTE PANCREATITIS, UNSPECIFIED COMPLICATION STATUS, UNSPECIFIED PANCREATITIS TYPE: ICD-10-CM

## 2021-04-21 DIAGNOSIS — R53.83 FATIGUE, UNSPECIFIED TYPE: ICD-10-CM

## 2021-04-22 LAB
A/G RATIO: 1.4 (ref 1.1–2.2)
ALBUMIN SERPL-MCNC: 3.7 G/DL (ref 3.4–5)
ALP BLD-CCNC: 176 U/L (ref 40–129)
ALT SERPL-CCNC: 17 U/L (ref 10–40)
ANION GAP SERPL CALCULATED.3IONS-SCNC: 11 MMOL/L (ref 3–16)
AST SERPL-CCNC: 17 U/L (ref 15–37)
BASOPHILS ABSOLUTE: 0.1 K/UL (ref 0–0.2)
BASOPHILS RELATIVE PERCENT: 1 %
BILIRUB SERPL-MCNC: 0.3 MG/DL (ref 0–1)
BUN BLDV-MCNC: 18 MG/DL (ref 7–20)
CALCIUM SERPL-MCNC: 8.4 MG/DL (ref 8.3–10.6)
CHLORIDE BLD-SCNC: 103 MMOL/L (ref 99–110)
CO2: 32 MMOL/L (ref 21–32)
CREAT SERPL-MCNC: 1 MG/DL (ref 0.8–1.3)
EOSINOPHILS ABSOLUTE: 0.1 K/UL (ref 0–0.6)
EOSINOPHILS RELATIVE PERCENT: 2.5 %
GFR AFRICAN AMERICAN: >60
GFR NON-AFRICAN AMERICAN: >60
GLOBULIN: 2.6 G/DL
GLUCOSE BLD-MCNC: 87 MG/DL (ref 70–99)
HCT VFR BLD CALC: 36.7 % (ref 40.5–52.5)
HEMOGLOBIN: 12.5 G/DL (ref 13.5–17.5)
LIPASE: 37 U/L (ref 13–60)
LYMPHOCYTES ABSOLUTE: 0.6 K/UL (ref 1–5.1)
LYMPHOCYTES RELATIVE PERCENT: 11.5 %
MCH RBC QN AUTO: 33.5 PG (ref 26–34)
MCHC RBC AUTO-ENTMCNC: 34.2 G/DL (ref 31–36)
MCV RBC AUTO: 98.1 FL (ref 80–100)
MONOCYTES ABSOLUTE: 0.3 K/UL (ref 0–1.3)
MONOCYTES RELATIVE PERCENT: 6 %
NEUTROPHILS ABSOLUTE: 4.4 K/UL (ref 1.7–7.7)
NEUTROPHILS RELATIVE PERCENT: 79 %
PDW BLD-RTO: 15.1 % (ref 12.4–15.4)
PLATELET # BLD: 245 K/UL (ref 135–450)
PMV BLD AUTO: 8.3 FL (ref 5–10.5)
POTASSIUM SERPL-SCNC: 3.9 MMOL/L (ref 3.5–5.1)
RBC # BLD: 3.74 M/UL (ref 4.2–5.9)
SODIUM BLD-SCNC: 146 MMOL/L (ref 136–145)
T4 FREE: 1.1 NG/DL (ref 0.9–1.8)
TOTAL PROTEIN: 6.3 G/DL (ref 6.4–8.2)
TSH SERPL DL<=0.05 MIU/L-ACNC: 41.66 UIU/ML (ref 0.27–4.2)
VITAMIN B-12: 647 PG/ML (ref 211–911)
WBC # BLD: 5.6 K/UL (ref 4–11)

## 2021-04-28 DIAGNOSIS — E03.9 HYPOTHYROIDISM, UNSPECIFIED TYPE: Primary | ICD-10-CM

## 2021-05-13 ENCOUNTER — TELEPHONE (OUTPATIENT)
Dept: FAMILY MEDICINE CLINIC | Age: 86
End: 2021-05-13

## 2021-05-18 ENCOUNTER — TELEPHONE (OUTPATIENT)
Dept: FAMILY MEDICINE CLINIC | Age: 86
End: 2021-05-18

## 2021-05-18 DIAGNOSIS — I10 ESSENTIAL HYPERTENSION: ICD-10-CM

## 2021-05-18 NOTE — TELEPHONE ENCOUNTER
Last week we decreased his lisinopril hydrochlorothiazide by half. If we can keep that and 1/2 pill. As long as he remains on the metoprolol, lets stop the diltiazem. And please continue following her blood pressure and pulse.   Thank you

## 2021-05-18 NOTE — TELEPHONE ENCOUNTER
Spoke with Ciro Carpio. Pt's heart rate 81. His heart rate has been fine Today was home cares last visit. Bp did improve with drinking water. Pt doesn't drink enough hydrating fluids. Spoke with pt's Dtr Lillette Bamberger, who is aware of the low bp readings. dtr stated pt will drink 1/2 cup h20 in am with meds and 1/2 cup h2o in pm, with meds, maybe bottle or less of  pepper daily.  Trying to get pt to drink fluids is an ongoing struggle

## 2021-05-19 ENCOUNTER — TELEPHONE (OUTPATIENT)
Dept: FAMILY MEDICINE CLINIC | Age: 86
End: 2021-05-19

## 2021-05-20 RX ORDER — LISINOPRIL AND HYDROCHLOROTHIAZIDE 12.5; 1 MG/1; MG/1
TABLET ORAL
Qty: 30 TABLET | Refills: 5 | Status: SHIPPED | OUTPATIENT
Start: 2021-05-20 | End: 2021-09-22 | Stop reason: ALTCHOICE

## 2021-05-24 DIAGNOSIS — E03.9 HYPOTHYROIDISM, UNSPECIFIED TYPE: ICD-10-CM

## 2021-05-24 LAB
T3 FREE: 2.1 PG/ML (ref 2.3–4.2)
T4 FREE: 1.6 NG/DL (ref 0.9–1.8)
TSH SERPL DL<=0.05 MIU/L-ACNC: 1.46 UIU/ML (ref 0.27–4.2)

## 2021-05-26 ENCOUNTER — OFFICE VISIT (OUTPATIENT)
Dept: FAMILY MEDICINE CLINIC | Age: 86
End: 2021-05-26
Payer: COMMERCIAL

## 2021-05-26 VITALS
HEART RATE: 80 BPM | WEIGHT: 171 LBS | SYSTOLIC BLOOD PRESSURE: 122 MMHG | BODY MASS INDEX: 30.3 KG/M2 | HEIGHT: 63 IN | DIASTOLIC BLOOD PRESSURE: 78 MMHG

## 2021-05-26 VITALS
SYSTOLIC BLOOD PRESSURE: 122 MMHG | HEIGHT: 63 IN | WEIGHT: 171 LBS | DIASTOLIC BLOOD PRESSURE: 78 MMHG | BODY MASS INDEX: 30.3 KG/M2 | TEMPERATURE: 98.2 F | HEART RATE: 80 BPM | OXYGEN SATURATION: 100 %

## 2021-05-26 DIAGNOSIS — M25.562 CHRONIC PAIN OF BOTH KNEES: Primary | ICD-10-CM

## 2021-05-26 DIAGNOSIS — E03.9 ACQUIRED HYPOTHYROIDISM: ICD-10-CM

## 2021-05-26 DIAGNOSIS — F33.1 MODERATE EPISODE OF RECURRENT MAJOR DEPRESSIVE DISORDER (HCC): ICD-10-CM

## 2021-05-26 DIAGNOSIS — K94.19 INTESTINAL STOMA PROLAPSE (HCC): ICD-10-CM

## 2021-05-26 DIAGNOSIS — M25.561 CHRONIC PAIN OF BOTH KNEES: Primary | ICD-10-CM

## 2021-05-26 DIAGNOSIS — I48.0 PAF (PAROXYSMAL ATRIAL FIBRILLATION) (HCC): ICD-10-CM

## 2021-05-26 DIAGNOSIS — G89.29 CHRONIC PAIN OF BOTH KNEES: Primary | ICD-10-CM

## 2021-05-26 DIAGNOSIS — Z00.00 ROUTINE GENERAL MEDICAL EXAMINATION AT A HEALTH CARE FACILITY: Primary | ICD-10-CM

## 2021-05-26 PROCEDURE — G0438 PPPS, INITIAL VISIT: HCPCS | Performed by: FAMILY MEDICINE

## 2021-05-26 PROCEDURE — 99214 OFFICE O/P EST MOD 30 MIN: CPT | Performed by: FAMILY MEDICINE

## 2021-05-26 RX ORDER — LATANOPROST 50 UG/ML
SOLUTION/ DROPS OPHTHALMIC
COMMUNITY
Start: 2021-05-21

## 2021-05-26 RX ORDER — TIMOLOL MALEATE 5 MG/ML
SOLUTION/ DROPS OPHTHALMIC
COMMUNITY
Start: 2021-05-21 | End: 2021-11-17

## 2021-05-26 ASSESSMENT — PATIENT HEALTH QUESTIONNAIRE - PHQ9
1. LITTLE INTEREST OR PLEASURE IN DOING THINGS: 0
SUM OF ALL RESPONSES TO PHQ QUESTIONS 1-9: 1

## 2021-05-26 ASSESSMENT — LIFESTYLE VARIABLES: HOW OFTEN DO YOU HAVE A DRINK CONTAINING ALCOHOL: 0

## 2021-05-26 NOTE — PROGRESS NOTES
Medicare Annual Wellness Visit  Name: Patsy Martini Date: 2021   MRN: [id number] Sex: Male   Age: 80 y.o. Ethnicity: Non-/Non    : 3/23/1930 Race: Tulio Fernandez is here for Medicare AWV    Screenings for behavioral, psychosocial and functional/safety risks, and cognitive dysfunction are all negative except as indicated below. These results, as well as other patient data from the [address] form, are documented in Flowsheets linked to this Encounter. Allergies   Allergen Reactions    Codeine Anaphylaxis    Fentanyl Other (See Comments)     Hypotension        Prior to Visit Medications    Medication Sig Taking?  Authorizing Provider   timolol (TIMOPTIC) 0.5 % ophthalmic solution  Yes Historical Provider, MD   latanoprost (XALATAN) 0.005 % ophthalmic solution  Yes Historical Provider, MD   lisinopril-hydroCHLOROthiazide (PRINZIDE;ZESTORETIC) 10-12.5 MG per tablet TAKE ONE-HALF (1/2) TABLET BY MOUTH ONCE DAILY Yes Germán Harvey MD   levothyroxine (SYNTHROID) 100 MCG tablet Take 1 tablet by mouth daily Yes Germán Harvey MD   atorvastatin (LIPITOR) 20 MG tablet TAKE 1 TABLET BY MOUTH ONCE DAILY Yes Germán Harvey MD   metoprolol succinate (TOPROL XL) 25 MG extended release tablet TAKE 1 TABLET BY MOUTH ONCE DAILY Yes Germán Harvey MD   sucralfate (CARAFATE) 1 GM tablet TAKE 1 TABLET BY MOUTH TWO TIMES DAILY Yes Germán Harvey MD   tamsulosin (FLOMAX) 0.4 MG capsule TAKE 1 CAPSULE BY MOUTH ONCE DAILY Yes Germán Harvey MD   sertraline (ZOLOFT) 50 MG tablet TAKE ONE (1) TABLET BY MOUTH ONCE DAILY Yes Germán Harvey MD   Ascorbic Acid (VITAMIN C) 1000 MG tablet Take 1,000 mg by mouth daily Yes Historical Provider, MD   brimonidine-timolol (COMBIGAN) 0.2-0.5 % ophthalmic solution Place 1 drop into both eyes every 12 hours Yes Historical Provider, MD   Glucosamine-MSM-Hyaluronic Acd ([facility]) Take 1 each by mouth daily  Yes Historical Provider, MD       Past Medical History:   Diagnosis Date    AAA (abdominal aortic aneurysm) [facility])     Surgery scheduled for 7/1/2014 with Dr. Valentine Vega.  Arthritis     generalized    Asthma     AV block, 1st degree     Back pain     Borderline hypothyroidism 12/4/2020    Bradycardia     Colon polyps     Colostomy in place [facility])     Glaucoma     H/O cardiovascular stress test 12/27/2013    cardiolite-EF56%, apical hypokinesis is seen, cannot exlude apical Tyler ischemia    History of blood transfusion     History of cardiovascular stress test 3/5/10    No angina or ischemic EKG changes are noted with Lexiscan. The cardiolite study demonstrated normal perfusion in all segments of the myocardium with an intact left ventricular systolic function. The resting sestamibi dose is 10.8, stress is 32.5. EF 66%    History of chest x-ray 3/16/10    The chest is considered nonacute. There is cardiomegaly noted. COPD.  History of Doppler ultrasound 3/25/10    venous doppler- Technically good venous ultrasound study negative for DVT in both lower extremities. Normal compressibility,color flow doppler pattern and spectral doppler pattern demonstrated thoughout.  History of echocardiogram 3/18/10    Boarderline LV dilatation with concentric hypertrophy. Low normal systolic and abnormal diastolic function. Mild mitral and trace tricuspid regurgitation. Mild aortic root and bilateral dilatation.  Holter monitor, abnormal 11/10/10    11/10/2010- 24 HR- Abnormal holter revealing some significant brasycardia's and wenckebach phenomenon. Therefore, clinical  correlation is recommend.     Hx of blood clots     Pacemaker     PAF (paroxysmal atrial fibrillation) (HCC)     Peritonitis ([address]     Personal history of colonic polyps     Poor historian     Skin cancer     face    Ulcerative (chronic) proctosigmoiditis ([address]     Wears glasses        Past Surgical History: Procedure Laterality Date    ABDOMINAL AORTIC ANEURYSM REPAIR, ENDOVASCULAR  7/1/14    ABDOMINAL EXPLORATION SURGERY  2/4/2013    exp lap, left hemicolectomy, umbilectomy    APPENDECTOMY  2/4/2013    APPENDECTOMY  2/4/2013    COLONOSCOPY  2/4/2013    COLOSTOMY  2/4/2013    CYSTOSCOPY  2/03/2014    TURP    HEMORRHOID SURGERY  2011    HERNIA REPAIR Bilateral [address] hernia    KNEE SURGERY Right 1980s    PACEMAKER INSERTION Right 12/13/2017    BIV PPM Medtronic Percepta Quad CRT-P MRI SureScan Pacemaker    PACEMAKER PLACEMENT Right 02/25/2013    Explanted 12/13/2017    SMALL INTESTINE SURGERY N/A 8/28/2019    EXPLORATORY LAPAROTOMY, SMALL BOWEL RESECTION, LYSIS OF ADHESIONS, NEW COLOSTOMY, AND HERNIA REPAIR WITH XENMATRIX MESH performed by Radha Finley MD at [address] [place] [address] OR       Family History   Problem Relation Age of Onset    Stroke Mother         CVA    Heart Disease Mother     Cancer Brother         prostate    Cancer Brother         skin    Cancer Daughter         colon    Substance Abuse Son         Tobacco    No Known Problems Father        CareTeam (Including outside providers/suppliers regularly involved in providing care):   Patient Care Team:  Will Castillo MD as PCP - General  Will Castillo MD as PCP - [facility] Empaneled Provider  Nuvia Spears DO as Consulting Physician (Gastroenterology)  Ezio Gonzalez MD as Consulting Physician (Cardiology)    Wt Readings from Last 3 Encounters:   05/26/21 171 lb (77.6 kg)   05/26/21 171 lb (77.6 kg)   04/21/21 181 lb 12.8 oz (82.5 kg)     Vitals:    05/26/21 1610   BP: 122/78   Pulse: 80   Temp: 98.2 °F (36.8 °C)   SpO2: 100%   Weight: 171 lb (77.6 kg)   Height: 5' 3\" (1.6 m)     Body mass index is 30.29 kg/m². Based upon direct observation of the patient, evaluation of cognition reveals recent and remote memory intact.     Patient's complete Health Risk Assessment and screening values have been reviewed and are found in [facility]. The following problems were reviewed today and where indicated follow up appointments were made and/or referrals ordered. Positive Risk Factor Screenings with Interventions:          General Health and ACP:  General  In general, how would you say your health is?: Good  In the past 7 days, have you experienced any of the following?  New or Increased Pain, New or Increased Fatigue, Loneliness, Social Isolation, Stress or Anger?: (!) New or Increased Fatigue  Do you get the social and emotional support that you need?: Yes  Do you have a Living Will?: Yes  Advance Directives     Power of  Living Will ACP-Advance Directive ACP-Power of Liane Banks on 04/09/21 Not on File Not on File Filed      General Health Risk Interventions:  · Fatigue: patient declines any further evaluation/treatment for this issue  · No Living Will: ACP documents already completed- patient asked to provide copy to the office    Health Habits/Nutrition:  Health Habits/Nutrition  Do you exercise for at least 20 minutes 2-3 times per week?: (!) No  Have you lost any weight without trying in the past 3 months?: (!) Yes  Do you eat only one meal per day?: No  Have you seen the dentist within the past year?: N/A - wear dentures  Body mass index: (!) 30.29  Health Habits/Nutrition Interventions:  · Inadequate physical activity:  patient is not ready to increase his/her physical activity level at this time  · Nutritional issues:  educational materials for healthy, well-balanced diet provided, educational materials to promote weight loss provided    Hearing/Vision:  No exam data present  Hearing/Vision  Do you or your family notice any trouble with your hearing that hasn't been managed with hearing aids?: (!) Yes  Do you have difficulty driving, watching TV, or doing any of your daily activities because of your eyesight?: No  Have you had an eye exam within the past year?: Yes  Hearing/Vision Interventions:  · Hearing concerns:  patient declines any further evaluation/treatment for hearing issues     ADL:  ADLs  In the past 7 days, did you need help from others to perform any of the following everyday activities? Eating, dressing, grooming, bathing, toileting, or walking/balance?: None  In the past 7 days, did you need help from others to take care of any of the following?  Laundry, housekeeping, banking/finances, shopping, telephone use, food preparation, transportation, or taking medications?: [employer], Housekeeping, Shopping, Food Preparation, Transportation, Banking/Finances, Taking Medications  ADL Interventions:  · Patient declines any further evaluation/treatment for this issue, states his daughter helps him with ADLs    Personalized Preventive Plan   Current Health Maintenance Status  Immunization History   Administered Date(s) Administered    COVID-19, Carias Peter, PF, 30mcg/0.3mL 01/21/2021, 02/11/2021    Influenza Virus Vaccine 09/05/2014    Influenza, High Dose (Fluzone 65 yrs and older) 10/19/2016, 10/30/2017, 10/18/2018    Influenza, Triv, inactivated, subunit, adjuvanted, IM (Fluad 65 yrs and older) 11/20/2019    Pneumococcal Conjugate 13-valent ([id number]) 10/19/2016    Pneumococcal Polysaccharide ([id number]) 11/18/1998    Tdap (Boostrix, Adacel) 01/09/2014        Health Maintenance   Topic Date Due    Shingles Vaccine (1 of 2) Never done   [employer] Visit (AWV)  Never done    Flu vaccine (Season Ended) 09/01/2021    Lipid screen  12/03/2021    Potassium monitoring  04/21/2022    Creatinine monitoring  04/21/2022    TSH testing  05/24/2022    DTaP/Tdap/Td vaccine (2 - Td) 01/09/2024    Pneumococcal 65+ years Vaccine  Completed    COVID-19 Vaccine  Completed    Hepatitis A vaccine  Aged Out    Hepatitis B vaccine  Aged Out    Hib vaccine  Aged Out    Meningococcal (ACWY) vaccine  Aged Out     Recommendations for Betterment Due: see orders and patient instructions/AVS. Discussed

## 2021-05-26 NOTE — PATIENT INSTRUCTIONS
heart-healthy diet is one that limits sodium , certain types of fat , and cholesterol . This type of diet is recommended for:   People with any form of cardiovascular disease (eg, coronary heart disease , peripheral vascular disease , previous heart attack , previous stroke )   People with risk factors for cardiovascular disease, such as high blood pressure , high cholesterol , or diabetes   Anyone who wants to lower their risk of developing cardiovascular disease   Sodium    Sodium is a mineral found in many foods. In general, most people consume much more sodium than they need. Diets high in sodium can increase blood pressure and lead to edema (water retention). On a heart-healthy diet, you should consume no more than 2,300 mg (milligrams) of sodium per dayabout the amount in one teaspoon of table salt. The foods highest in sodium include table salt (about 50% sodium), processed foods, convenience foods, and preserved foods. Cholesterol    Cholesterol is a fat-like, waxy substance in your blood. Our bodies make some cholesterol. It is also found in animal products, with the highest amounts in fatty meat, egg yolks, whole milk, cheese, shellfish, and organ meats. On a heart-healthy diet, you should limit your cholesterol intake to less than 200 mg per day. It is normal and important to have some cholesterol in your bloodstream. But too much cholesterol can cause plaque to build up within your arteries, which can eventually lead to a heart attack or stroke. The two types of cholesterol that are most commonly referred to are:   Low-density lipoprotein (LDL) cholesterol  Also known as bad cholesterol, this is the cholesterol that tends to build up along your arteries. Bad cholesterol levels are increased by eating fats that are saturated or hydrogenated. Optimal level of this cholesterol is less than 100. Over 130 starts to get risky for heart disease.    High-density lipoprotein (HDL) cholesterol  Also known as good cholesterol, this type of cholesterol actually carries cholesterol away from your arteries and may, therefore, help lower your risk of having a heart attack. You want this level to be high (ideally greater than 60). It is a risk to have a level less than 40. You can raise this good cholesterol by eating olive oil, canola oil, avocados, or nuts. Exercise raises this level, too. Fat    Fat is calorie dense and packs a lot of calories into a small amount of food. Even though fats should be limited due to their high calorie content, not all fats are bad. In fact, some fats are quite healthful. Fat can be broken down into four main types. The good-for-you fats are:   Monounsaturated fat  found in oils such as olive and canola, avocados, and nuts and natural nut butters; can decrease cholesterol levels, while keeping levels of HDL cholesterol high   Polyunsaturated fat  found in oils such as safflower, sunflower, soybean, corn, and sesame; can decrease total cholesterol and LDL cholesterol   Omega-3 fatty acids  particularly those found in fatty fish (such as salmon, trout, tuna, mackerel, herring, and sardines); can decrease risk of arrhythmias, decrease triglyceride levels, and slightly lower blood pressure   The fats that you want to limit are:   Saturated fat  found in animal products, many fast foods, and a few vegetables; increases total blood cholesterol, including LDL levels   Animal fats that are saturated include: butter, lard, whole-milk dairy products, meat fat, and poultry skin   Vegetable fats that are saturated include: hydrogenated shortening, palm oil, coconut oil, cocoa butter   Hydrogenated or trans fat  found in margarine and vegetable shortening, most shelf stable snack foods, and fried foods; increases LDL and decreases HDL     It is generally recommended that you limit your total fat for the day to less than 30% of your total calories.  If you follow an 1800-calorie heart healthy diet, for example, this would mean 60 grams of fat or less per day. Saturated fat and trans fat in your diet raises your blood cholesterol the most, much more than dietary cholesterol does. For this reason, on a heart-healthy diet, less than 7% of your calories should come from saturated fat and ideally 0% from trans fat. On an 1800-calorie diet, this translates into less than 14 grams of saturated fat per day, leaving 46 grams of fat to come from mono- and polyunsaturated fats.    Food Choices on a Heart Healthy Diet   Food Category   Foods Recommended   Foods to Avoid   Grains   Breads and rolls without salted tops Most dry and cooked cereals Unsalted crackers and breadsticks Low-sodium or homemade breadcrumbs or stuffing All rice and pastas   Breads, rolls, and crackers with salted tops High-fat baked goods (eg, muffins, donuts, pastries) Quick breads, self-rising flour, and biscuit mixes Regular bread crumbs Instant hot cereals Commercially prepared rice, pasta, or stuffing mixes   Vegetables   Most fresh, frozen, and low-sodium canned vegetables Low-sodium and salt-free vegetable juices Canned vegetables if unsalted or rinsed   Regular canned vegetables and juices, including sauerkraut and pickled vegetables Frozen vegetables with sauces Commercially prepared potato and vegetable mixes   Fruits   Most fresh, frozen, and canned fruits All fruit juices   Fruits processed with salt or sodium   Milk   Nonfat or low-fat (1%) milk Nonfat or low-fat yogurt Cottage cheese, low-fat ricotta, cheeses labeled as low-fat and low-sodium   Whole milk Reduced-fat (2%) milk Malted and chocolate milk Full fat yogurt Most cheeses (unless low-fat and low salt) Buttermilk (no more than 1 cup per week)   Meats and Beans   Lean cuts of fresh or frozen beef, veal, lamb, or pork (look for the word loin) Fresh or frozen poultry without the skin Fresh or frozen fish and some shellfish Egg whites and egg substitutes (Limit whole eggs to three per week) Tofu Nuts or seeds (unsalted, dry-roasted), low-sodium peanut butter Dried peas, beans, and lentils   Any smoked, cured, salted, or canned meat, fish, or poultry (including mayfield, chipped beef, cold cuts, hot dogs, sausages, sardines, and anchovies) Poultry skins Breaded and/or fried fish or meats Canned peas, beans, and lentils Salted nuts   Fats and Oils   Olive oil and canola oil Low-sodium, low-fat salad dressings and mayonnaise   Butter, margarine, coconut and palm oils, [place]field fat   Snacks, Sweets, and Condiments   Low-sodium or unsalted versions of broths, soups, soy sauce, and condiments Pepper, herbs, and spices; vinegar, lemon, or lime juice Low-fat frozen desserts (yogurt, sherbet, fruit bars) Sugar, cocoa powder, honey, syrup, jam, and preserves Low-fat, trans-fat free cookies, cakes, and pies Isauro and animal crackers, fig bars, ирина snaps   High-fat desserts Broth, soups, gravies, and sauces, made from instant mixes or other high-sodium ingredients Salted snack foods Canned olives Meat tenderizers, seasoning salt, and most flavored vinegars   Beverages   Low-sodium carbonated beverages Tea and coffee in moderation Soy milk   Commercially softened water   Suggestions   Make whole grains, fruits, and vegetables the base of your diet. Choose heart-healthy fats such as canola, olive, and flaxseed oil, and foods high in heart-healthy fats, such as nuts, seeds, soybeans, tofu, and fish. Eat fish at least twice per week; the fish highest in omega-3 fatty acids and lowest in mercury include salmon, herring, mackerel, sardines, and canned chunk light tuna. If you eat fish less than twice per week or have high triglycerides, talk to your doctor about taking fish oil supplements. Read food labels.    For products low in fat and cholesterol, look for fat free, low-fat, cholesterol free, saturated fat free, and trans fat freeAlso scan the Nutrition Facts Label, which lists saturated fat, trans fat, and cholesterol amounts. For products low in sodium, look for sodium free, very low sodium, low sodium, no added salt, and unsalted   Skip the salt when cooking or at the table; if food needs more flavor, get creative and try out different herbs and spices. Garlic and onion also add substantial flavor to foods. Trim any visible fat off meat and poultry before cooking, and drain the fat off after andrea. Use cooking methods that require little or no added fat, such as grilling, boiling, baking, poaching, broiling, roasting, steaming, stir-frying, and sauting. Avoid fast food and convenience food. They tend to be high in saturated and trans fat and have a lot of added salt. Talk to a registered dietitian for individualized diet advice. Last Reviewed: March 2011 Kieran Lawson MS, MPH, RD   Updated: 3/29/2011   ·     Heart-Healthy Diet   Sodium, Fat, and Cholesterol Controlled Diet       What Is a Heart Healthy Diet? A heart-healthy diet is one that limits sodium , certain types of fat , and cholesterol . This type of diet is recommended for:   People with any form of cardiovascular disease (eg, coronary heart disease , peripheral vascular disease , previous heart attack , previous stroke )   People with risk factors for cardiovascular disease, such as high blood pressure , high cholesterol , or diabetes   Anyone who wants to lower their risk of developing cardiovascular disease   Sodium    Sodium is a mineral found in many foods. In general, most people consume much more sodium than they need. Diets high in sodium can increase blood pressure and lead to edema (water retention). On a heart-healthy diet, you should consume no more than 2,300 mg (milligrams) of sodium per dayabout the amount in one teaspoon of table salt. The foods highest in sodium include table salt (about 50% sodium), processed foods, convenience foods, and preserved foods.    Cholesterol    Cholesterol is a fat-like, waxy substance in your blood. Our bodies make some cholesterol. It is also found in animal products, with the highest amounts in fatty meat, egg yolks, whole milk, cheese, shellfish, and organ meats. On a heart-healthy diet, you should limit your cholesterol intake to less than 200 mg per day. It is normal and important to have some cholesterol in your bloodstream. But too much cholesterol can cause plaque to build up within your arteries, which can eventually lead to a heart attack or stroke. The two types of cholesterol that are most commonly referred to are:   Low-density lipoprotein (LDL) cholesterol  Also known as bad cholesterol, this is the cholesterol that tends to build up along your arteries. Bad cholesterol levels are increased by eating fats that are saturated or hydrogenated. Optimal level of this cholesterol is less than 100. Over 130 starts to get risky for heart disease. High-density lipoprotein (HDL) cholesterol  Also known as good cholesterol, this type of cholesterol actually carries cholesterol away from your arteries and may, therefore, help lower your risk of having a heart attack. You want this level to be high (ideally greater than 60). It is a risk to have a level less than 40. You can raise this good cholesterol by eating olive oil, canola oil, avocados, or nuts. Exercise raises this level, too. Fat    Fat is calorie dense and packs a lot of calories into a small amount of food. Even though fats should be limited due to their high calorie content, not all fats are bad. In fact, some fats are quite healthful. Fat can be broken down into four main types.    The good-for-you fats are:   Monounsaturated fat  found in oils such as olive and canola, avocados, and nuts and natural nut butters; can decrease cholesterol levels, while keeping levels of HDL cholesterol high   Polyunsaturated fat  found in oils such as safflower, sunflower, soybean, corn, and sesame; can decrease total cholesterol and LDL cholesterol   Omega-3 fatty acids  particularly those found in fatty fish (such as salmon, trout, tuna, mackerel, herring, and sardines); can decrease risk of arrhythmias, decrease triglyceride levels, and slightly lower blood pressure   The fats that you want to limit are:   Saturated fat  found in animal products, many fast foods, and a few vegetables; increases total blood cholesterol, including LDL levels   Animal fats that are saturated include: butter, lard, whole-milk dairy products, meat fat, and poultry skin   Vegetable fats that are saturated include: hydrogenated shortening, palm oil, coconut oil, cocoa butter   Hydrogenated or trans fat  found in margarine and vegetable shortening, most shelf stable snack foods, and fried foods; increases LDL and decreases HDL     It is generally recommended that you limit your total fat for the day to less than 30% of your total calories. If you follow an 1800-calorie heart healthy diet, for example, this would mean 60 grams of fat or less per day. Saturated fat and trans fat in your diet raises your blood cholesterol the most, much more than dietary cholesterol does. For this reason, on a heart-healthy diet, less than 7% of your calories should come from saturated fat and ideally 0% from trans fat. On an 1800-calorie diet, this translates into less than 14 grams of saturated fat per day, leaving 46 grams of fat to come from mono- and polyunsaturated fats.    Food Choices on a Heart Healthy Diet   Food Category   Foods Recommended   Foods to Avoid   Grains   Breads and rolls without salted tops Most dry and cooked cereals Unsalted crackers and breadsticks Low-sodium or homemade breadcrumbs or stuffing All rice and pastas   Breads, rolls, and crackers with salted tops High-fat baked goods (eg, muffins, donuts, pastries) Quick breads, self-rising flour, and biscuit mixes Regular bread crumbs Instant hot cereals Commercially prepared rice, pasta, or stuffing mixes Vegetables   Most fresh, frozen, and low-sodium canned vegetables Low-sodium and salt-free vegetable juices Canned vegetables if unsalted or rinsed   Regular canned vegetables and juices, including sauerkraut and pickled vegetables Frozen vegetables with sauces Commercially prepared potato and vegetable mixes   Fruits   Most fresh, frozen, and canned fruits All fruit juices   Fruits processed with salt or sodium   Milk   Nonfat or low-fat (1%) milk Nonfat or low-fat yogurt Cottage cheese, low-fat ricotta, cheeses labeled as low-fat and low-sodium   Whole milk Reduced-fat (2%) milk Malted and chocolate milk Full fat yogurt Most cheeses (unless low-fat and low salt) Buttermilk (no more than 1 cup per week)   Meats and Beans   Lean cuts of fresh or frozen beef, veal, lamb, or pork (look for the word loin) Fresh or frozen poultry without the skin Fresh or frozen fish and some shellfish Egg whites and egg substitutes (Limit whole eggs to three per week) Tofu Nuts or seeds (unsalted, dry-roasted), low-sodium peanut butter Dried peas, beans, and lentils   Any smoked, cured, salted, or canned meat, fish, or poultry (including mayfield, chipped beef, cold cuts, hot dogs, sausages, sardines, and anchovies) Poultry skins Breaded and/or fried fish or meats Canned peas, beans, and lentils Salted nuts   Fats and Oils   Olive oil and canola oil Low-sodium, low-fat salad dressings and mayonnaise   Butter, margarine, coconut and palm oils, mayfield fat   Snacks, Sweets, and Condiments   Low-sodium or unsalted versions of broths, soups, soy sauce, and condiments Pepper, herbs, and spices; vinegar, lemon, or lime juice Low-fat frozen desserts (yogurt, sherbet, fruit bars) Sugar, cocoa powder, honey, syrup, jam, and preserves Low-fat, trans-fat free cookies, cakes, and pies Isauro and animal crackers, fig bars, ирина snaps   High-fat desserts Broth, soups, gravies, and sauces, made from instant mixes or other high-sodium ingredients contain fiber, including fruits, vegetables, grains, and legumes. Fiber is often classified into two categories: soluble and insoluble. Soluble fiber draws water into the bowel and can help slow digestion. Examples of foods that are high in soluble fiber include oatmeal, oat bran, barley, legumes (eg, beans and peas), apples, and strawberries. Insoluble fiber speeds digestion and can add bulk to the stool. Examples of foods that are high in insoluble fiber include whole-wheat products, wheat bran, cauliflower, green beans, and potatoes. Why Follow a High-Fiber Diet? A high-fiber diet is often recommended to prevent and treat constipation , hemorrhoids , diverticulitis , and irritable bowel syndrome . Eating a high-fiber diet can also help improve your cholesterol levels, lower your risk of coronary heart disease , reduce your risk of type 2 diabetes , and lower your weight. For people with type 1 or 2 diabetes, a high-fiber diet can also help stabilize blood sugar levels. How Much Fiber Should I Eat? A high-fiber diet should contain  20-35 grams  of fiber a day. This is actually the amount recommended for the general adult population; however, most Americans eat only 15 grams of fiber per day. Digestion of Fiber   Eating a higher fiber diet than usual can take some getting used to by your body's digestive system. To avoid the side effects of sudden increases in dietary fiber (eg, gas, cramping, bloating, and diarrhea), increase fiber gradually and be sure to drink plenty of fluids every day. Tips for Increasing Fiber Intake   Whenever possible, choose whole grains over refined grains (eg, brown rice instead of white rice, whole-wheat bread instead of white bread). Include a variety of grains in your diet, such as wheat, rye, barley, oats, quinoa, and bulgur. Eat more vegetarian-based meals. Here are some ideas: black bean burgers, eggplant lasagna, and veggie tofu stir-santos.     Choose high-fiber snacks, such as fruits, popcorn, whole-grain crackers, and nuts. Make whole-grain cereal or whole-grain toast part of your daily breakfast regime. When eating out, whether ordering a sandwich or dinner, ask for extra vegetables. When baking, replace part of the white flour with whole-wheat flour. Whole-wheat flour is particularly easy to incorporate into a recipe. High-Fiber Diet Eating Guide   Food Category   Foods Recommended   Notes   Grains   Whole-grain breads, muffins, bagels, or vipul bread Rye bread Whole-wheat crackers or crisp breads Whole-grain or bran cereals Oatmeal, oat bran, or grits Wheat germ Whole-wheat pasta and brown rice   Read the ingredients list on food labels. Look for products that list \"whole\" as the first ingredient (eg, whole-wheat, whole oats). Choose cereals with at least 2 grams of fiber per serving. Vegetables   All vegetables, especially asparagus, bean sprouts, broccoli, [place] sprouts, cabbage, carrots, cauliflower, celery, corn, greens, green beans, green pepper, onions, peas, potatoes (with skin), snow peas, spinach, squash, sweet potatoes, tomatoes, zucchini   For maximum fiber intake, eat the peels of fruits and vegetablesjust be sure to wash them well first.   Fruits   All fruits, especially apples, berries, grapefruits, mangoes, nectarines, oranges, peaches, pears, dried fruits (figs, dates, prunes, raisins)   Choose raw fruits and vegetables over juice, cooked, or cannedraw fruit has more fiber. Dried fruit is also a good source of fiber. Milk   With the exception of yogurt containing inulin (a type of fiber), dairy foods provide little fiber. Add more fiber by topping your yogurt or cottage cheese with fresh fruit, whole grain or bran cereals, nuts, or seeds.    Meats and Beans   All beans and peas, especially Garbanzo beans, kidney beans, lentils, lima beans, split peas, and omalley beans All nuts and seeds, especially almonds, peanuts, [place] nuts, informationThese devices can \"beep\" to remind you of appointments. A book of days to record birthdays, anniversaries, and other occasions that occur on the same date every year   Detailed \"to-do\" lists and strategically placed sticky notes   Quick \"study\" sessionsBefore a gathering, review who will be there so their names will be fresh in your mind. Establish routinesFor example, keep your keys, wallet, and umbrella in the same place all the time or take medicine with your 8:00 AM glass of juice   Live a Healthy Life   Many actions that will keep your body strong will do the same for your mind. For example:   Talk to Your Doctor About Herbs and Supplements    Malnutrition and vitamin deficiencies can impair your mental function. For example, vitamin B12 deficiency can cause a range of symptoms, including confusion. But, what if your nutritional needs are being met? Can herbs and supplements still offer a benefit? Researchers have investigated a range of natural remedies, such as ginkgo , ginseng , and the supplement phosphatidylserine (PS). So far, though, the evidence is inconsistent as to whether these products can improve memory or thinking. If you are interested in taking herbs and supplements, talk to your doctor first because they may interact with other medicines that you are taking. Exercise Regularly    Among the many benefits of regular exercise are increased blood flow to the brain and decreased risk of certain diseases that can interfere with memory function. One study found that even moderate exercise has a beneficial effect. Examples of \"moderate\" exercise include:   Playing 18 holes of golf once a week, without a cart   Playing tennis twice a week   Walking one mile per day   Manage Stress    It can be tough to remember what is important when your mind is cluttered. Make time for relaxation. Choose activities that calm you down, and make it routine.    Manage Chronic Conditions    Side effects of high blood pressure , diabetes, and heart disease can interfere with mental function. Many of the lifestyle steps discussed here can help manage these conditions. Strive to eat a healthy diet, exercise regularly, get stress under control, and follow your doctor's advice for your condition. Minimize Medications    Talk to your doctor about the medicines that you take. Some may be unnecessary. Also, healthy lifestyle habits may lower the need for certain drugs. Last Reviewed: April 2010 Dorothy Ulloa MD   Updated: 4/13/2010   ·     [address]       As we get older, changes in balance, gait, strength, vision, hearing, and cognition make even the most youthful senior more prone to accidents. Falls are one of the leading health risks for older people. This increased risk of falling is related to:   Aging process (eg, decreased muscle strength, slowed reflexes)   Higher incidence of chronic health problems (eg, arthritis, diabetes) that may limit mobility, agility or sensory awareness   Side effects of medicine (eg, dizziness, blurred vision)especially medicines like prescription pain medicines and drugs used to treat mental health conditions   Depending on the brittleness of your bones, the consequences of a fall can be serious and long lasting. Home Life   Research by the [employer]) shows that some home accidents among older adults can be prevented by making simple lifestyle changes and basic modifications and repairs to the home environment. Here are some lifestyle changes that experts recommend:   Have your hearing and vision checked regularly. Be sure to wear prescription glasses that are right for you. Speak to your doctor or pharmacist about the possible side effects of your medicines. A number of medicines can cause dizziness. If you have problems with sleep, talk to your doctor. Limit your intake of alcohol.    If necessary, use a cane or walker to help maintain your balance. Wear supportive, rubber-soled shoes, even at home. If you live in a region that gets wintry weather, you may want to put special cleats on your shoes to prevent you from slipping on the snow and ice. Exercise regularly to help maintain muscle tone, agility, and balance. Always hold the banister when going up or down stairs. Also, use  bars when getting in or out of the bath or shower, or using the toilet. To avoid dizziness, get up slowly from a lying down position. Sit up first, dangling your legs for a minute or two before rising to a standing position. Overall Home Safety Check   According to the Consumer Product Safety Commision's \"Older Consumer Home Safety Checklist,\" it is important to check for potential hazards in each room. And remember, proper lighting is an essential factor in home safety. If you cannot see clearly, you are more likely to fall. Important questions to ask yourself include:   Are lamp, electric, extension, and telephone cords placed out of the flow of traffic and maintained in good condition? Have frayed cords been replaced? Are all small rugs and runners slip resistant? If not, you can secure them to the floor with a special double-sided carpet tape. Are smoke detectors properly locatedone on every floor of your home and one outside of every sleeping area? Are they in good working order? Are batteries replaced at least once a year? Do you have a well-maintained carbon monoxide detector outside every sleeping are in your home? Does your furniture layout leave plenty of space to maneuver between and around chairs, tables, beds, and sofas? Are hallways, stairs and passages between rooms well lit? Can you reach a lamp without getting out of bed? Are floor surfaces well maintained? Shag rugs, high-pile carpeting, tile floors, and polished wood floors can be particularly slippery.  Stairs should always have handrails and be carpeted or fitted with a non-skid tread. Is your telephone easily reachable. Is the cord safely tucked away? Room by Room   According to the Association of Aging, bathrooms and enedelia are the two most potentially hazardous rooms in your home. In the Kitchen    Be sure your stove is in proper working order and always make sure burners and the oven are off before you go out or go to sleep. Keep pots on the back burners, turn handles away from the front of the stove, and keep stove clean and free of grease build-up. Kitchen ventilation systems and range exhausts should be working properly. Keep flammable objects such as towels and pot holders away from the cooking area except when in use. Make sure kitchen curtains are tied back. Move cords and appliances away from the sink and hot surfaces. If extension cords are needed, install wiring guides so they do not hang over the sink, range, or working areas. Look for coffee pots, kettles and toaster ovens with automatic shut-offs. Keep a mop handy in the kitchen so you can wipe up spills instantly. You should also have a small fire extinguisher. Arrange your kitchen with frequently used items on lower shelves to avoid the need to stand on a stepstool to reach them. Make sure countertops are well-lit to avoid injuries while cutting and preparing food. In the Bathroom    Use a non-slip mat or decals in the tub and shower, since wet, soapy tile or porcelain surfaces are extremely slippery. Make sure bathroom rugs are non-skid or tape them firmly to the floor. Bathtubs should have at least one, preferably two, grab bars, firmly attached to structural supports in the wall. (Do not use built-in soap holders or glass shower doors as grab bars.)    Tub seats fitted with non-slip material on the legs allow you to wash sitting down. For people with limited mobility, bathtub transfer benches allow you to slide safely into the tub.     Raised toilet seats and toilet safety rails are helpful for those with knee or hip problems. In the [facility]    Make sure you use a nightlight and that the area around your bed is clear of potential obstacles. Be careful with electric blankets and never go to sleep with a heating pad, which can cause serious burns even if on a low setting. Use fire-resistant mattress covers and pillows, and NEVER smoke in bed. Keep a phone next to the bed that is programmed to dial 911 at the push of a button. If you have a chronic condition, you may want to sign on with an automatic call-in service. Typically the system includes a small pendant that connects directly to an emergency medical voice-response system. You should also make arrangements to stay in contact with someonefriend, neighbor, family memberon a regular schedule. Fire Prevention   According to the [employer] (Smoke Alarms for Every) [address] [place] [address], senior citizens are one of the two highest risk groups for death and serious injuries due to residential fires. When cooking, wear short-sleeved items, never a bulky long-sleeved robe. The [facility]'s Safety Checklist for Older Consumers emphasizes the importance of checking basements, garages, workshops and storage areas for fire hazards, such as volatile liquids, piles of old rags or clothing and overloaded circuits. Never smoke in bed or when lying down on a couch or recliner chair. Small portable electric or kerosene heaters are responsible for many home fires and should be used cautiously if at all. If you do use one, be sure to keep them away from flammable materials. In case of fire, make sure you have a pre-established emergency exit plan. Have a professional check your fireplace and other fuel-burning appliances yearly. Helping Hands   Baby boomers entering the fay years will continue to see the development of new products to help older adults live safely and independently in spite of age-related changes. Making Life More Livable  , by Angel [employer]is[employer], lists over 1,000 products for \"living well in the mature years,\" such as bathing and mobility aids, household security devices, ergonomically designed knives and peelers, and faucet valves and knobs for temperature control. Medical supply stores and organizations are good sources of information about products that improve your quality of life and insure your safety.      Last Reviewed: November 2009 Rianna Colin MD   Updated: 3/7/2011     ·

## 2021-05-26 NOTE — PROGRESS NOTES
5/26/2021    Cheril Standing    Chief Complaint   Patient presents with    1 Month Follow-Up     6 week f/u    Knee Pain     bilateral knee pain       HPI    Miah Madrid is a 80 y.o. male who presents today with follow-up. Patient states his breathing is better. He remains on just a low dose of water pill. His daughter states he does not actually do a very good job with supportive socks but is wearing them today. Overall she seems pleased with him this time. Patient states stoma is okay. No pain no problems. I reviewed his admission to the hospital for a pancreatic flare. He does not seem to remember it. Daughter states he has been doing fine. I reviewed his thyroid check from 2 days ago. It is much better than previous check of a TSH of 48. Patient complains of bilateral knee pain. He has used horse liniment in the past but not recently. Patient has a history of GI bleeding. He is still on Carafate from the last.    REVIEW OF SYSTEMS    Constitutional:  Denies fever, chills, weight loss or weakness  Eyes:  no photophobia or discharge  ENT:  no sore throat or ear pain  Cardiovascular:  Denies chest pain, palpitations or swelling  Respiratory:  Denies cough or shortness of breath  GI:  no abdominal pain, nausea, vomiting, or diarrhea  Musculoskeletal:  no back pain  Skin:  No rashes  Neurologic:  no headache, focal weakness, or sensory changes  Endocrine:  no polyuria or polydipsia      PAST MEDICAL HISTORY  Past Medical History:   Diagnosis Date    AAA (abdominal aortic aneurysm) ([facility] [address]     Surgery scheduled for 7/1/2014 with Dr. Patience Madsen.     Arthritis     generalized    Asthma     AV block, 1st degree     Back pain     Borderline hypothyroidism 12/4/2020    Bradycardia     Colon polyps     Colostomy in place ([address]     Glaucoma     H/O cardiovascular stress test 12/27/2013    cardiolite-EF56%, apical hypokinesis is seen, cannot exlude apical Tyler ischemia    History of blood transfusion     History of cardiovascular stress test 3/5/10    No angina or ischemic EKG changes are noted with Lexiscan. The cardiolite study demonstrated normal perfusion in all segments of the myocardium with an intact left ventricular systolic function. The resting sestamibi dose is 10.8, stress is 32.5. EF 66%    History of chest x-ray 3/16/10    The chest is considered nonacute. There is cardiomegaly noted. COPD.  History of Doppler ultrasound 3/25/10    venous doppler- Technically good venous ultrasound study negative for DVT in both lower extremities. Normal compressibility,color flow doppler pattern and spectral doppler pattern demonstrated thoughout.  History of echocardiogram 3/18/10    Boarderline LV dilatation with concentric hypertrophy. Low normal systolic and abnormal diastolic function. Mild mitral and trace tricuspid regurgitation. Mild aortic root and bilateral dilatation.  Holter monitor, abnormal 11/10/10    11/10/2010- 24 HR- Abnormal holter revealing some significant brasycardia's and wenckebach phenomenon. Therefore, clinical  correlation is recommend.  Hx of blood clots     Pacemaker     PAF (paroxysmal atrial fibrillation) (HCC)     Peritonitis ([address]     Personal history of colonic polyps     Poor historian     Skin cancer     face    Ulcerative (chronic) proctosigmoiditis ([address]     Wears glasses        FAMILY HISTORY  Family History   Problem Relation Age of Onset    Stroke Mother         CVA    Heart Disease Mother     Cancer Brother         prostate    Cancer Brother         skin    Cancer Daughter         colon    Substance Abuse Son         Tobacco    No Known Problems Father        SOCIAL HISTORY  Social History     Socioeconomic History    Marital status:       Spouse name: Not on file    Number of children: 3    Years of education: Not on file    Highest education level: Not on file   Occupational History    Occupation: Retired   Tobacco Use  Smoking status: Never Smoker    Smokeless tobacco: Never Used   Vaping Use    Vaping Use: Never used   Substance and Sexual Activity    Alcohol use: No     Alcohol/week: 0.0 standard drinks    Drug use: No    Sexual activity: Yes     Partners: Female     Comment:    Other Topics Concern    Not on file   Social History Narrative    Not on file     Social Determinants of Health     Financial Resource Strain: Low Risk     Difficulty of Paying Living Expenses: Not hard at all   Food Insecurity: No Food Insecurity    Worried About Running Out of Food in the Last Year: Never true    Christopher of Food in the Last Year: Never true   Transportation Needs:     Lack of Transportation (Medical):      Lack of Transportation (Non-Medical):    Physical Activity:     Days of Exercise per Week:     Minutes of Exercise per Session:    Stress:     Feeling of Stress :    Social Connections:     Frequency of Communication with Friends and Family:     Frequency of Social Gatherings with Friends and Family:     Attends Je[employer] witness Services:     Active Member of Clubs or Organizations:     Attends Club or Organization Meetings:     Marital Status:    Intimate Partner Violence:     Fear of Current or Ex-Partner:     Emotionally Abused:     Physically Abused:     Sexually Abused:         SURGICAL HISTORY  Past Surgical History:   Procedure Laterality Date    ABDOMINAL AORTIC ANEURYSM REPAIR, ENDOVASCULAR  7/1/14    ABDOMINAL EXPLORATION SURGERY  2/4/2013    exp lap, left hemicolectomy, umbilectomy    APPENDECTOMY  2/4/2013    APPENDECTOMY  2/4/2013    COLONOSCOPY  2/4/2013    COLOSTOMY  2/4/2013    CYSTOSCOPY  2/03/2014    TURP    HEMORRHOID SURGERY  2011    HERNIA REPAIR Bilateral 1940 & 1958    Ing hernia    KNEE SURGERY Right 1980s    PACEMAKER INSERTION Right 12/13/2017    BIV PPM Medtronic Percepta Quad CRT-P MRI SureScan Pacemaker    PACEMAKER PLACEMENT Right 02/25/2013    Explanted 12/13/2017  SMALL INTESTINE SURGERY N/A 8/28/2019    EXPLORATORY LAPAROTOMY, SMALL BOWEL RESECTION, LYSIS OF ADHESIONS, NEW COLOSTOMY, AND HERNIA REPAIR WITH XENMATRIX MESH performed by Aleksandra Waggoner MD at [address]  Current Outpatient Medications   Medication Sig Dispense Refill    timolol (TIMOPTIC) 0.5 % ophthalmic solution       latanoprost (XALATAN) 0.005 % ophthalmic solution       lisinopril-hydroCHLOROthiazide (PRINZIDE;ZESTORETIC) 10-12.5 MG per tablet TAKE ONE-HALF (1/2) TABLET BY MOUTH ONCE DAILY 30 tablet 5    levothyroxine (SYNTHROID) 100 MCG tablet Take 1 tablet by mouth daily 30 tablet 5    atorvastatin (LIPITOR) 20 MG tablet TAKE 1 TABLET BY MOUTH ONCE DAILY 30 tablet 5    metoprolol succinate (TOPROL XL) 25 MG extended release tablet TAKE 1 TABLET BY MOUTH ONCE DAILY 30 tablet 5    sucralfate (CARAFATE) 1 GM tablet TAKE 1 TABLET BY MOUTH TWO TIMES DAILY 60 tablet 5    tamsulosin (FLOMAX) 0.4 MG capsule TAKE 1 CAPSULE BY MOUTH ONCE DAILY 30 capsule 5    sertraline (ZOLOFT) 50 MG tablet TAKE ONE (1) TABLET BY MOUTH ONCE DAILY 90 tablet 1    Ascorbic Acid (VITAMIN C) 1000 MG tablet Take 1,000 mg by mouth daily      brimonidine-timolol (COMBIGAN) 0.2-0.5 % ophthalmic solution Place 1 drop into both eyes every 12 hours      Glucosamine-MSM-Hyaluronic Acd (JOINT HEALTH PO) Take 1 each by mouth daily        No current facility-administered medications for this visit. ALLERGIES  Allergies   Allergen Reactions    Codeine Anaphylaxis    Fentanyl Other (See Comments)     Hypotension        PHYSICAL EXAM  /78   Pulse 80   Ht 5' 3\" (1.6 m)   Wt 171 lb (77.6 kg)   BMI 30.29 kg/m²     ASSESSMENT & PLAN    1. Chronic pain of both knees  Due to history of GI bleeding, recommended continuation of Tylenol up to 6 g daily. Continuation of horse liniment, trial of T-Gel. Recommended icing the knee for 30 minutes twice a day.     2. Acquired hypothyroidism  Reviewed

## 2021-06-22 ENCOUNTER — PROCEDURE VISIT (OUTPATIENT)
Dept: CARDIOLOGY CLINIC | Age: 86
End: 2021-06-22
Payer: COMMERCIAL

## 2021-06-22 DIAGNOSIS — Z95.0 BIVENTRICULAR CARDIAC PACEMAKER IN SITU: Primary | ICD-10-CM

## 2021-06-22 DIAGNOSIS — I49.5 SICK SINUS SYNDROME (HCC): ICD-10-CM

## 2021-06-22 PROCEDURE — 93297 REM INTERROG DEV EVAL ICPMS: CPT | Performed by: INTERNAL MEDICINE

## 2021-06-22 PROCEDURE — 93294 REM INTERROG EVL PM/LDLS PM: CPT | Performed by: INTERNAL MEDICINE

## 2021-06-22 PROCEDURE — 93296 REM INTERROG EVL PM/IDS: CPT | Performed by: INTERNAL MEDICINE

## 2021-07-27 RX ORDER — SUCRALFATE 1 G/1
TABLET ORAL
Qty: 180 TABLET | Refills: 0 | Status: SHIPPED | OUTPATIENT
Start: 2021-07-27 | End: 2021-09-22 | Stop reason: SDUPTHER

## 2021-08-24 RX ORDER — ATORVASTATIN CALCIUM 20 MG/1
TABLET, FILM COATED ORAL
Qty: 90 TABLET | Refills: 0 | Status: SHIPPED | OUTPATIENT
Start: 2021-08-24 | End: 2021-11-16

## 2021-08-24 RX ORDER — LEVOTHYROXINE SODIUM 0.1 MG/1
TABLET ORAL
Qty: 90 TABLET | Refills: 0 | Status: SHIPPED | OUTPATIENT
Start: 2021-08-24 | End: 2021-12-20 | Stop reason: SDUPTHER

## 2021-09-14 ENCOUNTER — OFFICE VISIT (OUTPATIENT)
Dept: CARDIOLOGY CLINIC | Age: 86
End: 2021-09-14
Payer: COMMERCIAL

## 2021-09-14 VITALS
SYSTOLIC BLOOD PRESSURE: 118 MMHG | WEIGHT: 168 LBS | DIASTOLIC BLOOD PRESSURE: 80 MMHG | HEIGHT: 62 IN | BODY MASS INDEX: 30.91 KG/M2 | HEART RATE: 80 BPM

## 2021-09-14 DIAGNOSIS — I10 ESSENTIAL HYPERTENSION: Primary | ICD-10-CM

## 2021-09-14 DIAGNOSIS — I50.22 CHRONIC SYSTOLIC HEART FAILURE (HCC): ICD-10-CM

## 2021-09-14 DIAGNOSIS — E78.00 PURE HYPERCHOLESTEROLEMIA: ICD-10-CM

## 2021-09-14 DIAGNOSIS — I73.9 PVD (PERIPHERAL VASCULAR DISEASE) (HCC): ICD-10-CM

## 2021-09-14 DIAGNOSIS — I48.0 PAF (PAROXYSMAL ATRIAL FIBRILLATION) (HCC): ICD-10-CM

## 2021-09-14 DIAGNOSIS — I44.2 COMPLETE HEART BLOCK (HCC): ICD-10-CM

## 2021-09-14 DIAGNOSIS — Z95.0 S/P BIVENTRICULAR CARDIAC PACEMAKER PROCEDURE: ICD-10-CM

## 2021-09-14 PROCEDURE — 99213 OFFICE O/P EST LOW 20 MIN: CPT | Performed by: INTERNAL MEDICINE

## 2021-09-14 RX ORDER — APIXABAN 2.5 MG/1
TABLET, FILM COATED ORAL
COMMUNITY
Start: 2021-08-24 | End: 2021-09-28 | Stop reason: SDUPTHER

## 2021-09-14 NOTE — LETTER
Patient Name: Georgia Morales  : 3/23/1930  MRN# [id number]    REASON FOR VISIT:       Current Outpatient Medications   Medication Sig Dispense Refill    atorvastatin (LIPITOR) 20 MG tablet TAKE ONE (1) TABLET  BY MOUTH ONCE DAILY 90 tablet 0    levothyroxine (SYNTHROID) 100 MCG tablet TAKE ONE (1) TABLET BY MOUTH ONCE DAILY 90 tablet 0    sucralfate (CARAFATE) 1 GM tablet TAKE ONE (1) TABLET BY MOUTH TWO TIMES DAILY 180 tablet 0    sertraline (ZOLOFT) 50 MG tablet TAKE 1 TABLET BY MOUTH ONCE DAILY 90 tablet 0    timolol (TIMOPTIC) 0.5 % ophthalmic solution       latanoprost (XALATAN) 0.005 % ophthalmic solution       lisinopril-hydroCHLOROthiazide (PRINZIDE;ZESTORETIC) 10-12.5 MG per tablet TAKE ONE-HALF (1/2) TABLET BY MOUTH ONCE DAILY 30 tablet 5    metoprolol succinate (TOPROL XL) 25 MG extended release tablet TAKE 1 TABLET BY MOUTH ONCE DAILY 30 tablet 5    tamsulosin (FLOMAX) 0.4 MG capsule TAKE 1 CAPSULE BY MOUTH ONCE DAILY 30 capsule 5    Ascorbic Acid (VITAMIN C) 1000 MG tablet Take 1,000 mg by mouth daily      brimonidine-timolol (COMBIGAN) 0.2-0.5 % ophthalmic solution Place 1 drop into both eyes every 12 hours      Glucosamine-MSM-Hyaluronic Acd (JOINT HEALTH PO) Take 1 each by mouth daily        No current facility-administered medications for this visit. Smoke: What:                           How much:    Alcohol: How Much:     Caffeine: Pop:         Tea:            Coffee:                Chocolate:    Exercise:    Labs: Lipids:             CBC:       BMP/CMP:          TSH:              A1C:    Last Visit:  Complaints:  Changes:    Last EKG:      STRESS TEST:  2018  Abnormal Study.    IMI of a medium sized territory. No reversible Ischemia.    Other areas perfuse normally.    Inferior wall Hypokinesis with borderline reduction of LV function           ECHO: 2019   Technically difficult examination. Done in supine. Patient could not turn. Recent bowel surgery. Suboptimal subcostal window. Left ventricular function is normal, EF is estimated at 50%. Grade I diastolic dysfunction. Mild left ventricular hypertrophy. Mild to moderate aortic and mitral regurgitation noted   Moderate tricuspid regurgitation . No evidence of pericardial effusion    CAROTID: NONE    MUGA: NONE    LAST PACER CHECK: 6/22/2021    CARDIAC CATH: 7/2014    Amio Protocol:    CHADS: [id number] Score for Atrial Fibrillation Stroke Risk   Risk   Factors  Component Value   C CHF No 0   H HTN Yes 1   A2 Age >= 76 Yes,  (80 y.o.) 2   D DM No 0   S2 Prior Stroke/TIA No 0   V Vascular Disease No 0   A Age 74-69 No,  (80 y.o.) 0   Sc Sex male 0    [id number]  Score  3   Score last updated 9/14/21 82:97 AM EDT    Click here for a link to the UpToDate guideline \"Atrial Fibrillation: Anticoagulation therapy to prevent embolization    Disclaimer: Risk Score calculation is dependent on accuracy of patient problem list and past encounter diagnosis.

## 2021-09-14 NOTE — PATIENT INSTRUCTIONS
CAD:None known  HTN:well controlled on current medical regimen, see list above.              - changes in  treatment:   no   CARDIOMYOPATHY: None known   CONGESTIVE HEART FAILURE: NO KNOWN HISTORY.     VHD: No significant VHD noted  DYSLIPIDEMIA: Patient's profile is at / near [facility] is low                                Tolerating current medical regimen wellyes,                                                               See most recent Lab values in Labs section above. OTHER RELEVANT DIAGNOSIS:as noted in patient's active problem list:  TESTS ORDERED: None this visit                                      All previously ordered tests reviewed.   ARRHYTHMIAS:  Known H/O CHB                                Patient has H/O P. Atrial fibrillation                                He is rate controlled & on anticoagulation.                              MEDICATIONS: CPM   Office f/u in six months. Device check per protocol.

## 2021-09-14 NOTE — LETTER
[address]  [address] [address]  Phone: [phone]  Fax: [phone]    Nancy Santo MD    September 14, 2021     Radha Doe MD  [address]    Patient: Akira Navarro   MR Number: [id number]   YOB: 1930   Date of Visit: 9/14/2021       Dear Radha Doe: Thank you for referring Lesvia Yun to me for evaluation/treatment. Below are the relevant portions of my assessment and plan of care. If you have questions, please do not hesitate to call me. I look forward to following Fidel Kwon along with you.     Sincerely,      Nancy Santo MD

## 2021-09-14 NOTE — PROGRESS NOTES
[id number] Score for Atrial Fibrillation Stroke Risk   Risk   Factors  Component Value   C CHF No 0   H HTN Yes 1   A2 Age >= 76 Yes,  (80 y.o.) 2   D DM No 0   S2 Prior Stroke/TIA No 0   V Vascular Disease No 0   A Age 74-69 No,  (80 y.o.) 0   Sc Sex male 0    [id number]  Score  3   Score last updated 9/14/21 8:66 PM EDT    Click here for a link to the UpToDate guideline \"Atrial Fibrillation: Anticoagulation therapy to prevent embolization    Disclaimer: Risk Score calculation is dependent on accuracy of patient problem list and past encounter diagnosis.

## 2021-09-14 NOTE — PROGRESS NOTES
OFFICE PROGRESS NOTES      Ann Marie Hernandez is a 80 y.o. male who has    CHIEF COMPLAINT AS FOLLOWS:  CHEST PAIN: No C/O chest pain.   SOB: No C/O SOB at this time.               LEG EDEMA: B/L Lower extremity edema is present but no change over previous.   PALPITATIONS: Denies any C/O Palpitations                                 DIZZINESS: No C/O Dizziness                          SYNCOPE: None   OTHER:                                     HPI: Patient is here for F/U on his PAF- Arrhythmia, HTN & Dyslipidemia problems. Arrhythmia: Patient has known Hx of PAF. HTN: Patient has known Hx of essential HTN. Has been treated with guideline recommended medical / physical/ diet therapy as stated below. Dyslipidemia: Patient has known Hx of mixed dyslipidemia. Has been treated with guideline recommended medical / physical/ diet therapy as stated below. He does not have any new complaints at this time.     Current Outpatient Medications   Medication Sig Dispense Refill    ELIQUIS 2.5 MG TABS tablet       atorvastatin (LIPITOR) 20 MG tablet TAKE ONE (1) TABLET  BY MOUTH ONCE DAILY 90 tablet 0    levothyroxine (SYNTHROID) 100 MCG tablet TAKE ONE (1) TABLET BY MOUTH ONCE DAILY 90 tablet 0    sucralfate (CARAFATE) 1 GM tablet TAKE ONE (1) TABLET BY MOUTH TWO TIMES DAILY 180 tablet 0    sertraline (ZOLOFT) 50 MG tablet TAKE 1 TABLET BY MOUTH ONCE DAILY 90 tablet 0    timolol (TIMOPTIC) 0.5 % ophthalmic solution       lisinopril-hydroCHLOROthiazide (PRINZIDE;ZESTORETIC) 10-12.5 MG per tablet TAKE ONE-HALF (1/2) TABLET BY MOUTH ONCE DAILY 30 tablet 5    metoprolol succinate (TOPROL XL) 25 MG extended release tablet TAKE 1 TABLET BY MOUTH ONCE DAILY 30 tablet 5    tamsulosin (FLOMAX) 0.4 MG capsule TAKE 1 CAPSULE BY MOUTH ONCE DAILY 30 capsule 5    Ascorbic Acid (VITAMIN C) 1000 MG tablet Take 1,000 mg by mouth daily      brimonidine-timolol (COMBIGAN) 0.2-0.5 % ophthalmic solution Place 1 drop into both eyes every 12 hours      Glucosamine-MSM-Hyaluronic Acd (JOINT HEALTH PO) Take 1 each by mouth daily       latanoprost (XALATAN) 0.005 % ophthalmic solution        No current facility-administered medications for this visit. Allergies: Codeine and Fentanyl  Review of Systems:    Constitutional: Negative for diaphoresis and fatigue  Respiratory: Negative for shortness of breath  Cardiovascular: Negative for chest pain, dyspnea on exertion, claudication, edema, irregular heartbeat, murmur, palpitations or shortness of breath  Musculoskeletal: Negative for muscle pain, muscular weakness, negative for pain in arm and leg or swelling in foot and leg    Objective:  /80   Pulse 80   Ht 5' 2\" (1.575 m)   Wt 168 lb (76.2 kg)   BMI 30.73 kg/m²   Wt Readings from Last 3 Encounters:   09/14/21 168 lb (76.2 kg)   05/26/21 171 lb (77.6 kg)   05/26/21 171 lb (77.6 kg)     Body mass index is 30.73 kg/m². GENERAL - Alert, oriented, pleasant, in no apparent distress. EYES: No jaundice, no conjunctival pallor. Neck - Supple. No jugular venous distention noted. No carotid bruits. Cardiovascular  Normal S1 and S2 without obvious murmur or gallop. Extremities - No cyanosis, clubbing, or significant edema. Pulmonary  No respiratory distress. No wheezes or rales.       Lab Review   Lab Results   Component Value Date    TROPONINT <0.010 08/16/2019    TROPONINT <0.010 12/03/2018     Lab Results   Component Value Date     02/07/2013    PROBNP 2,032 09/01/2019    PROBNP 1,170 08/30/2019     Lab Results   Component Value Date    INR 1.06 04/09/2021    INR 1.16 12/18/2019     No results found for: LABA1C  Lab Results   Component Value Date    WBC 5.6 04/21/2021    WBC 8.9 04/10/2021    HCT 36.7 (L) 04/21/2021    HCT 37.6 (L) 04/10/2021    MCV 98.1 04/21/2021    .3 (H) 04/10/2021     04/21/2021     04/10/2021     Lab Results   Component Value Date    CHOL 140 12/03/2020    CHOL 160 07/22/2014    TRIG 87 04/09/2021    TRIG 151 (H) 12/03/2020    HDL 39 (L) 12/03/2020    HDL 38 (L) 07/22/2014    LDLCALC 71 12/03/2020    LDLCALC 99 07/22/2014     Lab Results   Component Value Date    ALT 17 04/21/2021    ALT 27 04/12/2021    AST 17 04/21/2021    AST 24 04/12/2021     BMP:    Lab Results   Component Value Date     04/21/2021     04/12/2021    K 3.9 04/21/2021    K 3.1 04/12/2021     04/21/2021    CL 98 04/12/2021    CO2 32 04/21/2021    CO2 32 04/12/2021    BUN 18 04/21/2021    BUN 19 04/12/2021    CREATININE 1.0 04/21/2021    CREATININE 1.2 04/12/2021     CMP:   Lab Results   Component Value Date     04/21/2021     04/12/2021    K 3.9 04/21/2021    K 3.1 04/12/2021     04/21/2021    CL 98 04/12/2021    CO2 32 04/21/2021    CO2 32 04/12/2021    BUN 18 04/21/2021    BUN 19 04/12/2021    CREATININE 1.0 04/21/2021    CREATININE 1.2 04/12/2021    PROT 6.3 04/21/2021    PROT 5.2 04/12/2021    PROT 5.2 02/24/2013    PROT 5.5 02/04/2013     Lab Results   Component Value Date    TSH 1.46 05/24/2021    TSH 41.66 04/21/2021    TSHHS 1.950 03/23/2016    TSHHS 3.673 02/21/2013     CARDIOLITE 12/2018   IMI of a medium sized territory. No reversible Ischemia.    Other areas perfuse normally.    Inferior wall Hypokinesis with borderline reduction of LV function      ECHO 8/2019   Technically difficult examination. Done in supine.   Patient could not turn.   Recent bowel surgery. Suboptimal subcostal window.   Left ventricular function is normal, EF is estimated at 50%.  Grade I diastolic dysfunction.   Mild left ventricular hypertrophy.   Mild to moderate aortic and mitral regurgitation noted   Moderate tricuspid regurgitation .   No evidence of pericardial effusion.     QUALITY MEASURES REVIEWED:  1.CAD:Patient is taking anti platelet agent:No  Patient does not have Hx of documented CAD  2. DYSLIPIDEMIA: Patient is on cholesterol lowering medication:Yes   3. Beta-Blocker therapy for CAD, if prior Myocardial Infarction:Yes   4. Counselled regarding smoking cessation. No   Patient does not Smoke. 5.Anticoagulation therapy (for A.Fib) Yes   Does Not have A.Fib.  6.Discussed weight management strategies. Assessment & Plan:  Primary / Secondary prevention is the goal by aggressive risk modification, healthy and therapeutic life style changes for cardiovascular risk reduction. Various goals are discussed and multiple questions answered.     CAD:None known  HTN:well controlled on current medical regimen, see list above.              - changes in  treatment:   no   CARDIOMYOPATHY: None known   CONGESTIVE HEART FAILURE: NO KNOWN HISTORY.     VHD: No significant VHD noted  DYSLIPIDEMIA: Patient's profile is at / near [facility] is low                                Tolerating current medical regimen wellyes,                                                               See most recent Lab values in Labs section above. OTHER RELEVANT DIAGNOSIS:as noted in patient's active problem list:  TESTS ORDERED: None this visit                                      All previously ordered tests reviewed.   ARRHYTHMIAS:  Known H/O CHB                                Patient has H/O P. Atrial fibrillation                                He is rate controlled & on anticoagulation.                              MEDICATIONS: CPM   Office f/u in six months. Device check per protocol.

## 2021-09-22 ENCOUNTER — OFFICE VISIT (OUTPATIENT)
Dept: FAMILY MEDICINE CLINIC | Age: 86
End: 2021-09-22
Payer: COMMERCIAL

## 2021-09-22 VITALS
HEART RATE: 81 BPM | WEIGHT: 167 LBS | OXYGEN SATURATION: 96 % | BODY MASS INDEX: 30.73 KG/M2 | HEIGHT: 62 IN | SYSTOLIC BLOOD PRESSURE: 102 MMHG | DIASTOLIC BLOOD PRESSURE: 64 MMHG

## 2021-09-22 DIAGNOSIS — Z23 FLU VACCINE NEED: ICD-10-CM

## 2021-09-22 DIAGNOSIS — E03.9 ACQUIRED HYPOTHYROIDISM: ICD-10-CM

## 2021-09-22 DIAGNOSIS — I48.0 PAF (PAROXYSMAL ATRIAL FIBRILLATION) (HCC): ICD-10-CM

## 2021-09-22 DIAGNOSIS — F33.0 MILD EPISODE OF RECURRENT MAJOR DEPRESSIVE DISORDER (HCC): ICD-10-CM

## 2021-09-22 DIAGNOSIS — R42 ORTHOSTATIC LIGHTHEADEDNESS: Primary | ICD-10-CM

## 2021-09-22 DIAGNOSIS — I10 ESSENTIAL HYPERTENSION: ICD-10-CM

## 2021-09-22 DIAGNOSIS — R41.3 INTERMITTENT MEMORY LOSS: ICD-10-CM

## 2021-09-22 PROCEDURE — 99214 OFFICE O/P EST MOD 30 MIN: CPT | Performed by: FAMILY MEDICINE

## 2021-09-22 PROCEDURE — 90694 VACC AIIV4 NO PRSRV 0.5ML IM: CPT | Performed by: FAMILY MEDICINE

## 2021-09-22 PROCEDURE — G0008 ADMIN INFLUENZA VIRUS VAC: HCPCS | Performed by: FAMILY MEDICINE

## 2021-09-22 RX ORDER — TAMSULOSIN HYDROCHLORIDE 0.4 MG/1
CAPSULE ORAL
Qty: 30 CAPSULE | Refills: 5 | Status: SHIPPED | OUTPATIENT
Start: 2021-09-22

## 2021-09-22 RX ORDER — METOPROLOL SUCCINATE 25 MG/1
TABLET, EXTENDED RELEASE ORAL
Qty: 30 TABLET | Refills: 5 | Status: SHIPPED | OUTPATIENT
Start: 2021-09-22 | End: 2021-09-28 | Stop reason: SDUPTHER

## 2021-09-22 RX ORDER — SUCRALFATE 1 G/1
TABLET ORAL
Qty: 30 TABLET | Refills: 5 | Status: SHIPPED | OUTPATIENT
Start: 2021-09-22 | End: 2021-10-19

## 2021-09-22 RX ORDER — LISINOPRIL AND HYDROCHLOROTHIAZIDE 12.5; 1 MG/1; MG/1
TABLET ORAL
Qty: 30 TABLET | Refills: 5 | Status: CANCELLED | OUTPATIENT
Start: 2021-09-22 | End: 2022-05-25

## 2021-09-22 RX ORDER — LISINOPRIL 10 MG/1
10 TABLET ORAL DAILY
Qty: 30 TABLET | Refills: 5 | Status: SHIPPED | OUTPATIENT
Start: 2021-09-22 | End: 2022-01-24

## 2021-09-22 NOTE — PROGRESS NOTES
Vaccine Information Sheet, \"Influenza - Inactivated\"  given to Nghia Smith, or parent/legal guardian of  Nghia Smith and verbalized understanding. Patient responses:    Have you ever had a reaction to a flu vaccine? No  Do you have any current illness? No  Have you ever had [place] Syndrome? No  Do you have a serious allergy to any of the follow: Neomycin, Polymyxin, Thimerosal, eggs or egg products? No    Flu vaccine given per order. Please see immunization tab. Risks and benefits explained. Current VIS given.

## 2021-09-22 NOTE — PROGRESS NOTES
9/22/2021    Jennifer Padilla    Chief Complaint   Patient presents with   [facility] Check-Up     4 month follow up    Memory Loss     pts memory is getting worse    Fatigue     pt has been sleeping alot and feeling fatigued, no motivation to do anything    Dizziness     pt reports dizziness when he sits up after laying down    Other     pt got flu vaccine in office today       JYOIT Quintero is a 80 y.o. male who presents today with follow-up. Daughter concerned about decreasing memory, decreasing activity and motivation versus physical fatigue. Patient frequently looks to daughter when asked questions. Daughter notes patient is dizzy when getting out of bed or getting up from chair. Patient does not recall this. REVIEW OF SYSTEMS    Constitutional:  Denies fever, chills, weight loss or weakness  Eyes:  no photophobia or discharge  ENT:  no sore throat or ear pain  Cardiovascular:  Denies chest pain, palpitations or swelling  Respiratory:  Denies cough or shortness of breath  GI:  no abdominal pain, nausea, vomiting, or diarrhea  Musculoskeletal:  no back pain  Skin:  No rashes  Neurologic:  no headache, focal weakness, or sensory changes  Endocrine:  no polyuria or polydipsia      PAST MEDICAL HISTORY  Past Medical History:   Diagnosis Date    AAA (abdominal aortic aneurysm) ([facility] [address]     Surgery scheduled for 7/1/2014 with Dr. Enedelia Gordon. 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  The cardiolite study demonstrated normal perfusion in all segments of the myocardium with an intact left ventricular systolic function. The resting sestamibi dose is 10.8, stress is 32.5. EF 66%    History of chest x-ray 3/16/10    The chest is considered nonacute. There is cardiomegaly noted. COPD.  History of Doppler ultrasound 3/25/10    venous doppler- Technically good venous ultrasound study negative for DVT in both lower extremities. Normal compressibility,color flow doppler pattern and spectral doppler pattern demonstrated thoughout.  History of echocardiogram 3/18/10    [place]erline LV dilatation with concentric hypertrophy. Low normal systolic and abnormal diastolic function. Mild mitral and trace tricuspid regurgitation. Mild aortic root and bilateral dilatation.  Holter monitor, abnormal 11/10/10    11/10/2010- 24 HR- Abnormal holter revealing some significant brasycardia's and wenckebach phenomenon. Therefore, clinical  correlation is recommend.  Hx of blood clots     Pacemaker     PAF (paroxysmal atrial fibrillation) (HCC)     Peritonitis ([address]     Personal history of colonic polyps     Poor historian     Skin cancer     face    Ulcerative (chronic) proctosigmoiditis ([address]     Wears glasses        FAMILY HISTORY  Family History   Problem Relation Age of Onset    Stroke Mother         CVA    Heart Disease Mother     Cancer Brother         prostate    Cancer Brother         skin    Cancer Daughter         colon    Substance Abuse Son         Tobacco    No Known Problems Father        SOCIAL HISTORY  Social History     Socioeconomic History    Marital status:       Spouse name: None    Number of children: 3    Years of education: None    Highest education level: None   Occupational History    Occupation: Retired   Tobacco Use    Smoking status: Never Smoker    Smokeless tobacco: Never Used   Vaping Use    Vaping Use: Never used   Substance and Sexual Activity    Alcohol use: No     Alcohol/week: 0.0 standard drinks    Drug use: No    Sexual activity: Yes Partners: Female     Comment:    Other Topics Concern    None   Social History Narrative    None     Social Determinants of Health     Financial Resource Strain: Low Risk     Difficulty of Paying Living Expenses: Not hard at all   Food Insecurity: No Food Insecurity    Worried About Running Out of Food in the Last Year: Never true    [address] in the Last Year: Never true   Transportation Needs:     Lack of Transportation (Medical):      Lack of Transportation (Non-Medical):    Physical Activity:     Days of Exercise per Week:     Minutes of Exercise per Session:    Stress:     Feeling of Stress :    Social Connections:     Frequency of Communication with Friends and Family:     Frequency of Social Gatherings with Friends and Family:     Attends Sabianism Services:     Active Member of Clubs or Organizations:     Attends Club or Organization Meetings:     Marital Status:    Intimate Partner Violence:     Fear of Current or Ex-Partner:     Emotionally Abused:     Physically Abused:     Sexually Abused:         SURGICAL HISTORY  Past Surgical History:   Procedure Laterality Date    ABDOMINAL AORTIC ANEURYSM REPAIR, ENDOVASCULAR  7/1/14    ABDOMINAL EXPLORATION SURGERY  2/4/2013    exp lap, left hemicolectomy, umbilectomy    APPENDECTOMY  2/4/2013    APPENDECTOMY  2/4/2013    COLONOSCOPY  2/4/2013    COLOSTOMY  2/4/2013    CYSTOSCOPY  2/03/2014    TURP    HEMORRHOID SURGERY  2011    HERNIA REPAIR Bilateral [address] hernia    KNEE SURGERY Right 1980s    PACEMAKER INSERTION Right 12/13/2017    BIV PPM Medtronic Percepta Quad CRT-P MRI SureScan Pacemaker    PACEMAKER PLACEMENT Right 02/25/2013    Explanted 12/13/2017    SMALL INTESTINE SURGERY N/A 8/28/2019    EXPLORATORY LAPAROTOMY, SMALL BOWEL RESECTION, LYSIS OF ADHESIONS, NEW COLOSTOMY, AND HERNIA REPAIR WITH XENMATRIX MESH performed by Sean Joseph MD at [address] [facility] Outpatient Medications   Medication Sig Dispense Refill    tamsulosin (FLOMAX) 0.4 MG capsule TAKE 1 CAPSULE BY MOUTH ONCE DAILY 30 capsule 5    metoprolol succinate (TOPROL XL) 25 MG extended release tablet TAKE 1 TABLET BY MOUTH ONCE DAILY 30 tablet 5    sertraline (ZOLOFT) 50 MG tablet TAKE 1 TABLET BY MOUTH ONCE DAILY 90 tablet 0    sucralfate (CARAFATE) 1 GM tablet TAKE ONE (1) TABLET BY MOUTH DAILY 30 tablet 5    lisinopril (PRINIVIL;ZESTRIL) 10 MG tablet Take 1 tablet by mouth daily 30 tablet 5    ELIQUIS 2.5 MG TABS tablet       atorvastatin (LIPITOR) 20 MG tablet TAKE ONE (1) TABLET  BY MOUTH ONCE DAILY 90 tablet 0    levothyroxine (SYNTHROID) 100 MCG tablet TAKE ONE (1) TABLET BY MOUTH ONCE DAILY 90 tablet 0    timolol (TIMOPTIC) 0.5 % ophthalmic solution       latanoprost (XALATAN) 0.005 % ophthalmic solution       Ascorbic Acid (VITAMIN C) 1000 MG tablet Take 1,000 mg by mouth daily      brimonidine-timolol (COMBIGAN) 0.2-0.5 % ophthalmic solution Place 1 drop into both eyes every 12 hours      Glucosamine-MSM-Hyaluronic Acd (JOINT HEALTH PO) Take 1 each by mouth daily        No current facility-administered medications for this visit. ALLERGIES  Allergies   Allergen Reactions    Codeine Anaphylaxis    Fentanyl Other (See Comments)     Hypotension        PHYSICAL EXAM  /64   Pulse 81   Ht 5' 2\" (1.575 m)   Wt 167 lb (75.8 kg)   SpO2 96%   BMI 30.54 kg/m²     ASSESSMENT & PLAN    1. Orthostatic lightheadedness  Stop hydrochlorothiazide   push fluids    2. PAF (paroxysmal atrial fibrillation) ([facility])  Issue controlled. Continue meds. Refilled meds. 3. Essential hypertension  Stop the water pill continue the lisinopril by itself along with Toprol    - metoprolol succinate (TOPROL XL) 25 MG extended release tablet; TAKE 1 TABLET BY MOUTH ONCE DAILY  Dispense: 30 tablet; Refill: 5  - lisinopril (PRINIVIL;ZESTRIL) 10 MG tablet;  Take 1 tablet by mouth daily  Dispense: 30 tablet; Refill: 5  - Comprehensive Metabolic Panel; Future  - CBC Auto Differential; Future    4. Acquired hypothyroidism  Issue is stable check labs today. Adjust medication off of lab results.  - TSH without Reflex; Future    5. Flu vaccine need  - INFLUENZA, QUADV, ADJUVANTED, 65 YRS =, IM, PF, PREFILL SYR, 0.5ML (FLUAD)    6. Intermittent memory loss  Dementia labs patient without urinary symptoms  - Vitamin B12; Future  - Folate; Future    7. Mild episode of recurrent major depressive disorder (HCC)  Issue controlled. Continue meds. Refilled meds. Doubt pseudodementia    Asked daughter to call if lab work-up normal and she wants to start Aricept 5 mg. Discussed risks and benefits of this therapy.        Electronically signed by Tarik Yanez MD on 9/22/2021

## 2021-09-27 ENCOUNTER — PROCEDURE VISIT (OUTPATIENT)
Dept: CARDIOLOGY CLINIC | Age: 86
End: 2021-09-27
Payer: COMMERCIAL

## 2021-09-27 DIAGNOSIS — I49.5 SICK SINUS SYNDROME (HCC): ICD-10-CM

## 2021-09-27 DIAGNOSIS — Z95.0 BIVENTRICULAR CARDIAC PACEMAKER IN SITU: Primary | ICD-10-CM

## 2021-09-28 DIAGNOSIS — I10 ESSENTIAL HYPERTENSION: ICD-10-CM

## 2021-09-28 RX ORDER — APIXABAN 2.5 MG/1
2.5 TABLET, FILM COATED ORAL 2 TIMES DAILY
Qty: 60 TABLET | Refills: 5 | Status: SHIPPED | OUTPATIENT
Start: 2021-09-28 | End: 2021-10-28

## 2021-09-28 RX ORDER — METOPROLOL SUCCINATE 25 MG/1
TABLET, EXTENDED RELEASE ORAL
Qty: 30 TABLET | Refills: 5 | Status: SHIPPED | OUTPATIENT
Start: 2021-09-28 | End: 2022-03-07

## 2021-10-06 RX ORDER — DILTIAZEM HYDROCHLORIDE 120 MG/1
120 CAPSULE, COATED, EXTENDED RELEASE ORAL DAILY
Qty: 90 CAPSULE | Refills: 1 | OUTPATIENT
Start: 2021-10-06 | End: 2021-11-05

## 2021-10-06 NOTE — TELEPHONE ENCOUNTER
This keeps getting denied----this is for a Dispill Patient.     Please advise if patient is not suppose to get this anymore----dilTIAZem (CARDIZEM CD) extended release capsule 120 mg

## 2021-10-19 RX ORDER — SUCRALFATE 1 G/1
TABLET ORAL
Qty: 180 TABLET | Refills: 0 | Status: SHIPPED | OUTPATIENT
Start: 2021-10-19 | End: 2022-01-12

## 2021-10-25 ENCOUNTER — OFFICE VISIT (OUTPATIENT)
Dept: FAMILY MEDICINE CLINIC | Age: 86
End: 2021-10-25
Payer: COMMERCIAL

## 2021-10-25 VITALS
DIASTOLIC BLOOD PRESSURE: 70 MMHG | HEART RATE: 88 BPM | WEIGHT: 166.3 LBS | SYSTOLIC BLOOD PRESSURE: 124 MMHG | OXYGEN SATURATION: 95 % | BODY MASS INDEX: 30.6 KG/M2 | HEIGHT: 62 IN

## 2021-10-25 DIAGNOSIS — S61.213A LACERATION OF LEFT MIDDLE FINGER WITHOUT FOREIGN BODY WITHOUT DAMAGE TO NAIL, INITIAL ENCOUNTER: Primary | ICD-10-CM

## 2021-10-25 PROCEDURE — 99213 OFFICE O/P EST LOW 20 MIN: CPT | Performed by: FAMILY MEDICINE

## 2021-10-25 RX ORDER — CEPHALEXIN 500 MG/1
500 CAPSULE ORAL DAILY
Qty: 7 CAPSULE | Refills: 0 | Status: SHIPPED | OUTPATIENT
Start: 2021-10-25 | End: 2022-01-24

## 2021-10-25 NOTE — PROGRESS NOTES
10/25/2021    Tony Boucher    Chief Complaint   Patient presents with    Finger Injury     left ring finger - fell friday evening in his bedroom and scraped it up. JYOTI Khan is a 80 y.o. male who presents today with follow-up. Patient has a wide laceration most abrasion of skin from the posterior aspect of his right middle finger between the PIP and MCP joints. There is no sign for spreading erythema. There is some dead skin overlying a small area. Daughter states he picks at it. They both feel that he has adequate protection for falls. Or leaves he will not use additional equipment. They declined physical therapy at this time. REVIEW OF SYSTEMS    Musculoskeletal: See above   skin:  No rashes  Neurologic:  no headache, focal weakness, or sensory changes    PAST MEDICAL HISTORY  Past Medical History:   Diagnosis Date    AAA (abdominal aortic aneurysm) [facility])     Surgery scheduled for 7/1/2014 with Dr. Tc Delgadillo.  Arthritis     generalized    Asthma     AV block, 1st degree     Back pain     Borderline hypothyroidism 12/4/2020    Bradycardia     Colon polyps     Colostomy in place [facility])     Glaucoma     H/O cardiovascular stress test 12/27/2013    cardiolite-EF56%, apical hypokinesis is seen, cannot exlude apical Tyler ischemia    History of blood transfusion     History of cardiovascular stress test 3/5/10    No angina or ischemic EKG changes are noted with Lexiscan. The cardiolite study demonstrated normal perfusion in all segments of the myocardium with an intact left ventricular systolic function. The resting sestamibi dose is 10.8, stress is 32.5. EF 66%    History of chest x-ray 3/16/10    The chest is considered nonacute. There is cardiomegaly noted. COPD.  History of Doppler ultrasound 3/25/10    venous doppler- Technically good venous ultrasound study negative for DVT in both lower extremities.  Normal compressibility,color flow doppler pattern and spectral doppler pattern demonstrated thoughout.  History of echocardiogram 3/18/10    Boarderline LV dilatation with concentric hypertrophy. Low normal systolic and abnormal diastolic function. Mild mitral and trace tricuspid regurgitation. Mild aortic root and bilateral dilatation.  Holter monitor, abnormal 11/10/10    11/10/2010- 24 HR- Abnormal holter revealing some significant brasycardia's and wenckebach phenomenon. Therefore, clinical  correlation is recommend.  Hx of blood clots     Pacemaker     PAF (paroxysmal atrial fibrillation) (HCC)     Peritonitis ([address]     Personal history of colonic polyps     Poor historian     Skin cancer     face    Ulcerative (chronic) proctosigmoiditis ([address]     Wears glasses        FAMILY HISTORY  Family History   Problem Relation Age of Onset    Stroke Mother         CVA    Heart Disease Mother     Cancer Brother         prostate    Cancer Brother         skin    Cancer Daughter         colon    Substance Abuse Son         Tobacco    No Known Problems Father        SOCIAL HISTORY  Social History     Socioeconomic History    Marital status:       Spouse name: Not on file    Number of children: 3    Years of education: Not on file    Highest education level: Not on file   Occupational History    Occupation: Retired   Tobacco Use    Smoking status: Never Smoker    Smokeless tobacco: Never Used   Vaping Use    Vaping Use: Never used   Substance and Sexual Activity    Alcohol use: No     Alcohol/week: 0.0 standard drinks    Drug use: No    Sexual activity: Yes     Partners: Female     Comment:    Other Topics Concern    Not on file   Social History Narrative    Not on file     Social Determinants of Health     Financial Resource Strain: Low Risk     Difficulty of Paying Living Expenses: Not hard at all   Food Insecurity: No Food Insecurity    Worried About [address] in the Last Year: Never true    [address] in the Last Year: Never true   Transportation Needs:     Lack of Transportation (Medical):      Lack of Transportation (Non-Medical):    Physical Activity:     Days of Exercise per Week:     Minutes of Exercise per Session:    Stress:     Feeling of Stress :    Social Connections:     Frequency of Communication with Friends and Family:     Frequency of Social Gatherings with Friends and Family:     Attends Protestant Services:     Active Member of Clubs or Organizations:     Attends Club or Organization Meetings:     Marital Status:    Intimate Partner Violence:     Fear of Current or Ex-Partner:     Emotionally Abused:     Physically Abused:     Sexually Abused:         SURGICAL HISTORY  Past Surgical History:   Procedure Laterality Date    ABDOMINAL AORTIC ANEURYSM REPAIR, ENDOVASCULAR  7/1/14    ABDOMINAL EXPLORATION SURGERY  2/4/2013    exp lap, left hemicolectomy, umbilectomy    APPENDECTOMY  2/4/2013    APPENDECTOMY  2/4/2013    COLONOSCOPY  2/4/2013    COLOSTOMY  2/4/2013    CYSTOSCOPY  2/03/2014    TURP    HEMORRHOID SURGERY  2011    HERNIA REPAIR Bilateral [address] hernia    KNEE SURGERY Right 1980s    PACEMAKER INSERTION Right 12/13/2017    BIV PPM Medtronic Percepta Quad CRT-P MRI SureScan Pacemaker    PACEMAKER PLACEMENT Right 02/25/2013    Explanted 12/13/2017    SMALL INTESTINE SURGERY N/A 8/28/2019    EXPLORATORY LAPAROTOMY, SMALL BOWEL RESECTION, LYSIS OF ADHESIONS, NEW COLOSTOMY, AND HERNIA REPAIR WITH XENMATRIX MESH performed by Luz Maria Garcia MD at [address] [place] [address]  Current Outpatient Medications   Medication Sig Dispense Refill    cephALEXin (KEFLEX) 500 MG capsule Take 1 capsule by mouth daily 7 capsule 0    sucralfate (CARAFATE) 1 GM tablet TAKE ONE (1) TABLET BY MOUTH TWO TIMES DAILY 180 tablet 0    ELIQUIS 2.5 MG TABS tablet Take 1 tablet by mouth 2 times daily 60 tablet 5    metoprolol succinate (TOPROL XL) 25 MG extended release tablet TAKE 1 TABLET BY MOUTH ONCE DAILY 30 tablet 5    tamsulosin (FLOMAX) 0.4 MG capsule TAKE 1 CAPSULE BY MOUTH ONCE DAILY 30 capsule 5    sertraline (ZOLOFT) 50 MG tablet TAKE 1 TABLET BY MOUTH ONCE DAILY 90 tablet 0    lisinopril (PRINIVIL;ZESTRIL) 10 MG tablet Take 1 tablet by mouth daily 30 tablet 5    atorvastatin (LIPITOR) 20 MG tablet TAKE ONE (1) TABLET  BY MOUTH ONCE DAILY 90 tablet 0    levothyroxine (SYNTHROID) 100 MCG tablet TAKE ONE (1) TABLET BY MOUTH ONCE DAILY 90 tablet 0    timolol (TIMOPTIC) 0.5 % ophthalmic solution       latanoprost (XALATAN) 0.005 % ophthalmic solution       Ascorbic Acid (VITAMIN C) 1000 MG tablet Take 1,000 mg by mouth daily      brimonidine-timolol (COMBIGAN) 0.2-0.5 % ophthalmic solution Place 1 drop into both eyes every 12 hours      Glucosamine-MSM-Hyaluronic Acd (JOINT HEALTH PO) Take 1 each by mouth daily        No current facility-administered medications for this visit. ALLERGIES  Allergies   Allergen Reactions    Codeine Anaphylaxis    Fentanyl Other (See Comments)     Hypotension        PHYSICAL EXAM  /70 (Site: Right Upper Arm, Position: Sitting, Cuff Size: Medium Adult)   Pulse 88   Ht 5' 2\" (1.575 m)   Wt 166 lb 4.8 oz (75.4 kg)   SpO2 95%   BMI 30.42 kg/m²   Exam under history    ASSESSMENT & PLAN    1. Laceration of left middle finger without foreign body without damage to nail, initial encounter  Cleaned wound with alcohol  No foreign body  Placed Telfa pad and a light wrap. Goal is to keep dry and let a scab form  Use prophylactic antibiotics for a week  Follow-up if not resolving    - cephALEXin (KEFLEX) 500 MG capsule; Take 1 capsule by mouth daily  Dispense: 7 capsule;  Refill: 0             Electronically signed by Birdie Amin MD on 10/25/2021

## 2021-10-26 NOTE — TELEPHONE ENCOUNTER
Believe dtr is asking to resend referral to [facility] dermatology. ok to send ? Pt given preet and jello at this time ok per [id number] Chun Hensley Anali, RN  10/26/21 [id number]

## 2021-11-16 RX ORDER — ATORVASTATIN CALCIUM 20 MG/1
TABLET, FILM COATED ORAL
Qty: 90 TABLET | Refills: 0 | Status: SHIPPED | OUTPATIENT
Start: 2021-11-16 | End: 2022-02-11

## 2021-11-17 ENCOUNTER — OFFICE VISIT (OUTPATIENT)
Dept: FAMILY MEDICINE CLINIC | Age: 86
End: 2021-11-17
Payer: COMMERCIAL

## 2021-11-17 VITALS
BODY MASS INDEX: 27.26 KG/M2 | DIASTOLIC BLOOD PRESSURE: 78 MMHG | WEIGHT: 159.7 LBS | HEART RATE: 75 BPM | HEIGHT: 64 IN | SYSTOLIC BLOOD PRESSURE: 120 MMHG

## 2021-11-17 DIAGNOSIS — R04.0 EPISTAXIS, RECURRENT: Primary | ICD-10-CM

## 2021-11-17 DIAGNOSIS — E03.9 ACQUIRED HYPOTHYROIDISM: ICD-10-CM

## 2021-11-17 DIAGNOSIS — R13.10 PILL DYSPHAGIA: ICD-10-CM

## 2021-11-17 DIAGNOSIS — I10 ESSENTIAL HYPERTENSION: ICD-10-CM

## 2021-11-17 DIAGNOSIS — I48.0 PAF (PAROXYSMAL ATRIAL FIBRILLATION) (HCC): ICD-10-CM

## 2021-11-17 DIAGNOSIS — R04.0 EPISTAXIS, RECURRENT: ICD-10-CM

## 2021-11-17 DIAGNOSIS — L29.9 PRURITUS: ICD-10-CM

## 2021-11-17 LAB
A/G RATIO: 1.1 (ref 1.1–2.2)
ALBUMIN SERPL-MCNC: 3.8 G/DL (ref 3.4–5)
ALP BLD-CCNC: 134 U/L (ref 40–129)
ALT SERPL-CCNC: 9 U/L (ref 10–40)
ANION GAP SERPL CALCULATED.3IONS-SCNC: 17 MMOL/L (ref 3–16)
AST SERPL-CCNC: 23 U/L (ref 15–37)
BASOPHILS ABSOLUTE: 0 K/UL (ref 0–0.2)
BASOPHILS RELATIVE PERCENT: 0.8 %
BILIRUB SERPL-MCNC: 0.6 MG/DL (ref 0–1)
BUN BLDV-MCNC: 33 MG/DL (ref 7–20)
CALCIUM SERPL-MCNC: 9.1 MG/DL (ref 8.3–10.6)
CHLORIDE BLD-SCNC: 105 MMOL/L (ref 99–110)
CO2: 26 MMOL/L (ref 21–32)
CREAT SERPL-MCNC: 1.3 MG/DL (ref 0.8–1.3)
EOSINOPHILS ABSOLUTE: 0.1 K/UL (ref 0–0.6)
EOSINOPHILS RELATIVE PERCENT: 2.6 %
GFR AFRICAN AMERICAN: >60
GFR NON-AFRICAN AMERICAN: 52
GLUCOSE BLD-MCNC: 149 MG/DL (ref 70–99)
HCT VFR BLD CALC: 39 % (ref 40.5–52.5)
HEMOGLOBIN: 13.1 G/DL (ref 13.5–17.5)
LYMPHOCYTES ABSOLUTE: 0.6 K/UL (ref 1–5.1)
LYMPHOCYTES RELATIVE PERCENT: 14.2 %
MCH RBC QN AUTO: 32.8 PG (ref 26–34)
MCHC RBC AUTO-ENTMCNC: 33.6 G/DL (ref 31–36)
MCV RBC AUTO: 97.7 FL (ref 80–100)
MONOCYTES ABSOLUTE: 0.3 K/UL (ref 0–1.3)
MONOCYTES RELATIVE PERCENT: 6.5 %
NEUTROPHILS ABSOLUTE: 3.4 K/UL (ref 1.7–7.7)
NEUTROPHILS RELATIVE PERCENT: 75.9 %
PDW BLD-RTO: 14.4 % (ref 12.4–15.4)
PLATELET # BLD: 203 K/UL (ref 135–450)
PMV BLD AUTO: 8.9 FL (ref 5–10.5)
POTASSIUM SERPL-SCNC: 4.1 MMOL/L (ref 3.5–5.1)
RBC # BLD: 3.99 M/UL (ref 4.2–5.9)
SODIUM BLD-SCNC: 148 MMOL/L (ref 136–145)
TOTAL PROTEIN: 7.3 G/DL (ref 6.4–8.2)
WBC # BLD: 4.5 K/UL (ref 4–11)

## 2021-11-17 PROCEDURE — 99214 OFFICE O/P EST MOD 30 MIN: CPT | Performed by: FAMILY MEDICINE

## 2021-11-17 NOTE — PROGRESS NOTES
11/17/2021    Mario Lala    Chief Complaint   Patient presents with   Corneliodavid Fine Other     choking on meds    Other     nose bleeds few times weekly    Other     picking/itching at arms       HPI    David Gaitan is a 80 y.o. male who presents today with follow-up. Daughter notes pill dysphagia mostly the Carafate. Patient has not had anemia for 6 months. Patient is willing to stop it. Daughter notes epistaxis couple of times a week recurrently. Patient is not using any nasal sprays. His blood pressure is controlled. Daughter again notes the patient recurrently scratching his arms. Patient has bruising but no excoriations. Patient has no rash. REVIEW OF SYSTEMS    Constitutional:  Denies fever, chills, weight loss or weakness  Eyes:  no photophobia or discharge  ENT:  no sore throat or ear pain  Cardiovascular:  Denies chest pain, palpitations or swelling  Respiratory:  Denies cough or shortness of breath  GI:  no abdominal pain, nausea, vomiting, or diarrhea  Musculoskeletal:  no back pain  Skin:  No rashes  Neurologic:  no headache, focal weakness, or sensory changes  Endocrine:  no polyuria or polydipsia      PAST MEDICAL HISTORY  Past Medical History:   Diagnosis Date    AAA [facility]ab[facility]) [facility]     Surgery scheduled for 7/1/2014 with Dr. Mariposa Ruelas.  Arthritis     generalized    Asthma     AV block, 1st degree     Back pain     Borderline hypothyroidism 12/4/2020    Bradycardia     Colon polyps     Colostomy in place [facility])     Glaucoma     H/O cardiovascular stress test 12/27/2013    cardiolite-EF56%, apical hypokinesis is seen, cannot exlude apical Tyler ischemia    History of blood transfusion     History of cardiovascular stress test 3/5/10    No angina or ischemic EKG changes are noted with Lexiscan. The cardiolite study demonstrated normal perfusion in all segments of the myocardium with an intact left ventricular systolic function.  The resting sestamibi dose is 10.8, stress is 32.5. EF 66%    History of chest x-ray 3/16/10    The chest is considered nonacute. There is cardiomegaly noted. COPD.  History of Doppler ultrasound 3/25/10    venous doppler- Technically good venous ultrasound study negative for DVT in both lower extremities. Normal compressibility,color flow doppler pattern and spectral doppler pattern demonstrated thoughout.  History of echocardiogram 3/18/10    Boarderline LV dilatation with concentric hypertrophy. Low normal systolic and abnormal diastolic function. Mild mitral and trace tricuspid regurgitation. Mild aortic root and bilateral dilatation.  Holter monitor, abnormal 11/10/10    11/10/2010- 24 HR- Abnormal holter revealing some significant brasycardia's and wenckebach phenomenon. Therefore, clinical  correlation is recommend.  Hx of blood clots     Pacemaker     PAF (paroxysmal atrial fibrillation) (HCC)     Peritonitis ([address]     Personal history of colonic polyps     Poor historian     Skin cancer     face    Ulcerative (chronic) proctosigmoiditis ([address]     Wears glasses        FAMILY HISTORY  Family History   Problem Relation Age of Onset    Stroke Mother         CVA    Heart Disease Mother     Cancer Brother         prostate    Cancer Brother         skin    Cancer Daughter         colon    Substance Abuse Son         Tobacco    No Known Problems Father        SOCIAL HISTORY  Social History     Socioeconomic History    Marital status:       Spouse name: None    Number of children: 3    Years of education: None    Highest education level: None   Occupational History    Occupation: Retired   Tobacco Use    Smoking status: Never Smoker    Smokeless tobacco: Never Used   Vaping Use    Vaping Use: Never used   Substance and Sexual Activity    Alcohol use: No     Alcohol/week: 0.0 standard drinks    Drug use: No    Sexual activity: Yes     Partners: Female     Comment:    Other Topics Concern    None Social History Narrative    None     Social Determinants of Health     Financial Resource Strain: Low Risk     Difficulty of Paying Living Expenses: Not hard at all   Food Insecurity: No Food Insecurity    Worried About Running Out of Food in the Last Year: Never true    Christopher of Food in the Last Year: Never true   Transportation Needs:     Lack of Transportation (Medical): Not on file    Lack of Transportation (Non-Medical):  Not on file   Physical Activity:     Days of Exercise per Week: Not on file    Minutes of Exercise per Session: Not on file   Stress:     Feeling of Stress : Not on file   Social Connections:     Frequency of Communication with Friends and Family: Not on file    Frequency of Social Gatherings with Friends and Family: Not on file    Attends Orthodoxy Services: Not on file    Active Member of [address] [place] [address] [employer] or Organizations: Not on file    Attends Club or Organization Meetings: Not on file    Marital Status: Not on file   Intimate Partner Violence:     Fear of Current or Ex-Partner: Not on file    Emotionally Abused: Not on file    Physically Abused: Not on file    Sexually Abused: Not on file   Housing Stability:     Unable to Pay for Housing in the Last Year: Not on file    Number of [place] in the Last Year: Not on file    Unstable Housing in the Last Year: Not on file        SURGICAL HISTORY  Past Surgical History:   Procedure Laterality Date    ABDOMINAL AORTIC ANEURYSM REPAIR, ENDOVASCULAR  7/1/14    ABDOMINAL EXPLORATION SURGERY  2/4/2013    exp lap, left hemicolectomy, umbilectomy    APPENDECTOMY  2/4/2013    APPENDECTOMY  2/4/2013    COLONOSCOPY  2/4/2013    COLOSTOMY  2/4/2013    CYSTOSCOPY  2/03/2014    TURP    HEMORRHOID SURGERY  2011    HERNIA REPAIR [address]ilateral [address] hernia    KNEE SURGERY Right 1980s   [address] Right 12/13/2017    BIV PPM Medtronic Percepta Quad CRT-P MRI SureScan Pacemaker    PACEMAKER PLACEMENT Right 02/25/2013    Explanted 12/13/2017    SMALL INTESTINE SURGERY N/A 8/28/2019    EXPLORATORY LAPAROTOMY, SMALL BOWEL RESECTION, LYSIS OF ADHESIONS, NEW COLOSTOMY, AND HERNIA REPAIR WITH XENMATRIX MESH performed by Shahbaz Reyes MD at [address]  Current Outpatient Medications   Medication Sig Dispense Refill    atorvastatin (LIPITOR) 20 MG tablet TAKE ONE (1) TABLET BY MOUTH ONCE DAILY 90 tablet 0    cephALEXin (KEFLEX) 500 MG capsule Take 1 capsule by mouth daily 7 capsule 0    sucralfate (CARAFATE) 1 GM tablet TAKE ONE (1) TABLET BY MOUTH TWO TIMES DAILY 180 tablet 0    metoprolol succinate (TOPROL XL) 25 MG extended release tablet TAKE 1 TABLET BY MOUTH ONCE DAILY 30 tablet 5    tamsulosin (FLOMAX) 0.4 MG capsule TAKE 1 CAPSULE BY MOUTH ONCE DAILY 30 capsule 5    sertraline (ZOLOFT) 50 MG tablet TAKE 1 TABLET BY MOUTH ONCE DAILY 90 tablet 0    lisinopril (PRINIVIL;ZESTRIL) 10 MG tablet Take 1 tablet by mouth daily 30 tablet 5    levothyroxine (SYNTHROID) 100 MCG tablet TAKE ONE (1) TABLET BY MOUTH ONCE DAILY 90 tablet 0    latanoprost (XALATAN) 0.005 % ophthalmic solution       Ascorbic Acid (VITAMIN C) 1000 MG tablet Take 1,000 mg by mouth daily      brimonidine-timolol (COMBIGAN) 0.2-0.5 % ophthalmic solution Place 1 drop into both eyes every 12 hours      Glucosamine-MSM-Hyaluronic Acd (JOINT HEALTH PO) Take 1 each by mouth daily        No current facility-administered medications for this visit. ALLERGIES  Allergies   Allergen Reactions    Codeine Anaphylaxis    Fentanyl Other (See Comments)     Hypotension        PHYSICAL EXAM  /78   Pulse 75   Ht 5' 4\" (1.626 m)   Wt 159 lb 11.2 oz (72.4 kg)   BMI 27.41 kg/m²     ASSESSMENT & PLAN    1. Epistaxis, recurrent  Use saline nasal spray twice daily  Rule out anemia  Rule out thrombocytopenia    - CBC Auto Differential; Future    2. Pill dysphagia  Stop Carafate  No evidence of recent GI bleeding    3. Pruritus  Might be lichen simplex chronicus either way patient recommended for regular nail care. Trim nails weekly  Use a file on them regularly. Rule out liver disease/obstructive jaundice    4. Essential hypertension  Issue controlled. Continue meds. Refilled meds. - Comprehensive Metabolic Panel; Future    5. Acquired hypothyroidism  Reviewed labs  At goal/remain on the same    6. PAF (paroxysmal atrial fibrillation) (HCC)  Issue controlled. Continue meds. Refilled meds.     Follow-up 4 months       Electronically signed by Estrella Manzano MD on 11/17/2021

## 2021-11-19 ENCOUNTER — TELEPHONE (OUTPATIENT)
Dept: FAMILY MEDICINE CLINIC | Age: 86
End: 2021-11-19

## 2021-11-19 NOTE — TELEPHONE ENCOUNTER
----- Message from Letitia Gonzalez sent at 11/19/2021  8:45 AM EST -----  Subject: Message to Provider    QUESTIONS  Information for Provider? pt's daughter calling because she missed a call   from the office. please call pt's daughter back, possibly concerning   results from visit on Wed.   ---------------------------------------------------------------------------  --------------  [address]  What is the best way for the office to contact you? OK to leave message on   voicemail  Preferred Call Back Phone Number?  [phone]  ---------------------------------------------------------------------------  --------------  SCRIPT ANSWERS  undefined

## 2021-12-10 PROCEDURE — 93294 REM INTERROG EVL PM/LDLS PM: CPT | Performed by: INTERNAL MEDICINE

## 2021-12-10 PROCEDURE — 93296 REM INTERROG EVL PM/IDS: CPT | Performed by: INTERNAL MEDICINE

## 2021-12-10 PROCEDURE — 93297 REM INTERROG DEV EVAL ICPMS: CPT | Performed by: INTERNAL MEDICINE

## 2021-12-19 ENCOUNTER — PATIENT MESSAGE (OUTPATIENT)
Dept: FAMILY MEDICINE CLINIC | Age: 86
End: 2021-12-19

## 2021-12-20 RX ORDER — LEVOTHYROXINE SODIUM 0.1 MG/1
TABLET ORAL
Qty: 90 TABLET | Refills: 0 | Status: SHIPPED | OUTPATIENT
Start: 2021-12-20 | End: 2022-02-11

## 2021-12-20 NOTE — TELEPHONE ENCOUNTER
Spoke with dtr Isaiah Patel. Per dr Lily Uribe \"spoke with dtr over the weekend , advised saline nasal spray, robitussin dm increase h2o intake. dtr confirmed conversation. Asked if pt is getting worse? Per dtr \"no but he is not getting any better (sx's for 2 days), pt's o2 resting 93 %, temp 98.7\" advised dtr to continue to monitor if sx worse call office asap or go to er. dtr voiced understanding.

## 2021-12-20 NOTE — TELEPHONE ENCOUNTER
Spoke with dtr. Pt not willing to to clinic or urgent. Care    Sx's nasal congestion, non productive cough, unsure chest congestion, denies sob, unsure temp.  Sx's 2 days    [employer]

## 2022-01-04 ENCOUNTER — PROCEDURE VISIT (OUTPATIENT)
Dept: CARDIOLOGY CLINIC | Age: 87
End: 2022-01-04
Payer: COMMERCIAL

## 2022-01-04 DIAGNOSIS — I49.5 SICK SINUS SYNDROME (HCC): ICD-10-CM

## 2022-01-04 DIAGNOSIS — Z95.0 BIVENTRICULAR CARDIAC PACEMAKER IN SITU: Primary | ICD-10-CM

## 2022-01-06 ENCOUNTER — NURSE TRIAGE (OUTPATIENT)
Dept: OTHER | Facility: CLINIC | Age: 87
End: 2022-01-06

## 2022-01-06 PROCEDURE — 93294 REM INTERROG EVL PM/LDLS PM: CPT | Performed by: NURSE PRACTITIONER

## 2022-01-06 PROCEDURE — 93296 REM INTERROG EVL PM/IDS: CPT | Performed by: NURSE PRACTITIONER

## 2022-01-06 PROCEDURE — 93297 REM INTERROG DEV EVAL ICPMS: CPT | Performed by: NURSE PRACTITIONER

## 2022-01-06 NOTE — TELEPHONE ENCOUNTER
Received call from  at [facility] with Red Flag Complaint. Daughter returning call from  that nurse triage left. Subjective: Caller states \"sleeping all the time\"  Patient states \"i'm just getting old\"     Current Symptoms: wants to sleep all day. Denies illness. Has colostomy-  Doing ok. He tells daughter Neeru Barton just doesn't feel well\" Denies depression    Onset: 7 days ago; gradual    Associated Symptoms: reduced appetite    Pain Severity: 0/10; N/A; none    Temperature: denies n/a    What has been tried: offering to take him places      Recommended disposition: see within 24 hours    Care advice provided, patient verbalizes understanding; denies any other questions or concerns; instructed to call back for any new or worsening symptoms. Writer provided warm transfer to brenna at [facility] for appointment scheduling     Attention Provider: Thank you for allowing me to participate in the care of your patient. The patient was connected to triage in response to information provided to the ECC/PSC. Please do not respond through this encounter as the response is not directed to a shared pool.            Reason for Disposition   [1] MODERATE weakness (i.e., interferes with work, school, normal activities) AND [2] persists > 3 days    Protocols used: WEAKNESS (GENERALIZED) AND FATIGUE-ADULT-AH

## 2022-01-07 NOTE — TELEPHONE ENCOUNTER
Please call daughter and see if Melba Eisenberg is doing any better today. If not. Please ask what symptoms he is having.

## 2022-01-12 RX ORDER — SUCRALFATE 1 G/1
TABLET ORAL
Qty: 180 TABLET | Refills: 0 | Status: SHIPPED | OUTPATIENT
Start: 2022-01-12 | End: 2022-01-13 | Stop reason: ALTCHOICE

## 2022-01-13 ENCOUNTER — OFFICE VISIT (OUTPATIENT)
Dept: FAMILY MEDICINE CLINIC | Age: 87
End: 2022-01-13
Payer: COMMERCIAL

## 2022-01-13 VITALS
OXYGEN SATURATION: 96 % | DIASTOLIC BLOOD PRESSURE: 62 MMHG | WEIGHT: 161.9 LBS | HEART RATE: 78 BPM | SYSTOLIC BLOOD PRESSURE: 96 MMHG | HEIGHT: 64 IN | BODY MASS INDEX: 27.64 KG/M2

## 2022-01-13 DIAGNOSIS — F03.90 DEMENTIA WITHOUT BEHAVIORAL DISTURBANCE, UNSPECIFIED DEMENTIA TYPE: Primary | ICD-10-CM

## 2022-01-13 DIAGNOSIS — R42 DIZZINESS: ICD-10-CM

## 2022-01-13 PROCEDURE — 99214 OFFICE O/P EST MOD 30 MIN: CPT | Performed by: PHYSICIAN ASSISTANT

## 2022-01-13 RX ORDER — SERTRALINE HYDROCHLORIDE 100 MG/1
TABLET, FILM COATED ORAL
Qty: 30 TABLET | Refills: 2 | Status: SHIPPED | OUTPATIENT
Start: 2022-01-13 | End: 2022-04-05

## 2022-01-13 RX ORDER — APIXABAN 2.5 MG/1
TABLET, FILM COATED ORAL
COMMUNITY
Start: 2021-12-13 | End: 2022-04-05

## 2022-01-13 RX ORDER — FINASTERIDE 5 MG/1
TABLET, FILM COATED ORAL
COMMUNITY
Start: 2021-11-30

## 2022-01-13 RX ORDER — LISINOPRIL AND HYDROCHLOROTHIAZIDE 12.5; 1 MG/1; MG/1
0.5 TABLET ORAL DAILY
COMMUNITY
Start: 2021-11-15 | End: 2022-02-24 | Stop reason: ALTCHOICE

## 2022-01-13 RX ORDER — DONEPEZIL HYDROCHLORIDE 5 MG/1
5 TABLET, FILM COATED ORAL NIGHTLY
Qty: 30 TABLET | Refills: 3 | Status: SHIPPED | OUTPATIENT
Start: 2022-01-13 | End: 2022-01-24 | Stop reason: SDUPTHER

## 2022-01-13 NOTE — PROGRESS NOTES
1/13/2022    Thalia Vizcaino    Chief Complaint   Patient presents with    Fatigue     going on for about a couple of weeks, dizziness, only eating about 1 time a day. HPI  History was obtained from pt and daughter  Gianfranco Rashid is a 80 y.o. male with a PMHx as listed below who presents today to evaluate fatigue. This has been a chronic issue for the patient but daughter says it is getting worse. He has been sleeping late into the afternoon most days, he wakes up to eat one meal then goes back to bed by 8pm. Pt takes a while to get going and sometimes gets dizzy when he gets up from bed or from seated. Pt will have half a glass of water and half of a doctor pepper. There is a lot of disagreement between the daughter and pt of how he is doing. She feels like he is deteriorating and too stubborn to talk to us about it but he says he is doing fine. MMSE-24 of 30     1. Dementia without behavioral disturbance, unspecified dementia type ([facility]           REVIEW OF SYMPTOMS    Review of Systems    PAST MEDICAL HISTORY  Past Medical History:   Diagnosis Date    AAA (abdominal aortic aneurysm) [facility])     Surgery scheduled for 7/1/2014 with Dr. Joe Sheets.  Arthritis     generalized    Asthma     AV block, 1st degree     Back pain     Borderline hypothyroidism 12/4/2020    Bradycardia     Colon polyps     Colostomy in place [facility])     Glaucoma     H/O cardiovascular stress test 12/27/2013    cardiolite-EF56%, apical hypokinesis is seen, cannot exlude apical Tyler ischemia    History of blood transfusion     History of cardiovascular stress test 3/5/10    No angina or ischemic EKG changes are noted with Lexiscan. The cardiolite study demonstrated normal perfusion in all segments of the myocardium with an intact left ventricular systolic function. The resting sestamibi dose is 10.8, stress is 32.5. EF 66%    History of chest x-ray 3/16/10    The chest is considered nonacute. There is cardiomegaly noted. COPD.  History of Doppler ultrasound 3/25/10    venous doppler- Technically good venous ultrasound study negative for DVT in both lower extremities. Normal compressibility,color flow doppler pattern and spectral doppler pattern demonstrated thoughout.  History of echocardiogram 3/18/10    Boarderline LV dilatation with concentric hypertrophy. Low normal systolic and abnormal diastolic function. Mild mitral and trace tricuspid regurgitation. Mild aortic root and bilateral dilatation.  Holter monitor, abnormal 11/10/10    11/10/2010- 24 HR- Abnormal holter revealing some significant brasycardia's and wenckebach phenomenon. Therefore, clinical  correlation is recommend.  Hx of blood clots     Pacemaker     PAF (paroxysmal atrial fibrillation) (HCC)     Peritonitis ([address]     Personal history of colonic polyps     Poor historian     Skin cancer     face    Ulcerative (chronic) proctosigmoiditis ([address]     Wears glasses        FAMILY HISTORY  Family History   Problem Relation Age of Onset    Stroke Mother         CVA    Heart Disease Mother     Cancer Brother         prostate    Cancer Brother         skin    Cancer Daughter         colon    Substance Abuse Son         Tobacco    No Known Problems Father        SOCIAL HISTORY  Social History     Socioeconomic History    Marital status:       Spouse name: Not on file    Number of children: 3    Years of education: Not on file    Highest education level: Not on file   Occupational History    Occupation: Retired   Tobacco Use    Smoking status: Never Smoker    Smokeless tobacco: Never Used   Vaping Use    Vaping Use: Never used   Substance and Sexual Activity    Alcohol use: No     Alcohol/week: 0.0 standard drinks    Drug use: No    Sexual activity: Yes     Partners: Female     Comment:    Other Topics Concern    Not on file   Social History Narrative    Not on file     Social Determinants of Health     Financial Resource Strain: Low Risk     Difficulty of Paying Living Expenses: Not hard at all   Food Insecurity: No Food Insecurity    Worried About Running Out of Food in the Last Year: Never true    Christopher of Food in the Last Year: Never true   Transportation Needs:     Lack of Transportation (Medical): Not on file    Lack of Transportation (Non-Medical):  Not on file   Physical Activity:     Days of Exercise per Week: Not on file    Minutes of Exercise per Session: Not on file   Stress:     Feeling of Stress : Not on file   Social Connections:     Frequency of Communication with Friends and Family: Not on file    Frequency of Social Gatherings with Friends and Family: Not on file    Attends [employer] Services: Not on file    Active Member of [address] [place] [address] [employer] or Organizations: Not on file    Attends Club or Organization Meetings: Not on file    Marital Status: Not on file   Intimate Partner Violence:     Fear of Current or Ex-Partner: Not on file    Emotionally Abused: Not on file    Physically Abused: Not on file    Sexually Abused: Not on file   Housing Stability:     Unable to Pay for Housing in the Last Year: Not on file    Number of [place] in the Last Year: Not on file    Unstable Housing in the Last Year: Not on file        SURGICAL HISTORY  Past Surgical History:   Procedure Laterality Date    ABDOMINAL AORTIC ANEURYSM REPAIR, ENDOVASCULAR  7/1/14    ABDOMINAL EXPLORATION SURGERY  2/4/2013    exp lap, left hemicolectomy, umbilectomy    APPENDECTOMY  2/4/2013    APPENDECTOMY  2/4/2013    COLONOSCOPY  2/4/2013    COLOSTOMY  2/4/2013    CYSTOSCOPY  2/03/2014    TURP    HEMORRHOID SURGERY  2011    HERNIA REPAIR Bilateral [address] hernia    KNEE SURGERY Right 1980s   [address] Right 12/13/2017    BIV PPM Medtronic Percepta Quad CRT-P MRI SureScan Pacemaker    PACEMAKER PLACEMENT Right 02/25/2013    Explanted 12/13/2017    SMALL INTESTINE SURGERY N/A 8/28/2019    EXPLORATORY LAPAROTOMY, SMALL BOWEL RESECTION, LYSIS OF ADHESIONS, NEW COLOSTOMY, AND HERNIA REPAIR WITH XENMATRIX MESH performed by Sona Pool MD at [facility]  Current Outpatient Medications   Medication Sig Dispense Refill    ELIQUIS 2.5 MG TABS tablet       finasteride (PROSCAR) 5 MG tablet       lisinopril-hydroCHLOROthiazide (PRINZIDE;ZESTORETIC) 10-12.5 MG per tablet       donepezil (ARICEPT) 5 MG tablet Take 1 tablet by mouth nightly 30 tablet 3    sertraline (ZOLOFT) 100 MG tablet TAKE 1 TABLET BY MOUTH ONCE DAILY 30 tablet 2    levothyroxine (SYNTHROID) 100 MCG tablet TAKE ONE (1) TABLET BY MOUTH ONCE DAILY 90 tablet 0    atorvastatin (LIPITOR) 20 MG tablet TAKE ONE (1) TABLET BY MOUTH ONCE DAILY 90 tablet 0    cephALEXin (KEFLEX) 500 MG capsule Take 1 capsule by mouth daily 7 capsule 0    metoprolol succinate (TOPROL XL) 25 MG extended release tablet TAKE 1 TABLET BY MOUTH ONCE DAILY 30 tablet 5    tamsulosin (FLOMAX) 0.4 MG capsule TAKE 1 CAPSULE BY MOUTH ONCE DAILY 30 capsule 5    lisinopril (PRINIVIL;ZESTRIL) 10 MG tablet Take 1 tablet by mouth daily 30 tablet 5    latanoprost (XALATAN) 0.005 % ophthalmic solution       Ascorbic Acid (VITAMIN C) 1000 MG tablet Take 1,000 mg by mouth daily      brimonidine-timolol (COMBIGAN) 0.2-0.5 % ophthalmic solution Place 1 drop into both eyes every 12 hours      Glucosamine-MSM-Hyaluronic Acd (JOINT HEALTH PO) Take 1 each by mouth daily        No current facility-administered medications for this visit. ALLERGIES  Allergies   Allergen Reactions    Codeine Anaphylaxis    Fentanyl Other (See Comments)     Hypotension        PHYSICAL EXAM    BP 96/62 (Site: Right Upper Arm, Position: Standing)   Pulse 78   Ht 5' 4\" (1.626 m)   Wt 161 lb 14.4 oz (73.4 kg)   SpO2 96%   BMI 27.79 kg/m²     Physical Exam  Constitutional:       Appearance: Normal appearance. He is normal weight.    HENT:      Head: Normocephalic and atraumatic. Right Ear: Tympanic membrane and external ear normal.      Left Ear: Tympanic membrane and external ear normal.      Nose: No rhinorrhea. Mouth/Throat:      Mouth: Mucous membranes are moist.      Pharynx: Oropharynx is clear. No oropharyngeal exudate or posterior oropharyngeal erythema. Eyes:      General: No scleral icterus. Extraocular Movements: Extraocular movements intact. Conjunctiva/sclera: Conjunctivae normal.      Pupils: Pupils are equal, round, and reactive to light. Cardiovascular:      Rate and Rhythm: Normal rate and regular rhythm. Pulses: Normal pulses. Heart sounds: Normal heart sounds. No murmur heard. No friction rub. No gallop. Pulmonary:      Effort: Pulmonary effort is normal.      Breath sounds: Normal breath sounds. No wheezing, rhonchi or rales. Abdominal:      General: Bowel sounds are normal. There is no distension. Palpations: Abdomen is soft. There is no mass. Tenderness: There is no abdominal tenderness. There is no right CVA tenderness, left CVA tenderness, guarding or rebound. Musculoskeletal:         General: No deformity. Normal range of motion. Cervical back: Normal range of motion and neck supple. No rigidity. No muscular tenderness. Right lower leg: No edema. Left lower leg: No edema. Skin:     General: Skin is warm and dry. Capillary Refill: Capillary refill takes less than 2 seconds. Findings: No bruising, erythema or rash. Neurological:      General: No focal deficit present. Mental Status: He is alert and oriented to person, place, and time. Coordination: Coordination normal.      Gait: Gait normal.   Psychiatric:         Mood and Affect: Mood normal.         Behavior: Behavior normal.         ASSESSMENT & PLAN    1.  Dementia without behavioral disturbance, unspecified dementia type (HCC)  Mild dementia, likely vascular, gradual onset  Short term memory issues  - donepezil (ARICEPT) 5 MG tablet; Take 1 tablet by mouth nightly  Dispense: 30 tablet; Refill: 3  - sertraline (ZOLOFT) 100 MG tablet; TAKE 1 TABLET BY MOUTH ONCE DAILY  Dispense: 30 tablet; Refill: 2    2. Dizziness  Likely due to decreased fluid and food intake, patient has not lost a ton of weight is likely not drinking enough water because he sleeps so much. Needs to continue to take care when getting up and needs to increase his intake of food and fluids    Return in about 6 weeks (around 2/24/2022). Electronically signed by SVEN Mcgowan on 1/13/2022      Comment: Please note this report has been produced using speech recognition software and may contain errors related to that system including errors in grammar, punctuation, and spelling, as well as words and phrases that may be inappropriate. If there are any questions or concerns please feel free to contact the dictating provider for clarification.

## 2022-01-14 ENCOUNTER — TELEPHONE (OUTPATIENT)
Dept: FAMILY MEDICINE CLINIC | Age: 87
End: 2022-01-14

## 2022-01-24 ENCOUNTER — TELEMEDICINE (OUTPATIENT)
Dept: FAMILY MEDICINE CLINIC | Age: 87
End: 2022-01-24
Payer: COMMERCIAL

## 2022-01-24 DIAGNOSIS — F33.0 MILD EPISODE OF RECURRENT MAJOR DEPRESSIVE DISORDER (HCC): ICD-10-CM

## 2022-01-24 DIAGNOSIS — I50.22 CHRONIC SYSTOLIC HEART FAILURE (HCC): ICD-10-CM

## 2022-01-24 DIAGNOSIS — I10 ESSENTIAL HYPERTENSION: ICD-10-CM

## 2022-01-24 DIAGNOSIS — I73.9 PVD (PERIPHERAL VASCULAR DISEASE) (HCC): ICD-10-CM

## 2022-01-24 DIAGNOSIS — F03.90 DEMENTIA WITHOUT BEHAVIORAL DISTURBANCE, UNSPECIFIED DEMENTIA TYPE: Primary | ICD-10-CM

## 2022-01-24 PROBLEM — K94.19 INTESTINAL STOMA PROLAPSE (HCC): Status: RESOLVED | Noted: 2021-05-26 | Resolved: 2022-01-24

## 2022-01-24 PROCEDURE — 99213 OFFICE O/P EST LOW 20 MIN: CPT | Performed by: FAMILY MEDICINE

## 2022-01-24 RX ORDER — DONEPEZIL HYDROCHLORIDE 10 MG/1
10 TABLET, FILM COATED ORAL NIGHTLY
Qty: 30 TABLET | Refills: 5 | Status: SHIPPED | OUTPATIENT
Start: 2022-01-24 | End: 2022-07-05

## 2022-01-24 NOTE — PROGRESS NOTES
2022    TELEHEALTH EVALUATION -- Audio/Visual (During WSDSR-93 public health emergency) via [address] at [place] with daughter Diane Bingham    HPI:    Jennifer Germain (:  3/23/1930) has requested an audio/video evaluation for the following concern(s):    Patient feels he is doing fine. Diane Bingham notes his challenges. Patient short on memory. He is not cleaning and bathing well. He sleeps quite a bit. The symptoms have been going on for a long time. Difficult to get patient to change his ways. Patient been started on Aricept 5 mg. They think his weight is the same. He looks similar. REVIEW OF SYSTEMS    Constitutional:  Denies fever, chills, weight loss or weakness  Eyes:  no photophobia or discharge  ENT:  no sore throat or ear pain  Cardiovascular:  Denies chest pain, palpitations or swelling  Respiratory:  Denies cough or shortness of breath  GI:  no abdominal pain, nausea, vomiting, or diarrhea  Musculoskeletal:  no back pain  Skin:  No rashes  Neurologic:  no headache, focal weakness, or sensory changes  Endocrine:  no polyuria or polydipsia        Prior to Visit Medications    Medication Sig Taking?  Authorizing Provider   donepezil (ARICEPT) 10 MG tablet Take 1 tablet by mouth nightly Yes Flo Calderon MD   ELIQUIS 2.5 MG TABS tablet  Yes Historical Provider, MD   finasteride (PROSCAR) 5 MG tablet  Yes Historical Provider, MD   lisinopril-hydroCHLOROthiazide (PRINZIDE;ZESTORETIC) 10-12.5 MG per tablet Take 0.5 tablets by mouth daily  Yes Historical Provider, MD   sertraline (ZOLOFT) 100 MG tablet TAKE 1 TABLET BY MOUTH ONCE DAILY Yes SVEN Aldridge   levothyroxine (SYNTHROID) 100 MCG tablet TAKE ONE (1) TABLET BY MOUTH ONCE DAILY Yes Flo Calderon MD   atorvastatin (LIPITOR) 20 MG tablet TAKE ONE (1) TABLET BY MOUTH ONCE DAILY Yes Flo Calderon MD   metoprolol succinate (TOPROL XL) 25 MG extended release tablet TAKE 1 TABLET BY MOUTH ONCE DAILY Yes Jose Miguel Heather Palmer MD   tamsulosin (FLOMAX) 0.4 MG capsule TAKE 1 CAPSULE BY MOUTH ONCE DAILY Yes Michelle Diane MD   latanoprost (XALATAN) 0.005 % ophthalmic solution  Yes Historical Provider, MD   Ascorbic Acid (VITAMIN C) 1000 MG tablet Take 1,000 mg by mouth daily Yes Historical Provider, MD   brimonidine-timolol (COMBIGAN) 0.2-0.5 % ophthalmic solution Place 1 drop into both eyes every 12 hours Yes Historical Provider, MD   Glucosamine-MSM-Hyaluronic Acd ([facility]) Take 1 each by mouth daily  Yes Historical Provider, MD       Social History     Tobacco Use    Smoking status: Never Smoker    Smokeless tobacco: Never Used   Vaping Use    Vaping Use: Never used   Substance Use Topics    Alcohol use: No     Alcohol/week: 0.0 standard drinks    Drug use: No          PHYSICAL EXAM  There were no vitals taken for this visit. ASSESSMENT/PLAN:  1. Dementia without behavioral disturbance, unspecified dementia type (HCC)  Double Aricept from 5-10. Watch weight to make sure he is not losing    - donepezil (ARICEPT) 10 MG tablet; Take 1 tablet by mouth nightly  Dispense: 30 tablet; Refill: 5    2. Essential hypertension  Issue controlled. Continue meds. Refilled meds. 3. Mild episode of recurrent major depressive disorder ([address]  Appears to be stable and at baseline on medicines    4. Chronic systolic heart failure (HCC)  Issue controlled. Continue meds. Refilled meds. 5. PVD (peripheral vascular disease) ([address]  Issue controlled. Continue meds. Refilled meds. Return in about 4 months (around 5/24/2022). Eward Severance is a 80 y.o. male being evaluated by a Virtual Visit (video visit) encounter to address concerns as mentioned above. A caregiver was present when appropriate.  Due to this being a TeleHealth encounter (During TKFNT-76 public health emergency), evaluation of the following organ systems was limited: Vitals/Constitutional/EENT/Resp/CV/GI//MS/Neuro/Skin/Heme-Lymph-Imm. Pursuant to the emergency declaration under the [address] [place] [address] and the [employer] Act, this Virtual Visit was conducted with patient's (and/or legal guardian's) consent, to reduce the patient's risk of exposure to COVID-19 and provide necessary medical care. The patient (and/or legal guardian) has also been advised to contact this office for worsening conditions or problems, and seek emergency medical treatment and/or call 911 if deemed necessary. Patient identification was verified at the start of the visit: Yes    Total time spent on this encounter: Not billed by time    Services were provided through a video synchronous discussion virtually to substitute for in-person clinic visit. Patient and provider were located at their individual homes. --Ge Morejon MD on 1/24/2022 at 6:21 PM    An electronic signature was used to authenticate this note.

## 2022-02-11 RX ORDER — LEVOTHYROXINE SODIUM 0.1 MG/1
TABLET ORAL
Qty: 90 TABLET | Refills: 0 | Status: SHIPPED | OUTPATIENT
Start: 2022-02-11 | End: 2022-05-04

## 2022-02-11 RX ORDER — ATORVASTATIN CALCIUM 20 MG/1
TABLET, FILM COATED ORAL
Qty: 90 TABLET | Refills: 0 | Status: SHIPPED | OUTPATIENT
Start: 2022-02-11 | End: 2022-05-04

## 2022-02-24 ENCOUNTER — OFFICE VISIT (OUTPATIENT)
Dept: FAMILY MEDICINE CLINIC | Age: 87
End: 2022-02-24
Payer: COMMERCIAL

## 2022-02-24 VITALS
HEIGHT: 64 IN | SYSTOLIC BLOOD PRESSURE: 108 MMHG | WEIGHT: 161 LBS | DIASTOLIC BLOOD PRESSURE: 64 MMHG | HEART RATE: 80 BPM | OXYGEN SATURATION: 98 % | BODY MASS INDEX: 27.49 KG/M2

## 2022-02-24 DIAGNOSIS — R73.9 HYPERGLYCEMIA: ICD-10-CM

## 2022-02-24 DIAGNOSIS — E03.9 ACQUIRED HYPOTHYROIDISM: ICD-10-CM

## 2022-02-24 DIAGNOSIS — I10 ESSENTIAL HYPERTENSION: Primary | ICD-10-CM

## 2022-02-24 DIAGNOSIS — F33.0 MILD EPISODE OF RECURRENT MAJOR DEPRESSIVE DISORDER (HCC): ICD-10-CM

## 2022-02-24 DIAGNOSIS — I10 ESSENTIAL HYPERTENSION: ICD-10-CM

## 2022-02-24 DIAGNOSIS — I48.0 PAF (PAROXYSMAL ATRIAL FIBRILLATION) (HCC): ICD-10-CM

## 2022-02-24 LAB
BASOPHILS ABSOLUTE: 0 K/UL (ref 0–0.2)
BASOPHILS RELATIVE PERCENT: 0.9 %
EOSINOPHILS ABSOLUTE: 0.1 K/UL (ref 0–0.6)
EOSINOPHILS RELATIVE PERCENT: 2.3 %
HCT VFR BLD CALC: 39.5 % (ref 40.5–52.5)
HEMOGLOBIN: 13.3 G/DL (ref 13.5–17.5)
LYMPHOCYTES ABSOLUTE: 0.7 K/UL (ref 1–5.1)
LYMPHOCYTES RELATIVE PERCENT: 12.5 %
MCH RBC QN AUTO: 33.1 PG (ref 26–34)
MCHC RBC AUTO-ENTMCNC: 33.6 G/DL (ref 31–36)
MCV RBC AUTO: 98.6 FL (ref 80–100)
MONOCYTES ABSOLUTE: 0.3 K/UL (ref 0–1.3)
MONOCYTES RELATIVE PERCENT: 6.3 %
NEUTROPHILS ABSOLUTE: 4.3 K/UL (ref 1.7–7.7)
NEUTROPHILS RELATIVE PERCENT: 78 %
PDW BLD-RTO: 14.2 % (ref 12.4–15.4)
PLATELET # BLD: 197 K/UL (ref 135–450)
PMV BLD AUTO: 8.7 FL (ref 5–10.5)
RBC # BLD: 4.01 M/UL (ref 4.2–5.9)
TSH REFLEX FT4: 0.33 UIU/ML (ref 0.27–4.2)
WBC # BLD: 5.5 K/UL (ref 4–11)

## 2022-02-24 PROCEDURE — 99213 OFFICE O/P EST LOW 20 MIN: CPT | Performed by: FAMILY MEDICINE

## 2022-02-24 RX ORDER — LISINOPRIL 10 MG/1
10 TABLET ORAL DAILY
Qty: 30 TABLET | Refills: 5 | Status: SHIPPED | OUTPATIENT
Start: 2022-02-24 | End: 2022-07-12

## 2022-02-25 ENCOUNTER — TELEPHONE (OUTPATIENT)
Dept: FAMILY MEDICINE CLINIC | Age: 87
End: 2022-02-25

## 2022-02-25 LAB
ESTIMATED AVERAGE GLUCOSE: 116.9 MG/DL
HBA1C MFR BLD: 5.7 %

## 2022-02-25 RX ORDER — BUPROPION HYDROCHLORIDE 150 MG/1
150 TABLET ORAL EVERY MORNING
Qty: 30 TABLET | Refills: 5 | Status: SHIPPED | OUTPATIENT
Start: 2022-02-25 | End: 2022-07-12

## 2022-02-25 NOTE — TELEPHONE ENCOUNTER
Was try adding Wellbutrin to his Zoloft. Please add Wellbutrin  mg 1 daily #30 refill 5.   The goal would be to add some motivation and not help get him up out of bed

## 2022-02-25 NOTE — TELEPHONE ENCOUNTER
Dr Luther Richards,  Pt's dtr Erika Rasmussen wanted to know if pt's sertraline could be increased or change in medication to teat depression?

## 2022-02-25 NOTE — TELEPHONE ENCOUNTER
Requested Prescriptions     Signed Prescriptions Disp Refills    buPROPion (WELLBUTRIN XL) 150 MG extended release tablet 30 tablet 5     Sig: Take 1 tablet by mouth every morning     Authorizing Provider: [facility]     Ordering User: Aleksander woods

## 2022-02-26 NOTE — PROGRESS NOTES
2/26/2022    Marlene Hernandez    Chief Complaint   Patient presents with   [facility] Other     6 week f/u    Other     sleeping most of the day and night    Incontinence     urinary    Depression     dtr Raymonde Curling believes depression is getting worse       HPI    Lonni Hatchet is a 80 y.o. male who presents today with follow-up. Patient and daughter are without complaint today. REVIEW OF SYSTEMS    Constitutional:  Denies fever, chills, weight loss or weakness  Eyes:  no photophobia or discharge  ENT:  no sore throat or ear pain  Cardiovascular:  Denies chest pain, palpitations or swelling  Respiratory:  Denies cough or shortness of breath  GI:  no abdominal pain, nausea, vomiting, or diarrhea  Musculoskeletal:  no back pain  Skin:  No rashes  Neurologic:  no headache, focal weakness, or sensory changes  Endocrine:  no polyuria or polydipsia      PAST MEDICAL HISTORY  Past Medical History:   Diagnosis Date    AAA [facility]abdominal aortic aneurysm) [facility]     Surgery scheduled for 7/1/2014 with Dr. Nidia Braxton.  Arthritis     generalized    Asthma     AV block, 1st degree     Back pain     Borderline hypothyroidism 12/4/2020    Bradycardia     Colon polyps     Colostomy in place [facility])     Glaucoma     H/O cardiovascular stress test 12/27/2013    cardiolite-EF56%, apical hypokinesis is seen, cannot exlude apical Tyler ischemia    History of blood transfusion     History of cardiovascular stress test 3/5/10    No angina or ischemic EKG changes are noted with Lexiscan. The cardiolite study demonstrated normal perfusion in all segments of the myocardium with an intact left ventricular systolic function. The resting sestamibi dose is 10.8, stress is 32.5. EF 66%    History of chest x-ray 3/16/10    The chest is considered nonacute. There is cardiomegaly noted. COPD.  History of Doppler ultrasound 3/25/10    venous doppler- Technically good venous ultrasound study negative for DVT in both lower extremities.  Normal of Food in the Last Year: Never true   Transportation Needs:     Lack of Transportation (Medical): Not on file    Lack of Transportation (Non-Medical):  Not on file   Physical Activity:     Days of Exercise per Week: Not on file    Minutes of Exercise per Session: Not on file   Stress:     Feeling of Stress : Not on file   Social Connections:     Frequency of Communication with Friends and Family: Not on file    Frequency of Social Gatherings with Friends and Family: Not on file    Attends Ep[employer]op[employer] Services: Not on file    Active Member of Clubs or Organizations: Not on file    Attends Club or Organization Meetings: Not on file    Marital Status: Not on file   Intimate Partner Violence:     Fear of Current or Ex-Partner: Not on file    Emotionally Abused: Not on file    Physically Abused: Not on file    Sexually Abused: Not on file   Housing Stability:     Unable to Pay for Housing in the Last Year: Not on file    Number of [place] in the Last Year: Not on file    Unstable Housing in the Last Year: Not on file        SURGICAL HISTORY  Past Surgical History:   Procedure Laterality Date    ABDOMINAL AORTIC ANEURYSM REPAIR, ENDOVASCULAR  7/1/14    ABDOMINAL EXPLORATION SURGERY  2/4/2013    exp lap, left hemicolectomy, umbilectomy    APPENDECTOMY  2/4/2013    APPENDECTOMY  2/4/2013    COLONOSCOPY  2/4/2013    COLOSTOMY  2/4/2013    CYSTOSCOPY  2/03/2014    TURP    HEMORRHOID SURGERY  2011   [address] Bilateral [address] hernia    KNEE SURGERY Right 1980s   [address] Right 12/13/2017    BIV PPM Medtronic Percepta Quad CRT-P MRI SureScan Pacemaker   [facility] Right 02/25/2013    Explanted 12/13/2017    SMALL INTESTINE SURGERY N/A 8/28/2019    EXPLORATORY LAPAROTOMY, SMALL BOWEL RESECTION, LYSIS OF ADHESIONS, NEW COLOSTOMY, AND HERNIA REPAIR WITH XENMATRIX MESH performed by Chris Coyne MD at [address] [place] [address]  Current Outpatient Medications   Medication Sig Dispense Refill    lisinopril (PRINIVIL;ZESTRIL) 10 MG tablet Take 1 tablet by mouth daily 30 tablet 5    atorvastatin (LIPITOR) 20 MG tablet TAKE ONE (1) TABLET BY MOUTH ONCE DAILY 90 tablet 0    levothyroxine (SYNTHROID) 100 MCG tablet TAKE 1 TABLET BY MOUTH ONCE DAILY 90 tablet 0    donepezil (ARICEPT) 10 MG tablet Take 1 tablet by mouth nightly 30 tablet 5    ELIQUIS 2.5 MG TABS tablet       finasteride (PROSCAR) 5 MG tablet       sertraline (ZOLOFT) 100 MG tablet TAKE 1 TABLET BY MOUTH ONCE DAILY 30 tablet 2    metoprolol succinate (TOPROL XL) 25 MG extended release tablet TAKE 1 TABLET BY MOUTH ONCE DAILY 30 tablet 5    tamsulosin (FLOMAX) 0.4 MG capsule TAKE 1 CAPSULE BY MOUTH ONCE DAILY 30 capsule 5    latanoprost (XALATAN) 0.005 % ophthalmic solution       Ascorbic Acid (VITAMIN C) 1000 MG tablet Take 1,000 mg by mouth daily      brimonidine-timolol (COMBIGAN) 0.2-0.5 % ophthalmic solution Place 1 drop into both eyes every 12 hours      Glucosamine-MSM-Hyaluronic Acd (JOINT HEALTH PO) Take 1 each by mouth daily       buPROPion (WELLBUTRIN XL) 150 MG extended release tablet Take 1 tablet by mouth every morning 30 tablet 5     No current facility-administered medications for this visit. ALLERGIES  Allergies   Allergen Reactions    Codeine Anaphylaxis    Fentanyl Other (See Comments)     Hypotension        PHYSICAL EXAM  /64   Pulse 80   Ht 5' 4\" (1.626 m)   Wt 161 lb (73 kg)   SpO2 98%   BMI 27.64 kg/m²     ASSESSMENT & PLAN    1. Essential hypertension  issue is stable check labs today. Adjust medication off of lab results. - CBC with Auto Differential; Future    2. PAF (paroxysmal atrial fibrillation) ([address]  issue controlled. Continue meds. Refilled meds. 3. Mild episode of recurrent major depressive disorder ([address]  issue controlled. Continue meds. Refilled meds. 4. Acquired hypothyroidism  issue is stable check labs today.   Adjust medication off of lab results.  - TSH with Reflex to FT4; Future    5.  Hyperglycemia  rule out interval onset diabetes  - Hemoglobin A1C; Future    follow-up 6 months       Electronically signed by Yolanda Costa MD on 2/26/2022

## 2022-03-04 DIAGNOSIS — I10 ESSENTIAL HYPERTENSION: ICD-10-CM

## 2022-03-07 RX ORDER — METOPROLOL SUCCINATE 25 MG/1
TABLET, EXTENDED RELEASE ORAL
Qty: 30 TABLET | Refills: 5 | Status: SHIPPED | OUTPATIENT
Start: 2022-03-07 | End: 2022-08-23

## 2022-03-31 ENCOUNTER — OFFICE VISIT (OUTPATIENT)
Dept: CARDIOLOGY CLINIC | Age: 87
End: 2022-03-31
Payer: COMMERCIAL

## 2022-03-31 VITALS
WEIGHT: 164 LBS | HEART RATE: 88 BPM | HEIGHT: 64 IN | DIASTOLIC BLOOD PRESSURE: 80 MMHG | SYSTOLIC BLOOD PRESSURE: 110 MMHG | BODY MASS INDEX: 28 KG/M2

## 2022-03-31 DIAGNOSIS — I73.9 PVD (PERIPHERAL VASCULAR DISEASE) (HCC): ICD-10-CM

## 2022-03-31 DIAGNOSIS — I44.2 COMPLETE HEART BLOCK (HCC): ICD-10-CM

## 2022-03-31 DIAGNOSIS — E78.00 PURE HYPERCHOLESTEROLEMIA: ICD-10-CM

## 2022-03-31 DIAGNOSIS — Z95.0 S/P BIVENTRICULAR CARDIAC PACEMAKER PROCEDURE: ICD-10-CM

## 2022-03-31 DIAGNOSIS — I10 ESSENTIAL HYPERTENSION: Primary | ICD-10-CM

## 2022-03-31 DIAGNOSIS — I71.40 ABDOMINAL AORTIC ANEURYSM (AAA) WITHOUT RUPTURE: ICD-10-CM

## 2022-03-31 DIAGNOSIS — I48.0 PAF (PAROXYSMAL ATRIAL FIBRILLATION) (HCC): ICD-10-CM

## 2022-03-31 PROCEDURE — 99213 OFFICE O/P EST LOW 20 MIN: CPT | Performed by: INTERNAL MEDICINE

## 2022-03-31 NOTE — PATIENT INSTRUCTIONS
CORONARY ARTERY DISEASE:None known  12/2018    IMI of a medium sized territory. No reversible Ischemia.    Other areas perfuse normally.    Inferior wall Hypokinesis with borderline reduction of LV function     HTN:well controlled on current medical regimen, see list above.              - changes in  treatment:   no, on Lisinopril & Lopressor   CARDIOMYOPATHY: None known   CONGESTIVE HEART FAILURE: NO KNOWN HISTORY.     VHD: No significant VHD noted  ECHO 9/2019   Technically difficult examination. Done in supine. Patient could not turn. Recent bowel surgery. Suboptimal subcostal window. Left ventricular function is normal, EF is estimated at 50%. Grade I diastolic dysfunction. Mild left ventricular hypertrophy. Mild to moderate aortic and mitral regurgitation noted   Moderate tricuspid regurgitation . No evidence of pericardial effusion  DYSLIPIDEMIA: Patient's profile is at / near [employer] [facility] is low                                Tolerating current medical regimen wellyes, take Lipitor                                                              See most recent Lab values in Labs section above.   ARRHYTHMIAS:  Known H/O CHB                                Patient has H/O P. Atrial fibrillation                                He is rate controlled, on Metoprolol & on anticoagulation with Eliquis.    S/P PPM: 1/2022  The device is Medtronic BIV pacemaker.  Device interrogation was performed.    Mode : DDDR    Sensing is normal. Impedence is normal.  Threshold is normal.    There has not been interval changes.    Estimated battery life is 5.3 years   Atrial Arrhythmia : 6.1% burden   Non sustained VT episodes : No   Sustained VT episodes : 0   Patient activity reported by the device to 0.0 hr/day    The underlying rhythm is AP, .  87.8 % atrial paced; 99.4 % ventricular paced.    The patient is pacemaker dependent.     HF management parameter are with in normal limits    TESTS ORDERED: none this visit     PREVIOUSLY ORDERED TESTS REVIEWED & DISCUSSED WITH THE PATIENT:     I personally reviewed & interpreted, all previously ordered tests as copied above. Latest Labs are pulled in to the note with dates. Labs, specially in Reference to Lipid profile, Cardiac testing in the form of Echo ( dated: ), stress tests ( dated: ) & other relevant cardiac testing reviewed with patient & recommendations made based on assessment of the results. Discussed role of Cardiac risk factors & effects + treatment of co morbidities with patient & advised accordingly. MEDICATIONS: List of medications patient is currently taking is reviewed in detail with the patient & family member present. Discussed any side effects or problems taking the medication. Recommend Continue present management & medications as listed. AFFIRMATION: I spent at least 20 minutes of time reviewing patient's history, previous & current medical problems & all Labs + testing. This includes chart prep even prior to the vosit. Various goals are discussed and multiple questions answered. Relevant concelling performed. Office follow up in six months. Device check per protocol.

## 2022-03-31 NOTE — LETTER
3/31/2022  1:45 PM     Patient Name: Kasey Eric  : 3/23/1930  MRN# [id number]    REASON FOR VISIT: 6 month     Patient Active Problem List    Diagnosis Date Noted    Pacemaker lead malfunction     [facility] heart block ([facility]     S/P biventricular cardiac pacemaker procedure 2017    Partial small bowel obstruction ([facility] 2016    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 2016    Leukocytosis 2016    Ileus following gastrointestinal surgery ([facility] 2013    Perforated viscus 2013    PA[facility]ox[facility]mal [facility] fibrillation) [facility]     Essential hypertension     Pure hypercholesterolemia     Mild episode of recurrent major depressive disorder ([facility] 2022    Pancreatitis, unspecified pancreatitis type 2021    Acquired hypothyroidism 2021    Other hyperlipidemia 2021    PVD (peripheral vascular disease) ([facility] 2021    Intractable nausea and vomiting 2020    Small bowel obstruction ([facility] 2019    Urinary tract infection without hematuria 2019    Chronic systolic heart failure ([facility] 2018    AAA (abdominal aortic aneurysm) ([facility])     Hypertrophy of prostate with urinary obstruction and other lower urinary tract symptoms (LUTS) 2014    Gross hematuria 2014    Cardiac pacemaker 2013    Hypokalemia 2013    Anemia 2013       Allergies: Codeine and Fentanyl      Current Outpatient Medications   Medication Sig Dispense Refill    metoprolol succinate (TOPROL XL) 25 MG extended release tablet TAKE 1 TABLET BY MOUTH ONCE DAILY 30 tablet 5    buPROPion (WELLBUTRIN XL) 150 MG extended release tablet Take 1 tablet by mouth every morning 30 tablet 5    lisinopril (PRINIVIL;ZESTRIL) 10 MG tablet Take 1 tablet by mouth daily 30 tablet 5    atorvastatin (LIPITOR) 20 MG tablet TAKE ONE (1) TABLET BY MOUTH ONCE DAILY 90 tablet 0    levothyroxine (SYNTHROID) 100 MCG tablet TAKE 1 TABLET BY MOUTH ONCE DAILY 90 tablet 0    donepezil (ARICEPT) 10 MG tablet Take 1 tablet by mouth nightly 30 tablet 5    ELIQUIS 2.5 MG TABS tablet       finasteride (PROSCAR) 5 MG tablet       sertraline (ZOLOFT) 100 MG tablet TAKE 1 TABLET BY MOUTH ONCE DAILY 30 tablet 2    tamsulosin (FLOMAX) 0.4 MG capsule TAKE 1 CAPSULE BY MOUTH ONCE DAILY 30 capsule 5    latanoprost (XALATAN) 0.005 % ophthalmic solution       Ascorbic Acid (VITAMIN C) 1000 MG tablet Take 1,000 mg by mouth daily      brimonidine-timolol (COMBIGAN) 0.2-0.5 % ophthalmic solution Place 1 drop into both eyes every 12 hours      Glucosamine-MSM-Hyaluronic Acd (JOINT HEALTH PO) Take 1 each by mouth daily        No current facility-administered medications for this visit.          Lab Review   Lab Results   Component Value Date    CKTOTAL 110 12/03/2018    CKTOTAL 148 05/13/2016    TROPONINT <0.010 08/16/2019    TROPONINT <0.010 12/03/2018     BNP:    Lab Results   Component Value Date     02/07/2013    PROBNP 2,032 09/01/2019    PROBNP 1,170 08/30/2019     PT/INR:    Lab Results   Component Value Date    INR 1.06 04/09/2021    INR 1.16 12/18/2019     Lab Results   Component Value Date    LABA1C 5.7 02/24/2022     Lab Results   Component Value Date    WBC 5.5 02/24/2022    WBC 4.5 11/17/2021    HCT 39.5 (L) 02/24/2022    HCT 39.0 (L) 11/17/2021    MCV 98.6 02/24/2022    MCV 97.7 11/17/2021     02/24/2022     11/17/2021     Lab Results   Component Value Date    CHOL 140 12/03/2020    CHOL 160 07/22/2014    TRIG 87 04/09/2021    TRIG 151 (H) 12/03/2020    HDL 39 (L) 12/03/2020    HDL 38 (L) 07/22/2014    LDLCALC 71 12/03/2020    LDLCALC 99 07/22/2014     Lab Results   Component Value Date    ALT 9 (L) 11/17/2021    ALT 11 09/22/2021    AST 23 11/17/2021    AST 19 09/22/2021     BMP:    Lab Results   Component Value Date     11/17/2021     09/22/2021    K 4.1 11/17/2021    K 4.0 09/22/2021     11/17/2021  2021    CO2 26 2021    CO2 28 2021    BUN 33 2021    BUN 22 2021    CREATININE 1.3 2021    CREATININE 1.1 2021     CMP:   Lab Results   Component Value Date     2021     2021    K 4.1 2021    K 4.0 2021     2021     2021    CO2 26 2021    CO2 28 2021    BUN 33 2021    BUN 22 2021    CREATININE 1.3 2021    CREATININE 1.1 2021    PROT 7.3 2021    PROT 6.8 2021    PROT 5.2 2013    PROT 5.5 2013     TSH:    Lab Results   Component Value Date    TSH 0.14 2021    TSH 1.46 2021    TSHHS 1.950 2016    TSHHS 3.673 2013       Smoke: What:                           How much:    Alcohol: How Much:     Caffeine: Pop:         Tea:            Coffee:                Chocolate:    Exercise:    Last Visit:2021  Complaints:B/L Lower extremity edema is present but no change over previous  Changes: None this visit    LAST EK/15/2021  AV dual-paced rhythm   Abnormal ECG   When compared with ECG of 16-AUG-2019 10:19,   Vent. rate has increased BY   2 BPM     VENOUS DOPPLER: NONE     VL DUP LOWER EXTREMITY ARTERIES BILATERAL:3/1/22    HOLTER/ EVENT MONITOR: NONE    STRESS TEST:  2018  Abnormal Study.    IMI of a medium sized territory. No reversible Ischemia.    Other areas perfuse normally.    Inferior wall Hypokinesis with borderline reduction of LV function     ECHO: 9/3/2021   Technically difficult examination. Done in supine. Patient could not turn. Recent bowel surgery. Suboptimal subcostal window. Left ventricular function is normal, EF is estimated at 50%. Grade I diastolic dysfunction. Mild left ventricular hypertrophy. Mild to moderate aortic and mitral regurgitation noted   Moderate tricuspid regurgitation .    No evidence of pericardial effusion    CAROTID: NONE    MUGA:

## 2022-03-31 NOTE — LETTER
[facility] [address]  [address] [place] [address]  Phone: [phone]  Fax: [phone]    Nyla Ashley MD    March 31, 2022     Jose Jose MD  [facility]    Patient: Cory Elias   MR Number: [id number]   YOB: 1930   Date of Visit: 3/31/2022       Dear Jose Jose: Thank you for referring Ross Harman to me for evaluation/treatment. Below are the relevant portions of my assessment and plan of care. If you have questions, please do not hesitate to call me. I look forward to following Miah Madrid along with you.     Sincerely,      Nyla Ashley MD

## 2022-03-31 NOTE — PROGRESS NOTES
[id number] Score for Atrial Fibrillation Stroke Risk   Risk   Factors  Component Value   C CHF No 0   H HTN Yes 1   A2 Age >= 76 Yes,  (80 y.o.) 2   D DM No 0   S2 Prior Stroke/TIA No 0   V Vascular Disease No 0   A Age 74-69 No,  (80 y.o.) 0   Sc Sex male 0    [id number]  Score  3   Score last updated 3/31/22 8:42 PM EDT    Click here for a link to the UpToDate guideline \"Atrial Fibrillation: Anticoagulation therapy to prevent embolization    Disclaimer: Risk Score calculation is dependent on accuracy of patient problem list and past encounter diagnosis.

## 2022-03-31 NOTE — PROGRESS NOTES
OFFICE PROGRESS NOTES      Zandra Essex is a 80 y.o. male who has    CHIEF COMPLAINT AS FOLLOWS:  CHEST PAIN: No C/O chest pain.   SOB: No C/O SOB at this time.               LEG EDEMA: B/L Lower extremity edema is present but no change over previous.   PALPITATIONS: Denies any C/O Palpitations                                 DIZZINESS: No C/O Dizziness      SYNCOPE: None   OTHER/ ADDITIONAL COMPLAINTS:                                     HPI: Patient is here for F/U on his  Arrhythmia, HTN & Dyslipidemia problems. Arrhythmia: Patient has known  PAF. HTN: Patient has known essential HTN. Has been treated with guideline recommended medical / physical/ diet therapy as stated below. Dyslipidemia: Patient has known mixed dyslipidemia. Has been treated with guideline recommended medical / physical/ diet therapy as stated below.                 Current Outpatient Medications   Medication Sig Dispense Refill    metoprolol succinate (TOPROL XL) 25 MG extended release tablet TAKE 1 TABLET BY MOUTH ONCE DAILY 30 tablet 5    buPROPion (WELLBUTRIN XL) 150 MG extended release tablet Take 1 tablet by mouth every morning 30 tablet 5    lisinopril (PRINIVIL;ZESTRIL) 10 MG tablet Take 1 tablet by mouth daily 30 tablet 5    atorvastatin (LIPITOR) 20 MG tablet TAKE ONE (1) TABLET BY MOUTH ONCE DAILY 90 tablet 0    levothyroxine (SYNTHROID) 100 MCG tablet TAKE 1 TABLET BY MOUTH ONCE DAILY 90 tablet 0    donepezil (ARICEPT) 10 MG tablet Take 1 tablet by mouth nightly 30 tablet 5    ELIQUIS 2.5 MG TABS tablet       finasteride (PROSCAR) 5 MG tablet       sertraline (ZOLOFT) 100 MG tablet TAKE 1 TABLET BY MOUTH ONCE DAILY 30 tablet 2    tamsulosin (FLOMAX) 0.4 MG capsule TAKE 1 CAPSULE BY MOUTH ONCE DAILY 30 capsule 5    latanoprost (XALATAN) 0.005 % ophthalmic solution       Ascorbic Acid (VITAMIN C) 1000 MG tablet Take 1,000 mg by mouth daily      brimonidine-timolol (COMBIGAN) 0.2-0.5 % ophthalmic solution Place 1 drop into both eyes every 12 hours      Glucosamine-MSM-Hyaluronic Acd (JOINT HEALTH PO) Take 1 each by mouth daily        No current facility-administered medications for this visit. Allergies: Codeine and Fentanyl  Review of Systems:    Constitutional: Negative for diaphoresis and fatigue  Respiratory: Negative for shortness of breath  Cardiovascular: Negative for chest pain, dyspnea on exertion, claudication, edema, irregular heartbeat, murmur, palpitations or shortness of breath  Musculoskeletal: Negative for muscle pain, muscular weakness, negative for pain in arm and leg or swelling in foot and leg    Objective:  /80   Pulse 88   Ht 5' 4\" (1.626 m)   Wt 164 lb (74.4 kg)   BMI 28.15 kg/m²   Wt Readings from Last 3 Encounters:   03/31/22 164 lb (74.4 kg)   03/08/22 167 lb (75.8 kg)   02/24/22 161 lb (73 kg)     Body mass index is 28.15 kg/m². GENERAL - Alert, oriented, pleasant, in no apparent distress. EYES: No jaundice, no conjunctival pallor. Neck - Supple. No jugular venous distention noted. No carotid bruits. Cardiovascular  Normal S1 and S2 without obvious murmur or gallop. Extremities - No cyanosis, clubbing, or significant edema. Pulmonary  No respiratory distress. No wheezes or rales.       MEDICAL DECISION MAKING & DATA REVIEW:    Lab Review   Lab Results   Component Value Date    TROPONINT <0.010 08/16/2019    TROPONINT <0.010 12/03/2018     Lab Results   Component Value Date     02/07/2013    PROBNP 2,032 09/01/2019    PROBNP 1,170 08/30/2019     Lab Results   Component Value Date    INR 1.06 04/09/2021    INR 1.16 12/18/2019     Lab Results   Component Value Date    LABA1C 5.7 02/24/2022     Lab Results   Component Value Date    WBC 5.5 02/24/2022    WBC 4.5 11/17/2021    HCT 39.5 (L) 02/24/2022    HCT 39.0 (L) 11/17/2021    MCV 98.6 02/24/2022    MCV 97.7 11/17/2021     02/24/2022     11/17/2021     Lab Results   Component Value Date    CHOL 140 12/03/2020    CHOL 160 07/22/2014    TRIG 87 04/09/2021    TRIG 151 (H) 12/03/2020    HDL 39 (L) 12/03/2020    HDL 38 (L) 07/22/2014    LDLCALC 71 12/03/2020    LDLCALC 99 07/22/2014     Lab Results   Component Value Date    ALT 9 (L) 11/17/2021    ALT 11 09/22/2021    AST 23 11/17/2021    AST 19 09/22/2021     BMP:    Lab Results   Component Value Date     11/17/2021     09/22/2021    K 4.1 11/17/2021    K 4.0 09/22/2021     11/17/2021     09/22/2021    CO2 26 11/17/2021    CO2 28 09/22/2021    BUN 33 11/17/2021    BUN 22 09/22/2021    CREATININE 1.3 11/17/2021    CREATININE 1.1 09/22/2021     CMP:   Lab Results   Component Value Date     11/17/2021     09/22/2021    K 4.1 11/17/2021    K 4.0 09/22/2021     11/17/2021     09/22/2021    CO2 26 11/17/2021    CO2 28 09/22/2021    BUN 33 11/17/2021    BUN 22 09/22/2021    CREATININE 1.3 11/17/2021    CREATININE 1.1 09/22/2021    PROT 7.3 11/17/2021    PROT 6.8 09/22/2021    PROT 5.2 02/24/2013    PROT 5.5 02/04/2013     Lab Results   Component Value Date    TSH 0.14 09/22/2021    TSH 1.46 05/24/2021    TSHHS 1.950 03/23/2016    TSHHS 3.673 02/21/2013       QUALITY MEASURES REVIEWED:  1.CAD:Patient is taking anti platelet agent:No  Patient does not have Hx of documented CAD  2. DYSLIPIDEMIA: Patient is on cholesterol lowering medication:Yes  3. Beta-Blocker therapy for CAD, if prior Myocardial Infarction:Yes   4. Counselled regarding smoking cessation. No   Patient does not Smoke. 5.Anticoagulation therapy (for A.Fib) Yes   Does Not have A.Fib.  6.Discussed weight management strategies. Assessment & Plan:  Primary / Secondary prevention is the goal by aggressive risk modification, healthy and therapeutic life style changes for cardiovascular risk reduction. CORONARY ARTERY DISEASE:None known  12/2018    IMI of a medium sized territory.  No reversible Ischemia.    Other areas perfuse normally.    Inferior wall Hypokinesis with borderline reduction of LV function     HTN:well controlled on current medical regimen, see list above.              - changes in  treatment:   no, on Lisinopril & Lopressor   CARDIOMYOPATHY: None known   CONGESTIVE HEART FAILURE: NO KNOWN HISTORY.     VHD: No significant VHD noted  ECHO 9/2019   Technically difficult examination. Done in supine. Patient could not turn. Recent bowel surgery. Suboptimal subcostal window. Left ventricular function is normal, EF is estimated at 50%. Grade I diastolic dysfunction. Mild left ventricular hypertrophy. Mild to moderate aortic and mitral regurgitation noted   Moderate tricuspid regurgitation . No evidence of pericardial effusion  DYSLIPIDEMIA: Patient's profile is at / near [employer] [facility] is low                                Tolerating current medical regimen wellyes, take Lipitor                                                              See most recent Lab values in Labs section above.   ARRHYTHMIAS:  Known H/O CHB                                Patient has H/O P. Atrial fibrillation                                He is rate controlled, on Metoprolol & on anticoagulation with Eliquis.    S/P PPM: 1/2022  The device is Medtronic BIV pacemaker.  Device interrogation was performed.    Mode : DDDR    Sensing is normal. Impedence is normal.  Threshold is normal.    There has not been interval changes.    Estimated battery life is 5.3 years   Atrial Arrhythmia : 6.1% burden   Non sustained VT episodes : No   Sustained VT episodes : 0   Patient activity reported by the device to 0.0 hr/day    The underlying rhythm is AP, .  87.8 % atrial paced; 99.4 % ventricular paced.    The patient is pacemaker dependent.     HF management parameter are with in normal limits    TESTS ORDERED: none this visit     PREVIOUSLY ORDERED TESTS REVIEWED & DISCUSSED WITH THE PATIENT:     I personally reviewed & interpreted, all previously ordered tests as copied above. Latest Labs are pulled in to the note with dates. Labs, specially in Reference to Lipid profile, Cardiac testing in the form of Echo ( dated: ), stress tests ( dated: ) & other relevant cardiac testing reviewed with patient & recommendations made based on assessment of the results. Discussed role of Cardiac risk factors & effects + treatment of co morbidities with patient & advised accordingly. MEDICATIONS: List of medications patient is currently taking is reviewed in detail with the patient & family member present. Discussed any side effects or problems taking the medication. Recommend Continue present management & medications as listed. AFFIRMATION: I spent at least 20 minutes of time reviewing patient's history, previous & current medical problems & all Labs + testing. This includes chart prep even prior to the vosit. Various goals are discussed and multiple questions answered. Relevant concelling performed. Office follow up in six months. Device check per protocol.

## 2022-04-05 DIAGNOSIS — F03.90 DEMENTIA WITHOUT BEHAVIORAL DISTURBANCE, UNSPECIFIED DEMENTIA TYPE: ICD-10-CM

## 2022-04-05 RX ORDER — SUCRALFATE 1 G/1
TABLET ORAL
Qty: 180 TABLET | Refills: 0 | OUTPATIENT
Start: 2022-04-05

## 2022-04-05 RX ORDER — SERTRALINE HYDROCHLORIDE 100 MG/1
TABLET, FILM COATED ORAL
Qty: 30 TABLET | Refills: 2 | Status: SHIPPED | OUTPATIENT
Start: 2022-04-05 | End: 2022-06-22

## 2022-04-05 RX ORDER — APIXABAN 2.5 MG/1
TABLET, FILM COATED ORAL
Qty: 60 TABLET | Refills: 2 | Status: SHIPPED | OUTPATIENT
Start: 2022-04-05 | End: 2022-06-22

## 2022-04-08 RX ORDER — SUCRALFATE 1 G/1
TABLET ORAL
Qty: 180 TABLET | Refills: 0 | OUTPATIENT
Start: 2022-04-08

## 2022-04-13 ENCOUNTER — TELEPHONE (OUTPATIENT)
Dept: CARDIOLOGY CLINIC | Age: 87
End: 2022-04-13

## 2022-04-13 ENCOUNTER — PROCEDURE VISIT (OUTPATIENT)
Dept: CARDIOLOGY CLINIC | Age: 87
End: 2022-04-13
Payer: COMMERCIAL

## 2022-04-13 DIAGNOSIS — Z95.0 BIVENTRICULAR CARDIAC PACEMAKER IN SITU: Primary | ICD-10-CM

## 2022-04-13 DIAGNOSIS — I49.5 SICK SINUS SYNDROME (HCC): ICD-10-CM

## 2022-04-13 PROCEDURE — 93297 REM INTERROG DEV EVAL ICPMS: CPT | Performed by: NURSE PRACTITIONER

## 2022-04-13 PROCEDURE — 93294 REM INTERROG EVL PM/LDLS PM: CPT | Performed by: NURSE PRACTITIONER

## 2022-04-13 PROCEDURE — 93296 REM INTERROG EVL PM/IDS: CPT | Performed by: NURSE PRACTITIONER

## 2022-04-18 RX ORDER — SUCRALFATE 1 G/1
TABLET ORAL
Qty: 180 TABLET | Refills: 0 | OUTPATIENT
Start: 2022-04-18

## 2022-05-04 RX ORDER — ATORVASTATIN CALCIUM 20 MG/1
TABLET, FILM COATED ORAL
Qty: 90 TABLET | Refills: 0 | Status: SHIPPED | OUTPATIENT
Start: 2022-05-04 | End: 2022-07-25

## 2022-05-04 RX ORDER — LEVOTHYROXINE SODIUM 0.1 MG/1
TABLET ORAL
Qty: 90 TABLET | Refills: 0 | Status: SHIPPED | OUTPATIENT
Start: 2022-05-04 | End: 2022-07-25

## 2022-05-31 ENCOUNTER — TELEPHONE (OUTPATIENT)
Dept: FAMILY MEDICINE CLINIC | Age: 87
End: 2022-05-31

## 2022-05-31 NOTE — TELEPHONE ENCOUNTER
To Abida Quinones-    Daughter is reporting-    For about one week, patient has been sleeping all day-----he only gets up to eat and then he goes back to bed. He sleeps through the night as well. Daughter is very concerned about this.      Please advise     [place]'s phone:  [phone]

## 2022-06-02 NOTE — TELEPHONE ENCOUNTER
Spoke with patient's daughter she states the patient is unable to do a virtual since her father is never up that early. I did schedule an appt for him to see Ananda Tripathiirma next week and told the daughter if he needs to get in sooner she could call for a same day appt which I did explain the same day protocol to her. Daughter verbalized understanding.

## 2022-06-09 ENCOUNTER — OFFICE VISIT (OUTPATIENT)
Dept: FAMILY MEDICINE CLINIC | Age: 87
End: 2022-06-09
Payer: COMMERCIAL

## 2022-06-09 VITALS
HEIGHT: 64 IN | BODY MASS INDEX: 26.46 KG/M2 | HEART RATE: 76 BPM | WEIGHT: 155 LBS | SYSTOLIC BLOOD PRESSURE: 96 MMHG | DIASTOLIC BLOOD PRESSURE: 58 MMHG | OXYGEN SATURATION: 96 %

## 2022-06-09 DIAGNOSIS — F03.90 DEMENTIA WITHOUT BEHAVIORAL DISTURBANCE, UNSPECIFIED DEMENTIA TYPE: Chronic | ICD-10-CM

## 2022-06-09 DIAGNOSIS — R30.0 DYSURIA: ICD-10-CM

## 2022-06-09 DIAGNOSIS — R53.83 FATIGUE, UNSPECIFIED TYPE: ICD-10-CM

## 2022-06-09 DIAGNOSIS — G47.19 EXCESSIVE DAYTIME SLEEPINESS: Primary | ICD-10-CM

## 2022-06-09 LAB
BILIRUBIN, POC: ABNORMAL
BLOOD URINE, POC: ABNORMAL
CLARITY, POC: ABNORMAL
COLOR, POC: ABNORMAL
GLUCOSE URINE, POC: NEGATIVE
KETONES, POC: NEGATIVE
LEUKOCYTE EST, POC: ABNORMAL
NITRITE, POC: NEGATIVE
PH, POC: 5.5
PROTEIN, POC: 100
SPECIFIC GRAVITY, POC: 1.02
UROBILINOGEN, POC: 0.2

## 2022-06-09 PROCEDURE — 99214 OFFICE O/P EST MOD 30 MIN: CPT

## 2022-06-09 PROCEDURE — 1123F ACP DISCUSS/DSCN MKR DOCD: CPT

## 2022-06-09 PROCEDURE — 81002 URINALYSIS NONAUTO W/O SCOPE: CPT

## 2022-06-09 RX ORDER — SULFAMETHOXAZOLE AND TRIMETHOPRIM 800; 160 MG/1; MG/1
1 TABLET ORAL 2 TIMES DAILY
Qty: 14 TABLET | Refills: 0 | Status: SHIPPED | OUTPATIENT
Start: 2022-06-09 | End: 2022-06-16

## 2022-06-09 SDOH — ECONOMIC STABILITY: FOOD INSECURITY: WITHIN THE PAST 12 MONTHS, THE FOOD YOU BOUGHT JUST DIDN'T LAST AND YOU DIDN'T HAVE MONEY TO GET MORE.: NEVER TRUE

## 2022-06-09 SDOH — ECONOMIC STABILITY: FOOD INSECURITY: WITHIN THE PAST 12 MONTHS, YOU WORRIED THAT YOUR FOOD WOULD RUN OUT BEFORE YOU GOT MONEY TO BUY MORE.: NEVER TRUE

## 2022-06-09 ASSESSMENT — PATIENT HEALTH QUESTIONNAIRE - PHQ9
4. FEELING TIRED OR HAVING LITTLE ENERGY: 3
3. TROUBLE FALLING OR STAYING ASLEEP: 3
SUM OF ALL RESPONSES TO PHQ9 QUESTIONS 1 & 2: 0
SUM OF ALL RESPONSES TO PHQ QUESTIONS 1-9: 9
6. FEELING BAD ABOUT YOURSELF - OR THAT YOU ARE A FAILURE OR HAVE LET YOURSELF OR YOUR FAMILY DOWN: 0
SUM OF ALL RESPONSES TO PHQ QUESTIONS 1-9: 9
SUM OF ALL RESPONSES TO PHQ QUESTIONS 1-9: 9
9. THOUGHTS THAT YOU WOULD BE BETTER OFF DEAD, OR OF HURTING YOURSELF: 0
5. POOR APPETITE OR OVEREATING: 0
2. FEELING DOWN, DEPRESSED OR HOPELESS: 0
8. MOVING OR SPEAKING SO SLOWLY THAT OTHER PEOPLE COULD HAVE NOTICED. OR THE OPPOSITE, BEING SO FIGETY OR RESTLESS THAT YOU HAVE BEEN MOVING AROUND A LOT MORE THAN USUAL: 0
10. IF YOU CHECKED OFF ANY PROBLEMS, HOW DIFFICULT HAVE THESE PROBLEMS MADE IT FOR YOU TO DO YOUR WORK, TAKE CARE OF THINGS AT HOME, OR GET ALONG WITH OTHER PEOPLE: 0
SUM OF ALL RESPONSES TO PHQ QUESTIONS 1-9: 9
1. LITTLE INTEREST OR PLEASURE IN DOING THINGS: 0
7. TROUBLE CONCENTRATING ON THINGS, SUCH AS READING THE NEWSPAPER OR WATCHING TELEVISION: 3

## 2022-06-09 ASSESSMENT — ENCOUNTER SYMPTOMS
NAUSEA: 0
VOMITING: 0
COLOR CHANGE: 0
ABDOMINAL PAIN: 0
DIARRHEA: 0
SHORTNESS OF BREATH: 0
BLOOD IN STOOL: 0
CONSTIPATION: 0

## 2022-06-09 ASSESSMENT — SOCIAL DETERMINANTS OF HEALTH (SDOH): HOW HARD IS IT FOR YOU TO PAY FOR THE VERY BASICS LIKE FOOD, HOUSING, MEDICAL CARE, AND HEATING?: NOT HARD AT ALL

## 2022-06-09 NOTE — PATIENT INSTRUCTIONS
[facility] [address] [place] [address]. [facility], [facility]    Hours  Monday- Thursday      8am-4pm  Fridays  7am-3pm      [facility] [place] [facility]  [address]    [facility], [address] [facility],[address]    Hours  Monday-Friday  7am-5pm  Saturday  8am-12pm

## 2022-06-09 NOTE — PROGRESS NOTES
6/9/2022    Volodymyr Celeste    Chief Complaint   Patient presents with   [place] Sicard Other     per dtr Vinod Carroll pt sleeps too much. most of the day and night. per pt \"I get up when I need to. \" . dtr states \"sleeping alot over the last 2 weeks\"       HPI  History was obtained from patient. Princess Joel is a pleasant 80 y.o. male who presents today with his daughter Vinod Carroll for concerns related to sleeping more during the daytime. Excessive Daytime Sleepiness: Daughter reports that pt has been sleeping all throughout the day- he often takes his AM and Pm medications fairly close together. Dtr reports that a couple of weeks ago pt was outside and helped with some gardening and after that he has been sleeping a significant amount more than normal. No changes in appetite, dtr reports that pt will eat when he is awake. Fatigue: Pt reports that he is \"just tired\" and that he \"gets up when he needs to\". Pt reports that he just does not have the energy to do things he may want to, states that he often doesn't feel well enough to do many activities. Dysuria: Dtr reports that pt has periods of incontinence, and that he only drinks enough water to take his medications. She states that she encourages water intake every day but pt gets upset at her for doing so. Pt denies burning and urgency, notable confusion during our visit. Pt seems unsure of certain topics including whether he has dark urine or blood in his urine. Dementia: Taking Aricept 10mg. Dtr reports increased episodes of anger toward his family members and reports pt states that he would like to go places and do things but when offered to he declines. Pt repeats the same phrases several times during our visit. Dtr reports that these behaviors, especially his anger have been worsening x2 weeks. 1. Excessive daytime sleepiness    2. Fatigue, unspecified type    3. Dysuria    4.  Dementia without behavioral disturbance, unspecified dementia type ([address] REVIEW OF SYMPTOMS    Review of Systems   Constitutional: Positive for fatigue. Negative for activity change, chills, fever and unexpected weight change. Respiratory: Negative for shortness of breath. Cardiovascular: Negative. Negative for chest pain, palpitations and leg swelling. Gastrointestinal: Negative for abdominal pain, blood in stool, constipation, diarrhea, nausea and vomiting. Genitourinary: Negative for difficulty urinating. Musculoskeletal: Positive for arthralgias (Right knee). Skin: Negative for color change. Lichenification on right lateral forearm from constant rubbing/scratching     Neurological: Positive for dizziness. Negative for light-headedness. PAST MEDICAL HISTORY  Past Medical History:   Diagnosis Date    AAA (abdominal aortic aneurysm) [facility])     Surgery scheduled for 7/1/2014 with Dr. Thea Garcia. 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)     Glaucoma     H/O cardiovascular stress test 12/27/2013    cardiolite-EF56%, apical hypokinesis is seen, cannot exlude apical Tyler ischemia    History of blood transfusion     History of cardiovascular stress test 3/5/10    No angina or ischemic EKG changes are noted with Lexiscan. The cardiolite study demonstrated normal perfusion in all segments of the myocardium with an intact left ventricular systolic function. The resting sestamibi dose is 10.8, stress is 32.5. EF 66%    History of chest x-ray 3/16/10    The chest is considered nonacute. There is cardiomegaly noted. COPD.  History of Doppler ultrasound 3/25/10    venous doppler- Technically good venous ultrasound study negative for DVT in both lower extremities. Normal compressibility,color flow doppler pattern and spectral doppler pattern demonstrated thoughout.     History of echocardiogram 3/18/10    [place]line LV dilatation with concentric hypertrophy. Low normal systolic and abnormal diastolic function. Mild mitral and trace tricuspid regurgitation. Mild aortic root and bilateral dilatation.  Holter monitor, abnormal 11/10/10    11/10/2010- 24 HR- Abnormal holter revealing some significant brasycardia's and wenckebach phenomenon. Therefore, clinical  correlation is recommend.  Hx of blood clots     Pacemaker     PAF (paroxysmal atrial fibrillation) (HCC)     Peritonitis ([address]     Personal history of colonic polyps     Poor historian     Skin cancer     face    Ulcerative (chronic) proctosigmoiditis ([address]     Wears glasses        FAMILY HISTORY  Family History   Problem Relation Age of Onset    Stroke Mother         CVA    Heart Disease Mother     Cancer Brother         prostate    Cancer Brother         skin    Cancer Daughter         colon    Substance Abuse Son         Tobacco    No Known Problems Father        SOCIAL HISTORY  Social History     Socioeconomic History    Marital status:      Spouse name: None    Number of children: 3    Years of education: None    Highest education level: None   Occupational History    Occupation: Retired   Tobacco Use    Smoking status: Never Smoker    Smokeless tobacco: Never Used   Vaping Use    Vaping Use: Never used   Substance and Sexual Activity    Alcohol use: No     Alcohol/week: 0.0 standard drinks    Drug use: No    Sexual activity: Yes     Partners: Female     Comment:    Other Topics Concern    None   Social History Narrative    None     Social Determinants of Health     Financial Resource Strain: Low Risk     Difficulty of Paying Living Expenses: Not hard at all   Food Insecurity: No Food Insecurity    Worried About [address] in the Last Year: Never true    [address] in the Last Year: Never true   Transportation Needs:     Lack of Transportation (Medical):  Not on file    Lack of Transportation (Non-Medical): Not on file   Physical Activity:     Days of Exercise per Week: Not on file    Minutes of Exercise per Session: Not on file   Stress:     Feeling of Stress : Not on file   Social Connections:     Frequency of Communication with Friends and Family: Not on file    Frequency of Social Gatherings with Friends and Family: Not on file    Attends Baptism Services: Not on file    Active Member of Clubs or Organizations: Not on file    Attends Club or Organization Meetings: Not on file    Marital Status: Not on file   Intimate Partner Violence:     Fear of Current or Ex-Partner: Not on file    Emotionally Abused: Not on file    Physically Abused: Not on file    Sexually Abused: Not on file   Housing Stability:     Unable to Pay for Housing in the Last Year: Not on file    Number of [place] in the Last Year: Not on file    Unstable Housing in the Last Year: Not on file        SURGICAL HISTORY  Past Surgical History:   Procedure Laterality Date    ABDOMINAL AORTIC ANEURYSM REPAIR, ENDOVASCULAR  7/1/14    ABDOMINAL EXPLORATION SURGERY  2/4/2013    exp lap, left hemicolectomy, umbilectomy    APPENDECTOMY  2/4/2013    APPENDECTOMY  2/4/2013    COLONOSCOPY  2/4/2013    COLOSTOMY  2/4/2013    CYSTOSCOPY  2/03/2014    TURP    HEMORRHOID SURGERY  2011    HERNIA REPAIR Bilateral [address] hernia    KNEE SURGERY Right 1980s   [address] Right 12/13/2017    BIV PPM Medtronic Percepta Quad CRT-P MRI SureScan Pacemaker    PACEMAKER PLACEMENT Right 02/25/2013    Explanted 12/13/2017    SMALL INTESTINE SURGERY N/A 8/28/2019    EXPLORATORY LAPAROTOMY, SMALL BOWEL RESECTION, LYSIS OF ADHESIONS, NEW COLOSTOMY, AND HERNIA REPAIR WITH XENMATRIX MESH performed by May Gottron, MD at [facility]  Current Outpatient Medications   Medication Sig Dispense Refill    sulfamethoxazole-trimethoprim (BACTRIM DS;SEPTRA DS) 800-160 MG per tablet Take 1 tablet by mouth 2 times daily for 7 days 14 tablet 0    atorvastatin (LIPITOR) 20 MG tablet TAKE ONE (1) TABLET BY MOUTH ONCE DAILY 90 tablet 0    levothyroxine (SYNTHROID) 100 MCG tablet TAKE 1 TABLET BY MOUTH ONCE DAILY 90 tablet 0    ELIQUIS 2.5 MG TABS tablet TAKE 1 TABLET BY MOUTH TWO TIMES DAILY 60 tablet 2    sertraline (ZOLOFT) 100 MG tablet TAKE 1 TABLET BY MOUTH ONCE DAILY 30 tablet 2    metoprolol succinate (TOPROL XL) 25 MG extended release tablet TAKE 1 TABLET BY MOUTH ONCE DAILY 30 tablet 5    buPROPion (WELLBUTRIN XL) 150 MG extended release tablet Take 1 tablet by mouth every morning 30 tablet 5    lisinopril (PRINIVIL;ZESTRIL) 10 MG tablet Take 1 tablet by mouth daily 30 tablet 5    donepezil (ARICEPT) 10 MG tablet Take 1 tablet by mouth nightly 30 tablet 5    finasteride (PROSCAR) 5 MG tablet       tamsulosin (FLOMAX) 0.4 MG capsule TAKE 1 CAPSULE BY MOUTH ONCE DAILY 30 capsule 5    latanoprost (XALATAN) 0.005 % ophthalmic solution       Ascorbic Acid (VITAMIN C) 1000 MG tablet Take 1,000 mg by mouth daily      brimonidine-timolol (COMBIGAN) 0.2-0.5 % ophthalmic solution Place 1 drop into both eyes every 12 hours      Glucosamine-MSM-Hyaluronic Acd ([facility] PO) Take 1 each by mouth daily        No current facility-administered medications for this visit. ALLERGIES  Allergies   Allergen Reactions    Codeine Anaphylaxis    Fentanyl Other (See Comments)     Hypotension        PHYSICAL EXAM    BP (!) 96/58   Pulse 76   Ht 5' 4\" (1.626 m)   Wt 155 lb (70.3 kg)   SpO2 96%   BMI 26.61 kg/m²     Physical Exam  Vitals and nursing note reviewed. Constitutional:       Appearance: He is well-developed. HENT:      Head: Normocephalic and atraumatic. Eyes:      General: No scleral icterus. Conjunctiva/sclera: Conjunctivae normal.   Neck:      Thyroid: No thyromegaly. Vascular: No JVD. Trachea: No tracheal deviation.    Cardiovascular:      Rate and Rhythm: Normal rate and regular rhythm. Heart sounds: Normal heart sounds. Pulmonary:      Effort: Pulmonary effort is normal. No respiratory distress. Breath sounds: Normal breath sounds. No wheezing or rales. Abdominal:      General: There is no distension. Palpations: Abdomen is soft. Tenderness: There is no abdominal tenderness. There is no guarding or rebound. Musculoskeletal:      Cervical back: Neck supple. Lymphadenopathy:      Cervical: No cervical adenopathy. Skin:     General: Skin is warm and dry. Nails: There is no clubbing. Neurological:      Mental Status: He is alert and oriented to person, place, and time. Cranial Nerves: Cranial nerves are intact. Motor: Weakness present. Gait: Gait abnormal (Uses cane to ambulate). Comments: Nonfocal   Psychiatric:         Behavior: Behavior is withdrawn. Behavior is cooperative. Cognition and Memory: Memory is impaired. Judgment: Judgment normal.         ASSESSMENT & PLAN    Joshua Woods was seen today for other. Diagnoses and all orders for this visit:    Excessive daytime sleepiness  -     TSH with Reflex; Future  -     Comprehensive Metabolic Panel; Future  -     CBC with Auto Differential; Future  -     Urinalysis with Reflex to Culture; Future  - Will review results    Fatigue, unspecified type  -     TSH with Reflex; Future  -     CBC with Auto Differential; Future  - Will review results    Dysuria  -     sulfamethoxazole-trimethoprim (BACTRIM DS;SEPTRA DS) 800-160 MG per tablet; Take 1 tablet by mouth 2 times daily for 7 days  -     Culture, Urine  -     POCT Urinalysis no Micro  - Blood, protein and leukocytes noted in UA  - Will review culture to ensure effective antibiotic selection    Dementia without behavioral disturbance, unspecified dementia type ([address]        - Consider adding Namenda if increased behaviors and memory concerns persist         No follow-ups on file.  Keep scheduled appointment with [facility] on [facility]/24/2022         Electronically signed by LIA Rios CNP on 6/9/2022

## 2022-06-10 DIAGNOSIS — R53.83 FATIGUE, UNSPECIFIED TYPE: ICD-10-CM

## 2022-06-10 DIAGNOSIS — G47.19 EXCESSIVE DAYTIME SLEEPINESS: ICD-10-CM

## 2022-06-10 LAB
A/G RATIO: 1.4 (ref 1.1–2.2)
ALBUMIN SERPL-MCNC: 4 G/DL (ref 3.4–5)
ALP BLD-CCNC: 129 U/L (ref 40–129)
ALT SERPL-CCNC: 10 U/L (ref 10–40)
ANION GAP SERPL CALCULATED.3IONS-SCNC: 13 MMOL/L (ref 3–16)
AST SERPL-CCNC: 17 U/L (ref 15–37)
BACTERIA: ABNORMAL /HPF
BASOPHILS ABSOLUTE: 0.1 K/UL (ref 0–0.2)
BASOPHILS RELATIVE PERCENT: 1.2 %
BILIRUB SERPL-MCNC: 0.4 MG/DL (ref 0–1)
BILIRUBIN URINE: NEGATIVE
BLOOD, URINE: ABNORMAL
BUN BLDV-MCNC: 35 MG/DL (ref 7–20)
CALCIUM SERPL-MCNC: 8.9 MG/DL (ref 8.3–10.6)
CHLORIDE BLD-SCNC: 103 MMOL/L (ref 99–110)
CLARITY: ABNORMAL
CO2: 26 MMOL/L (ref 21–32)
COLOR: ABNORMAL
COMMENT UA: ABNORMAL
CREAT SERPL-MCNC: 1.7 MG/DL (ref 0.8–1.3)
EOSINOPHILS ABSOLUTE: 0.1 K/UL (ref 0–0.6)
EOSINOPHILS RELATIVE PERCENT: 1.9 %
EPITHELIAL CELLS, UA: 18 /HPF (ref 0–5)
GFR AFRICAN AMERICAN: 46
GFR NON-AFRICAN AMERICAN: 38
GLUCOSE BLD-MCNC: 100 MG/DL (ref 70–99)
GLUCOSE URINE: NEGATIVE MG/DL
HCT VFR BLD CALC: 40.3 % (ref 40.5–52.5)
HEMOGLOBIN: 13.6 G/DL (ref 13.5–17.5)
HYALINE CASTS: 1 /LPF (ref 0–8)
KETONES, URINE: NEGATIVE MG/DL
LEUKOCYTE ESTERASE, URINE: ABNORMAL
LYMPHOCYTES ABSOLUTE: 0.6 K/UL (ref 1–5.1)
LYMPHOCYTES RELATIVE PERCENT: 13 %
MCH RBC QN AUTO: 33.7 PG (ref 26–34)
MCHC RBC AUTO-ENTMCNC: 33.7 G/DL (ref 31–36)
MCV RBC AUTO: 100.1 FL (ref 80–100)
MICROSCOPIC EXAMINATION: YES
MONOCYTES ABSOLUTE: 0.2 K/UL (ref 0–1.3)
MONOCYTES RELATIVE PERCENT: 5.6 %
NEUTROPHILS ABSOLUTE: 3.4 K/UL (ref 1.7–7.7)
NEUTROPHILS RELATIVE PERCENT: 78.3 %
NITRITE, URINE: NEGATIVE
PDW BLD-RTO: 14.1 % (ref 12.4–15.4)
PH UA: 5.5 (ref 5–8)
PLATELET # BLD: 177 K/UL (ref 135–450)
PMV BLD AUTO: 9.3 FL (ref 5–10.5)
POTASSIUM SERPL-SCNC: 4.2 MMOL/L (ref 3.5–5.1)
PROTEIN UA: 100 MG/DL
RBC # BLD: 4.02 M/UL (ref 4.2–5.9)
RBC UA: >100 /HPF (ref 0–4)
SODIUM BLD-SCNC: 142 MMOL/L (ref 136–145)
SPECIFIC GRAVITY UA: 1.02 (ref 1–1.03)
T4 FREE: 1.7 NG/DL (ref 0.9–1.8)
TOTAL PROTEIN: 6.8 G/DL (ref 6.4–8.2)
TSH REFLEX: 0.14 UIU/ML (ref 0.27–4.2)
URINE REFLEX TO CULTURE: YES
URINE TYPE: ABNORMAL
UROBILINOGEN, URINE: 0.2 E.U./DL
WBC # BLD: 4.3 K/UL (ref 4–11)
WBC UA: >100 /HPF (ref 0–5)

## 2022-06-11 LAB
T3 TOTAL: 0.54 NG/ML (ref 0.8–2)
URINE CULTURE, ROUTINE: NORMAL

## 2022-06-14 DIAGNOSIS — Z00.00 HEALTHCARE MAINTENANCE: ICD-10-CM

## 2022-06-14 DIAGNOSIS — I10 ESSENTIAL HYPERTENSION: Primary | ICD-10-CM

## 2022-06-21 ENCOUNTER — TELEMEDICINE (OUTPATIENT)
Dept: FAMILY MEDICINE CLINIC | Age: 87
End: 2022-06-21
Payer: COMMERCIAL

## 2022-06-21 DIAGNOSIS — Z00.00 MEDICARE ANNUAL WELLNESS VISIT, SUBSEQUENT: Primary | ICD-10-CM

## 2022-06-21 DIAGNOSIS — F03.90 DEMENTIA WITHOUT BEHAVIORAL DISTURBANCE, UNSPECIFIED DEMENTIA TYPE: ICD-10-CM

## 2022-06-21 PROCEDURE — G0439 PPPS, SUBSEQ VISIT: HCPCS | Performed by: FAMILY MEDICINE

## 2022-06-21 PROCEDURE — 1123F ACP DISCUSS/DSCN MKR DOCD: CPT | Performed by: FAMILY MEDICINE

## 2022-06-21 ASSESSMENT — PATIENT HEALTH QUESTIONNAIRE - PHQ9
SUM OF ALL RESPONSES TO PHQ QUESTIONS 1-9: 8
1. LITTLE INTEREST OR PLEASURE IN DOING THINGS: 1
4. FEELING TIRED OR HAVING LITTLE ENERGY: 3
7. TROUBLE CONCENTRATING ON THINGS, SUCH AS READING THE NEWSPAPER OR WATCHING TELEVISION: 0
SUM OF ALL RESPONSES TO PHQ QUESTIONS 1-9: 8
SUM OF ALL RESPONSES TO PHQ QUESTIONS 1-9: 8
9. THOUGHTS THAT YOU WOULD BE BETTER OFF DEAD, OR OF HURTING YOURSELF: 0
10. IF YOU CHECKED OFF ANY PROBLEMS, HOW DIFFICULT HAVE THESE PROBLEMS MADE IT FOR YOU TO DO YOUR WORK, TAKE CARE OF THINGS AT HOME, OR GET ALONG WITH OTHER PEOPLE: 0
5. POOR APPETITE OR OVEREATING: 0
2. FEELING DOWN, DEPRESSED OR HOPELESS: 1
SUM OF ALL RESPONSES TO PHQ9 QUESTIONS 1 & 2: 2
3. TROUBLE FALLING OR STAYING ASLEEP: 3
8. MOVING OR SPEAKING SO SLOWLY THAT OTHER PEOPLE COULD HAVE NOTICED. OR THE OPPOSITE, BEING SO FIGETY OR RESTLESS THAT YOU HAVE BEEN MOVING AROUND A LOT MORE THAN USUAL: 0
6. FEELING BAD ABOUT YOURSELF - OR THAT YOU ARE A FAILURE OR HAVE LET YOURSELF OR YOUR FAMILY DOWN: 0
SUM OF ALL RESPONSES TO PHQ QUESTIONS 1-9: 8

## 2022-06-21 ASSESSMENT — LIFESTYLE VARIABLES: HOW OFTEN DO YOU HAVE A DRINK CONTAINING ALCOHOL: NEVER

## 2022-06-21 NOTE — PATIENT INSTRUCTIONS
Personalized Preventive Plan for Akira Navarro - 6/21/2022  Medicare offers a range of preventive health benefits. Some of the tests and screenings are paid in full while other may be subject to a deductible, co-insurance, and/or copay. Some of these benefits include a comprehensive review of your medical history including lifestyle, illnesses that may run in your family, and various assessments and screenings as appropriate. After reviewing your medical record and screening and assessments performed today your provider may have ordered immunizations, labs, imaging, and/or referrals for you. A list of these orders (if applicable) as well as your Preventive Care list are included within your After Visit Summary for your review. Other Preventive Recommendations:    · A preventive eye exam performed by an eye specialist is recommended every 1-2 years to screen for glaucoma; cataracts, macular degeneration, and other eye disorders. · A preventive dental visit is recommended every 6 months. · Try to get at least 150 minutes of exercise per week or 10,000 steps per day on a pedometer . · Order or download the FREE \"Exercise & Physical Activity: Your Everyday Guide\" from [employer]. Call 5-785.528.6659 or search [employer] online. · You need 9691-0512 mg of calcium and 1378-6282 IU of vitamin D per day. It is possible to meet your calcium requirement with diet alone, but a vitamin D supplement is usually necessary to meet this goal.  · When exposed to the sun, use a sunscreen that protects against both UVA and UVB radiation with an SPF of 30 or greater. Reapply every 2 to 3 hours or after sweating, drying off with a towel, or swimming. · Always wear a seat belt when traveling in a car. Always wear a helmet when riding a bicycle or motorcycle.

## 2022-06-21 NOTE — PROGRESS NOTES
Medicare Annual Wellness Visit    Brayan Kitchen is here for Medicare AWV    Assessment & Plan   Medicare annual wellness visit, subsequent      Recommendations for Preventive Services Due: see orders and patient instructions/AVS.  Recommended screening schedule for the next 5-10 years is provided to the patient in written form: see Patient Instructions/AVS.     No follow-ups on file. Subjective       Patient's complete Health Risk Assessment and screening values have been reviewed and are found in Flowsheets. The following problems were reviewed today and where indicated follow up appointments were made and/or referrals ordered. Positive Risk Factor Screenings with Interventions:     Cognitive: Words recalled: 0 Words Recalled  Total Score Interpretation: Abnormal Mini-Cog  Did the patient refuse to take the cognition test?: No    Cognitive Impairment Interventions:  · Patient declines any further evaluation/treatment for cognitive impairment  · patient has diagnosis of dementia    Depression:  PHQ-2 Score: 2  PHQ-9 Total Score: 8    Severity:1-4 = minimal depression, 5-9 = mild depression, 10-14 = moderate depression, 15-19 = moderately severe depression, 20-27 = severe depression    Depression Interventions:  · LPN INTERVENTION GUIDE: SCORE 5-14 = MODERATE DEPRESSION: FOLLOW UP IN 1 WEEK   · Patient is scheduled to see PCP 7/14/22. Patient's daughter, Quinn Mena, was also on the visit, states patient sleeps a lot and is going to having labs drawn soon. Patient responses were recorded.           General Health and ACP:  General  In general, how would you say your health is?: Good  In the past 7 days, have you experienced any of the following: New or Increased Pain, New or Increased Fatigue, Loneliness, Social Isolation, Stress or Anger?: (!) Yes  Select all that apply: (!) New or Increased Pain,Stress,Anger (appt w/ surgeon tomorrow/colostomy; daughter)  Do you get the social and emotional support that you need?: Yes  Do you have a Living Will?: Yes    Advance Directives     Power of  Living Will ACP-Advance Directive ACP-Power of Narayan Doing on 04/09/21 Not on File Not on File Filed      General Health Risk Interventions:  · Pain issues: patient's daughter states that patients ostomy site is red and very sore, patient is scheduled to see his surgeon tomorrow  · Stress: patient's comments regarding reasons for stress and/or anger: patient states his daughter will cause him stress  · Anger: patient's comments regarding reasons for stress and/or anger: patient states stress will cause him to become angry at times  · No Living Will: ACP documents already completed- patient asked to provide copy to the office    Health Habits/Nutrition:     Physical Activity: Inactive    Days of Exercise per Week: 0 days    Minutes of Exercise per Session: 0 min     Have you lost any weight without trying in the past 3 months?: (!) Yes (pt has lost 9 lbs, sleeps a lot)     Have you seen the dentist within the past year?: N/A - wear dentures    Health Habits/Nutrition Interventions:  · Inadequate physical activity:  patient is not ready to increase his/her physical activity level at this time  · Nutritional issues:  patient is not ready to address his/her nutritional/weight issues at this time   · Patient's daughter states patient doesn't eat as much due to sleeping more frequently    Hearing/Vision:  Do you or your family notice any trouble with your hearing that hasn't been managed with hearing aids?: (!) Yes  Do you have difficulty driving, watching TV, or doing any of your daily activities because of your eyesight?: No  Have you had an eye exam within the past year?: Yes  No exam data present    Hearing/Vision Interventions:  · Hearing concerns:  patient declines any further evaluation/treatment for hearing issues     ADLs:  In the past 7 days, did you need help from others to perform any of the following everyday activities: Eating, dressing, grooming, bathing, toileting, or walking/balance?: (!) Yes  Select all that apply: (!) Toileting (colostomy)  In the past 7 days, did you need help from others to take care of any of the following: Laundry, housekeeping, banking/finances, shopping, telephone use, food preparation, transportation, or taking medications?: (!) Yes  Select all that apply: (!) Laundry,Housekeeping,Banking/Finances,Shopping,Food Preparation,Transportation,Taking Medications (daughter)    ADL Interventions:  · Patient declines any further evaluation/treatment for this issue   · Patient states his daughter helps him with his ADLs          Objective      Patient-Reported Vitals  No data recorded     Unable to obtain 3 vital signs due to patient not having equipment to take blood pressure/temperature. Allergies   Allergen Reactions    Codeine Anaphylaxis    Fentanyl Other (See Comments)     Hypotension      Prior to Visit Medications    Medication Sig Taking?  Authorizing Provider   atorvastatin (LIPITOR) 20 MG tablet TAKE ONE (1) TABLET BY MOUTH ONCE DAILY Yes SVEN Manning   levothyroxine (SYNTHROID) 100 MCG tablet TAKE 1 TABLET BY MOUTH ONCE DAILY Yes SVEN Jones   ELIQUIS 2.5 MG TABS tablet TAKE 1 TABLET BY MOUTH TWO TIMES DAILY Yes LIA Lea CNP   sertraline (ZOLOFT) 100 MG tablet TAKE 1 TABLET BY MOUTH ONCE DAILY Yes SVEN Manning   metoprolol succinate (TOPROL XL) 25 MG extended release tablet TAKE 1 TABLET BY MOUTH ONCE DAILY Yes Enrique Canales MD   buPROPion (WELLBUTRIN XL) 150 MG extended release tablet Take 1 tablet by mouth every morning Yes Enrique Canales MD   lisinopril (PRINIVIL;ZESTRIL) 10 MG tablet Take 1 tablet by mouth daily Yes Enrique Canales MD   finasteride (PROSCAR) 5 MG tablet  Yes Historical Provider, MD   tamsulosin (FLOMAX) 0.4 MG capsule TAKE 1 CAPSULE BY MOUTH ONCE DAILY Yes Enrique Canales MD   latanoprost Lisa Ibarra) 0.005 % ophthalmic solution  Yes Historical Provider, MD   Ascorbic Acid (VITAMIN C) 1000 MG tablet Take 1,000 mg by mouth daily Yes Historical Provider, MD   brimonidine-timolol (COMBIGAN) 0.2-0.5 % ophthalmic solution Place 1 drop into both eyes every 12 hours Yes Historical Provider, MD   Glucosamine-MSM-Hyaluronic Acd ([facility] [place]) Take 1 each by mouth daily  Yes Historical Provider, MD   donepezil (ARICEPT) 10 MG tablet Take 1 tablet by mouth nightly  Jean Marie Duran MD       [facility] (Including outside providers/suppliers regularly involved in providing care):   Patient Care Team:  Jean Marie Duran MD as PCP - General  Jean Marie Duran MD as PCP - [facility] Empaneled Provider  Ronit Fried DO as Consulting Physician (Gastroenterology)  Odalis Caldera MD as Consulting Physician (Cardiology)     Reviewed and updated this visit:  Tobacco  Allergies  Meds  Med Hx  Surg Hx  Soc Hx  Fam Hx             I, Amado Pierre LPN, [birth year], performed the documented evaluation under the direct supervision of the attending physician. Marty Cabrera, was evaluated through a synchronous (real-time) audio encounter. The patient (or guardian if applicable) is aware that this is a billable service, which includes applicable co-pays. This Virtual Visit was conducted with patient's (and/or legal guardian's) consent. The visit was conducted pursuant to the emergency declaration under the [facility], [facility] authority and the [employer] General Act. Patient identification was verified, and a caregiver was present when appropriate. The patient was located at Home: [address] [id number]. Provider was located at [facility] [facility]): [address] [facility]. [place] [address].         Total time spent for this encounter: Not billed by time    --Amado Pierre LPN on 8/95/0956 at 1:48 PM    An electronic signature was used to authenticate this note. This encounter was performed under myAmador MDs, direct supervision, 6/21/2022.

## 2022-06-22 RX ORDER — BUPROPION HYDROCHLORIDE 150 MG/1
150 TABLET ORAL EVERY MORNING
Qty: 60 TABLET | OUTPATIENT
Start: 2022-06-22

## 2022-06-22 RX ORDER — SERTRALINE HYDROCHLORIDE 100 MG/1
TABLET, FILM COATED ORAL
Qty: 90 TABLET | Refills: 0 | Status: SHIPPED | OUTPATIENT
Start: 2022-06-22 | End: 2022-08-23

## 2022-06-22 RX ORDER — APIXABAN 2.5 MG/1
TABLET, FILM COATED ORAL
Qty: 60 TABLET | Refills: 2 | Status: SHIPPED | OUTPATIENT
Start: 2022-06-22 | End: 2022-09-20

## 2022-06-22 RX ORDER — DONEPEZIL HYDROCHLORIDE 10 MG/1
TABLET, FILM COATED ORAL
Qty: 60 TABLET | OUTPATIENT
Start: 2022-06-22

## 2022-06-22 RX ORDER — LISINOPRIL 10 MG/1
TABLET ORAL
Qty: 60 TABLET | OUTPATIENT
Start: 2022-06-22

## 2022-06-28 ENCOUNTER — HOSPITAL ENCOUNTER (EMERGENCY)
Age: 87
Discharge: HOME | End: 2022-06-29
Attending: EMERGENCY MEDICINE
Payer: COMMERCIAL

## 2022-06-28 DIAGNOSIS — R10.9 ABDOMINAL PAIN, UNSPECIFIED ABDOMINAL LOCATION: Primary | ICD-10-CM

## 2022-06-28 LAB
ALBUMIN SERPL-MCNC: 4.2 GM/DL (ref 3.4–5)
ALP BLD-CCNC: 129 IU/L (ref 40–128)
ALT SERPL-CCNC: 11 U/L (ref 10–40)
ANION GAP SERPL CALCULATED.3IONS-SCNC: 13 MMOL/L (ref 4–16)
AST SERPL-CCNC: 18 IU/L (ref 15–37)
BASOPHILS ABSOLUTE: 0 K/CU MM
BASOPHILS RELATIVE PERCENT: 0.5 % (ref 0–1)
BILIRUB SERPL-MCNC: 0.4 MG/DL (ref 0–1)
BUN BLDV-MCNC: 32 MG/DL (ref 6–23)
CALCIUM SERPL-MCNC: 9.2 MG/DL (ref 8.3–10.6)
CHLORIDE BLD-SCNC: 100 MMOL/L (ref 99–110)
CO2: 24 MMOL/L (ref 21–32)
CREAT SERPL-MCNC: 1.5 MG/DL (ref 0.9–1.3)
DIFFERENTIAL TYPE: ABNORMAL
EOSINOPHILS ABSOLUTE: 0.1 K/CU MM
EOSINOPHILS RELATIVE PERCENT: 1.7 % (ref 0–3)
GFR AFRICAN AMERICAN: 53 ML/MIN/1.73M2
GFR NON-AFRICAN AMERICAN: 44 ML/MIN/1.73M2
GLUCOSE BLD-MCNC: 134 MG/DL (ref 70–99)
HCT VFR BLD CALC: 43.8 % (ref 42–52)
HEMOGLOBIN: 14.2 GM/DL (ref 13.5–18)
IMMATURE NEUTROPHIL %: 0.3 % (ref 0–0.43)
LIPASE: 64 IU/L (ref 13–60)
LYMPHOCYTES ABSOLUTE: 0.6 K/CU MM
LYMPHOCYTES RELATIVE PERCENT: 10.2 % (ref 24–44)
MCH RBC QN AUTO: 33.1 PG (ref 27–31)
MCHC RBC AUTO-ENTMCNC: 32.4 % (ref 32–36)
MCV RBC AUTO: 102.1 FL (ref 78–100)
MONOCYTES ABSOLUTE: 0.4 K/CU MM
MONOCYTES RELATIVE PERCENT: 6 % (ref 0–4)
NUCLEATED RBC %: 0 %
PDW BLD-RTO: 12.9 % (ref 11.7–14.9)
PLATELET # BLD: 199 K/CU MM (ref 140–440)
PMV BLD AUTO: 10.2 FL (ref 7.5–11.1)
POTASSIUM SERPL-SCNC: 4.2 MMOL/L (ref 3.5–5.1)
RBC # BLD: 4.29 M/CU MM (ref 4.6–6.2)
SEGMENTED NEUTROPHILS ABSOLUTE COUNT: 4.9 K/CU MM
SEGMENTED NEUTROPHILS RELATIVE PERCENT: 81.3 % (ref 36–66)
SODIUM BLD-SCNC: 137 MMOL/L (ref 135–145)
TOTAL IMMATURE NEUTOROPHIL: 0.02 K/CU MM
TOTAL NUCLEATED RBC: 0 K/CU MM
TOTAL PROTEIN: 7.4 GM/DL (ref 6.4–8.2)
TROPONIN T: <0.01 NG/ML
WBC # BLD: 6 K/CU MM (ref 4–10.5)

## 2022-06-28 PROCEDURE — 84484 ASSAY OF TROPONIN QUANT: CPT

## 2022-06-28 PROCEDURE — 83690 ASSAY OF LIPASE: CPT

## 2022-06-28 PROCEDURE — 85025 COMPLETE CBC W/AUTO DIFF WBC: CPT

## 2022-06-28 PROCEDURE — 80053 COMPREHEN METABOLIC PANEL: CPT

## 2022-06-28 PROCEDURE — 99284 EMERGENCY DEPT VISIT MOD MDM: CPT

## 2022-06-28 PROCEDURE — 93005 ELECTROCARDIOGRAM TRACING: CPT | Performed by: PHYSICIAN ASSISTANT

## 2022-06-29 ENCOUNTER — APPOINTMENT (OUTPATIENT)
Dept: CT IMAGING | Age: 87
End: 2022-06-29
Payer: COMMERCIAL

## 2022-06-29 VITALS
WEIGHT: 155 LBS | HEART RATE: 88 BPM | BODY MASS INDEX: 26.46 KG/M2 | OXYGEN SATURATION: 97 % | TEMPERATURE: 98.2 F | SYSTOLIC BLOOD PRESSURE: 98 MMHG | RESPIRATION RATE: 18 BRPM | DIASTOLIC BLOOD PRESSURE: 52 MMHG | HEIGHT: 64 IN

## 2022-06-29 LAB
BACTERIA: NEGATIVE /HPF
BILIRUBIN URINE: ABNORMAL MG/DL
BLOOD, URINE: ABNORMAL
CLARITY: ABNORMAL
COLOR: ABNORMAL
GLUCOSE, URINE: NEGATIVE MG/DL
KETONES, URINE: ABNORMAL MG/DL
LEUKOCYTE ESTERASE, URINE: ABNORMAL
MUCUS: ABNORMAL HPF
NITRITE URINE, QUANTITATIVE: NEGATIVE
PH, URINE: 6.5 (ref 5–8)
PROTEIN UA: 100 MG/DL
RBC URINE: 2985 /HPF (ref 0–3)
SPECIFIC GRAVITY UA: 1.02 (ref 1–1.03)
TRICHOMONAS: ABNORMAL /HPF
UROBILINOGEN, URINE: 1 MG/DL (ref 0.2–1)
WBC UA: 14 /HPF (ref 0–2)

## 2022-06-29 PROCEDURE — 74176 CT ABD & PELVIS W/O CONTRAST: CPT

## 2022-06-29 PROCEDURE — 81001 URINALYSIS AUTO W/SCOPE: CPT

## 2022-06-29 ASSESSMENT — ENCOUNTER SYMPTOMS
COUGH: 0
NAUSEA: 0
EYE PAIN: 0
BACK PAIN: 0
RHINORRHEA: 0
ABDOMINAL PAIN: 1
EYE DISCHARGE: 0
SORE THROAT: 0
SHORTNESS OF BREATH: 0

## 2022-06-29 NOTE — ED TRIAGE NOTES
Pt reports has been having ABD pain for the last 6 weeks with no change. Came in today d/t family wanting him checked out.

## 2022-06-29 NOTE — ED NOTES
Pt d/c'd home with instructions & belongings. Denies any questions or concerns. resp even & non-labored. amb with cane with family without difficulty.       Lotus Deluna RN  06/29/22 2251

## 2022-06-29 NOTE — ED PROVIDER NOTES
[id number] ENCOUNTER      Pt Name: Loni Maynard  MRN: [id number]  [place] 3/23/1930  Date of evaluation: 6/28/2022  Provider: Rylan Velasco MD    CHIEF COMPLAINT       Chief Complaint   Patient presents with    Abdominal Pain         HISTORY OF PRESENT ILLNESS      Loni Maynard is a 80 y.o. male who presents to the emergency department  for   Chief Complaint   Patient presents with    Abdominal Pain       68-year-old male presents with reported pain and irritation at the site of his colostomy. He has a right-sided colostomy. He was follow-up with a local general surgeon Dr. Clifton Mitchell. He reportedly has a history of bowel obstruction and bowel resection that led to the colostomy. He previously had a left-sided colostomy but due to issues at that side it was moved to the right. He presents with his daughter who provides collateral information. She reports that he has had some redness and irritation at the site of the right-sided colostomy. His daughter reports that he has been \"messing with it. \"  And they have seen a general surgeon about this about 1 week ago. From review of those notes, it was thought that he was getting a contact dermatitis from stool exposure at that site. He was prescribed topical antibiotic as well as given instructions for further ostomy care. His daughter states that he continues to have pain at that site and they became concerned to emergency department for evaluation. He denies any trauma or injury to his abdomen. No report of any fever, chills or concentration affected symptoms. No remarkable respiratory symptoms. GCS of 15 in the emergency department. Nursing Notes, Triage Notes & Vital Signs were reviewed. REVIEW OF SYSTEMS    (2-9 systems for level 4, 10 or more for level 5)     Review of Systems   Constitutional: Negative for chills and fever. HENT: Negative for congestion, rhinorrhea and sore throat. Eyes: Negative for pain and discharge. Respiratory: Negative for cough and shortness of breath. Cardiovascular: Negative for chest pain and palpitations. Gastrointestinal: Positive for abdominal pain. Negative for nausea. Endocrine: Negative for polydipsia and polyuria. Genitourinary: Negative for dysuria and flank pain. Musculoskeletal: Negative for back pain and neck pain. Skin: Negative for pallor and wound. Neurological: Negative for dizziness, facial asymmetry, light-headedness and headaches. Psychiatric/Behavioral: Negative for confusion. Except as noted above the remainder of the review of systems was reviewed and negative. PAST MEDICAL HISTORY     Past Medical History:   Diagnosis Date    AAA [facility]abdominal [facility])     Surgery scheduled for 7/1/2014 with Dr. Eric Stringer.  Arthritis     generalized    Asthma     AV block, 1st degree     Back pain     Borderline hypothyroidism 12/4/2020    Bradycardia     Colon polyps     Colostomy in place [facility])     Glaucoma     H/O cardiovascular stress test 12/27/2013    cardiolite-EF56%, apical hypokinesis is seen, cannot exlude apical Tyler ischemia    History of blood transfusion     History of cardiovascular stress test 3/5/10    No angina or ischemic EKG changes are noted with Lexiscan. The cardiolite study demonstrated normal perfusion in all segments of the myocardium with an intact left ventricular systolic function. The resting sestamibi dose is 10.8, stress is 32.5. EF 66%    History of chest x-ray 3/16/10    The chest is considered nonacute. There is cardiomegaly noted. COPD.  History of Doppler ultrasound 3/25/10    venous doppler- Technically good venous ultrasound study negative for DVT in both lower extremities. Normal compressibility,color flow doppler pattern and spectral doppler pattern demonstrated thoughout.     History of echocardiogram 3/18/10    Boarderline LV dilatation with concentric hypertrophy. Low normal systolic and abnormal diastolic function. Mild mitral and trace tricuspid regurgitation. Mild aortic root and bilateral dilatation.  Holter monitor, abnormal 11/10/10    11/10/2010- 24 HR- Abnormal holter revealing some significant brasycardia's and wenckebach phenomenon. Therefore, clinical  correlation is recommend.  Hx of blood clots     Pacemaker     PAF (paroxysmal atrial fibrillation) (HCC)     Peritonitis ([facility]     Personal history of colonic polyps     Poor historian     Skin cancer     face    Ulcerative (chronic) proctosigmoiditis ([facility]     Wears glasses        Prior to Admission medications    Medication Sig Start Date End Date Taking? Authorizing Provider   ELIQUIS 2.5 MG TABS tablet TAKE 1 TABLET BY MOUTH TWO TIMES DAILY 6/22/22   SVEN Roberson   sertraline (ZOLOFT) 100 MG tablet TAKE 1 TABLET BY MOUTH ONCE DAILY 6/22/22   SVEN Roberson   clotrimazole-betamethasone (LOTRISONE) 1-0.05 % cream Apply topically 2 times daily.  6/22/22   Luz Maria Garcia MD   atorvastatin (LIPITOR) 20 MG tablet TAKE ONE (1) TABLET BY MOUTH ONCE DAILY 5/4/22   SVEN Roberson   levothyroxine (SYNTHROID) 100 MCG tablet TAKE 1 TABLET BY MOUTH ONCE DAILY 5/4/22   SVEN Roberson   metoprolol succinate (TOPROL XL) 25 MG extended release tablet TAKE 1 TABLET BY MOUTH ONCE DAILY 3/7/22   Mellisa Arias MD   buPROPion (WELLBUTRIN XL) 150 MG extended release tablet Take 1 tablet by mouth every morning 2/25/22   Mellisa Arias MD   lisinopril (PRINIVIL;ZESTRIL) 10 MG tablet Take 1 tablet by mouth daily 2/24/22   Mellisa Arias MD   donepezil (ARICEPT) 10 MG tablet Take 1 tablet by mouth nightly 1/24/22 6/9/22  Mellisa Arias MD   finasteride (PROSCAR) 5 MG tablet  11/30/21   Historical Provider, MD   tamsulosin (FLOMAX) 0.4 MG capsule TAKE 1 CAPSULE BY MOUTH ONCE DAILY 9/22/21   Rupinder Varela Joe Little MD   latanoprost (XALATAN) 0.005 % ophthalmic solution  5/21/21   Historical Provider, MD   Ascorbic Acid (VITAMIN C) 1000 MG tablet Take 1,000 mg by mouth daily    Historical Provider, MD   brimonidine-timolol (COMBIGAN) 0.2-0.5 % ophthalmic solution Place 1 drop into both eyes every 12 hours    Historical Provider, MD   Glucosamine-MSM-Hyaluronic Acd ([address] [facility]") Take 1 each by mouth daily     Historical Provider, MD        Patient Active Problem List   Diagnosis    Pure hypercholesterolemia    Essential hypertension    Perforated viscus    Anemia    Ileus following gastrointestinal surgery ([address]    Hypokalemia    Cardiac pacemaker    Hypertrophy of prostate with urinary obstruction and other lower urinary tract symptoms (LUTS)    Gross hematuria    AAA (abdominal aortic aneurysm) (HCC)    PAF (paroxysmal atrial fibrillation) ([address]    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    Leukocytosis    Partial small bowel obstruction ([address]    S/P biventricular cardiac pacemaker procedure    Pacemaker lead malfunction    Complete heart block (HCC)    Chronic systolic heart failure (HCC)    Small bowel obstruction (HCC)    Urinary tract infection without hematuria    Intractable nausea and vomiting    Other hyperlipidemia    PVD (peripheral vascular disease) ([address]    Acquired hypothyroidism    Pancreatitis, unspecified pancreatitis type    Mild episode of recurrent major depressive disorder ([address]    Excessive daytime sleepiness    Fatigue    Dementia without behavioral disturbance ([address]    Dysuria         SURGICAL HISTORY       Past Surgical History:   Procedure Laterality Date    ABDOMINAL AORTIC ANEURYSM REPAIR, ENDOVASCULAR  7/1/14    ABDOMINAL EXPLORATION SURGERY  2/4/2013    exp lap, left hemicolectomy, umbilectomy    APPENDECTOMY  2/4/2013    APPENDECTOMY  2/4/2013    COLONOSCOPY  2/4/2013    COLOSTOMY  2/4/2013    CYSTOSCOPY  2/03/2014    TURP    HEMORRHOID SURGERY  2011    HERNIA REPAIR Bilateral [employer] [employer] hernia    KNEE SURGERY Right 1980s    PACEMAKER INSERTION Right 12/13/2017    BIV PPM Medtronic Percepta Quad CRT-P MRI SureScan Pacemaker    PACEMAKER PLACEMENT Right 02/25/2013    Explanted 12/13/2017    SMALL INTESTINE SURGERY N/A 8/28/2019    EXPLORATORY LAPAROTOMY, SMALL BOWEL RESECTION, LYSIS OF ADHESIONS, NEW COLOSTOMY, AND HERNIA REPAIR WITH XENMATRIX MESH performed by Colton Gonzalez MD at [place] [address]       Discharge Medication List as of 6/29/2022  3:16 AM      CONTINUE these medications which have NOT CHANGED    Details   ELIQUIS 2.5 MG TABS tablet TAKE 1 TABLET BY MOUTH TWO TIMES DAILY, Disp-60 tablet, R-2, DAWDISPILL PATIENTNormal      clotrimazole-betamethasone (LOTRISONE) 1-0.05 % cream Apply topically 2 times daily. , Disp-1 each, R-1, Normal      sertraline (ZOLOFT) 100 MG tablet TAKE 1 TABLET BY MOUTH ONCE DAILY, Disp-90 tablet, R-0DISPILL PATIENTNormal      atorvastatin (LIPITOR) 20 MG tablet TAKE ONE (1) TABLET BY MOUTH ONCE DAILY, Disp-90 tablet, R-0DISPILL PATIENTNormal      levothyroxine (SYNTHROID) 100 MCG tablet TAKE 1 TABLET BY MOUTH ONCE DAILY, Disp-90 tablet, R-0DISPILL PATIENTNormal      metoprolol succinate (TOPROL XL) 25 MG extended release tablet TAKE 1 TABLET BY MOUTH ONCE DAILY, Disp-30 tablet, R-5DISPILL PATIENTNormal      buPROPion (WELLBUTRIN XL) 150 MG extended release tablet Take 1 tablet by mouth every morning, Disp-30 tablet, R-5Normal      lisinopril (PRINIVIL;ZESTRIL) 10 MG tablet Take 1 tablet by mouth daily, Disp-30 tablet, R-5Stopped lisin/hctzNormal      donepezil (ARICEPT) 10 MG tablet Take 1 tablet by mouth nightly, Disp-30 tablet, R-5Normal      finasteride (PROSCAR) 5 MG tablet Historical Med      tamsulosin (FLOMAX) 0.4 MG capsule TAKE 1 CAPSULE BY MOUTH ONCE DAILY, Disp-30 capsule, R-5DISPILL PATIENT! Normal      latanoprost (XALATAN) 0.005 % ophthalmic solution Historical Med      Ascorbic Acid (VITAMIN C) 1000 MG tablet Take 1,000 mg by mouth dailyHistorical Med      brimonidine-timolol (COMBIGAN) 0.2-0.5 % ophthalmic solution Place 1 drop into both eyes every 12 hoursHistorical Med      Glucosamine-MSM-Hyaluronic Acd ([facility] PO) Take 1 each by mouth daily Historical Med             ALLERGIES     Codeine and Fentanyl    FAMILY HISTORY       Family History   Problem Relation Age of Onset    Stroke Mother         CVA    Heart Disease Mother     Prostate Cancer Brother     Pacemaker Brother     Cancer Brother         skin    Cancer Daughter         colon    Substance Abuse Son         Tobacco    No Known Problems Father           SOCIAL HISTORY       Social History     Socioeconomic History    Marital status:      Spouse name: None    Number of children: 3    Years of education: None    Highest education level: None   Occupational History    Occupation: Retired   Tobacco Use    Smoking status: Never Smoker    Smokeless tobacco: Never Used   Vaping Use    Vaping Use: Never used   Substance and Sexual Activity    Alcohol use: No     Alcohol/week: 0.0 standard drinks    Drug use: No    Sexual activity: Yes     Partners: Female     Comment:    Other Topics Concern    None   Social History Narrative    None     Social Determinants of Health     Financial Resource Strain: Low Risk     Difficulty of Paying Living Expenses: Not hard at all   Food Insecurity: No Food Insecurity    Worried About [address] in the Last Year: Never true    [facility] in the Last Year: Never true   Transportation Needs:     Lack of Transportation (Medical): Not on file    Lack of Transportation (Non-Medical):  Not on file   Physical Activity: Inactive    Days of Exercise per Week: 0 days    Minutes of Exercise per Session: 0 min   Stress:     Feeling of Stress : Not on file   Social Connections:     Frequency of Communication with Friends and Skin:     General: Skin is warm. Capillary Refill: Capillary refill takes less than 2 seconds. Neurological:      General: No focal deficit present. Mental Status: He is alert and oriented to person, place, and time. DIAGNOSTIC RESULTS     Labs Reviewed   CBC WITH AUTO DIFFERENTIAL - Abnormal; Notable for the following components:       Result Value    RBC 4.29 (*)     .1 (*)     MCH 33.1 (*)     Segs Relative 81.3 (*)     Lymphocytes % 10.2 (*)     Monocytes % 6.0 (*)     All other components within normal limits   COMPREHENSIVE METABOLIC PANEL - Abnormal; Notable for the following components:    BUN 32 (*)     CREATININE 1.5 (*)     Glucose 134 (*)     Alkaline Phosphatase 129 (*)     GFR Non- 44 (*)     GFR  53 (*)     All other components within normal limits   LIPASE - Abnormal; Notable for the following components:    Lipase 64 (*)     All other components within normal limits   URINALYSIS WITH MICROSCOPIC - Abnormal; Notable for the following components:    Color, UA BROWN (*)     Clarity, UA CLOUDY (*)     Bilirubin Urine MODERATE (*)     Ketones, Urine TRACE (*)     Blood, Urine LARGE NUMBER OR AMOUNT OF  (*)     Protein,  (*)     Leukocyte Esterase, Urine MODERATE (*)     RBC, UA 2,985 (*)     WBC, UA 14 (*)     Mucus, UA FEW (*)     All other components within normal limits   TROPONIN          EKG: All EKG's are interpreted by the Emergency Department Physician who either signs or Co-signs this chart in the absence of a cardiologist.       EKG Interpretation    Interpreted by emergency department physician    Difficult to interpret, axis is left deviated, does not appear to be any remarkable ST segment elevations or depressions, T waves overall appear to be unremarkable, QRS duration 142, QTc of 508. Final impression, paced rhythm, nonspecific EKG.     Leighann Bryant MD     RADIOLOGY:     Non-plain film images such as CT, Ultrasound and MRI are read by the radiologist. Plain radiographic images are visualized and preliminarily interpreted by the emergency physician. Interpretation per the Radiologist below, if available at the time of this note:    CT ABDOMEN PELVIS WO CONTRAST Additional Contrast? None   Final Result   There is a right lower quadrant ostomy with large associated parastomal   hernia. There is no fluid collection associated with this ostomy or hernia. There are several ventral hernias each containing loops of bowel with no   evidence of incarceration, unchanged. Status post aorto bi iliac endograft for pararenal abdominal aortic aneurysm. The large residual native aneurysm measuring up to 7.8 cm is stable from   04/09/2021. The oval-shaped left anterior abdominal wall fluid collection has decreased   slightly in size from 04/09/2021 now measuring up to 10.6 cm, previously 12.8   cm.                ED BEDSIDE ULTRASOUND:   Performed by ED Physician Paulina Brownlee MD       LABS:  Labs Reviewed   CBC WITH AUTO DIFFERENTIAL - Abnormal; Notable for the following components:       Result Value    RBC 4.29 (*)     .1 (*)     MCH 33.1 (*)     Segs Relative 81.3 (*)     Lymphocytes % 10.2 (*)     Monocytes % 6.0 (*)     All other components within normal limits   COMPREHENSIVE METABOLIC PANEL - Abnormal; Notable for the following components:    BUN 32 (*)     CREATININE 1.5 (*)     Glucose 134 (*)     Alkaline Phosphatase 129 (*)     GFR Non- 44 (*)     GFR  53 (*)     All other components within normal limits   LIPASE - Abnormal; Notable for the following components:    Lipase 64 (*)     All other components within normal limits   URINALYSIS WITH MICROSCOPIC - Abnormal; Notable for the following components:    Color, UA BROWN (*)     Clarity, UA CLOUDY (*)     Bilirubin Urine MODERATE (*)     Ketones, Urine TRACE (*)     Blood, Urine LARGE NUMBER OR AMOUNT OF  (*) Protein,  (*)     Leukocyte Esterase, Urine MODERATE (*)     RBC, UA 2,985 (*)     WBC, UA 14 (*)     Mucus, UA FEW (*)     All other components within normal limits   TROPONIN       All other labs were within normal range or not returned as of this dictation. EMERGENCY DEPARTMENT COURSE and DIFFERENTIAL DIAGNOSIS/MDM:   Vitals:    Vitals:    06/29/22 0002 06/29/22 0102 06/29/22 0202 06/29/22 0254   BP: 102/61 110/86 (!) 105/92 (!) 98/52   Pulse:       Resp:       Temp:       TempSrc:       SpO2: 98%   97%   Weight:       Height:               MDM  Number of Diagnoses or Management Options  Abdominal pain, unspecified abdominal location  Diagnosis management comments: 3year-old male with right colostomy presents complaining of some pain and irritation around his colostomy. He has been dealing with this issue for some time. Presents with his daughter who provides collateral information. She reports that he \"messes with\" the colostomy a lot. He was seen by his general surgeon about 1 week ago. It was thought at that time according to notes that he had some contact dermatitis from stool exposure. He was prescribed a topical antibiotic as well as given some colostomy care instructions. Daughter reports that he is continuing to have symptoms at that site. Denies any is any other remarkable infectious symptoms. He presents the emergency department afebrile. No tachycardia. Respirations within normal limits. Saturations in the high 90s. On exam he does have some erythema at the site of the colostomy. Does have a known parastomal hernia there. Area is soft and reducible. He does have a new colostomy bag with no significant output. Labs are obtained. He has no significant leukocytosis. Lites overall within normal limits. Did obtain abdominal CT scan that continues to show his parastomal hernia that is largely unchanged. No other acute abnormalities noted.   He has had consistent issues with some pyuria and hematuria over the last year urinalysis test that he has had done. These do not appear to have grown out any infection. Denies any dysuria at this time. I do not feel that this finding at this time clinically warrants treatment with antibiotics. Results are discussed with patient's family. At this time he is overall well-appearing. He has no signs of obstruction at the stoma and CT does not show any signs of intra-abdominal acute abnormality. He will continue with his topical treatment prescribed by his general surgeon. I will give him referral to wound care. Follow-up with general surgery for further evaluation management. Family patient agreed with plan of care. He is discharged in stable condition with return precautions. Amount and/or Complexity of Data Reviewed  Decide to obtain previous medical records or to obtain history from someone other than the patient: yes        -  Patient seen and evaluated in the emergency department. -  Triage and nursing notes reviewed and incorporated. -  Old chart records reviewed and incorporated. -  Work-up included:  See above  -  Results discussed with patient. CONSULTS:  None    PROCEDURES:  None performed unless otherwise noted below     Procedures        FINAL IMPRESSION      1. Abdominal pain, unspecified abdominal location          DISPOSITION/PLAN   DISPOSITION Decision To Discharge 06/29/2022 03:26:05 AM      PATIENT REFERRED TO:  Monica Alves MD  [address] [place] [address]. [address]  [facility]  [phone]    Schedule an appointment as soon as possible for a visit in 2 days      [facility] [address] [id number] [phone]  Schedule an appointment as soon as possible for a visit in 2 days        DISCHARGE MEDICATIONS:  Discharge Medication List as of 6/29/2022  3:16 AM          ED Provider Disposition Time  DISPOSITION Decision To Discharge 06/29/2022 03:26:05 AM      Appropriate personal protective equipment was worn during the patient's evaluation. These included surgical, eye protection, surgical mask or in 95 respirator and gloves. The patient was also placed in a surgical mask for the prevention of possible spread of respiratory viral illnesses. The Patient was instructed to read the package inserts with any medication that was prescribed. Major potential reactions and medication interactions were discussed. The Patient understands that there are numerous possible adverse reactions not covered. The patient was also instructed to arrange follow-up with his or her primary care provider for review of any pending labwork or incidental findings on any radiology results that were obtained. All efforts were made to discuss any incidental findings that require further monitoring. Controlled Substances Monitoring:     No flowsheet data found.     (Please note that portions of this note were completed with a voice recognition program.  Efforts were made to edit the dictations but occasionally words are mis-transcribed.)    Jaycob Basiloi MD (electronically signed)  Attending Emergency Physician           Jaycob Basilio MD  06/29/22 2625

## 2022-06-29 NOTE — ED NOTES
Pt resting in bed with family @ BS. resp even & non-labored. Denies any pain. States his stoma is red & irritated looking. Family states that pt hasn't been eating much recently & that he has been more tired lately. Denies any other complaints @ this time. Call light in reach, will cont to monitor.       Sonia Neville RN  06/28/22 1914

## 2022-06-30 LAB
EKG ATRIAL RATE: 277 BPM
EKG DIAGNOSIS: NORMAL
EKG Q-T INTERVAL: 438 MS
EKG QRS DURATION: 142 MS
EKG QTC CALCULATION (BAZETT): 508 MS
EKG R AXIS: 238 DEGREES
EKG T AXIS: 71 DEGREES
EKG VENTRICULAR RATE: 81 BPM

## 2022-06-30 PROCEDURE — 93010 ELECTROCARDIOGRAM REPORT: CPT | Performed by: INTERNAL MEDICINE

## 2022-07-01 DIAGNOSIS — F03.90 DEMENTIA WITHOUT BEHAVIORAL DISTURBANCE, UNSPECIFIED DEMENTIA TYPE: ICD-10-CM

## 2022-07-05 RX ORDER — DONEPEZIL HYDROCHLORIDE 10 MG/1
TABLET, FILM COATED ORAL
Qty: 30 TABLET | Refills: 5 | Status: SHIPPED | OUTPATIENT
Start: 2022-07-05

## 2022-07-12 DIAGNOSIS — R82.998 DARK BROWN URINE: Primary | ICD-10-CM

## 2022-07-12 DIAGNOSIS — Z00.00 HEALTHCARE MAINTENANCE: ICD-10-CM

## 2022-07-12 DIAGNOSIS — I10 ESSENTIAL HYPERTENSION: ICD-10-CM

## 2022-07-12 LAB
ANION GAP SERPL CALCULATED.3IONS-SCNC: 10 MMOL/L (ref 3–16)
BACTERIA: ABNORMAL /HPF
BILIRUBIN URINE: ABNORMAL
BLOOD, URINE: ABNORMAL
BUN BLDV-MCNC: 21 MG/DL (ref 7–20)
CALCIUM SERPL-MCNC: 8.8 MG/DL (ref 8.3–10.6)
CHLORIDE BLD-SCNC: 102 MMOL/L (ref 99–110)
CLARITY: ABNORMAL
CO2: 28 MMOL/L (ref 21–32)
COLOR: ABNORMAL
COMMENT UA: ABNORMAL
CREAT SERPL-MCNC: 1.4 MG/DL (ref 0.8–1.3)
CREATININE URINE: 189.5 MG/DL (ref 39–259)
EPITHELIAL CELLS, UA: 20 /HPF (ref 0–5)
GFR AFRICAN AMERICAN: 57
GFR NON-AFRICAN AMERICAN: 47
GLUCOSE BLD-MCNC: 115 MG/DL (ref 70–99)
GLUCOSE URINE: NEGATIVE MG/DL
HYALINE CASTS: 1 /LPF (ref 0–8)
KETONES, URINE: NEGATIVE MG/DL
LEUKOCYTE ESTERASE, URINE: ABNORMAL
MICROALBUMIN UR-MCNC: 29.6 MG/DL
MICROALBUMIN/CREAT UR-RTO: 156.2 MG/G (ref 0–30)
MICROSCOPIC EXAMINATION: YES
NITRITE, URINE: NEGATIVE
PH UA: 5.5 (ref 5–8)
POTASSIUM SERPL-SCNC: 4.4 MMOL/L (ref 3.5–5.1)
PROTEIN UA: 100 MG/DL
RBC UA: 303 /HPF (ref 0–4)
SODIUM BLD-SCNC: 140 MMOL/L (ref 136–145)
SPECIFIC GRAVITY UA: 1.02 (ref 1–1.03)
URINE TYPE: ABNORMAL
UROBILINOGEN, URINE: 0.2 E.U./DL
WBC UA: 20 /HPF (ref 0–5)

## 2022-07-12 RX ORDER — LISINOPRIL 10 MG/1
TABLET ORAL
Qty: 30 TABLET | Refills: 5 | Status: SHIPPED | OUTPATIENT
Start: 2022-07-12

## 2022-07-12 RX ORDER — BUPROPION HYDROCHLORIDE 150 MG/1
150 TABLET ORAL EVERY MORNING
Qty: 30 TABLET | Refills: 5 | Status: SHIPPED | OUTPATIENT
Start: 2022-07-12

## 2022-07-13 ENCOUNTER — HOSPITAL ENCOUNTER (OUTPATIENT)
Dept: WOUND CARE | Age: 87
Discharge: HOME | End: 2022-07-13
Payer: COMMERCIAL

## 2022-07-13 ENCOUNTER — TELEPHONE (OUTPATIENT)
Dept: FAMILY MEDICINE CLINIC | Age: 87
End: 2022-07-13

## 2022-07-13 VITALS
DIASTOLIC BLOOD PRESSURE: 85 MMHG | SYSTOLIC BLOOD PRESSURE: 140 MMHG | RESPIRATION RATE: 18 BRPM | TEMPERATURE: 98.4 F | HEART RATE: 96 BPM

## 2022-07-13 DIAGNOSIS — R31.9 URINARY TRACT INFECTION WITH HEMATURIA, SITE UNSPECIFIED: Primary | ICD-10-CM

## 2022-07-13 DIAGNOSIS — N39.0 URINARY TRACT INFECTION WITH HEMATURIA, SITE UNSPECIFIED: Primary | ICD-10-CM

## 2022-07-13 DIAGNOSIS — R30.0 DYSURIA: Primary | ICD-10-CM

## 2022-07-13 PROCEDURE — 99202 OFFICE O/P NEW SF 15 MIN: CPT

## 2022-07-13 RX ORDER — CIPROFLOXACIN 500 MG/1
500 TABLET, FILM COATED ORAL 2 TIMES DAILY
Qty: 20 TABLET | Refills: 0 | Status: SHIPPED | OUTPATIENT
Start: 2022-07-13 | End: 2022-07-23

## 2022-07-13 NOTE — CONSULTS
Ostomy Referral Progress Note      NAME:  Ti Sylvester RECORD NUMBER:  [id number]  AGE: 80 y.o. GENDER:  male  :  3/23/1930  TODAY'S DATE:  2022    Oskar Tobar is a 80 y.o. male referred by:   [x] Physician  [] Nursing  [] Other:     New    Surgeon Dr. Jennifer Ortiz HISTORY:        Diagnosis Date    AAA [facility]abdominal aortic [facility]) [facility])     Surgery scheduled for 2014 with Dr. Jose Alfredo Deleon.  Arthritis     generalized    Asthma     AV block, 1st degree     Back pain     Borderline hypothyroidism 2020    Bradycardia     Colon polyps     Colostomy in place [facility])     Glaucoma     H/O cardiovascular stress test 2013    cardiolite-EF56%, apical hypokinesis is seen, cannot exlude apical Tyler ischemia    History of blood transfusion     History of cardiovascular stress test 3/5/10    No angina or ischemic EKG changes are noted with Lexiscan. The cardiolite study demonstrated normal perfusion in all segments of the myocardium with an intact left ventricular systolic function. The resting sestamibi dose is 10.8, stress is 32.5. EF 66%    History of chest x-ray 3/16/10    The chest is considered nonacute. There is cardiomegaly noted. COPD.  History of Doppler ultrasound 3/25/10    venous doppler- Technically good venous ultrasound study negative for DVT in both lower extremities. Normal compressibility,color flow doppler pattern and spectral doppler pattern demonstrated thoughout.  History of echocardiogram 3/18/10    [place]line LV dilatation with concentric hypertrophy. Low normal systolic and abnormal diastolic function. Mild mitral and trace tricuspid regurgitation. Mild aortic root and bilateral dilatation.  Holter monitor, abnormal 11/10/10    11/10/2010- 24 HR- Abnormal holter revealing some significant brasycardia's and wenckebach phenomenon.  Therefore, clinical  correlation is recommend.  Hx of blood clots     Pacemaker     PAF (paroxysmal atrial fibrillation) (HCC)     Peritonitis ([facility] [address]     Personal history of colonic polyps     Poor historian     Skin cancer     face    Ulcerative (chronic) proctosigmoiditis (HCC)     Wears glasses        MEDICATIONS:    Current Outpatient Medications on File Prior to Encounter   Medication Sig Dispense Refill    ELIQUIS 2.5 MG TABS tablet TAKE 1 TABLET BY MOUTH TWO TIMES DAILY 60 tablet 2    lisinopril (PRINIVIL;ZESTRIL) 10 MG tablet TAKE 1 TABLET BY MOUTH ONCE DAILY 30 tablet 5    buPROPion (WELLBUTRIN XL) 150 MG extended release tablet TAKE 1 TABLET BY MOUTH EVERY MORNING 30 tablet 5    donepezil (ARICEPT) 10 MG tablet TAKE 1 TABLET BY MOUTH AT NIGHT 30 tablet 5    sertraline (ZOLOFT) 100 MG tablet TAKE 1 TABLET BY MOUTH ONCE DAILY 90 tablet 0    clotrimazole-betamethasone (LOTRISONE) 1-0.05 % cream Apply topically 2 times daily. 1 each 1    atorvastatin (LIPITOR) 20 MG tablet TAKE ONE (1) TABLET BY MOUTH ONCE DAILY 90 tablet 0    levothyroxine (SYNTHROID) 100 MCG tablet TAKE 1 TABLET BY MOUTH ONCE DAILY 90 tablet 0    metoprolol succinate (TOPROL XL) 25 MG extended release tablet TAKE 1 TABLET BY MOUTH ONCE DAILY 30 tablet 5    finasteride (PROSCAR) 5 MG tablet       tamsulosin (FLOMAX) 0.4 MG capsule TAKE 1 CAPSULE BY MOUTH ONCE DAILY 30 capsule 5    latanoprost (XALATAN) 0.005 % ophthalmic solution       Ascorbic Acid (VITAMIN C) 1000 MG tablet Take 1,000 mg by mouth daily      brimonidine-timolol (COMBIGAN) 0.2-0.5 % ophthalmic solution Place 1 drop into both eyes every 12 hours      Glucosamine-MSM-Hyaluronic Acd ([employer] PO) Take 1 each by mouth daily        No current facility-administered medications on file prior to encounter.        ALLERGIES:    Allergies   Allergen Reactions    Codeine Anaphylaxis    Fentanyl Other (See Comments)     Hypotension        PAST SURGICAL HISTORY:    Past Surgical History: Procedure Laterality Date    ABDOMINAL AORTIC ANEURYSM REPAIR, ENDOVASCULAR  7/1/14    ABDOMINAL EXPLORATION SURGERY  2/4/2013    exp lap, left hemicolectomy, umbilectomy    APPENDECTOMY  2/4/2013    APPENDECTOMY  2/4/2013    COLONOSCOPY  2/4/2013    COLOSTOMY  2/4/2013    CYSTOSCOPY  2/03/2014    TURP    HEMORRHOID SURGERY  2011    HERNIA REPAIR Bilateral [address] hernia    KNEE SURGERY Right 1980s    PACEMAKER INSERTION Right 12/13/2017    BIV PPM Medtronic Percepta Quad CRT-P MRI SureScan Pacemaker    PACEMAKER PLACEMENT Right 02/25/2013    Explanted 12/13/2017    SMALL INTESTINE SURGERY N/A 8/28/2019    EXPLORATORY LAPAROTOMY, SMALL BOWEL RESECTION, LYSIS OF ADHESIONS, NEW COLOSTOMY, AND HERNIA REPAIR WITH XENMATRIX MESH performed by Tara Lan MD at [address] [place] [address]:    family history includes Cancer in his brother and daughter; Heart Disease in his mother; No Known Problems in his father; Pacemaker in his brother; Prostate Cancer in his brother; Stroke in his mother; Substance Abuse in his son.     SOCIAL HISTORY:    Social History     Tobacco Use    Smoking status: Never Smoker    Smokeless tobacco: Never Used   Vaping Use    Vaping Use: Never used   Substance Use Topics    Alcohol use: No     Alcohol/week: 0.0 standard drinks    Drug use: No       LABS:  WBC:    Lab Results   Component Value Date/Time    WBC 6.0 06/28/2022 10:55 PM     H/H:    Lab Results   Component Value Date/Time    HGB 14.2 06/28/2022 10:55 PM    HCT 43.8 06/28/2022 10:55 PM     BMP:    Lab Results   Component Value Date/Time     07/12/2022 02:52 PM    K 4.4 07/12/2022 02:52 PM     07/12/2022 02:52 PM    CO2 28 07/12/2022 02:52 PM    BUN 21 07/12/2022 02:52 PM    LABALBU 4.2 06/28/2022 10:55 PM    CREATININE 1.4 07/12/2022 02:52 PM    CALCIUM 8.8 07/12/2022 02:52 PM    GFRAA 57 07/12/2022 02:52 PM    LABGLOM 47 07/12/2022 02:52 PM    GLUCOSE 115 07/12/2022 02:52 PM     PTT: Lab Results   Component Value Date/Time    APTT 27.0 04/09/2021 07:49 AM   [APTT}  PT/INR:    Lab Results   Component Value Date/Time    PROTIME 12.8 04/09/2021 07:49 AM    PROTIME 11.3 02/06/2012 01:15 PM    INR 1.06 04/09/2021 07:49 AM       Objective    BP (!) 140/85   Pulse 96   Temp 98.4 °F (36.9 °C) (Tympanic)   Resp 18     Aaron Risk Score Aaron Scale Score: 20    Patient Active Problem List   Diagnosis Code    Pure hypercholesterolemia E78.00    Essential hypertension I10    Perforated viscus R19.8    Anemia D64.9    Ileus following gastrointestinal surgery ([facility]) K91.89, K56.7    Hypokalemia E87.6    Cardiac pacemaker Z95.0    Hypertrophy of prostate with urinary obstruction and other lower urinary tract symptoms (LUTS) N40.1, N13.8    Gross hematuria R31.0    AAA (abdominal aortic aneurysm) ([facility]) I71.4    PAF (paroxysmal atrial fibrillation) ([facility]) I48.0    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    Leukocytosis D72.829    Partial small bowel obstruction ([facility]) K56.600    S/P biventricular cardiac pacemaker procedure Z95.0    Pacemaker lead malfunction T82.110A    Complete heart block ([facility]) I44.2    Chronic systolic heart failure ([facility]) I50.22    Small bowel obstruction ([address] K56.609    Urinary tract infection without hematuria N39.0    Intractable nausea and vomiting R11.2    Other hyperlipidemia E78.49    PVD (peripheral vascular disease) ([facility]) I73.9    Acquired hypothyroidism E03.9    Pancreatitis, unspecified pancreatitis type K85.90    Mild episode of recurrent major depressive disorder ([facility]) F33.0    Excessive daytime sleepiness G47.19    Fatigue R53.83    Dementia without behavioral disturbance ([facility]) F03.90    Dysuria R30.0       Assessment              Colostomy RUQ (Active)   Stomal Appliance Changed;Leaking;1 piece 07/13/22 1432   Stoma  Assessment Moist;Red;Protrudes 07/13/22 1432   Peristomal Assessment Denuded 07/13/22 1432   Treatment Bag change;Site care 07/13/22 1432   Stool Appearance Loose 07/13/22 1432   Stool Color Brown 07/13/22 1432   Stool Amount Medium 07/13/22 1432   Number of days: 9976                No intake or output data in the 24 hours ending 07/13/22 1200 Milka Guzman Darin for Ostomy Care:        Pt ambulated to exam room independently with cane. Daughter Jono Hudson is with pt. Pt with some memory deficits. History obtained both from pt and Jono yMerslayo. Ana Paula Vega is with pt at home but he mainly cares for colostomy. Stated has had colostomy since 2013 (was on left side) due to \"sepsis\" but was unable to clarify. Stated had revision in 2017 for hernia and was moved to right side. Chart reviewed but no op notes seen. Appears to be colostomy with soft stool output even though on right side of abdomen. Stated pt has been having some pain/irritation around stoma for a while and pt was having difficulty with \"3 piece system\" which seemed to be stoma ring and 2 piece Coloplast system so recently changed to 1 piece flat Coloplast cut to fit. Also, stated was instructed by Dr. Clayton Lucero to apply barrier cream to peristomal area but then it would not stick. Pt was cutting barriers too large so daughter has been cutting to 35 mm. Pt able to empty pouch independently and stated has been changing daily. Unable to determine if from leakage or just routinely changing. Silent leakage noted all under current barrier. Removed. Stoma red, moist, protruding, and round when stretched. Measures 35 mm. Peristomal skin denuded. MASD and scar tissue. Abdomen soft and probable hernia noted as well. Picture taken. Daughter stated pt has lost weight recently as well. Recommend to keep regimen simple in order for pt to perform and add convexity with precut barrier to 1 3/8\". Applied 1 piece Coloplast convex [id number] and gave daughter samples. Will have [employer] send sample as well. Instructed to try both brands to see if get better seal/preference precut.  Plan to follow up in 3 weeks. Pt/daughter agreeable to plan. Ambulated to lobby independently with cane. Ostomy Care:    Remove pouch and barrier. Cleanse stoma and peristomal skin with warm water. Dry thoroughly. Measure stoma and cut barrier opening to fit close around base of stoma. (35 mm)      Apply Coloplast 1 piece convex [id number] or Reyes 1 piece convex [id number]. Hold pressure for 2 minutes. Empty pouch when 1/3 to 1/2 full. Change every 3-7 days and prn. Follow up with Felix stoma nurse on Wednesday August 3rd at 2:30. Call [phone] with any issues.        Ostomy Plan of Care  [] Supplies/Instructions left in room  [] Patient using home supplies  [] Brand/supplies at bedside   [x] Current pouching system: Coloplast flat 1 piece cut to fit #[id number]    Current Diet: No diet orders on file  Dietician consult:  No    Discharge Plan:  Placement for patient upon discharge: home with support    Outpatient visit plan Yes  Supplies given Yes   Samples requested Yes    Referrals:  []   [] [facility]  [] Supplies  [] Other      Patient/Caregiver Teaching:  Written Instructions given to patient/family:  Teaching provided:  [] Reviewed GI and A&P        [x] Supplies  [x] Pouch emptying      [x] Manipulate closure  [x] Routine Care         [] Comment  [x] Pouch maintenance           Level of patient/caregiver understanding able to:  [x] Indicates understanding       [] Needs reinforcement  [] Unsuccessful      [] Verbal Understanding  [] Demonstrated understanding       [] No evidence of learning  [] Refused teaching         [] N/A    Electronically signed by Bertin Sarmiento RN, Tara Chan on 7/13/2022 at 3:35 PM

## 2022-07-13 NOTE — PROGRESS NOTES
Clinical Level of Care Assessment    Outpatient Ostomy Care      NAME:  Michela Penaloza  YOB: 1930  MEDICAL RECORD NUMBER:  [id number]   DATE:  7/13/2022      Patient Lizzie Voss Assessment- Document in Flowsheet I&O   Points   Review of chart []   0   Assess Complete Ostomy tab in Navigator for assessment of; stoma status, peristomal skin, presence of hernia/stool consistency/diet/related medications   Simple adjustments to pouch size/pouch system, new stoma pattern, accessory addition/deletion. []   1   Assess Complete Ostomy tab in Navigator for assessment of; stoma status, peristomal skin, presence of hernia/stool consistency/diet/related medications   Moderate adjustments to pouch size/pouch system, new stoma pattern, accessory addition/deletion. Observe patient/caregiver with hands-on care. 1-2 adjustments to pouch size/system/skin care/accessory addition or deletion. [x]   2   Assess Complete Ostomy tab in Navigator for assessment of; stoma status, peristomal skin, presence of hernia/stool consistency/diet/related medications   Complex adjustments to pouch size/pouch system, new stoma pattern, accessory addition/deletion. 3 or more complex adjustments to pouch size/system/skin care/accessory addition or deletion. Observe patient/caregiver with hands-on care. Assess patient/patient abdomen for optimal pre-marked stoma site. Assess patient abdomen for type of hernia belt/accessory needed. []   3         Ambulation Status Documented in CN Clinical Note  Status Definition Points   Independent Independently able to ambulate. Fully able (without any assistance) to get on/off exam table/chair. [x]   0   Minimal Physical Assistance Requires physical assistance of one person to ambulate and/or position patient to be examined. Includes necessary physical assistance to position lower extremities on/off stool.    []   1   Moderate Physical Assistance Requires at least one staff member to physically assist patient in ambulating into treatment room, and/or on off chair/bed. Requires assistance to bathroom. []   2   Full Assistance Requires assistance of at least two staff members to transfer patient into treatment room and/or on/off bed/chair. \"Total Transfer\". Unable to use bathroom requires bedside commode and/or bedpan []   3       Teaching Effort Documented in [facility] Clinical Note  Effort Definition Points   No Teaching  []   0   General Initial/Simple lesson:  Assess readiness to learn, assess patient learning style to determine educational flow/special needs for learning. Teaching related to 1-3 topics  Documentation in [facility] completed. [x]   1   Intermediate Assess readiness to learn, assess patient learning style to determine educational flow/special needs for learning. Teaching related to 3-4 topics. Hernia belt application and care considerations  Documentation in [facility] completed. []   2   Complex Assess readiness to learn, assess patient learning style to determine educational flow/special needs for learning. Teaching of greater than 5 additional topics   Pre-operative ostomy education with review of written resources for patient/family/caregiver as needed. Demonstration/return demonstration of ostomy irrigation  Documentation in [facility] completed. []   3     Patient Assessment and Planning in [facility] Clinical Note   Planning Definition Points   Simple Simple pouch change procedure completed and reviewed with patient/family/caregiver   Documentation in [facility] completed. []   1   Intermediate Moderate level of follow-up needs:   Pouch change/discharge procedure revised and reviewed with patient/caregiver. Communications with outside resources; i.e. Telephone calls to Surgeon/ PCP, family/caregiver, home health, ECF. Documentation in [facility] completed.      [x]   2   Complex Complex level of instructions/changes:   Family/Caregiver learning/demonstration/return demonstration visit. Pouching/discharge procedure revised/reviewed with patient/family/caregiver. Contact with outside resources; i.e. communication with Surgeon/ PCP, home health, ECF. Contact/referral to ostomy appliance supplier for new or additional products. Review when to call [facility] or schedule follow-up visit. Referral to Emergency Department   Documentation in [facility] completed. []   3       Is this the Patient's First Visit with [facility] @ [facility]? Yes    Is this Patient Established to this [facility] within the last 3 years?    No             Clinical Level of Care      Points  0-3  Level 1 []     Points  4-6  Level 2 [x]     Points  7-8  Level 3 []     Points  9-10  Level 4 []     Points  11-12  Level 5 []       Electronically signed by Marianne Avila RN on 7/13/2022 at 3:33 PM

## 2022-07-16 LAB
ORGANISM: ABNORMAL
ORGANISM: ABNORMAL
URINE CULTURE, ROUTINE: ABNORMAL
URINE CULTURE, ROUTINE: ABNORMAL

## 2022-07-18 PROCEDURE — 93294 REM INTERROG EVL PM/LDLS PM: CPT | Performed by: NURSE PRACTITIONER

## 2022-07-18 PROCEDURE — 93297 REM INTERROG DEV EVAL ICPMS: CPT | Performed by: NURSE PRACTITIONER

## 2022-07-18 PROCEDURE — 93296 REM INTERROG EVL PM/IDS: CPT | Performed by: NURSE PRACTITIONER

## 2022-07-19 ENCOUNTER — PROCEDURE VISIT (OUTPATIENT)
Dept: CARDIOLOGY CLINIC | Age: 87
End: 2022-07-19
Payer: COMMERCIAL

## 2022-07-19 DIAGNOSIS — I49.5 SICK SINUS SYNDROME (HCC): ICD-10-CM

## 2022-07-19 DIAGNOSIS — Z95.0 BIVENTRICULAR CARDIAC PACEMAKER IN SITU: Primary | ICD-10-CM

## 2022-07-20 ENCOUNTER — TELEPHONE (OUTPATIENT)
Dept: FAMILY MEDICINE CLINIC | Age: 87
End: 2022-07-20

## 2022-07-22 ENCOUNTER — OFFICE VISIT (OUTPATIENT)
Dept: FAMILY MEDICINE CLINIC | Age: 87
End: 2022-07-22
Payer: COMMERCIAL

## 2022-07-22 VITALS
BODY MASS INDEX: 27.64 KG/M2 | HEART RATE: 82 BPM | OXYGEN SATURATION: 96 % | WEIGHT: 161 LBS | SYSTOLIC BLOOD PRESSURE: 122 MMHG | DIASTOLIC BLOOD PRESSURE: 68 MMHG

## 2022-07-22 DIAGNOSIS — B07.0 PLANTAR WART OF LEFT FOOT: ICD-10-CM

## 2022-07-22 DIAGNOSIS — R31.0 GROSS HEMATURIA: Primary | ICD-10-CM

## 2022-07-22 PROCEDURE — 1123F ACP DISCUSS/DSCN MKR DOCD: CPT

## 2022-07-22 PROCEDURE — 99213 OFFICE O/P EST LOW 20 MIN: CPT

## 2022-07-22 ASSESSMENT — ENCOUNTER SYMPTOMS
ABDOMINAL PAIN: 0
SHORTNESS OF BREATH: 0
CONSTIPATION: 0
BLOOD IN STOOL: 0
NAUSEA: 0
DIARRHEA: 0
COLOR CHANGE: 0
VOMITING: 0

## 2022-07-22 NOTE — PROGRESS NOTES
7/22/2022    Michela Penaloza    Chief Complaint   Patient presents with    Follow-up     1 month f/u  - pt's states feels fine, has not noticed dark urine. HPI  History was obtained from patient. Kelli Orlando is a pleasant 80 y.o. male who presents today for 1 month follow up. UTI: Pt was just able to start the Cipro on Monday related to his daughter being in the hospital, has upcoming appointment with Dr. Santos Duverney in  Urology in August. Pt states that he does not notice any darkness to his urine. Pt continues to sleep quite often throughout the day. 1. Gross hematuria    2. Plantar wart of left foot             REVIEW OF SYMPTOMS    Review of Systems   Constitutional:  Negative for chills, fever and unexpected weight change. Respiratory:  Negative for shortness of breath. Cardiovascular: Negative. Negative for chest pain, palpitations and leg swelling. Gastrointestinal:  Negative for abdominal pain, blood in stool, constipation, diarrhea, nausea and vomiting. Genitourinary:  Negative for difficulty urinating and hematuria. Skin:  Negative for color change. Neurological:  Negative for light-headedness. PAST MEDICAL HISTORY  Past Medical History:   Diagnosis Date    AAA (abdominal aortic aneurysm) [facility])     Surgery scheduled for 7/1/2014 with Dr. Sandi Hanson. Arthritis     generalized    Asthma     AV block, 1st degree     Back pain     Borderline hypothyroidism 12/4/2020    Bradycardia     Colon polyps     Colostomy in place [facility])     Glaucoma     H/O cardiovascular stress test 12/27/2013    cardiolite-EF56%, apical hypokinesis is seen, cannot exlude apical Tyler ischemia    History of blood transfusion     History of cardiovascular stress test 3/5/10    No angina or ischemic EKG changes are noted with Lexiscan. The cardiolite study demonstrated normal perfusion in all segments of the myocardium with an intact left ventricular systolic function.  The resting sestamibi dose is 10.8, stress is 32.5. EF 66%    History of chest x-ray 3/16/10    The chest is considered nonacute. There is cardiomegaly noted. COPD. History of Doppler ultrasound 3/25/10    venous doppler- Technically good venous ultrasound study negative for DVT in both lower extremities. Normal compressibility,color flow doppler pattern and spectral doppler pattern demonstrated thoughout. History of echocardiogram 3/18/10    Boarderline LV dilatation with concentric hypertrophy. Low normal systolic and abnormal diastolic function. Mild mitral and trace tricuspid regurgitation. Mild aortic root and bilateral dilatation. Holter monitor, abnormal 11/10/10    11/10/2010- 24 HR- Abnormal holter revealing some significant brasycardia's and wenckebach phenomenon. Therefore, clinical  correlation is recommend. Hx of blood clots     Pacemaker     PAF (paroxysmal atrial fibrillation) ([address]     Peritonitis (HCC)     Personal history of colonic polyps     Poor historian     Skin cancer     face    Ulcerative (chronic) proctosigmoiditis (HCC)     Wears glasses        FAMILY HISTORY  Family History   Problem Relation Age of Onset    Stroke Mother         CVA    Heart Disease Mother     Prostate Cancer Brother     Pacemaker Brother     Cancer Brother         skin    Cancer Daughter         colon    Substance Abuse Son         Tobacco    No Known Problems Father        SOCIAL HISTORY  Social History     Socioeconomic History    Marital status:       Spouse name: None    Number of children: 3    Years of education: None    Highest education level: None   Occupational History    Occupation: Retired   Tobacco Use    Smoking status: Never    Smokeless tobacco: Never   Vaping Use    Vaping Use: Never used   Substance and Sexual Activity    Alcohol use: No     Alcohol/week: 0.0 standard drinks    Drug use: No    Sexual activity: Yes     Partners: Female     Comment:      Social Determinants of Health     Financial Resource Strain: Low Risk     Difficulty of Paying Living Expenses: Not hard at all   Food Insecurity: No Food Insecurity    Worried About Running Out of Food in the Last Year: Never true    Ran Out of Food in the Last Year: Never true   Physical Activity: Inactive    Days of Exercise per Week: 0 days    Minutes of Exercise per Session: 0 min        SURGICAL HISTORY  Past Surgical History:   Procedure Laterality Date    ABDOMINAL AORTIC ANEURYSM REPAIR, ENDOVASCULAR  7/1/14    ABDOMINAL EXPLORATION SURGERY  2/4/2013    exp lap, left hemicolectomy, umbilectomy    APPENDECTOMY  2/4/2013    APPENDECTOMY  2/4/2013    COLONOSCOPY  2/4/2013    COLOSTOMY  2/4/2013    CYSTOSCOPY  2/03/2014    TURP    HEMORRHOID SURGERY  2011    HERNIA REPAIR Bilateral [address] hernia    KNEE SURGERY Right 1980s    PACEMAKER INSERTION Right 12/13/2017    BIV PPM Medtronic Percepta Quad CRT-P MRI SureScan Pacemaker    PACEMAKER PLACEMENT Right 02/25/2013    Explanted 12/13/2017    SMALL INTESTINE SURGERY N/A 8/28/2019    EXPLORATORY LAPAROTOMY, SMALL BOWEL RESECTION, LYSIS OF ADHESIONS, NEW COLOSTOMY, AND HERNIA REPAIR WITH XENMATRIX MESH performed by Raquel Shoemaker MD at [facility]  Current Outpatient Medications   Medication Sig Dispense Refill    ciprofloxacin (CIPRO) 500 MG tablet Take 1 tablet by mouth 2 times daily for 10 days 20 tablet 0    lisinopril (PRINIVIL;ZESTRIL) 10 MG tablet TAKE 1 TABLET BY MOUTH ONCE DAILY 30 tablet 5    buPROPion (WELLBUTRIN XL) 150 MG extended release tablet TAKE 1 TABLET BY MOUTH EVERY MORNING 30 tablet 5    donepezil (ARICEPT) 10 MG tablet TAKE 1 TABLET BY MOUTH AT NIGHT 30 tablet 5    sertraline (ZOLOFT) 100 MG tablet TAKE 1 TABLET BY MOUTH ONCE DAILY 90 tablet 0    ELIQUIS 2.5 MG TABS tablet TAKE 1 TABLET BY MOUTH TWO TIMES DAILY 60 tablet 2    clotrimazole-betamethasone (LOTRISONE) 1-0.05 % cream Apply topically 2 times daily.  1 each 1    atorvastatin (LIPITOR) 20 MG tablet TAKE ONE (1) TABLET BY MOUTH ONCE DAILY 90 tablet 0    levothyroxine (SYNTHROID) 100 MCG tablet TAKE 1 TABLET BY MOUTH ONCE DAILY 90 tablet 0    metoprolol succinate (TOPROL XL) 25 MG extended release tablet TAKE 1 TABLET BY MOUTH ONCE DAILY 30 tablet 5    finasteride (PROSCAR) 5 MG tablet       tamsulosin (FLOMAX) 0.4 MG capsule TAKE 1 CAPSULE BY MOUTH ONCE DAILY 30 capsule 5    latanoprost (XALATAN) 0.005 % ophthalmic solution       Ascorbic Acid (VITAMIN C) 1000 MG tablet Take 1,000 mg by mouth daily      brimonidine-timolol (COMBIGAN) 0.2-0.5 % ophthalmic solution Place 1 drop into both eyes every 12 hours      Glucosamine-MSM-Hyaluronic Acd (JOINT HEALTH PO) Take 1 each by mouth daily        No current facility-administered medications for this visit. ALLERGIES  Allergies   Allergen Reactions    Codeine Anaphylaxis    Fentanyl Other (See Comments)     Hypotension        PHYSICAL EXAM    /68   Pulse 82   Wt 161 lb (73 kg)   SpO2 96%   BMI 27.64 kg/m²     Physical Exam  Vitals and nursing note reviewed. Constitutional:       Appearance: Normal appearance. He is well-developed. HENT:      Head: Normocephalic and atraumatic. Eyes:      General: No scleral icterus. Extraocular Movements: Extraocular movements intact. Conjunctiva/sclera: Conjunctivae normal.   Neck:      Thyroid: No thyromegaly. Vascular: No JVD. Trachea: No tracheal deviation. Cardiovascular:      Rate and Rhythm: Normal rate and regular rhythm. Pulses: Normal pulses. Heart sounds: Normal heart sounds. Pulmonary:      Effort: Pulmonary effort is normal. No respiratory distress. Breath sounds: Normal breath sounds. No wheezing or rales. Abdominal:      General: Bowel sounds are normal. There is no distension. Palpations: Abdomen is soft. Tenderness: There is no abdominal tenderness. There is no guarding or rebound. Musculoskeletal:      Cervical back: Neck supple. Lymphadenopathy:      Cervical: No cervical adenopathy. Skin:     General: Skin is warm and dry. Nails: There is no clubbing. Neurological:      Mental Status: He is alert. Mental status is at baseline. Comments: Nonfocal   Psychiatric:         Behavior: Behavior normal.         Judgment: Judgment normal.       ASSESSMENT & PLAN    Viktor Ramey was seen today for follow-up. Diagnoses and all orders for this visit:    Gross hematuria  -     CBC with Auto Differential; Future  - Continue Cipro   - Follow up with Dr. Viktor Fischer in Urology as planned    Plantar wart of left foot        - Pt already established with [address] [place] [address] to use OTC wart/callous cushions for comfort while waiting for dermatology appointmnet        - Dermatology contact info provided on AVS    Return in about 3 months (around 10/22/2022).        Electronically signed by LIA Quintana CNP on 7/22/2022

## 2022-07-22 NOTE — PATIENT INSTRUCTIONS
DO STEPHANY [address]   [address], [address] [facility]  [phone]    [facility] [address] [place] [address]. Kimberly Archer    Hours  Monday- Thursday     8am-4pm  Fridays             7am-3pm    \[facility]\"" Lab Hours  [address]    [address]    Hours  Monday-Friday 7am-5pm  Saturday  8am-12pm

## 2022-07-25 DIAGNOSIS — R31.0 GROSS HEMATURIA: ICD-10-CM

## 2022-07-25 LAB
BASOPHILS ABSOLUTE: 0 K/UL (ref 0–0.2)
BASOPHILS RELATIVE PERCENT: 0.8 %
EOSINOPHILS ABSOLUTE: 0.1 K/UL (ref 0–0.6)
EOSINOPHILS RELATIVE PERCENT: 2.6 %
HCT VFR BLD CALC: 35 % (ref 40.5–52.5)
HEMOGLOBIN: 11.6 G/DL (ref 13.5–17.5)
LYMPHOCYTES ABSOLUTE: 0.5 K/UL (ref 1–5.1)
LYMPHOCYTES RELATIVE PERCENT: 11.9 %
MCH RBC QN AUTO: 33.5 PG (ref 26–34)
MCHC RBC AUTO-ENTMCNC: 33.3 G/DL (ref 31–36)
MCV RBC AUTO: 100.6 FL (ref 80–100)
MONOCYTES ABSOLUTE: 0.4 K/UL (ref 0–1.3)
MONOCYTES RELATIVE PERCENT: 10.4 %
NEUTROPHILS ABSOLUTE: 2.9 K/UL (ref 1.7–7.7)
NEUTROPHILS RELATIVE PERCENT: 74.3 %
PDW BLD-RTO: 15.7 % (ref 12.4–15.4)
PLATELET # BLD: 176 K/UL (ref 135–450)
PMV BLD AUTO: 8.2 FL (ref 5–10.5)
RBC # BLD: 3.47 M/UL (ref 4.2–5.9)
WBC # BLD: 3.9 K/UL (ref 4–11)

## 2022-07-25 RX ORDER — LEVOTHYROXINE SODIUM 0.1 MG/1
TABLET ORAL
Qty: 90 TABLET | Refills: 0 | Status: SHIPPED | OUTPATIENT
Start: 2022-07-25

## 2022-07-25 RX ORDER — ATORVASTATIN CALCIUM 20 MG/1
TABLET, FILM COATED ORAL
Qty: 90 TABLET | Refills: 0 | Status: SHIPPED | OUTPATIENT
Start: 2022-07-25

## 2022-07-27 ENCOUNTER — TELEPHONE (OUTPATIENT)
Dept: FAMILY MEDICINE CLINIC | Age: 87
End: 2022-07-27

## 2022-08-10 ENCOUNTER — HOSPITAL ENCOUNTER (OUTPATIENT)
Dept: WOUND CARE | Age: 87
Discharge: HOME | End: 2022-08-10
Payer: COMMERCIAL

## 2022-08-10 VITALS
TEMPERATURE: 97.3 F | DIASTOLIC BLOOD PRESSURE: 79 MMHG | RESPIRATION RATE: 16 BRPM | HEART RATE: 75 BPM | SYSTOLIC BLOOD PRESSURE: 133 MMHG

## 2022-08-10 PROCEDURE — 99211 OFF/OP EST MAY X REQ PHY/QHP: CPT

## 2022-08-10 NOTE — CONSULTS
Ostomy Referral Progress Note      NAME:  Ti Sylvseter RECORD NUMBER:  [id number]  AGE: 80 y.o. GENDER:  male  :  3/23/1930  TODAY'S DATE:  8/10/2022    Oskar Durán is a 80 y.o. male referred by:   [x] Physician  [] Nursing  [] Other:     Established    Surgeon Dr. Danielle Sue:        Diagnosis Date    AAA (abdominal aortic aneurysm) [facility])     Surgery scheduled for 2014 with Dr. Maricel Degroot. Arthritis     generalized    Asthma     AV block, 1st degree     Back pain     Borderline hypothyroidism 2020    Bradycardia     Colon polyps     Colostomy in place [facility])     Glaucoma     H/O cardiovascular stress test 2013    cardiolite-EF56%, apical hypokinesis is seen, cannot exlude apical Tyler ischemia    History of blood transfusion     History of cardiovascular stress test 3/5/10    No angina or ischemic EKG changes are noted with Lexiscan. The cardiolite study demonstrated normal perfusion in all segments of the myocardium with an intact left ventricular systolic function. The resting sestamibi dose is 10.8, stress is 32.5. EF 66%    History of chest x-ray 3/16/10    The chest is considered nonacute. There is cardiomegaly noted. COPD. History of Doppler ultrasound 3/25/10    venous doppler- Technically good venous ultrasound study negative for DVT in both lower extremities. Normal compressibility,color flow doppler pattern and spectral doppler pattern demonstrated thoughout. History of echocardiogram 3/18/10    Boarderline LV dilatation with concentric hypertrophy. Low normal systolic and abnormal diastolic function. Mild mitral and trace tricuspid regurgitation. Mild aortic root and bilateral dilatation. Holter monitor, abnormal 11/10/10    11/10/2010- 24 HR- Abnormal holter revealing some significant brasycardia's and wenckebach phenomenon. Therefore, clinical  correlation is recommend.     Hx of blood clots     Pacemaker     PAF (paroxysmal atrial fibrillation) (HCC)     Peritonitis (HCC)     Personal history of colonic polyps     Poor historian     Skin cancer     face    Ulcerative (chronic) proctosigmoiditis (HCC)     Wears glasses        MEDICATIONS:    Current Outpatient Medications on File Prior to Encounter   Medication Sig Dispense Refill    atorvastatin (LIPITOR) 20 MG tablet TAKE ONE (1) TABLET BY MOUTH ONCE DAILY 90 tablet 0    levothyroxine (SYNTHROID) 100 MCG tablet TAKE ONE (1) TABLET BY MOUTH ONCE DAILY 90 tablet 0    lisinopril (PRINIVIL;ZESTRIL) 10 MG tablet TAKE 1 TABLET BY MOUTH ONCE DAILY 30 tablet 5    buPROPion (WELLBUTRIN XL) 150 MG extended release tablet TAKE 1 TABLET BY MOUTH EVERY MORNING 30 tablet 5    donepezil (ARICEPT) 10 MG tablet TAKE 1 TABLET BY MOUTH AT NIGHT 30 tablet 5    sertraline (ZOLOFT) 100 MG tablet TAKE 1 TABLET BY MOUTH ONCE DAILY 90 tablet 0    ELIQUIS 2.5 MG TABS tablet TAKE 1 TABLET BY MOUTH TWO TIMES DAILY 60 tablet 2    clotrimazole-betamethasone (LOTRISONE) 1-0.05 % cream Apply topically 2 times daily. 1 each 1    metoprolol succinate (TOPROL XL) 25 MG extended release tablet TAKE 1 TABLET BY MOUTH ONCE DAILY 30 tablet 5    finasteride (PROSCAR) 5 MG tablet       tamsulosin (FLOMAX) 0.4 MG capsule TAKE 1 CAPSULE BY MOUTH ONCE DAILY 30 capsule 5    latanoprost (XALATAN) 0.005 % ophthalmic solution       Ascorbic Acid (VITAMIN C) 1000 MG tablet Take 1,000 mg by mouth daily      brimonidine-timolol (COMBIGAN) 0.2-0.5 % ophthalmic solution Place 1 drop into both eyes every 12 hours      Glucosamine-MSM-Hyaluronic Acd (JOINT HEALTH PO) Take 1 each by mouth daily        No current facility-administered medications on file prior to encounter.        ALLERGIES:    Allergies   Allergen Reactions    Codeine Anaphylaxis    Fentanyl Other (See Comments)     Hypotension        PAST SURGICAL HISTORY:    Past Surgical History:   Procedure Laterality Date    ABDOMINAL AORTIC ANEURYSM REPAIR, ENDOVASCULAR  7/1/14    ABDOMINAL EXPLORATION SURGERY  2/4/2013    exp lap, left hemicolectomy, umbilectomy    APPENDECTOMY  2/4/2013    APPENDECTOMY  2/4/2013    COLONOSCOPY  2/4/2013    COLOSTOMY  2/4/2013    CYSTOSCOPY  2/03/2014    TURP    HEMORRHOID SURGERY  2011    HERNIA REPAIR Bilateral [address] hernia    KNEE SURGERY Right 1980s    PACEMAKER INSERTION Right 12/13/2017    BIV PPM Medtronic Percepta Quad CRT-P MRI SureScan Pacemaker    PACEMAKER PLACEMENT Right 02/25/2013    Explanted 12/13/2017    SMALL INTESTINE SURGERY N/A 8/28/2019    EXPLORATORY LAPAROTOMY, SMALL BOWEL RESECTION, LYSIS OF ADHESIONS, NEW COLOSTOMY, AND HERNIA REPAIR WITH XENMATRIX MESH performed by Elsa March MD at [address] [place] [address]:    family history includes Cancer in his brother and daughter; Heart Disease in his mother; No Known Problems in his father; Pacemaker in his brother; Prostate Cancer in his brother; Stroke in his mother; Substance Abuse in his son.     SOCIAL HISTORY:    Social History     Tobacco Use    Smoking status: Never    Smokeless tobacco: Never   Vaping Use    Vaping Use: Never used   Substance Use Topics    Alcohol use: No     Alcohol/week: 0.0 standard drinks    Drug use: No       LABS:  WBC:    Lab Results   Component Value Date/Time    WBC 3.9 07/25/2022 02:31 PM     H/H:    Lab Results   Component Value Date/Time    HGB 11.6 07/25/2022 02:31 PM    HCT 35.0 07/25/2022 02:31 PM     BMP:    Lab Results   Component Value Date/Time     07/12/2022 02:52 PM    K 4.4 07/12/2022 02:52 PM     07/12/2022 02:52 PM    CO2 28 07/12/2022 02:52 PM    BUN 21 07/12/2022 02:52 PM    LABALBU 4.2 06/28/2022 10:55 PM    CREATININE 1.4 07/12/2022 02:52 PM    CALCIUM 8.8 07/12/2022 02:52 PM    GFRAA 57 07/12/2022 02:52 PM    LABGLOM 47 07/12/2022 02:52 PM    GLUCOSE 115 07/12/2022 02:52 PM     PTT:    Lab Results   Component Value Date/Time    APTT 27.0 04/09/2021 07:49 AM   [APTT}  PT/INR:    Lab Results   Component Value Date/Time    PROTIME 12.8 04/09/2021 07:49 AM    PROTIME 11.3 02/06/2012 01:15 PM    INR 1.06 04/09/2021 07:49 AM       Objective    /79   Pulse 75   Temp 97.3 °F (36.3 °C) (Oral)   Resp 16     Aaron Risk Score Aaron Scale Score: 22    Patient Active Problem List   Diagnosis Code    Pure hypercholesterolemia E78.00    Essential hypertension I10    Perforated viscus R19.8    Anemia D64.9    Ileus following gastrointestinal surgery ([facility]) K91.89, K56.7    Hypokalemia E87.6    Cardiac pacemaker Z95.0    Hypertrophy of prostate with urinary obstruction and other lower urinary tract symptoms (LUTS) N40.1, N13.8    Gross hematuria R31.0    AAA (abdominal aortic aneurysm) ([facility]) I71.4    PAF (paroxysmal atrial fibrillation) ([facility]) I48.0    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    Leukocytosis D72.829    Partial small bowel obstruction ([facility]) K56.600    S/P biventricular cardiac pacemaker procedure Z95.0    Pacemaker lead malfunction T82.110A    Complete heart block ([facility]) I44.2    Chronic systolic heart failure ([facility]) I50.22    Small bowel obstruction ([address] K56.609    Urinary tract infection without hematuria N39.0    Intractable nausea and vomiting R11.2    Other hyperlipidemia E78.49    PVD (peripheral vascular disease) ([facility]) I73.9    Acquired hypothyroidism E03.9    Pancreatitis, unspecified pancreatitis type K85.90    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    Excessive daytime sleepiness G47.19    Fatigue R53.83    Dementia without behavioral disturbance ([facility]) F03.90    Dysuria R30.0    Plantar wart of left foot B07.0       Assessment              Colostomy RUQ (Active)   Stomal Appliance 1 piece;Clean, dry & intact; Changed 08/10/22 1420   Flange Size (inches) 35 Inches 08/10/22 1420   Stoma  Assessment Red;Moist;Protrudes 08/10/22 1420   Peristomal Assessment Clean, dry & intact 08/10/22 1420   Treatment Bag change;Site care 08/10/22 1420   Stool Appearance Soft 08/10/22 1420   Stool Color Brown 08/10/22 1420   Stool Amount Smear 08/10/22 1420   Number of days: 1077                No intake or output data in the 24 hours ending 08/10/22 1531      Plan   Plan for Ostomy Care:        Pt ambulated to exam room independently with cane. Daughter Mary Ann Hannah is with pt. Pt with some memory deficits. Rayna Allen is with pt at home but he mainly cares for colostomy. Stated has had colostomy since 2013 (was on left side) due to \"sepsis\" but was unable to clarify. Stated had revision in 2017 for hernia and was moved to right side. Chart reviewed but no op notes seen. Appears to be colostomy with soft stool output even though on right side of abdomen. Daughter stated tried both convex 1 piece Coloplast and Reyes pouching systems and liked both. Decided to order Coloplast. Received deep convex [id number] for some reason. Pt stated changes everyday. When asked why, stated that he \"just does it. \" Educated pt that recommend to change every 3-4 days and to empty in between. Denies any leakage issues. Old pouching system removed. No silent leakage. Stoma red, moist, protruding, and round when stretched. Measures 35 mm. Peristomal skin intact. Abdomen soft and probable hernia noted as well. Picture taken. Daughter stated pt has lost weight recently as well. Recommend to keep regimen simple in order for pt to perform and order Coloplast convex 1 piece precut #[id number] from [address] next order time. Does not need deep convexity. Daughter does ordering and will take care of. Plan to follow up as needed. Pt/daughter agreeable to plan. Ambulated to lobby independently with cane. Ostomy Care:     Remove pouch and barrier. Cleanse stoma and peristomal skin with warm water. Dry thoroughly. Apply Coloplast 1 piece precut (35mm) convex [id number]. Hold pressure for 2 minutes. Empty pouch when 1/3 to 1/2 full. Change every 3-7 days and prn. Follow up with [facility] stoma nurse as needed. Call [phone] with any issues.     Ostomy Plan of Care  [] Supplies/Instructions left in room  [] Patient using home supplies  [] Brand/supplies at bedside   [x] Current pouching system-Coloplast 1 piece precut 35mm deep convex #[id number]    Current Diet: No diet orders on file  Dietician consult:  No    Discharge Plan:  Placement for patient upon discharge: home with support    Outpatient visit plan No  Supplies given No   Samples requested No    Referrals:  []   [] [facility]  [] Supplies  [] Other      Patient/Caregiver Teaching:  Written Instructions given to patient/family:  Teaching provided:  [] Reviewed GI and A&P        [x] Supplies  [x] Pouch emptying      [] Manipulate closure  [x] Routine Care         [] Comment  [] Pouch maintenance           Level of patient/caregiver understanding able to:  [x] Indicates understanding       [] Needs reinforcement  [] Unsuccessful      [] Verbal Understanding  [] Demonstrated understanding       [] No evidence of learning  [] Refused teaching         [] N/A    Electronically signed by Kelly Leigh RN, CWOCN on 8/10/2022 at 3:31 PM

## 2022-08-10 NOTE — DISCHARGE INSTRUCTIONS
Ostomy Care:     Remove pouch and barrier. Cleanse stoma and peristomal skin with warm water. Dry thoroughly. Apply Coloplast 1 piece precut (35mm) convex [id number]. Hold pressure for 2 minutes. Empty pouch when 1/3 to 1/2 full. Change every 3-7 days and prn. Follow up with [facility] stoma nurse as needed. Call [phone] with any issues.

## 2022-08-22 DIAGNOSIS — F03.90 DEMENTIA WITHOUT BEHAVIORAL DISTURBANCE, UNSPECIFIED DEMENTIA TYPE: ICD-10-CM

## 2022-08-22 DIAGNOSIS — I10 ESSENTIAL HYPERTENSION: ICD-10-CM

## 2022-08-23 DIAGNOSIS — F03.90 DEMENTIA WITHOUT BEHAVIORAL DISTURBANCE, UNSPECIFIED DEMENTIA TYPE: ICD-10-CM

## 2022-08-23 RX ORDER — METOPROLOL SUCCINATE 25 MG/1
TABLET, EXTENDED RELEASE ORAL
Qty: 90 TABLET | Refills: 1 | Status: SHIPPED | OUTPATIENT
Start: 2022-08-23

## 2022-08-23 RX ORDER — SERTRALINE HYDROCHLORIDE 100 MG/1
TABLET, FILM COATED ORAL
Qty: 90 TABLET | Refills: 0 | Status: SHIPPED | OUTPATIENT
Start: 2022-08-23

## 2022-08-23 RX ORDER — SERTRALINE HYDROCHLORIDE 100 MG/1
TABLET, FILM COATED ORAL
Qty: 90 TABLET | Refills: 0 | OUTPATIENT
Start: 2022-08-23

## 2022-09-20 RX ORDER — APIXABAN 2.5 MG/1
TABLET, FILM COATED ORAL
Qty: 60 TABLET | Refills: 2 | Status: SHIPPED | OUTPATIENT
Start: 2022-09-20

## 2022-10-24 PROCEDURE — 93294 REM INTERROG EVL PM/LDLS PM: CPT | Performed by: NURSE PRACTITIONER

## 2022-10-24 PROCEDURE — 93297 REM INTERROG DEV EVAL ICPMS: CPT | Performed by: NURSE PRACTITIONER

## 2022-10-24 PROCEDURE — 93296 REM INTERROG EVL PM/IDS: CPT | Performed by: NURSE PRACTITIONER

## 2022-10-26 ENCOUNTER — PROCEDURE VISIT (OUTPATIENT)
Dept: CARDIOLOGY CLINIC | Age: 87
End: 2022-10-26
Payer: COMMERCIAL

## 2022-10-26 DIAGNOSIS — I49.5 SICK SINUS SYNDROME (HCC): ICD-10-CM

## 2022-10-26 DIAGNOSIS — Z95.0 BIVENTRICULAR CARDIAC PACEMAKER IN SITU: Primary | ICD-10-CM

## 2022-10-28 ENCOUNTER — APPOINTMENT (OUTPATIENT)
Dept: GENERAL RADIOLOGY | Age: 87
End: 2022-10-28
Payer: COMMERCIAL

## 2022-10-28 ENCOUNTER — APPOINTMENT (OUTPATIENT)
Dept: CT IMAGING | Age: 87
End: 2022-10-28
Payer: COMMERCIAL

## 2022-10-28 ENCOUNTER — HOSPITAL ENCOUNTER (EMERGENCY)
Age: 87
Discharge: HOME | End: 2022-10-29
Payer: COMMERCIAL

## 2022-10-28 VITALS
BODY MASS INDEX: 27.31 KG/M2 | HEART RATE: 80 BPM | RESPIRATION RATE: 22 BRPM | TEMPERATURE: 98.5 F | DIASTOLIC BLOOD PRESSURE: 65 MMHG | OXYGEN SATURATION: 98 % | WEIGHT: 160 LBS | SYSTOLIC BLOOD PRESSURE: 98 MMHG | HEIGHT: 64 IN

## 2022-10-28 DIAGNOSIS — R11.2 NAUSEA AND VOMITING, UNSPECIFIED VOMITING TYPE: Primary | ICD-10-CM

## 2022-10-28 DIAGNOSIS — N39.0 URINARY TRACT INFECTION WITHOUT HEMATURIA, SITE UNSPECIFIED: ICD-10-CM

## 2022-10-28 LAB
ALBUMIN SERPL-MCNC: 3.9 GM/DL (ref 3.4–5)
ALP BLD-CCNC: 154 IU/L (ref 40–129)
ALT SERPL-CCNC: 18 U/L (ref 10–40)
ANION GAP SERPL CALCULATED.3IONS-SCNC: 10 MMOL/L (ref 4–16)
AST SERPL-CCNC: 26 IU/L (ref 15–37)
BACTERIA: NEGATIVE /HPF
BASOPHILS ABSOLUTE: 0 K/CU MM
BASOPHILS RELATIVE PERCENT: 0.2 % (ref 0–1)
BILIRUB SERPL-MCNC: 0.9 MG/DL (ref 0–1)
BILIRUBIN URINE: NEGATIVE MG/DL
BLOOD, URINE: ABNORMAL
BUN BLDV-MCNC: 21 MG/DL (ref 6–23)
CALCIUM SERPL-MCNC: 9 MG/DL (ref 8.3–10.6)
CHLORIDE BLD-SCNC: 99 MMOL/L (ref 99–110)
CLARITY: ABNORMAL
CO2: 27 MMOL/L (ref 21–32)
COLOR: ABNORMAL
CREAT SERPL-MCNC: 1.4 MG/DL (ref 0.9–1.3)
DIFFERENTIAL TYPE: ABNORMAL
EOSINOPHILS ABSOLUTE: 0 K/CU MM
EOSINOPHILS RELATIVE PERCENT: 0.2 % (ref 0–3)
GFR SERPL CREATININE-BSD FRML MDRD: 47 ML/MIN/1.73M2
GLUCOSE BLD-MCNC: 135 MG/DL (ref 70–99)
GLUCOSE, URINE: NEGATIVE MG/DL
HCT VFR BLD CALC: 38.9 % (ref 42–52)
HEMOGLOBIN: 12.5 GM/DL (ref 13.5–18)
IMMATURE NEUTROPHIL %: 0.4 % (ref 0–0.43)
KETONES, URINE: NEGATIVE MG/DL
LEUKOCYTE ESTERASE, URINE: ABNORMAL
LIPASE: 22 IU/L (ref 13–60)
LYMPHOCYTES ABSOLUTE: 0.2 K/CU MM
LYMPHOCYTES RELATIVE PERCENT: 1.7 % (ref 24–44)
MCH RBC QN AUTO: 33.7 PG (ref 27–31)
MCHC RBC AUTO-ENTMCNC: 32.1 % (ref 32–36)
MCV RBC AUTO: 104.9 FL (ref 78–100)
MONOCYTES ABSOLUTE: 0.5 K/CU MM
MONOCYTES RELATIVE PERCENT: 3.7 % (ref 0–4)
NITRITE URINE, QUANTITATIVE: NEGATIVE
NUCLEATED RBC %: 0 %
PDW BLD-RTO: 13.4 % (ref 11.7–14.9)
PH, URINE: 6 (ref 5–8)
PLATELET # BLD: 191 K/CU MM (ref 140–440)
PMV BLD AUTO: 10.8 FL (ref 7.5–11.1)
POTASSIUM SERPL-SCNC: 4.5 MMOL/L (ref 3.5–5.1)
PROTEIN UA: 100 MG/DL
RAPID INFLUENZA  B AGN: NEGATIVE
RAPID INFLUENZA A AGN: NEGATIVE
RBC # BLD: 3.71 M/CU MM (ref 4.6–6.2)
RBC URINE: 943 /HPF (ref 0–3)
SARS-COV-2, NAAT: NOT DETECTED
SEGMENTED NEUTROPHILS ABSOLUTE COUNT: 11.6 K/CU MM
SEGMENTED NEUTROPHILS RELATIVE PERCENT: 93.8 % (ref 36–66)
SODIUM BLD-SCNC: 136 MMOL/L (ref 135–145)
SOURCE: NORMAL
SPECIFIC GRAVITY UA: 1.02 (ref 1–1.03)
SQUAMOUS EPITHELIAL: <1 /HPF
TOTAL IMMATURE NEUTOROPHIL: 0.05 K/CU MM
TOTAL NUCLEATED RBC: 0 K/CU MM
TOTAL PROTEIN: 7.2 GM/DL (ref 6.4–8.2)
TRICHOMONAS: ABNORMAL /HPF
TROPONIN T: <0.01 NG/ML
UROBILINOGEN, URINE: 0.2 MG/DL (ref 0.2–1)
WBC # BLD: 12.4 K/CU MM (ref 4–10.5)
WBC CLUMP: ABNORMAL /HPF
WBC UA: 207 /HPF (ref 0–2)

## 2022-10-28 PROCEDURE — 80053 COMPREHEN METABOLIC PANEL: CPT

## 2022-10-28 PROCEDURE — 6360000002 HC RX W HCPCS: Performed by: PHYSICIAN ASSISTANT

## 2022-10-28 PROCEDURE — 6360000004 HC RX CONTRAST MEDICATION: Performed by: PHYSICIAN ASSISTANT

## 2022-10-28 PROCEDURE — 87635 SARS-COV-2 COVID-19 AMP PRB: CPT

## 2022-10-28 PROCEDURE — 96374 THER/PROPH/DIAG INJ IV PUSH: CPT

## 2022-10-28 PROCEDURE — 2580000003 HC RX 258: Performed by: PHYSICIAN ASSISTANT

## 2022-10-28 PROCEDURE — 93005 ELECTROCARDIOGRAM TRACING: CPT | Performed by: PHYSICIAN ASSISTANT

## 2022-10-28 PROCEDURE — 87804 INFLUENZA ASSAY W/OPTIC: CPT

## 2022-10-28 PROCEDURE — 87086 URINE CULTURE/COLONY COUNT: CPT

## 2022-10-28 PROCEDURE — 87088 URINE BACTERIA CULTURE: CPT

## 2022-10-28 PROCEDURE — 85025 COMPLETE CBC W/AUTO DIFF WBC: CPT

## 2022-10-28 PROCEDURE — 71045 X-RAY EXAM CHEST 1 VIEW: CPT

## 2022-10-28 PROCEDURE — 6370000000 HC RX 637 (ALT 250 FOR IP): Performed by: PHYSICIAN ASSISTANT

## 2022-10-28 PROCEDURE — 87186 SC STD MICRODIL/AGAR DIL: CPT

## 2022-10-28 PROCEDURE — 81001 URINALYSIS AUTO W/SCOPE: CPT

## 2022-10-28 PROCEDURE — 84484 ASSAY OF TROPONIN QUANT: CPT

## 2022-10-28 PROCEDURE — 99285 EMERGENCY DEPT VISIT HI MDM: CPT

## 2022-10-28 PROCEDURE — 74177 CT ABD & PELVIS W/CONTRAST: CPT

## 2022-10-28 PROCEDURE — 83690 ASSAY OF LIPASE: CPT

## 2022-10-28 RX ORDER — 0.9 % SODIUM CHLORIDE 0.9 %
1000 INTRAVENOUS SOLUTION INTRAVENOUS ONCE
Status: COMPLETED | OUTPATIENT
Start: 2022-10-28 | End: 2022-10-28

## 2022-10-28 RX ORDER — CEPHALEXIN 250 MG/1
500 CAPSULE ORAL ONCE
Status: COMPLETED | OUTPATIENT
Start: 2022-10-28 | End: 2022-10-28

## 2022-10-28 RX ORDER — ONDANSETRON 4 MG/1
4 TABLET, ORALLY DISINTEGRATING ORAL EVERY 6 HOURS
Qty: 20 TABLET | Refills: 0 | Status: SHIPPED | OUTPATIENT
Start: 2022-10-28

## 2022-10-28 RX ORDER — ONDANSETRON 2 MG/ML
4 INJECTION INTRAMUSCULAR; INTRAVENOUS ONCE
Status: COMPLETED | OUTPATIENT
Start: 2022-10-28 | End: 2022-10-28

## 2022-10-28 RX ORDER — CEPHALEXIN 500 MG/1
500 CAPSULE ORAL 2 TIMES DAILY
Qty: 14 CAPSULE | Refills: 0 | Status: SHIPPED | OUTPATIENT
Start: 2022-10-28 | End: 2022-11-04

## 2022-10-28 RX ADMIN — ONDANSETRON 4 MG: 2 INJECTION INTRAMUSCULAR; INTRAVENOUS at 20:15

## 2022-10-28 RX ADMIN — CEPHALEXIN 500 MG: 250 CAPSULE ORAL at 23:51

## 2022-10-28 RX ADMIN — SODIUM CHLORIDE 1000 ML: 9 INJECTION, SOLUTION INTRAVENOUS at 20:15

## 2022-10-28 RX ADMIN — IOPAMIDOL 75 ML: 755 INJECTION, SOLUTION INTRAVENOUS at 22:56

## 2022-10-28 ASSESSMENT — PAIN - FUNCTIONAL ASSESSMENT: PAIN_FUNCTIONAL_ASSESSMENT: NONE - DENIES PAIN

## 2022-10-28 ASSESSMENT — PAIN SCALES - GENERAL: PAINLEVEL_OUTOF10: 0

## 2022-10-28 NOTE — ED NOTES
Per Midge Vazquez EMT basic, family stated that pt saw the urologist either today or yesterday. Urologist said he was fine and he didn't need to be seen for 6 more months, but there was blood in the urine.       Ari Lozano RN  10/28/22 [id number]

## 2022-10-29 LAB
EKG DIAGNOSIS: NORMAL
EKG Q-T INTERVAL: 434 MS
EKG QRS DURATION: 140 MS
EKG QTC CALCULATION (BAZETT): 500 MS
EKG R AXIS: -73 DEGREES
EKG T AXIS: 97 DEGREES
EKG VENTRICULAR RATE: 80 BPM

## 2022-10-29 PROCEDURE — 93010 ELECTROCARDIOGRAM REPORT: CPT | Performed by: INTERNAL MEDICINE

## 2022-10-29 NOTE — ED PROVIDER NOTES
12 lead EKG per my interpretation:  Paced 80  Axis is   Normal  QTc is   500  There is no specific T wave changes appreciated. There is no specific ST wave changes appreciated.     Prior EKG to compare with was not available         Tiago Montalvo DO  10/28/22 2026

## 2022-10-29 NOTE — ED PROVIDER NOTES
Emergency [address] EMERGENCY DEPARTMENT    Patient: Tristan Crisostomo  MRN: [id number]  : 3/23/1930  Date of Evaluation: 10/28/2022  ED Provider: Purvis Fothergill, PA-C    Chief Complaint       Chief Complaint   Patient presents with    Emesis       [place]     Tristan Crisostomo is a 80 y.o. male who presents to the emergency department for fatigue, vomiting. Onset was just today. Patient's family, at bedside, state he was fine yesterday. He had visited his brother at a nursing home--brother passed away today. Patient has only had half a banana to eat today. He has had vomiting but mostly dry-heaving. Denies diarrhea--he has a colostomy bag. Denies fever, chills, nasal congestion, sore throat, cough. Denies cp or sob. ROS     CONSTITUTIONAL:  Denies fever. + fatigue. HEAD:  Denies headache. ENT:  Denies earache, nasal congestion, sore throat. NECK:  Denies neck pain. RESPIRATORY:  Denies any shortness of breath. CARDIOVASCULAR:  Denies chest pain. GI:  + vomiting. :  Denies urinary symptoms. MUSCULOSKELETAL:  Denies extremity pain or swelling. INTEGUMENT:  Denies skin changes. NEUROLOGIC:  Denies any AMS. Past History     Past Medical History:   Diagnosis Date    AAA (abdominal aortic aneurysm) [facility])     Surgery scheduled for 2014 with Dr. Dexter Wheat. Arthritis     generalized    Asthma     AV block, 1st degree     Back pain     Borderline hypothyroidism 2020    Bradycardia     Colon polyps     Colostomy in place [facility])     Glaucoma     H/O cardiovascular stress test 2013    cardiolite-EF56%, apical hypokinesis is seen, cannot exlude apical Tyler ischemia    History of blood transfusion     History of cardiovascular stress test 3/5/10    No angina or ischemic EKG changes are noted with Lexiscan.  The cardiolite study demonstrated normal perfusion in all segments of the myocardium with an intact left ventricular Report received from [facility]. SBAR, Kardex, MAR or Recent Results were discussed. , RN assumed care of the pt. Patients blood pressure reading 60's/40's. Call placed to Dr. Saud Mohan by day shift nurse. Orders received to call cardiology regarding hypotension. Gagan Napier, [employer]  
 
 
8800 Called placed to Dr. Brian Reddy. Orders received to give 250ml NS bolus and recall if hypotension does not resolve. systolic function. The resting sestamibi dose is 10.8, stress is 32.5. EF 66%    History of chest x-ray 3/16/10    The chest is considered nonacute. There is cardiomegaly noted. COPD. History of Doppler ultrasound 3/25/10    venous doppler- Technically good venous ultrasound study negative for DVT in both lower extremities. Normal compressibility,color flow doppler pattern and spectral doppler pattern demonstrated thoughout. History of echocardiogram 3/18/10    [place] LV dilatation with concentric hypertrophy. Low normal systolic and abnormal diastolic function. Mild mitral and trace tricuspid regurgitation. Mild aortic root and bilateral dilatation. Holter monitor, abnormal 11/10/10    11/10/2010- 24 HR- Abnormal holter revealing some significant brasycardia's and wenckebach phenomenon. Therefore, clinical  correlation is recommend.     Hx of blood clots     Pacemaker     PAF (paroxysmal atrial fibrillation) ([address]     Peritonitis ([address]     Personal history of colonic polyps     Poor historian     Skin cancer     face    Ulcerative (chronic) proctosigmoiditis ([address]     Wears glasses      Past Surgical History:   Procedure Laterality Date    ABDOMINAL AORTIC ANEURYSM REPAIR, ENDOVASCULAR  7/1/14    ABDOMINAL EXPLORATION SURGERY  2/4/2013    exp lap, left hemicolectomy, umbilectomy    APPENDECTOMY  2/4/2013    APPENDECTOMY  2/4/2013    COLONOSCOPY  2/4/2013    COLOSTOMY  2/4/2013    CYSTOSCOPY  2/03/2014    TURP    HEMORRHOID SURGERY  2011    HERNIA REPAIR Bilateral [address] hernia    KNEE SURGERY Right 1980s    PACEMAKER INSERTION Right 12/13/2017    BIV PPM Medtronic Percepta Quad CRT-P MRI SureScan Pacemaker    PACEMAKER PLACEMENT Right 02/25/2013    Explanted 12/13/2017    SMALL INTESTINE SURGERY N/A 8/28/2019    EXPLORATORY LAPAROTOMY, SMALL BOWEL RESECTION, LYSIS OF ADHESIONS, NEW COLOSTOMY, AND HERNIA REPAIR WITH XENMATRIX MESH performed by Digna Guo MD at [address] [place] [address] History     Socioeconomic History    Marital status:     Number of children: 3   Occupational History    Occupation: Retired   Tobacco Use    Smoking status: Never    Smokeless tobacco: Never   Vaping Use    Vaping Use: Never used   Substance and Sexual Activity    Alcohol use: No     Alcohol/week: 0.0 standard drinks    Drug use: No    Sexual activity: Yes     Partners: Female     Comment:      Social Determinants of Health     Financial Resource Strain: Low Risk     Difficulty of Paying Living Expenses: Not hard at all   Food Insecurity: No Food Insecurity    Worried About Running Out of Food in the Last Year: Never true    Ran Out of Food in the Last Year: Never true   Physical Activity: Inactive    Days of Exercise per Week: 0 days    Minutes of Exercise per Session: 0 min       Medications/Allergies     Discharge Medication List as of 10/28/2022 11:48 PM        CONTINUE these medications which have NOT CHANGED    Details   ELIQUIS 2.5 MG TABS tablet TAKE ONE (1) TABLET BY MOUTH TWO TIMES DAILY, Disp-60 tablet, R-2, DAWDISPILL PATIENTNormal      metoprolol succinate (TOPROL XL) 25 MG extended release tablet TAKE 1 TABLET BY MOUTH ONCE DAILY, Disp-90 tablet, R-1DISPILL PATIENTNormal      sertraline (ZOLOFT) 100 MG tablet TAKE ONE (1) TABLET BY MOUTH ONCE DAILY, Disp-90 tablet, R-0DISPILL PATIENTNormal      atorvastatin (LIPITOR) 20 MG tablet TAKE ONE (1) TABLET BY MOUTH ONCE DAILY, Disp-90 tablet, R-0DISPILL PATIENTNormal      levothyroxine (SYNTHROID) 100 MCG tablet TAKE ONE (1) TABLET BY MOUTH ONCE DAILY, Disp-90 tablet, R-0DISPILL PATIENTNormal      lisinopril (PRINIVIL;ZESTRIL) 10 MG tablet TAKE 1 TABLET BY MOUTH ONCE DAILY, Disp-30 tablet, R-5DISPILL PATIENT! ! Normal      buPROPion (WELLBUTRIN XL) 150 MG extended release tablet TAKE 1 TABLET BY MOUTH EVERY MORNING, Disp-30 tablet, R-5DISPILL PATIENTNormal      donepezil (ARICEPT) 10 MG tablet TAKE 1 TABLET BY MOUTH AT NIGHT, Disp-30 tablet, R-5DISPILL PATIENT  JULY DISPILLNormal      clotrimazole-betamethasone (LOTRISONE) 1-0.05 % cream Apply topically 2 times daily. , Disp-1 each, R-1, Normal      finasteride (PROSCAR) 5 MG tablet Historical Med      tamsulosin (FLOMAX) 0.4 MG capsule TAKE 1 CAPSULE BY MOUTH ONCE DAILY, Disp-30 capsule, R-5DISPILL PATIENT! Normal      latanoprost (XALATAN) 0.005 % ophthalmic solution Historical Med      Ascorbic Acid (VITAMIN C) 1000 MG tablet Take 1,000 mg by mouth dailyHistorical Med      brimonidine-timolol (COMBIGAN) 0.2-0.5 % ophthalmic solution Place 1 drop into both eyes every 12 hoursHistorical Med      Glucosamine-MSM-Hyaluronic Acd (JOINT HEALTH PO) Take 1 each by mouth daily Historical Med           Allergies   Allergen Reactions    Codeine Anaphylaxis    Fentanyl Other (See Comments)     Hypotension         Physical Exam       ED Triage Vitals   BP Temp Temp Source Heart Rate Resp SpO2 Height Weight   10/28/22 1849 10/28/22 1849 10/28/22 1849 10/28/22 1849 10/28/22 1849 10/28/22 1849 10/28/22 1846 10/28/22 1846   (!) 168/95 98.5 °F (36.9 °C) Oral 92 22 97 % 5' 4\" (1.626 m) 160 lb (72.6 kg)     GENERAL APPEARANCE:  Well-developed, well-nourished, no acute distress. HEAD:  NC/AT. EYES:  Sclera anicteric. ENT:  Ears, nose, mouth normal.     NECK:  Supple. CARDIO:  RRR. LUNGS:   CTAB. Respirations unlabored. ABDOMEN:  Soft, non-distended, non-tender. BS active. Colostomy bag in place to the right lower abdomen, no surrounding erythema, no tenderness. Normal stool output in bag. EXTREMITIES:  No acute deformities. SKIN:  Warm and dry. NEUROLOGICAL:  Alert and oriented. PSYCHIATRIC:  Normal mood.      Diagnostics     Labs:  Results for orders placed or performed during the hospital encounter of 10/28/22   COVID-19, Rapid    Specimen: Nasopharyngeal   Result Value Ref Range    Source UNKNOWN     SARS-CoV-2, NAAT NOT DETECTED NOT DETECTED   Rapid Flu Swab    Specimen: Nasopharyngeal   Result Value Ref Range    Rapid Influenza A Ag NEGATIVE NEGATIVE    Rapid Influenza B Ag NEGATIVE NEGATIVE   CBC with Auto Differential   Result Value Ref Range    WBC 12.4 (H) 4.0 - 10.5 K/CU MM    RBC 3.71 (L) 4.6 - 6.2 M/CU MM    Hemoglobin 12.5 (L) 13.5 - 18.0 GM/DL    Hematocrit 38.9 (L) 42 - 52 %    .9 (H) 78 - 100 FL    MCH 33.7 (H) 27 - 31 PG    MCHC 32.1 32.0 - 36.0 %    RDW 13.4 11.7 - 14.9 %    Platelets 141 390 - 462 K/CU MM    MPV 10.8 7.5 - 11.1 FL    Differential Type AUTOMATED DIFFERENTIAL     Segs Relative 93.8 (H) 36 - 66 %    Lymphocytes % 1.7 (L) 24 - 44 %    Monocytes % 3.7 0 - 4 %    Eosinophils % 0.2 0 - 3 %    Basophils % 0.2 0 - 1 %    Segs Absolute 11.6 K/CU MM    Lymphocytes Absolute 0.2 K/CU MM    Monocytes Absolute 0.5 K/CU MM    Eosinophils Absolute 0.0 K/CU MM    Basophils Absolute 0.0 K/CU MM    Nucleated RBC % 0.0 %    Total Nucleated RBC 0.0 K/CU MM    Total Immature Neutrophil 0.05 K/CU MM    Immature Neutrophil % 0.4 0 - 0.43 %   CMP   Result Value Ref Range    Sodium 136 135 - 145 MMOL/L    Potassium 4.5 3.5 - 5.1 MMOL/L    Chloride 99 99 - 110 mMol/L    CO2 27 21 - 32 MMOL/L    BUN 21 6 - 23 MG/DL    Creatinine 1.4 (H) 0.9 - 1.3 MG/DL    Est, Glom Filt Rate 47 (L) >60 mL/min/1.73m2    Glucose 135 (H) 70 - 99 MG/DL    Calcium 9.0 8.3 - 10.6 MG/DL    Albumin 3.9 3.4 - 5.0 GM/DL    Total Protein 7.2 6.4 - 8.2 GM/DL    Total Bilirubin 0.9 0.0 - 1.0 MG/DL    ALT 18 10 - 40 U/L    AST 26 15 - 37 IU/L    Alkaline Phosphatase 154 (H) 40 - 129 IU/L    Anion Gap 10 4 - 16   Lipase   Result Value Ref Range    Lipase 22 13 - 60 IU/L   Urinalysis   Result Value Ref Range    Color, UA ORANGE (A) YELLOW    Clarity, UA TURBID (A) CLEAR    Glucose, Urine NEGATIVE NEGATIVE MG/DL    Bilirubin Urine NEGATIVE NEGATIVE MG/DL    Ketones, Urine NEGATIVE NEGATIVE MG/DL    Specific Gravity, UA 1.025 1.001 - 1.035    Blood, Urine LARGE (A) NEGATIVE    pH, Urine 6.0 5.0 - 8.0    Protein,  (A) NEGATIVE MG/DL    Urobilinogen, Urine 0.2 0.2 - 1.0 MG/DL    Nitrite Urine, Quantitative NEGATIVE NEGATIVE    Leukocyte Esterase, Urine SMALL (A) NEGATIVE   Troponin   Result Value Ref Range    Troponin T <0.010 <0.01 NG/ML   Microscopic Urinalysis   Result Value Ref Range    RBC,  (H) 0 - 3 /HPF    WBC,  (H) 0 - 2 /HPF    Bacteria, UA NEGATIVE NEGATIVE /HPF    WBC Clumps, UA FEW /HPF    Squam Epithel, UA <1 /HPF    Trichomonas, UA NONE SEEN NONE SEEN /HPF   EKG 12 Lead   Result Value Ref Range    Ventricular Rate 80 BPM    Atrial Rate 86 BPM    QRS Duration 140 ms    Q-T Interval 434 ms    QTc Calculation (Bazett) 500 ms    R Axis -73 degrees    T Axis 97 degrees    Diagnosis       Ventricular-paced rhythm  Abnormal ECG  When compared with ECG of 28-JUN-2022 22:51,  No significant change was found         Radiographs:  CT ABDOMEN PELVIS W IV CONTRAST Additional Contrast? None    Result Date: 10/28/2022  EXAMINATION: CT OF THE ABDOMEN AND PELVIS WITH CONTRAST 10/28/2022 10:56 pm TECHNIQUE: CT of the abdomen and pelvis was performed with the administration of intravenous contrast. Multiplanar reformatted images are provided for review. Automated exposure control, iterative reconstruction, and/or weight based adjustment of the mA/kV was utilized to reduce the radiation dose to as low as reasonably achievable. COMPARISON: 06/09/2022 HISTORY: ORDERING SYSTEM PROVIDED HISTORY: vomiting TECHNOLOGIST PROVIDED HISTORY: Additional Contrast?->None Reason for exam:->vomiting Decision Support Exception - unselect if not a suspected or confirmed emergency medical condition->Emergency Medical Condition (MA) Reason for Exam: vomiting FINDINGS: Lower Chest: There is mild bibasilar pleural and parenchymal disease, new from 06/29/2022. There is bronchial wall thickening with mucoid impaction in the bilateral lower lobes, right greater than left. Artifact from cardiac pacing leads. Organs: There are few tiny gallstones.   No pericholecystic fluid fat stranding. The liver, spleen, pancreas and adrenal glands are normal.  There is a 4 cm left renal cyst.  Kidneys are otherwise grossly normal. GI/Bowel: Status post colectomy with Vini's pouch. There is a right lower quadrant ileostomy with large parastomal hernia, unchanged. There is a smaller ventral hernia in the right pelvis containing a knuckle of bowel with no evidence of incarceration. There is a patulous postoperative small bowel loop in the left upper quadrant, unchanged. There is no bowel obstruction. Pelvis: There are unchanged urinary bladder diverticula. Bladder is otherwise unremarkable. Moderate prostatomegaly, unchanged. Central defect may be related to prior TURP. Peritoneum/Retroperitoneum: There is an aorto bi iliac endograft repair of an abdominal aortic aneurysm. There is a large residual native aneurysm measuring up to 7.8 cm in transverse diameter and 6.3 cm in AP diameter, unchanged. There is artifact from embolization material in the native aneurysm lumen. The aorto bi iliac endograft is patent. Bones/Soft Tissues: There is an oval-shaped anterior abdominal wall fluid collection in the region of the rectus sheath measuring 9.6 cm by 5.4 cm, previously 10.6 cm x 6.0 cm. Multilevel degenerative changes. No acute bone finding. Unchanged right lower quadrant ostomy with parastomal hernia. No evidence of incarceration. There are few tiny gallstones. No findings to suggest cholecystitis. Patent aorto bi iliac endograft for repair of a large abdominal aortic aneurysm. The residual native aneurysm is unchanged in size from 06/29/2022. Oval-shaped anterior abdominal wall fluid collection continues to decreased slightly in size with dimensions as above. A seroma is suspected. There is bibasilar pleural and parenchymal disease, new from 06/29/2022.  There is bronchial wall thickening and mucoid impaction in the basal segments of the bilateral lower lobes, right greater than left. XR CHEST PORTABLE    Result Date: 10/28/2022  EXAMINATION: ONE XRAY VIEW OF THE CHEST 10/28/2022 7:59 pm COMPARISON: 04/12/2021 HISTORY: ORDERING SYSTEM PROVIDED HISTORY: vomiting TECHNOLOGIST PROVIDED HISTORY: Reason for exam:->vomiting Reason for Exam: vomiting FINDINGS: Mild bibasilar atelectasis. Lungs are otherwise clear. No pneumothorax or pleural effusion. Stable cardiomegaly. There is a 4 lead right subclavian cardiac pacemaker, unchanged. Right shoulder arthropathy with subacromial joint space narrowing and superior migration humeral head consistent with a chronic rotator cuff tear. No acute bone finding. Mild bibasilar atelectasis. Lungs are otherwise clear. Stable cardiomegaly. Severe right shoulder arthropathy. Procedures/EKG:   Please see attending physician's note for interpretation. ED Course and MDM   -  Patient seen and evaluated in the emergency department. -  Triage and nursing notes reviewed and incorporated. -  Old chart records reviewed and incorporated. -  Supervising physician was Dr. Anny Huynh. Patient was seen independently. -  Work-up included:  see above  -  ED medications:  NS, Zofran, Keflex  -  Results discussed with patient and family. UA with small leuks, 943 RBC, 207 WBC, few clumps. Will send for culture and treat. Negative for COVID or flu. WBC count is 12,400. Stable renal function. CT abd pelv without acute findings. Patient feels much lois daisy re-examination, tolerating PO intake, requesting to go home. Steve Wayne from [address] did meet with the family and they deny any needs at home. Fu with PCP, return as needed.   They are agreeable with plan of care and disposition.  -  Disposition:  Home    In light of current events, I did utilize appropriate PPE (including N95 and surgical face mask, safety glasses, and gloves, as recommended by the health facility/national standard best practice, during my bedside interactions with the patient. Final Impression      1. Nausea and vomiting, unspecified vomiting type    2.  Urinary tract infection without hematuria, site unspecified          DISPOSITION Decision To Discharge 10/28/2022 11:43:28 PM      [address], [place]  [address] [place] [address]  10/29/22 0147

## 2022-10-29 NOTE — CARE COORDINATION
CM received a consult from [facility]. Patient just lost his brother and then became sick to his stomach. Patient here due to throwing up and not being able to eat. CM went to patients' room and introduced self and explained job role. Patient stated that he lives at home with his daughter--Helene Rose and FLORIN. CM explained that if patient does get discharged this evening; will patient be ok once he returns home or does patient need anything. Patient stated that he uses a wheelchair, but does not have one here at the time, but they can use a wheelchair from the hospital to get patient to the car and then grab patients' cane to get patient in to the house. Patient stated that he did not need anything once he returned home. Patient very friendly and engaging. Son-in-law met with this CM outside of room and stated that patients' health just keeps declining. Son-n-law reports that patient just wants to sleep most of the time and not get out of the bed and is getting more forgetful. CM and family discussed aging process. Son-n-law also discussed Covid and flu that was going around. Son-n-law discussed patients' recent loss of his brother and possible flu. Family and patient have no needs if patient is discharged tonight.  CM shared with [facility].

## 2022-10-31 LAB
CULTURE: ABNORMAL
CULTURE: ABNORMAL
Lab: ABNORMAL
SPECIMEN: ABNORMAL

## 2022-11-14 DIAGNOSIS — F03.90 DEMENTIA WITHOUT BEHAVIORAL DISTURBANCE (HCC): ICD-10-CM

## 2022-11-14 RX ORDER — ATORVASTATIN CALCIUM 20 MG/1
TABLET, FILM COATED ORAL
Qty: 90 TABLET | Refills: 0 | Status: SHIPPED | OUTPATIENT
Start: 2022-11-14

## 2022-11-14 RX ORDER — LEVOTHYROXINE SODIUM 0.1 MG/1
TABLET ORAL
Qty: 90 TABLET | Refills: 0 | Status: SHIPPED | OUTPATIENT
Start: 2022-11-14

## 2022-11-14 RX ORDER — SERTRALINE HYDROCHLORIDE 100 MG/1
TABLET, FILM COATED ORAL
Qty: 90 TABLET | Refills: 0 | Status: SHIPPED | OUTPATIENT
Start: 2022-11-14

## 2022-11-15 ENCOUNTER — OFFICE VISIT (OUTPATIENT)
Dept: CARDIOLOGY CLINIC | Age: 87
End: 2022-11-15
Payer: COMMERCIAL

## 2022-11-15 VITALS
DIASTOLIC BLOOD PRESSURE: 70 MMHG | WEIGHT: 153 LBS | SYSTOLIC BLOOD PRESSURE: 128 MMHG | HEART RATE: 76 BPM | HEIGHT: 64 IN | BODY MASS INDEX: 26.12 KG/M2

## 2022-11-15 DIAGNOSIS — Z95.0 S/P BIVENTRICULAR CARDIAC PACEMAKER PROCEDURE: ICD-10-CM

## 2022-11-15 DIAGNOSIS — I10 ESSENTIAL HYPERTENSION: ICD-10-CM

## 2022-11-15 DIAGNOSIS — I44.2 COMPLETE HEART BLOCK (HCC): ICD-10-CM

## 2022-11-15 DIAGNOSIS — I71.43 INFRARENAL ABDOMINAL AORTIC ANEURYSM (AAA) WITHOUT RUPTURE: ICD-10-CM

## 2022-11-15 DIAGNOSIS — I50.22 CHRONIC SYSTOLIC HEART FAILURE (HCC): Primary | ICD-10-CM

## 2022-11-15 DIAGNOSIS — I48.0 PAF (PAROXYSMAL ATRIAL FIBRILLATION) (HCC): ICD-10-CM

## 2022-11-15 DIAGNOSIS — I73.9 PVD (PERIPHERAL VASCULAR DISEASE) (HCC): ICD-10-CM

## 2022-11-15 PROCEDURE — 99213 OFFICE O/P EST LOW 20 MIN: CPT | Performed by: INTERNAL MEDICINE

## 2022-11-15 PROCEDURE — 1123F ACP DISCUSS/DSCN MKR DOCD: CPT | Performed by: INTERNAL MEDICINE

## 2022-11-15 NOTE — PROGRESS NOTES
OFFICE PROGRESS NOTES      Valeria Villeda is a 80 y.o. male who has    CHIEF COMPLAINT AS FOLLOWS:  CHEST PAIN: No C/O chest pain. SOB: No C/O SOB at this time. LEG EDEMA: some times gets Lower extremity edema . PALPITATIONS: Denies any C/O Palpitations                                 DIZZINESS: No C/O Dizziness      SYNCOPE: None   OTHER/ ADDITIONAL COMPLAINTS:                                     HPI: Patient is here for F/U on his Arrhythmia, HTN & Dyslipidemia problems. CHF: Patient has known systolic (HFrEF). Patient had been managed with medical regimen as stated below. Arrhythmia: Patient has known  PAF. HTN: Patient has known essential HTN. Has been treated with guideline recommended medical / physical/ diet therapy as stated below. Dyslipidemia: Patient has known mixed dyslipidemia. Has been treated with guideline recommended medical / physical/ diet therapy as stated below. Current Outpatient Medications   Medication Sig Dispense Refill    atorvastatin (LIPITOR) 20 MG tablet TAKE ONE (1) TABLET BY MOUTH ONCE DAILY 90 tablet 0    levothyroxine (SYNTHROID) 100 MCG tablet TAKE ONE (1) TABLET BY MOUTH ONCE DAILY 90 tablet 0    sertraline (ZOLOFT) 100 MG tablet TAKE ONE (1) TABLET BY MOUTH ONCE DAILY 90 tablet 0    ondansetron (ZOFRAN ODT) 4 MG disintegrating tablet Take 1 tablet by mouth in the morning and 1 tablet at noon and 1 tablet in the evening and 1 tablet before bedtime.  20 tablet 0    ELIQUIS 2.5 MG TABS tablet TAKE ONE (1) TABLET BY MOUTH TWO TIMES DAILY 60 tablet 2    metoprolol succinate (TOPROL XL) 25 MG extended release tablet TAKE 1 TABLET BY MOUTH ONCE DAILY 90 tablet 1    lisinopril (PRINIVIL;ZESTRIL) 10 MG tablet TAKE 1 TABLET BY MOUTH ONCE DAILY 30 tablet 5    buPROPion (WELLBUTRIN XL) 150 MG extended release tablet TAKE 1 TABLET BY MOUTH EVERY MORNING 30 tablet 5    donepezil (ARICEPT) 10 MG tablet TAKE 1 TABLET BY MOUTH AT NIGHT 30 tablet 5    finasteride (PROSCAR) 5 MG tablet       tamsulosin (FLOMAX) 0.4 MG capsule TAKE 1 CAPSULE BY MOUTH ONCE DAILY 30 capsule 5    latanoprost (XALATAN) 0.005 % ophthalmic solution       Ascorbic Acid (VITAMIN C) 1000 MG tablet Take 1,000 mg by mouth daily      brimonidine-timolol (COMBIGAN) 0.2-0.5 % ophthalmic solution Place 1 drop into both eyes every 12 hours      Glucosamine-MSM-Hyaluronic Acd (JOINT HEALTH PO) Take 1 each by mouth daily       clotrimazole-betamethasone (LOTRISONE) 1-0.05 % cream Apply topically 2 times daily. (Patient not taking: Reported on 11/15/2022) 1 each 1     No current facility-administered medications for this visit. Allergies: Codeine and Fentanyl  Review of Systems:    Constitutional: Negative for diaphoresis and fatigue  Respiratory: Negative for shortness of breath  Cardiovascular: Negative for chest pain, dyspnea on exertion, claudication, edema, irregular heartbeat, murmur, palpitations or shortness of breath  Musculoskeletal: Negative for muscle pain, muscular weakness, negative for pain in arm and leg or swelling in foot and leg    Objective:  /70 (Site: Left Upper Arm, Position: Sitting, Cuff Size: Medium Adult)   Pulse 76   Ht 5' 4\" (1.626 m)   Wt 153 lb (69.4 kg)   BMI 26.26 kg/m²   Wt Readings from Last 3 Encounters:   11/15/22 153 lb (69.4 kg)   10/28/22 160 lb (72.6 kg)   07/22/22 161 lb (73 kg)     Body mass index is 26.26 kg/m². GENERAL - Alert, oriented, pleasant, in no apparent distress. EYES: No jaundice, no conjunctival pallor. Neck - Supple. No jugular venous distention noted. No carotid bruits. Cardiovascular - Normal S1 and S2 without obvious murmur or gallop. Extremities - No cyanosis, clubbing, or significant edema. Pulmonary - No respiratory distress. No wheezes or rales.       MEDICAL DECISION MAKING & DATA REVIEW:    Lab Review   Lab Results   Component Value Date/Time    TROPONINT <0.010 10/28/2022 06:50 PM    TROPONINT <0.010 06/28/2022 10:55 PM Lab Results   Component Value Date/Time     02/07/2013 06:00 AM    PROBNP 2,032 09/01/2019 04:35 AM    PROBNP 1,170 08/30/2019 04:00 AM     Lab Results   Component Value Date    INR 1.06 04/09/2021    INR 1.16 12/18/2019     Lab Results   Component Value Date    LABA1C 5.7 02/24/2022     Lab Results   Component Value Date    WBC 12.4 (H) 10/28/2022    WBC 3.9 (L) 07/25/2022    HCT 38.9 (L) 10/28/2022    HCT 35.0 (L) 07/25/2022    .9 (H) 10/28/2022    .6 (H) 07/25/2022     10/28/2022     07/25/2022     Lab Results   Component Value Date    CHOL 140 12/03/2020    CHOL 160 07/22/2014    TRIG 87 04/09/2021    TRIG 151 (H) 12/03/2020    HDL 39 (L) 12/03/2020    HDL 38 (L) 07/22/2014    LDLCALC 71 12/03/2020    LDLCALC 99 07/22/2014     Lab Results   Component Value Date    ALT 18 10/28/2022    ALT 11 06/28/2022    AST 26 10/28/2022    AST 18 06/28/2022     BMP:    Lab Results   Component Value Date/Time     10/28/2022 06:50 PM     07/12/2022 02:52 PM    K 4.5 10/28/2022 06:50 PM    K 4.4 07/12/2022 02:52 PM    CL 99 10/28/2022 06:50 PM     07/12/2022 02:52 PM    CO2 27 10/28/2022 06:50 PM    CO2 28 07/12/2022 02:52 PM    BUN 21 10/28/2022 06:50 PM    BUN 21 07/12/2022 02:52 PM    CREATININE 1.4 10/28/2022 06:50 PM    CREATININE 1.4 07/12/2022 02:52 PM     CMP:   Lab Results   Component Value Date/Time     10/28/2022 06:50 PM     07/12/2022 02:52 PM    K 4.5 10/28/2022 06:50 PM    K 4.4 07/12/2022 02:52 PM    CL 99 10/28/2022 06:50 PM     07/12/2022 02:52 PM    CO2 27 10/28/2022 06:50 PM    CO2 28 07/12/2022 02:52 PM    BUN 21 10/28/2022 06:50 PM    BUN 21 07/12/2022 02:52 PM    CREATININE 1.4 10/28/2022 06:50 PM    CREATININE 1.4 07/12/2022 02:52 PM    PROT 7.2 10/28/2022 06:50 PM    PROT 7.4 06/28/2022 10:55 PM    PROT 5.2 02/24/2013 05:10 AM    PROT 5.5 02/04/2013 05:30 PM     Lab Results   Component Value Date/Time    TSH 0.14 09/22/2021 04:12 PM    TSH 1.46 05/24/2021 01:56 PM    TSHHS 1.950 03/23/2016 07:41 PM    TSHHS 3.673 02/21/2013 05:34 AM       QUALITY MEASURES REVIEWED:  1.CAD:Patient is taking anti platelet agent:No  Patient does not have Hx of documented CAD  2. DYSLIPIDEMIA: Patient is on cholesterol lowering medication:Yes   3. Beta-Blocker therapy for CAD, if prior Myocardial Infarction:Yes   4. Counselled regarding smoking cessation. No   Patient does not Smoke. 5.Anticoagulation therapy (for A.Fib) Yes   Does Not have A.Fib.  6.Discussed weight management strategies. Assessment & Plan:  Primary / Secondary prevention is the goal by aggressive risk modification, healthy and therapeutic life style changes for cardiovascular risk reduction. CORONARY ARTERY DISEASE:None known  12/2018    IMI of a medium sized territory. No reversible Ischemia. Other areas perfuse normally. Inferior wall Hypokinesis with borderline reduction of LV function      HTN:well controlled on current medical regimen, see list above. - changes in  treatment:   no, on Lisinopril & Lopressor   CARDIOMYOPATHY: None known   CONGESTIVE HEART FAILURE: NO KNOWN HISTORY.     VHD: No significant VHD noted  ECHO 9/2019   Technically difficult examination. Done in supine. Patient could not turn. Recent bowel surgery. Suboptimal subcostal window. Left ventricular function is normal, EF is estimated at 50%. Grade I diastolic dysfunction. Mild left ventricular hypertrophy. Mild to moderate aortic and mitral regurgitation noted   Moderate tricuspid regurgitation . No evidence of pericardial effusion  DYSLIPIDEMIA: Patient's profile is at / near Goal.yes,                                 HDL is low                                Tolerating current medical regimen wellyes, take Lipitor                                                              See most recent Lab values in Labs section above.    ARRHYTHMIAS:  Known H/O CHB Patient has H/O P. Atrial fibrillation                                He is rate controlled, on Metoprolol & on anticoagulation with Eliquis. S/P PPM: 1/2022  The device is Medtronic BIV pacemaker. Device interrogation was performed. Mode : DDDR    Sensing is normal. Impedence is normal.  Threshold is normal.    There has not been interval changes. Estimated battery life is 3.4 years   Atrial Arrhythmia : 6.1% burden   Non sustained VT episodes : No   Sustained VT episodes : 0   Patient activity reported by the device to 0.0 hr/day    The underlying rhythm is AP, .  87.8 % atrial paced; 99.4 % ventricular paced. The patient is pacemaker dependent. HF management parameter are with in normal limits     TESTS ORDERED: none this visit     PREVIOUSLY ORDERED TESTS REVIEWED & DISCUSSED WITH THE PATIENT:     I personally reviewed & interpreted, all previously ordered tests as copied above. Latest Labs are pulled in to the note with dates. Labs, specially in Reference to Lipid profile, Cardiac testing in the form of Echo ( dated: ), stress tests ( dated: ) & other relevant cardiac testing reviewed with patient & recommendations made based on assessment of the results. Discussed role of Cardiac risk factors & effects + treatment of co morbidities with patient & advised accordingly. MEDICATIONS: List of medications patient is currently taking is reviewed in detail with the patient & family member present. Discussed any side effects or problems taking the medication. Recommend Continue present management & medications as listed. AFFIRMATION: I spent at least 20 minutes of time reviewing patient's history, previous & current medical problems & all Labs + testing. This includes chart prep even prior to the vosit. Various goals are discussed and multiple questions answered. Relevant concelling performed. Office follow up in six months. Device check per protocol.

## 2022-11-15 NOTE — LETTER
[facility]  [address] [id number]  Phone: [phone]  Fax: [phone]    Twan Felix MD    November 15, 2022     LIA San     Patient: Malathi Robertson   MR Number: [id number]   YOB: 1930   Date of Visit: 11/15/2022       Dear Constantine Soriano: Thank you for referring Tana Valdivia to me for evaluation/treatment. Below are the relevant portions of my assessment and plan of care. If you have questions, please do not hesitate to call me. I look forward to following Rita Battles along with you.     Sincerely,      Twan Felix MD

## 2022-11-15 NOTE — PATIENT INSTRUCTIONS
CORONARY ARTERY DISEASE:None known  12/2018    IMI of a medium sized territory. No reversible Ischemia. Other areas perfuse normally. Inferior wall Hypokinesis with borderline reduction of LV function      HTN:well controlled on current medical regimen, see list above. - changes in  treatment:   no, on Lisinopril & Lopressor   CARDIOMYOPATHY: None known   CONGESTIVE HEART FAILURE: NO KNOWN HISTORY.     VHD: No significant VHD noted  ECHO 9/2019   Technically difficult examination. Done in supine. Patient could not turn. Recent bowel surgery. Suboptimal subcostal window. Left ventricular function is normal, EF is estimated at 50%. Grade I diastolic dysfunction. Mild left ventricular hypertrophy. Mild to moderate aortic and mitral regurgitation noted   Moderate tricuspid regurgitation . No evidence of pericardial effusion  DYSLIPIDEMIA: Patient's profile is at / near Goal.yes,                                 HDL is low                                Tolerating current medical regimen wellyes, take Lipitor                                                              See most recent Lab values in Labs section above. ARRHYTHMIAS:  Known H/O CHB                                Patient has H/O P. Atrial fibrillation                                He is rate controlled, on Metoprolol & on anticoagulation with Eliquis. S/P PPM: 1/2022  The device is Medtronic BIV pacemaker. Device interrogation was performed. Mode : DDDR    Sensing is normal. Impedence is normal.  Threshold is normal.    There has not been interval changes. Estimated battery life is 3.4 years   Atrial Arrhythmia : 6.1% burden   Non sustained VT episodes : No   Sustained VT episodes : 0   Patient activity reported by the device to 0.0 hr/day    The underlying rhythm is AP, .  87.8 % atrial paced; 99.4 % ventricular paced. The patient is pacemaker dependent.      HF management parameter are with in normal limits TESTS ORDERED: none this visit     PREVIOUSLY ORDERED TESTS REVIEWED & DISCUSSED WITH THE PATIENT:     I personally reviewed & interpreted, all previously ordered tests as copied above. Latest Labs are pulled in to the note with dates. Labs, specially in Reference to Lipid profile, Cardiac testing in the form of Echo ( dated: ), stress tests ( dated: ) & other relevant cardiac testing reviewed with patient & recommendations made based on assessment of the results. Discussed role of Cardiac risk factors & effects + treatment of co morbidities with patient & advised accordingly. MEDICATIONS: List of medications patient is currently taking is reviewed in detail with the patient & family member present. Discussed any side effects or problems taking the medication. Recommend Continue present management & medications as listed. AFFIRMATION: I spent at least 20 minutes of time reviewing patient's history, previous & current medical problems & all Labs + testing. This includes chart prep even prior to the vosit. Various goals are discussed and multiple questions answered. Relevant concelling performed. Office follow up in six months. Device check per protocol.

## 2022-12-13 DIAGNOSIS — F03.90 DEMENTIA WITHOUT BEHAVIORAL DISTURBANCE (HCC): ICD-10-CM

## 2022-12-13 RX ORDER — DONEPEZIL HYDROCHLORIDE 10 MG/1
TABLET, FILM COATED ORAL
Qty: 60 TABLET | OUTPATIENT
Start: 2022-12-13

## 2022-12-13 RX ORDER — APIXABAN 2.5 MG/1
TABLET, FILM COATED ORAL
Qty: 60 TABLET | Refills: 2 | Status: SHIPPED | OUTPATIENT
Start: 2022-12-13

## 2022-12-27 DIAGNOSIS — F03.90 DEMENTIA WITHOUT BEHAVIORAL DISTURBANCE (HCC): ICD-10-CM

## 2022-12-27 RX ORDER — DONEPEZIL HYDROCHLORIDE 10 MG/1
TABLET, FILM COATED ORAL
Qty: 30 TABLET | Refills: 5 | Status: SHIPPED | OUTPATIENT
Start: 2022-12-27

## 2022-12-28 ENCOUNTER — TELEPHONE (OUTPATIENT)
Dept: FAMILY MEDICINE CLINIC | Age: 87
End: 2022-12-28

## 2022-12-28 NOTE — TELEPHONE ENCOUNTER
To Sana-      Patient has cold symptoms:  coughing, congestion, and sore throat. What OTC medicine can he take based on his medical Hx----can he take Equate Black Elderberry Cold & Flu lozenges (it melts under tongue). Please advise.     [facility] Phone:  [phone]

## 2022-12-29 DIAGNOSIS — R05.1 ACUTE COUGH: Primary | ICD-10-CM

## 2022-12-29 DIAGNOSIS — R09.81 NASAL CONGESTION: ICD-10-CM

## 2022-12-29 RX ORDER — FLUTICASONE PROPIONATE 50 MCG
2 SPRAY, SUSPENSION (ML) NASAL DAILY
Qty: 16 G | Refills: 0 | Status: SHIPPED | OUTPATIENT
Start: 2022-12-29

## 2022-12-29 RX ORDER — BENZONATATE 100 MG/1
100-200 CAPSULE ORAL 3 TIMES DAILY PRN
Qty: 60 CAPSULE | Refills: 0 | Status: SHIPPED | OUTPATIENT
Start: 2022-12-29 | End: 2023-01-05

## 2023-01-01 ENCOUNTER — APPOINTMENT (OUTPATIENT)
Dept: GENERAL RADIOLOGY | Age: 88
DRG: 283 | End: 2023-01-01
Payer: COMMERCIAL

## 2023-01-01 ENCOUNTER — APPOINTMENT (OUTPATIENT)
Dept: CT IMAGING | Age: 88
DRG: 283 | End: 2023-01-01
Payer: COMMERCIAL

## 2023-01-01 ENCOUNTER — HOSPITAL ENCOUNTER (INPATIENT)
Age: 88
LOS: 1 days | DRG: 283 | End: 2023-03-14
Attending: EMERGENCY MEDICINE | Admitting: STUDENT IN AN ORGANIZED HEALTH CARE EDUCATION/TRAINING PROGRAM
Payer: COMMERCIAL

## 2023-01-01 VITALS
OXYGEN SATURATION: 84 % | HEART RATE: 80 BPM | TEMPERATURE: 96.4 F | HEIGHT: 68 IN | WEIGHT: 155.65 LBS | BODY MASS INDEX: 23.59 KG/M2 | DIASTOLIC BLOOD PRESSURE: 52 MMHG | SYSTOLIC BLOOD PRESSURE: 66 MMHG | RESPIRATION RATE: 16 BRPM

## 2023-01-01 DIAGNOSIS — I16.0 HYPERTENSIVE URGENCY: Primary | ICD-10-CM

## 2023-01-01 DIAGNOSIS — R77.8 ELEVATED TROPONIN: ICD-10-CM

## 2023-01-01 DIAGNOSIS — I50.9 ACUTE CONGESTIVE HEART FAILURE, UNSPECIFIED HEART FAILURE TYPE (HCC): ICD-10-CM

## 2023-01-01 LAB
ALBUMIN SERPL-MCNC: 3.7 GM/DL (ref 3.4–5)
ALP BLD-CCNC: 139 IU/L (ref 40–129)
ALT SERPL-CCNC: 16 U/L (ref 10–40)
AMMONIA: 15 UMOL/L (ref 16–60)
ANION GAP SERPL CALCULATED.3IONS-SCNC: 10 MMOL/L (ref 4–16)
ANION GAP SERPL CALCULATED.3IONS-SCNC: 12 MMOL/L (ref 4–16)
ANION GAP SERPL CALCULATED.3IONS-SCNC: 15 MMOL/L (ref 4–16)
ANION GAP SERPL CALCULATED.3IONS-SCNC: 9 MMOL/L (ref 4–16)
AST SERPL-CCNC: 28 IU/L (ref 15–37)
BACTERIA: NEGATIVE /HPF
BASE EXCESS MIXED: 0 (ref 0–1.2)
BASE EXCESS MIXED: 5.9 (ref 0–1.2)
BASE EXCESS MIXED: 7.1 (ref 0–1.2)
BASE EXCESS: ABNORMAL (ref 0–3.3)
BASE EXCESS: ABNORMAL (ref 0–3.3)
BASOPHILS ABSOLUTE: 0.1 K/CU MM
BASOPHILS RELATIVE PERCENT: 1 % (ref 0–1)
BILIRUB SERPL-MCNC: 0.5 MG/DL (ref 0–1)
BILIRUBIN URINE: ABNORMAL MG/DL
BLOOD, URINE: ABNORMAL
BUN SERPL-MCNC: 22 MG/DL (ref 6–23)
BUN SERPL-MCNC: 23 MG/DL (ref 6–23)
BUN SERPL-MCNC: 23 MG/DL (ref 6–23)
BUN SERPL-MCNC: 24 MG/DL (ref 6–23)
CALCIUM SERPL-MCNC: 8.3 MG/DL (ref 8.3–10.6)
CALCIUM SERPL-MCNC: 8.4 MG/DL (ref 8.3–10.6)
CALCIUM SERPL-MCNC: 8.4 MG/DL (ref 8.3–10.6)
CALCIUM SERPL-MCNC: 9.2 MG/DL (ref 8.3–10.6)
CARBON MONOXIDE, BLOOD: 1.9 % (ref 0–5)
CHLORIDE BLD-SCNC: 100 MMOL/L (ref 99–110)
CHLORIDE BLD-SCNC: 100 MMOL/L (ref 99–110)
CHLORIDE BLD-SCNC: 98 MMOL/L (ref 99–110)
CHLORIDE BLD-SCNC: 99 MMOL/L (ref 99–110)
CHOLEST SERPL-MCNC: 131 MG/DL
CLARITY: CLEAR
CO2 CONTENT: 28.1 MMOL/L (ref 19–24)
CO2: 15 MMOL/L (ref 21–32)
CO2: 23 MMOL/L (ref 21–32)
CO2: 28 MMOL/L (ref 21–32)
CO2: 29 MMOL/L (ref 21–32)
CO2: 29 MMOL/L (ref 21–32)
CO2: 31 MMOL/L (ref 21–32)
COLOR: YELLOW
COMMENT: ABNORMAL
CREAT SERPL-MCNC: 1.3 MG/DL (ref 0.9–1.3)
CREAT SERPL-MCNC: 1.4 MG/DL (ref 0.9–1.3)
CULTURE: NORMAL
DIFFERENTIAL TYPE: ABNORMAL
EGFR, POC: 52 ML/MIN/1.73M2
EGFR, POC: 52 ML/MIN/1.73M2
EKG ATRIAL RATE: 40 BPM
EKG ATRIAL RATE: 79 BPM
EKG ATRIAL RATE: 81 BPM
EKG DIAGNOSIS: NORMAL
EKG P AXIS: 111 DEGREES
EKG P-R INTERVAL: 174 MS
EKG P-R INTERVAL: 176 MS
EKG Q-T INTERVAL: 448 MS
EKG Q-T INTERVAL: 452 MS
EKG Q-T INTERVAL: 466 MS
EKG QRS DURATION: 134 MS
EKG QRS DURATION: 138 MS
EKG QRS DURATION: 154 MS
EKG QTC CALCULATION (BAZETT): 516 MS
EKG QTC CALCULATION (BAZETT): 537 MS
EKG QTC CALCULATION (BAZETT): 546 MS
EKG R AXIS: 231 DEGREES
EKG R AXIS: 233 DEGREES
EKG R AXIS: 238 DEGREES
EKG T AXIS: 100 DEGREES
EKG T AXIS: 78 DEGREES
EKG T AXIS: 98 DEGREES
EKG VENTRICULAR RATE: 80 BPM
EKG VENTRICULAR RATE: 80 BPM
EKG VENTRICULAR RATE: 88 BPM
EOSINOPHILS ABSOLUTE: 0.1 K/CU MM
EOSINOPHILS RELATIVE PERCENT: 2 % (ref 0–3)
GFR SERPL CREATININE-BSD FRML MDRD: 47 ML/MIN/1.73M2
GFR SERPL CREATININE-BSD FRML MDRD: 52 ML/MIN/1.73M2
GLUCOSE BLD-MCNC: 104 MG/DL (ref 70–99)
GLUCOSE BLD-MCNC: 143 MG/DL (ref 70–99)
GLUCOSE BLD-MCNC: 189 MG/DL (ref 70–99)
GLUCOSE SERPL-MCNC: 105 MG/DL (ref 70–99)
GLUCOSE SERPL-MCNC: 108 MG/DL (ref 70–99)
GLUCOSE SERPL-MCNC: 141 MG/DL (ref 70–99)
GLUCOSE SERPL-MCNC: 92 MG/DL (ref 70–99)
GLUCOSE, URINE: NEGATIVE MG/DL
HCO3 ARTERIAL: 14.2 MMOL/L (ref 18–23)
HCO3 ARTERIAL: 22.1 MMOL/L (ref 18–23)
HCO3 ARTERIAL: 27.2 MMOL/L (ref 18–23)
HCT VFR BLD CALC: 25 % (ref 42–52)
HCT VFR BLD CALC: 32.9 % (ref 42–52)
HCT VFR BLD CALC: 36 % (ref 42–52)
HCT VFR BLD CALC: 36.7 % (ref 42–52)
HCT VFR BLD CALC: 38.5 % (ref 42–52)
HCT VFR BLD CALC: 38.5 % (ref 42–52)
HDLC SERPL-MCNC: 54 MG/DL
HEMOGLOBIN: 10.3 GM/DL (ref 13.5–18)
HEMOGLOBIN: 11.3 GM/DL (ref 13.5–18)
HEMOGLOBIN: 11.6 GM/DL (ref 13.5–18)
HEMOGLOBIN: 12.3 GM/DL (ref 13.5–18)
HEMOGLOBIN: 12.3 GM/DL (ref 13.5–18)
HEMOGLOBIN: 8.6 GM/DL (ref 13.5–18)
IMMATURE NEUTROPHIL %: 0.2 % (ref 0–0.43)
KETONES, URINE: ABNORMAL MG/DL
LACTATE: 6 MMOL/L (ref 0.5–1.9)
LDLC SERPL CALC-MCNC: 60 MG/DL
LEUKOCYTE ESTERASE, URINE: ABNORMAL
LV EF: 45 %
LVEF MODALITY: NORMAL
LYMPHOCYTES ABSOLUTE: 0.7 K/CU MM
LYMPHOCYTES RELATIVE PERCENT: 13.2 % (ref 24–44)
Lab: NORMAL
MAGNESIUM: 1.9 MG/DL (ref 1.8–2.4)
MAGNESIUM: 2 MG/DL (ref 1.8–2.4)
MCH RBC QN AUTO: 32 PG (ref 27–31)
MCH RBC QN AUTO: 32.2 PG (ref 27–31)
MCH RBC QN AUTO: 32.3 PG (ref 27–31)
MCH RBC QN AUTO: 32.3 PG (ref 27–31)
MCHC RBC AUTO-ENTMCNC: 29.4 % (ref 32–36)
MCHC RBC AUTO-ENTMCNC: 31.3 % (ref 32–36)
MCHC RBC AUTO-ENTMCNC: 31.6 % (ref 32–36)
MCHC RBC AUTO-ENTMCNC: 31.9 % (ref 32–36)
MCV RBC AUTO: 100.3 FL (ref 78–100)
MCV RBC AUTO: 102.2 FL (ref 78–100)
MCV RBC AUTO: 103.1 FL (ref 78–100)
MCV RBC AUTO: 109.7 FL (ref 78–100)
METHEMOGLOBIN ARTERIAL: 1.6 %
MONOCYTES ABSOLUTE: 0.4 K/CU MM
MONOCYTES RELATIVE PERCENT: 8.9 % (ref 0–4)
NITRITE URINE, QUANTITATIVE: NEGATIVE
NUCLEATED RBC %: 0 %
O2 SATURATION: 100 % (ref 96–97)
O2 SATURATION: 41.4 % (ref 96–97)
O2 SATURATION: 95.9 % (ref 96–97)
PCO2 ARTERIAL: 19.2 MMHG (ref 32–45)
PCO2 ARTERIAL: 26.2 MMHG (ref 32–45)
PCO2 ARTERIAL: 29 MMHG (ref 32–45)
PDW BLD-RTO: 15.4 % (ref 11.7–14.9)
PDW BLD-RTO: 15.6 % (ref 11.7–14.9)
PDW BLD-RTO: 15.8 % (ref 11.7–14.9)
PDW BLD-RTO: 19.5 % (ref 11.7–14.9)
PH BLOOD: 7.48 (ref 7.34–7.45)
PH BLOOD: 7.53 (ref 7.34–7.45)
PH BLOOD: 7.58 (ref 7.34–7.45)
PH, URINE: 6.5 (ref 5–8)
PHOSPHORUS: 2.7 MG/DL (ref 2.5–4.9)
PHOSPHORUS: 3.4 MG/DL (ref 2.5–4.9)
PHOSPHORUS: 3.5 MG/DL (ref 2.5–4.9)
PLATELET # BLD: 154 K/CU MM (ref 140–440)
PLATELET # BLD: 189 K/CU MM (ref 140–440)
PLATELET # BLD: 192 K/CU MM (ref 140–440)
PLATELET # BLD: 203 K/CU MM (ref 140–440)
PMV BLD AUTO: 10 FL (ref 7.5–11.1)
PMV BLD AUTO: 10.3 FL (ref 7.5–11.1)
PMV BLD AUTO: 10.4 FL (ref 7.5–11.1)
PMV BLD AUTO: 9.8 FL (ref 7.5–11.1)
PO2 ARTERIAL: 20.9 MMHG (ref 75–100)
PO2 ARTERIAL: 278 MMHG (ref 75–100)
PO2 ARTERIAL: 353.9 MMHG (ref 75–100)
POC CALCIUM: 1.04 MMOL/L (ref 1.12–1.32)
POC CALCIUM: 1.13 MMOL/L (ref 1.12–1.32)
POC CHLORIDE: 104 MMOL/L (ref 98–109)
POC CHLORIDE: 107 MMOL/L (ref 98–109)
POC CREATININE: 1.3 MG/DL (ref 0.9–1.3)
POC CREATININE: 1.3 MG/DL (ref 0.9–1.3)
POTASSIUM SERPL-SCNC: 2.6 MMOL/L (ref 3.5–4.5)
POTASSIUM SERPL-SCNC: 2.9 MMOL/L (ref 3.5–5.1)
POTASSIUM SERPL-SCNC: 3.8 MMOL/L (ref 3.5–5.1)
POTASSIUM SERPL-SCNC: 3.8 MMOL/L (ref 3.5–5.1)
POTASSIUM SERPL-SCNC: 4.2 MMOL/L (ref 3.5–5.1)
POTASSIUM SERPL-SCNC: 4.9 MMOL/L (ref 3.5–4.5)
PRO-BNP: 8856 PG/ML
PRO-BNP: 9353 PG/ML
PROCALCITONIN SERPL-MCNC: 0.08 NG/ML
PROTEIN UA: 100 MG/DL
RBC # BLD: 3.19 M/CU MM (ref 4.6–6.2)
RBC # BLD: 3.51 M/CU MM (ref 4.6–6.2)
RBC # BLD: 3.59 M/CU MM (ref 4.6–6.2)
RBC # BLD: 3.84 M/CU MM (ref 4.6–6.2)
RBC URINE: 1318 /HPF (ref 0–3)
REASON FOR REJECTION: NORMAL
REJECTED TEST: NORMAL
SEGMENTED NEUTROPHILS ABSOLUTE COUNT: 3.7 K/CU MM
SEGMENTED NEUTROPHILS RELATIVE PERCENT: 74.7 % (ref 36–66)
SODIUM BLD-SCNC: 138 MMOL/L (ref 135–145)
SODIUM BLD-SCNC: 139 MMOL/L (ref 135–145)
SODIUM BLD-SCNC: 140 MMOL/L (ref 138–146)
SODIUM BLD-SCNC: 141 MMOL/L (ref 135–145)
SODIUM BLD-SCNC: 142 MMOL/L (ref 135–145)
SODIUM BLD-SCNC: 142 MMOL/L (ref 138–146)
SOURCE, BLOOD GAS: ABNORMAL
SOURCE, BLOOD GAS: ABNORMAL
SPECIFIC GRAVITY UA: 1.02 (ref 1–1.03)
SPECIMEN: NORMAL
T4 FREE SERPL-MCNC: 1.31 NG/DL (ref 0.9–1.8)
TOTAL CK: 105 IU/L (ref 38–174)
TOTAL CK: 53 IU/L (ref 38–174)
TOTAL IMMATURE NEUTOROPHIL: 0.01 K/CU MM
TOTAL NUCLEATED RBC: 0 K/CU MM
TOTAL PROTEIN: 6.9 GM/DL (ref 6.4–8.2)
TRICHOMONAS: ABNORMAL /HPF
TRIGL SERPL-MCNC: 83 MG/DL
TROPONIN T: 0.03 NG/ML
TROPONIN T: 0.04 NG/ML
TROPONIN T: 0.04 NG/ML
TROPONIN T: 0.07 NG/ML
TSH SERPL DL<=0.005 MIU/L-ACNC: 2.97 UIU/ML (ref 0.27–4.2)
UROBILINOGEN, URINE: 0.2 MG/DL (ref 0.2–1)
WBC # BLD: 4.9 K/CU MM (ref 4–10.5)
WBC # BLD: 5.5 K/CU MM (ref 4–10.5)
WBC # BLD: 6.2 K/CU MM (ref 4–10.5)
WBC # BLD: 7.7 K/CU MM (ref 4–10.5)
WBC UA: ABNORMAL /HPF (ref 0–2)

## 2023-01-01 PROCEDURE — 84484 ASSAY OF TROPONIN QUANT: CPT

## 2023-01-01 PROCEDURE — 82962 GLUCOSE BLOOD TEST: CPT

## 2023-01-01 PROCEDURE — 89220 SPUTUM SPECIMEN COLLECTION: CPT

## 2023-01-01 PROCEDURE — 84439 ASSAY OF FREE THYROXINE: CPT

## 2023-01-01 PROCEDURE — 93005 ELECTROCARDIOGRAM TRACING: CPT | Performed by: INTERNAL MEDICINE

## 2023-01-01 PROCEDURE — 82140 ASSAY OF AMMONIA: CPT

## 2023-01-01 PROCEDURE — 93010 ELECTROCARDIOGRAM REPORT: CPT | Performed by: INTERNAL MEDICINE

## 2023-01-01 PROCEDURE — 6360000002 HC RX W HCPCS: Performed by: EMERGENCY MEDICINE

## 2023-01-01 PROCEDURE — 96374 THER/PROPH/DIAG INJ IV PUSH: CPT

## 2023-01-01 PROCEDURE — 36620 INSERTION CATHETER ARTERY: CPT

## 2023-01-01 PROCEDURE — 94002 VENT MGMT INPAT INIT DAY: CPT

## 2023-01-01 PROCEDURE — 97116 GAIT TRAINING THERAPY: CPT

## 2023-01-01 PROCEDURE — G0378 HOSPITAL OBSERVATION PER HR: HCPCS

## 2023-01-01 PROCEDURE — 83735 ASSAY OF MAGNESIUM: CPT

## 2023-01-01 PROCEDURE — 83880 ASSAY OF NATRIURETIC PEPTIDE: CPT

## 2023-01-01 PROCEDURE — 6370000000 HC RX 637 (ALT 250 FOR IP): Performed by: FAMILY MEDICINE

## 2023-01-01 PROCEDURE — 51798 US URINE CAPACITY MEASURE: CPT

## 2023-01-01 PROCEDURE — 6360000002 HC RX W HCPCS: Performed by: NURSE PRACTITIONER

## 2023-01-01 PROCEDURE — 5A1935Z RESPIRATORY VENTILATION, LESS THAN 24 CONSECUTIVE HOURS: ICD-10-PCS | Performed by: INTERNAL MEDICINE

## 2023-01-01 PROCEDURE — 2700000000 HC OXYGEN THERAPY PER DAY

## 2023-01-01 PROCEDURE — 36415 COLL VENOUS BLD VENIPUNCTURE: CPT

## 2023-01-01 PROCEDURE — 96375 TX/PRO/DX INJ NEW DRUG ADDON: CPT

## 2023-01-01 PROCEDURE — 99222 1ST HOSP IP/OBS MODERATE 55: CPT | Performed by: INTERNAL MEDICINE

## 2023-01-01 PROCEDURE — 2500000003 HC RX 250 WO HCPCS: Performed by: EMERGENCY MEDICINE

## 2023-01-01 PROCEDURE — 82803 BLOOD GASES ANY COMBINATION: CPT

## 2023-01-01 PROCEDURE — 6370000000 HC RX 637 (ALT 250 FOR IP): Performed by: INTERNAL MEDICINE

## 2023-01-01 PROCEDURE — 74018 RADEX ABDOMEN 1 VIEW: CPT

## 2023-01-01 PROCEDURE — 2580000003 HC RX 258: Performed by: INTERNAL MEDICINE

## 2023-01-01 PROCEDURE — 80048 BASIC METABOLIC PNL TOTAL CA: CPT

## 2023-01-01 PROCEDURE — 94761 N-INVAS EAR/PLS OXIMETRY MLT: CPT

## 2023-01-01 PROCEDURE — 93308 TTE F-UP OR LMTD: CPT

## 2023-01-01 PROCEDURE — 84100 ASSAY OF PHOSPHORUS: CPT

## 2023-01-01 PROCEDURE — 92950 HEART/LUNG RESUSCITATION CPR: CPT

## 2023-01-01 PROCEDURE — 80164 ASSAY DIPROPYLACETIC ACD TOT: CPT

## 2023-01-01 PROCEDURE — 82550 ASSAY OF CK (CPK): CPT

## 2023-01-01 PROCEDURE — 71045 X-RAY EXAM CHEST 1 VIEW: CPT

## 2023-01-01 PROCEDURE — 6360000002 HC RX W HCPCS: Performed by: STUDENT IN AN ORGANIZED HEALTH CARE EDUCATION/TRAINING PROGRAM

## 2023-01-01 PROCEDURE — 97162 PT EVAL MOD COMPLEX 30 MIN: CPT

## 2023-01-01 PROCEDURE — 85027 COMPLETE CBC AUTOMATED: CPT

## 2023-01-01 PROCEDURE — 84443 ASSAY THYROID STIM HORMONE: CPT

## 2023-01-01 PROCEDURE — 99285 EMERGENCY DEPT VISIT HI MDM: CPT

## 2023-01-01 PROCEDURE — 84145 PROCALCITONIN (PCT): CPT

## 2023-01-01 PROCEDURE — 97530 THERAPEUTIC ACTIVITIES: CPT

## 2023-01-01 PROCEDURE — 1200000000 HC SEMI PRIVATE

## 2023-01-01 PROCEDURE — 6360000002 HC RX W HCPCS: Performed by: INTERNAL MEDICINE

## 2023-01-01 PROCEDURE — 31500 INSERT EMERGENCY AIRWAY: CPT

## 2023-01-01 PROCEDURE — 80165 DIPROPYLACETIC ACID FREE: CPT

## 2023-01-01 PROCEDURE — APPSS45 APP SPLIT SHARED TIME 31-45 MINUTES

## 2023-01-01 PROCEDURE — 81001 URINALYSIS AUTO W/SCOPE: CPT

## 2023-01-01 PROCEDURE — 2500000003 HC RX 250 WO HCPCS: Performed by: STUDENT IN AN ORGANIZED HEALTH CARE EDUCATION/TRAINING PROGRAM

## 2023-01-01 PROCEDURE — 6360000002 HC RX W HCPCS

## 2023-01-01 PROCEDURE — 85014 HEMATOCRIT: CPT

## 2023-01-01 PROCEDURE — 80061 LIPID PANEL: CPT

## 2023-01-01 PROCEDURE — 99233 SBSQ HOSP IP/OBS HIGH 50: CPT | Performed by: INTERNAL MEDICINE

## 2023-01-01 PROCEDURE — 6360000002 HC RX W HCPCS: Performed by: PHYSICIAN ASSISTANT

## 2023-01-01 PROCEDURE — 85025 COMPLETE CBC W/AUTO DIFF WBC: CPT

## 2023-01-01 PROCEDURE — 83605 ASSAY OF LACTIC ACID: CPT

## 2023-01-01 PROCEDURE — 2500000003 HC RX 250 WO HCPCS

## 2023-01-01 PROCEDURE — 87086 URINE CULTURE/COLONY COUNT: CPT

## 2023-01-01 PROCEDURE — 80051 ELECTROLYTE PANEL: CPT

## 2023-01-01 PROCEDURE — 70450 CT HEAD/BRAIN W/O DYE: CPT

## 2023-01-01 PROCEDURE — APPNB60 APP NON BILLABLE TIME 46-60 MINS: Performed by: NURSE PRACTITIONER

## 2023-01-01 PROCEDURE — 36556 INSERT NON-TUNNEL CV CATH: CPT

## 2023-01-01 PROCEDURE — 6370000000 HC RX 637 (ALT 250 FOR IP): Performed by: EMERGENCY MEDICINE

## 2023-01-01 PROCEDURE — 85018 HEMOGLOBIN: CPT

## 2023-01-01 PROCEDURE — 02HV33Z INSERTION OF INFUSION DEVICE INTO SUPERIOR VENA CAVA, PERCUTANEOUS APPROACH: ICD-10-PCS | Performed by: STUDENT IN AN ORGANIZED HEALTH CARE EDUCATION/TRAINING PROGRAM

## 2023-01-01 PROCEDURE — 93005 ELECTROCARDIOGRAM TRACING: CPT | Performed by: EMERGENCY MEDICINE

## 2023-01-01 PROCEDURE — 37799 UNLISTED PX VASCULAR SURGERY: CPT

## 2023-01-01 PROCEDURE — 80053 COMPREHEN METABOLIC PANEL: CPT

## 2023-01-01 PROCEDURE — 82565 ASSAY OF CREATININE: CPT

## 2023-01-01 PROCEDURE — 0BH17EZ INSERTION OF ENDOTRACHEAL AIRWAY INTO TRACHEA, VIA NATURAL OR ARTIFICIAL OPENING: ICD-10-PCS | Performed by: INTERNAL MEDICINE

## 2023-01-01 RX ORDER — METOPROLOL SUCCINATE 25 MG/1
25 TABLET, EXTENDED RELEASE ORAL DAILY
Status: DISCONTINUED | OUTPATIENT
Start: 2023-03-15 | End: 2023-01-01 | Stop reason: HOSPADM

## 2023-01-01 RX ORDER — EPINEPHRINE 0.1 MG/ML
SYRINGE (ML) INJECTION
Status: COMPLETED | OUTPATIENT
Start: 2023-01-01 | End: 2023-01-01

## 2023-01-01 RX ORDER — HYDROXYZINE HYDROCHLORIDE 25 MG/1
50 TABLET, FILM COATED ORAL
Status: COMPLETED | OUTPATIENT
Start: 2023-01-01 | End: 2023-01-01

## 2023-01-01 RX ORDER — FUROSEMIDE 10 MG/ML
40 INJECTION INTRAMUSCULAR; INTRAVENOUS DAILY
Status: DISCONTINUED | OUTPATIENT
Start: 2023-01-01 | End: 2023-01-01

## 2023-01-01 RX ORDER — ASPIRIN 81 MG/1
324 TABLET, CHEWABLE ORAL ONCE
Status: COMPLETED | OUTPATIENT
Start: 2023-01-01 | End: 2023-01-01

## 2023-01-01 RX ORDER — ACETAMINOPHEN 325 MG/1
650 TABLET ORAL EVERY 6 HOURS PRN
Status: DISCONTINUED | OUTPATIENT
Start: 2023-01-01 | End: 2023-01-01 | Stop reason: HOSPADM

## 2023-01-01 RX ORDER — POTASSIUM CHLORIDE 29.8 MG/ML
20 INJECTION INTRAVENOUS
Status: DISCONTINUED | OUTPATIENT
Start: 2023-01-01 | End: 2023-01-01 | Stop reason: HOSPADM

## 2023-01-01 RX ORDER — TAMSULOSIN HYDROCHLORIDE 0.4 MG/1
0.4 CAPSULE ORAL DAILY
Status: DISCONTINUED | OUTPATIENT
Start: 2023-01-01 | End: 2023-01-01 | Stop reason: HOSPADM

## 2023-01-01 RX ORDER — HYDRALAZINE HYDROCHLORIDE 20 MG/ML
10 INJECTION INTRAMUSCULAR; INTRAVENOUS EVERY 6 HOURS PRN
Status: DISCONTINUED | OUTPATIENT
Start: 2023-01-01 | End: 2023-01-01 | Stop reason: HOSPADM

## 2023-01-01 RX ORDER — KETAMINE HYDROCHLORIDE 10 MG/ML
INJECTION INTRAMUSCULAR; INTRAVENOUS
Status: COMPLETED | OUTPATIENT
Start: 2023-01-01 | End: 2023-01-01

## 2023-01-01 RX ORDER — MIDAZOLAM HYDROCHLORIDE 2 MG/2ML
2 INJECTION, SOLUTION INTRAMUSCULAR; INTRAVENOUS ONCE
Status: COMPLETED | OUTPATIENT
Start: 2023-01-01 | End: 2023-01-01

## 2023-01-01 RX ORDER — DIVALPROEX SODIUM 250 MG/1
250 TABLET, EXTENDED RELEASE ORAL NIGHTLY
Status: CANCELLED | OUTPATIENT
Start: 2023-01-01

## 2023-01-01 RX ORDER — BUPROPION HYDROCHLORIDE 75 MG/1
75 TABLET ORAL DAILY
Status: DISCONTINUED | OUTPATIENT
Start: 2023-01-01 | End: 2023-01-01 | Stop reason: HOSPADM

## 2023-01-01 RX ORDER — DIVALPROEX SODIUM 125 MG/1
250 CAPSULE, COATED PELLETS ORAL
Status: CANCELLED | OUTPATIENT
Start: 2023-01-01

## 2023-01-01 RX ORDER — LANOLIN ALCOHOL/MO/W.PET/CERES
3 CREAM (GRAM) TOPICAL
Status: COMPLETED | OUTPATIENT
Start: 2023-01-01 | End: 2023-01-01

## 2023-01-01 RX ORDER — MIDAZOLAM HYDROCHLORIDE 2 MG/2ML
1 INJECTION, SOLUTION INTRAMUSCULAR; INTRAVENOUS
Status: DISCONTINUED | OUTPATIENT
Start: 2023-01-01 | End: 2023-01-01 | Stop reason: HOSPADM

## 2023-01-01 RX ORDER — LORAZEPAM 2 MG/ML
INJECTION INTRAMUSCULAR
Status: COMPLETED | OUTPATIENT
Start: 2023-01-01 | End: 2023-01-01

## 2023-01-01 RX ORDER — LORAZEPAM 2 MG/ML
INJECTION INTRAMUSCULAR
Status: DISCONTINUED
Start: 2023-01-01 | End: 2023-01-01 | Stop reason: HOSPADM

## 2023-01-01 RX ORDER — FINASTERIDE 5 MG/1
5 TABLET, FILM COATED ORAL DAILY
Status: DISCONTINUED | OUTPATIENT
Start: 2023-01-01 | End: 2023-01-01 | Stop reason: HOSPADM

## 2023-01-01 RX ORDER — METOPROLOL SUCCINATE 25 MG/1
12.5 TABLET, EXTENDED RELEASE ORAL DAILY
Status: DISCONTINUED | OUTPATIENT
Start: 2023-01-01 | End: 2023-01-01

## 2023-01-01 RX ORDER — TRAZODONE HYDROCHLORIDE 50 MG/1
50 TABLET ORAL NIGHTLY PRN
Status: DISCONTINUED | OUTPATIENT
Start: 2023-01-01 | End: 2023-01-01 | Stop reason: HOSPADM

## 2023-01-01 RX ORDER — LABETALOL HYDROCHLORIDE 5 MG/ML
5 INJECTION, SOLUTION INTRAVENOUS ONCE
Status: COMPLETED | OUTPATIENT
Start: 2023-01-01 | End: 2023-01-01

## 2023-01-01 RX ORDER — MORPHINE SULFATE 2 MG/ML
2 INJECTION, SOLUTION INTRAMUSCULAR; INTRAVENOUS
Status: DISCONTINUED | OUTPATIENT
Start: 2023-01-01 | End: 2023-01-01 | Stop reason: HOSPADM

## 2023-01-01 RX ORDER — CALCIUM GLUCONATE 20 MG/ML
1000 INJECTION, SOLUTION INTRAVENOUS ONCE
Status: DISCONTINUED | OUTPATIENT
Start: 2023-01-01 | End: 2023-01-01 | Stop reason: HOSPADM

## 2023-01-01 RX ORDER — SODIUM CHLORIDE 0.9 % (FLUSH) 0.9 %
5-40 SYRINGE (ML) INJECTION EVERY 12 HOURS SCHEDULED
Status: DISCONTINUED | OUTPATIENT
Start: 2023-01-01 | End: 2023-01-01 | Stop reason: HOSPADM

## 2023-01-01 RX ORDER — ATORVASTATIN CALCIUM 40 MG/1
40 TABLET, FILM COATED ORAL NIGHTLY
Status: DISCONTINUED | OUTPATIENT
Start: 2023-01-01 | End: 2023-01-01 | Stop reason: HOSPADM

## 2023-01-01 RX ORDER — CHLORHEXIDINE GLUCONATE 0.12 MG/ML
15 RINSE ORAL 2 TIMES DAILY
Status: DISCONTINUED | OUTPATIENT
Start: 2023-01-01 | End: 2023-01-01 | Stop reason: HOSPADM

## 2023-01-01 RX ORDER — ROCURONIUM BROMIDE 10 MG/ML
INJECTION, SOLUTION INTRAVENOUS
Status: COMPLETED | OUTPATIENT
Start: 2023-01-01 | End: 2023-01-01

## 2023-01-01 RX ORDER — NOREPINEPHRINE BIT/0.9 % NACL 16MG/250ML
INFUSION BOTTLE (ML) INTRAVENOUS CONTINUOUS PRN
Status: COMPLETED | OUTPATIENT
Start: 2023-01-01 | End: 2023-01-01

## 2023-01-01 RX ORDER — MIDAZOLAM HYDROCHLORIDE 1 MG/ML
INJECTION INTRAMUSCULAR; INTRAVENOUS
Status: DISCONTINUED
Start: 2023-01-01 | End: 2023-01-01 | Stop reason: WASHOUT

## 2023-01-01 RX ORDER — POLYETHYLENE GLYCOL 3350 17 G/17G
17 POWDER, FOR SOLUTION ORAL DAILY PRN
Status: DISCONTINUED | OUTPATIENT
Start: 2023-01-01 | End: 2023-01-01 | Stop reason: HOSPADM

## 2023-01-01 RX ORDER — MIDAZOLAM HYDROCHLORIDE 2 MG/2ML
INJECTION, SOLUTION INTRAMUSCULAR; INTRAVENOUS
Status: COMPLETED | OUTPATIENT
Start: 2023-01-01 | End: 2023-01-01

## 2023-01-01 RX ORDER — FUROSEMIDE 10 MG/ML
40 INJECTION INTRAMUSCULAR; INTRAVENOUS ONCE
Status: COMPLETED | OUTPATIENT
Start: 2023-01-01 | End: 2023-01-01

## 2023-01-01 RX ORDER — ATORVASTATIN CALCIUM 40 MG/1
80 TABLET, FILM COATED ORAL NIGHTLY
Status: CANCELLED | OUTPATIENT
Start: 2023-01-01

## 2023-01-01 RX ORDER — FUROSEMIDE 10 MG/ML
40 INJECTION INTRAMUSCULAR; INTRAVENOUS DAILY
Status: DISCONTINUED | OUTPATIENT
Start: 2023-01-01 | End: 2023-01-01 | Stop reason: HOSPADM

## 2023-01-01 RX ORDER — LEVOTHYROXINE SODIUM 0.1 MG/1
100 TABLET ORAL DAILY
Status: DISCONTINUED | OUTPATIENT
Start: 2023-01-01 | End: 2023-01-01 | Stop reason: HOSPADM

## 2023-01-01 RX ORDER — SODIUM CHLORIDE 0.9 % (FLUSH) 0.9 %
5-40 SYRINGE (ML) INJECTION PRN
Status: DISCONTINUED | OUTPATIENT
Start: 2023-01-01 | End: 2023-01-01 | Stop reason: HOSPADM

## 2023-01-01 RX ORDER — LABETALOL HYDROCHLORIDE 5 MG/ML
10 INJECTION, SOLUTION INTRAVENOUS ONCE
Status: DISCONTINUED | OUTPATIENT
Start: 2023-01-01 | End: 2023-01-01

## 2023-01-01 RX ORDER — DONEPEZIL HYDROCHLORIDE 10 MG/1
10 TABLET, FILM COATED ORAL NIGHTLY
Status: DISCONTINUED | OUTPATIENT
Start: 2023-01-01 | End: 2023-01-01 | Stop reason: HOSPADM

## 2023-01-01 RX ORDER — FUROSEMIDE 10 MG/ML
40 INJECTION INTRAMUSCULAR; INTRAVENOUS 2 TIMES DAILY
Status: DISCONTINUED | OUTPATIENT
Start: 2023-01-01 | End: 2023-01-01

## 2023-01-01 RX ORDER — ONDANSETRON 2 MG/ML
4 INJECTION INTRAMUSCULAR; INTRAVENOUS EVERY 6 HOURS PRN
Status: DISCONTINUED | OUTPATIENT
Start: 2023-01-01 | End: 2023-01-01 | Stop reason: HOSPADM

## 2023-01-01 RX ORDER — KETAMINE HYDROCHLORIDE 10 MG/ML
150 INJECTION INTRAMUSCULAR; INTRAVENOUS ONCE
Status: DISCONTINUED | OUTPATIENT
Start: 2023-01-01 | End: 2023-01-01 | Stop reason: HOSPADM

## 2023-01-01 RX ORDER — SODIUM CHLORIDE 9 MG/ML
INJECTION, SOLUTION INTRAVENOUS PRN
Status: DISCONTINUED | OUTPATIENT
Start: 2023-01-01 | End: 2023-01-01 | Stop reason: HOSPADM

## 2023-01-01 RX ORDER — ASPIRIN 81 MG/1
81 TABLET, CHEWABLE ORAL DAILY
Status: DISCONTINUED | OUTPATIENT
Start: 2023-01-01 | End: 2023-01-01 | Stop reason: HOSPADM

## 2023-01-01 RX ORDER — LABETALOL HYDROCHLORIDE 5 MG/ML
10 INJECTION, SOLUTION INTRAVENOUS EVERY 6 HOURS PRN
Status: DISCONTINUED | OUTPATIENT
Start: 2023-01-01 | End: 2023-01-01 | Stop reason: HOSPADM

## 2023-01-01 RX ORDER — MORPHINE SULFATE 4 MG/ML
4 INJECTION, SOLUTION INTRAMUSCULAR; INTRAVENOUS
Status: COMPLETED | OUTPATIENT
Start: 2023-01-01 | End: 2023-01-01

## 2023-01-01 RX ORDER — ACETAMINOPHEN 650 MG/1
650 SUPPOSITORY RECTAL EVERY 6 HOURS PRN
Status: DISCONTINUED | OUTPATIENT
Start: 2023-01-01 | End: 2023-01-01 | Stop reason: HOSPADM

## 2023-01-01 RX ORDER — NOREPINEPHRINE BIT/0.9 % NACL 16MG/250ML
1-60 INFUSION BOTTLE (ML) INTRAVENOUS CONTINUOUS
Status: DISCONTINUED | OUTPATIENT
Start: 2023-01-01 | End: 2023-01-01 | Stop reason: HOSPADM

## 2023-01-01 RX ORDER — ONDANSETRON 4 MG/1
4 TABLET, ORALLY DISINTEGRATING ORAL EVERY 8 HOURS PRN
Status: DISCONTINUED | OUTPATIENT
Start: 2023-01-01 | End: 2023-01-01 | Stop reason: HOSPADM

## 2023-01-01 RX ORDER — FENTANYL CITRATE 50 UG/ML
50 INJECTION, SOLUTION INTRAMUSCULAR; INTRAVENOUS
Status: DISCONTINUED | OUTPATIENT
Start: 2023-01-01 | End: 2023-01-01 | Stop reason: HOSPADM

## 2023-01-01 RX ORDER — CALCIUM CHLORIDE 100 MG/ML
INJECTION INTRAVENOUS; INTRAVENTRICULAR
Status: COMPLETED | OUTPATIENT
Start: 2023-01-01 | End: 2023-01-01

## 2023-01-01 RX ADMIN — ASPIRIN 81 MG: 81 TABLET, CHEWABLE ORAL at 09:56

## 2023-01-01 RX ADMIN — LEVOTHYROXINE SODIUM 100 MCG: 0.1 TABLET ORAL at 06:31

## 2023-01-01 RX ADMIN — FUROSEMIDE 40 MG: 10 INJECTION, SOLUTION INTRAVENOUS at 01:29

## 2023-01-01 RX ADMIN — APIXABAN 2.5 MG: 2.5 TABLET, FILM COATED ORAL at 20:11

## 2023-01-01 RX ADMIN — TRAZODONE HYDROCHLORIDE 50 MG: 50 TABLET ORAL at 23:45

## 2023-01-01 RX ADMIN — MIDAZOLAM HYDROCHLORIDE 2 MG: 2 INJECTION, SOLUTION INTRAMUSCULAR; INTRAVENOUS at 14:22

## 2023-01-01 RX ADMIN — SODIUM CHLORIDE, PRESERVATIVE FREE 10 ML: 5 INJECTION INTRAVENOUS at 20:11

## 2023-01-01 RX ADMIN — DONEPEZIL HYDROCHLORIDE 10 MG: 10 TABLET, FILM COATED ORAL at 20:11

## 2023-01-01 RX ADMIN — PHENYLEPHRINE HYDROCHLORIDE 30 MG: 10 INJECTION INTRAVENOUS at 13:32

## 2023-01-01 RX ADMIN — METOPROLOL SUCCINATE 12.5 MG: 25 TABLET, EXTENDED RELEASE ORAL at 09:56

## 2023-01-01 RX ADMIN — EPINEPHRINE 1 MG: 0.1 INJECTION INTRACARDIAC; INTRAVENOUS at 13:38

## 2023-01-01 RX ADMIN — SODIUM BICARBONATE 50 MEQ: 84 INJECTION, SOLUTION INTRAVENOUS at 13:40

## 2023-01-01 RX ADMIN — LABETALOL HYDROCHLORIDE 5 MG: 5 INJECTION, SOLUTION INTRAVENOUS at 01:37

## 2023-01-01 RX ADMIN — CALCIUM CHLORIDE 1000 MG: 100 INJECTION, SOLUTION INTRAVENOUS; INTRAVENTRICULAR at 13:38

## 2023-01-01 RX ADMIN — Medication 15 MCG/MIN: at 13:36

## 2023-01-01 RX ADMIN — MIDAZOLAM 2 MG: 1 INJECTION INTRAMUSCULAR; INTRAVENOUS at 14:22

## 2023-01-01 RX ADMIN — SODIUM CHLORIDE, PRESERVATIVE FREE 10 ML: 5 INJECTION INTRAVENOUS at 10:01

## 2023-01-01 RX ADMIN — MORPHINE SULFATE 4 MG: 4 INJECTION, SOLUTION INTRAMUSCULAR; INTRAVENOUS at 16:24

## 2023-01-01 RX ADMIN — FINASTERIDE 5 MG: 5 TABLET, FILM COATED ORAL at 08:20

## 2023-01-01 RX ADMIN — SERTRALINE HYDROCHLORIDE 100 MG: 50 TABLET ORAL at 08:20

## 2023-01-01 RX ADMIN — EPINEPHRINE 1 MG: 0.1 INJECTION INTRACARDIAC; INTRAVENOUS at 13:12

## 2023-01-01 RX ADMIN — ATORVASTATIN CALCIUM 40 MG: 40 TABLET, FILM COATED ORAL at 20:11

## 2023-01-01 RX ADMIN — ASPIRIN 324 MG: 81 TABLET, CHEWABLE ORAL at 01:26

## 2023-01-01 RX ADMIN — APIXABAN 2.5 MG: 2.5 TABLET, FILM COATED ORAL at 09:56

## 2023-01-01 RX ADMIN — EPINEPHRINE 100 MCG: 0.1 INJECTION INTRACARDIAC; INTRAVENOUS at 13:37

## 2023-01-01 RX ADMIN — Medication 3 MG: at 02:51

## 2023-01-01 RX ADMIN — SODIUM BICARBONATE 50 MEQ: 84 INJECTION, SOLUTION INTRAVENOUS at 13:38

## 2023-01-01 RX ADMIN — SODIUM CHLORIDE, PRESERVATIVE FREE 10 ML: 5 INJECTION INTRAVENOUS at 08:22

## 2023-01-01 RX ADMIN — BUPROPION HYDROCHLORIDE 75 MG: 75 TABLET, FILM COATED ORAL at 08:31

## 2023-01-01 RX ADMIN — FUROSEMIDE 40 MG: 10 INJECTION, SOLUTION INTRAMUSCULAR; INTRAVENOUS at 10:01

## 2023-01-01 RX ADMIN — ROCURONIUM BROMIDE 100 MG: 10 INJECTION INTRAVENOUS at 13:28

## 2023-01-01 RX ADMIN — MIDAZOLAM HYDROCHLORIDE 2 MG: 1 INJECTION, SOLUTION INTRAMUSCULAR; INTRAVENOUS at 13:27

## 2023-01-01 RX ADMIN — HYDROXYZINE HYDROCHLORIDE 50 MG: 25 TABLET, FILM COATED ORAL at 04:21

## 2023-01-01 RX ADMIN — METOPROLOL SUCCINATE 12.5 MG: 25 TABLET, EXTENDED RELEASE ORAL at 08:20

## 2023-01-01 RX ADMIN — BUPROPION HYDROCHLORIDE 75 MG: 75 TABLET, FILM COATED ORAL at 09:56

## 2023-01-01 RX ADMIN — TAMSULOSIN HYDROCHLORIDE 0.4 MG: 0.4 CAPSULE ORAL at 09:56

## 2023-01-01 RX ADMIN — SERTRALINE HYDROCHLORIDE 100 MG: 50 TABLET ORAL at 09:56

## 2023-01-01 RX ADMIN — LORAZEPAM 2 MG: 2 INJECTION INTRAMUSCULAR; INTRAVENOUS at 13:19

## 2023-01-01 RX ADMIN — VASOPRESSIN 0.03 UNITS/MIN: 0.2 INJECTION INTRAVENOUS at 15:05

## 2023-01-01 RX ADMIN — FINASTERIDE 5 MG: 5 TABLET, FILM COATED ORAL at 09:56

## 2023-01-01 RX ADMIN — APIXABAN 2.5 MG: 2.5 TABLET, FILM COATED ORAL at 08:21

## 2023-01-01 RX ADMIN — FUROSEMIDE 40 MG: 10 INJECTION, SOLUTION INTRAMUSCULAR; INTRAVENOUS at 08:20

## 2023-01-01 RX ADMIN — TAMSULOSIN HYDROCHLORIDE 0.4 MG: 0.4 CAPSULE ORAL at 08:20

## 2023-01-01 RX ADMIN — LEVOTHYROXINE SODIUM 100 MCG: 0.1 TABLET ORAL at 06:25

## 2023-01-01 RX ADMIN — KETAMINE HYDROCHLORIDE 150 MG: 10 INJECTION INTRAMUSCULAR; INTRAVENOUS at 13:27

## 2023-01-01 ASSESSMENT — LIFESTYLE VARIABLES: HOW OFTEN DO YOU HAVE A DRINK CONTAINING ALCOHOL: NEVER

## 2023-01-01 ASSESSMENT — ENCOUNTER SYMPTOMS
SHORTNESS OF BREATH: 1
DIARRHEA: 0
CHEST TIGHTNESS: 0
COUGH: 1
ABDOMINAL PAIN: 0
VOMITING: 0

## 2023-01-01 ASSESSMENT — PULMONARY FUNCTION TESTS
PIF_VALUE: 25
PIF_VALUE: 23
PIF_VALUE: 25
PIF_VALUE: 26
PIF_VALUE: 35
PIF_VALUE: 27
PIF_VALUE: 26
PIF_VALUE: 25
PIF_VALUE: 26
PIF_VALUE: 26
PIF_VALUE: 25
PIF_VALUE: 27
PIF_VALUE: 25

## 2023-01-01 ASSESSMENT — PAIN - FUNCTIONAL ASSESSMENT: PAIN_FUNCTIONAL_ASSESSMENT: NONE - DENIES PAIN

## 2023-01-10 ENCOUNTER — TELEMEDICINE (OUTPATIENT)
Dept: FAMILY MEDICINE CLINIC | Age: 88
End: 2023-01-10
Payer: COMMERCIAL

## 2023-01-10 DIAGNOSIS — F03.90 DEMENTIA WITHOUT BEHAVIORAL DISTURBANCE (HCC): ICD-10-CM

## 2023-01-10 DIAGNOSIS — I48.0 PAF (PAROXYSMAL ATRIAL FIBRILLATION) (HCC): ICD-10-CM

## 2023-01-10 DIAGNOSIS — L30.9 DERMATITIS: Primary | ICD-10-CM

## 2023-01-10 DIAGNOSIS — I10 ESSENTIAL HYPERTENSION: ICD-10-CM

## 2023-01-10 DIAGNOSIS — R63.4 WEIGHT LOSS, NON-INTENTIONAL: ICD-10-CM

## 2023-01-10 DIAGNOSIS — E78.00 PURE HYPERCHOLESTEROLEMIA: ICD-10-CM

## 2023-01-10 DIAGNOSIS — F33.0 MILD EPISODE OF RECURRENT MAJOR DEPRESSIVE DISORDER (HCC): ICD-10-CM

## 2023-01-10 DIAGNOSIS — E03.9 ACQUIRED HYPOTHYROIDISM: ICD-10-CM

## 2023-01-10 PROCEDURE — 99423 OL DIG E/M SVC 21+ MIN: CPT

## 2023-01-10 RX ORDER — APIXABAN 2.5 MG/1
TABLET, FILM COATED ORAL
Qty: 60 TABLET | Refills: 5 | Status: SHIPPED | OUTPATIENT
Start: 2023-01-10

## 2023-01-10 RX ORDER — LISINOPRIL 10 MG/1
TABLET ORAL
Qty: 30 TABLET | Refills: 5 | Status: SHIPPED | OUTPATIENT
Start: 2023-01-10

## 2023-01-10 RX ORDER — ATORVASTATIN CALCIUM 20 MG/1
TABLET, FILM COATED ORAL
Qty: 30 TABLET | Refills: 5 | Status: SHIPPED | OUTPATIENT
Start: 2023-01-10

## 2023-01-10 RX ORDER — BUPROPION HYDROCHLORIDE 150 MG/1
150 TABLET ORAL EVERY MORNING
Qty: 30 TABLET | Refills: 5 | Status: SHIPPED | OUTPATIENT
Start: 2023-01-10

## 2023-01-10 RX ORDER — SERTRALINE HYDROCHLORIDE 100 MG/1
TABLET, FILM COATED ORAL
Qty: 30 TABLET | Refills: 5 | Status: SHIPPED | OUTPATIENT
Start: 2023-01-10

## 2023-01-10 RX ORDER — LEVOTHYROXINE SODIUM 0.1 MG/1
TABLET ORAL
Qty: 30 TABLET | Refills: 5 | Status: SHIPPED | OUTPATIENT
Start: 2023-01-10

## 2023-01-10 RX ORDER — METOPROLOL SUCCINATE 25 MG/1
TABLET, EXTENDED RELEASE ORAL
Qty: 30 TABLET | Refills: 5 | Status: SHIPPED | OUTPATIENT
Start: 2023-01-10

## 2023-01-10 ASSESSMENT — PATIENT HEALTH QUESTIONNAIRE - PHQ9
3. TROUBLE FALLING OR STAYING ASLEEP: 3
9. THOUGHTS THAT YOU WOULD BE BETTER OFF DEAD, OR OF HURTING YOURSELF: 0
2. FEELING DOWN, DEPRESSED OR HOPELESS: 1
7. TROUBLE CONCENTRATING ON THINGS, SUCH AS READING THE NEWSPAPER OR WATCHING TELEVISION: 0
SUM OF ALL RESPONSES TO PHQ QUESTIONS 1-9: 7
10. IF YOU CHECKED OFF ANY PROBLEMS, HOW DIFFICULT HAVE THESE PROBLEMS MADE IT FOR YOU TO DO YOUR WORK, TAKE CARE OF THINGS AT HOME, OR GET ALONG WITH OTHER PEOPLE: 0
SUM OF ALL RESPONSES TO PHQ QUESTIONS 1-9: 7
SUM OF ALL RESPONSES TO PHQ QUESTIONS 1-9: 7
5. POOR APPETITE OR OVEREATING: 0
4. FEELING TIRED OR HAVING LITTLE ENERGY: 3
6. FEELING BAD ABOUT YOURSELF - OR THAT YOU ARE A FAILURE OR HAVE LET YOURSELF OR YOUR FAMILY DOWN: 0
8. MOVING OR SPEAKING SO SLOWLY THAT OTHER PEOPLE COULD HAVE NOTICED. OR THE OPPOSITE, BEING SO FIGETY OR RESTLESS THAT YOU HAVE BEEN MOVING AROUND A LOT MORE THAN USUAL: 0
SUM OF ALL RESPONSES TO PHQ QUESTIONS 1-9: 7
SUM OF ALL RESPONSES TO PHQ9 QUESTIONS 1 & 2: 1
1. LITTLE INTEREST OR PLEASURE IN DOING THINGS: 0

## 2023-01-10 NOTE — PROGRESS NOTES
Saadia Denise (:  3/23/1930) is a Established patient, here for evaluation of the following:    Assessment & Plan   Below is the assessment and plan developed based on review of pertinent history, physical exam, labs, studies, and medications. 1. Dermatitis  Etiology unclear, low suspicion for cellulitis. Educated patient and daughter on signs and symptoms of cellulitis and advised to notify office immediately if these occur.  -     triamcinolone (KENALOG) 0.1 % ointment; Apply topically 2 times daily for 7 days, Topical, 2 TIMES DAILY Starting Tue 1/10/2023, Until 2023, For 7 days, Disp-30 g, R-3, Normal  2. Dementia without behavioral disturbance (HCC)  Mood seems largely stable on current regimen. Patient continues to sleep often throughout the day. Patient frequently forgets details. -     buPROPion (WELLBUTRIN XL) 150 MG extended release tablet; Take 1 tablet by mouth every morning, Disp-30 tablet, R-5DISPILL PATIENTNormal  3. Essential hypertension  Well-controlled on current regimen. -     metoprolol succinate (TOPROL XL) 25 MG extended release tablet; TAKE 1 TABLET BY MOUTH ONCE DAILY, Disp-30 tablet, R-5DISPILL PATIENTNormal  -     lisinopril (PRINIVIL;ZESTRIL) 10 MG tablet; TAKE 1 TABLET BY MOUTH ONCE DAILY, Disp-30 tablet, R-5DISPILL PATIENT! ! Normal  4. Mild episode of recurrent major depressive disorder (HCC)  Overall moods been stable. -     sertraline (ZOLOFT) 100 MG tablet; TAKE ONE (1) TABLET BY MOUTH ONCE DAILY, Disp-30 tablet, R-5DISPILL PATIENTNormal  5. Weight loss, non-intentional  Weight loss reported by daughter as evidenced by patient's pants not fitting without a belt. Per office records patient has been stable. Discussion of strategies to include the normal is in more high-calorie foods as this seems to be patient's favorite food at this time.   6. Pure hypercholesterolemia  -     atorvastatin (LIPITOR) 20 MG tablet; TAKE ONE (1) TABLET BY MOUTH ONCE DAILY, Disp-30 tablet, R-5DISPILL PATIENTNormal  7. PAF (paroxysmal atrial fibrillation) (HCC)  Small abnormal bruise to forehead noted by daughter last week, unsure of the etiology. Bruising has resolved at this time with no additional bruising to extremities, trunk, head. No neurological deficits reported or noted on video feed. No additional injuries, no known fall or trauma.  -     ELIQUIS 2.5 MG TABS tablet; TAKE ONE (1) TABLET BY MOUTH TWO TIMES DAILY, Disp-60 tablet, R-5, DAWDISPILL PATIENTNormal  8. Acquired hypothyroidism  -     levothyroxine (SYNTHROID) 100 MCG tablet; TAKE ONE (1) TABLET BY MOUTH ONCE DAILY, Disp-30 tablet, R-5DISPILL PATIENTNormal    Return in about 3 months (around 4/10/2023). Subjective   HPI    Rash: left upper arm, red, itchy, no blisters started 4 days ago. Daughter reports that it almost looked like a scratch initially- localized redness only, no changes to skin, no warmth, no drainage noted. Bruise to head: Noticed first last week- cause unknown. Began as the size of a pencil erased but has resolved. No known trauma, no neurological changes or complaints. No other injuries. Weight Loss: Eats one meal daily. Weight has been holding steady over the past few months but none of his pants fit. Really likes bananas and will eat several in one sitting without eating other foods. Mood: Sleeps most of the day- he is compliant with Wellbutrin. Per daughter patient remains grumpy this is a large difference from prior to his dementia he was calm, cool and collected. Laser surgery planned on one of his eyes on 1/17 Dr. Felice Funk    Review of Systems   Constitutional:  Positive for fatigue. Negative for activity change, appetite change and unexpected weight change. Respiratory:  Negative for shortness of breath. Cardiovascular:  Negative for chest pain and palpitations.    Gastrointestinal:  Negative for abdominal pain, blood in stool, constipation, diarrhea, nausea and vomiting. Musculoskeletal:  Positive for arthralgias (Chronic). Skin:  Positive for rash (Left upper arm). Objective   Patient-Reported Vitals  No data recorded     Physical Exam  Vitals and nursing note reviewed. Constitutional:       General: He is not in acute distress. Appearance: Normal appearance. He is not ill-appearing or toxic-appearing. HENT:      Head: Normocephalic and atraumatic. Eyes:      General: No scleral icterus. Conjunctiva/sclera: Conjunctivae normal.   Pulmonary:      Effort: Pulmonary effort is normal.   Neurological:      Mental Status: He is alert. Mental status is at baseline. Psychiatric:         Attention and Perception: Attention normal.         Mood and Affect: Mood normal.         Behavior: Behavior normal. Behavior is cooperative. Cognition and Memory: Memory is impaired.      [INSTRUCTIONS:  \"[x]\" Indicates a positive item  \"[]\" Indicates a negative item  -- DELETE ALL ITEMS NOT EXAMINED]    Constitutional: [x] Appears well-developed and well-nourished [x] No apparent distress      [] Abnormal -     Mental status: [x] Alert and awake  [x] Oriented to person/place/time [x] Able to follow commands    [] Abnormal -     Eyes:   EOM    [x]  Normal    [] Abnormal -   Sclera  [x]  Normal    [] Abnormal -          Discharge [x]  None visible   [] Abnormal -     HENT: [x] Normocephalic, atraumatic  [] Abnormal -   [x] Mouth/Throat: Mucous membranes are moist    External Ears [x] Normal  [] Abnormal -    Neck: [x] No visualized mass [] Abnormal -     Pulmonary/Chest: [x] Respiratory effort normal   [x] No visualized signs of difficulty breathing or respiratory distress        [] Abnormal -      Musculoskeletal:   [x] Normal gait with no signs of ataxia         [x] Normal range of motion of neck        [] Abnormal -     Neurological:        [x] No Facial Asymmetry (Cranial nerve 7 motor function) (limited exam due to video visit)          [x] No gaze palsy   [] Abnormal -          Skin:        [x] No significant exanthematous lesions or discoloration noted on facial skin         [] Abnormal -            Psychiatric:       [x] Normal Affect [] Abnormal -        [x] No Hallucinations    Other pertinent observable physical exam findings:-         On this date 1/10/2023 I have spent 27 minutes reviewing previous notes, test results and face to face (virtual) with the patient discussing the diagnosis and importance of compliance with the treatment plan as well as documenting on the day of the visit.    Rachid Armstrong, was evaluated through a synchronous (real-time) audio-video encounter. The patient (or guardian if applicable) is aware that this is a billable service, which includes applicable co-pays. This Virtual Visit was conducted with patient's (and/or legal guardian's) consent. The visit was conducted pursuant to the emergency declaration under the Gomez Act and the National Emergencies Act, 1135 waiver authority and the Coronavirus Preparedness and Response Supplemental Appropriations Act.  Patient identification was verified, and a caregiver was present when appropriate.   The patient was located at Home: [address] [place] [address].   Provider was located at Facility (Appt Dept): [address] [place] [address]  [address]  [place] [address].        --LIA Rapp - CNP

## 2023-01-11 ASSESSMENT — ENCOUNTER SYMPTOMS
BLOOD IN STOOL: 0
CONSTIPATION: 0
DIARRHEA: 0
VOMITING: 0
ABDOMINAL PAIN: 0
NAUSEA: 0
SHORTNESS OF BREATH: 0

## 2023-01-16 ENCOUNTER — HOSPITAL ENCOUNTER (INPATIENT)
Age: 88
LOS: 7 days | Discharge: HOME HEALTH | DRG: 640 | End: 2023-01-23
Attending: EMERGENCY MEDICINE | Admitting: INTERNAL MEDICINE
Payer: COMMERCIAL

## 2023-01-16 ENCOUNTER — APPOINTMENT (OUTPATIENT)
Dept: CT IMAGING | Age: 88
DRG: 640 | End: 2023-01-16
Payer: COMMERCIAL

## 2023-01-16 ENCOUNTER — APPOINTMENT (OUTPATIENT)
Dept: GENERAL RADIOLOGY | Age: 88
DRG: 640 | End: 2023-01-16
Payer: COMMERCIAL

## 2023-01-16 DIAGNOSIS — R07.9 CHEST PAIN, UNSPECIFIED TYPE: Primary | ICD-10-CM

## 2023-01-16 DIAGNOSIS — R77.8 TROPONIN LEVEL ELEVATED: ICD-10-CM
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
## 2023-01-16 PROBLEM — R62.7 FAILURE TO THRIVE IN ADULT: Status: ACTIVE | Noted: 2023-01-01

## 2023-01-16 LAB
ALBUMIN SERPL-MCNC: 3.6 GM/DL (ref 3.4–5)
ALP BLD-CCNC: 114 IU/L (ref 40–129)
ALT SERPL-CCNC: 11 U/L (ref 10–40)
ANION GAP SERPL CALCULATED.3IONS-SCNC: 13 MMOL/L (ref 4–16)
ANION GAP SERPL CALCULATED.3IONS-SCNC: 9 MMOL/L (ref 4–16)
APTT: 35.2 SECONDS (ref 25.1–37.1)
AST SERPL-CCNC: 17 IU/L (ref 15–37)
BACTERIA: NEGATIVE /HPF
BASOPHILS ABSOLUTE: 0 K/CU MM
BASOPHILS ABSOLUTE: 0.1 K/CU MM
BASOPHILS RELATIVE PERCENT: 0.5 % (ref 0–1)
BASOPHILS RELATIVE PERCENT: 0.6 % (ref 0–1)
BILIRUB SERPL-MCNC: 0.5 MG/DL (ref 0–1)
BILIRUBIN URINE: NEGATIVE MG/DL
BLOOD, URINE: ABNORMAL
BUN BLDV-MCNC: 27 MG/DL (ref 6–23)
BUN BLDV-MCNC: 29 MG/DL (ref 6–23)
CALCIUM SERPL-MCNC: 8.6 MG/DL (ref 8.3–10.6)
CALCIUM SERPL-MCNC: 8.8 MG/DL (ref 8.3–10.6)
CHLORIDE BLD-SCNC: 104 MMOL/L (ref 99–110)
CHLORIDE BLD-SCNC: 104 MMOL/L (ref 99–110)
CLARITY: ABNORMAL
CO2: 24 MMOL/L (ref 21–32)
CO2: 25 MMOL/L (ref 21–32)
COLOR: YELLOW
CREAT SERPL-MCNC: 1.3 MG/DL (ref 0.9–1.3)
CREAT SERPL-MCNC: 1.5 MG/DL (ref 0.9–1.3)
DIFFERENTIAL TYPE: ABNORMAL
DIFFERENTIAL TYPE: ABNORMAL
EOSINOPHILS ABSOLUTE: 0.1 K/CU MM
EOSINOPHILS ABSOLUTE: 0.1 K/CU MM
EOSINOPHILS RELATIVE PERCENT: 0.8 % (ref 0–3)
EOSINOPHILS RELATIVE PERCENT: 1.1 % (ref 0–3)
GFR SERPL CREATININE-BSD FRML MDRD: 43 ML/MIN/1.73M2
GFR SERPL CREATININE-BSD FRML MDRD: 52 ML/MIN/1.73M2
GLUCOSE BLD-MCNC: 110 MG/DL (ref 70–99)
GLUCOSE BLD-MCNC: 116 MG/DL (ref 70–99)
GLUCOSE, URINE: NEGATIVE MG/DL
HCT VFR BLD CALC: 38.4 % (ref 42–52)
HCT VFR BLD CALC: 39.1 % (ref 42–52)
HEMOGLOBIN: 12.2 GM/DL (ref 13.5–18)
HEMOGLOBIN: 12.4 GM/DL (ref 13.5–18)
IMMATURE NEUTROPHIL %: 0.2 % (ref 0–0.43)
IMMATURE NEUTROPHIL %: 0.5 % (ref 0–0.43)
INR BLD: 1.24 INDEX
KETONES, URINE: NEGATIVE MG/DL
LACTATE: 1.5 MMOL/L (ref 0.5–1.9)
LEUKOCYTE ESTERASE, URINE: ABNORMAL
LIPASE: 159 IU/L (ref 13–60)
LV EF: 63 %
LVEF MODALITY: NORMAL
LYMPHOCYTES ABSOLUTE: 0.7 K/CU MM
LYMPHOCYTES ABSOLUTE: 1.1 K/CU MM
LYMPHOCYTES RELATIVE PERCENT: 10.6 % (ref 24–44)
LYMPHOCYTES RELATIVE PERCENT: 13 % (ref 24–44)
MAGNESIUM: 2.1 MG/DL (ref 1.8–2.4)
MAGNESIUM: 2.4 MG/DL (ref 1.8–2.4)
MCH RBC QN AUTO: 32 PG (ref 27–31)
MCH RBC QN AUTO: 32.2 PG (ref 27–31)
MCHC RBC AUTO-ENTMCNC: 31.2 % (ref 32–36)
MCHC RBC AUTO-ENTMCNC: 32.3 % (ref 32–36)
MCV RBC AUTO: 102.6 FL (ref 78–100)
MCV RBC AUTO: 99.7 FL (ref 78–100)
MONOCYTES ABSOLUTE: 0.7 K/CU MM
MONOCYTES ABSOLUTE: 0.8 K/CU MM
MONOCYTES RELATIVE PERCENT: 10.4 % (ref 0–4)
MONOCYTES RELATIVE PERCENT: 10.7 % (ref 0–4)
MUCUS: ABNORMAL HPF
NITRITE URINE, QUANTITATIVE: NEGATIVE
NUCLEATED RBC %: 0 %
NUCLEATED RBC %: 0 %
PDW BLD-RTO: 13.7 % (ref 11.7–14.9)
PDW BLD-RTO: 13.8 % (ref 11.7–14.9)
PH, URINE: 6 (ref 5–8)
PHOSPHORUS: 2.7 MG/DL (ref 2.5–4.9)
PLATELET # BLD: 195 K/CU MM (ref 140–440)
PLATELET # BLD: 210 K/CU MM (ref 140–440)
PMV BLD AUTO: 10.3 FL (ref 7.5–11.1)
PMV BLD AUTO: 10.7 FL (ref 7.5–11.1)
POTASSIUM SERPL-SCNC: 3.9 MMOL/L (ref 3.5–5.1)
POTASSIUM SERPL-SCNC: 4.2 MMOL/L (ref 3.5–5.1)
PREALBUMIN: 15 MG/DL (ref 20–40)
PRO-BNP: 2012 PG/ML
PROTEIN UA: 100 MG/DL
PROTHROMBIN TIME: 16 SECONDS (ref 11.7–14.5)
RBC # BLD: 3.81 M/CU MM (ref 4.6–6.2)
RBC # BLD: 3.85 M/CU MM (ref 4.6–6.2)
RBC URINE: 641 /HPF (ref 0–3)
SEGMENTED NEUTROPHILS ABSOLUTE COUNT: 4.9 K/CU MM
SEGMENTED NEUTROPHILS ABSOLUTE COUNT: 6 K/CU MM
SEGMENTED NEUTROPHILS RELATIVE PERCENT: 74.7 % (ref 36–66)
SEGMENTED NEUTROPHILS RELATIVE PERCENT: 76.9 % (ref 36–66)
SODIUM BLD-SCNC: 138 MMOL/L (ref 135–145)
SODIUM BLD-SCNC: 141 MMOL/L (ref 135–145)
SPECIFIC GRAVITY UA: 1.02 (ref 1–1.03)
TOTAL CK: 44 IU/L (ref 38–174)
TOTAL IMMATURE NEUTOROPHIL: 0.02 K/CU MM
TOTAL IMMATURE NEUTOROPHIL: 0.03 K/CU MM
TOTAL NUCLEATED RBC: 0 K/CU MM
TOTAL NUCLEATED RBC: 0 K/CU MM
TOTAL PROTEIN: 7.2 GM/DL (ref 6.4–8.2)
TRICHOMONAS: ABNORMAL /HPF
TROPONIN T: 0.02 NG/ML
TROPONIN T: 0.02 NG/ML
TROPONIN T: 0.03 NG/ML
TSH HIGH SENSITIVITY: 0.22 UIU/ML (ref 0.27–4.2)
UROBILINOGEN, URINE: 0.2 MG/DL (ref 0.2–1)
WBC # BLD: 6.4 K/CU MM (ref 4–10.5)
WBC # BLD: 8.1 K/CU MM (ref 4–10.5)
WBC CLUMP: ABNORMAL /HPF
WBC UA: 1863 /HPF (ref 0–2)

## 2023-01-16 PROCEDURE — 83735 ASSAY OF MAGNESIUM: CPT

## 2023-01-16 PROCEDURE — 83880 ASSAY OF NATRIURETIC PEPTIDE: CPT

## 2023-01-16 PROCEDURE — 84484 ASSAY OF TROPONIN QUANT: CPT

## 2023-01-16 PROCEDURE — 80048 BASIC METABOLIC PNL TOTAL CA: CPT

## 2023-01-16 PROCEDURE — 36415 COLL VENOUS BLD VENIPUNCTURE: CPT

## 2023-01-16 PROCEDURE — 81001 URINALYSIS AUTO W/SCOPE: CPT

## 2023-01-16 PROCEDURE — 80053 COMPREHEN METABOLIC PANEL: CPT

## 2023-01-16 PROCEDURE — 70450 CT HEAD/BRAIN W/O DYE: CPT

## 2023-01-16 PROCEDURE — 97116 GAIT TRAINING THERAPY: CPT

## 2023-01-16 PROCEDURE — 6370000000 HC RX 637 (ALT 250 FOR IP): Performed by: INTERNAL MEDICINE

## 2023-01-16 PROCEDURE — 93005 ELECTROCARDIOGRAM TRACING: CPT | Performed by: INTERNAL MEDICINE

## 2023-01-16 PROCEDURE — 99285 EMERGENCY DEPT VISIT HI MDM: CPT

## 2023-01-16 PROCEDURE — 72128 CT CHEST SPINE W/O DYE: CPT

## 2023-01-16 PROCEDURE — 83605 ASSAY OF LACTIC ACID: CPT

## 2023-01-16 PROCEDURE — 2580000003 HC RX 258: Performed by: INTERNAL MEDICINE

## 2023-01-16 PROCEDURE — 84134 ASSAY OF PREALBUMIN: CPT

## 2023-01-16 PROCEDURE — 84100 ASSAY OF PHOSPHORUS: CPT

## 2023-01-16 PROCEDURE — 85025 COMPLETE CBC W/AUTO DIFF WBC: CPT

## 2023-01-16 PROCEDURE — 85610 PROTHROMBIN TIME: CPT

## 2023-01-16 PROCEDURE — 2060000000 HC ICU INTERMEDIATE R&B

## 2023-01-16 PROCEDURE — 83690 ASSAY OF LIPASE: CPT

## 2023-01-16 PROCEDURE — 84443 ASSAY THYROID STIM HORMONE: CPT

## 2023-01-16 PROCEDURE — 71045 X-RAY EXAM CHEST 1 VIEW: CPT

## 2023-01-16 PROCEDURE — 85730 THROMBOPLASTIN TIME PARTIAL: CPT

## 2023-01-16 PROCEDURE — 82550 ASSAY OF CK (CPK): CPT

## 2023-01-16 PROCEDURE — 93005 ELECTROCARDIOGRAM TRACING: CPT | Performed by: EMERGENCY MEDICINE

## 2023-01-16 PROCEDURE — 97162 PT EVAL MOD COMPLEX 30 MIN: CPT

## 2023-01-16 PROCEDURE — 72125 CT NECK SPINE W/O DYE: CPT

## 2023-01-16 PROCEDURE — 93306 TTE W/DOPPLER COMPLETE: CPT

## 2023-01-16 PROCEDURE — 72170 X-RAY EXAM OF PELVIS: CPT

## 2023-01-16 PROCEDURE — 72131 CT LUMBAR SPINE W/O DYE: CPT

## 2023-01-16 PROCEDURE — 94761 N-INVAS EAR/PLS OXIMETRY MLT: CPT

## 2023-01-16 PROCEDURE — 97166 OT EVAL MOD COMPLEX 45 MIN: CPT

## 2023-01-16 PROCEDURE — 99223 1ST HOSP IP/OBS HIGH 75: CPT | Performed by: INTERNAL MEDICINE

## 2023-01-16 PROCEDURE — 97530 THERAPEUTIC ACTIVITIES: CPT

## 2023-01-16 RX ORDER — SODIUM CHLORIDE 0.9 % (FLUSH) 0.9 %
5-40 SYRINGE (ML) INJECTION PRN
Status: DISCONTINUED | OUTPATIENT
Start: 2023-01-16 | End: 2023-01-23 | Stop reason: HOSPADM

## 2023-01-16 RX ORDER — LIDOCAINE 4 G/G
2 PATCH TOPICAL DAILY
Status: DISCONTINUED | OUTPATIENT
Start: 2023-01-16 | End: 2023-01-23 | Stop reason: HOSPADM

## 2023-01-16 RX ORDER — SODIUM CHLORIDE 9 MG/ML
INJECTION, SOLUTION INTRAVENOUS PRN
Status: DISCONTINUED | OUTPATIENT
Start: 2023-01-16 | End: 2023-01-23 | Stop reason: HOSPADM

## 2023-01-16 RX ORDER — LEVOTHYROXINE SODIUM 0.1 MG/1
100 TABLET ORAL DAILY
Status: DISCONTINUED | OUTPATIENT
Start: 2023-01-16 | End: 2023-01-23 | Stop reason: HOSPADM

## 2023-01-16 RX ORDER — LISINOPRIL 5 MG/1
10 TABLET ORAL DAILY
Status: DISCONTINUED | OUTPATIENT
Start: 2023-01-16 | End: 2023-01-23 | Stop reason: HOSPADM

## 2023-01-16 RX ORDER — SODIUM CHLORIDE 0.9 % (FLUSH) 0.9 %
5-40 SYRINGE (ML) INJECTION EVERY 12 HOURS SCHEDULED
Status: DISCONTINUED | OUTPATIENT
Start: 2023-01-16 | End: 2023-01-23 | Stop reason: HOSPADM

## 2023-01-16 RX ORDER — ONDANSETRON 4 MG/1
4 TABLET, ORALLY DISINTEGRATING ORAL EVERY 8 HOURS PRN
Status: DISCONTINUED | OUTPATIENT
Start: 2023-01-16 | End: 2023-01-23 | Stop reason: HOSPADM

## 2023-01-16 RX ORDER — FINASTERIDE 5 MG/1
5 TABLET, FILM COATED ORAL DAILY
Status: DISCONTINUED | OUTPATIENT
Start: 2023-01-16 | End: 2023-01-23 | Stop reason: HOSPADM

## 2023-01-16 RX ORDER — POLYETHYLENE GLYCOL 3350 17 G/17G
17 POWDER, FOR SOLUTION ORAL DAILY PRN
Status: DISCONTINUED | OUTPATIENT
Start: 2023-01-16 | End: 2023-01-23 | Stop reason: HOSPADM

## 2023-01-16 RX ORDER — METOPROLOL SUCCINATE 25 MG/1
25 TABLET, EXTENDED RELEASE ORAL DAILY
Status: DISCONTINUED | OUTPATIENT
Start: 2023-01-16 | End: 2023-01-22

## 2023-01-16 RX ORDER — DONEPEZIL HYDROCHLORIDE 10 MG/1
10 TABLET, FILM COATED ORAL NIGHTLY
Status: DISCONTINUED | OUTPATIENT
Start: 2023-01-16 | End: 2023-01-23 | Stop reason: HOSPADM

## 2023-01-16 RX ORDER — BUPROPION HYDROCHLORIDE 150 MG/1
150 TABLET ORAL EVERY MORNING
Status: DISCONTINUED | OUTPATIENT
Start: 2023-01-16 | End: 2023-01-19

## 2023-01-16 RX ORDER — ACETAMINOPHEN 325 MG/1
650 TABLET ORAL EVERY 6 HOURS PRN
Status: DISCONTINUED | OUTPATIENT
Start: 2023-01-16 | End: 2023-01-23 | Stop reason: HOSPADM

## 2023-01-16 RX ORDER — TAMSULOSIN HYDROCHLORIDE 0.4 MG/1
0.4 CAPSULE ORAL DAILY
Status: DISCONTINUED | OUTPATIENT
Start: 2023-01-16 | End: 2023-01-23 | Stop reason: HOSPADM

## 2023-01-16 RX ORDER — ACETAMINOPHEN 650 MG/1
650 SUPPOSITORY RECTAL EVERY 6 HOURS PRN
Status: DISCONTINUED | OUTPATIENT
Start: 2023-01-16 | End: 2023-01-23 | Stop reason: HOSPADM

## 2023-01-16 RX ORDER — ONDANSETRON 2 MG/ML
4 INJECTION INTRAMUSCULAR; INTRAVENOUS EVERY 6 HOURS PRN
Status: DISCONTINUED | OUTPATIENT
Start: 2023-01-16 | End: 2023-01-23 | Stop reason: HOSPADM

## 2023-01-16 RX ORDER — ATORVASTATIN CALCIUM 10 MG/1
20 TABLET, FILM COATED ORAL NIGHTLY
Status: DISCONTINUED | OUTPATIENT
Start: 2023-01-16 | End: 2023-01-23 | Stop reason: HOSPADM

## 2023-01-16 RX ORDER — ASCORBIC ACID 500 MG
1000 TABLET ORAL DAILY
Status: DISCONTINUED | OUTPATIENT
Start: 2023-01-16 | End: 2023-01-23 | Stop reason: HOSPADM

## 2023-01-16 RX ADMIN — LISINOPRIL 10 MG: 5 TABLET ORAL at 10:21

## 2023-01-16 RX ADMIN — BUPROPION HYDROCHLORIDE 150 MG: 150 TABLET, FILM COATED, EXTENDED RELEASE ORAL at 10:17

## 2023-01-16 RX ADMIN — METOPROLOL SUCCINATE 25 MG: 25 TABLET, EXTENDED RELEASE ORAL at 10:17

## 2023-01-16 RX ADMIN — ATORVASTATIN CALCIUM 20 MG: 10 TABLET, FILM COATED ORAL at 22:29

## 2023-01-16 RX ADMIN — Medication 10 ML: at 22:30

## 2023-01-16 RX ADMIN — APIXABAN 2.5 MG: 2.5 TABLET, FILM COATED ORAL at 22:29

## 2023-01-16 RX ADMIN — OXYCODONE HYDROCHLORIDE AND ACETAMINOPHEN 1000 MG: 500 TABLET ORAL at 10:18

## 2023-01-16 RX ADMIN — FINASTERIDE 5 MG: 5 TABLET, FILM COATED ORAL at 10:17

## 2023-01-16 RX ADMIN — LEVOTHYROXINE SODIUM 100 MCG: 100 TABLET ORAL at 06:59

## 2023-01-16 RX ADMIN — APIXABAN 2.5 MG: 2.5 TABLET, FILM COATED ORAL at 10:17

## 2023-01-16 RX ADMIN — DONEPEZIL HYDROCHLORIDE 10 MG: 10 TABLET ORAL at 22:29

## 2023-01-16 RX ADMIN — SERTRALINE HYDROCHLORIDE 100 MG: 50 TABLET ORAL at 10:21

## 2023-01-16 RX ADMIN — TAMSULOSIN HYDROCHLORIDE 0.4 MG: 0.4 CAPSULE ORAL at 10:21

## 2023-01-16 ASSESSMENT — ENCOUNTER SYMPTOMS
VOICE CHANGE: 0
SINUS PAIN: 0
EYE DISCHARGE: 0
BACK PAIN: 1
SHORTNESS OF BREATH: 0
BLOOD IN STOOL: 0
ABDOMINAL DISTENTION: 0
SORE THROAT: 0
GASTROINTESTINAL NEGATIVE: 1
CONSTIPATION: 0
PHOTOPHOBIA: 0
EYE REDNESS: 0
NAUSEA: 0
COUGH: 0
ALLERGIC/IMMUNOLOGIC NEGATIVE: 1
EYE ITCHING: 0
RESPIRATORY NEGATIVE: 1
CHEST TIGHTNESS: 0
EYE PAIN: 0
DIARRHEA: 0
RHINORRHEA: 0
VOMITING: 0
EYES NEGATIVE: 1
TROUBLE SWALLOWING: 0
ABDOMINAL PAIN: 0

## 2023-01-16 ASSESSMENT — PAIN SCALES - GENERAL: PAINLEVEL_OUTOF10: 0

## 2023-01-16 NOTE — ED TRIAGE NOTES
Pt fell at home yesterday - aprx  - has since been c/o back pain. Daughter was concerned as the pt was groaning in pain while eating dinner. Indicated to her, that his pain was upper abd and mid chest. Pt denies any pain at present time. Looks well. Awake and alert.

## 2023-01-16 NOTE — CONSULTS
[facility], 3/23/1930, 2017/2017-A, 1/16/2023      Discharge Recommendation: Home with 24/7 [facility] and [facility] OT     History:  [place]:  The primary encounter diagnosis was Chest pain, unspecified type. Diagnoses of Fall, initial encounter and Troponin level elevated were also pertinent to this visit. Past Medical History:   Diagnosis Date    AAA (abdominal aortic aneurysm)     Surgery scheduled for 7/1/2014 with Dr. Valdemar Shin. 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 The cardiolite study demonstrated normal perfusion in all segments of the myocardium with an intact left ventricular systolic function. The resting sestamibi dose is 10.8, stress is 32.5. EF 66%    History of chest x-ray 3/16/10    The chest is considered nonacute. There is cardiomegaly noted. COPD. History of Doppler ultrasound 3/25/10    venous doppler- Technically good venous ultrasound study negative for DVT in both lower extremities. Normal compressibility,color flow doppler pattern and spectral doppler pattern demonstrated thoughout. History of echocardiogram 3/18/10    Boarderline LV dilatation with concentric hypertrophy. Low normal systolic and abnormal diastolic function. Mild mitral and trace tricuspid regurgitation. Mild aortic root and bilateral dilatation. Holter monitor, abnormal 11/10/10    11/10/2010- 24 HR- Abnormal holter revealing some significant brasycardia's and wenckebach phenomenon. Therefore, clinical  correlation is recommend.     Hx of blood clots     Pacemaker     PAF (paroxysmal atrial fibrillation) (HCC)     Peritonitis (HCC)     Personal history of colonic polyps     Poor historian     Skin cancer     face    Ulcerative (chronic) proctosigmoiditis (HCC)     Wears glasses          Subjective:  Patient states: \"Can I aggravate 'em? \"  Pain: c/o chronic back pain no rating   Communication with other providers: Coeval with PT for pt safety and tolerance, RN ok'd eval, RN handoff   Restrictions: general precautions, fall risk, tele   No one at bedside initially, daughter entered later     Home Setup/Prior level of function:  Social/Functional History  Lives With: Daughter  Type of Home: House  Home Layout: Two level, Able to Live on Main level with bedroom/bathroom  Home Access: Stairs to enter with rails  Entrance Stairs - Number of Steps: 1  Bathroom Shower/Tub: Walk-in shower  Bathroom Toilet: Standard  Bathroom Equipment: Shower chair, Grab bars in [address] [place]: [employer], Walker, rolling, Rollator  ADL Assistance: Independent (though dtr reports pt has not bathed since Christmas.)  Homemaking Responsibilities: No (daughter manages household chores)  Ambulation Assistance: Independent (uses cane or one of his walkers, whichever is closest)  Transfer Assistance:  (Dtr helps with bed mobility. Pt able to transfer on his own between surfaces)  Active : No  Patient's  Info: daughter  Additional Comments: Dtr present and reported that pt needs a lot of encouragement to get out of bed, walk, or shower lately. Examination:  Observation: Pt was in bed upon arrival, agreeable to session  Vision: WFL, glasses   Hearing: slight [place]   Objective Measures: stable vitals on RA     Body Systems and functions:  ROM: WFL in BUE, with exception to impaired bilat shoulder ABD/ scaption. Pt demo ~40 degrees AROM.    Strength: 4-/5 in BUE  Sensation: WFL  Tone: Mild resting tremor in BUE  Coordination: movements fluid and coordinated  Posture: normal posture  Activity Tolerance: Good     Activities of Daily Living (ADLs):  Feeding: SetupA   Grooming: SetupA   Toileting: Maria G   UB dressing: Maria G    LB dressing: ModA   UB bathing: Maria G   LB bathing: aMria G       *pt ADL function inferred from gross functional assessment of mobility, balance, posture, safety awareness, activity tolerance (unless otherwise indicated)    Cognitive and Psychosocial Functioning:  Overall cognitive status:    01/16/23 1555   Orientation   Orientation Level Oriented to place;Oriented to situation;Oriented to person;Disoriented to time  (Reported 1960)   Cognition   Overall Cognitive Status Exceptions   Arousal/Alertness Appropriate responses to stimuli   Following Commands Follows multistep commands with repitition; Follows multistep commands with increased time   Attention Span Attends with cues to redirect   Memory Decreased recall of recent events   Safety Judgement Decreased awareness of need for safety;Decreased awareness of need for assistance   Problem Solving Decreased awareness of errors;Assistance required to correct errors made   Insights Decreased awareness of deficits   Initiation Requires cues for some   Sequencing Requires cues for some     Affect: normal     Balance:   Sitting: fair-, Maria G progressing to CGA with BUE support   Standing: fair-, CGA at [facility]     Functional Mobility:  Rolling Laterally in Bed: SBA  Supine to Sit: Maria G at last ascent of trunk   Sit to Stand: CGA    Ambulation: CGA using FWW       AM-PAC 6 click short form for inpatient daily activity:   How much help from another person does the patient currently need. .. Unable  Dep A Lot  Max A A Lot   Mod A A Little  Min A A Little   CGA  SBA None   Mod I  Indep  Sup   1. Putting on and taking off regular lower body clothing? [] 1    [] 2   [x] 2   [] 3   [] 3   [] 4      2. Bathing (including washing, rinsing, drying)? [] 1   [] 2   [] 2 [x] 3 [] 3 [] 4   3.  Toileting, which includes using toilet, bedpan, or urinal? [] 1    [] 2   [] 2   [x] 3   [] 3   [] 4     4. Putting on and taking off regular upper body clothing? [] 1   [] 2   [] 2   [x] 3   [] 3    [] 4      5. Taking care of personal grooming such as brushing teeth? [] 1   [] 2    [] 2 [] 3    [x] 3   [] 4      6. Eating meals? [] 1   [] 2   [] 2   [] 3   [x] 3   [] 4        Raw Score:  17      24/24 = unimpaired  23/24 = 1-20% impaired   20/24-22/24 = 21-40% impaired  15/24-19/24 = 41-59% impaired   10/24-14/24 = 60%-79% impaired  7/24-9/24 = 80%-99% impaired  6/24 = 100% impaired     Treatment:  Therapeutic Activity Training:   Therapeutic activity training was instructed today. Cues were given for safety, sequence, UE/LE placement, visual cues, and balance. Activities performed today included:    Bed mobility:  Pt completed sup to sit with Maria G for last ascent of trunk. Rolling:  Pt demo SBA for rolling to L. Seated balance:  Pt required Maria G initially and progressed to CGA with cues for use of BUE support. STS:  Pt completed from EOB CGA to FWW. Pt completed to recliner CGA with cues for hand placement. Ambulation:  Pt ambulated short HH distance CGA with FWW. Pt required inc time. See PT note for further details regarding ambulation. Self Care Training:   Self care training was performed today. Cues were given for safety, sequence, UE/LE placement, visual cues, and balance. Activities performed today included:    Feeding/eating:  Pt completed seated in recliner with setupA. Education: Role of OT, OT POC, discharge needs, safety, benefits of EOB/OOB activity, AE needs    Safety Measures: Gait belt used for safety of pt and therapist, Left in recliner with lunch setup, Alarm in place, call light and phone within reach, lines managed, needs met     Assessment:  Pt is a 80 yr old male from home who presents with chest pain and s/p fall. Pt with hx of dementia.  Prior to admission, pt was completing ADLs with Supervision of daughter and ambulated Apolinar with SPC or FWW. Daughter assisted with bed mobility. Pt currently performed bed mobility with Maria G, transfers CGA, and ambulated short HH distance CGA with FWW. Pt appeared to be functioning near typical baseline and would benefit from [address] OT to cont to address remaining impairments. Daughter reported inc difficulty with pt motivation to engage in ADLs and may have influenced deconditioned state. Pt would benefit from continued IP OT services during their stay, and discharge to Home with 24/7 [facility] and [address] OT.     Complexity: Moderate   Prognosis: Good   Barriers: strength, endurance, cognition     Plan:  Plan: 3+/week    Treatment to include: Strengthening, ROM, Balance Training, Functional Mobility Training, Endurance Training, Gait Training, Pain Management, Safety Education and Training, Patient+Caregiver Education and Training, Equipment Evaluation Education and Procurement, Positioning, Self Care Training, Home Management Training, Coordination Training    Pt would benefit from continued edu on: falls prevention   Adaptive Equipment Recommendations: no needs identified at this time, will cont to monitor     Goals:  Time frame for goals: 2 weeks    Pt will complete grooming tasks with CGA at standing level   Pt will complete toileting tasks with CGA using standard commode and grab bars  Pt will complete UB dressing tasks with SetupA seated EOB   Pt will complete LB dressing tasks with Maria G seated EOB and AE PRN   Pt will complete UB bathing tasks with Supervision seated and AE PRN   Pt will complete LB bathing tasks with Supervision seated and AE PRN   Pt will complete therapeutic exercise/activity to increase independence in ADL/IADL function  Pt will practice functional transfers and mobility with AD for increased safety and independence    Time:   Time in: 1142  Time out: 1203  Treatment Minutes: 11  Evaluation Minutes: 10  Total time: 21    Electronically signed by: HILL Smith/L   License: [id number]  9/07/0915, 3:44 PM

## 2023-01-16 NOTE — H&P
V2.0  History and Physical      Name:  Thi Paredes /Age/Sex: 3/23/1930  (80 y.o. male)   MRN & CSN:  [id number] & [id number] Encounter Date/Time: 2023 3:24 AM EST   Location:  ED14/ED-14 PCP: Brandy Friday, [address] [facility] Day: 1    Assessment and Plan:   Thi Paredes is a 80 y.o. male with a pmh of HFrecoveredEF s/p BiV PPM, paroxysmal atrial fibrillation, hypertension, dementia, hyperlipidemia, anxiety/depression, who presents with Failure to thrive in adult    Hospital Problems             Last Modified POA    * (Principal) Failure to thrive in adult 2023 Yes       Fall, around  yesterday  Chest pain, musculoskeletal 2/2 trauma, resolved  Back pain  CT head without contrast negative for any acute intracranial abnormality, diffuse parenchymal volume loss with moderate chronic white matter micro ischemic changes. CT cervical spine shows no acute osseous abnormality. CT thoracic spine without contrast only shows mild to moderate degenerative changes. CT lumbar spine without contrast shows moderate to severe degenerative changes with degenerative grade 1 anterolisthesis of L5 on S1 and moderate upper lumbar dextroscoliosis with reciprocal lower lumbar levoscoliosis. X-ray chest negative for any acute abnormality. X-ray pelvis negative for any acute traumatic injury. No red flags.  -Pain control with analgesics if needed.  Currently he rates his pain at 2/10.  -lidocaine patch  -Prealbumin level-if low consider dietary consult  -PT/OT  -Discuss with case management regarding placement    NSTEMI, troponin 0.022, no chest pain  EKG shows AV dual paced rhythm.  -trend troponin  -Repeat EKG  -will defer on heparin drip since patient is chest pain free  -continue to monitor on telemetry  -consult cardiology    HF recovered EF, EF 50% , BiV PPM in place  -No signs of volume overload  -Continue beta-blocker    Paroxysmal Afib  -Continue Eliquis 2.5 Mg twice daily  -Continue metoprolol succinate 25 Mg daily    CKD stage IIIA, baseline Cr 1.4-1.7  -Creatinine 1.5 at presentation  -Continue to monitor    Hypertension  -Continue lisinopril 10 Mg daily    Dementia  -baseline orientation - knows self, family, city, month, not time  -Continue donepezil 10 mg nightly    Hyperlipidemia  -Continue atorvastatin 20 mg daily    Hypothyroidism   -continue levothyroxine 100 MCG daily    BPH   -continue finasteride 5 mg daily   -continue tamsulosin 0.4 mg daily    Anxiety/depression   -continue sertraline 100 Mg daily   -continue bupropion 150 Mg daily    Disposition:   Current Living situation: home with daughter  Expected Disposition: per PT/OT eval  Estimated D/C: 2-3 days    Diet No diet orders on file   DVT Prophylaxis [] Lovenox, []  Heparin, [] SCDs, [] Ambulation,  [x] Eliquis, [] Xarelto, [] Coumadin   Code Status Prior   Surrogate Decision Maker/ POA -     History from:     patient    History of Present Illness:     Chief Complaint: Failure to thrive in adult  Loni Maynard is a 80 y.o. male with pmh of HFrecoveredEF s/p BiV PPM, paroxysmal atrial fibrillation, hypertension, dementia, hyperlipidemia, hypothyroidism, BPH, anxiety/depression, who presents with fall followed by chest pain and back pain. Patient brought in by EMS from home. Apparently family found patient getting up from the ground-around [id number] on 1/15/2023. Since then he has been complaining of chest pain and back pain. In the ED CT head without brain, CT cervical spine, CT thoracic spine, CT lumbar spine, pelvis x-ray, chest x-ray-all were negative for any acute fracture/deformity. Only lab abnormality was mildly elevated troponin at 0.022. Patient did not complain of significant chest pain after arrival to the emergency and cardiac consult and Heparin drip was deferred. Plan to manage conservatively with repeat labs. Cardiac consult in the morning.   Admission under internal medicine for further care and management. Review of Systems: Need 10 Elements   Review of Systems   Constitutional:  Negative for activity change, appetite change, chills, fatigue and fever. HENT:  Negative for congestion, ear discharge, ear pain, hearing loss, nosebleeds, postnasal drip, rhinorrhea, sinus pain, sore throat, trouble swallowing and voice change. Eyes:  Negative for photophobia, pain, discharge, redness and itching. Respiratory:  Negative for cough, chest tightness and shortness of breath. Cardiovascular:  Negative for chest pain, palpitations and leg swelling. Gastrointestinal:  Negative for abdominal distention, abdominal pain, blood in stool, constipation, diarrhea, nausea and vomiting. Endocrine: Negative for cold intolerance, heat intolerance, polydipsia, polyphagia and polyuria. Genitourinary:  Negative for difficulty urinating, dysuria, flank pain, frequency, hematuria and urgency. Musculoskeletal:  Positive for back pain. Negative for arthralgias, gait problem, joint swelling and myalgias. Skin:  Negative for pallor, rash and wound. Allergic/Immunologic: Negative for environmental allergies and food allergies. Neurological:  Negative for dizziness, tremors, seizures, syncope, speech difficulty, weakness, light-headedness, numbness and headaches. Hematological:  Negative for adenopathy. Does not bruise/bleed easily. Psychiatric/Behavioral:  Negative for agitation, confusion, decreased concentration, hallucinations, self-injury, sleep disturbance and suicidal ideas. The patient is not nervous/anxious and is not hyperactive. Objective:   No intake or output data in the 24 hours ending 01/16/23 0324   Vitals:   Vitals:    01/16/23 0143 01/16/23 0154   BP: 137/80    Pulse:  78   Resp:  24   SpO2: 94% 97%       Medications Prior to Admission     Prior to Admission medications    Medication Sig Start Date End Date Taking?  Authorizing Provider   atorvastatin (LIPITOR) 20 MG tablet TAKE ONE (1) TABLET BY MOUTH ONCE DAILY 1/10/23   Lilliebeatriz Marks, APRN - CNP   ELIQUIS 2.5 MG TABS tablet TAKE ONE (1) TABLET BY MOUTH TWO TIMES DAILY 1/10/23   Lillie Kendrick, APRN - CNP   levothyroxine (SYNTHROID) 100 MCG tablet TAKE ONE (1) TABLET BY MOUTH ONCE DAILY 1/10/23   Lillie Kendrick, APRN - CNP   sertraline (ZOLOFT) 100 MG tablet TAKE ONE (1) TABLET BY MOUTH ONCE DAILY 1/10/23   Lillie Kendrick, APRN - CNP   metoprolol succinate (TOPROL XL) 25 MG extended release tablet TAKE 1 TABLET BY MOUTH ONCE DAILY 1/10/23   Lillie Kendrick, APRN - CNP   lisinopril (PRINIVIL;ZESTRIL) 10 MG tablet TAKE 1 TABLET BY MOUTH ONCE DAILY 1/10/23   Havelock Kendrick, APRN - CNP   buPROPion (WELLBUTRIN XL) 150 MG extended release tablet Take 1 tablet by mouth every morning 1/10/23   Lillie Kendrick, APRN - CNP   triamcinolone (KENALOG) 0.1 % ointment Apply topically 2 times daily for 7 days 1/10/23 1/17/23  Lillie Kendrick, APRN - CNP   fluticasone (FLONASE) 50 MCG/ACT nasal spray 2 sprays by Each Nostril route daily 12/29/22   Lillie Kendrick, APRN - CNP   donepezil (ARICEPT) 10 MG tablet TAKE ONE (1) TABLET BY MOUTH AT NIGHT 12/27/22   Havelock Kendrick, APRN - CNP   clotrimazole-betamethasone (LOTRISONE) 1-0.05 % cream Apply topically 2 times daily.   Patient not taking: Reported on 1/10/2023 6/22/22   Jb Mcdonnell MD   finasteride (PROSCAR) 5 MG tablet  11/30/21   Historical Provider, MD   tamsulosin (FLOMAX) 0.4 MG capsule TAKE 1 CAPSULE BY MOUTH ONCE DAILY 9/22/21   Corona Tobar MD   latanoprost (XALATAN) 0.005 % ophthalmic solution  5/21/21   Historical Provider, MD   Ascorbic Acid (VITAMIN C) 1000 MG tablet Take 1,000 mg by mouth daily    Historical Provider, MD   brimonidine-timolol (COMBIGAN) 0.2-0.5 % ophthalmic solution Place 1 drop into both eyes every 12 hours    Historical Provider, MD   Glucosamine-MSM-Hyaluronic Acd ([facility]) Take 1 each by mouth daily     Historical Provider, MD       Physical Exam: Need 8 Elements   Physical Exam  Constitutional:       General: He is not in acute distress. Appearance: Normal appearance. He is normal weight. He is not ill-appearing, toxic-appearing or diaphoretic. HENT:      Head: Normocephalic and atraumatic. Right Ear: Tympanic membrane, ear canal and external ear normal. There is no impacted cerumen. Left Ear: Tympanic membrane, ear canal and external ear normal. There is no impacted cerumen. Nose: Nose normal. No congestion or rhinorrhea. Mouth/Throat:      Mouth: Mucous membranes are moist.      Pharynx: No oropharyngeal exudate or posterior oropharyngeal erythema. Eyes:      General: No scleral icterus. Right eye: No discharge. Left eye: No discharge. Extraocular Movements: Extraocular movements intact. Conjunctiva/sclera: Conjunctivae normal.      Pupils: Pupils are equal, round, and reactive to light. Neck:      Vascular: No carotid bruit. Cardiovascular:      Rate and Rhythm: Normal rate and regular rhythm. Pulses: Normal pulses. Heart sounds: Normal heart sounds. No murmur heard. No friction rub. No gallop. Pulmonary:      Effort: Pulmonary effort is normal. No respiratory distress. Breath sounds: Normal breath sounds. No stridor. No wheezing or rhonchi. Abdominal:      General: Abdomen is flat. Bowel sounds are normal. There is no distension. Palpations: Abdomen is soft. Tenderness: There is no abdominal tenderness. Musculoskeletal:         General: No swelling, tenderness, deformity or signs of injury. Normal range of motion. Cervical back: Normal range of motion and neck supple. No rigidity or tenderness. Right lower leg: No edema. Left lower leg: No edema. Lymphadenopathy:      Cervical: No cervical adenopathy. Skin:     General: Skin is warm. Capillary Refill: Capillary refill takes less than 2 seconds. Coloration: Skin is not jaundiced or pale. Findings: No bruising or erythema. Neurological:      General: No focal deficit present. Mental Status: He is alert. He is disoriented. Cranial Nerves: No cranial nerve deficit. Sensory: No sensory deficit. Motor: No weakness. Coordination: Coordination normal.      Gait: Gait normal.      Deep Tendon Reflexes: Reflexes normal.   Psychiatric:         Mood and Affect: Mood normal.         Behavior: Behavior normal.         Thought Content: Thought content normal.         Judgment: Judgment normal.         Past Medical History:   PMHx   Past Medical History:   Diagnosis Date    AAA (abdominal aortic aneurysm)     Surgery scheduled for 7/1/2014 with Dr. Marco Yuan. Arthritis     generalized    Asthma     AV block, 1st degree     Back pain     Borderline hypothyroidism 12/4/2020    Bradycardia     Colon polyps     Colostomy in place [facility])     Glaucoma     H/O cardiovascular stress test 12/27/2013    cardiolite-EF56%, apical hypokinesis is seen, cannot exlude apical Tyler ischemia    History of blood transfusion     History of cardiovascular stress test 3/5/10    No angina or ischemic EKG changes are noted with Lexiscan. The cardiolite study demonstrated normal perfusion in all segments of the myocardium with an intact left ventricular systolic function. The resting sestamibi dose is 10.8, stress is 32.5. EF 66%    History of chest x-ray 3/16/10    The chest is considered nonacute. There is cardiomegaly noted. COPD. History of Doppler ultrasound 3/25/10    venous doppler- Technically good venous ultrasound study negative for DVT in both lower extremities. Normal compressibility,color flow doppler pattern and spectral doppler pattern demonstrated thoughout. History of echocardiogram 3/18/10    Boarderline LV dilatation with concentric hypertrophy. Low normal systolic and abnormal diastolic function. Mild mitral and trace tricuspid regurgitation. Mild aortic root and bilateral dilatation. Holter monitor, abnormal 11/10/10    11/10/2010- 24 HR- Abnormal holter revealing some significant brasycardia's and wenckebach phenomenon. Therefore, clinical  correlation is recommend. Hx of blood clots     Pacemaker     PAF (paroxysmal atrial fibrillation) ([facility] [address]     Peritonitis ([facility] [address]     Personal history of colonic polyps     Poor historian     Skin cancer     face    Ulcerative (chronic) proctosigmoiditis ([facility] [address]     Wears glasses      PSHX:  has a past surgical history that includes hernia repair (Bilateral, [facility]); Hemorrhoid surgery (2011); Colonoscopy (2/4/2013); knee surgery (Right, 1980s); Abdominal exploration surgery (2/4/2013); Appendectomy (2/4/2013); pacemaker placement (Right, 02/25/2013); Appendectomy (2/4/2013); colostomy (2/4/2013); Cystocopy (2/03/2014); AAA repair, endovascular (7/1/14); Pacemaker insertion (Right, 12/13/2017); and Small intestine surgery (N/A, 8/28/2019). Allergies: Allergies   Allergen Reactions    Codeine Anaphylaxis    Fentanyl Other (See Comments)     Hypotension      Fam HX:  family history includes Cancer in his brother and daughter; Heart Disease in his mother; No Known Problems in his father; Pacemaker in his brother; Prostate Cancer in his brother; Stroke in his mother; Substance Abuse in his son. Soc HX:   Social History     Socioeconomic History    Marital status:       Spouse name: None    Number of children: 3    Years of education: None    Highest education level: None   Occupational History    Occupation: Retired   Tobacco Use    Smoking status: Never    Smokeless tobacco: Never   Vaping Use    Vaping Use: Never used   Substance and Sexual Activity    Alcohol use: No     Comment: 1 can of soda a day    Drug use: No    Sexual activity: Yes     Partners: Female     Comment:      Social Determinants of Health     Financial Resource Strain: Low Risk     Difficulty of Paying Living Expenses: Not hard at all   Food Insecurity: No Food Insecurity Worried About Running Out of Food in the Last Year: Never true    Ran Out of Food in the Last Year: Never true   Physical Activity: Inactive    Days of Exercise per Week: 0 days    Minutes of Exercise per Session: 0 min       Medications:   Medications:    Infusions:   PRN Meds:     Labs      CBC:   Recent Labs     01/16/23 0155   WBC 6.4   HGB 12.4*        BMP:    Recent Labs     01/16/23 0155      K 4.2      CO2 25   BUN 29*   CREATININE 1.5*   GLUCOSE 110*     Hepatic:   Recent Labs     01/16/23 0155   AST 17   ALT 11   BILITOT 0.5   ALKPHOS 114     Lipids:   Lab Results   Component Value Date/Time    CHOL 140 12/03/2020 02:30 PM    HDL 39 12/03/2020 02:30 PM    TRIG 87 04/09/2021 07:49 AM     Hemoglobin A1C:   Lab Results   Component Value Date/Time    LABA1C 5.7 02/24/2022 03:24 PM     TSH:   Lab Results   Component Value Date/Time    TSH 0.14 09/22/2021 04:12 PM     Troponin:   Lab Results   Component Value Date/Time    TROPONINT 0.022 01/16/2023 01:55 AM    TROPONINT <0.010 10/28/2022 06:50 PM    TROPONINT <0.010 06/28/2022 10:55 PM     Lactic Acid: No results for input(s): LACTA in the last 72 hours.   BNP:   Recent Labs     01/16/23 0155   PROBNP 2,012*     UA:  Lab Results   Component Value Date/Time    NITRU NEGATIVE 10/28/2022 08:28 PM    NITRU Negative 07/12/2022 03:07 PM    COLORU ORANGE 10/28/2022 08:28 PM    PHUR 5.5 07/12/2022 03:07 PM    WBCUA 207 10/28/2022 08:28 PM    RBCUA 943 10/28/2022 08:28 PM    MUCUS FEW 06/29/2022 01:25 AM    TRICHOMONAS NONE SEEN 10/28/2022 08:28 PM    BACTERIA NEGATIVE 10/28/2022 08:28 PM    CLARITYU TURBID 10/28/2022 08:28 PM    SPECGRAV 1.025 10/28/2022 08:28 PM    LEUKOCYTESUR SMALL 10/28/2022 08:28 PM    UROBILINOGEN 0.2 10/28/2022 08:28 PM    BILIRUBINUR NEGATIVE 10/28/2022 08:28 PM    BILIRUBINUR nefative 06/09/2022 05:42 PM    BLOODU LARGE 10/28/2022 08:28 PM    GLUCOSEU Negative 07/12/2022 03:07 PM    KETUA NEGATIVE 10/28/2022 08:28 PM Urine Cultures:   Lab Results   Component Value Date/Time    LABURIN 25,000 CFU/ml 07/12/2022 03:07 PM    LABURIN 25,000 CFU/ml 07/12/2022 03:07 PM     Blood Cultures: No results found for: BC  No results found for: BLOODCULT2  Organism:   Lab Results   Component Value Date/Time    ORG Enterococcus faecalis 07/12/2022 03:07 PM    ORG Enterococcus avium 07/12/2022 03:07 PM       Imaging/Diagnostics Last 24 Hours   XR PELVIS (1-2 VIEWS)    Result Date: 1/16/2023  EXAMINATION: ONE XRAY VIEW OF THE PELVIS 1/16/2023 2:09 am COMPARISON: 08/27/2019 HISTORY: ORDERING SYSTEM PROVIDED HISTORY: trauma TECHNOLOGIST PROVIDED HISTORY: Reason for exam:->trauma Reason for Exam: fall, trauma Additional signs and symptoms: na Relevant Medical/Surgical History: arthritis FINDINGS: No traumatic pelvic fracture is identified. Degenerative changes seen in the hip joints, spine and pubic symphysis. Surgical clips are seen in the right proximal lower extremity. Atherosclerotic calcifications are identified. Aorto bi-iliac endograft noted. Embolization coils seen as well. No acute traumatic injury is identified. CT HEAD WO CONTRAST    Result Date: 1/16/2023  EXAMINATION: CT OF THE HEAD WITHOUT CONTRAST  1/16/2023 2:08 am TECHNIQUE: CT of the head was performed without the administration of intravenous contrast. Automated exposure control, iterative reconstruction, and/or weight based adjustment of the mA/kV was utilized to reduce the radiation dose to as low as reasonably achievable. COMPARISON: None. HISTORY: ORDERING SYSTEM PROVIDED HISTORY: HEAD TRAUMA, CLOSED, MILD, GCS >= 13, NO RISK FACTORS, NEURO EXAM NORMAL TECHNOLOGIST PROVIDED HISTORY: Has a \"code stroke\" or \"stroke alert\" been called? ->No Reason for exam:->fall Decision Support Exception - unselect if not a suspected or confirmed emergency medical condition->Emergency Medical Condition (MA) Reason for Exam: fall, Head trauma, closed, mild, GCS >= 13, no risk factors, neuro exam normal FINDINGS: BRAIN/VENTRICLES: There is no acute intracranial hemorrhage, mass effect or midline shift. No abnormal extra-axial fluid collection. The gray-white differentiation is maintained without evidence of an acute infarct. There is no evidence of hydrocephalus. Diffuse parenchymal volume loss with moderate chronic white matter microvascular ischemic changes. ORBITS: The visualized portion of the orbits demonstrate no acute abnormality. SINUSES: The visualized paranasal sinuses and mastoid air cells demonstrate no acute abnormality. SOFT TISSUES/SKULL:  No acute abnormality of the visualized skull or soft tissues. 1. No acute intracranial abnormality. 2. Diffuse parenchymal volume loss with moderate chronic white matter microischemic changes. CT CERVICAL SPINE WO CONTRAST    Result Date: 1/16/2023  EXAMINATION: CT OF THE CERVICAL SPINE WITHOUT CONTRAST 1/16/2023 2:09 am TECHNIQUE: CT of the cervical spine was performed without the administration of intravenous contrast. Multiplanar reformatted images are provided for review. Automated exposure control, iterative reconstruction, and/or weight based adjustment of the mA/kV was utilized to reduce the radiation dose to as low as reasonably achievable. COMPARISON: None. HISTORY: ORDERING SYSTEM PROVIDED HISTORY: fall TECHNOLOGIST PROVIDED HISTORY: Reason for exam:->fall Decision Support Exception - unselect if not a suspected or confirmed emergency medical condition->Emergency Medical Condition (MA) Reason for Exam: fall FINDINGS: BONES/ALIGNMENT: There is no acute fracture or suspect osseous lesion. Vertebral body heights are maintained. There is 2 mm degenerative anterolisthesis of C7 on T1 secondary to disc height loss and facet arthropathy. No traumatic malalignment. DEGENERATIVE CHANGES: Mild-to-moderate multilevel degenerative disease and facet arthropathy. No high-grade bony spinal canal stenosis.  SOFT TISSUES: No acute paraspinal soft tissue abnormality. No acute osseous abnormality of the cervical spine. CT THORACIC SPINE WO CONTRAST    Result Date: 1/16/2023  EXAMINATION: CT OF THE THORACIC SPINE WITHOUT CONTRAST  1/16/2023 2:09 am: TECHNIQUE: CT of the thoracic spine was performed without the administration of intravenous contrast. Multiplanar reformatted images are provided for review. Automated exposure control, iterative reconstruction, and/or weight based adjustment of the mA/kV was utilized to reduce the radiation dose to as low as reasonably achievable. COMPARISON: None. HISTORY: ORDERING SYSTEM PROVIDED HISTORY: fall TECHNOLOGIST PROVIDED HISTORY: CT  T-Spine w/o Contrast Reason for exam:->fall Reason for Exam: fall FINDINGS: BONES/ALIGNMENT: There is no acute fracture or suspect osseous lesion. Vertebral body heights are maintained. There is mild lower thoracic levoscoliosis without spondylolisthesis. DEGENERATIVE CHANGES: Mild-to-moderate diffuse disc height loss and desiccation. No high-grade bony spinal canal stenosis. Multilevel facet hypertrophy in the lower thoracic spine. SOFT TISSUES: No acute paraspinal soft tissue abnormality. 1. No acute fracture or traumatic malalignment of the thoracic spine. 2. Mild-to-moderate degenerative changes. CT LUMBAR SPINE WO CONTRAST    Result Date: 1/16/2023  EXAMINATION: CT OF THE LUMBAR SPINE WITHOUT CONTRAST  1/16/2023 TECHNIQUE: CT of the lumbar spine was performed without the administration of intravenous contrast. Multiplanar reformatted images are provided for review. Adjustment of mA and/or kV according to patient size was utilized. Automated exposure control, iterative reconstruction, and/or weight based adjustment of the mA/kV was utilized to reduce the radiation dose to as low as reasonably achievable.  COMPARISON: None HISTORY: ORDERING SYSTEM PROVIDED HISTORY: fall TECHNOLOGIST PROVIDED HISTORY: Reason for exam:->fall Decision Support Exception - unselect if not a suspected or confirmed emergency medical condition->Emergency Medical Condition (MA) Reason for Exam: fall FINDINGS: BONES/ALIGNMENT: There is no acute fracture or suspect osseous lesion. Vertebral body heights are maintained. There is grade 1 anterolisthesis of L5 on S1 secondary to disc height loss and facet arthropathy. Moderate dextroscoliosis is present centered at L1 with mild reciprocal levoscoliosis centered at L4. DEGENERATIVE CHANGES: Moderate multilevel disc disease and moderate to severe facet hypertrophy. SOFT TISSUES/RETROPERITONEUM: No acute paraspinal soft tissue abnormality. Prior aorto bi-iliac endograft. 1. No acute fracture or traumatic malalignment of the lumbar spine. 2. Moderate to severe degenerative changes with degenerative grade 1 anterolisthesis of L5 on S1. 3. Moderate upper lumbar dextroscoliosis with reciprocal lower lumbar levoscoliosis. XR CHEST PORTABLE    Result Date: 1/16/2023  EXAMINATION: ONE XRAY VIEW OF THE CHEST 1/16/2023 2:14 am COMPARISON: 10/28/2022 HISTORY: ORDERING SYSTEM PROVIDED HISTORY: CP/FALL TECHNOLOGIST PROVIDED HISTORY: Reason for exam:->CP/FALL Reason for Exam: chest pain, fall Additional signs and symptoms: na Relevant Medical/Surgical History: asthma, PAF, bradycardia FINDINGS: Aortic atherosclerosis. Implanted cardiac device is identified. No significant cardiomegaly. No focal consolidation or pleural effusion. No pneumothorax. Degenerative changes are seen in the shoulders and spine. No acute osseous abnormality is identified. High-riding humeral head on the right may be related to a chronic rotator cuff tear.      1. No acute abnormality seen in the chest.           Electronically signed by Krystal Winslow MD on 1/16/2023 at 3:24 AM      Comment: Please note this report has been produced using speech recognition software and may contain errors related to that system including errors in grammar, punctuation, and spelling, as well as words and phrases that may be inappropriate. If there are any questions or concerns please feel free to contact the dictating provider for clarification.

## 2023-01-16 NOTE — ED NOTES
ED TO INPATIENT SBAR HANDOFF    Patient Name: Junior Davies   :  3/23/1930  80 y.o. MRN:  [id number]  Preferred Name    ED Room #:  ED14/ED-14  Family/Caregiver Present yes   Restraints no   Sitter no   Sepsis Risk Score Sepsis Risk Score: 1.33    Situation  Code Status: Prior No additional code details. Allergies: Codeine and Fentanyl  Weight: No data found. Arrived from: home  Chief Complaint:   Chief Complaint   Patient presents with    Chest Pain   [facility] Problem/Diagnosis:  Principal Problem:    Failure to thrive in adult  Resolved Problems:    * No resolved hospital problems. *    Imaging:   CT LUMBAR SPINE WO CONTRAST   Final Result   1. No acute fracture or traumatic malalignment of the lumbar spine. 2. Moderate to severe degenerative changes with degenerative grade 1   anterolisthesis of L5 on S1.   3. Moderate upper lumbar dextroscoliosis with reciprocal lower lumbar   levoscoliosis. CT THORACIC SPINE WO CONTRAST   Final Result   1. No acute fracture or traumatic malalignment of the thoracic spine. 2. Mild-to-moderate degenerative changes. CT CERVICAL SPINE WO CONTRAST   Final Result   No acute osseous abnormality of the cervical spine. CT HEAD WO CONTRAST   Final Result   1. No acute intracranial abnormality. 2. Diffuse parenchymal volume loss with moderate chronic white matter   microischemic changes. XR CHEST PORTABLE   Final Result   1. No acute abnormality seen in the chest.         XR PELVIS (1-2 VIEWS)   Final Result   No acute traumatic injury is identified.            Abnormal labs:   Abnormal Labs Reviewed   CBC WITH AUTO DIFFERENTIAL - Abnormal; Notable for the following components:       Result Value    RBC 3.85 (*)     Hemoglobin 12.4 (*)     Hematocrit 38.4 (*)     MCH 32.2 (*)     Segs Relative 76.9 (*)     Lymphocytes % 10.6 (*)     Monocytes % 10.7 (*)     Immature Neutrophil % 0.5 (*)     All other components within normal limits   COMPREHENSIVE METABOLIC PANEL - Abnormal; Notable for the following components:    BUN 29 (*)     Creatinine 1.5 (*)     Est, Glom Filt Rate 43 (*)     Glucose 110 (*)     All other components within normal limits   TROPONIN - Abnormal; Notable for the following components:    Troponin T 0.022 (*)     All other components within normal limits   BRAIN NATRIURETIC PEPTIDE - Abnormal; Notable for the following components:    Pro-BNP 2,012 (*)     All other components within normal limits   PROTIME/INR & PTT - Abnormal; Notable for the following components:    Protime 16.0 (*)     All other components within normal limits   LIPASE - Abnormal; Notable for the following components:    Lipase 159 (*)     All other components within normal limits     Critical values: no     Abnormal Assessment Findings: none    Background  History:   Past Medical History:   Diagnosis Date    AAA (abdominal aortic aneurysm)     Surgery scheduled for 7/1/2014 with Dr. Valdemar Shin. 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)     Glaucoma     H/O cardiovascular stress test 12/27/2013    cardiolite-EF56%, apical hypokinesis is seen, cannot exlude apical Tyler ischemia    History of blood transfusion     History of cardiovascular stress test 3/5/10    No angina or ischemic EKG changes are noted with Lexiscan. The cardiolite study demonstrated normal perfusion in all segments of the myocardium with an intact left ventricular systolic function. The resting sestamibi dose is 10.8, stress is 32.5. EF 66%    History of chest x-ray 3/16/10    The chest is considered nonacute. There is cardiomegaly noted. COPD.  History of Doppler ultrasound 3/25/10    venous doppler- Technically good venous ultrasound study negative for DVT in both lower extremities.  Normal compressibility,color flow doppler pattern and spectral doppler pattern demonstrated thoughout.  History of echocardiogram 3/18/10    Boarderline LV dilatation with concentric hypertrophy. Low normal systolic and abnormal diastolic function. Mild mitral and trace tricuspid regurgitation. Mild aortic root and bilateral dilatation.  Holter monitor, abnormal 11/10/10    11/10/2010- 24 HR- Abnormal holter revealing some significant brasycardia's and wenckebach phenomenon. Therefore, clinical  correlation is recommend.     Hx of blood clots     Pacemaker     PAF (paroxysmal atrial fibrillation) (HCC)     Peritonitis (HCC)     Personal history of colonic polyps     Poor historian     Skin cancer     face    Ulcerative (chronic) proctosigmoiditis (HCC)     Wears glasses        Assessment    Vitals/MEWS: MEWS Score: 1  Level of Consciousness: Alert (0)   Vitals:    01/16/23 0143 01/16/23 0154 01/16/23 0426   BP: 137/80  113/73   Pulse:  78 79   Resp:  24 19   Temp:   99 °F (37.2 °C)   TempSrc:   Oral   SpO2: 94% 97% 96%     FiO2 (%): ra  O2 Flow Rate: O2 Device: None (Room air)    Cardiac Rhythm: Paced  Pain Assessment:  [] Verbal [] Malina Rockers Scale  Pain Scale:    Last documented pain score (0-10 scale)    Last documented pain medication administered:   Mental Status: Logical and oriented with periods of confusion  NIH Score:    C-SSRS: Risk of Suicide: No Risk  Bedside swallow:    [place] Coma Scale (GCS): Femi Coma Scale  Eye Opening: Spontaneous  Best Verbal Response: Confused  Best Motor Response: Obeys commands  [place] Coma Scale Score: 14  Active LDA's:   Peripheral IV 01/16/23 Right Antecubital (Active)   Site Assessment Clean, dry & intact 01/16/23 0209   Line Status Blood return noted 01/16/23 0209   Phlebitis Assessment No symptoms 01/16/23 0209   Infiltration Assessment 0 01/16/23 0209     PO Status:   Pertinent or High Risk Medications/Drips: no   o If Yes, please provide details:   Pending Blood Product Administration: no     You may also review the ED PT Care Timeline found under the Summary Nursing Index tab. Recommendation    Pending orders Pls review admission orders  Plan for Discharge (if known):    Additional Comments:    If any further questions, please call Sending RN at     Electronically signed by: Electronically signed by Maegan Cortes RN on 1/16/2023 at 4:28 AM       Ira Neves, FLORY  01/16/23 0598 JOHN Christopher, RN  01/16/23 3520

## 2023-01-16 NOTE — ED PROVIDER NOTES
[facility]      TRIAGE CHIEF COMPLAINT:   Chest Pain and Fall      [place]:  Robin Durán is a 80 y.o. male that presents by EMS with complaint of chest pain, fall. Apparently patient is a full code lives with family apparently has been here recently and case management has seen him, sounds like family is not able to take care of him. Apparently family found him today getting up from the ground possible fall complaint of back pain and chest pain. Patient brought in by EMS has a pacemaker currently he has no complaints he cannot tell me why he is here he does appear slightly confused unclear if this is baseline or not. No other questions or concerns. No family present. Edyta Mccarty REVIEW OF SYSTEMS:  At least 10 systems reviewed and otherwise acutely negative except as in the [address] Review of Systems   Unable to perform ROS: Dementia   Constitutional:  Positive for activity change and fatigue. HENT: Negative. Eyes: Negative. Respiratory: Negative. Cardiovascular:  Positive for chest pain. Gastrointestinal: Negative. Endocrine: Negative. Genitourinary: Negative. Musculoskeletal:  Positive for arthralgias, back pain and myalgias. Skin: Negative. Allergic/Immunologic: Negative. Neurological: Negative. Hematological: Negative. Psychiatric/Behavioral: Negative. All other systems reviewed and are negative. Past Medical History:   Diagnosis Date    AAA (abdominal aortic aneurysm)     Surgery scheduled for 7/1/2014 with Dr. Maricel Degroot.     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 The cardiolite study demonstrated normal perfusion in all segments of the myocardium with an intact left ventricular systolic function. The resting sestamibi dose is 10.8, stress is 32.5. EF 66%    History of chest x-ray 3/16/10    The chest is considered nonacute. There is cardiomegaly noted. COPD. History of Doppler ultrasound 3/25/10    venous doppler- Technically good venous ultrasound study negative for DVT in both lower extremities. Normal compressibility,color flow doppler pattern and spectral doppler pattern demonstrated thoughout. History of echocardiogram 3/18/10    Boarderline LV dilatation with concentric hypertrophy. Low normal systolic and abnormal diastolic function. Mild mitral and trace tricuspid regurgitation. Mild aortic root and bilateral dilatation. Holter monitor, abnormal 11/10/10    11/10/2010- 24 HR- Abnormal holter revealing some significant brasycardia's and wenckebach phenomenon. Therefore, clinical  correlation is recommend.     Hx of blood clots     Pacemaker     PAF (paroxysmal atrial fibrillation) ([address]     Peritonitis ([address]     Personal history of colonic polyps     Poor historian     Skin cancer     face    Ulcerative (chronic) proctosigmoiditis ([address]     Wears glasses      Past Surgical History:   Procedure Laterality Date    ABDOMINAL AORTIC ANEURYSM REPAIR, ENDOVASCULAR  7/1/14    ABDOMINAL EXPLORATION SURGERY  2/4/2013    exp lap, left hemicolectomy, umbilectomy    APPENDECTOMY  2/4/2013    APPENDECTOMY  2/4/2013    COLONOSCOPY  2/4/2013    COLOSTOMY  2/4/2013    CYSTOSCOPY  2/03/2014    TURP    HEMORRHOID SURGERY  2011    HERNIA REPAIR Bilateral [address] hernia    KNEE SURGERY Right 1980s    PACEMAKER INSERTION Right 12/13/2017    BIV PPM Medtronic Percepta Quad CRT-P MRI SureScan Pacemaker    PACEMAKER PLACEMENT Right 02/25/2013    Explanted 12/13/2017    SMALL INTESTINE SURGERY N/A 8/28/2019    EXPLORATORY LAPAROTOMY, SMALL BOWEL RESECTION, LYSIS OF ADHESIONS, NEW COLOSTOMY, AND HERNIA REPAIR WITH XENMATRIX MESH performed by Eli Ying MD at [facility] OR     Family History   Problem Relation Age of Onset    Stroke Mother         CVA    Heart Disease Mother     Prostate Cancer Brother     Pacemaker Brother     Cancer Brother         skin    Cancer Daughter         colon    Substance Abuse Son         Tobacco    No Known Problems Father      Social History     Socioeconomic History    Marital status:      Spouse name: Not on file    Number of children: 3    Years of education: Not on file    Highest education level: Not on file   Occupational History    Occupation: Retired   Tobacco Use    Smoking status: Never    Smokeless tobacco: Never   Vaping Use    Vaping Use: Never used   Substance and Sexual Activity    Alcohol use: No     Comment: 1 can of soda a day    Drug use: No    Sexual activity: Yes     Partners: Female     Comment:    Other Topics Concern    Not on file   Social History Narrative    Not on file     Social Determinants of Health     Financial Resource Strain: Low Risk     Difficulty of Paying Living Expenses: Not hard at all   Food Insecurity: No Food Insecurity    Worried About Running Out of Food in the Last Year: Never true    Ran Out of Food in the Last Year: Never true   Transportation Needs: Not on file   Physical Activity: Inactive    Days of Exercise per Week: 0 days    Minutes of Exercise per Session: 0 min   Stress: Not on file   Social Connections: Not on file   Intimate Partner Violence: Not on file   Housing Stability: Not on file     No current facility-administered medications for this encounter.      Current Outpatient Medications   Medication Sig Dispense Refill    atorvastatin (LIPITOR) 20 MG tablet TAKE ONE (1) TABLET BY MOUTH ONCE DAILY 30 tablet 5    ELIQUIS 2.5 MG TABS tablet TAKE ONE (1) TABLET BY MOUTH TWO TIMES DAILY 60 tablet 5    levothyroxine (SYNTHROID) 100 MCG tablet TAKE ONE (1) TABLET BY MOUTH ONCE DAILY 30 tablet 5    sertraline (ZOLOFT) 100 MG tablet TAKE ONE (1) TABLET BY MOUTH ONCE DAILY 30 tablet 5    metoprolol succinate (TOPROL XL) 25 MG extended release tablet TAKE 1 TABLET BY MOUTH ONCE DAILY 30 tablet 5    lisinopril (PRINIVIL;ZESTRIL) 10 MG tablet TAKE 1 TABLET BY MOUTH ONCE DAILY 30 tablet 5    buPROPion (WELLBUTRIN XL) 150 MG extended release tablet Take 1 tablet by mouth every morning 30 tablet 5    triamcinolone (KENALOG) 0.1 % ointment Apply topically 2 times daily for 7 days 30 g 3    fluticasone (FLONASE) 50 MCG/ACT nasal spray 2 sprays by Each Nostril route daily 16 g 0    donepezil (ARICEPT) 10 MG tablet TAKE ONE (1) TABLET BY MOUTH AT NIGHT 30 tablet 5    clotrimazole-betamethasone (LOTRISONE) 1-0.05 % cream Apply topically 2 times daily. (Patient not taking: Reported on 1/10/2023) 1 each 1    finasteride (PROSCAR) 5 MG tablet       tamsulosin (FLOMAX) 0.4 MG capsule TAKE 1 CAPSULE BY MOUTH ONCE DAILY 30 capsule 5    latanoprost (XALATAN) 0.005 % ophthalmic solution       Ascorbic Acid (VITAMIN C) 1000 MG tablet Take 1,000 mg by mouth daily      brimonidine-timolol (COMBIGAN) 0.2-0.5 % ophthalmic solution Place 1 drop into both eyes every 12 hours      Glucosamine-MSM-Hyaluronic Acd (JOINT HEALTH PO) Take 1 each by mouth daily         Allergies   Allergen Reactions    Codeine Anaphylaxis    Fentanyl Other (See Comments)     Hypotension      No current facility-administered medications for this encounter.      Current Outpatient Medications   Medication Sig Dispense Refill    atorvastatin (LIPITOR) 20 MG tablet TAKE ONE (1) TABLET BY MOUTH ONCE DAILY 30 tablet 5    ELIQUIS 2.5 MG TABS tablet TAKE ONE (1) TABLET BY MOUTH TWO TIMES DAILY 60 tablet 5    levothyroxine (SYNTHROID) 100 MCG tablet TAKE ONE (1) TABLET BY MOUTH ONCE DAILY 30 tablet 5    sertraline (ZOLOFT) 100 MG tablet TAKE ONE (1) TABLET BY MOUTH ONCE DAILY 30 tablet 5    metoprolol succinate (TOPROL XL) 25 MG extended release tablet TAKE 1 TABLET BY MOUTH ONCE DAILY 30 tablet 5    lisinopril (PRINIVIL;ZESTRIL) 10 MG tablet TAKE 1 TABLET BY MOUTH ONCE DAILY 30 tablet 5    buPROPion (WELLBUTRIN XL) 150 MG extended release tablet Take 1 tablet by mouth every morning 30 tablet 5    triamcinolone (KENALOG) 0.1 % ointment Apply topically 2 times daily for 7 days 30 g 3    fluticasone (FLONASE) 50 MCG/ACT nasal spray 2 sprays by Each Nostril route daily 16 g 0    donepezil (ARICEPT) 10 MG tablet TAKE ONE (1) TABLET BY MOUTH AT NIGHT 30 tablet 5    clotrimazole-betamethasone (LOTRISONE) 1-0.05 % cream Apply topically 2 times daily. (Patient not taking: Reported on 1/10/2023) 1 each 1    finasteride (PROSCAR) 5 MG tablet       tamsulosin (FLOMAX) 0.4 MG capsule TAKE 1 CAPSULE BY MOUTH ONCE DAILY 30 capsule 5    latanoprost (XALATAN) 0.005 % ophthalmic solution       Ascorbic Acid (VITAMIN C) 1000 MG tablet Take 1,000 mg by mouth daily      brimonidine-timolol (COMBIGAN) 0.2-0.5 % ophthalmic solution Place 1 drop into both eyes every 12 hours      Glucosamine-MSM-Hyaluronic Acd (JOINT HEALTH PO) Take 1 each by mouth daily          Nursing Notes Reviewed    VITAL SIGNS:  ED Triage Vitals   Enc Vitals Group      BP       Pulse       Resp       Temp       Temp src       SpO2       Weight       Height       Head Circumference       Peak Flow       Pain Score       Pain Loc       Pain Edu? Excl. in [address]? PHYSICAL EXAM:  Physical Exam  Vitals and nursing note reviewed. Constitutional:       General: He is not in acute distress. Appearance: Normal appearance. He is not ill-appearing, toxic-appearing or diaphoretic. HENT:      Head: Normocephalic and atraumatic. Right Ear: External ear normal.      Left Ear: External ear normal.      Nose: No congestion or rhinorrhea. Eyes:      General:         Right eye: No discharge. Left eye: No discharge. Extraocular Movements: Extraocular movements intact.      Conjunctiva/sclera: Conjunctivae normal.   Neck:      Vascular: No JVD.      Trachea: Phonation normal.   Cardiovascular:      Rate and Rhythm: Normal rate and regular rhythm.      Pulses: Normal pulses.      Heart sounds: Normal heart sounds.   Pulmonary:      Effort: Pulmonary effort is normal. No respiratory distress.      Breath sounds: Normal breath sounds. No stridor. No wheezing, rhonchi or rales.   Abdominal:      General: The ostomy site is clean. Bowel sounds are normal. There is no distension.      Palpations: Abdomen is soft. There is no mass.      Tenderness: There is no abdominal tenderness. There is no guarding or rebound. Negative signs include Cabrera's sign, Rovsing's sign and McBurney's sign.      Hernia: No hernia is present.   Musculoskeletal:         General: No swelling, tenderness, deformity or signs of injury. Normal range of motion.      Cervical back: Full passive range of motion without pain and normal range of motion. No rigidity or tenderness. No spinous process tenderness or muscular tenderness.      Right lower leg: No edema.      Left lower leg: No edema.   Skin:     Coloration: Skin is not jaundiced or pale.      Findings: No bruising, erythema, lesion or rash.   Neurological:      General: No focal deficit present.      Mental Status: He is alert. He is disoriented and confused.      GCS: GCS eye subscore is 4. GCS verbal subscore is 4. GCS motor subscore is 6.      Cranial Nerves: Cranial nerves 2-12 are intact. No cranial nerve deficit, dysarthria or facial asymmetry.      Sensory: Sensation is intact. No sensory deficit.      Motor: Motor function is intact. No weakness, tremor, atrophy, abnormal muscle tone or seizure activity.      Coordination: Coordination normal.   Psychiatric:         Mood and Affect: Mood normal.         Behavior: Behavior normal.         I have reviewed andinterpreted all of the currently available lab results from this visit (if  applicable):    Results for orders placed or performed during the hospital encounter of 01/16/23   CBC with Auto Differential   Result Value Ref Range    WBC 6.4 4.0 - 10.5 K/CU MM    RBC 3.85 (L) 4.6 - 6.2 M/CU MM    Hemoglobin 12.4 (L) 13.5 - 18.0 GM/DL    Hematocrit 38.4 (L) 42 - 52 %    MCV 99.7 78 - 100 FL    MCH 32.2 (H) 27 - 31 PG    MCHC 32.3 32.0 - 36.0 %    RDW 13.7 11.7 - 14.9 %    Platelets 217 329 - 684 K/CU MM    MPV 10.3 7.5 - 11.1 FL    Differential Type AUTOMATED DIFFERENTIAL     Segs Relative 76.9 (H) 36 - 66 %    Lymphocytes % 10.6 (L) 24 - 44 %    Monocytes % 10.7 (H) 0 - 4 %    Eosinophils % 0.8 0 - 3 %    Basophils % 0.5 0 - 1 %    Segs Absolute 4.9 K/CU MM    Lymphocytes Absolute 0.7 K/CU MM    Monocytes Absolute 0.7 K/CU MM    Eosinophils Absolute 0.1 K/CU MM    Basophils Absolute 0.0 K/CU MM    Nucleated RBC % 0.0 %    Total Nucleated RBC 0.0 K/CU MM    Total Immature Neutrophil 0.03 K/CU MM    Immature Neutrophil % 0.5 (H) 0 - 0.43 %   Comprehensive Metabolic Panel   Result Value Ref Range    Sodium 138 135 - 145 MMOL/L    Potassium 4.2 3.5 - 5.1 MMOL/L    Chloride 104 99 - 110 mMol/L    CO2 25 21 - 32 MMOL/L    BUN 29 (H) 6 - 23 MG/DL    Creatinine 1.5 (H) 0.9 - 1.3 MG/DL    Est, Glom Filt Rate 43 (L) >60 mL/min/1.73m2    Glucose 110 (H) 70 - 99 MG/DL    Calcium 8.6 8.3 - 10.6 MG/DL    Albumin 3.6 3.4 - 5.0 GM/DL    Total Protein 7.2 6.4 - 8.2 GM/DL    Total Bilirubin 0.5 0.0 - 1.0 MG/DL    ALT 11 10 - 40 U/L    AST 17 15 - 37 IU/L    Alkaline Phosphatase 114 40 - 129 IU/L    Anion Gap 9 4 - 16   Troponin   Result Value Ref Range    Troponin T 0.022 (H) <0.01 NG/ML   Brain Natriuretic Peptide   Result Value Ref Range    Pro-BNP 2,012 (H) <300 PG/ML   CK   Result Value Ref Range    Total CK 44 38 - 174 IU/L   Protime/INR & PTT   Result Value Ref Range    Protime 16.0 (H) 11.7 - 14.5 SECONDS    INR 1.24 INDEX    aPTT 35.2 25.1 - 37.1 SECONDS   Lipase   Result Value Ref Range    Lipase 159 (H) 13 - 60 IU/L   Magnesium   Result Value Ref Range    Magnesium 2.1 1.8 - 2.4 mg/dl   EKG 12 Lead   Result Value Ref Range    Ventricular Rate 80 BPM    Atrial Rate 80 BPM    P-R Interval 160 ms    QRS Duration 146 ms    Q-T Interval 450 ms    QTc Calculation (Bazett) 519 ms    P Axis 90 degrees    R Axis -62 degrees    T Axis 89 degrees    Diagnosis       AV dual-paced rhythm  Abnormal ECG  When compared with ECG of 28-OCT-2022 20:22,  No significant change was found          Radiographs (if obtained):  [] The following radiograph was interpreted by myself in the absence of a radiologist:  [x] Radiologist's Report Reviewed:    CXR, Pelvis, CT brain/c/t/l spine    EKG (if obtained): (All EKG's are interpreted by myself in the absence of a cardiologist)    12 lead EKG per my interpretation:  Paced 80  Axis is   Left axis deviation  QTc is   519  There is no specific T wave changes appreciated. There is no specific ST wave changes appreciated. Prior EKG to compare with was not available       MDM:    Patient with complaint of chest pain and possible fall. Again came by EMS from home. Apparently family found him getting up from the ground possibly fell complaint of chest pain and back pain earlier he has no complaints now came by EMS. Has a pacemaker. He cannot tell me why he is here he does not know the year or who the president is. Knows the city. Knows the month. Patient was seen by case management before on previous visits and apparently family probably cannot take care of him anymore. He does live with family 80years old. Family's not currently here they sent in by EMS he has a pacemaker had it interrogated functioning normally. Patient arrival has no complaints otherwise I will see any signs of trauma he is moving all extremities does appear confused. I did do labs, imaging, CT of the brain and entire spine chest x-ray pelvis.   Work-up is negative for elevation of creatinine, troponin elevation. EKG and labs otherwise negative due to his complaints symptoms and age will admit to hospital medicine for observation, case management following up possible PT OT possible placement otherwise patient stable admitted. Okay with plan. CLINICAL IMPRESSION:  Final diagnoses:   Chest pain, unspecified type   Fall, initial encounter   Troponin level elevated       (Please note that portions of this note may have been completed with a voice recognition program. Efforts were made to edit the dictations but occasionally words aremis-transcribed.)    DISPOSITION REFERRAL (if applicable):  No follow-up provider specified.     DISPOSITION MEDICATIONS (if applicable):  New Prescriptions    No medications on file          Alcon Conley, [facility],   01/16/23 6366

## 2023-01-16 NOTE — PROGRESS NOTES
Patient was seen and examined this AM, please refer to H/P from earlier this AM for a more robust note. Patient was examined at bedside, eating breakfast. He denied any pain or discomfort at the time of encounter. On exam, he is a pleasant gentleman with probable baseline confusion. Vitals:    01/16/23 1202   BP: 127/74   Pulse: 80   Resp: 20   Temp:    SpO2:        Physical Exam  HENT:      Head: Normocephalic and atraumatic. Mouth/Throat:      Mouth: Mucous membranes are moist.   Eyes:      Extraocular Movements: Extraocular movements intact. Cardiovascular:      Rate and Rhythm: Normal rate. Pulmonary:      Effort: Pulmonary effort is normal.      Breath sounds: Normal breath sounds. Abdominal:      General: Bowel sounds are normal.      Palpations: Abdomen is soft. Skin:     General: Skin is warm and dry. Neurological:      Mental Status: He is alert. Comments: [place] to self and place. Spontaneouslty moving all extremities   Psychiatric:         Mood and Affect: Mood normal.      He was admitted s/p fall for chest pain w/ elevated troponin. Card on board, no further ischemic work up planned. His biV deveice was interrogated and did not show any ventricular arrhythmias. Will follow up echo. Based on fall, patient will undergo eval by PT/OT    Will continue meds for chronic conditions as appropriate.

## 2023-01-16 NOTE — CARE COORDINATION
Met with patient at bedside. He is alert to person but confused to some questions. Patient stated that he lives with his daughter. Stated that they \"do fine\" but could not say if they have help at home. He knows that his wife is - stated \"too long ago\" (within 7 years) He stated \"Can you come back when my daughter is here\". Will revisit.  Laureen Leonardo RN

## 2023-01-16 NOTE — PROGRESS NOTES
Patients daughter relates to this nurse that when the patient is home with her, he refuses to bath and refuses to take his medications as prescribed. When she tries to get him up and out of bed, it takes him hours just to get up. She knows he is physically able to do so, he just doesn't want to do it. She states she is in need of some assistance with him to get him to bath and to take his medication. She is able to take him to his appointments, however he lacks the motivation to get ready and go.

## 2023-01-16 NOTE — CARE COORDINATION
CM in to see patient. Patients' daughter was in room at bedside. Patient sleeping and has dementia. Patients' daughter reports that patient is continuing to decline. Daughter is living with patient and assisting with patient. Patients daughter is cooking and doing all meals. Patient has used [facility] in past but is not in need of their services at this time. Patient has a PCP and insurance. Patient does not need any other DME. Patients' daughter would like to utilize [facility]. Patient has used [facility] in past. Patient used [address] in 2020. CM gave list to daughter of SNFs' (for [address]) area [facility] agencies and [facility] information as daughter is interested in [address]. Patient will need HHC order upon discharge. Daughter stated that she would like to utilize [address] again as they were very happy with their services before. Chest pain

## 2023-01-16 NOTE — PROGRESS NOTES
Physical Therapy  [facility] ACUTE CARE PHYSICAL THERAPY EVALUATION  [address] [facility], 3/23/1930, 2017/2017-A, 1/16/2023    History  [place]:  The primary encounter diagnosis was Chest pain, unspecified type. Diagnoses of Fall, initial encounter and Troponin level elevated were also pertinent to this visit. Patient  has a past medical history of AAA (abdominal aortic aneurysm), Arthritis, Asthma, AV block, 1st degree, Back pain, Borderline hypothyroidism, Bradycardia, Colon polyps, Colostomy in place ([address] Glaucoma, H/O cardiovascular stress test, History of blood transfusion, History of cardiovascular stress test, History of chest x-ray, History of Doppler ultrasound, History of echocardiogram, Holter monitor, abnormal, Hx of blood clots, Pacemaker, PAF (paroxysmal atrial fibrillation) ([address] Peritonitis ([address] Personal history of colonic polyps, Poor historian, Skin cancer, Ulcerative (chronic) proctosigmoiditis ([address] and Wears glasses. Patient  has a past surgical history that includes hernia repair (Bilateral, [facility]); Hemorrhoid surgery (2011); Colonoscopy (2/4/2013); knee surgery (Right, 1980s); Abdominal exploration surgery (2/4/2013); Appendectomy (2/4/2013); pacemaker placement (Right, 02/25/2013); Appendectomy (2/4/2013); colostomy (2/4/2013); Cystocopy (2/03/2014); AAA repair, endovascular (7/1/14); Pacemaker insertion (Right, 12/13/2017); and Small intestine surgery (N/A, 8/28/2019).     Subjective:  Patient states:  \"I need help getting this bar down\" (bed rail)   Pain:  back discomfort (though pt states this is normal for him)  Communication with other providers:  RN, co-eval with Hoa EVANS   Restrictions: general precautions, falls     Home Setup/Prior level of function  Social/Functional History  Lives With: Daughter  Type of Home: House  Home Layout: Two level, Able to Live on Main level with bedroom/bathroom  Home Access: Stairs to enter with rails  Entrance Stairs - Number of Steps: 1  Bathroom Shower/Tub: Walk-in shower  Bathroom Toilet: Standard  Bathroom Equipment: Shower chair, Grab bars in shower  Home Equipment: Marlon Sorrow, Walker, rolling, Rollator  ADL Assistance: Independent (though dtr reports pt has not bathed since Artur.)  Homemaking Responsibilities: No (daughter manages household chores)  Ambulation Assistance: Independent (uses cane or one of his walkers, whichever is closest)  Transfer Assistance:  (Dtr helps with bed mobility. Pt able to transfer on his own between surfaces)  Active : No  Patient's  Info: daughter  Additional Comments: Dtr present and reported that pt needs a lot of encouragement to get out of bed, walk, or shower lately. Examination of body systems (includes body structures/functions, activity/participation limitations):  Observation:  Supine in bed upon arrival. Cooperative with therapy. Dtr visiting. Pt liked to tell jokes throughout session. Pleasantly confused. Oriented to person and grossly place. Disoriented to time. Vision:  WFL  Hearing:  [place]   Cardiopulmonary:  stable vitals on room air     Musculoskeletal  ROM R/L:  WFL BLEs  Strength R/L:  BLEs grossly 4 to 4+/5    Mobility/treatment:   Rolling L/R: SBA to the left   Supine to sit:  Salty for trunk boost. Inc time and effort but unable to manage trunk on his own   Transfers:   Sit to stand: CGA for safety from EOB  Stand to sit: CGA for safety to recliner  Step pivot: CGA for safety with RW   Sitting balance:  Salty initially at EOB with pt sitting on his hand. Once bilat hands on bed for support pt progressed to CGA with static sitting. Standing balance:  CGA at RW, static with BUE support x ~20 seconds prior to initiating mobility   Gait: ~20ft with RW CGA for safety. Dec pace with reciprocal gait pattern and slightly fwd posture. No LOB noted. Educated pt and daughter on POC, role of PT, DME use, discharge.      [facility] 6 Clicks Inpatient Mobility:  AM-PAC Inpatient Mobility Raw Score : 18    Safety: patient left in chair with alarm, call light within reach, gait belt used. Assessment:  Pt is a 80year old male admitted with chest pain and a fall. Hx of dementia. Recommend home with 24/7 support and HH PT once medically stable. At baseline he is [place] with ambulation and most transfers/ADLs though does need some help and encouragement some of the time from his daughter. He performed well this date though slightly below his baseline with dec strength, balance, and activity tolerance. He would benefit from continued therapy to address her current deficits, dec potential fall risk, dec burden of care, and restore PLOF. Complexity: Moderate  Prognosis: Good, no significant barriers to participation at this time.    Plan: 3-5 times per week  Discharge Recommendations: 24 hour supervision or assist, Home with Home health PT  Equipment: no needs at this time     Goals:  Short Term Goals  Time Frame for Short Term Goals: 2 weeks  Short Term Goal 1: Pt will perform sit><supine Salty  Short Term Goal 2: Pt will transfer to all surfaces SBA  Short Term Goal 3: Pt will ambulate 50ft with LRAD SBA  Short Term Goal 4: Pt will ascend/descend 1 step, single rail CGA  Short Term Goal 5: Pt will perform sitting light dynamic activity x 5 minutes, single UE support SBA       Treatment plan:  Bed mobility, transfers, balance, gait, TA, TX, stairs     Recommendations for NURSING mobility: ambulate with RW to the bathroom     Time:   Time in: 1142  Time out: 1203  Timed treatment minutes: 11  Total time: 21 minutes     Electronically signed by:    Micky Campbell [id number]  1/16/2023, 12:48 PM

## 2023-01-16 NOTE — CONSULTS
INPATIENT CARDIOLOGY CONSULT NOTE         Reason for consultation:   Chest pain    Referring physician:  No admitting provider for patient encounter. Primary care physician: LIA Barger - CNP      Dear No admitting provider for patient encounter. Thank you for the consult    Chief Complaint   Patient presents with    Chest Pain    Fall       History of present illness:Rachid is a 80 y. o.year old who  presents with   Chief Complaint   Patient presents with    Chest Pain    Fall       Patient is a 80 years gentleman who presents to the hospital s/p fall at home. Cardiologist consulted to evaluate patient for chest pain/non-STEMI    Patient does not recall the incident or the fall. He currently denies any chest pain. Patient follows up with Dr. Jojo Fine as outpatient. Prior medical history significant for atrial fibrillation, history of heart failure s/p BiV, hypertension, hyperlipidemia    Evidently patient as per medical record patient was at home when he had a fall. EMS was summoned and patient was noted to be on the ground. Since then he has been complaining of lower chest pain as well as back pain. In the ER CT scan images were obtained which were essentially normal.  Cardiac troponin was noted to be 0.02      Past medical history:    has a past medical history of AAA (abdominal aortic aneurysm), Arthritis, Asthma, AV block, 1st degree, Back pain, Borderline hypothyroidism, Bradycardia, Colon polyps, Colostomy in place ([address] Glaucoma, H/O cardiovascular stress test, History of blood transfusion, History of cardiovascular stress test, History of chest x-ray, History of Doppler ultrasound, History of echocardiogram, Holter monitor, abnormal, Hx of blood clots, Pacemaker, PAF (paroxysmal atrial fibrillation) ([address] Peritonitis ([address] Personal history of colonic polyps, Poor historian, Skin cancer, Ulcerative (chronic) proctosigmoiditis ([address] and Wears glasses.   Past surgical history: has a past surgical history that includes hernia repair (Bilateral, [facility]); Hemorrhoid surgery (2011); Colonoscopy (2/4/2013); knee surgery (Right, 1980s); Abdominal exploration surgery (2/4/2013); Appendectomy (2/4/2013); pacemaker placement (Right, 02/25/2013); Appendectomy (2/4/2013); colostomy (2/4/2013); Cystocopy (2/03/2014); AAA repair, endovascular (7/1/14); Pacemaker insertion (Right, 12/13/2017); and Small intestine surgery (N/A, 8/28/2019). Social History:   reports that he has never smoked. He has never used smokeless tobacco. He reports that he does not drink alcohol and does not use drugs.   Family history:   no family history of CAD, STROKE of DM    Allergies   Allergen Reactions    Codeine Anaphylaxis    Fentanyl Other (See Comments)     Hypotension        lidocaine 4 % external patch 2 patch, Daily  ascorbic acid (VITAMIN C) tablet 1,000 mg, Daily  atorvastatin (LIPITOR) tablet 20 mg, Nightly  buPROPion (WELLBUTRIN XL) extended release tablet 150 mg, QAM  donepezil (ARICEPT) tablet 10 mg, Nightly  apixaban (ELIQUIS) tablet 2.5 mg, BID  finasteride (PROSCAR) tablet 5 mg, Daily  levothyroxine (SYNTHROID) tablet 100 mcg, Daily  lisinopril (PRINIVIL;ZESTRIL) tablet 10 mg, Daily  metoprolol succinate (TOPROL XL) extended release tablet 25 mg, Daily  sertraline (ZOLOFT) tablet 100 mg, Daily  tamsulosin (FLOMAX) capsule 0.4 mg, Daily  sodium chloride flush 0.9 % injection 5-40 mL, 2 times per day  sodium chloride flush 0.9 % injection 5-40 mL, PRN  0.9 % sodium chloride infusion, PRN  ondansetron (ZOFRAN-ODT) disintegrating tablet 4 mg, Q8H PRN   Or  ondansetron (ZOFRAN) injection 4 mg, Q6H PRN  polyethylene glycol (GLYCOLAX) packet 17 g, Daily PRN  acetaminophen (TYLENOL) tablet 650 mg, Q6H PRN   Or  acetaminophen (TYLENOL) suppository 650 mg, Q6H PRN      Current Facility-Administered Medications   Medication Dose Route Frequency Provider Last Rate Last Admin    lidocaine 4 % external patch 2 patch 2 patch TransDERmal Daily Ayala Phoenix MD   2 patch at 01/16/23 0700    ascorbic acid (VITAMIN C) tablet 1,000 mg  1,000 mg Oral Daily yAala Phoenix MD   1,000 mg at 01/16/23 1018    atorvastatin (LIPITOR) tablet 20 mg  20 mg Oral Nightly Ayala Phoenix MD        buPROPion (WELLBUTRIN XL) extended release tablet 150 mg  150 mg Oral QAM Ayala Phoenix MD   150 mg at 01/16/23 1017    donepezil (ARICEPT) tablet 10 mg  10 mg Oral Nightly Ayala Phoenix MD        apixaban [place]) tablet 2.5 mg  2.5 mg Oral BID Ayala Phoenix MD   2.5 mg at 01/16/23 1017    finasteride (PROSCAR) tablet 5 mg  5 mg Oral Daily Ayala Phoenix MD   5 mg at 01/16/23 1017    levothyroxine (SYNTHROID) tablet 100 mcg  100 mcg Oral Daily Ayala Phoenix MD   100 mcg at 01/16/23 0659    lisinopril (PRINIVIL;ZESTRIL) tablet 10 mg  10 mg Oral Daily Ayala Phoenix MD   10 mg at 01/16/23 1021    metoprolol succinate (TOPROL XL) extended release tablet 25 mg  25 mg Oral Daily Ayala Phoenix MD   25 mg at 01/16/23 1017    sertraline (ZOLOFT) tablet 100 mg  100 mg Oral Daily Ayala Phoenix MD   100 mg at 01/16/23 1021    tamsulosin (FLOMAX) capsule 0.4 mg  0.4 mg Oral Daily Ayala Phoenix MD   0.4 mg at 01/16/23 1021    sodium chloride flush 0.9 % injection 5-40 mL  5-40 mL IntraVENous 2 times per day Ayala Phoenix MD        sodium chloride flush 0.9 % injection 5-40 mL  5-40 mL IntraVENous PRN Ayala Phoenix MD        0.9 % sodium chloride infusion   IntraVENous PRN Ayala Phoenix MD        ondansetron (ZOFRAN-ODT) disintegrating tablet 4 mg  4 mg Oral Q8H PRN Ayala Phoenix MD        Or    ondansetron (ZOFRAN) injection 4 mg  4 mg IntraVENous Q6H PRN Ayala Phoenix MD        polyethylene glycol (GLYCOLAX) packet 17 g  17 g Oral Daily PRN Ayala Phoenix MD        acetaminophen (TYLENOL) tablet 650 mg  650 mg Oral Q6H PRN Ayala Phoenix MD        Or    acetaminophen (TYLENOL) suppository 650 mg  650 mg Rectal Q6H PRN Ayala Phoenix MD             Review of Systems: Patient is a poor historian. Denies any active chest pain  Full review of system could not be obtained patient denies any fall or remember 1      Physical Examination:      Vitals:    01/16/23 1202   BP: 127/74   Pulse: 80   Resp: 20   Temp:    SpO2:       Wt Readings from Last 3 Encounters:   01/16/23 143 lb 11.8 oz (65.2 kg)   11/15/22 153 lb (69.4 kg)   10/28/22 160 lb (72.6 kg)     Body mass index is 21.86 kg/m². General Appearance:  No distress, conversant  Constitutional:  Well developed, Well nourished  HEENT:  Normocephalic, Atraumatic, Oropharynx moist   Nose normal. Neck Supple Carotid: no carotid bruit  Eyes:  Conjunctiva normal, No discharge. Respiratory:     Normal breath sounds, No respiratory distress, No wheezing, no use of accessory muscles, diaphragm movement is normal  No chest Tenderness  Cardiovascular: S1-S2 No  murmurs auscultated. No rubs, thrills or gallops. Normal   rhythm. Pedal pulses are normal. No  pedal edema  GI:  Soft Non tender, non distended. Musculoskeletal:   No tenderness, No cyanosis, No clubbing. Integument:  Warm, Dry, No erythema, No rash. Lymphatic:  No lymphadenopathy noted. Neurologic:   Alert & oriented x 3  No focal deficits noted. Psychiatric:  Affect normal, Judgment normal, Mood normal.       Lab Review     Recent Labs     01/16/23  0700   WBC 8.1   HGB 12.2*   HCT 39.1*         Recent Labs     01/16/23  0700      K 3.9      CO2 24   PHOS 2.7   BUN 27*   CREATININE 1.3     Recent Labs     01/16/23  0155   AST 17   ALT 11   BILITOT 0.5   ALKPHOS 114     No results for input(s): TROPONINI in the last 72 hours. Lab Results   Component Value Date     (H) 02/07/2013     Lab Results   Component Value Date    INR 1.24 01/16/2023    PROTIME 16.0 (H) 01/16/2023         All labs, images, EKGs were personally reviewed      Assessment: 80 y. o.year old with PMH of  has a past medical history of AAA (abdominal aortic aneurysm), Arthritis, Asthma, AV block, 1st degree, Back pain, Borderline hypothyroidism, Bradycardia, Colon polyps, Colostomy in place ([facility] Glaucoma, H/O cardiovascular stress test, History of blood transfusion, History of cardiovascular stress test, History of chest x-ray, History of Doppler ultrasound, History of echocardiogram, Holter monitor, abnormal, Hx of blood clots, Pacemaker, PAF (paroxysmal atrial fibrillation) ([address] Peritonitis ([address] Personal history of colonic polyps, Poor historian, Skin cancer, Ulcerative (chronic) proctosigmoiditis ([facility] and Wears glasses. Medical Decision Making :       S/p fall /patient does not have full recollection of the incident  Atypical chest pain musculoskeletal in origin and reproducible to touch. Minimally elevated troponin, 0.02, 0.02.  a non-ACS trend. In absence of any chest pain EKG changes, reproducible chest pain  No further ischemic cardiac work-up planned at this time. EKG shows BiV paced rhythm    Device was interrogated today does not exhibit any ventricular arrhythmias. Fall likely mechanical in nature    Check Echo     History of paroxysmal atrial fibrillation recommend to resume oral anticoagulation however patient will need PT OT and fall assessment prior to discharge. CKD with baseline creatinine of 1.4  Essential hypertension: Blood pressure stable.   Continue with lisinopril  Hyperlipidemia: Continue with statin therapy  History of dementia on donezepil       Thank you for the consult    Dr. Quin Wolf  1/16/2023 12:04 PM

## 2023-01-17 LAB
ANION GAP SERPL CALCULATED.3IONS-SCNC: 11 MMOL/L (ref 4–16)
BASOPHILS ABSOLUTE: 0 K/CU MM
BASOPHILS RELATIVE PERCENT: 0.5 % (ref 0–1)
BUN BLDV-MCNC: 29 MG/DL (ref 6–23)
CALCIUM SERPL-MCNC: 8.6 MG/DL (ref 8.3–10.6)
CHLORIDE BLD-SCNC: 102 MMOL/L (ref 99–110)
CO2: 24 MMOL/L (ref 21–32)
CREAT SERPL-MCNC: 1.4 MG/DL (ref 0.9–1.3)
DIFFERENTIAL TYPE: ABNORMAL
EKG ATRIAL RATE: 79 BPM
EKG ATRIAL RATE: 80 BPM
EKG DIAGNOSIS: NORMAL
EKG DIAGNOSIS: NORMAL
EKG P AXIS: 90 DEGREES
EKG P-R INTERVAL: 160 MS
EKG P-R INTERVAL: 176 MS
EKG Q-T INTERVAL: 440 MS
EKG Q-T INTERVAL: 450 MS
EKG QRS DURATION: 146 MS
EKG QRS DURATION: 148 MS
EKG QTC CALCULATION (BAZETT): 507 MS
EKG QTC CALCULATION (BAZETT): 519 MS
EKG R AXIS: -62 DEGREES
EKG R AXIS: 230 DEGREES
EKG T AXIS: 88 DEGREES
EKG T AXIS: 89 DEGREES
EKG VENTRICULAR RATE: 80 BPM
EKG VENTRICULAR RATE: 80 BPM
EOSINOPHILS ABSOLUTE: 0.1 K/CU MM
EOSINOPHILS RELATIVE PERCENT: 1.4 % (ref 0–3)
GFR SERPL CREATININE-BSD FRML MDRD: 47 ML/MIN/1.73M2
GLUCOSE BLD-MCNC: 99 MG/DL (ref 70–99)
HCT VFR BLD CALC: 37.2 % (ref 42–52)
HEMOGLOBIN: 11.9 GM/DL (ref 13.5–18)
IMMATURE NEUTROPHIL %: 0.5 % (ref 0–0.43)
LYMPHOCYTES ABSOLUTE: 0.8 K/CU MM
LYMPHOCYTES RELATIVE PERCENT: 11.6 % (ref 24–44)
MAGNESIUM: 1.7 MG/DL (ref 1.8–2.4)
MCH RBC QN AUTO: 31.9 PG (ref 27–31)
MCHC RBC AUTO-ENTMCNC: 32 % (ref 32–36)
MCV RBC AUTO: 99.7 FL (ref 78–100)
MONOCYTES ABSOLUTE: 0.6 K/CU MM
MONOCYTES RELATIVE PERCENT: 9.1 % (ref 0–4)
NUCLEATED RBC %: 0 %
PDW BLD-RTO: 13.5 % (ref 11.7–14.9)
PHOSPHORUS: 2.9 MG/DL (ref 2.5–4.9)
PLATELET # BLD: 183 K/CU MM (ref 140–440)
PMV BLD AUTO: 10.6 FL (ref 7.5–11.1)
POTASSIUM SERPL-SCNC: 3.8 MMOL/L (ref 3.5–5.1)
RBC # BLD: 3.73 M/CU MM (ref 4.6–6.2)
SEGMENTED NEUTROPHILS ABSOLUTE COUNT: 5.1 K/CU MM
SEGMENTED NEUTROPHILS RELATIVE PERCENT: 76.9 % (ref 36–66)
SODIUM BLD-SCNC: 137 MMOL/L (ref 135–145)
TOTAL IMMATURE NEUTOROPHIL: 0.03 K/CU MM
TOTAL NUCLEATED RBC: 0 K/CU MM
WBC # BLD: 6.6 K/CU MM (ref 4–10.5)

## 2023-01-17 PROCEDURE — 80048 BASIC METABOLIC PNL TOTAL CA: CPT

## 2023-01-17 PROCEDURE — 83735 ASSAY OF MAGNESIUM: CPT

## 2023-01-17 PROCEDURE — 36415 COLL VENOUS BLD VENIPUNCTURE: CPT

## 2023-01-17 PROCEDURE — 97530 THERAPEUTIC ACTIVITIES: CPT

## 2023-01-17 PROCEDURE — 93010 ELECTROCARDIOGRAM REPORT: CPT | Performed by: INTERNAL MEDICINE

## 2023-01-17 PROCEDURE — 84100 ASSAY OF PHOSPHORUS: CPT

## 2023-01-17 PROCEDURE — 2060000000 HC ICU INTERMEDIATE R&B

## 2023-01-17 PROCEDURE — 85025 COMPLETE CBC W/AUTO DIFF WBC: CPT

## 2023-01-17 PROCEDURE — 94761 N-INVAS EAR/PLS OXIMETRY MLT: CPT

## 2023-01-17 PROCEDURE — 6370000000 HC RX 637 (ALT 250 FOR IP): Performed by: INTERNAL MEDICINE

## 2023-01-17 PROCEDURE — 99233 SBSQ HOSP IP/OBS HIGH 50: CPT | Performed by: INTERNAL MEDICINE

## 2023-01-17 PROCEDURE — 2580000003 HC RX 258: Performed by: INTERNAL MEDICINE

## 2023-01-17 RX ADMIN — OXYCODONE HYDROCHLORIDE AND ACETAMINOPHEN 1000 MG: 500 TABLET ORAL at 08:17

## 2023-01-17 RX ADMIN — LISINOPRIL 10 MG: 5 TABLET ORAL at 08:18

## 2023-01-17 RX ADMIN — BUPROPION HYDROCHLORIDE 150 MG: 150 TABLET, FILM COATED, EXTENDED RELEASE ORAL at 08:17

## 2023-01-17 RX ADMIN — ATORVASTATIN CALCIUM 20 MG: 10 TABLET, FILM COATED ORAL at 20:31

## 2023-01-17 RX ADMIN — SERTRALINE HYDROCHLORIDE 100 MG: 50 TABLET ORAL at 08:17

## 2023-01-17 RX ADMIN — FINASTERIDE 5 MG: 5 TABLET, FILM COATED ORAL at 08:18

## 2023-01-17 RX ADMIN — Medication 10 ML: at 20:31

## 2023-01-17 RX ADMIN — DONEPEZIL HYDROCHLORIDE 10 MG: 10 TABLET ORAL at 20:31

## 2023-01-17 RX ADMIN — APIXABAN 2.5 MG: 2.5 TABLET, FILM COATED ORAL at 20:31

## 2023-01-17 RX ADMIN — TAMSULOSIN HYDROCHLORIDE 0.4 MG: 0.4 CAPSULE ORAL at 08:18

## 2023-01-17 RX ADMIN — APIXABAN 2.5 MG: 2.5 TABLET, FILM COATED ORAL at 08:18

## 2023-01-17 RX ADMIN — Medication 10 ML: at 08:18

## 2023-01-17 RX ADMIN — METOPROLOL SUCCINATE 25 MG: 25 TABLET, EXTENDED RELEASE ORAL at 08:17

## 2023-01-17 RX ADMIN — LEVOTHYROXINE SODIUM 100 MCG: 100 TABLET ORAL at 06:40

## 2023-01-17 NOTE — PROGRESS NOTES
Radha Moy - Psychiatry paged via [facility] for new consult for New onset negative behaviors in setting of dementia.

## 2023-01-17 NOTE — PROGRESS NOTES
Physical Therapy    Physical Therapy Treatment Note  Name: Thi Paredes MRN: [id number] :   3/23/1930   Date:  2023   Admission Date: 2023 Room:  A   Restrictions/Precautions:          general precautions, falls,    Subjective:  Patient states: \"Do you come everyday? \"   Pain:   Location, Type, Intensity (0/10 to 10/10):  denies   Objective:    Observation:    Supine in bed eating. Agreeable to sitting up in the chair to finish his meal for better positioning. Objective Measures:    Stable vitals on room air   Treatment, including education/measures:  Supine to sit: Salty for uprighting trunk. VCs for sequencing LES to EOB. Inc time to complete with pt being easily distracted and talkative. Poor shoulder motion with inability to functionally use bed rail to assist self. Sitting balance: CGA progressing to SBA for safety at EOB with BUE support  Sit to stand: CGA for safety from EOB to RW  Stand to sit: Salty for controlling sit to recliner with dec command following when cued to reach back to chair for support. Step pivot: CGA for safety with RW  Ambulation: ~20ft with RW CGA for safety. Dec pace with reciprocal gait pattern and slightly fwd posture. No LOB noted. Marylee Fendt to finish meal declining any further mobility at this time  Educated pt on POC, role of PT, progression with mobility, deconditioning, positioning for meals to prevent choking.  Cues provided for sequencing to inc safety and indep with mobility   Assessment / Impression:    Patient's tolerance of treatment:  good    Adverse Reaction: no  Significant change in status and impact:  no  Barriers to improvement:  activity tolerance, strength, balance, cognition   Plan for Next Session:    Activity tolerance, strength, balance, gait, transfers, bed mobility, stairs   Time in:  1444  Time out:  1501  Timed treatment minutes:  17  Total treatment time:  17 minutes     Previously filed items:  Social/Functional History  Lives With: Daughter  Type of Home: House  Home Layout: Two level, Able to Live on Main level with bedroom/bathroom  Home Access: Stairs to enter with rails  Entrance Stairs - Number of Steps: 1  Bathroom Shower/Tub: Walk-in shower  Bathroom Toilet: Standard  Bathroom Equipment: Shower chair, Grab bars in [address] [place]: Willy Bio, rolling, Rollator  ADL Assistance: Independent (though dtr reports pt has not bathed since Christmas.)  Homemaking Responsibilities: No (daughter manages household chores)  Ambulation Assistance: Independent (uses cane or one of his walkers, whichever is closest)  Transfer Assistance:  (Dtr helps with bed mobility. Pt able to transfer on his own between surfaces)  Active : No  Patient's  Info: daughter  Additional Comments: Dtr present and reported that pt needs a lot of encouragement to get out of bed, walk, or shower lately.         Short Term Goals  Time Frame for Short Term Goals: 2 weeks  Short Term Goal 1: Pt will perform sit><supine Salty  Short Term Goal 2: Pt will transfer to all surfaces SBA  Short Term Goal 3: Pt will ambulate 50ft with LRAD SBA  Short Term Goal 4: Pt will ascend/descend 1 step, single rail CGA  Short Term Goal 5: Pt will perform sitting light dynamic activity x 5 minutes, single UE support SBA    Electronically signed by:    Brianna Juarez, [id number]  1/17/2023, 3:33 PM

## 2023-01-17 NOTE — PLAN OF CARE
Problem: Skin/Tissue Integrity  Goal: Absence of new skin breakdown  Description: 1. Monitor for areas of redness and/or skin breakdown  2. Assess vascular access sites hourly  3. Every 4-6 hours minimum:  Change oxygen saturation probe site  4. Every 4-6 hours:  If on nasal continuous positive airway pressure, respiratory therapy assess nares and determine need for appliance change or resting period.   Outcome: Progressing     Problem: Safety - Adult  Goal: Free from fall injury  Outcome: Progressing     Problem: Discharge Planning  Goal: Discharge to home or other facility with appropriate resources  Outcome: Adequate for Discharge

## 2023-01-17 NOTE — PLAN OF CARE
Problem: Discharge Planning  Goal: Discharge to home or other facility with appropriate resources  1/17/2023 1157 by Estephania Carrasco RN  Outcome: Progressing  1/17/2023 0450 by Juliana Hidalgo RN  Outcome: Adequate for Discharge     Problem: Pain  Goal: Verbalizes/displays adequate comfort level or baseline comfort level  Outcome: Progressing

## 2023-01-17 NOTE — PROGRESS NOTES
V2.0  [facility] Hospitalist Progress Note      Name:  Gay Wall /Age/Sex: 3/23/1930  (80 y.o. male)   MRN & CSN:  [id number] & [id number] Encounter Date/Time: 2023 8:41 AM EST    Location:  -A PCP: Deloris Priest, [address] [facility] Day: 2    Assessment and Plan:   Gay Wall is a 80 y.o. male with pmh of HFrecoveredEF s/p BiV PPM, paroxysmal atrial fibrillation, hypertension, dementia, hyperlipidemia, anxiety/depression who presents with fall with subsequent chest and back pain and admitted for Failure to thrive in adult      Plan:    Unwitnessed fall  Chest pain -reproducible, likely MSK, do not suspect ACS  Back pain  Family found patient getting up from ground on 1/15 and since has been complaining of chest pain and back pain. Patient does not remember fall. CTH, CT cervical/thoracic/lumbar spine, pelvis x-ray, CXR all negative for acute fracture/deformity. -PT/OT, fall precautions    NSTEMI  Patient initially had chest pain after fall but this resolved after arrival.  Troponin elevated to 0.022 but downtrended. EKG with BiV paced rhythm.  -Cardiology advised no further ischemic work-up    Chronic systolic heart failure -recovered EF (50% in 2019)  S/p BiV PPM -device interrogation does not exhibit any ventricular arrhythmias  Hypertension  No sign of volume overload. -Continue GDMT as tolerated  -Continue home antihypertensives    Paroxysmal A. Fib  -Continue Eliquis 2.5 mg twice daily, Toprol XL 25 mg daily    CKD  Baseline creatinine 1.4.  -Monitor and avoid nephrotoxic medications    Dementia  Baseline orientation to self, family, city, month but not time  -Continue donepezil    HLD -continue statin  Hypothyroidism -continue Synthroid  BPH -continue finasteride and tamsulosin  Anxiety/depression -continue sertraline and bupropion      Diet ADULT DIET;  Regular   DVT Prophylaxis [] Lovenox, []  Heparin, [] SCDs, [] Ambulation,  [x] Eliquis, [] Xarelto  [] Coumadin Code Status Full Code   Disposition From: Home  Expected Disposition: TBD  Estimated Date of Discharge:   Patient requires continued admission due to unwitnessed fall and PT/OT evaluation. Surrogate Decision Maker/ POA      Subjective:     Chief Complaint: Chest Pain and Fall       Pt alert and eating at present. Reports no pain at this time. He does not know why he's here but his pain has resolved at present. Knows who he is, believes this is [facility] and knows he is in [facility]. No family present in room at this time. Review of Systems:    Review of Systems    Difficult to obtain as pt has dementia. Otherwise, negative except as above. Objective: Intake/Output Summary (Last 24 hours) at 1/17/2023 0841  Last data filed at 1/17/2023 0817  Gross per 24 hour   Intake 10 ml   Output 200 ml   Net -190 ml        Vitals:   Vitals:    01/17/23 0831   BP:    Pulse:    Resp:    Temp:    SpO2: 98%       Physical Exam:     General: NAD  Eyes: EOMI  ENT: neck supple  Cardiovascular: Regular rate. Respiratory: Clear to auscultation  Gastrointestinal: Soft, non tender  Genitourinary: no suprapubic tenderness  Musculoskeletal: No edema  Skin: warm, dry  Neuro: Alert. Psych: Mood appropriate.      Medications:   Medications:    lidocaine  2 patch TransDERmal Daily    vitamin C  1,000 mg Oral Daily    atorvastatin  20 mg Oral Nightly    buPROPion  150 mg Oral QAM    donepezil  10 mg Oral Nightly    apixaban  2.5 mg Oral BID    finasteride  5 mg Oral Daily    levothyroxine  100 mcg Oral Daily    lisinopril  10 mg Oral Daily    metoprolol succinate  25 mg Oral Daily    sertraline  100 mg Oral Daily    tamsulosin  0.4 mg Oral Daily    sodium chloride flush  5-40 mL IntraVENous 2 times per day      Infusions:    sodium chloride       PRN Meds: sodium chloride flush, 5-40 mL, PRN  sodium chloride, , PRN  ondansetron, 4 mg, Q8H PRN   Or  ondansetron, 4 mg, Q6H PRN  polyethylene glycol, 17 g, Daily PRN  acetaminophen, 650 mg, Q6H PRN   Or  acetaminophen, 650 mg, Q6H PRN        Labs      Recent Results (from the past 24 hour(s))   Troponin    Collection Time: 01/16/23  6:25 PM   Result Value Ref Range    Troponin T 0.021 (H) <0.01 NG/ML   Basic Metabolic Panel    Collection Time: 01/17/23  5:06 AM   Result Value Ref Range    Sodium 137 135 - 145 MMOL/L    Potassium 3.8 3.5 - 5.1 MMOL/L    Chloride 102 99 - 110 mMol/L    CO2 24 21 - 32 MMOL/L    Anion Gap 11 4 - 16    BUN 29 (H) 6 - 23 MG/DL    Creatinine 1.4 (H) 0.9 - 1.3 MG/DL    Est, Glom Filt Rate 47 (L) >60 mL/min/1.73m2    Glucose 99 70 - 99 MG/DL    Calcium 8.6 8.3 - 10.6 MG/DL   Magnesium    Collection Time: 01/17/23  5:06 AM   Result Value Ref Range    Magnesium 1.7 (L) 1.8 - 2.4 mg/dl   Phosphorus    Collection Time: 01/17/23  5:06 AM   Result Value Ref Range    Phosphorus 2.9 2.5 - 4.9 MG/DL   CBC with Auto Differential    Collection Time: 01/17/23  5:06 AM   Result Value Ref Range    WBC 6.6 4.0 - 10.5 K/CU MM    RBC 3.73 (L) 4.6 - 6.2 M/CU MM    Hemoglobin 11.9 (L) 13.5 - 18.0 GM/DL    Hematocrit 37.2 (L) 42 - 52 %    MCV 99.7 78 - 100 FL    MCH 31.9 (H) 27 - 31 PG    MCHC 32.0 32.0 - 36.0 %    RDW 13.5 11.7 - 14.9 %    Platelets 089 617 - 487 K/CU MM    MPV 10.6 7.5 - 11.1 FL    Differential Type AUTOMATED DIFFERENTIAL     Segs Relative 76.9 (H) 36 - 66 %    Lymphocytes % 11.6 (L) 24 - 44 %    Monocytes % 9.1 (H) 0 - 4 %    Eosinophils % 1.4 0 - 3 %    Basophils % 0.5 0 - 1 %    Segs Absolute 5.1 K/CU MM    Lymphocytes Absolute 0.8 K/CU MM    Monocytes Absolute 0.6 K/CU MM    Eosinophils Absolute 0.1 K/CU MM    Basophils Absolute 0.0 K/CU MM    Nucleated RBC % 0.0 %    Total Nucleated RBC 0.0 K/CU MM    Total Immature Neutrophil 0.03 K/CU MM    Immature Neutrophil % 0.5 (H) 0 - 0.43 %        Imaging/Diagnostics Last 24 Hours   XR PELVIS (1-2 VIEWS)    Result Date: 1/16/2023  EXAMINATION: ONE XRAY VIEW OF THE PELVIS 1/16/2023 2:09 am COMPARISON: 08/27/2019 HISTORY: ORDERING SYSTEM PROVIDED HISTORY: trauma TECHNOLOGIST PROVIDED HISTORY: Reason for exam:->trauma Reason for Exam: fall, trauma Additional signs and symptoms: na Relevant Medical/Surgical History: arthritis FINDINGS: No traumatic pelvic fracture is identified. Degenerative changes seen in the hip joints, spine and pubic symphysis. Surgical clips are seen in the right proximal lower extremity. Atherosclerotic calcifications are identified. Aorto bi-iliac endograft noted. Embolization coils seen as well. No acute traumatic injury is identified. CT HEAD WO CONTRAST    Result Date: 1/16/2023  EXAMINATION: CT OF THE HEAD WITHOUT CONTRAST  1/16/2023 2:08 am TECHNIQUE: CT of the head was performed without the administration of intravenous contrast. Automated exposure control, iterative reconstruction, and/or weight based adjustment of the mA/kV was utilized to reduce the radiation dose to as low as reasonably achievable. COMPARISON: None. HISTORY: ORDERING SYSTEM PROVIDED HISTORY: HEAD TRAUMA, CLOSED, MILD, GCS >= 13, NO RISK FACTORS, NEURO EXAM NORMAL TECHNOLOGIST PROVIDED HISTORY: Has a \"code stroke\" or \"stroke alert\" been called? ->No Reason for exam:->fall Decision Support Exception - unselect if not a suspected or confirmed emergency medical condition->Emergency Medical Condition (MA) Reason for Exam: fall, Head trauma, closed, mild, GCS >= 13, no risk factors, neuro exam normal FINDINGS: BRAIN/VENTRICLES: There is no acute intracranial hemorrhage, mass effect or midline shift. No abnormal extra-axial fluid collection. The gray-white differentiation is maintained without evidence of an acute infarct. There is no evidence of hydrocephalus. Diffuse parenchymal volume loss with moderate chronic white matter microvascular ischemic changes. ORBITS: The visualized portion of the orbits demonstrate no acute abnormality.  SINUSES: The visualized paranasal sinuses and mastoid air cells demonstrate no acute abnormality. SOFT TISSUES/SKULL:  No acute abnormality of the visualized skull or soft tissues. 1. No acute intracranial abnormality. 2. Diffuse parenchymal volume loss with moderate chronic white matter microischemic changes. CT CERVICAL SPINE WO CONTRAST    Result Date: 1/16/2023  EXAMINATION: CT OF THE CERVICAL SPINE WITHOUT CONTRAST 1/16/2023 2:09 am TECHNIQUE: CT of the cervical spine was performed without the administration of intravenous contrast. Multiplanar reformatted images are provided for review. Automated exposure control, iterative reconstruction, and/or weight based adjustment of the mA/kV was utilized to reduce the radiation dose to as low as reasonably achievable. COMPARISON: None. HISTORY: ORDERING SYSTEM PROVIDED HISTORY: fall TECHNOLOGIST PROVIDED HISTORY: Reason for exam:->fall Decision Support Exception - unselect if not a suspected or confirmed emergency medical condition->Emergency Medical Condition (MA) Reason for Exam: fall FINDINGS: BONES/ALIGNMENT: There is no acute fracture or suspect osseous lesion. Vertebral body heights are maintained. There is 2 mm degenerative anterolisthesis of C7 on T1 secondary to disc height loss and facet arthropathy. No traumatic malalignment. DEGENERATIVE CHANGES: Mild-to-moderate multilevel degenerative disease and facet arthropathy. No high-grade bony spinal canal stenosis. SOFT TISSUES: No acute paraspinal soft tissue abnormality. No acute osseous abnormality of the cervical spine. CT THORACIC SPINE WO CONTRAST    Result Date: 1/16/2023  EXAMINATION: CT OF THE THORACIC SPINE WITHOUT CONTRAST  1/16/2023 2:09 am: TECHNIQUE: CT of the thoracic spine was performed without the administration of intravenous contrast. Multiplanar reformatted images are provided for review.  Automated exposure control, iterative reconstruction, and/or weight based adjustment of the mA/kV was utilized to reduce the radiation dose to as low as reasonably achievable. COMPARISON: None. HISTORY: ORDERING SYSTEM PROVIDED HISTORY: fall TECHNOLOGIST PROVIDED HISTORY: CT  T-Spine w/o Contrast Reason for exam:->fall Reason for Exam: fall FINDINGS: BONES/ALIGNMENT: There is no acute fracture or suspect osseous lesion. Vertebral body heights are maintained. There is mild lower thoracic levoscoliosis without spondylolisthesis. DEGENERATIVE CHANGES: Mild-to-moderate diffuse disc height loss and desiccation. No high-grade bony spinal canal stenosis. Multilevel facet hypertrophy in the lower thoracic spine. SOFT TISSUES: No acute paraspinal soft tissue abnormality. 1. No acute fracture or traumatic malalignment of the thoracic spine. 2. Mild-to-moderate degenerative changes. CT LUMBAR SPINE WO CONTRAST    Result Date: 1/16/2023  EXAMINATION: CT OF THE LUMBAR SPINE WITHOUT CONTRAST  1/16/2023 TECHNIQUE: CT of the lumbar spine was performed without the administration of intravenous contrast. Multiplanar reformatted images are provided for review. Adjustment of mA and/or kV according to patient size was utilized. Automated exposure control, iterative reconstruction, and/or weight based adjustment of the mA/kV was utilized to reduce the radiation dose to as low as reasonably achievable. COMPARISON: None HISTORY: ORDERING SYSTEM PROVIDED HISTORY: fall TECHNOLOGIST PROVIDED HISTORY: Reason for exam:->fall Decision Support Exception - unselect if not a suspected or confirmed emergency medical condition->Emergency Medical Condition (MA) Reason for Exam: fall FINDINGS: BONES/ALIGNMENT: There is no acute fracture or suspect osseous lesion. Vertebral body heights are maintained. There is grade 1 anterolisthesis of L5 on S1 secondary to disc height loss and facet arthropathy.   Moderate dextroscoliosis is present centered at L1 with mild reciprocal levoscoliosis centered at L4. DEGENERATIVE CHANGES: Moderate multilevel disc disease and moderate to severe facet hypertrophy. SOFT TISSUES/RETROPERITONEUM: No acute paraspinal soft tissue abnormality. Prior aorto bi-iliac endograft. 1. No acute fracture or traumatic malalignment of the lumbar spine. 2. Moderate to severe degenerative changes with degenerative grade 1 anterolisthesis of L5 on S1. 3. Moderate upper lumbar dextroscoliosis with reciprocal lower lumbar levoscoliosis. XR CHEST PORTABLE    Result Date: 1/16/2023  EXAMINATION: ONE XRAY VIEW OF THE CHEST 1/16/2023 2:14 am COMPARISON: 10/28/2022 HISTORY: ORDERING SYSTEM PROVIDED HISTORY: CP/FALL TECHNOLOGIST PROVIDED HISTORY: Reason for exam:->CP/FALL Reason for Exam: chest pain, fall Additional signs and symptoms: na Relevant Medical/Surgical History: asthma, PAF, bradycardia FINDINGS: Aortic atherosclerosis. Implanted cardiac device is identified. No significant cardiomegaly. No focal consolidation or pleural effusion. No pneumothorax. Degenerative changes are seen in the shoulders and spine. No acute osseous abnormality is identified. High-riding humeral head on the right may be related to a chronic rotator cuff tear.      1. No acute abnormality seen in the chest.       Electronically signed by Ozzy Kenyon MD on 1/17/2023 at 8:41 AM

## 2023-01-17 NOTE — PROGRESS NOTES
Remains confused to place, situation. Insists that he is at his own home. CHICHO (safety video) in place, effective for reminders to pt enrrique he attempts to get out of bed. Cooperative with care. Bed alarm on.

## 2023-01-17 NOTE — PROGRESS NOTES
Comprehensive Nutrition Assessment    Type and Reason for Visit:  Initial (low BMI)    Nutrition Recommendations/Plan:   Add easy to chew diet modifier  Correct depleted Magnesium levels  Add frozen oral nutrition supplements  Assist pt with all meals   Please document all po intake     Malnutrition Assessment:  Malnutrition Status: At risk for malnutrition (Comment) (01/17/23 1504)    Context:  Acute Illness       Nutrition Assessment:    pt admitted for failure to thrive in adult, troponin level elevated, fall, chest pain, PMH: PAF, AAA, pt currently on regular diet, 1 meal of % documented this morning, visited pt at bedside, pt with lunch tray at bedside at time of visit, pt reporting difficulty with dentures causing his gums to hurt, denies any swallowing difficulty, pt unsure of UBW, reports fair appetite, NFPE completed, offered standard oral nutrition supplements however pt not does not care for Ensure, pt is agreeable to trial frozen oral nutrition supplements, pt may also benefit from softer foods d/t issues with dentures, weight loss noted, will continue to follow at high nutrition risk    Nutrition Related Findings:    Mag 1.7 Wound Type: None       Current Nutrition Intake & Therapies:    Average Meal Intake: %  Average Supplements Intake: None Ordered  ADULT DIET; Regular    Anthropometric Measures:  Height: 5' 7.99\" (172.7 cm)  Ideal Body Weight (IBW): 154 lbs (70 kg)       Current Body Weight: 143 lb 11.8 oz (65.2 kg), 93.3 % IBW.  Weight Source: Bed Scale  Current BMI (kg/m2): 21.9  Usual Body Weight: 153 lb (69.4 kg) ((11/15/22) office visit)  % Weight Change (Calculated): -6.1  Weight Adjustment For: No Adjustment                 BMI Categories: Underweight (BMI less than 22) age over 72    Estimated Daily Nutrient Needs:  Energy Requirements Based On: Kcal/kg  Weight Used for Energy Requirements: Current  Energy (kcal/day): 5270-6335 (28-32 kcal/kg)  Weight Used for Protein Requirements: Current  Protein (g/day): 78-91 (1.2-1.4 g/kg)  Method Used for Fluid Requirements: 1 ml/kcal  Fluid (ml/day):      Nutrition Diagnosis:   Inadequate oral intake related to acute injury/trauma as evidenced by weight loss    Nutrition Interventions:   Food and/or Nutrient Delivery: Start Oral Nutrition Supplement, Modify Current Diet  Nutrition Education/Counseling: No recommendation at this time  Coordination of Nutrition Care: Continue to monitor while inpatient       Goals:     Goals: within 2 days, Meet at least 75% of estimated needs       Nutrition Monitoring and Evaluation:   Behavioral-Environmental Outcomes: None Identified  Food/Nutrient Intake Outcomes: Food and Nutrient Intake, Supplement Intake  Physical Signs/Symptoms Outcomes: Biochemical Data, GI Status, Hemodynamic Status, Weight, Chewing or Swallowing, Meal Time Behavior, Skin, Nutrition Focused Physical Findings    Discharge Planning:     Too soon to determine     Tomer Iniguez [address], [address] [place] [address],   Contact: [phone]

## 2023-01-17 NOTE — CARE COORDINATION
CM to pt's room. Family requested to see CM. Pt's daughter(Socorro), lives with the pt & cares for him, is concerned because she cannot get her dad to bathe. Jose Mullins stated that he will go for 2 weeks or longer without bathing. She reported that he is not compliant with his medication. Pt has dementia. She stated that at times pt can be hateful & curses at [facility]. She reported that this is something he never had done in the past. Pt is oriented to self & family only. Dionisiorobbie Mullins given resources that may help such as [facility] and [employer] on [address] ROMERO sent PS to Dr Joan Yip to ask her if pt would benefit from a psych consult. She agreed.

## 2023-01-17 NOTE — PROGRESS NOTES
CARDIOLOGY PROGRESS NOTE                                                  Name:  Tone Jett /Age/Sex: 3/23/1930  (80 y.o. male)   MRN & CSN:  [id number] & [id number] Admission Date/Time: 2023  1:34 AM   Location:  -A PCP: LIA Villareal CNP         Admit Date:  2023  Hospital Day: 2      SUBJECTIVE:   Seen patient as follow up as consultation for     No chest pain. No shortness of breath  No palpations    TELEMETRY: SR       Intake/Output Summary (Last 24 hours) at 2023 1200  Last data filed at 2023 1027  Gross per 24 hour   Intake 410 ml   Output 300 ml   Net 110 ml       Assessment/Plan:          S/p fall /patient does not have full recollection of the incident  Atypical chest pain musculoskeletal in origin and reproducible to touch. Minimally elevated troponin, 0.02, 0.02.  a non-ACS trend. In absence of any chest pain EKG changes, reproducible chest pain  No further ischemic cardiac work-up planned at this time. EKG shows BiV paced rhythm     Device was interrogated today does not exhibit any ventricular arrhythmias. Fall likely mechanical in nature     Echocardiogram as below. History of paroxysmal atrial fibrillation recommend to resume oral anticoagulation however patient will need PT OT and fall assessment prior to discharge. CKD with baseline creatinine of 1.4  Essential hypertension: Blood pressure stable. Continue with lisinopril  Hyperlipidemia: Continue with statin therapy  History of dementia on donezepil       Cardiology will sign off please call us with any further questions    ECHO        Ejection fraction is visually estimated at 60-65%. Sclerotic, but non-stenotic aortic valve. Mild to Mod aortic regurgitation is noted. Trace tricuspid regurgitation; RVSP: 22 mmHg. No evidence of any pericardial effusion. Dilated ascending aorta measuring 4.2cm.        Past medical history:    has a past medical history of AAA (abdominal aortic aneurysm), Arthritis, Asthma, AV block, 1st degree, Back pain, Borderline hypothyroidism, Bradycardia, Colon polyps, Colostomy in place ([facility] [address] Glaucoma, H/O cardiovascular stress test, History of blood transfusion, History of cardiovascular stress test, History of chest x-ray, History of Doppler ultrasound, History of echocardiogram, Holter monitor, abnormal, Hx of blood clots, Pacemaker, PAF (paroxysmal atrial fibrillation) ([facility] [address] Peritonitis ([facility] [address] Personal history of colonic polyps, Poor historian, Skin cancer, Ulcerative (chronic) proctosigmoiditis ([facility] [address] and Wears glasses. Past surgical history:   has a past surgical history that includes hernia repair (Bilateral, [facility]); Hemorrhoid surgery (2011); Colonoscopy (2/4/2013); knee surgery (Right, 1980s); Abdominal exploration surgery (2/4/2013); Appendectomy (2/4/2013); pacemaker placement (Right, 02/25/2013); Appendectomy (2/4/2013); colostomy (2/4/2013); Cystocopy (2/03/2014); AAA repair, endovascular (7/1/14); Pacemaker insertion (Right, 12/13/2017); and Small intestine surgery (N/A, 8/28/2019). Social History:   reports that he has never smoked. He has never used smokeless tobacco. He reports that he does not drink alcohol and does not use drugs. Family history:  family history includes Cancer in his brother and daughter; Heart Disease in his mother; No Known Problems in his father; Pacemaker in his brother; Prostate Cancer in his brother; Stroke in his mother; Substance Abuse in his son. OBJECTIVE:     BP (!) 92/56   Pulse 80   Temp 98.2 °F (36.8 °C) (Oral)   Resp 20   Ht 5' 8\" (1.727 m)   Wt 143 lb 11.8 oz (65.2 kg)   SpO2 95%   BMI 21.86 kg/m²     Intake/Output Summary (Last 24 hours) at 1/17/2023 1200  Last data filed at 1/17/2023 1027  Gross per 24 hour   Intake 410 ml   Output 300 ml   Net 110 ml       Physical Exam:    Constitutional:  Well developed, Well nourished, No acute distress, Non-toxic appearance.    HENT: Normocephalic, Atraumatic, Bilateral external ears normal, Oropharynx moist, No oral exudates, Nose normal. Neck- Normal range of motion, No tenderness, Supple, No stridor. Eyes:  EOMI, Conjunctiva normal, No discharge. Respiratory:  Normal breath sounds, No respiratory distress, No wheezing, No chest tenderness. ,no use of accessory muscles, diaphragm movement is normal  Cardiovascular S1-S2 No  Murmurs, added sounds. Normal rate rhythm. No rubs gallops. Carotid pulses and amplitude are normal no bruit noted. Pedal pulses normal femoral pulses normal.  No pedal edema  GI:  Bowel sounds normal, Soft, No tenderness  : No CVA tenderness. Musculoskeletal: No edema, No tenderness, No cyanosis, No clubbing. Back- No tenderness. Integument:  Warm, Dry, No erythema, No rash. Lymphatic:  No lymphadenopathy noted. Neurologic:  Alert & oriented x 3, No focal deficits noted.    Psychiatric:  Affect normal, Judgment normal, Mood normal.           MEDICATIONS:     lidocaine  2 patch TransDERmal Daily    vitamin C  1,000 mg Oral Daily    atorvastatin  20 mg Oral Nightly    buPROPion  150 mg Oral QAM    donepezil  10 mg Oral Nightly    apixaban  2.5 mg Oral BID    finasteride  5 mg Oral Daily    levothyroxine  100 mcg Oral Daily    lisinopril  10 mg Oral Daily    metoprolol succinate  25 mg Oral Daily    sertraline  100 mg Oral Daily    tamsulosin  0.4 mg Oral Daily    sodium chloride flush  5-40 mL IntraVENous 2 times per day      sodium chloride       sodium chloride flush, sodium chloride, ondansetron **OR** ondansetron, polyethylene glycol, acetaminophen **OR** acetaminophen  Allergies   Allergen Reactions    Codeine Anaphylaxis    Fentanyl Other (See Comments)     Hypotension        Lab Data:  CBC:   Recent Labs     01/16/23  0155 01/16/23  0700 01/17/23  0506   WBC 6.4 8.1 6.6   HGB 12.4* 12.2* 11.9*   HCT 38.4* 39.1* 37.2*   MCV 99.7 102.6* 99.7    210 183     BMP:   Recent Labs     01/16/23  0155 01/16/23  0700 01/17/23  0506    141 137   K 4.2 3.9 3.8    104 102   CO2 25 24 24   PHOS  --  2.7 2.9   BUN 29* 27* 29*   CREATININE 1.5* 1.3 1.4*     LIVER PROFILE:   Recent Labs     01/16/23  0155   AST 17   ALT 11   LIPASE 159*   BILITOT 0.5   ALKPHOS 114     PT/INR:   Recent Labs     01/16/23  0155   PROTIME 16.0*   INR 1.24     APTT:   Recent Labs     01/16/23 0155   APTT 35.2        Lisa Edwards MD, MD 1/17/2023 12:00 PM

## 2023-01-18 LAB
ANION GAP SERPL CALCULATED.3IONS-SCNC: 12 MMOL/L (ref 4–16)
BASOPHILS ABSOLUTE: 0 K/CU MM
BASOPHILS RELATIVE PERCENT: 0.4 % (ref 0–1)
BUN BLDV-MCNC: 31 MG/DL (ref 6–23)
CALCIUM SERPL-MCNC: 8.6 MG/DL (ref 8.3–10.6)
CHLORIDE BLD-SCNC: 103 MMOL/L (ref 99–110)
CO2: 24 MMOL/L (ref 21–32)
CREAT SERPL-MCNC: 1.5 MG/DL (ref 0.9–1.3)
DIFFERENTIAL TYPE: ABNORMAL
EOSINOPHILS ABSOLUTE: 0.2 K/CU MM
EOSINOPHILS RELATIVE PERCENT: 2.1 % (ref 0–3)
GFR SERPL CREATININE-BSD FRML MDRD: 43 ML/MIN/1.73M2
GLUCOSE BLD-MCNC: 92 MG/DL (ref 70–99)
HCT VFR BLD CALC: 36.4 % (ref 42–52)
HEMOGLOBIN: 11.8 GM/DL (ref 13.5–18)
IMMATURE NEUTROPHIL %: 0.3 % (ref 0–0.43)
LYMPHOCYTES ABSOLUTE: 1 K/CU MM
LYMPHOCYTES RELATIVE PERCENT: 14.4 % (ref 24–44)
MAGNESIUM: 1.8 MG/DL (ref 1.8–2.4)
MCH RBC QN AUTO: 32.2 PG (ref 27–31)
MCHC RBC AUTO-ENTMCNC: 32.4 % (ref 32–36)
MCV RBC AUTO: 99.5 FL (ref 78–100)
MONOCYTES ABSOLUTE: 0.6 K/CU MM
MONOCYTES RELATIVE PERCENT: 8.3 % (ref 0–4)
NUCLEATED RBC %: 0 %
PDW BLD-RTO: 13.6 % (ref 11.7–14.9)
PHOSPHORUS: 3.2 MG/DL (ref 2.5–4.9)
PLATELET # BLD: 194 K/CU MM (ref 140–440)
PMV BLD AUTO: 10.2 FL (ref 7.5–11.1)
POTASSIUM SERPL-SCNC: 3.7 MMOL/L (ref 3.5–5.1)
RBC # BLD: 3.66 M/CU MM (ref 4.6–6.2)
SEGMENTED NEUTROPHILS ABSOLUTE COUNT: 5.2 K/CU MM
SEGMENTED NEUTROPHILS RELATIVE PERCENT: 74.5 % (ref 36–66)
SODIUM BLD-SCNC: 139 MMOL/L (ref 135–145)
TOTAL IMMATURE NEUTOROPHIL: 0.02 K/CU MM
TOTAL NUCLEATED RBC: 0 K/CU MM
WBC # BLD: 7 K/CU MM (ref 4–10.5)

## 2023-01-18 PROCEDURE — 83735 ASSAY OF MAGNESIUM: CPT

## 2023-01-18 PROCEDURE — 6370000000 HC RX 637 (ALT 250 FOR IP): Performed by: INTERNAL MEDICINE

## 2023-01-18 PROCEDURE — 80048 BASIC METABOLIC PNL TOTAL CA: CPT

## 2023-01-18 PROCEDURE — 94761 N-INVAS EAR/PLS OXIMETRY MLT: CPT

## 2023-01-18 PROCEDURE — 84100 ASSAY OF PHOSPHORUS: CPT

## 2023-01-18 PROCEDURE — 2580000003 HC RX 258: Performed by: INTERNAL MEDICINE

## 2023-01-18 PROCEDURE — 85025 COMPLETE CBC W/AUTO DIFF WBC: CPT

## 2023-01-18 PROCEDURE — 2060000000 HC ICU INTERMEDIATE R&B

## 2023-01-18 RX ADMIN — DONEPEZIL HYDROCHLORIDE 10 MG: 10 TABLET ORAL at 20:41

## 2023-01-18 RX ADMIN — APIXABAN 2.5 MG: 2.5 TABLET, FILM COATED ORAL at 08:38

## 2023-01-18 RX ADMIN — TAMSULOSIN HYDROCHLORIDE 0.4 MG: 0.4 CAPSULE ORAL at 08:38

## 2023-01-18 RX ADMIN — OXYCODONE HYDROCHLORIDE AND ACETAMINOPHEN 1000 MG: 500 TABLET ORAL at 08:38

## 2023-01-18 RX ADMIN — ACETAMINOPHEN 650 MG: 325 TABLET ORAL at 20:41

## 2023-01-18 RX ADMIN — ACETAMINOPHEN 650 MG: 325 TABLET ORAL at 11:55

## 2023-01-18 RX ADMIN — BUPROPION HYDROCHLORIDE 150 MG: 150 TABLET, FILM COATED, EXTENDED RELEASE ORAL at 08:38

## 2023-01-18 RX ADMIN — Medication 10 ML: at 20:41

## 2023-01-18 RX ADMIN — ATORVASTATIN CALCIUM 20 MG: 10 TABLET, FILM COATED ORAL at 20:41

## 2023-01-18 RX ADMIN — FINASTERIDE 5 MG: 5 TABLET, FILM COATED ORAL at 08:38

## 2023-01-18 RX ADMIN — Medication 10 ML: at 08:38

## 2023-01-18 RX ADMIN — LEVOTHYROXINE SODIUM 100 MCG: 100 TABLET ORAL at 05:05

## 2023-01-18 RX ADMIN — SERTRALINE HYDROCHLORIDE 100 MG: 50 TABLET ORAL at 08:38

## 2023-01-18 RX ADMIN — LISINOPRIL 10 MG: 5 TABLET ORAL at 08:38

## 2023-01-18 RX ADMIN — METOPROLOL SUCCINATE 25 MG: 25 TABLET, EXTENDED RELEASE ORAL at 08:38

## 2023-01-18 RX ADMIN — APIXABAN 2.5 MG: 2.5 TABLET, FILM COATED ORAL at 20:41

## 2023-01-18 ASSESSMENT — PAIN DESCRIPTION - LOCATION
LOCATION: GENERALIZED
LOCATION: BACK;CHEST;SHOULDER

## 2023-01-18 ASSESSMENT — PAIN SCALES - GENERAL
PAINLEVEL_OUTOF10: 3
PAINLEVEL_OUTOF10: 2

## 2023-01-18 ASSESSMENT — PAIN SCALES - WONG BAKER: WONGBAKER_NUMERICALRESPONSE: 0

## 2023-01-18 ASSESSMENT — PAIN - FUNCTIONAL ASSESSMENT: PAIN_FUNCTIONAL_ASSESSMENT: ACTIVITIES ARE NOT PREVENTED

## 2023-01-18 ASSESSMENT — PAIN DESCRIPTION - DESCRIPTORS
DESCRIPTORS: ACHING
DESCRIPTORS: DISCOMFORT

## 2023-01-18 ASSESSMENT — PAIN DESCRIPTION - ORIENTATION: ORIENTATION: LEFT

## 2023-01-18 NOTE — PROGRESS NOTES
Called to patients room by CHICHO, states patient has pulled off his colostomy bag. Upon entering room, patient is in bed and has pulled loose his colostomy bag. There is stool on him and in the bed. Pt is then bathed and a new bag is placed. Pt is assisted to chair at bedside with chair alarm. At this time this RN feels the need for a safety sitter and advises the charge RN of same d/t this is the 2nd time he has removed the colostomy bag in the last 24hrs. VS and assessment completed at this time and patient is provided with his breakfast.  Call light within reach.

## 2023-01-18 NOTE — PROGRESS NOTES
Physician Progress Note      Juan Valente  CSN #:                  [id number]  :                       3/23/1930  ADMIT DATE:       2023 1:34 AM  DISCH DATE:  RESPONDING  PROVIDER #:        Pao Barnhart MD          QUERY TEXT:    Patient admitted with chest pain. If possible, please document in the   progress notes and discharge summary if you are evaluating and/or treating any   of the following: The medical record reflects the following:  Risk Factors: HTN HLD  Clinical Indicators: Patient initially had chest pain after fall but this   resolved after arrival.  Troponin elevated to 0.022 but downtrended. EKG with   BiV paced rhythm. Cardiology advised no further ischemic work-up. Consult   note, dated 23, by cardiologist, Dr. Светлана Jaramillo, \"Atypical chest pain   musculoskeletal in origin and reproducible to touch. \"  Treatment: card constult, trop, imaging, ECHO, EKG    Thank you,  Carson Fernando, RN  [phone]  Options provided:  -- NSTEMI  -- Type 2 MI  -- Demand Ischemia with MI  -- Demand Ischemia only, no MI  -- Unstable Angina  -- NSTEMI ruled out  -- Other - I will add my own diagnosis  -- Disagree - Not applicable / Not valid  -- Disagree - Clinically unable to determine / Unknown  -- Refer to Clinical Documentation Reviewer    PROVIDER RESPONSE TEXT:    NSTEMI type II    Query created by: Cindi Pro on 2023 10:28 AM      QUERY TEXT:    Pt admitted with fall and noted adult failure to thrive. If possible, please   document in the progress notes and discharge summary if you are evaluating and   / or treating any of the following: The medical record reflects the following:  Risk Factors: advanced age, dementia  Clinical Indicators: Per daughter, pt declining quickly. She is living with pt   and preparing all meals and assisting with ADL's. Pt is alert to self, has   dementia. Pt found on floor by family at home while trying to get up.   Treatment: care coordination with CM, labs, cardiology, imaging    Thank you,  Genoveva Ireland, RN  [phone]  Options provided:  -- Age Related Physical Debility  -- Other - I will add my own diagnosis  -- Disagree - Not applicable / Not valid  -- Disagree - Clinically unable to determine / Unknown  -- Refer to Clinical Documentation Reviewer    PROVIDER RESPONSE TEXT:    Provider is clinically unable to determine a response to this query.     Query created by: Zahra Mcmanus on 1/17/2023 10:59 AM      Electronically signed by:  Robert Morejon MD 1/17/2023 7:52 PM

## 2023-01-19 ENCOUNTER — PROCEDURE VISIT (OUTPATIENT)
Dept: CARDIOLOGY CLINIC | Age: 88
End: 2023-01-19

## 2023-01-19 DIAGNOSIS — Z95.0 BIVENTRICULAR CARDIAC PACEMAKER IN SITU: ICD-10-CM

## 2023-01-19 DIAGNOSIS — I49.5 SICK SINUS SYNDROME (HCC): ICD-10-CM

## 2023-01-19 PROCEDURE — 2580000003 HC RX 258: Performed by: INTERNAL MEDICINE

## 2023-01-19 PROCEDURE — 6370000000 HC RX 637 (ALT 250 FOR IP): Performed by: INTERNAL MEDICINE

## 2023-01-19 PROCEDURE — 97530 THERAPEUTIC ACTIVITIES: CPT

## 2023-01-19 PROCEDURE — 82746 ASSAY OF FOLIC ACID SERUM: CPT

## 2023-01-19 PROCEDURE — 94761 N-INVAS EAR/PLS OXIMETRY MLT: CPT

## 2023-01-19 PROCEDURE — 97116 GAIT TRAINING THERAPY: CPT

## 2023-01-19 PROCEDURE — 97535 SELF CARE MNGMENT TRAINING: CPT

## 2023-01-19 PROCEDURE — 6370000000 HC RX 637 (ALT 250 FOR IP): Performed by: NURSE PRACTITIONER

## 2023-01-19 PROCEDURE — 1200000000 HC SEMI PRIVATE

## 2023-01-19 PROCEDURE — 82306 VITAMIN D 25 HYDROXY: CPT

## 2023-01-19 PROCEDURE — 82140 ASSAY OF AMMONIA: CPT

## 2023-01-19 PROCEDURE — 82607 VITAMIN B-12: CPT

## 2023-01-19 RX ORDER — TRAZODONE HYDROCHLORIDE 50 MG/1
25 TABLET ORAL NIGHTLY PRN
Status: DISCONTINUED | OUTPATIENT
Start: 2023-01-19 | End: 2023-01-20

## 2023-01-19 RX ORDER — BUPROPION HYDROCHLORIDE 75 MG/1
75 TABLET ORAL DAILY
Status: DISCONTINUED | OUTPATIENT
Start: 2023-01-20 | End: 2023-01-23 | Stop reason: HOSPADM

## 2023-01-19 RX ORDER — DIVALPROEX SODIUM 125 MG/1
125 CAPSULE, COATED PELLETS ORAL
Status: DISCONTINUED | OUTPATIENT
Start: 2023-01-19 | End: 2023-01-20

## 2023-01-19 RX ORDER — DIVALPROEX SODIUM 250 MG/1
250 TABLET, EXTENDED RELEASE ORAL NIGHTLY
Status: DISCONTINUED | OUTPATIENT
Start: 2023-01-19 | End: 2023-01-23 | Stop reason: HOSPADM

## 2023-01-19 RX ADMIN — Medication 10 ML: at 20:26

## 2023-01-19 RX ADMIN — APIXABAN 2.5 MG: 2.5 TABLET, FILM COATED ORAL at 20:26

## 2023-01-19 RX ADMIN — ATORVASTATIN CALCIUM 20 MG: 10 TABLET, FILM COATED ORAL at 20:26

## 2023-01-19 RX ADMIN — OXYCODONE HYDROCHLORIDE AND ACETAMINOPHEN 1000 MG: 500 TABLET ORAL at 09:20

## 2023-01-19 RX ADMIN — FINASTERIDE 5 MG: 5 TABLET, FILM COATED ORAL at 09:20

## 2023-01-19 RX ADMIN — METOPROLOL SUCCINATE 25 MG: 25 TABLET, EXTENDED RELEASE ORAL at 09:20

## 2023-01-19 RX ADMIN — DONEPEZIL HYDROCHLORIDE 10 MG: 10 TABLET ORAL at 20:26

## 2023-01-19 RX ADMIN — LEVOTHYROXINE SODIUM 100 MCG: 100 TABLET ORAL at 05:11

## 2023-01-19 RX ADMIN — LISINOPRIL 10 MG: 5 TABLET ORAL at 09:20

## 2023-01-19 RX ADMIN — APIXABAN 2.5 MG: 2.5 TABLET, FILM COATED ORAL at 09:21

## 2023-01-19 RX ADMIN — SERTRALINE HYDROCHLORIDE 100 MG: 50 TABLET ORAL at 09:21

## 2023-01-19 RX ADMIN — DIVALPROEX SODIUM 125 MG: 125 CAPSULE, COATED PELLETS ORAL at 17:30

## 2023-01-19 RX ADMIN — Medication 10 ML: at 09:21

## 2023-01-19 RX ADMIN — DIVALPROEX SODIUM 250 MG: 250 TABLET, EXTENDED RELEASE ORAL at 21:49

## 2023-01-19 RX ADMIN — BUPROPION HYDROCHLORIDE 150 MG: 150 TABLET, FILM COATED, EXTENDED RELEASE ORAL at 09:20

## 2023-01-19 RX ADMIN — TAMSULOSIN HYDROCHLORIDE 0.4 MG: 0.4 CAPSULE ORAL at 09:21

## 2023-01-19 ASSESSMENT — PAIN SCALES - WONG BAKER
WONGBAKER_NUMERICALRESPONSE: 0

## 2023-01-19 ASSESSMENT — PAIN SCALES - GENERAL
PAINLEVEL_OUTOF10: 0

## 2023-01-19 NOTE — PROGRESS NOTES
Occupational Therapy Treatment Note  Name: Jj Espinosa MRN: [id number] :   3/23/1930   Date:  2023   Admission Date: 2023 Room:  -A     Primary Problem:  The primary encounter diagnosis was Chest pain, unspecified type. Diagnoses of Fall, initial encounter and Troponin level elevated were also pertinent to this visit. Past Medical History:   Diagnosis Date    AAA (abdominal aortic aneurysm)     Surgery scheduled for 2014 with Dr. Radha Martin. Arthritis     generalized    Asthma     AV block, 1st degree     Back pain     Borderline hypothyroidism 2020    Bradycardia     Colon polyps     Colostomy in place [facility])     Glaucoma     H/O cardiovascular stress test 2013    cardiolite-EF56%, apical hypokinesis is seen, cannot exlude apical Tyler ischemia    History of blood transfusion     History of cardiovascular stress test 3/5/10    No angina or ischemic EKG changes are noted with Lexiscan. The cardiolite study demonstrated normal perfusion in all segments of the myocardium with an intact left ventricular systolic function. The resting sestamibi dose is 10.8, stress is 32.5. EF 66%    History of chest x-ray 3/16/10    The chest is considered nonacute. There is cardiomegaly noted. COPD. History of Doppler ultrasound 3/25/10    venous doppler- Technically good venous ultrasound study negative for DVT in both lower extremities. Normal compressibility,color flow doppler pattern and spectral doppler pattern demonstrated thoughout. History of echocardiogram 3/18/10    Boarderline LV dilatation with concentric hypertrophy. Low normal systolic and abnormal diastolic function. Mild mitral and trace tricuspid regurgitation. Mild aortic root and bilateral dilatation. Holter monitor, abnormal 11/10/10    11/10/2010- 24 HR- Abnormal holter revealing some significant brasycardia's and wenckebach phenomenon. Therefore, clinical  correlation is recommend.     Hx of blood clots Pacemaker     PAF (paroxysmal atrial fibrillation) [facility]     Peritonitis ([facility]     Personal history of colonic polyps     Poor historian     Skin cancer     face    Ulcerative (chronic) proctosigmoiditis ([facility] [facility]     Wears glasses          Communication with other providers:  Partial CoTx with PTA Amanda Pate for pt safety and tolerance to manage balance with ADLs, RN handoff     Subjective:  Patient states: \"This is the best treatment I have had since I been here\"  Pain: declined   Restrictions: general, fall, tele,    Tech at bedside    Objective:    Observation:  pt was seated EOB upon arrival with popsicle and SetupA from tech to manage packages, agreeable to session    Treatment, including education:    Therapeutic Activity Training:   Therapeutic activity training was instructed today. Cues were given for safety, sequence, UE/LE placement, visual cues, and balance. Activities performed today included:    Seated balance:  Pt demo seated balance at EOB Apolinar. STS: Pt demo poor carryover of cues for hand placement. Pt completed CGA from EOB to FWW. Pt completed from armless chair Maria G x2 trials. Ambulation:  Pt ambulated ~10' + ~10' CGA with FWW. Pt benefited from verbal cues for pathway negotiation and safety awareness throughout. Standing balance:  Pt demo good standing balance this date with standing ADLs. Pt completed ADLs with light UE support and intermittently without UE support. Pt demo no LOB throughout. Pt demo 1 seated rest break throughout all ADLs. Self Care Training:   Self care training was performed today. Cues were given for safety, sequence, UE/LE placement, visual cues, and balance. Activities performed today included:    Feeding/eating:  Pt completed seated EOB with SetupA from tech. Facial hygiene:  Pt completed with SetupA standing at sink. Hair grooming:  Pt engaged in [employer] standing sinkside with Maria G. Pt brushed hair standing at sink with SetupA.      UB bathing:  Pt completed washup standing at sink with SetupA and cues provided throughout. Therapist managed back. LB bathing:  Pt completed gopi care in standing stance CGAx2 for safety and OT assisted with SetupA. Pt completed bathing BLE in seated with SetupA. UB dressing:   Pt doffed/ donned new gown with ModA while seated. LB dressing:  Pt doffed/donned new depends with ModA. PT managed pt balance while OT managed depends. Education: Role of OT, OT POC, safety, benefits of EOB/OOB activity, rationale for treatment, importance of frequent mobility, importance of hygiene, safety awareness   Safety Measures: Gait belt used for safety of pt and therapist, Left pt with PT as they went off to gait train, Alarm in place, call light and phone within reach, lines managed, needs met     Assessment / Impression:    Pt benefited from inc time overall to complete ADLs sinkside. Pt demo good standing balance this date with standing ADLs. Cont to address OOB ADLs per pt tolerance. Pt demo confusion throughout and thought he was staying in residential at this time, pt educated regarding orienting facts. Will cont to monitor carryover.   Patient's tolerance of treatment: Good   Adverse Reaction: none   Significant change in status and impact:  none   Barriers to improvement: strength, endurance, safety awareness     Plan for Next Session:    Continue OT POC    Time in:  1456  Time out:  1546  Timed treatment minutes:  48  Total treatment time:  50    Electronically signed by:    SHARRON Gamez  License: [id number]  5/01/8882, 3:38 PM

## 2023-01-19 NOTE — PROGRESS NOTES
Report called to [facility] on 4N. Pt informed of transfer to room 4127. Pt and all belongings taken by wheelchair to room . CHICHO transferred with pt. [id number] [facility] office notified of transfer to new room. Daughter called and updated on transfer to room . All questions answered. No further needs at this time.

## 2023-01-19 NOTE — PROGRESS NOTES
V2.0  [facility] Hospitalist Progress Note      Name:  Taiwo Hawkins /Age/Sex: 3/23/1930  (80 y.o. male)   MRN & CSN:  [id number] & [id number] Encounter Date/Time: 2023 8:41 AM EST    Location:  -A PCP: Francisca Tovar, APRN - [address] [facility] Day: 4    Assessment and Plan:   Taiwo Hawkins is a 80 y.o. male with pmh of HFrecoveredEF s/p BiV PPM, paroxysmal atrial fibrillation, hypertension, dementia, hyperlipidemia, anxiety/depression who presents with fall with subsequent chest and back pain and admitted for Failure to thrive in adult      Plan:    Unwitnessed fall  Chest pain -reproducible, likely MSK, do not suspect ACS  Back pain  Family found patient getting up from ground on 1/15 and since has been complaining of chest pain and back pain. Patient does not remember fall. CTH, CT cervical/thoracic/lumbar spine, pelvis x-ray, CXR all negative for acute fracture/deformity. -PT/OT, fall precautions    NSTEMI  Patient initially had chest pain after fall but this resolved after arrival.  Troponin elevated to 0.022 but downtrended. EKG with BiV paced rhythm.  -Cardiology advised no further ischemic work-up    Chronic systolic heart failure -recovered EF (50% in 2019)  S/p BiV PPM -device interrogation does not exhibit any ventricular arrhythmias  Hypertension  No sign of volume overload. -Continue GDMT as tolerated  -Continue home antihypertensives    Paroxysmal A. Fib  -Continue Eliquis 2.5 mg twice daily, Toprol XL 25 mg daily    CKD  Baseline creatinine 1.4.  -Monitor and avoid nephrotoxic medications    Dementia -with recent behavioral disturbances  Baseline orientation to self, family, city, month but not time. Per daughter who lives with and cares for pt, she can no longer get him to bathe and reports he is noncompliant with his medication. Has times where he is hateful towards her and curses.  Pt tends to .  -Continue donepezil  -CM provided daughter with resources such as [facility] [address] [place] [address] on [address]  -Psychiatry consulted, discussed case  -SLP    HLD -continue statin  Hypothyroidism -continue Synthroid  BPH -continue finasteride and tamsulosin  Anxiety/depression -continue sertraline and bupropion      Diet ADULT DIET; Easy to Chew  ADULT ORAL NUTRITION SUPPLEMENT; Lunch, Dinner; Frozen Oral Supplement   DVT Prophylaxis [] Lovenox, []  Heparin, [] SCDs, [] Ambulation,  [x] Eliquis, [] Xarelto  [] Coumadin   Code Status Full Code   Disposition From: Home  Expected Disposition: TBD  Estimated Date of Discharge:   Patient requires continued admission due to unwitnessed fall and PT/OT evaluation. Surrogate Decision Maker/ POA      Subjective:     Chief Complaint: Chest Pain and Fall       Pt alert and pleasant, interacting appropriately. Reports no pain at this time. Is a poor historian. Discussed with daughter at bedside. She reports that pt's mentation has been declining the past 4 months and she is burnt out at home. She wonders if she can get care at home rather than memory unit. Review of Systems:    Review of Systems    Difficult to obtain as pt has dementia. Otherwise, negative except as above. Objective: Intake/Output Summary (Last 24 hours) at 1/19/2023 1518  Last data filed at 1/19/2023 0900  Gross per 24 hour   Intake --   Output 950 ml   Net -950 ml          Vitals:   Vitals:    01/19/23 1132   BP:    Pulse:    Resp:    Temp:    SpO2: 96%       Physical Exam:     General: NAD  Eyes: EOMI  ENT: neck supple  Cardiovascular: Regular rate. Respiratory: Clear to auscultation  Gastrointestinal: Soft, non tender  Genitourinary: no suprapubic tenderness  Musculoskeletal: No edema  Skin: warm, dry  Neuro: Alert. Psych: Mood appropriate.      Medications:   Medications:    [START ON 1/20/2023] buPROPion  75 mg Oral Daily    divalproex  250 mg Oral Nightly    divalproex  125 mg Oral Dinner    lidocaine  2 patch TransDERmal Daily    vitamin C 1,000 mg Oral Daily    atorvastatin  20 mg Oral Nightly    donepezil  10 mg Oral Nightly    apixaban  2.5 mg Oral BID    finasteride  5 mg Oral Daily    levothyroxine  100 mcg Oral Daily    lisinopril  10 mg Oral Daily    metoprolol succinate  25 mg Oral Daily    sertraline  100 mg Oral Daily    tamsulosin  0.4 mg Oral Daily    sodium chloride flush  5-40 mL IntraVENous 2 times per day      Infusions:    sodium chloride       PRN Meds: traZODone, 25 mg, Nightly PRN  sodium chloride flush, 5-40 mL, PRN  sodium chloride, , PRN  ondansetron, 4 mg, Q8H PRN   Or  ondansetron, 4 mg, Q6H PRN  polyethylene glycol, 17 g, Daily PRN  acetaminophen, 650 mg, Q6H PRN   Or  acetaminophen, 650 mg, Q6H PRN      Labs      No results found for this or any previous visit (from the past 24 hour(s)). Imaging/Diagnostics Last 24 Hours   XR PELVIS (1-2 VIEWS)    Result Date: 1/16/2023  EXAMINATION: ONE XRAY VIEW OF THE PELVIS 1/16/2023 2:09 am COMPARISON: 08/27/2019 HISTORY: ORDERING SYSTEM PROVIDED HISTORY: trauma TECHNOLOGIST PROVIDED HISTORY: Reason for exam:->trauma Reason for Exam: fall, trauma Additional signs and symptoms: na Relevant Medical/Surgical History: arthritis FINDINGS: No traumatic pelvic fracture is identified. Degenerative changes seen in the hip joints, spine and pubic symphysis. Surgical clips are seen in the right proximal lower extremity. Atherosclerotic calcifications are identified. Aorto bi-iliac endograft noted. Embolization coils seen as well. No acute traumatic injury is identified. CT HEAD WO CONTRAST    Result Date: 1/16/2023  EXAMINATION: CT OF THE HEAD WITHOUT CONTRAST  1/16/2023 2:08 am TECHNIQUE: CT of the head was performed without the administration of intravenous contrast. Automated exposure control, iterative reconstruction, and/or weight based adjustment of the mA/kV was utilized to reduce the radiation dose to as low as reasonably achievable. COMPARISON: None.  HISTORY: ORDERING SYSTEM PROVIDED HISTORY: HEAD TRAUMA, CLOSED, MILD, GCS >= 13, NO RISK FACTORS, NEURO EXAM NORMAL TECHNOLOGIST PROVIDED HISTORY: Has a \"code stroke\" or \"stroke alert\" been called? ->No Reason for exam:->fall Decision Support Exception - unselect if not a suspected or confirmed emergency medical condition->Emergency Medical Condition (MA) Reason for Exam: fall, Head trauma, closed, mild, GCS >= 13, no risk factors, neuro exam normal FINDINGS: BRAIN/VENTRICLES: There is no acute intracranial hemorrhage, mass effect or midline shift. No abnormal extra-axial fluid collection. The gray-white differentiation is maintained without evidence of an acute infarct. There is no evidence of hydrocephalus. Diffuse parenchymal volume loss with moderate chronic white matter microvascular ischemic changes. ORBITS: The visualized portion of the orbits demonstrate no acute abnormality. SINUSES: The visualized paranasal sinuses and mastoid air cells demonstrate no acute abnormality. SOFT TISSUES/SKULL:  No acute abnormality of the visualized skull or soft tissues. 1. No acute intracranial abnormality. 2. Diffuse parenchymal volume loss with moderate chronic white matter microischemic changes. CT CERVICAL SPINE WO CONTRAST    Result Date: 1/16/2023  EXAMINATION: CT OF THE CERVICAL SPINE WITHOUT CONTRAST 1/16/2023 2:09 am TECHNIQUE: CT of the cervical spine was performed without the administration of intravenous contrast. Multiplanar reformatted images are provided for review. Automated exposure control, iterative reconstruction, and/or weight based adjustment of the mA/kV was utilized to reduce the radiation dose to as low as reasonably achievable. COMPARISON: None.  HISTORY: ORDERING SYSTEM PROVIDED HISTORY: fall TECHNOLOGIST PROVIDED HISTORY: Reason for exam:->fall Decision Support Exception - unselect if not a suspected or confirmed emergency medical condition->Emergency Medical Condition (MA) Reason for Exam: fall FINDINGS: BONES/ALIGNMENT: There is no acute fracture or suspect osseous lesion. Vertebral body heights are maintained. There is 2 mm degenerative anterolisthesis of C7 on T1 secondary to disc height loss and facet arthropathy. No traumatic malalignment. DEGENERATIVE CHANGES: Mild-to-moderate multilevel degenerative disease and facet arthropathy. No high-grade bony spinal canal stenosis. SOFT TISSUES: No acute paraspinal soft tissue abnormality. No acute osseous abnormality of the cervical spine. CT THORACIC SPINE WO CONTRAST    Result Date: 1/16/2023  EXAMINATION: CT OF THE THORACIC SPINE WITHOUT CONTRAST  1/16/2023 2:09 am: TECHNIQUE: CT of the thoracic spine was performed without the administration of intravenous contrast. Multiplanar reformatted images are provided for review. Automated exposure control, iterative reconstruction, and/or weight based adjustment of the mA/kV was utilized to reduce the radiation dose to as low as reasonably achievable. COMPARISON: None. HISTORY: ORDERING SYSTEM PROVIDED HISTORY: fall TECHNOLOGIST PROVIDED HISTORY: CT  T-Spine w/o Contrast Reason for exam:->fall Reason for Exam: fall FINDINGS: BONES/ALIGNMENT: There is no acute fracture or suspect osseous lesion. Vertebral body heights are maintained. There is mild lower thoracic levoscoliosis without spondylolisthesis. DEGENERATIVE CHANGES: Mild-to-moderate diffuse disc height loss and desiccation. No high-grade bony spinal canal stenosis. Multilevel facet hypertrophy in the lower thoracic spine. SOFT TISSUES: No acute paraspinal soft tissue abnormality. 1. No acute fracture or traumatic malalignment of the thoracic spine. 2. Mild-to-moderate degenerative changes.      CT LUMBAR SPINE WO CONTRAST    Result Date: 1/16/2023  EXAMINATION: CT OF THE LUMBAR SPINE WITHOUT CONTRAST  1/16/2023 TECHNIQUE: CT of the lumbar spine was performed without the administration of intravenous contrast. Multiplanar reformatted images are provided for review. Adjustment of mA and/or kV according to patient size was utilized. Automated exposure control, iterative reconstruction, and/or weight based adjustment of the mA/kV was utilized to reduce the radiation dose to as low as reasonably achievable. COMPARISON: None HISTORY: ORDERING SYSTEM PROVIDED HISTORY: fall TECHNOLOGIST PROVIDED HISTORY: Reason for exam:->fall Decision Support Exception - unselect if not a suspected or confirmed emergency medical condition->Emergency Medical Condition (MA) Reason for Exam: fall FINDINGS: BONES/ALIGNMENT: There is no acute fracture or suspect osseous lesion. Vertebral body heights are maintained. There is grade 1 anterolisthesis of L5 on S1 secondary to disc height loss and facet arthropathy. Moderate dextroscoliosis is present centered at L1 with mild reciprocal levoscoliosis centered at L4. DEGENERATIVE CHANGES: Moderate multilevel disc disease and moderate to severe facet hypertrophy. SOFT TISSUES/RETROPERITONEUM: No acute paraspinal soft tissue abnormality. Prior aorto bi-iliac endograft. 1. No acute fracture or traumatic malalignment of the lumbar spine. 2. Moderate to severe degenerative changes with degenerative grade 1 anterolisthesis of L5 on S1. 3. Moderate upper lumbar dextroscoliosis with reciprocal lower lumbar levoscoliosis. XR CHEST PORTABLE    Result Date: 1/16/2023  EXAMINATION: ONE XRAY VIEW OF THE CHEST 1/16/2023 2:14 am COMPARISON: 10/28/2022 HISTORY: ORDERING SYSTEM PROVIDED HISTORY: CP/FALL TECHNOLOGIST PROVIDED HISTORY: Reason for exam:->CP/FALL Reason for Exam: chest pain, fall Additional signs and symptoms: na Relevant Medical/Surgical History: asthma, PAF, bradycardia FINDINGS: Aortic atherosclerosis. Implanted cardiac device is identified. No significant cardiomegaly. No focal consolidation or pleural effusion. No pneumothorax. Degenerative changes are seen in the shoulders and spine.   No acute osseous abnormality is identified.  High-riding humeral head on the right may be related to a chronic rotator cuff tear.     1. No acute abnormality seen in the chest.       Electronically signed by Tiesha Krause MD on 1/19/2023 at 3:18 PM

## 2023-01-19 NOTE — PROGRESS NOTES
01/19/23 1125   Encounter Summary   Encounter Overview/Reason  Initial Encounter   Service Provided For: Patient and family together   Referral/Consult From: [address] Family members   Last Encounter  01/19/23  (Spiritual Care Visit with prayer. Also discussion on ACP, patient's daughter will bring in a copy of patient's DPOA-healthcare and LW for our records.)   Complexity of Encounter High   Begin Time 1030   End Time  1131   Total Time Calculated 61 min   Encounter    Type Initial Screen/Assessment   Spiritual/Emotional needs   Type Spiritual Support   Advance Care Planning   Type ACP conversation  (Dtr will bring in copy of patient's ACP documents. I will complete 3 sets of documents for family members tomorrow.)   Assessment/Intervention/Outcome   Assessment Calm;Coping; Hopeful  (Peaceful spirit; appreciated prayer)   Intervention Active listening;Discussed illness injury and its impact; Discussed relationship with God;Nurtured Hope;Prayer (assurance of)/[place];Read/Provided Scripture;Sustaining Presence/Ministry of presence   Outcome Acceptance;Comfort; Concerns relieved;Coping;Encouraged;Engaged in conversation;Expressed feelings, needs, and concerns;Expressed feelings of Melissa, Peace and/or Love;Expressed Gratitude   Plan and Referrals   Plan/Referrals Continue to visit, (comment)

## 2023-01-19 NOTE — PROGRESS NOTES
Physical Therapy    Physical Therapy Treatment Note  Name: Erwin Ventura MRN: [id number] :   3/23/1930   Date:  2023   Admission Date: 2023 Room:  -A   Restrictions/Precautions:          general precautions, falls,     Communication with other providers:  per chart review ok to tx. Co-tx with Hoa EVANS for safety for part of tx session  Subjective:  Patient states:  agreeable to tx  Pain:   Location, Type, Intensity (0/10 to 10/10):  pt did not c/o pain during tx   Objective:    Observation:  alert and oriented to self but confused to place. Pt sitting EOB eating a popsicle at start of tx session    Treatment, including education/measures:  Sit<=>stand from bed and chair and chair without arm rest cga/min and cues for hand placement but poor compliance and carry over. Pt working on ADLs in bathroom refer to OT note. This therapist working with pt on standing balance and endurance during ADL activity mostly when pt only having one UE on walker for support. Amb with rw 10' x 2, 200' x 1 with several standing rest breaks cga and cue for walker safety. Pt has slow but stedy gt but did state during long bout of amb he wears a knee brace on right leg and should have it on but that it is at home . Safety  Patient left safely in the chair, with call light/phone in reach with alarm applied. Gait belt was used for transfers and gait. Assessment / Impression:      Patient's tolerance of treatment:  good   Adverse Reaction: na  Significant change in status and impact:  na  Barriers to improvement:  strength and safety  Plan for Next Session:    Cont.  POC  Time in:  1455  Time out:  1605  Timed treatment minutes:  70  Total treatment time:  79    Previously filed items:  Social/Functional History  Lives With: Daughter  Type of Home: House  Home Layout: Two level, Able to Live on Main level with bedroom/bathroom  Home Access: Stairs to enter with rails  Entrance Stairs - Number of Steps: 1  Bathroom Shower/Tub: Walk-in shower  Bathroom Toilet: Standard  Bathroom Equipment: Shower chair, Grab bars in shower  Home Equipment: Cane, Walker, rolling, Rollator  ADL Assistance: Independent (though dtr reports pt has not bathed since Christmas.)  Homemaking Responsibilities: No (daughter manages household chores)  Ambulation Assistance: Independent (uses cane or one of his walkers, whichever is closest)  Transfer Assistance:  (Dtr helps with bed mobility. Pt able to transfer on his own between surfaces)  Active : No  Patient's  Info: daughter  Additional Comments: Dtr present and reported that pt needs a lot of encouragement to get out of bed, walk, or shower lately.        Short Term Goals  Time Frame for Short Term Goals: 2 weeks  Short Term Goal 1: Pt will perform sit><supine Salty  Short Term Goal 2: Pt will transfer to all surfaces SBA  Short Term Goal 3: Pt will ambulate 50ft with LRAD SBA  Short Term Goal 4: Pt will ascend/descend 1 step, single rail CGA  Short Term Goal 5: Pt will perform sitting light dynamic activity x 5 minutes, single UE support SBA    Electronically signed by:    Devi Zayas PTA  1/19/2023, 2:52 PM

## 2023-01-20 ENCOUNTER — TELEPHONE (OUTPATIENT)
Dept: FAMILY MEDICINE CLINIC | Age: 88
End: 2023-01-20

## 2023-01-20 LAB
ANION GAP SERPL CALCULATED.3IONS-SCNC: 12 MMOL/L (ref 4–16)
BACTERIA: ABNORMAL /HPF
BASOPHILS ABSOLUTE: 0.1 K/CU MM
BASOPHILS RELATIVE PERCENT: 1 % (ref 0–1)
BILIRUBIN URINE: NEGATIVE MG/DL
BLOOD, URINE: ABNORMAL
BUN BLDV-MCNC: 37 MG/DL (ref 6–23)
CALCIUM SERPL-MCNC: 8.5 MG/DL (ref 8.3–10.6)
CHLORIDE BLD-SCNC: 104 MMOL/L (ref 99–110)
CLARITY: ABNORMAL
CO2: 22 MMOL/L (ref 21–32)
COLOR: YELLOW
CREAT SERPL-MCNC: 1.5 MG/DL (ref 0.9–1.3)
DIFFERENTIAL TYPE: ABNORMAL
EOSINOPHILS ABSOLUTE: 0.2 K/CU MM
EOSINOPHILS RELATIVE PERCENT: 3 % (ref 0–3)
GFR SERPL CREATININE-BSD FRML MDRD: 43 ML/MIN/1.73M2
GLUCOSE BLD-MCNC: 100 MG/DL (ref 70–99)
GLUCOSE BLD-MCNC: 90 MG/DL (ref 70–99)
GLUCOSE, URINE: NEGATIVE MG/DL
HCT VFR BLD CALC: 36.6 % (ref 42–52)
HEMOGLOBIN: 11.7 GM/DL (ref 13.5–18)
IMMATURE NEUTROPHIL %: 0.3 % (ref 0–0.43)
KETONES, URINE: NEGATIVE MG/DL
LACTATE: 1.7 MMOL/L (ref 0.5–1.9)
LEUKOCYTE ESTERASE, URINE: ABNORMAL
LYMPHOCYTES ABSOLUTE: 1.2 K/CU MM
LYMPHOCYTES RELATIVE PERCENT: 19.1 % (ref 24–44)
MCH RBC QN AUTO: 31.3 PG (ref 27–31)
MCHC RBC AUTO-ENTMCNC: 32 % (ref 32–36)
MCV RBC AUTO: 97.9 FL (ref 78–100)
MONOCYTES ABSOLUTE: 0.5 K/CU MM
MONOCYTES RELATIVE PERCENT: 8 % (ref 0–4)
NITRITE URINE, QUANTITATIVE: NEGATIVE
NUCLEATED RBC %: 0 %
PDW BLD-RTO: 13.8 % (ref 11.7–14.9)
PH, URINE: 6 (ref 5–8)
PLATELET # BLD: 225 K/CU MM (ref 140–440)
PMV BLD AUTO: 10 FL (ref 7.5–11.1)
POTASSIUM SERPL-SCNC: 4.4 MMOL/L (ref 3.5–5.1)
PROTEIN UA: 100 MG/DL
RBC # BLD: 3.74 M/CU MM (ref 4.6–6.2)
RBC URINE: 117 /HPF (ref 0–3)
SEGMENTED NEUTROPHILS ABSOLUTE COUNT: 4.1 K/CU MM
SEGMENTED NEUTROPHILS RELATIVE PERCENT: 68.6 % (ref 36–66)
SODIUM BLD-SCNC: 138 MMOL/L (ref 135–145)
SPECIFIC GRAVITY UA: 1.02 (ref 1–1.03)
TOTAL IMMATURE NEUTOROPHIL: 0.02 K/CU MM
TOTAL NUCLEATED RBC: 0 K/CU MM
TRICHOMONAS: ABNORMAL /HPF
UROBILINOGEN, URINE: 0.2 MG/DL (ref 0.2–1)
WBC # BLD: 6 K/CU MM (ref 4–10.5)
WBC CLUMP: ABNORMAL /HPF
WBC UA: 4542 /HPF (ref 0–2)

## 2023-01-20 PROCEDURE — 93005 ELECTROCARDIOGRAM TRACING: CPT | Performed by: STUDENT IN AN ORGANIZED HEALTH CARE EDUCATION/TRAINING PROGRAM

## 2023-01-20 PROCEDURE — 97530 THERAPEUTIC ACTIVITIES: CPT

## 2023-01-20 PROCEDURE — 2580000003 HC RX 258: Performed by: STUDENT IN AN ORGANIZED HEALTH CARE EDUCATION/TRAINING PROGRAM

## 2023-01-20 PROCEDURE — 97116 GAIT TRAINING THERAPY: CPT

## 2023-01-20 PROCEDURE — 6370000000 HC RX 637 (ALT 250 FOR IP): Performed by: INTERNAL MEDICINE

## 2023-01-20 PROCEDURE — 83605 ASSAY OF LACTIC ACID: CPT

## 2023-01-20 PROCEDURE — 81001 URINALYSIS AUTO W/SCOPE: CPT

## 2023-01-20 PROCEDURE — 87186 SC STD MICRODIL/AGAR DIL: CPT

## 2023-01-20 PROCEDURE — 6370000000 HC RX 637 (ALT 250 FOR IP): Performed by: NURSE PRACTITIONER

## 2023-01-20 PROCEDURE — 82962 GLUCOSE BLOOD TEST: CPT

## 2023-01-20 PROCEDURE — 92610 EVALUATE SWALLOWING FUNCTION: CPT

## 2023-01-20 PROCEDURE — 87077 CULTURE AEROBIC IDENTIFY: CPT

## 2023-01-20 PROCEDURE — 2580000003 HC RX 258: Performed by: INTERNAL MEDICINE

## 2023-01-20 PROCEDURE — 6360000002 HC RX W HCPCS: Performed by: STUDENT IN AN ORGANIZED HEALTH CARE EDUCATION/TRAINING PROGRAM

## 2023-01-20 PROCEDURE — 85025 COMPLETE CBC W/AUTO DIFF WBC: CPT

## 2023-01-20 PROCEDURE — 36415 COLL VENOUS BLD VENIPUNCTURE: CPT

## 2023-01-20 PROCEDURE — 81003 URINALYSIS AUTO W/O SCOPE: CPT

## 2023-01-20 PROCEDURE — 94761 N-INVAS EAR/PLS OXIMETRY MLT: CPT

## 2023-01-20 PROCEDURE — 1200000000 HC SEMI PRIVATE

## 2023-01-20 PROCEDURE — 87086 URINE CULTURE/COLONY COUNT: CPT

## 2023-01-20 PROCEDURE — 80048 BASIC METABOLIC PNL TOTAL CA: CPT

## 2023-01-20 RX ORDER — DIVALPROEX SODIUM 125 MG/1
250 CAPSULE, COATED PELLETS ORAL
Status: DISCONTINUED | OUTPATIENT
Start: 2023-01-20 | End: 2023-01-23 | Stop reason: HOSPADM

## 2023-01-20 RX ORDER — TRAZODONE HYDROCHLORIDE 50 MG/1
50 TABLET ORAL NIGHTLY
Status: DISCONTINUED | OUTPATIENT
Start: 2023-01-20 | End: 2023-01-23 | Stop reason: HOSPADM

## 2023-01-20 RX ORDER — SODIUM CHLORIDE, SODIUM LACTATE, POTASSIUM CHLORIDE, AND CALCIUM CHLORIDE .6; .31; .03; .02 G/100ML; G/100ML; G/100ML; G/100ML
250 INJECTION, SOLUTION INTRAVENOUS ONCE
Status: COMPLETED | OUTPATIENT
Start: 2023-01-20 | End: 2023-01-20

## 2023-01-20 RX ADMIN — FINASTERIDE 5 MG: 5 TABLET, FILM COATED ORAL at 12:26

## 2023-01-20 RX ADMIN — DIVALPROEX SODIUM 250 MG: 250 TABLET, EXTENDED RELEASE ORAL at 20:18

## 2023-01-20 RX ADMIN — SODIUM CHLORIDE, POTASSIUM CHLORIDE, SODIUM LACTATE AND CALCIUM CHLORIDE 250 ML: 600; 310; 30; 20 INJECTION, SOLUTION INTRAVENOUS at 12:23

## 2023-01-20 RX ADMIN — DIVALPROEX SODIUM 250 MG: 125 CAPSULE, COATED PELLETS ORAL at 17:34

## 2023-01-20 RX ADMIN — BUPROPION HYDROCHLORIDE 75 MG: 75 TABLET, FILM COATED ORAL at 12:25

## 2023-01-20 RX ADMIN — TRAZODONE HYDROCHLORIDE 50 MG: 50 TABLET ORAL at 20:18

## 2023-01-20 RX ADMIN — LEVOTHYROXINE SODIUM 100 MCG: 100 TABLET ORAL at 04:17

## 2023-01-20 RX ADMIN — APIXABAN 2.5 MG: 2.5 TABLET, FILM COATED ORAL at 20:18

## 2023-01-20 RX ADMIN — SODIUM CHLORIDE, POTASSIUM CHLORIDE, SODIUM LACTATE AND CALCIUM CHLORIDE 250 ML: 600; 310; 30; 20 INJECTION, SOLUTION INTRAVENOUS at 18:12

## 2023-01-20 RX ADMIN — OXYCODONE HYDROCHLORIDE AND ACETAMINOPHEN 1000 MG: 500 TABLET ORAL at 12:25

## 2023-01-20 RX ADMIN — Medication 10 ML: at 20:18

## 2023-01-20 RX ADMIN — APIXABAN 2.5 MG: 2.5 TABLET, FILM COATED ORAL at 12:26

## 2023-01-20 RX ADMIN — DONEPEZIL HYDROCHLORIDE 10 MG: 10 TABLET ORAL at 20:18

## 2023-01-20 RX ADMIN — SERTRALINE HYDROCHLORIDE 100 MG: 50 TABLET ORAL at 12:26

## 2023-01-20 RX ADMIN — ATORVASTATIN CALCIUM 20 MG: 10 TABLET, FILM COATED ORAL at 20:18

## 2023-01-20 RX ADMIN — TAMSULOSIN HYDROCHLORIDE 0.4 MG: 0.4 CAPSULE ORAL at 12:25

## 2023-01-20 RX ADMIN — CEFTRIAXONE SODIUM 1000 MG: 1 INJECTION, POWDER, FOR SOLUTION INTRAMUSCULAR; INTRAVENOUS at 20:23

## 2023-01-20 ASSESSMENT — PAIN SCALES - GENERAL
PAINLEVEL_OUTOF10: 0

## 2023-01-20 ASSESSMENT — PAIN SCALES - WONG BAKER
WONGBAKER_NUMERICALRESPONSE: 0

## 2023-01-20 NOTE — PLAN OF CARE
Problem: Discharge Planning  Goal: Discharge to home or other facility with appropriate resources  1/19/2023 2308 by Narcisa Schneider LPN  Outcome: Progressing  Flowsheets (Taken 1/19/2023 1930)  Discharge to home or other facility with appropriate resources: Identify barriers to discharge with patient and caregiver  1/19/2023 1618 by Fred Lafleur RN  Outcome: Progressing  Flowsheets (Taken 1/19/2023 1775)  Discharge to home or other facility with appropriate resources:   Identify barriers to discharge with patient and caregiver   Arrange for needed discharge resources and transportation as appropriate   Identify discharge learning needs (meds, wound care, etc)   Refer to discharge planning if patient needs post-hospital services based on physician order or complex needs related to functional status, cognitive ability or social support system     Problem: Pain  Goal: Verbalizes/displays adequate comfort level or baseline comfort level  1/19/2023 2308 by Narcisa Schneider LPN  Outcome: Progressing  Flowsheets (Taken 1/19/2023 1930)  Verbalizes/displays adequate comfort level or baseline comfort level: Encourage patient to monitor pain and request assistance  1/19/2023 1618 by Fred Lafleur RN  Outcome: Progressing     Problem: Skin/Tissue Integrity  Goal: Absence of new skin breakdown  Description: 1. Monitor for areas of redness and/or skin breakdown  2. Assess vascular access sites hourly  3. Every 4-6 hours minimum:  Change oxygen saturation probe site  4. Every 4-6 hours:  If on nasal continuous positive airway pressure, respiratory therapy assess nares and determine need for appliance change or resting period.   1/19/2023 2308 by Narcisa Schneider LPN  Outcome: Progressing  1/19/2023 1618 by Fred Lafleur RN  Outcome: Progressing     Problem: Safety - Adult  Goal: Free from fall injury  1/19/2023 2308 by Narcisa Schneider LPN  Outcome: Progressing  1/19/2023 1618 by Fred Lafleur RN  Outcome: Progressing     Problem: Nutrition Deficit:  Goal: Optimize nutritional status  1/19/2023 2308 by Tiago Rincon LPN  Outcome: Progressing  1/19/2023 1618 by Rudolph Dong RN  Outcome: Progressing

## 2023-01-20 NOTE — PROGRESS NOTES
01/20/23 1112   Encounter Summary   Encounter Overview/Reason  Advance Care Planning   Service Provided For: Patient and family together   Referral/Consult From: [address] Family members   Last Encounter  01/20/23  (Looked for existing document in [id number].  Document in current and on file.)   Complexity of Encounter Low   Begin Time 1055   End Time  1105   Total Time Calculated 10 min   Encounter    Type Follow up   Spiritual/Emotional needs   Type Spiritual Support   Advance Care Planning   Type   (Document on file)   Assessment/Intervention/Outcome   Assessment Peaceful   Intervention Active listening;Discussed belief system/Restorationism practices/larry;Nurtured Hope;Sustaining Presence/Ministry of presence   Outcome Engaged in conversation;Expressed feelings of Melissa, Peace and/or Love   Plan and Referrals   Plan/Referrals No future visits requested

## 2023-01-20 NOTE — ACP (ADVANCE CARE PLANNING)
Advance Care Planning   Advance Care Planning Note  Ambulatory Spiritual Care Services    Date:  1/16/2023    Received request from [facility] Provider and family. Consultation conversation participants:   Patient who understands ACP conversation  Patient's child(teresita)     Goals of ACP Conversation:  Discuss advance care planning documents    Health Care Decision Makers:      Primary Decision Maker: Flako Parker - [phone]   Summary:  Verified [address] (Patient Wishes)  Currently on file:   Healthcare Power of /Advance Directive Appointment of Health Care Agent  Living Will/Advance Directive    Assessment:   Patient has a Current Advanced Directive on file, address the way holistic dimensions influence ACP decisions - medical, psychological, psychosocial, family systems, ethical, cultural, societal and spiritual.    Interventions:  Patient has an existing Document on file    Care Preferences Communicated:   No    Outcomes:  Existing advance directive reviewed with patient; no changes to patient's previously recorded wishes.     Patient / Healthcare Decision Maker Instructions:  Review completed ACP document(s) and update, if needed, with changes health or future preferences    Electronically signed by April Sargent Genevieve Yisel on 1/20/2023 at 11:08 AM.

## 2023-01-20 NOTE — PROGRESS NOTES
Comprehensive Nutrition Assessment    Type and Reason for Visit:  Reassess    Nutrition Recommendations/Plan:   Recommend to continue Frozen oral nutrition supplements (Magic cups) at least 1-2 times per day during and after discharge   Continue Easy to [facility] with meal set up   Document po intakes and encourage adequate water      Malnutrition Assessment:  Malnutrition Status: At risk for malnutrition (Comment) (01/17/23 3616)    Context:  Acute Illness     Findings of the 6 clinical characteristics of malnutrition:  Energy Intake:  Mild decrease in energy intake (Comment)  Weight Loss:   (6% weight loss x 2 mo)     Body Fat Loss:  No significant body fat loss     Muscle Mass Loss:  Mild muscle mass loss Clavicles (pectoralis & deltoids), Temples (temporalis)  Fluid Accumulation:  No significant fluid accumulation     Strength:  Not Performed    Nutrition Assessment:    Pt on easy to chew diet, consuming % of meals, needs some meal set up assistance but able to feed self. Pt enjoys magic cup. Will continue to monitor as moderate nutrition risk s/s pt is underweight. Nutrition Related Findings:    GFR 43, unclear if pt drinks much water Wound Type: None       Current Nutrition Intake & Therapies:    Average Meal Intake: %  Average Supplements Intake: %  ADULT DIET; Easy to Chew  ADULT ORAL NUTRITION SUPPLEMENT; Lunch, Dinner; Frozen Oral Supplement    Anthropometric Measures:  Height: 5' 7.99\" (172.7 cm)  Ideal Body Weight (IBW): 154 lbs (70 kg)       Current Body Weight: 139 lb 8.8 oz (63.3 kg), 90.6 % IBW.  Weight Source: Bed Scale  Current BMI (kg/m2): 21.2  Usual Body Weight: 153 lb (69.4 kg) ((11/15/22) office visit)  % Weight Change (Calculated): -6.1  Weight Adjustment For: No Adjustment                 BMI Categories: Underweight (BMI less than 22) age over 72    Estimated Daily Nutrient Needs:  Energy Requirements Based On: Kcal/kg  Weight Used for Energy Requirements: Current  Energy (kcal/day): 3916-5258 (28-32 kcal/kg)  Weight Used for Protein Requirements: Current  Protein (g/day): 78-91 (1.2-1.4 g/kg)  Method Used for Fluid Requirements: 1 ml/kcal  Fluid (ml/day):  8276-3524    Nutrition Diagnosis:   Inadequate protein-energy intake related to early satiety, acute injury/trauma as evidenced by weight loss, BMI    Nutrition Interventions:   Food and/or Nutrient Delivery: Continue Current Diet, Continue Oral Nutrition Supplement  Nutrition Education/Counseling: No recommendation at this time  Coordination of Nutrition Care: Continue to monitor while inpatient, Feeding Assistance/Environment Change       Goals:  Previous Goal Met: Progressing toward Goal(s)  Goals: Meet at least 75% of estimated needs       Nutrition Monitoring and Evaluation:   Behavioral-Environmental Outcomes: None Identified  Food/Nutrient Intake Outcomes: Food and Nutrient Intake, Supplement Intake  Physical Signs/Symptoms Outcomes: Biochemical Data, GI Status, Hemodynamic Status, Weight, Chewing or Swallowing, Meal Time Behavior, Skin, Nutrition Focused Physical Findings    Discharge Planning:    Continue Oral Nutrition Supplement, Continue current diet (recommend to continue magic cups at least 1-2 times per day)     Delano Marte RD, LD  Contact: [phone]

## 2023-01-20 NOTE — PROGRESS NOTES
Physical Therapy    Physical Therapy Treatment Note  Name: Michela Penaloza MRN: [id number] :   3/23/1930   Date:  2023   Admission Date: 2023 Room:  [address] [place] [address]   Restrictions/Precautions:          general precautions, falls,     Communication with other providers:  per nurse ok to tx  Subjective:  Patient states:  pt motivated and agreeable to tx  Pain:   Location, Type, Intensity (0/10 to 10/10):  pt reports right knee stiff and sore. Family present and advice if pt staying here very long might need to bring it in.  Objective:    Observation:  alert and oriented to self  Treatment, including education/measures:  Sup to sit with HOB up using bed rail and sba. Pt needing increased time and effort. Sit<=>stand from bed sba/cga and cues for hand placement, from low couch and chair cga/min assist and cues due to c/o stiff knees. Amb with rw 180' x 2 reps sba/cga and cues for posture and to keep hands on walker handles pt has very slow but steady gt needing several standing rest.    Safety  Patient left safely in the chair, with call light/phone in reach with alarm and  applied. Gait belt was used for transfers and gait. Assessment / Impression:      Patient's tolerance of treatment:  good   Adverse Reaction: na  Significant change in status and impact:  na  Barriers to improvement:  strength and safety   Plan for Next Session:    Cont.  POC  Time in:  1040  Time out:  1135    Timed treatment minutes: 55    Total treatment time:  54    Previously filed items:  Social/Functional History  Lives With: Daughter  Type of Home: House  Home Layout: Two level, Able to Live on Main level with bedroom/bathroom  Home Access: Stairs to enter with rails  Entrance Stairs - Number of Steps: 1  Bathroom Shower/Tub: Walk-in shower  Bathroom Toilet: Standard  Bathroom Equipment: Shower chair, Grab bars in [address]: [place], Walker, rolling, Rollator  ADL Assistance: Independent (though dtr reports pt has not bathed since Artur.)  Homemaking Responsibilities: No (daughter manages household chores)  Ambulation Assistance: Independent (uses cane or one of his walkers, whichever is closest)  Transfer Assistance:  (Dtr helps with bed mobility. Pt able to transfer on his own between surfaces)  Active : No  Patient's  Info: daughter  Additional Comments: Dtr present and reported that pt needs a lot of encouragement to get out of bed, walk, or shower lately.         Short Term Goals  Time Frame for Short Term Goals: 2 weeks  Short Term Goal 1: Pt will perform sit><supine Salty  Short Term Goal 2: Pt will transfer to all surfaces SBA  Short Term Goal 3: Pt will ambulate 50ft with LRAD SBA  Short Term Goal 4: Pt will ascend/descend 1 step, single rail CGA  Short Term Goal 5: Pt will perform sitting light dynamic activity x 5 minutes, single UE support SBA    Electronically signed by:    Carlo Reeves PTA  1/20/2023, 8:42 AM

## 2023-01-20 NOTE — PROGRESS NOTES
V2.0  [facility] Hospitalist Progress Note      Name:  Suresh Moralez /Age/Sex: 3/23/1930  (80 y.o. male)   MRN & CSN:  [id number] & [id number] Encounter Date/Time: 2023 8:41 AM EST    Location:  [address] [place] [address]-A PCP: Sean Patterson, APRN - [facility] Day: 5    Assessment and Plan:   Suresh Moralez is a 80 y.o. male with pmh of HFrecoveredEF s/p BiV PPM, paroxysmal atrial fibrillation, hypertension, dementia, hyperlipidemia, anxiety/depression who presents with fall with subsequent chest and back pain and admitted for Failure to thrive in adult      Plan:    Unwitnessed fall  Chest pain -reproducible, likely MSK, do not suspect ACS  Back pain  Family found patient getting up from ground on 1/15 and since has been complaining of chest pain and back pain. Patient does not remember fall. CTH, CT cervical/thoracic/lumbar spine, pelvis x-ray, CXR all negative for acute fracture/deformity. -PT/OT, fall precautions    UTI  -UA indicative of UTI  -Initiate rocephin as prior cultures grew pansensitive E. Coli  -F/u urine cultures and sensitivities    NSTEMI  Patient initially had chest pain after fall but this resolved after arrival.  Troponin elevated to 0.022 but downtrended. EKG with BiV paced rhythm.  -Cardiology advised no further ischemic work-up    Chronic systolic heart failure -recovered EF (50% in 2019)  S/p BiV PPM -device interrogation does not exhibit any ventricular arrhythmias  Hypertension  No sign of volume overload. -Continue GDMT as tolerated  -Continue home antihypertensives    Paroxysmal A. Fib  -Continue Eliquis 2.5 mg twice daily, Toprol XL 25 mg daily    CKD  Baseline creatinine 1.4.  -Monitor and avoid nephrotoxic medications    Dementia -with recent behavioral disturbances  Baseline orientation to self, family, city, month but not time. Per daughter who lives with and cares for pt, she can no longer get him to bathe and reports he is noncompliant with his medication.  Has times where he is hateful towards her and curses. Pt tends to sundown.  -Continue donepezil  -CM provided daughter with resources such as [employer] and [employer] on [address]  -Psychiatry consulted, discussed case  -SLP    HLD -continue statin  Hypothyroidism -continue Synthroid  BPH -continue finasteride and tamsulosin  Anxiety/depression -continue sertraline and bupropion      Diet ADULT DIET; Easy to Chew  ADULT ORAL NUTRITION SUPPLEMENT; Lunch, Dinner; Frozen Oral Supplement   DVT Prophylaxis [] Lovenox, []  Heparin, [] SCDs, [] Ambulation,  [x] Eliquis, [] Xarelto  [] Coumadin   Code Status Full Code   Disposition From: Home  Expected Disposition: TBD  Estimated Date of Discharge:   Patient requires continued admission due to unwitnessed fall and PT/OT evaluation. Surrogate Decision Maker/ POA      Subjective:     Chief Complaint: Chest Pain and Fall       Pt alert and pleasant, interacting appropriately. Reports no pain at this time. Is a poor historian. Through the afternoon, pt had an episode of hypotension with SBP in 80s. Difficult to determine if pt symptomatic as he is a very poor historian however he did feel slightly clammy. Administered 250 cc bolus with good response in BP, also held antihypertensives at this time. Review of Systems:    Review of Systems    Difficult to obtain as pt has dementia. Otherwise, negative except as above. Objective: Intake/Output Summary (Last 24 hours) at 1/20/2023 1836  Last data filed at 1/20/2023 1348  Gross per 24 hour   Intake 470 ml   Output 1325 ml   Net -855 ml          Vitals:   Vitals:    01/20/23 1730   BP: 96/78   Pulse:    Resp:    Temp:    SpO2:        Physical Exam:     General: NAD  Eyes: EOMI  ENT: neck supple  Cardiovascular: Regular rate. Respiratory: Clear to auscultation  Gastrointestinal: Soft, non tender  Genitourinary: no suprapubic tenderness  Musculoskeletal: No edema  Skin: warm, dry  Neuro: Alert.   Psych: Mood appropriate.      Medications:   Medications:    divalproex  250 mg Oral Dinner    traZODone  50 mg Oral Nightly    lactated ringers bolus  250 mL IntraVENous Once    buPROPion  75 mg Oral Daily    divalproex  250 mg Oral Nightly    lidocaine  2 patch TransDERmal Daily    vitamin C  1,000 mg Oral Daily    atorvastatin  20 mg Oral Nightly    donepezil  10 mg Oral Nightly    apixaban  2.5 mg Oral BID    finasteride  5 mg Oral Daily    levothyroxine  100 mcg Oral Daily    lisinopril  10 mg Oral Daily    metoprolol succinate  25 mg Oral Daily    sertraline  100 mg Oral Daily    tamsulosin  0.4 mg Oral Daily    sodium chloride flush  5-40 mL IntraVENous 2 times per day      Infusions:    sodium chloride       PRN Meds: sodium chloride flush, 5-40 mL, PRN  sodium chloride, , PRN  ondansetron, 4 mg, Q8H PRN   Or  ondansetron, 4 mg, Q6H PRN  polyethylene glycol, 17 g, Daily PRN  acetaminophen, 650 mg, Q6H PRN   Or  acetaminophen, 650 mg, Q6H PRN      Labs      Recent Results (from the past 24 hour(s))   CBC with Auto Differential    Collection Time: 01/20/23  2:55 AM   Result Value Ref Range    WBC 6.0 4.0 - 10.5 K/CU MM    RBC 3.74 (L) 4.6 - 6.2 M/CU MM    Hemoglobin 11.7 (L) 13.5 - 18.0 GM/DL    Hematocrit 36.6 (L) 42 - 52 %    MCV 97.9 78 - 100 FL    MCH 31.3 (H) 27 - 31 PG    MCHC 32.0 32.0 - 36.0 %    RDW 13.8 11.7 - 14.9 %    Platelets 929 269 - 306 K/CU MM    MPV 10.0 7.5 - 11.1 FL    Differential Type AUTOMATED DIFFERENTIAL     Segs Relative 68.6 (H) 36 - 66 %    Lymphocytes % 19.1 (L) 24 - 44 %    Monocytes % 8.0 (H) 0 - 4 %    Eosinophils % 3.0 0 - 3 %    Basophils % 1.0 0 - 1 %    Segs Absolute 4.1 K/CU MM    Lymphocytes Absolute 1.2 K/CU MM    Monocytes Absolute 0.5 K/CU MM    Eosinophils Absolute 0.2 K/CU MM    Basophils Absolute 0.1 K/CU MM    Nucleated RBC % 0.0 %    Total Nucleated RBC 0.0 K/CU MM    Total Immature Neutrophil 0.02 K/CU MM    Immature Neutrophil % 0.3 0 - 0.43 %   Basic Metabolic Panel w/ Reflex to MG    Collection Time: 01/20/23  2:55 AM   Result Value Ref Range    Sodium 138 135 - 145 MMOL/L    Potassium 4.4 3.5 - 5.1 MMOL/L    Chloride 104 99 - 110 mMol/L    CO2 22 21 - 32 MMOL/L    Anion Gap 12 4 - 16    BUN 37 (H) 6 - 23 MG/DL    Creatinine 1.5 (H) 0.9 - 1.3 MG/DL    Est, Glom Filt Rate 43 (L) >60 mL/min/1.73m2    Glucose 90 70 - 99 MG/DL    Calcium 8.5 8.3 - 10.6 MG/DL   EKG 12 Lead    Collection Time: 01/20/23 12:34 PM   Result Value Ref Range    Ventricular Rate 80 BPM    Atrial Rate 72 BPM    P-R Interval 160 ms    QRS Duration 142 ms    Q-T Interval 436 ms    QTc Calculation (Bazett) 502 ms    R Axis -45 degrees    T Axis 101 degrees    Diagnosis       AV dual-paced rhythm  Abnormal ECG  When compared with ECG of 16-JAN-2023 06:41,  No significant change was found     POCT Glucose    Collection Time: 01/20/23  1:42 PM   Result Value Ref Range    POC Glucose 100 (H) 70 - 99 MG/DL   Urinalysis with Reflex to Culture    Collection Time: 01/20/23  1:51 PM    Specimen: Urine   Result Value Ref Range    Color, UA YELLOW YELLOW    Clarity, UA TURBID (A) CLEAR    Glucose, Urine NEGATIVE NEGATIVE MG/DL    Bilirubin Urine NEGATIVE NEGATIVE MG/DL    Ketones, Urine NEGATIVE NEGATIVE MG/DL    Specific Gravity, UA 1.020 1.001 - 1.035    Blood, Urine LARGE NUMBER OR AMOUNT OBSERVED (A) NEGATIVE    pH, Urine 6.0 5.0 - 8.0    Protein,  (A) NEGATIVE MG/DL    Urobilinogen, Urine 0.2 0.2 - 1.0 MG/DL    Nitrite Urine, Quantitative NEGATIVE NEGATIVE    Leukocyte Esterase, Urine LARGE NUMBER OR AMOUNT OBSERVED (A) NEGATIVE    RBC,  (H) 0 - 3 /HPF    WBC, UA 4542 (H) 0 - 2 /HPF    WBC Clumps, UA MANY /HPF    Bacteria, UA MANY (A) NEGATIVE /HPF    Trichomonas, UA NONE SEEN NONE SEEN /HPF   Lactic Acid    Collection Time: 01/20/23  3:15 PM   Result Value Ref Range    Lactate 1.7 0.5 - 1.9 mMOL/L          Imaging/Diagnostics Last 24 Hours   XR PELVIS (1-2 VIEWS)    Result Date: 1/16/2023  EXAMINATION: ONE XRAY VIEW OF THE PELVIS 1/16/2023 2:09 am COMPARISON: 08/27/2019 HISTORY: ORDERING SYSTEM PROVIDED HISTORY: trauma TECHNOLOGIST PROVIDED HISTORY: Reason for exam:->trauma Reason for Exam: fall, trauma Additional signs and symptoms: na Relevant Medical/Surgical History: arthritis FINDINGS: No traumatic pelvic fracture is identified. Degenerative changes seen in the hip joints, spine and pubic symphysis. Surgical clips are seen in the right proximal lower extremity. Atherosclerotic calcifications are identified. Aorto bi-iliac endograft noted. Embolization coils seen as well. No acute traumatic injury is identified. CT HEAD WO CONTRAST    Result Date: 1/16/2023  EXAMINATION: CT OF THE HEAD WITHOUT CONTRAST  1/16/2023 2:08 am TECHNIQUE: CT of the head was performed without the administration of intravenous contrast. Automated exposure control, iterative reconstruction, and/or weight based adjustment of the mA/kV was utilized to reduce the radiation dose to as low as reasonably achievable. COMPARISON: None. HISTORY: ORDERING SYSTEM PROVIDED HISTORY: HEAD TRAUMA, CLOSED, MILD, GCS >= 13, NO RISK FACTORS, NEURO EXAM NORMAL TECHNOLOGIST PROVIDED HISTORY: Has a \"code stroke\" or \"stroke alert\" been called? ->No Reason for exam:->fall Decision Support Exception - unselect if not a suspected or confirmed emergency medical condition->Emergency Medical Condition (MA) Reason for Exam: fall, Head trauma, closed, mild, GCS >= 13, no risk factors, neuro exam normal FINDINGS: BRAIN/VENTRICLES: There is no acute intracranial hemorrhage, mass effect or midline shift. No abnormal extra-axial fluid collection. The gray-white differentiation is maintained without evidence of an acute infarct. There is no evidence of hydrocephalus. Diffuse parenchymal volume loss with moderate chronic white matter microvascular ischemic changes. ORBITS: The visualized portion of the orbits demonstrate no acute abnormality.  SINUSES: The visualized paranasal sinuses and mastoid air cells demonstrate no acute abnormality. SOFT TISSUES/SKULL:  No acute abnormality of the visualized skull or soft tissues. 1. No acute intracranial abnormality. 2. Diffuse parenchymal volume loss with moderate chronic white matter microischemic changes. CT CERVICAL SPINE WO CONTRAST    Result Date: 1/16/2023  EXAMINATION: CT OF THE CERVICAL SPINE WITHOUT CONTRAST 1/16/2023 2:09 am TECHNIQUE: CT of the cervical spine was performed without the administration of intravenous contrast. Multiplanar reformatted images are provided for review. Automated exposure control, iterative reconstruction, and/or weight based adjustment of the mA/kV was utilized to reduce the radiation dose to as low as reasonably achievable. COMPARISON: None. HISTORY: ORDERING SYSTEM PROVIDED HISTORY: fall TECHNOLOGIST PROVIDED HISTORY: Reason for exam:->fall Decision Support Exception - unselect if not a suspected or confirmed emergency medical condition->Emergency Medical Condition (MA) Reason for Exam: fall FINDINGS: BONES/ALIGNMENT: There is no acute fracture or suspect osseous lesion. Vertebral body heights are maintained. There is 2 mm degenerative anterolisthesis of C7 on T1 secondary to disc height loss and facet arthropathy. No traumatic malalignment. DEGENERATIVE CHANGES: Mild-to-moderate multilevel degenerative disease and facet arthropathy. No high-grade bony spinal canal stenosis. SOFT TISSUES: No acute paraspinal soft tissue abnormality. No acute osseous abnormality of the cervical spine. CT THORACIC SPINE WO CONTRAST    Result Date: 1/16/2023  EXAMINATION: CT OF THE THORACIC SPINE WITHOUT CONTRAST  1/16/2023 2:09 am: TECHNIQUE: CT of the thoracic spine was performed without the administration of intravenous contrast. Multiplanar reformatted images are provided for review.  Automated exposure control, iterative reconstruction, and/or weight based adjustment of the mA/kV was utilized to reduce the radiation dose to as low as reasonably achievable. COMPARISON: None. HISTORY: ORDERING SYSTEM PROVIDED HISTORY: fall TECHNOLOGIST PROVIDED HISTORY: CT  T-Spine w/o Contrast Reason for exam:->fall Reason for Exam: fall FINDINGS: BONES/ALIGNMENT: There is no acute fracture or suspect osseous lesion. Vertebral body heights are maintained. There is mild lower thoracic levoscoliosis without spondylolisthesis. DEGENERATIVE CHANGES: Mild-to-moderate diffuse disc height loss and desiccation. No high-grade bony spinal canal stenosis. Multilevel facet hypertrophy in the lower thoracic spine. SOFT TISSUES: No acute paraspinal soft tissue abnormality. 1. No acute fracture or traumatic malalignment of the thoracic spine. 2. Mild-to-moderate degenerative changes. CT LUMBAR SPINE WO CONTRAST    Result Date: 1/16/2023  EXAMINATION: CT OF THE LUMBAR SPINE WITHOUT CONTRAST  1/16/2023 TECHNIQUE: CT of the lumbar spine was performed without the administration of intravenous contrast. Multiplanar reformatted images are provided for review. Adjustment of mA and/or kV according to patient size was utilized. Automated exposure control, iterative reconstruction, and/or weight based adjustment of the mA/kV was utilized to reduce the radiation dose to as low as reasonably achievable. COMPARISON: None HISTORY: ORDERING SYSTEM PROVIDED HISTORY: fall TECHNOLOGIST PROVIDED HISTORY: Reason for exam:->fall Decision Support Exception - unselect if not a suspected or confirmed emergency medical condition->Emergency Medical Condition (MA) Reason for Exam: fall FINDINGS: BONES/ALIGNMENT: There is no acute fracture or suspect osseous lesion. Vertebral body heights are maintained. There is grade 1 anterolisthesis of L5 on S1 secondary to disc height loss and facet arthropathy.   Moderate dextroscoliosis is present centered at L1 with mild reciprocal levoscoliosis centered at L4. DEGENERATIVE CHANGES: Moderate multilevel disc disease and moderate to severe facet hypertrophy. SOFT TISSUES/RETROPERITONEUM: No acute paraspinal soft tissue abnormality. Prior aorto bi-iliac endograft. 1. No acute fracture or traumatic malalignment of the lumbar spine. 2. Moderate to severe degenerative changes with degenerative grade 1 anterolisthesis of L5 on S1. 3. Moderate upper lumbar dextroscoliosis with reciprocal lower lumbar levoscoliosis. XR CHEST PORTABLE    Result Date: 1/16/2023  EXAMINATION: ONE XRAY VIEW OF THE CHEST 1/16/2023 2:14 am COMPARISON: 10/28/2022 HISTORY: ORDERING SYSTEM PROVIDED HISTORY: CP/FALL TECHNOLOGIST PROVIDED HISTORY: Reason for exam:->CP/FALL Reason for Exam: chest pain, fall Additional signs and symptoms: na Relevant Medical/Surgical History: asthma, PAF, bradycardia FINDINGS: Aortic atherosclerosis. Implanted cardiac device is identified. No significant cardiomegaly. No focal consolidation or pleural effusion. No pneumothorax. Degenerative changes are seen in the shoulders and spine. No acute osseous abnormality is identified. High-riding humeral head on the right may be related to a chronic rotator cuff tear.      1. No acute abnormality seen in the chest.       Electronically signed by Donell Crawford MD on 1/20/2023 at 6:36 PM

## 2023-01-20 NOTE — PROGRESS NOTES
Speech Language Pathology  Facility/Department: [facility]   CLINICAL BEDSIDE SWALLOW EVALUATION    NAME: Jamilah Greenberg  : 3/23/1930  MRN: [id number]    IMPRESSIONS AND RECOMMENDATIONS: Jamilah Greenberg was referred for a bedside swallow evaluation following admission to [facility] s/p fall with back and chest pain. Per radiologist head CT negative for acute abnormality. Medical hx includes afib, HTN, HLD, dementia. No known history of dysphagia prior to admission. Pt seen for evaluation seated upright in bed, alert, cooperative given cues. Oral mechanism examination WFL without asymmetry. Upper and lower dentures placed for participation. Pt seen with breakfast tray including easy to chew foods and thin liquids via cup/straw. Oral stage WFL with intact labial seal, slow mastication, adequate bolus formation and complete oral clearance. Pharyngeal stage appears WFL/age-appropriate without s/s aspiration across all trials. Recommend continued easy to chew diet/thin liquids. No further acute SLP needs identified. 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; Leukocytosis; Partial small bowel obstruction ([address] S/P biventricular cardiac pacemaker procedure; Pacemaker lead malfunction; Complete heart block ([address] Chronic systolic heart failure ([address] Small bowel obstruction ([address] Urinary tract infection without hematuria; Intractable nausea and vomiting; Other hyperlipidemia; PVD (peripheral vascular disease) ([address] Acquired hypothyroidism;  Pancreatitis, unspecified pancreatitis type; Mild episode of recurrent major depressive disorder ([facility] Excessive daytime sleepiness; Fatigue; Dementia without behavioral disturbance ([facility] [address] [facility] Dysuria; Plantar wart of left foot; and Failure to thrive in adult on their problem list.  ONSET DATE: this admission    Recent Chest Xray/CT of Chest: see chart    Date of Eval: 1/20/2023  Evaluating Therapist: HALI Easley    Current Diet level:  Current Diet : Easy to chew      Primary Complaint  Patient Complaint: denies current complaints    Pain:  Pain Assessment  Pain Assessment: None - Denies Pain  Pain Level: 0  Choi-Baker Pain Rating: No hurt  Patient's Stated Pain Goal: 0 - No pain  Pain Location: Generalized  Pain Orientation: Left  Pain Descriptors: Aching  Functional Pain Assessment: Activities are not prevented  Non-Pharmaceutical Pain Intervention(s): Environmental changes  Response to Pain Intervention: None (pain unchanged or no improvement)  Side Effects: No reported side effects    Reason for Referral  Jenni Murrieta was referred for a bedside swallow evaluation to assess the efficiency of his swallow function, identify signs and symptoms of aspiration and make recommendations regarding safe dietary consistencies, effective compensatory strategies, and safe eating environment.     Impression  Dysphagia Diagnosis: Swallow function appears WFL;No clinical indicators of dysphagia  Dysphagia Outcome Severity Scale: Level 6: Within functional limits/Modified independence     Treatment Plan  Requires SLP Intervention: No  Duration of Treatment: N/A  D/C Recommendations: No follow up therapy recommended post discharge       Recommended Diet and Intervention        Recommended Form of Meds: PO          Compensatory Swallowing Strategies  Compensatory Swallowing Strategies : Upright as possible for all oral intake    Treatment/Goals  Short-term Goals  Timeframe for Short-term Goals: N/A    General  Chart Reviewed: Yes  Behavior/Cognition: Alert; Cooperative;Confused; Requires cueing  Respiratory Status: Room air  O2 Device: None (Room air)  Communication Observation:  (cognitive communication deficits)  Follows Directions: Simple  Dentition: Dentures top;Dentures bottom  Patient Positioning: Upright in bed  Baseline Vocal Quality: Normal  Prior Dysphagia History: none known prior to admission  Consistencies Administered: Easy to chew; Thin - cup; Thin - straw           Vision/Hearing  Vision  Vision: Impaired  Vision Exceptions: Wears glasses at all times  Hearing  Hearing: Exceptions to [facility]  Hearing Exceptions: Hard of hearing/hearing concerns    Oral Motor Deficits  Oral/Motor  Oral Hygiene: Moist;Clean    Oral Phase Dysfunction  Oral Phase  Oral Phase: WFL     Indicators of Pharyngeal Phase Dysfunction   Pharyngeal Phase   Pharyngeal Phase: WFL    Prognosis  Individuals consulted  Consulted and agree with results and recommendations: Patient    Education  Patient Education: results, recommendations  Patient Education Response: Verbalizes understanding  Safety Devices in place: Yes  Type of devices: All fall risk precautions in place; Left in bed       Therapy Time  SLP Individual Minutes  Time In: 0845  Time Out: 0900  Minutes: [place], [place] [facility]-SLP  1/20/2023 9:51 AM

## 2023-01-20 NOTE — CARE COORDINATION
Reviewed chart , discussed in IDR then spoke with pt's daughter/POA about discharge plan. She is wanting to take pt home with [facility].. Would like them to visit 2 times/week and hopefully help with bathing /bathing strategies. They have used [phone] in past and would like them again. PS to [facility] PEDRO GARCIA and Dr Sophia Burns.        At Discharge, Please notify [facility] upon discharge, [phone], they will provide fax number to send Shama Aguiar order & AVS.

## 2023-01-20 NOTE — CARE COORDINATION
Spoke with Mary Ann Hannah pts POA. She is agreeable to [facility] at discharge.  Please place inpatient consult to home health needs order in Epic at [facility].  Pt had [facility] in the past.

## 2023-01-21 LAB
ANION GAP SERPL CALCULATED.3IONS-SCNC: 8 MMOL/L (ref 4–16)
BASOPHILS ABSOLUTE: 0.1 K/CU MM
BASOPHILS RELATIVE PERCENT: 0.9 % (ref 0–1)
BUN BLDV-MCNC: 38 MG/DL (ref 6–23)
CALCIUM SERPL-MCNC: 8.3 MG/DL (ref 8.3–10.6)
CHLORIDE BLD-SCNC: 107 MMOL/L (ref 99–110)
CO2: 25 MMOL/L (ref 21–32)
CREAT SERPL-MCNC: 1.4 MG/DL (ref 0.9–1.3)
DIFFERENTIAL TYPE: ABNORMAL
DOSE AMOUNT: ABNORMAL
DOSE TIME: ABNORMAL
EKG ATRIAL RATE: 72 BPM
EKG DIAGNOSIS: NORMAL
EKG P-R INTERVAL: 160 MS
EKG Q-T INTERVAL: 436 MS
EKG QRS DURATION: 142 MS
EKG QTC CALCULATION (BAZETT): 502 MS
EKG R AXIS: -45 DEGREES
EKG T AXIS: 101 DEGREES
EKG VENTRICULAR RATE: 80 BPM
EOSINOPHILS ABSOLUTE: 0.2 K/CU MM
EOSINOPHILS RELATIVE PERCENT: 4.4 % (ref 0–3)
FOLATE: 12.4 NG/ML (ref 3.1–17.5)
GFR SERPL CREATININE-BSD FRML MDRD: 47 ML/MIN/1.73M2
GLUCOSE BLD-MCNC: 100 MG/DL (ref 70–99)
HCT VFR BLD CALC: 35.4 % (ref 42–52)
HEMOGLOBIN: 11.3 GM/DL (ref 13.5–18)
IMMATURE NEUTROPHIL %: 0.9 % (ref 0–0.43)
LYMPHOCYTES ABSOLUTE: 0.8 K/CU MM
LYMPHOCYTES RELATIVE PERCENT: 14.9 % (ref 24–44)
MAGNESIUM: 2.1 MG/DL (ref 1.8–2.4)
MCH RBC QN AUTO: 32 PG (ref 27–31)
MCHC RBC AUTO-ENTMCNC: 31.9 % (ref 32–36)
MCV RBC AUTO: 100.3 FL (ref 78–100)
MONOCYTES ABSOLUTE: 0.4 K/CU MM
MONOCYTES RELATIVE PERCENT: 8 % (ref 0–4)
NUCLEATED RBC %: 0 %
PDW BLD-RTO: 14.2 % (ref 11.7–14.9)
PLATELET # BLD: 209 K/CU MM (ref 140–440)
PMV BLD AUTO: 9.7 FL (ref 7.5–11.1)
POTASSIUM SERPL-SCNC: 4.3 MMOL/L (ref 3.5–5.1)
RBC # BLD: 3.53 M/CU MM (ref 4.6–6.2)
SEGMENTED NEUTROPHILS ABSOLUTE COUNT: 3.9 K/CU MM
SEGMENTED NEUTROPHILS RELATIVE PERCENT: 70.9 % (ref 36–66)
SODIUM BLD-SCNC: 140 MMOL/L (ref 135–145)
TOTAL IMMATURE NEUTOROPHIL: 0.05 K/CU MM
TOTAL NUCLEATED RBC: 0 K/CU MM
VALPROIC ACID LEVEL: 27.2 UG/ML (ref 50–100)
VITAMIN B-12: 603.7 PG/ML (ref 211–911)
WBC # BLD: 5.5 K/CU MM (ref 4–10.5)

## 2023-01-21 PROCEDURE — 6370000000 HC RX 637 (ALT 250 FOR IP): Performed by: INTERNAL MEDICINE

## 2023-01-21 PROCEDURE — 36415 COLL VENOUS BLD VENIPUNCTURE: CPT

## 2023-01-21 PROCEDURE — 82607 VITAMIN B-12: CPT

## 2023-01-21 PROCEDURE — 94761 N-INVAS EAR/PLS OXIMETRY MLT: CPT

## 2023-01-21 PROCEDURE — 97535 SELF CARE MNGMENT TRAINING: CPT

## 2023-01-21 PROCEDURE — 2580000003 HC RX 258: Performed by: STUDENT IN AN ORGANIZED HEALTH CARE EDUCATION/TRAINING PROGRAM

## 2023-01-21 PROCEDURE — 85025 COMPLETE CBC W/AUTO DIFF WBC: CPT

## 2023-01-21 PROCEDURE — 2580000003 HC RX 258: Performed by: INTERNAL MEDICINE

## 2023-01-21 PROCEDURE — 83735 ASSAY OF MAGNESIUM: CPT

## 2023-01-21 PROCEDURE — 6360000002 HC RX W HCPCS: Performed by: STUDENT IN AN ORGANIZED HEALTH CARE EDUCATION/TRAINING PROGRAM

## 2023-01-21 PROCEDURE — 6370000000 HC RX 637 (ALT 250 FOR IP): Performed by: NURSE PRACTITIONER

## 2023-01-21 PROCEDURE — 97530 THERAPEUTIC ACTIVITIES: CPT

## 2023-01-21 PROCEDURE — 1200000000 HC SEMI PRIVATE

## 2023-01-21 PROCEDURE — 80164 ASSAY DIPROPYLACETIC ACD TOT: CPT

## 2023-01-21 PROCEDURE — 93010 ELECTROCARDIOGRAM REPORT: CPT | Performed by: INTERNAL MEDICINE

## 2023-01-21 PROCEDURE — 80048 BASIC METABOLIC PNL TOTAL CA: CPT

## 2023-01-21 PROCEDURE — 82746 ASSAY OF FOLIC ACID SERUM: CPT

## 2023-01-21 PROCEDURE — 82306 VITAMIN D 25 HYDROXY: CPT

## 2023-01-21 RX ORDER — SODIUM CHLORIDE, SODIUM LACTATE, POTASSIUM CHLORIDE, AND CALCIUM CHLORIDE .6; .31; .03; .02 G/100ML; G/100ML; G/100ML; G/100ML
250 INJECTION, SOLUTION INTRAVENOUS ONCE
Status: COMPLETED | OUTPATIENT
Start: 2023-01-21 | End: 2023-01-21

## 2023-01-21 RX ADMIN — CEFTRIAXONE SODIUM 1000 MG: 1 INJECTION, POWDER, FOR SOLUTION INTRAMUSCULAR; INTRAVENOUS at 21:58

## 2023-01-21 RX ADMIN — BUPROPION HYDROCHLORIDE 75 MG: 75 TABLET, FILM COATED ORAL at 08:42

## 2023-01-21 RX ADMIN — SODIUM CHLORIDE, POTASSIUM CHLORIDE, SODIUM LACTATE AND CALCIUM CHLORIDE 250 ML: 600; 310; 30; 20 INJECTION, SOLUTION INTRAVENOUS at 16:55

## 2023-01-21 RX ADMIN — OXYCODONE HYDROCHLORIDE AND ACETAMINOPHEN 1000 MG: 500 TABLET ORAL at 08:42

## 2023-01-21 RX ADMIN — APIXABAN 2.5 MG: 2.5 TABLET, FILM COATED ORAL at 21:46

## 2023-01-21 RX ADMIN — SERTRALINE HYDROCHLORIDE 100 MG: 50 TABLET ORAL at 08:42

## 2023-01-21 RX ADMIN — APIXABAN 2.5 MG: 2.5 TABLET, FILM COATED ORAL at 08:43

## 2023-01-21 RX ADMIN — ATORVASTATIN CALCIUM 20 MG: 10 TABLET, FILM COATED ORAL at 21:47

## 2023-01-21 RX ADMIN — TAMSULOSIN HYDROCHLORIDE 0.4 MG: 0.4 CAPSULE ORAL at 08:42

## 2023-01-21 RX ADMIN — Medication 10 ML: at 08:42

## 2023-01-21 RX ADMIN — DIVALPROEX SODIUM 250 MG: 250 TABLET, EXTENDED RELEASE ORAL at 21:54

## 2023-01-21 RX ADMIN — ACETAMINOPHEN 650 MG: 325 TABLET ORAL at 21:47

## 2023-01-21 RX ADMIN — Medication 10 ML: at 21:58

## 2023-01-21 RX ADMIN — FINASTERIDE 5 MG: 5 TABLET, FILM COATED ORAL at 08:42

## 2023-01-21 RX ADMIN — DIVALPROEX SODIUM 250 MG: 125 CAPSULE, COATED PELLETS ORAL at 16:50

## 2023-01-21 RX ADMIN — METOPROLOL SUCCINATE 25 MG: 25 TABLET, EXTENDED RELEASE ORAL at 08:42

## 2023-01-21 RX ADMIN — TRAZODONE HYDROCHLORIDE 50 MG: 50 TABLET ORAL at 21:47

## 2023-01-21 RX ADMIN — LEVOTHYROXINE SODIUM 100 MCG: 100 TABLET ORAL at 05:09

## 2023-01-21 RX ADMIN — DONEPEZIL HYDROCHLORIDE 10 MG: 10 TABLET ORAL at 21:46

## 2023-01-21 RX ADMIN — LISINOPRIL 10 MG: 5 TABLET ORAL at 08:42

## 2023-01-21 ASSESSMENT — PAIN SCALES - WONG BAKER

## 2023-01-21 ASSESSMENT — PAIN SCALES - GENERAL
PAINLEVEL_OUTOF10: 0
PAINLEVEL_OUTOF10: 3
PAINLEVEL_OUTOF10: 0

## 2023-01-21 ASSESSMENT — PAIN DESCRIPTION - LOCATION: LOCATION: HEAD

## 2023-01-21 NOTE — PROGRESS NOTES
Occupational Therapy    Occupational Therapy Treatment Note    Name: Junior Davies MRN: [id number] :   3/23/1930   Date:  2023   Admission Date: 2023 Room:  [address] [place] [address]-     Primary Problem:  The primary encounter diagnosis was Chest pain, unspecified type. Diagnoses of Fall, initial encounter and Troponin level elevated were also pertinent to this visit. Restrictions/Precautions:          general precautions, fall risk, CHICHO     Communication with other providers: RN approved session     Subjective:  Patient states: \"If I am wrong, then I turn right. \"   Pain:   Location, Type, Intensity (0/10 to 10/10):  lower back     Objective:    Observation: Pt presented in high guillen's finishing breakfast.   Objective Measures:  , A&O X2    Treatment, including education:  Therapeutic Activity Training:   Therapeutic activity training was instructed today. Cues were given for safety, sequence, UE/LE placement, awareness, and balance. Activities performed today included bed mobility training, sup-sit, sit-stand, SPT. Supine to Sit with HOB raised ~40 degrees with SBA and increased time to complete. Sitting balance on EOB with SBA for ~7 min. Sit to Stand with use of FWW with CGA. Functional mobility for short HH distance within room with CGA and use of fWW with min cues for safe AD mgmt. Self Care Training:   Cues were given for safety, sequence, UE/LE placement, visual cues, and balance. Pt finished breakfast upon arrival with set-up A. While seated EOB pt doffed/donned gown with Min A d/t limited shoulder ROM. Pt stood at sink for hygiene/grooming tasks for ~6.5 min with CGA for balance including washing face, hands, and utilizing mouth wash. Pt required Max A to comb hair d/t limited shoulder ROM. Pt set up in recliner at end of session. Patient educated on role of OT , benefits of OT and rationale for therapeutic intervention.  Educated on need to be patient advocate, benefit of EOB activity. Pt left with chair alarm on, call light within reach, CHICHO on, and nursing made aware. Pt's family entering at end of session. Assessment / Impression:    Patient's tolerance of treatment: Well  Adverse Reaction: None  Significant change in status and impact: Improved from initial evaluation  Barriers to improvement: None       Plan for Next Session:    Cont OT POC     Time in:  1127  Time out:  1159  Timed treatment minutes:  32  Total treatment time:  32      Electronically signed by:     SOLANGE Randall,   1/21/2023, 11:35 AM

## 2023-01-21 NOTE — PLAN OF CARE
Problem: Discharge Planning  Goal: Discharge to home or other facility with appropriate resources  Outcome: Progressing  Flowsheets (Taken 1/20/2023 2009)  Discharge to home or other facility with appropriate resources: Identify barriers to discharge with patient and caregiver     Problem: Pain  Goal: Verbalizes/displays adequate comfort level or baseline comfort level  Outcome: Progressing  Flowsheets (Taken 1/20/2023 2009)  Verbalizes/displays adequate comfort level or baseline comfort level: Encourage patient to monitor pain and request assistance     Problem: Skin/Tissue Integrity  Goal: Absence of new skin breakdown  Description: 1. Monitor for areas of redness and/or skin breakdown  2. Assess vascular access sites hourly  3. Every 4-6 hours minimum:  Change oxygen saturation probe site  4. Every 4-6 hours:  If on nasal continuous positive airway pressure, respiratory therapy assess nares and determine need for appliance change or resting period.   Outcome: Progressing     Problem: Safety - Adult  Goal: Free from fall injury  Outcome: Progressing     Problem: Nutrition Deficit:  Goal: Optimize nutritional status  Outcome: Progressing  Flowsheets (Taken 1/20/2023 1338 by Ledy Jackson, RD, LD)  Nutrient intake appropriate for improving, restoring, or maintaining nutritional needs:   Assess nutritional status and recommend course of action   Monitor oral intake, labs, and treatment plans   Recommend appropriate diets, oral nutritional supplements, and vitamin/mineral supplements

## 2023-01-21 NOTE — PROGRESS NOTES
V2.0  [facility] [place]      Name:  Rebeka Salinas /Age/Sex: 3/23/1930  (80 y.o. male)   MRN & CSN:  [id number] & [id number] Encounter Date/Time: 2023 8:41 AM EST    Location:  [address] [place] [address]-A PCP: Moraima Hendricks APRN - [facility] Day: 6    Assessment and Plan:   Rebeka Salinas is a 80 y.o. male with pmh of HFrecoveredEF s/p BiV PPM, paroxysmal atrial fibrillation, hypertension, dementia, hyperlipidemia, anxiety/depression who presents with fall with subsequent chest and back pain and admitted for Failure to thrive in adult      Plan:    Unwitnessed fall  Chest pain -reproducible, likely MSK, do not suspect ACS  Back pain  Family found patient getting up from ground on 1/15 and since has been complaining of chest pain and back pain. Patient does not remember fall. CTH, CT cervical/thoracic/lumbar spine, pelvis x-ray, CXR all negative for acute fracture/deformity. -PT/OT, fall precautions    UTI  -UA indicative of UTI however has predisposition towards contamination due to history of phimosis (previously declined circumcision or dorsal slit)  -Initiated rocephin, prior cultures grew pansensitive E. Coli  -F/u urine cultures and sensitivities    NSTEMI  Patient initially had chest pain after fall but this resolved after arrival.  Troponin elevated to 0.022 but downtrended. EKG with BiV paced rhythm.  -Cardiology advised no further ischemic work-up    Chronic systolic heart failure -recovered EF (50% in 2019)  S/p BiV PPM -device interrogation does not exhibit any ventricular arrhythmias  Hypertension  No sign of volume overload. -Continue GDMT as tolerated  -Continue home antihypertensives    Paroxysmal A. Fib  -Continue Eliquis 2.5 mg twice daily, Toprol XL 25 mg daily    CKD  Baseline creatinine 1.4.  -Monitor and avoid nephrotoxic medications    Dementia -with recent behavioral disturbances  Baseline orientation to self, family, city, month but not time.  Per daughter who lives with and cares for pt, she can no longer get him to bathe and reports he is noncompliant with his medication. Has times where he is hateful towards her and curses. Pt tends to sundown.  -Continue donepezil  -CM provided daughter with resources such as [employer] and [address] [place] [address] on [address]  -Psychiatry consulted, discussed case  -SLP    HLD -continue statin  Hypothyroidism -continue Synthroid  BPH -continue finasteride and tamsulosin  Anxiety/depression -continue sertraline and bupropion      Diet ADULT DIET; Easy to Chew  ADULT ORAL NUTRITION SUPPLEMENT; Lunch, Dinner; Frozen Oral Supplement   DVT Prophylaxis [] Lovenox, []  Heparin, [] SCDs, [] Ambulation,  [x] Eliquis, [] Xarelto  [] Coumadin   Code Status Full Code   Disposition From: Home  Expected Disposition: TBD  Estimated Date of Discharge:   Patient requires continued admission due to unwitnessed fall and PT/OT evaluation. Surrogate Decision Maker/ POA      Subjective:     Chief Complaint: Chest Pain and Fall       Pt alert and pleasant, interacting appropriately. Reports no pain at this time. Is a poor historian. Hypotension resolved. Discussed with family at bedside. Review of Systems:    Review of Systems    Difficult to obtain as pt has dementia. Otherwise, negative except as above. Objective: Intake/Output Summary (Last 24 hours) at 1/21/2023 1521  Last data filed at 1/21/2023 1316  Gross per 24 hour   Intake 577.6 ml   Output 850 ml   Net -272.4 ml          Vitals:   Vitals:    01/21/23 0830   BP: 135/89   Pulse: 81   Resp: 16   Temp: 97.5 °F (36.4 °C)   SpO2: 99%       Physical Exam:     General: NAD  Eyes: EOMI  ENT: neck supple  Cardiovascular: Regular rate. Respiratory: Clear to auscultation  Gastrointestinal: Soft, non tender  Genitourinary: no suprapubic tenderness  Musculoskeletal: No edema  Skin: warm, dry  Neuro: Alert. Psych: Mood appropriate.      Medications:   Medications:    divalproex  250 mg Oral Dinner    traZODone  50 mg Oral Nightly    cefTRIAXone (ROCEPHIN) IV  1,000 mg IntraVENous Q24H    buPROPion  75 mg Oral Daily    divalproex  250 mg Oral Nightly    lidocaine  2 patch TransDERmal Daily    vitamin C  1,000 mg Oral Daily    atorvastatin  20 mg Oral Nightly    donepezil  10 mg Oral Nightly    apixaban  2.5 mg Oral BID    finasteride  5 mg Oral Daily    levothyroxine  100 mcg Oral Daily    [Held by provider] lisinopril  10 mg Oral Daily    metoprolol succinate  25 mg Oral Daily    sertraline  100 mg Oral Daily    tamsulosin  0.4 mg Oral Daily    sodium chloride flush  5-40 mL IntraVENous 2 times per day      Infusions:    sodium chloride       PRN Meds: sodium chloride flush, 5-40 mL, PRN  sodium chloride, , PRN  ondansetron, 4 mg, Q8H PRN   Or  ondansetron, 4 mg, Q6H PRN  polyethylene glycol, 17 g, Daily PRN  acetaminophen, 650 mg, Q6H PRN   Or  acetaminophen, 650 mg, Q6H PRN      Labs      Recent Results (from the past 24 hour(s))   CBC with Auto Differential    Collection Time: 01/21/23 10:31 AM   Result Value Ref Range    WBC 5.5 4.0 - 10.5 K/CU MM    RBC 3.53 (L) 4.6 - 6.2 M/CU MM    Hemoglobin 11.3 (L) 13.5 - 18.0 GM/DL    Hematocrit 35.4 (L) 42 - 52 %    .3 (H) 78 - 100 FL    MCH 32.0 (H) 27 - 31 PG    MCHC 31.9 (L) 32.0 - 36.0 %    RDW 14.2 11.7 - 14.9 %    Platelets 864 457 - 402 K/CU MM    MPV 9.7 7.5 - 11.1 FL    Differential Type AUTOMATED DIFFERENTIAL     Segs Relative 70.9 (H) 36 - 66 %    Lymphocytes % 14.9 (L) 24 - 44 %    Monocytes % 8.0 (H) 0 - 4 %    Eosinophils % 4.4 (H) 0 - 3 %    Basophils % 0.9 0 - 1 %    Segs Absolute 3.9 K/CU MM    Lymphocytes Absolute 0.8 K/CU MM    Monocytes Absolute 0.4 K/CU MM    Eosinophils Absolute 0.2 K/CU MM    Basophils Absolute 0.1 K/CU MM    Nucleated RBC % 0.0 %    Total Nucleated RBC 0.0 K/CU MM    Total Immature Neutrophil 0.05 K/CU MM    Immature Neutrophil % 0.9 (H) 0 - 0.43 %   Valproic Acid Level, Total    Collection Time: 01/21/23 10:31 AM   Result Value Ref Range    Valproic Acid Lvl 27.2 (L) 50 - 100 UG/ML    DOSE AMOUNT DOSE AMT. GIVEN - UNKNOWN     DOSE TIME DOSE TIME GIVEN - UNKNOWN    Vitamin B12 & Folate    Collection Time: 01/21/23 10:31 AM   Result Value Ref Range    Vitamin B-12 603.7 211 - 911 pg/ml    Folate 12.4 3.1 - 17.5 NG/ML   Basic Metabolic Panel    Collection Time: 01/21/23 10:31 AM   Result Value Ref Range    Sodium 140 135 - 145 MMOL/L    Potassium 4.3 3.5 - 5.1 MMOL/L    Chloride 107 99 - 110 mMol/L    CO2 25 21 - 32 MMOL/L    Anion Gap 8 4 - 16    BUN 38 (H) 6 - 23 MG/DL    Creatinine 1.4 (H) 0.9 - 1.3 MG/DL    Est, Glom Filt Rate 47 (L) >60 mL/min/1.73m2    Glucose 100 (H) 70 - 99 MG/DL    Calcium 8.3 8.3 - 10.6 MG/DL   Magnesium    Collection Time: 01/21/23 10:31 AM   Result Value Ref Range    Magnesium 2.1 1.8 - 2.4 mg/dl          Imaging/Diagnostics Last 24 Hours   XR PELVIS (1-2 VIEWS)    Result Date: 1/16/2023  EXAMINATION: ONE XRAY VIEW OF THE PELVIS 1/16/2023 2:09 am COMPARISON: 08/27/2019 HISTORY: ORDERING SYSTEM PROVIDED HISTORY: trauma TECHNOLOGIST PROVIDED HISTORY: Reason for exam:->trauma Reason for Exam: fall, trauma Additional signs and symptoms: na Relevant Medical/Surgical History: arthritis FINDINGS: No traumatic pelvic fracture is identified. Degenerative changes seen in the hip joints, spine and pubic symphysis. Surgical clips are seen in the right proximal lower extremity. Atherosclerotic calcifications are identified. Aorto bi-iliac endograft noted. Embolization coils seen as well. No acute traumatic injury is identified.      CT HEAD WO CONTRAST    Result Date: 1/16/2023  EXAMINATION: CT OF THE HEAD WITHOUT CONTRAST  1/16/2023 2:08 am TECHNIQUE: CT of the head was performed without the administration of intravenous contrast. Automated exposure control, iterative reconstruction, and/or weight based adjustment of the mA/kV was utilized to reduce the radiation dose to as low as reasonably achievable. COMPARISON: None. HISTORY: ORDERING SYSTEM PROVIDED HISTORY: HEAD TRAUMA, CLOSED, MILD, GCS >= 13, NO RISK FACTORS, NEURO EXAM NORMAL TECHNOLOGIST PROVIDED HISTORY: Has a \"code stroke\" or \"stroke alert\" been called?->No Reason for exam:->fall Decision Support Exception - unselect if not a suspected or confirmed emergency medical condition->Emergency Medical Condition (MA) Reason for Exam: fall, Head trauma, closed, mild, GCS >= 13, no risk factors, neuro exam normal FINDINGS: BRAIN/VENTRICLES: There is no acute intracranial hemorrhage, mass effect or midline shift.  No abnormal extra-axial fluid collection.  The gray-white differentiation is maintained without evidence of an acute infarct.  There is no evidence of hydrocephalus. Diffuse parenchymal volume loss with moderate chronic white matter microvascular ischemic changes. ORBITS: The visualized portion of the orbits demonstrate no acute abnormality. SINUSES: The visualized paranasal sinuses and mastoid air cells demonstrate no acute abnormality. SOFT TISSUES/SKULL:  No acute abnormality of the visualized skull or soft tissues.     1. No acute intracranial abnormality. 2. Diffuse parenchymal volume loss with moderate chronic white matter microischemic changes.     CT CERVICAL SPINE WO CONTRAST    Result Date: 1/16/2023  EXAMINATION: CT OF THE CERVICAL SPINE WITHOUT CONTRAST 1/16/2023 2:09 am TECHNIQUE: CT of the cervical spine was performed without the administration of intravenous contrast. Multiplanar reformatted images are provided for review. Automated exposure control, iterative reconstruction, and/or weight based adjustment of the mA/kV was utilized to reduce the radiation dose to as low as reasonably achievable. COMPARISON: None. HISTORY: ORDERING SYSTEM PROVIDED HISTORY: fall TECHNOLOGIST PROVIDED HISTORY: Reason for exam:->fall Decision Support Exception - unselect if not a suspected or confirmed emergency medical condition->Emergency Medical  Condition (MA) Reason for Exam: fall FINDINGS: BONES/ALIGNMENT: There is no acute fracture or suspect osseous lesion. Vertebral body heights are maintained. There is 2 mm degenerative anterolisthesis of C7 on T1 secondary to disc height loss and facet arthropathy. No traumatic malalignment. DEGENERATIVE CHANGES: Mild-to-moderate multilevel degenerative disease and facet arthropathy. No high-grade bony spinal canal stenosis. SOFT TISSUES: No acute paraspinal soft tissue abnormality. No acute osseous abnormality of the cervical spine. CT THORACIC SPINE WO CONTRAST    Result Date: 1/16/2023  EXAMINATION: CT OF THE THORACIC SPINE WITHOUT CONTRAST  1/16/2023 2:09 am: TECHNIQUE: CT of the thoracic spine was performed without the administration of intravenous contrast. Multiplanar reformatted images are provided for review. Automated exposure control, iterative reconstruction, and/or weight based adjustment of the mA/kV was utilized to reduce the radiation dose to as low as reasonably achievable. COMPARISON: None. HISTORY: ORDERING SYSTEM PROVIDED HISTORY: fall TECHNOLOGIST PROVIDED HISTORY: CT  T-Spine w/o Contrast Reason for exam:->fall Reason for Exam: fall FINDINGS: BONES/ALIGNMENT: There is no acute fracture or suspect osseous lesion. Vertebral body heights are maintained. There is mild lower thoracic levoscoliosis without spondylolisthesis. DEGENERATIVE CHANGES: Mild-to-moderate diffuse disc height loss and desiccation. No high-grade bony spinal canal stenosis. Multilevel facet hypertrophy in the lower thoracic spine. SOFT TISSUES: No acute paraspinal soft tissue abnormality. 1. No acute fracture or traumatic malalignment of the thoracic spine. 2. Mild-to-moderate degenerative changes.      CT LUMBAR SPINE WO CONTRAST    Result Date: 1/16/2023  EXAMINATION: CT OF THE LUMBAR SPINE WITHOUT CONTRAST  1/16/2023 TECHNIQUE: CT of the lumbar spine was performed without the administration of intravenous contrast. Multiplanar reformatted images are provided for review. Adjustment of mA and/or kV according to patient size was utilized. Automated exposure control, iterative reconstruction, and/or weight based adjustment of the mA/kV was utilized to reduce the radiation dose to as low as reasonably achievable. COMPARISON: None HISTORY: ORDERING SYSTEM PROVIDED HISTORY: fall TECHNOLOGIST PROVIDED HISTORY: Reason for exam:->fall Decision Support Exception - unselect if not a suspected or confirmed emergency medical condition->Emergency Medical Condition (MA) Reason for Exam: fall FINDINGS: BONES/ALIGNMENT: There is no acute fracture or suspect osseous lesion. Vertebral body heights are maintained. There is grade 1 anterolisthesis of L5 on S1 secondary to disc height loss and facet arthropathy. Moderate dextroscoliosis is present centered at L1 with mild reciprocal levoscoliosis centered at L4. DEGENERATIVE CHANGES: Moderate multilevel disc disease and moderate to severe facet hypertrophy. SOFT TISSUES/RETROPERITONEUM: No acute paraspinal soft tissue abnormality. Prior aorto bi-iliac endograft. 1. No acute fracture or traumatic malalignment of the lumbar spine. 2. Moderate to severe degenerative changes with degenerative grade 1 anterolisthesis of L5 on S1. 3. Moderate upper lumbar dextroscoliosis with reciprocal lower lumbar levoscoliosis. XR CHEST PORTABLE    Result Date: 1/16/2023  EXAMINATION: ONE XRAY VIEW OF THE CHEST 1/16/2023 2:14 am COMPARISON: 10/28/2022 HISTORY: ORDERING SYSTEM PROVIDED HISTORY: CP/FALL TECHNOLOGIST PROVIDED HISTORY: Reason for exam:->CP/FALL Reason for Exam: chest pain, fall Additional signs and symptoms: na Relevant Medical/Surgical History: asthma, PAF, bradycardia FINDINGS: Aortic atherosclerosis. Implanted cardiac device is identified. No significant cardiomegaly. No focal consolidation or pleural effusion. No pneumothorax.   Degenerative changes are seen in the shoulders and spine.  No acute osseous abnormality is identified. High-riding humeral head on the right may be related to a chronic rotator cuff tear.      1. No acute abnormality seen in the chest.       Electronically signed by Mary Michael MD on 1/21/2023 at 3:21 PM

## 2023-01-22 PROCEDURE — 6370000000 HC RX 637 (ALT 250 FOR IP): Performed by: NURSE PRACTITIONER

## 2023-01-22 PROCEDURE — 6370000000 HC RX 637 (ALT 250 FOR IP): Performed by: INTERNAL MEDICINE

## 2023-01-22 PROCEDURE — 2580000003 HC RX 258: Performed by: STUDENT IN AN ORGANIZED HEALTH CARE EDUCATION/TRAINING PROGRAM

## 2023-01-22 PROCEDURE — 94761 N-INVAS EAR/PLS OXIMETRY MLT: CPT

## 2023-01-22 PROCEDURE — 1200000000 HC SEMI PRIVATE

## 2023-01-22 PROCEDURE — 2580000003 HC RX 258: Performed by: INTERNAL MEDICINE

## 2023-01-22 PROCEDURE — 6360000002 HC RX W HCPCS: Performed by: STUDENT IN AN ORGANIZED HEALTH CARE EDUCATION/TRAINING PROGRAM

## 2023-01-22 RX ORDER — METOPROLOL SUCCINATE 25 MG/1
12.5 TABLET, EXTENDED RELEASE ORAL DAILY
Status: DISCONTINUED | OUTPATIENT
Start: 2023-01-23 | End: 2023-01-23 | Stop reason: HOSPADM

## 2023-01-22 RX ADMIN — CEFTRIAXONE SODIUM 1000 MG: 1 INJECTION, POWDER, FOR SOLUTION INTRAMUSCULAR; INTRAVENOUS at 17:40

## 2023-01-22 RX ADMIN — BUPROPION HYDROCHLORIDE 75 MG: 75 TABLET, FILM COATED ORAL at 08:28

## 2023-01-22 RX ADMIN — Medication 10 ML: at 08:28

## 2023-01-22 RX ADMIN — SERTRALINE HYDROCHLORIDE 100 MG: 50 TABLET ORAL at 08:28

## 2023-01-22 RX ADMIN — TAMSULOSIN HYDROCHLORIDE 0.4 MG: 0.4 CAPSULE ORAL at 08:28

## 2023-01-22 RX ADMIN — DIVALPROEX SODIUM 250 MG: 125 CAPSULE, COATED PELLETS ORAL at 17:38

## 2023-01-22 RX ADMIN — TRAZODONE HYDROCHLORIDE 50 MG: 50 TABLET ORAL at 20:29

## 2023-01-22 RX ADMIN — LEVOTHYROXINE SODIUM 100 MCG: 100 TABLET ORAL at 05:47

## 2023-01-22 RX ADMIN — APIXABAN 2.5 MG: 2.5 TABLET, FILM COATED ORAL at 20:29

## 2023-01-22 RX ADMIN — Medication 10 ML: at 20:29

## 2023-01-22 RX ADMIN — FINASTERIDE 5 MG: 5 TABLET, FILM COATED ORAL at 08:28

## 2023-01-22 RX ADMIN — DIVALPROEX SODIUM 250 MG: 250 TABLET, EXTENDED RELEASE ORAL at 20:29

## 2023-01-22 RX ADMIN — APIXABAN 2.5 MG: 2.5 TABLET, FILM COATED ORAL at 08:28

## 2023-01-22 RX ADMIN — OXYCODONE HYDROCHLORIDE AND ACETAMINOPHEN 1000 MG: 500 TABLET ORAL at 08:28

## 2023-01-22 RX ADMIN — ATORVASTATIN CALCIUM 20 MG: 10 TABLET, FILM COATED ORAL at 20:29

## 2023-01-22 RX ADMIN — DONEPEZIL HYDROCHLORIDE 10 MG: 10 TABLET ORAL at 20:29

## 2023-01-22 ASSESSMENT — PAIN SCALES - WONG BAKER
WONGBAKER_NUMERICALRESPONSE: 0

## 2023-01-22 ASSESSMENT — PAIN SCALES - GENERAL
PAINLEVEL_OUTOF10: 0
PAINLEVEL_OUTOF10: 0

## 2023-01-22 NOTE — PROGRESS NOTES
V2.0  [facility] Hospitalist Progress Note      Name:  Junior Davies /Age/Sex: 3/23/1930  (80 y.o. male)   MRN & CSN:  [id number] & [id number] Encounter Date/Time: 2023 8:41 AM EST    Location:  [address] [place] [address]-A PCP: Awilda Mary, APRN - [facility] Day: 7    Assessment and Plan:   Junior Davies is a 80 y.o. male with pmh of HFrecoveredEF s/p BiV PPM, paroxysmal atrial fibrillation, hypertension, dementia, hyperlipidemia, anxiety/depression who presents with fall with subsequent chest and back pain and admitted for Failure to thrive in adult      Plan:    Unwitnessed fall  Chest pain -reproducible, likely MSK, do not suspect ACS  Back pain  Family found patient getting up from ground on 1/15 and since has been complaining of chest pain and back pain. Patient does not remember fall. CTH, CT cervical/thoracic/lumbar spine, pelvis x-ray, CXR all negative for acute fracture/deformity. -PT/OT, fall precautions    E coli UTI  History of phimosis (previously declined circumcision or dorsal slit). -Continue rocephin, prior cultures grew pansensitive E. Coli  -F/u sensitivities    NSTEMI  Patient initially had chest pain after fall but this resolved after arrival.  Troponin elevated to 0.022 but downtrended. EKG with BiV paced rhythm.  -Cardiology advised no further ischemic work-up    Chronic systolic heart failure -recovered EF (50% in 2019)  S/p BiV PPM -device interrogation does not exhibit any ventricular arrhythmias  Hypertension  No sign of volume overload. -Continue GDMT as tolerated  -Continue home antihypertensives    Paroxysmal A. Fib  -Continue Eliquis 2.5 mg twice daily, Toprol XL 25 mg daily    CKD  Baseline creatinine 1.4.  -Monitor and avoid nephrotoxic medications    Dementia -with recent behavioral disturbances  Baseline orientation to self, family, city, month but not time.  Per daughter who lives with and cares for pt, she can no longer get him to bathe and reports he is noncompliant with his medication. Has times where he is hateful towards her and curses. Pt tends to sundown.  -Continue donepezil  -CM provided daughter with resources such as [employer] and [employer] on [address]  -Psychiatry consulted, discussed case  -SLP    HLD -continue statin  Hypothyroidism -continue Synthroid  BPH -continue finasteride and tamsulosin  Anxiety/depression -continue sertraline and bupropion      Diet ADULT DIET; Easy to Chew  ADULT ORAL NUTRITION SUPPLEMENT; Lunch, Dinner; Frozen Oral Supplement   DVT Prophylaxis [] Lovenox, []  Heparin, [] SCDs, [] Ambulation,  [x] Eliquis, [] Xarelto  [] Coumadin   Code Status Full Code   Disposition From: Home  Expected Disposition: TBD  Estimated Date of Discharge:   Patient requires continued admission due to unwitnessed fall and PT/OT evaluation. Surrogate Decision Maker/ POA      Subjective:     Chief Complaint: Chest Pain and Fall       Pt alert and pleasant, interacting appropriately. Reports no pain at this time. Is a poor historian. Hypotension resolved. Discussed with family at bedside. Review of Systems:    Review of Systems    Difficult to obtain as pt has dementia. Otherwise, negative except as above. Objective: Intake/Output Summary (Last 24 hours) at 1/22/2023 1442  Last data filed at 1/22/2023 1146  Gross per 24 hour   Intake 400 ml   Output 350 ml   Net 50 ml          Vitals:   Vitals:    01/22/23 0827   BP: 103/66   Pulse: 83   Resp: 16   Temp: 97.7 °F (36.5 °C)   SpO2: 98%       Physical Exam:     General: NAD  Eyes: EOMI  ENT: neck supple  Cardiovascular: Regular rate. Respiratory: Clear to auscultation  Gastrointestinal: Soft, non tender  Genitourinary: no suprapubic tenderness  Musculoskeletal: No edema  Skin: warm, dry  Neuro: Alert. Psych: Mood appropriate.      Medications:   Medications:    [START ON 1/23/2023] metoprolol succinate  12.5 mg Oral Daily    divalproex  250 mg Oral Dinner    traZODone  50 mg Oral Nightly    cefTRIAXone (ROCEPHIN) IV  1,000 mg IntraVENous Q24H    buPROPion  75 mg Oral Daily    divalproex  250 mg Oral Nightly    lidocaine  2 patch TransDERmal Daily    vitamin C  1,000 mg Oral Daily    atorvastatin  20 mg Oral Nightly    donepezil  10 mg Oral Nightly    apixaban  2.5 mg Oral BID    finasteride  5 mg Oral Daily    levothyroxine  100 mcg Oral Daily    [Held by provider] lisinopril  10 mg Oral Daily    sertraline  100 mg Oral Daily    tamsulosin  0.4 mg Oral Daily    sodium chloride flush  5-40 mL IntraVENous 2 times per day      Infusions:    sodium chloride       PRN Meds: sodium chloride flush, 5-40 mL, PRN  sodium chloride, , PRN  ondansetron, 4 mg, Q8H PRN   Or  ondansetron, 4 mg, Q6H PRN  polyethylene glycol, 17 g, Daily PRN  acetaminophen, 650 mg, Q6H PRN   Or  acetaminophen, 650 mg, Q6H PRN      Labs      No results found for this or any previous visit (from the past 24 hour(s)). Imaging/Diagnostics Last 24 Hours   XR PELVIS (1-2 VIEWS)    Result Date: 1/16/2023  EXAMINATION: ONE XRAY VIEW OF THE PELVIS 1/16/2023 2:09 am COMPARISON: 08/27/2019 HISTORY: ORDERING SYSTEM PROVIDED HISTORY: trauma TECHNOLOGIST PROVIDED HISTORY: Reason for exam:->trauma Reason for Exam: fall, trauma Additional signs and symptoms: na Relevant Medical/Surgical History: arthritis FINDINGS: No traumatic pelvic fracture is identified. Degenerative changes seen in the hip joints, spine and pubic symphysis. Surgical clips are seen in the right proximal lower extremity. Atherosclerotic calcifications are identified. Aorto bi-iliac endograft noted. Embolization coils seen as well. No acute traumatic injury is identified.      CT HEAD WO CONTRAST    Result Date: 1/16/2023  EXAMINATION: CT OF THE HEAD WITHOUT CONTRAST  1/16/2023 2:08 am TECHNIQUE: CT of the head was performed without the administration of intravenous contrast. Automated exposure control, iterative reconstruction, and/or weight based adjustment of the mA/kV was utilized to reduce the radiation dose to as low as reasonably achievable. COMPARISON: None. HISTORY: ORDERING SYSTEM PROVIDED HISTORY: HEAD TRAUMA, CLOSED, MILD, GCS >= 13, NO RISK FACTORS, NEURO EXAM NORMAL TECHNOLOGIST PROVIDED HISTORY: Has a \"code stroke\" or \"stroke alert\" been called? ->No Reason for exam:->fall Decision Support Exception - unselect if not a suspected or confirmed emergency medical condition->Emergency Medical Condition (MA) Reason for Exam: fall, Head trauma, closed, mild, GCS >= 13, no risk factors, neuro exam normal FINDINGS: BRAIN/VENTRICLES: There is no acute intracranial hemorrhage, mass effect or midline shift. No abnormal extra-axial fluid collection. The gray-white differentiation is maintained without evidence of an acute infarct. There is no evidence of hydrocephalus. Diffuse parenchymal volume loss with moderate chronic white matter microvascular ischemic changes. ORBITS: The visualized portion of the orbits demonstrate no acute abnormality. SINUSES: The visualized paranasal sinuses and mastoid air cells demonstrate no acute abnormality. SOFT TISSUES/SKULL:  No acute abnormality of the visualized skull or soft tissues. 1. No acute intracranial abnormality. 2. Diffuse parenchymal volume loss with moderate chronic white matter microischemic changes. CT CERVICAL SPINE WO CONTRAST    Result Date: 1/16/2023  EXAMINATION: CT OF THE CERVICAL SPINE WITHOUT CONTRAST 1/16/2023 2:09 am TECHNIQUE: CT of the cervical spine was performed without the administration of intravenous contrast. Multiplanar reformatted images are provided for review. Automated exposure control, iterative reconstruction, and/or weight based adjustment of the mA/kV was utilized to reduce the radiation dose to as low as reasonably achievable. COMPARISON: None.  HISTORY: ORDERING SYSTEM PROVIDED HISTORY: fall TECHNOLOGIST PROVIDED HISTORY: Reason for exam:->fall Decision Support Exception - unselect if not a suspected or confirmed emergency medical condition->Emergency Medical Condition (MA) Reason for Exam: fall FINDINGS: BONES/ALIGNMENT: There is no acute fracture or suspect osseous lesion. Vertebral body heights are maintained. There is 2 mm degenerative anterolisthesis of C7 on T1 secondary to disc height loss and facet arthropathy. No traumatic malalignment. DEGENERATIVE CHANGES: Mild-to-moderate multilevel degenerative disease and facet arthropathy. No high-grade bony spinal canal stenosis. SOFT TISSUES: No acute paraspinal soft tissue abnormality. No acute osseous abnormality of the cervical spine. CT THORACIC SPINE WO CONTRAST    Result Date: 1/16/2023  EXAMINATION: CT OF THE THORACIC SPINE WITHOUT CONTRAST  1/16/2023 2:09 am: TECHNIQUE: CT of the thoracic spine was performed without the administration of intravenous contrast. Multiplanar reformatted images are provided for review. Automated exposure control, iterative reconstruction, and/or weight based adjustment of the mA/kV was utilized to reduce the radiation dose to as low as reasonably achievable. COMPARISON: None. HISTORY: ORDERING SYSTEM PROVIDED HISTORY: fall TECHNOLOGIST PROVIDED HISTORY: CT  T-Spine w/o Contrast Reason for exam:->fall Reason for Exam: fall FINDINGS: BONES/ALIGNMENT: There is no acute fracture or suspect osseous lesion. Vertebral body heights are maintained. There is mild lower thoracic levoscoliosis without spondylolisthesis. DEGENERATIVE CHANGES: Mild-to-moderate diffuse disc height loss and desiccation. No high-grade bony spinal canal stenosis. Multilevel facet hypertrophy in the lower thoracic spine. SOFT TISSUES: No acute paraspinal soft tissue abnormality. 1. No acute fracture or traumatic malalignment of the thoracic spine. 2. Mild-to-moderate degenerative changes.      CT LUMBAR SPINE WO CONTRAST    Result Date: 1/16/2023  EXAMINATION: CT OF THE LUMBAR SPINE WITHOUT CONTRAST  1/16/2023 TECHNIQUE: CT of the lumbar spine was performed without the administration of intravenous contrast. Multiplanar reformatted images are provided for review. Adjustment of mA and/or kV according to patient size was utilized. Automated exposure control, iterative reconstruction, and/or weight based adjustment of the mA/kV was utilized to reduce the radiation dose to as low as reasonably achievable. COMPARISON: None HISTORY: ORDERING SYSTEM PROVIDED HISTORY: fall TECHNOLOGIST PROVIDED HISTORY: Reason for exam:->fall Decision Support Exception - unselect if not a suspected or confirmed emergency medical condition->Emergency Medical Condition (MA) Reason for Exam: fall FINDINGS: BONES/ALIGNMENT: There is no acute fracture or suspect osseous lesion. Vertebral body heights are maintained. There is grade 1 anterolisthesis of L5 on S1 secondary to disc height loss and facet arthropathy. Moderate dextroscoliosis is present centered at L1 with mild reciprocal levoscoliosis centered at L4. DEGENERATIVE CHANGES: Moderate multilevel disc disease and moderate to severe facet hypertrophy. SOFT TISSUES/RETROPERITONEUM: No acute paraspinal soft tissue abnormality. Prior aorto bi-iliac endograft. 1. No acute fracture or traumatic malalignment of the lumbar spine. 2. Moderate to severe degenerative changes with degenerative grade 1 anterolisthesis of L5 on S1. 3. Moderate upper lumbar dextroscoliosis with reciprocal lower lumbar levoscoliosis. XR CHEST PORTABLE    Result Date: 1/16/2023  EXAMINATION: ONE XRAY VIEW OF THE CHEST 1/16/2023 2:14 am COMPARISON: 10/28/2022 HISTORY: ORDERING SYSTEM PROVIDED HISTORY: CP/FALL TECHNOLOGIST PROVIDED HISTORY: Reason for exam:->CP/FALL Reason for Exam: chest pain, fall Additional signs and symptoms: na Relevant Medical/Surgical History: asthma, PAF, bradycardia FINDINGS: Aortic atherosclerosis. Implanted cardiac device is identified. No significant cardiomegaly.   No focal consolidation or pleural effusion. No pneumothorax. Degenerative changes are seen in the shoulders and spine. No acute osseous abnormality is identified. High-riding humeral head on the right may be related to a chronic rotator cuff tear.      1. No acute abnormality seen in the chest.       Electronically signed by Venecia Blandon MD on 1/22/2023 at 2:42 PM

## 2023-01-23 VITALS
RESPIRATION RATE: 17 BRPM | DIASTOLIC BLOOD PRESSURE: 79 MMHG | BODY MASS INDEX: 21.15 KG/M2 | OXYGEN SATURATION: 97 % | SYSTOLIC BLOOD PRESSURE: 116 MMHG | HEART RATE: 79 BPM | WEIGHT: 139.55 LBS | TEMPERATURE: 97.8 F | HEIGHT: 68 IN

## 2023-01-23 LAB
CULTURE: ABNORMAL
CULTURE: ABNORMAL
Lab: ABNORMAL
SPECIMEN: ABNORMAL

## 2023-01-23 PROCEDURE — 97530 THERAPEUTIC ACTIVITIES: CPT

## 2023-01-23 PROCEDURE — 6370000000 HC RX 637 (ALT 250 FOR IP): Performed by: STUDENT IN AN ORGANIZED HEALTH CARE EDUCATION/TRAINING PROGRAM

## 2023-01-23 PROCEDURE — 6370000000 HC RX 637 (ALT 250 FOR IP): Performed by: INTERNAL MEDICINE

## 2023-01-23 PROCEDURE — 80164 ASSAY DIPROPYLACETIC ACD TOT: CPT

## 2023-01-23 PROCEDURE — 97535 SELF CARE MNGMENT TRAINING: CPT

## 2023-01-23 PROCEDURE — 2580000003 HC RX 258: Performed by: INTERNAL MEDICINE

## 2023-01-23 PROCEDURE — 6370000000 HC RX 637 (ALT 250 FOR IP): Performed by: NURSE PRACTITIONER

## 2023-01-23 PROCEDURE — 97116 GAIT TRAINING THERAPY: CPT

## 2023-01-23 RX ORDER — METOPROLOL SUCCINATE 25 MG/1
12.5 TABLET, EXTENDED RELEASE ORAL DAILY
Qty: 30 TABLET | Refills: 0 | Status: SHIPPED | OUTPATIENT
Start: 2023-01-24

## 2023-01-23 RX ORDER — BUPROPION HYDROCHLORIDE 75 MG/1
75 TABLET ORAL DAILY
Qty: 60 TABLET | Refills: 3 | Status: SHIPPED | OUTPATIENT
Start: 2023-01-24

## 2023-01-23 RX ORDER — DIVALPROEX SODIUM 125 MG/1
250 CAPSULE, COATED PELLETS ORAL
Qty: 60 CAPSULE | Refills: 0 | Status: SHIPPED | OUTPATIENT
Start: 2023-01-23

## 2023-01-23 RX ORDER — DIVALPROEX SODIUM 250 MG/1
250 TABLET, EXTENDED RELEASE ORAL NIGHTLY
Qty: 30 TABLET | Refills: 0 | Status: SHIPPED | OUTPATIENT
Start: 2023-01-23

## 2023-01-23 RX ORDER — TRAZODONE HYDROCHLORIDE 50 MG/1
50 TABLET ORAL NIGHTLY PRN
Qty: 30 TABLET | Refills: 0 | Status: SHIPPED | OUTPATIENT
Start: 2023-01-23

## 2023-01-23 RX ORDER — CEFDINIR 300 MG/1
300 CAPSULE ORAL 2 TIMES DAILY
Qty: 14 CAPSULE | Refills: 0 | Status: SHIPPED | OUTPATIENT
Start: 2023-01-23 | End: 2023-01-30

## 2023-01-23 RX ADMIN — Medication 10 ML: at 08:36

## 2023-01-23 RX ADMIN — BUPROPION HYDROCHLORIDE 75 MG: 75 TABLET, FILM COATED ORAL at 08:34

## 2023-01-23 RX ADMIN — OXYCODONE HYDROCHLORIDE AND ACETAMINOPHEN 1000 MG: 500 TABLET ORAL at 08:34

## 2023-01-23 RX ADMIN — SERTRALINE HYDROCHLORIDE 100 MG: 50 TABLET ORAL at 08:34

## 2023-01-23 RX ADMIN — LEVOTHYROXINE SODIUM 100 MCG: 100 TABLET ORAL at 06:06

## 2023-01-23 RX ADMIN — FINASTERIDE 5 MG: 5 TABLET, FILM COATED ORAL at 08:35

## 2023-01-23 RX ADMIN — METOPROLOL SUCCINATE 12.5 MG: 25 TABLET, EXTENDED RELEASE ORAL at 08:34

## 2023-01-23 RX ADMIN — TAMSULOSIN HYDROCHLORIDE 0.4 MG: 0.4 CAPSULE ORAL at 08:34

## 2023-01-23 RX ADMIN — APIXABAN 2.5 MG: 2.5 TABLET, FILM COATED ORAL at 08:34

## 2023-01-23 NOTE — PROGRESS NOTES
Outpatient Pharmacy Progress Note for Meds-to-Beds    Total number of Prescriptions Filled: 0    Additional Documentation:  Patient requested all medication be transferred to [facility]. Medications transferred. Thank you for letting us serve your patients.   [address] [place] [address] [place]    [address] [facility]    Phone: [phone]    Fax: [phone]

## 2023-01-23 NOTE — PROGRESS NOTES
Occupational Therapy    Occupational Therapy Treatment Note    Name: Twin Chin MRN: [id number] :   3/23/1930   Date:  2023   Admission Date: 2023 Room:  [address] [place] [address]-A     Primary Problem:    The primary encounter diagnosis was Chest pain, unspecified type. Diagnoses of Fall, initial encounter and Troponin level elevated were also pertinent to this visit. Restrictions/Precautions:          general precautions, fall risk, CHICHO    Communication with other providers: LPN approved session, PCA     Subjective:  Patient states: \"How many crazy ones like me do you see in a day? \" Pt demo decreased short term memory throughout session secondary to dementia, repeated cuing throughout. Pain:   Location, Type, Intensity (0/10 to 10/10):  generalized lower back and knee pain, did not quantify. Objective:    Observation: Pt presented supine in bed with nurse and PCA present. Objective Measures:  on RA,      Treatment, including education:  Therapeutic Activity Training:   Therapeutic activity training was instructed today. Cues were given for safety, sequence, UE/LE placement, awareness, and balance. Activities performed today included bed mobility training, sup-sit, sit-stand, SPT. Supine to Sit with HOB raised and with use of bed rail with SBA with increased time/cues. Sitting EOB with SBA for ~5 min. Sit to Stand transfer from EOB with CGA and with use of FWW. Functional mobility for X2 short HH distances within room including EOB <> bathroom and bathroom <> recliner with use of FWW with CGA and cues for safe AD mgmt. Stand to Sit with CGA and min v/c's to reach back prior to sitting. Self Care Training:   Cues were given for safety, sequence, UE/LE placement, visual cues, and balance. Dep Assist to don bilat socks, pt reports utilizing sock aid at home. Toilet t/f with min A with use of grab bar/FWW  CGA for pull-up mgmt in standing.  Pt attempting to complete colostomy care while seated on toilet and appeared confused, DEP assist to empty bag for safety. Pt required increased time to void this date. Pt stood at sink for ~1 min for hand hygiene and demo minor nose bleed, pt finished hygiene tasks seated after. Pt washed hands, face, and applied denture adhesive and placed in mouth all with set-up A and increased time to complete. Pt set up with breakfast tray at end of session with complete set-up. Pt educated on role of OT, benefit of OOB activity, and importance of utilizing call light for assistance. Pt left seated with chair alarm on, call light within reach,  on, and all needs met. Assessment / Impression:    Patient's tolerance of treatment: Well  Adverse Reaction: None  Significant change in status and impact: Improved from initial evaluation  Barriers to improvement: Safety/cog      Plan for Next Session:    Cont OT POC    Time in:  0841  Time out:  0920  Timed treatment minutes:  39  Total treatment time:  39      Electronically signed by:     SOLANGE Lopez,   1/23/2023, 9:21 AM

## 2023-01-23 NOTE — CARE COORDINATION
Pt on discharge to home with [facility], sent PS to [facility] to update on discharge order. No further CM needs id'd.

## 2023-01-23 NOTE — PROGRESS NOTES
[facility] made aware of patient discharging to home AVS faxed to [phone]. Patient's daughter present and aware of this and will be transporting patient.

## 2023-01-23 NOTE — PROGRESS NOTES
Physical Therapy    Physical Therapy Treatment Note  Name: Rebeka Salinas MRN: [id number] :   3/23/1930   Date:  2023   Admission Date: 2023 Room:  [address] [place] [address]   Restrictions/Precautions:          general precautions, falls   Communication with other providers:  RN   Subjective:  Patient states:  \"You mean they are kicking me out of here? \"   Pain:   Location, Type, Intensity (0/10 to 10/10):  right knee, chronic, did not rate   Objective:    Observation:    Sitting in recliner upon arrival. Cooperative with therapy. Dec STM, insight, and problem solving   Objective Measures:    Stable vitals on room air   Treatment, including education/measures:  Sit to stand: Salty from recliner and standard toilet. Unable to functionally use grab bar in the bathroom due to poor shoulder motion. Pt demonstrated inc posterior lean/push with both stands. Stand to sit: Salty for controlling sit to standard toilet, CGA to recliner  Step pivot: CGA for safety with RW (2x)   Ambulation: ~10ft +150ft with RW CGA for safety. Dec gracie and slightly antalgic due to right knee pain. Dec right LE stance time and bilat step length with fwd flexed posture. Short standing rest break (~20 seconds) during longer ambulation bout. Standing balance: CGA while at sink performing hand hygiene and face washing with no UE to single UE support. CGA while managing LB dressing without UE support x ~5 minutes   Sitting balance: SBA for safety, static and light dynamic with gopi care while at toilet x ~1 minute   Educated pt on POC, role of PT, DME use, discharge. Cues provided for sequencing to inc safety and indep with mobility.    Assessment / Impression:    Patient's tolerance of treatment:  good    Adverse Reaction: no   Significant change in status and impact:  no  Barriers to improvement:  activity tolerance, strength, balance, cognition, chronic right knee issues   Plan for Next Session:    Activity tolerance, strength, balance, gait, transfers, bed mobility, stairs   Time in:  1435  Time out:  1500  Timed treatment minutes:  25  Total treatment time:  25 minutes     Previously filed items:  Social/Functional History  Lives With: Daughter  Type of Home: House  Home Layout: Two level, Able to Live on Main level with bedroom/bathroom  Home Access: Stairs to enter with rails  Entrance Stairs - Number of Steps: 1  Bathroom Shower/Tub: Walk-in shower  Bathroom Toilet: Standard  Bathroom Equipment: Shower chair, Grab bars in [address] [place]: Marlon Sorrow, Walker, rolling, Rollator  ADL Assistance: Independent (though dtr reports pt has not bathed since Christmas.)  Homemaking Responsibilities: No (daughter manages household chores)  Ambulation Assistance: Independent (uses cane or one of his walkers, whichever is closest)  Transfer Assistance:  (Dtr helps with bed mobility. Pt able to transfer on his own between surfaces)  Active : No  Patient's  Info: daughter  Additional Comments: Dtr present and reported that pt needs a lot of encouragement to get out of bed, walk, or shower lately.         Short Term Goals  Time Frame for Short Term Goals: 2 weeks  Short Term Goal 1: Pt will perform sit><supine Salty  Short Term Goal 2: Pt will transfer to all surfaces SBA  Short Term Goal 3: Pt will ambulate 50ft with LRAD SBA  Short Term Goal 4: Pt will ascend/descend 1 step, single rail CGA  Short Term Goal 5: Pt will perform sitting light dynamic activity x 5 minutes, single UE support SBA    Electronically signed by:    JULIO [id number]  1/23/2023, 3:14 PM

## 2023-01-24 ENCOUNTER — TELEPHONE (OUTPATIENT)
Dept: FAMILY MEDICINE CLINIC | Age: 88
End: 2023-01-24

## 2023-01-24 ENCOUNTER — CARE COORDINATION (OUTPATIENT)
Dept: CASE MANAGEMENT | Age: 88
End: 2023-01-24

## 2023-01-24 DIAGNOSIS — N39.0 URINARY TRACT INFECTION WITHOUT HEMATURIA, SITE UNSPECIFIED: Primary | ICD-10-CM

## 2023-01-24 PROCEDURE — 1111F DSCHRG MED/CURRENT MED MERGE: CPT

## 2023-01-24 NOTE — CARE COORDINATION
[facility] Care Transitions Initial Follow Up Call    Call within 2 business days of discharge: Yes    Care Transition Nurse contacted Therese Talavera, by telephone to perform post hospital discharge assessment. Verified name and  with  Dtr  as identifiers. Provided introduction to self, and explanation of the Care Transition Nurse role. Patient: Dorita Shin Patient : 3/23/1930   MRN: [id number]  Reason for Admission: Fall, CP- MSK, UTI  Discharge Date: 23 RARS: Readmission Risk Score: 15.4  Facility: [facility]    Last Discharge  Street       Date Complaint Diagnosis Description Type Department Provider    23 Chest Pain; Fall Chest pain, unspecified type . .. ED to Hosp-Admission (Discharged) (ADMITTED) Christie Tiwari MD; Sarabjit Haynes. .. Was this an external facility discharge? No   Challenges to be reviewed by the provider   Additional needs identified to be addressed with provider: denies       Method of communication with provider: none. Dtr reports Patient doing well, back to baseline. Dtr provides the following Patient information: No further falls/near falls. Fall safety interventions reviewed. Patient uses walker for ambulation. Dtr/family provide 24/ supervision. No further chest pain. No urinary complaints. Reviewed UTI zone mgt education, verbalizes understanding. Reports appetite, fluid intake good w/ b&b wnl. Seen by [facility] today for intake visit w/ plan for SN/PT/OT. Dtr does not feel Palliative/Hospice care needed at this time. Denies additional resource needs. Care Transition Nurse reviewed discharge instructions, medical action plan, and red flags with  Dtr  who verbalized understanding. Dtr was given an opportunity to ask questions and does not have any further questions or concerns at this time. Were discharge instructions available to patient? Yes.  Reviewed appropriate site of care based on symptoms and resources available to patient including: PCP  Specialist  Urgent care clinics  Home health  [email or web address] Messaging. Dtr agrees to contact PCP office for questions related to  healthcare. Advance Care Planning:   Does patient have an Advance Directive: reviewed and current. Medication reconciliation was performed with  Dtr , who verbalizes understanding of administration of home medications. Medications reviewed, 1111F entered: yes    Was patient discharged with a pulse oximeter? no    Non-face-to-face services provided:  Obtained and reviewed discharge summary and/or continuity of care documents    Offered patient enrollment in the Remote Patient Monitoring (RPM) program for in-home monitoring:  declined .     Care Transitions 24 Hour Call    Schedule Follow Up Appointment with PCP: Michelle Clark you have a copy of your discharge instructions?: Yes  Do you have all of your prescriptions and are they filled?: Yes  Have you been contacted by a [facility] Pharmacist?: No  Have you scheduled your follow up appointment?: Yes  How are you going to get to your appointment?: Car - family or friend to transport  [address]: [address] [facility] [facility] (Comment: [facility]  [phone])  Patient DME: Luly rivas, Jillian Fine, Wheelchair, Other, [employer]  Other Patient DME: built in shower, grabbars  Do you have support at home?: Alone  Do you feel like you have everything you need to keep you well at home?: Yes  Are you an active caregiver in your home?: No  Care Transitions Interventions   Home Care Waiver: Completed      Hospice: Declined  Transportation Support: Declined      DME Assistance: Declined     Senior Services: Declined     Medication Assistance Program: Completed      Palliative Care: Declined            Dtr to provide transportation:   Future Appointments   Date Time Provider Arthur Estrada   1/25/2023  2:00 PM LIA Mahoney - CNP [facility]PS MMA   1/26/2023  2:45 PM Venkatesh Cerna MD [facility] MMA   5/17/2023  4:45 PM Venkatesh Cerna MD [facility] 6/27/2023  1:00 PM [facility] AWV LPN [facility]       Care Transition Nurse provided contact information. Plan for follow-up call in 5-7 days based on severity of symptoms and risk factors.   Plan for next call: symptom management-falls, cp, uti sx??  follow-up appointment-f/u on pcp/cardiology appts, confirm urology appt  referrals-RPM--declined    Alfonso Antonio RN

## 2023-01-25 ENCOUNTER — TELEMEDICINE (OUTPATIENT)
Dept: FAMILY MEDICINE CLINIC | Age: 88
End: 2023-01-25

## 2023-01-25 DIAGNOSIS — R77.8 ELEVATED TROPONIN LEVEL: ICD-10-CM

## 2023-01-25 DIAGNOSIS — M54.50 ACUTE BILATERAL LOW BACK PAIN WITHOUT SCIATICA: ICD-10-CM

## 2023-01-25 DIAGNOSIS — L30.9 DERMATITIS: ICD-10-CM

## 2023-01-25 DIAGNOSIS — M25.511 ACUTE PAIN OF RIGHT SHOULDER: ICD-10-CM
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
## 2023-01-25 DIAGNOSIS — Z09 HOSPITAL DISCHARGE FOLLOW-UP: Primary | ICD-10-CM

## 2023-01-25 DIAGNOSIS — R07.89 OTHER CHEST PAIN: ICD-10-CM

## 2023-01-25 NOTE — PROGRESS NOTES
Post-Discharge Transitional Care  Follow Up      Taiwo Ross   YOB: 1930    Date of Office Visit:  1/25/2023  Date of Hospital Admission: 1/16/23  Date of Hospital Discharge: 1/23/23  Risk of hospital readmission (high >=14%. Medium >=10%) :Readmission Risk Score: 15.4      Care management risk score Rising risk (score 2-5) and Complex Care (Scores >=6): No Risk Score On File     Non face to face  following discharge, date last encounter closed (first attempt may have been earlier): 01/24/2023    Call initiated 2 business days of discharge: Yes    ASSESSMENT/PLAN:   Hospital discharge follow-up  -     PA DISCHARGE MEDS RECONCILED W/ CURRENT OUTPATIENT MED LIST  -     Labs and imaging reviewed from hospital  -     Follow up planned with cardiology and urology.    Fall, subsequent encounter        -     [facility]  planning visits- has admitted patient into services yesterday        -     Fall was likely mechanical but details are unknown        -     Pathways in bedroom are clear of clutter        -     Patient has appropriate DME  Other chest pain        -     Troponin elevated mildly in hospital at 0.022        -     BiV paced rhythm noted on EKG        -     chest pain believed to be musculoskeletal in nature        -     Follow up with cardiology planned for 1/26/2023  Elevated troponin level        -     Follow up with cardiology planned for 1/26/2023        -     Troponin elevated mildly in hospital at 0.022        -     BiV paced rhythm noted on EKG        -     chest pain believed to be musculoskeletal in nature  Acute bilateral low back pain without sciatica        -    Lumbar/thoracic/cervical CT with no acute injuries        -    PT/OT ordered with [facility]  Acute pain of right shoulder        -    Will consider imaging if pain does not improve- daughter indicates that this is chronic in nature.         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   -     triamcinolone (KENALOG) 0.1 % ointment; Apply topically 2 times daily for 7 days, Topical, 2 TIMES DAILY Starting Wed 1/25/2023, Until Wed 2/1/2023, For 7 days, Disp-30 g, R-2, Normal    Medical Decision Making: low complexity  Return in about 3 months (around 4/25/2023). On this date 1/25/2023 I have spent 41 minutes reviewing previous notes, test results and face to face with the patient discussing the diagnosis and importance of compliance with the treatment plan as well as documenting on the day of the visit. Subjective:   HPI:  Follow up of Hospital problems/diagnosis(es): Fall; chest pain; back pain    Inpatient course: Discharge summary reviewed- see chart. Interval history/Current status: Patient is accompanied by his daughter Yanira Valderrama on video visit. Yanira Valderrama is unsure of how the fall happened- but she entered his room to advise that dinner was ready and found him on the floor. Patient was unsure of how he ended up on the floor. He doesn't remember the fall at all- he just knows that his body hurts. Initially he got up and ate dinner but a few hours later he was calling out for help and clutching his chest complaining of chest pain. Yanira Valderrama called the squad and he was admitted. Patient has been complaining of his back and right shoulder hurting this morning. He has returned to his baseline of sleeping most of the day and he has not eaten much as a result. Daughter reports that they have chili ready for him and he states that this does sound good. [facility] completed their initial visit yesterday and they are expected to call later this week to plan visits. They have a walker, grab bar and two built in benches in the shower and toilet riser/BSC already. Does not believe at this time that they need any additional equipment. Cardiology was consulted and he has a follow up appointment next week with Dr. Blane Coy. Dr. Sandi Smiley 2/13/2023 for follow up for E. Coli UTI.        Patient Active Problem List Diagnosis    Pure hypercholesterolemia    Essential hypertension    Perforated viscus    Anemia    Ileus following gastrointestinal surgery (HCC)    Hypokalemia    Cardiac pacemaker    Hypertrophy of prostate with urinary obstruction and other lower urinary tract symptoms (LUTS)    Gross hematuria    AAA (abdominal aortic aneurysm)    PAF (paroxysmal atrial fibrillation) ([facility])    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    Leukocytosis    Partial small bowel obstruction (HCC)    S/P biventricular cardiac pacemaker procedure    Pacemaker lead malfunction    Complete heart block (HCC)    Chronic systolic heart failure (HCC)    Small bowel obstruction (HCC)    Urinary tract infection without hematuria    Intractable nausea and vomiting    Other hyperlipidemia    PVD (peripheral vascular disease) ([address]    Acquired hypothyroidism    Pancreatitis, unspecified pancreatitis type    Mild episode of recurrent major depressive disorder ([facility])    Excessive daytime sleepiness    Fatigue    Dementia without behavioral disturbance ([address]    Dysuria    Plantar wart of left foot    Failure to thrive in adult       Medications listed as ordered at the time of discharge from hospital     Medication List            Accurate as of January 25, 2023  2:42 PM. If you have any questions, ask your nurse or doctor.                 START taking these medications      triamcinolone 0.1 % ointment  Commonly known as: KENALOG  Apply topically 2 times daily for 7 days  Started by: LIA Villareal CNP            CONTINUE taking these medications      atorvastatin 20 MG tablet  Commonly known as: LIPITOR  TAKE ONE (1) TABLET BY MOUTH ONCE DAILY     brimonidine-timolol 0.2-0.5 % ophthalmic solution  Commonly known as: COMBIGAN     buPROPion 75 MG tablet  Commonly known as: WELLBUTRIN  Take 1 tablet by mouth daily     cefdinir 300 MG capsule  Commonly known as: OMNICEF  Take 1 capsule by mouth 2 times daily for 7 days clotrimazole-betamethasone 1-0.05 % cream  Commonly known as: Lotrisone  Apply topically 2 times daily. * divalproex 250 MG extended release tablet  Commonly known as: DEPAKOTE ER  Take 1 tablet by mouth at bedtime     * divalproex 125 MG capsule  Commonly known as: DEPAKOTE SPRINKLE  Take 2 capsules by mouth Daily with supper     donepezil 10 MG tablet  Commonly known as: ARICEPT  TAKE ONE (1) TABLET BY MOUTH AT NIGHT     Eliquis 2.5 MG Tabs tablet  Generic drug: apixaban  TAKE ONE (1) TABLET BY MOUTH TWO TIMES DAILY     finasteride 5 MG tablet  Commonly known as: PROSCAR     fluticasone 50 MCG/ACT nasal spray  Commonly known as: FLONASE  2 sprays by Each Nostril route daily     JOINT HEALTH PO     latanoprost 0.005 % ophthalmic solution  Commonly known as: XALATAN     levothyroxine 100 MCG tablet  Commonly known as: SYNTHROID  TAKE ONE (1) TABLET BY MOUTH ONCE DAILY     metoprolol succinate 25 MG extended release tablet  Commonly known as: TOPROL XL  Take 0.5 tablets by mouth daily     sertraline 100 MG tablet  Commonly known as: ZOLOFT  TAKE ONE (1) TABLET BY MOUTH ONCE DAILY     tamsulosin 0.4 MG capsule  Commonly known as: FLOMAX  TAKE 1 CAPSULE BY MOUTH ONCE DAILY     traZODone 50 MG tablet  Commonly known as: DESYREL  Take 1 tablet by mouth nightly as needed for Sleep     vitamin C 1000 MG tablet           * This list has 2 medication(s) that are the same as other medications prescribed for you. Read the directions carefully, and ask your doctor or other care provider to review them with you. Where to Get Your Medications        These medications were sent to [facility], [address] [place] [address]. [address] [facility]      Phone: [phone]   triamcinolone 0.1 % ointment           Medications marked \"taking\" at this time  Outpatient Medications Marked as Taking for the 1/25/23 encounter (Telemedicine) with Jeannetta Heimlich, APRN - CNP   Medication Sig Dispense Refill    triamcinolone (KENALOG) 0.1 % ointment Apply topically 2 times daily for 7 days 30 g 2    buPROPion (WELLBUTRIN) 75 MG tablet Take 1 tablet by mouth daily 60 tablet 3    divalproex (DEPAKOTE ER) 250 MG extended release tablet Take 1 tablet by mouth at bedtime 30 tablet 0    divalproex (DEPAKOTE SPRINKLE) 125 MG capsule Take 2 capsules by mouth Daily with supper 60 capsule 0    traZODone (DESYREL) 50 MG tablet Take 1 tablet by mouth nightly as needed for Sleep 30 tablet 0    metoprolol succinate (TOPROL XL) 25 MG extended release tablet Take 0.5 tablets by mouth daily 30 tablet 0    cefdinir (OMNICEF) 300 MG capsule Take 1 capsule by mouth 2 times daily for 7 days 14 capsule 0    atorvastatin (LIPITOR) 20 MG tablet TAKE ONE (1) TABLET BY MOUTH ONCE DAILY 30 tablet 5    ELIQUIS 2.5 MG TABS tablet TAKE ONE (1) TABLET BY MOUTH TWO TIMES DAILY 60 tablet 5    levothyroxine (SYNTHROID) 100 MCG tablet TAKE ONE (1) TABLET BY MOUTH ONCE DAILY 30 tablet 5    sertraline (ZOLOFT) 100 MG tablet TAKE ONE (1) TABLET BY MOUTH ONCE DAILY 30 tablet 5    donepezil (ARICEPT) 10 MG tablet TAKE ONE (1) TABLET BY MOUTH AT NIGHT 30 tablet 5    finasteride (PROSCAR) 5 MG tablet       tamsulosin (FLOMAX) 0.4 MG capsule TAKE 1 CAPSULE BY MOUTH ONCE DAILY 30 capsule 5    latanoprost (XALATAN) 0.005 % ophthalmic solution       Ascorbic Acid (VITAMIN C) 1000 MG tablet Take 1,000 mg by mouth daily      brimonidine-timolol (COMBIGAN) 0.2-0.5 % ophthalmic solution Place 1 drop into both eyes every 12 hours      Glucosamine-MSM-Hyaluronic Acd (JOINT [employer] PO) Take 1 each by mouth daily           Medications patient taking as of now reconciled against medications ordered at time of hospital discharge: Yes    Patient awake and alert, appears slightly disheveled as he just woke up recently. Complains of pain in right shoulder and back likely from the fall.  No acute distress noted, respirations are easy and regular, thought process is easily communicated in speech. Objective: There were no vitals taken for this visit. General Appearance: alert and oriented to person, place and time, in no acute distress, alert, and disheveled  Skin: rash noted- lichenification on right forearm which is chronic but worsening as patient continues to scratch this area without thinking. Head: normocephalic and atraumatic  Additional physical assessment limited due to video visit. An electronic signature was used to authenticate this note.   --LIA Cardoza - CNP

## 2023-01-26 ENCOUNTER — OFFICE VISIT (OUTPATIENT)
Dept: CARDIOLOGY CLINIC | Age: 88
End: 2023-01-26
Payer: COMMERCIAL

## 2023-01-26 VITALS
WEIGHT: 149.8 LBS | BODY MASS INDEX: 23.51 KG/M2 | SYSTOLIC BLOOD PRESSURE: 100 MMHG | HEART RATE: 80 BPM | DIASTOLIC BLOOD PRESSURE: 72 MMHG | HEIGHT: 67 IN

## 2023-01-26 DIAGNOSIS — I10 ESSENTIAL HYPERTENSION: Primary | ICD-10-CM

## 2023-01-26 DIAGNOSIS — I73.9 PVD (PERIPHERAL VASCULAR DISEASE) (HCC): ICD-10-CM

## 2023-01-26 DIAGNOSIS — I44.2 COMPLETE HEART BLOCK (HCC): ICD-10-CM

## 2023-01-26 DIAGNOSIS — Z95.0 S/P BIVENTRICULAR CARDIAC PACEMAKER PROCEDURE: ICD-10-CM

## 2023-01-26 DIAGNOSIS — I71.43 INFRARENAL ABDOMINAL AORTIC ANEURYSM (AAA) WITHOUT RUPTURE: ICD-10-CM

## 2023-01-26 DIAGNOSIS — Z95.0 CARDIAC PACEMAKER: ICD-10-CM

## 2023-01-26 DIAGNOSIS — I48.0 PAF (PAROXYSMAL ATRIAL FIBRILLATION) (HCC): ICD-10-CM

## 2023-01-26 PROCEDURE — 99214 OFFICE O/P EST MOD 30 MIN: CPT | Performed by: INTERNAL MEDICINE

## 2023-01-26 PROCEDURE — 1123F ACP DISCUSS/DSCN MKR DOCD: CPT | Performed by: INTERNAL MEDICINE

## 2023-01-26 RX ORDER — MIDODRINE HYDROCHLORIDE 2.5 MG/1
2.5 TABLET ORAL 3 TIMES DAILY
Qty: 90 TABLET | Refills: 3 | Status: SHIPPED | OUTPATIENT
Start: 2023-01-26

## 2023-01-26 NOTE — PROGRESS NOTES
OFFICE PROGRESS NOTES      Lakhwinder Sanchez is a 80 y.o. male who has    CHIEF COMPLAINT AS FOLLOWS:  CHEST PAIN:  No C/O chest pain. Reportedly had chest pain the time he went to the hospital.   SOB: No C/O SOB at this time. LEG EDEMA: some times gets Lower extremity edema . PALPITATIONS: Denies any C/O Palpitations                                 DIZZINESS: No C/O Dizziness      SYNCOPE: None   OTHER/ ADDITIONAL COMPLAINTS:                                     HPI: Patient is here for F/U on his Arrhythmia, CHF, HTN & Dyslipidemia problems. CHF: Patient has known systolic (HFrEF). Patient had been managed with medical regimen as stated below. Arrhythmia: Patient has known  PAF. HTN: Patient has known essential HTN. Has been treated with guideline recommended medical / physical/ diet therapy as stated below. Dyslipidemia: Patient has known mixed dyslipidemia. Has been treated with guideline recommended medical / physical/ diet therapy as stated below.                 Current Outpatient Medications   Medication Sig Dispense Refill    triamcinolone (KENALOG) 0.1 % ointment Apply topically 2 times daily for 7 days 30 g 2    buPROPion (WELLBUTRIN) 75 MG tablet Take 1 tablet by mouth daily 60 tablet 3    divalproex (DEPAKOTE ER) 250 MG extended release tablet Take 1 tablet by mouth at bedtime 30 tablet 0    divalproex (DEPAKOTE SPRINKLE) 125 MG capsule Take 2 capsules by mouth Daily with supper 60 capsule 0    traZODone (DESYREL) 50 MG tablet Take 1 tablet by mouth nightly as needed for Sleep 30 tablet 0    metoprolol succinate (TOPROL XL) 25 MG extended release tablet Take 0.5 tablets by mouth daily 30 tablet 0    cefdinir (OMNICEF) 300 MG capsule Take 1 capsule by mouth 2 times daily for 7 days 14 capsule 0    atorvastatin (LIPITOR) 20 MG tablet TAKE ONE (1) TABLET BY MOUTH ONCE DAILY 30 tablet 5    ELIQUIS 2.5 MG TABS tablet TAKE ONE (1) TABLET BY MOUTH TWO TIMES DAILY 60 tablet 5    levothyroxine (SYNTHROID) 100 MCG tablet TAKE ONE (1) TABLET BY MOUTH ONCE DAILY 30 tablet 5    sertraline (ZOLOFT) 100 MG tablet TAKE ONE (1) TABLET BY MOUTH ONCE DAILY 30 tablet 5    donepezil (ARICEPT) 10 MG tablet TAKE ONE (1) TABLET BY MOUTH AT NIGHT 30 tablet 5    finasteride (PROSCAR) 5 MG tablet       tamsulosin (FLOMAX) 0.4 MG capsule TAKE 1 CAPSULE BY MOUTH ONCE DAILY 30 capsule 5    latanoprost (XALATAN) 0.005 % ophthalmic solution       Ascorbic Acid (VITAMIN C) 1000 MG tablet Take 1,000 mg by mouth daily      brimonidine-timolol (COMBIGAN) 0.2-0.5 % ophthalmic solution Place 1 drop into both eyes every 12 hours      Glucosamine-MSM-Hyaluronic Acd (JOINT HEALTH PO) Take 1 each by mouth daily       fluticasone (FLONASE) 50 MCG/ACT nasal spray 2 sprays by Each Nostril route daily (Patient not taking: No sig reported) 16 g 0    clotrimazole-betamethasone (LOTRISONE) 1-0.05 % cream Apply topically 2 times daily. (Patient not taking: No sig reported) 1 each 1     No current facility-administered medications for this visit. Allergies: Codeine and Fentanyl  Review of Systems:    Constitutional: Negative for diaphoresis and fatigue  Respiratory: Negative for shortness of breath  Cardiovascular: Negative for chest pain, dyspnea on exertion, claudication, edema, irregular heartbeat, murmur, palpitations or shortness of breath  Musculoskeletal: Negative for muscle pain, muscular weakness, negative for pain in arm and leg or swelling in foot and leg    Objective:  /72 (Site: Right Upper Arm, Position: Sitting, Cuff Size: Medium Adult)   Pulse 80   Ht 5' 7\" (1.702 m)   Wt 149 lb 12.8 oz (67.9 kg)   BMI 23.46 kg/m²   Wt Readings from Last 3 Encounters:   01/26/23 149 lb 12.8 oz (67.9 kg)   01/18/23 139 lb 8.8 oz (63.3 kg)   11/15/22 153 lb (69.4 kg)     Body mass index is 23.46 kg/m². GENERAL - Alert, oriented, pleasant, in no apparent distress. EYES: No jaundice, no conjunctival pallor. Neck - Supple.   No jugular venous distention noted. No carotid bruits. Cardiovascular - Normal S1 and S2 without obvious murmur or gallop. Extremities - No cyanosis, clubbing, or significant edema. Pulmonary - No respiratory distress. No wheezes or rales.       MEDICAL DECISION MAKING & DATA REVIEW:    Lab Review   Lab Results   Component Value Date/Time    TROPONINT 0.021 01/16/2023 06:25 PM    TROPONINT 0.028 01/16/2023 06:21 AM     Lab Results   Component Value Date/Time     02/07/2013 06:00 AM    PROBNP 2,012 01/16/2023 01:55 AM    PROBNP 2,032 09/01/2019 04:35 AM     Lab Results   Component Value Date    INR 1.24 01/16/2023    INR 1.06 04/09/2021     Lab Results   Component Value Date    LABA1C 5.7 02/24/2022     Lab Results   Component Value Date    WBC 5.5 01/21/2023    WBC 6.0 01/20/2023    HCT 35.4 (L) 01/21/2023    HCT 36.6 (L) 01/20/2023    .3 (H) 01/21/2023    MCV 97.9 01/20/2023     01/21/2023     01/20/2023     Lab Results   Component Value Date    CHOL 140 12/03/2020    CHOL 160 07/22/2014    TRIG 87 04/09/2021    TRIG 151 (H) 12/03/2020    HDL 39 (L) 12/03/2020    HDL 38 (L) 07/22/2014    LDLCALC 71 12/03/2020    LDLCALC 99 07/22/2014     Lab Results   Component Value Date    ALT 11 01/16/2023    ALT 18 10/28/2022    AST 17 01/16/2023    AST 26 10/28/2022     BMP:    Lab Results   Component Value Date/Time     01/21/2023 10:31 AM     01/20/2023 02:55 AM    K 4.3 01/21/2023 10:31 AM    K 4.4 01/20/2023 02:55 AM     01/21/2023 10:31 AM     01/20/2023 02:55 AM    CO2 25 01/21/2023 10:31 AM    CO2 22 01/20/2023 02:55 AM    BUN 38 01/21/2023 10:31 AM    BUN 37 01/20/2023 02:55 AM    CREATININE 1.4 01/21/2023 10:31 AM    CREATININE 1.5 01/20/2023 02:55 AM     CMP:   Lab Results   Component Value Date/Time     01/21/2023 10:31 AM     01/20/2023 02:55 AM    K 4.3 01/21/2023 10:31 AM    K 4.4 01/20/2023 02:55 AM     01/21/2023 10:31 AM     01/20/2023 02:55 AM    CO2 25 01/21/2023 10:31 AM    CO2 22 01/20/2023 02:55 AM    BUN 38 01/21/2023 10:31 AM    BUN 37 01/20/2023 02:55 AM    CREATININE 1.4 01/21/2023 10:31 AM    CREATININE 1.5 01/20/2023 02:55 AM    PROT 7.2 01/16/2023 01:55 AM    PROT 7.2 10/28/2022 06:50 PM    PROT 5.2 02/24/2013 05:10 AM    PROT 5.5 02/04/2013 05:30 PM     Lab Results   Component Value Date/Time    TSH 0.14 09/22/2021 04:12 PM    TSH 1.46 05/24/2021 01:56 PM    TSHHS 0.216 01/16/2023 07:00 AM    TSHHS 1.950 03/23/2016 07:41 PM       QUALITY MEASURES REVIEWED:  1.CAD:Patient is taking anti platelet agent:No  Patient does not have Hx of documented CAD  2. DYSLIPIDEMIA: Patient is on cholesterol lowering medication:Yes   3. Beta-Blocker therapy for CAD, if prior Myocardial Infarction:Yes   4. Counselled regarding smoking cessation. No   Patient does not Smoke. 5.Anticoagulation therapy (for A.Fib) No   Does Not have A.Fib.  6.Discussed weight management strategies. Assessment & Plan:  Primary / Secondary prevention is the goal by aggressive risk modification, healthy and therapeutic life style changes for cardiovascular risk reduction. CORONARY ARTERY DISEASE:None known  12/2018    IMI of a medium sized territory. No reversible Ischemia. Other areas perfuse normally. Inferior wall Hypokinesis with borderline reduction of LV function      HTN: BP is on the low side. - changes in  treatment: no, on low dose Lopressor. Will add Midodrine   CARDIOMYOPATHY: None known   CONGESTIVE HEART FAILURE: NO KNOWN HISTORY.     VHD: No significant VHD noted  ECHO 1/16/2023   Ejection fraction is visually estimated at 60-65%. Sclerotic, but non-stenotic aortic valve. Mild to Mod aortic regurgitation is noted. Trace tricuspid regurgitation; RVSP: 22 mmHg. No evidence of any pericardial effusion. Dilated ascending aorta measuring 4.2cm.     DYSLIPIDEMIA: Patient's profile is at / near [facility] [place] HDL is low                                Tolerating current medical regimen wellyes, take Lipitor                                                              See most recent Lab values in Labs section above. ARRHYTHMIAS:  Known H/O CHB                                Patient has H/O P. Atrial fibrillation                                He is rate controlled, on Metoprolol & on anticoagulation with Eliquis. S/P PPM: 1/2022  The device is Medtronic BIV pacemaker. Device interrogation was performed. Mode : DDDR    Sensing is normal. Impedence is normal.  Threshold is normal.    There has not been interval changes. Estimated battery life is 3.0 years   The patient is pacemaker dependent. HF management parameter are with in normal limits     TESTS ORDERED: [facility]     PREVIOUSLY ORDERED TESTS REVIEWED & DISCUSSED WITH THE PATIENT:     I personally reviewed & interpreted, all previously ordered tests as copied above. Latest Labs are pulled in to the note with dates. Labs, specially in Reference to Lipid profile, Cardiac testing in the form of Echo ( dated: ), stress tests ( dated: ) & other relevant cardiac testing reviewed with patient & recommendations made based on assessment of the results. Discussed role of Cardiac risk factors & effects + treatment of co morbidities with patient & advised accordingly. MEDICATIONS: List of medications patient is currently taking is reviewed in detail with the patient & family member present. Discussed any side effects or problems taking the medication. Recommend Continue present management & medications as listed. AFFIRMATION: I  reviewed patient's history, previous & current medical problems & all Labs + testing. This includes chart prep even prior to the vosit. Various goals are discussed and multiple questions answered. Relevant concelling performed. Office follow up in 3 months. Device check per protocol.

## 2023-01-26 NOTE — PATIENT INSTRUCTIONS
CORONARY ARTERY DISEASE:None known  12/2018    IMI of a medium sized territory. No reversible Ischemia. Other areas perfuse normally. Inferior wall Hypokinesis with borderline reduction of LV function      HTN: BP is on the low side. - changes in  treatment: no, on low dose Lopressor. Will add Midodrine   CARDIOMYOPATHY: None known   CONGESTIVE HEART FAILURE: NO KNOWN HISTORY.     VHD: No significant VHD noted  ECHO 1/16/2023   Ejection fraction is visually estimated at 60-65%. Sclerotic, but non-stenotic aortic valve. Mild to Mod aortic regurgitation is noted. Trace tricuspid regurgitation; RVSP: 22 mmHg. No evidence of any pericardial effusion. Dilated ascending aorta measuring 4.2cm. DYSLIPIDEMIA: Patient's profile is at / near [place],                                 HDL is low                                Tolerating current medical regimen wellyes, take Lipitor                                                              See most recent Lab values in Labs section above. ARRHYTHMIAS:  Known H/O CHB                                Patient has H/O P. Atrial fibrillation                                He is rate controlled, on Metoprolol & on anticoagulation with Eliquis. S/P PPM: 1/2022  The device is Medtronic BIV pacemaker. Device interrogation was performed. Mode : DDDR    Sensing is normal. Impedence is normal.  Threshold is normal.    There has not been interval changes. Estimated battery life is 3.0 years   The patient is pacemaker dependent. HF management parameter are with in normal limits     TESTS ORDERED: [facility] [employer]iol[facility]     PREVIOUSLY ORDERED TESTS REVIEWED & DISCUSSED WITH THE PATIENT:     I personally reviewed & interpreted, all previously ordered tests as copied above. Latest Labs are pulled in to the note with dates.    Labs, specially in Reference to Lipid profile, Cardiac testing in the form of Echo ( dated: ), stress tests ( dated: ) & other relevant cardiac testing reviewed with patient & recommendations made based on assessment of the results. Discussed role of Cardiac risk factors & effects + treatment of co morbidities with patient & advised accordingly. MEDICATIONS: List of medications patient is currently taking is reviewed in detail with the patient & family member present. Discussed any side effects or problems taking the medication. Recommend Continue present management & medications as listed. AFFIRMATION: I  reviewed patient's history, previous & current medical problems & all Labs + testing. This includes chart prep even prior to the vosit. Various goals are discussed and multiple questions answered. Relevant concelling performed. Office follow up in 3 months. Device check per protocol.

## 2023-01-26 NOTE — DISCHARGE SUMMARY
V2.0  Discharge Summary    Name:  Anjel Mckeon /Age/Sex: 3/23/1930 (80 y.o. male)   Admit Date: 2023  Discharge Date: 23    MRN & CSN:  [id number] & [id number] Encounter Date and Time 23 8:57 PM EST    Attending:  No att. providers found Discharging Provider: Trey Felder MD       Hospital Course:     Brief HPI: Anjel Mckeon is a 80 y.o. male who presented with fall. Brief Problem Based Course:     Unwitnessed fall  Chest pain -reproducible, likely MSK, do not suspect ACS  Back pain  Family found patient getting up from ground on 1/15 and since had been complaining of chest pain and back pain. Patient does not remember fall due to hx of dementia. CTH, CT cervical/thoracic/lumbar spine, pelvis x-ray, CXR all negative for acute fracture/deformity. -PT/OT, fall precautions  -Family advised on memory care unit vs SNF, opted for home with [facility] at this time     E coli UTI  History of phimosis (previously declined circumcision or dorsal slit). -Treated with rocephin in house, transitioned to cefdinir outpatient     NSTEMI  Patient initially had chest pain after fall but this resolved after arrival.  Troponin elevated to 0.022 but downtrended. EKG with BiV paced rhythm.  -Cardiology advised no further ischemic work-up     Chronic systolic heart failure -recovered EF (50% in 2019)  S/p BiV PPM -device interrogation does not exhibit any ventricular arrhythmias  Hypertension  No sign of volume overload. -Pt intermittently hypotensive in the setting of antihypertensives  -Decreased metoprolol dose and stopped lisinopril, pt tolerated these changes well  -F/u with cardiology outpatient to determine whether/when it is appropriate to resume these medications     Paroxysmal A.  Fib -rate controlled  -Decreased Toprol dose in the setting of hypotension  -Continue Eliquis 2.5 mg twice daily     CKD  Baseline creatinine 1.4.  -Monitor and avoid nephrotoxic medications     Dementia -with recent behavioral disturbances  Baseline orientation to self, family, city, month but not time. Per daughter who lives with and cares for pt, she can no longer get him to bathe and reports he is noncompliant with his medication. Has times where he is hateful towards her and curses. Pt tends to sundown.  -Continue donepezil  - provided daughter with resources such as [employer] [facility] [employer] on [address]  -Psychiatry decreased wellbutrin dose and added depakote, trazodone to be used prn; pt tolerated these changes well with improvement of his behavioral disturbances and better sleep at night  -Counseled family on delirium precautions     HLD -continue statin  Hypothyroidism -continue Synthroid  BPH -continue finasteride and tamsulosin  Anxiety/depression -continue sertraline and bupropion      The patient expressed appropriate understanding of, and agreement with the discharge recommendations, medications, and plan.      Consults this admission:  IP CONSULT TO CASE MANAGEMENT  IP CONSULT TO CARDIOLOGY  IP CONSULT TO PSYCHIATRY  IP CONSULT TO HOME CARE NEEDS    Discharge Diagnosis:   Failure to thrive in adult    Discharge Instruction:   Follow up appointments: PCP, cardiology, psychiatry  Primary care physician: LIA Bruno CNP within 2 weeks  Diet: regular diet   Activity: activity as tolerated and fall precautions  Disposition: Discharged to:   [x]Home, [x]HHC, []SNF, []Acute Rehab, []Hospice   Condition on discharge: Stable  Labs and Tests to be Followed up as an outpatient by PCP or Specialist:     Discharge Medications:        Medication List        START taking these medications      buPROPion 75 MG tablet  Commonly known as: WELLBUTRIN  Take 1 tablet by mouth daily  Replaces: buPROPion 150 MG extended release tablet     cefdinir 300 MG capsule  Commonly known as: OMNICEF  Take 1 capsule by mouth 2 times daily for 7 days     * divalproex 250 MG extended release tablet  Commonly known as: DEPAKOTE ER  Take 1 tablet by mouth at bedtime     * divalproex 125 MG capsule  Commonly known as: DEPAKOTE SPRINKLE  Take 2 capsules by mouth Daily with supper     traZODone 50 MG tablet  Commonly known as: DESYREL  Take 1 tablet by mouth nightly as needed for Sleep           * This list has 2 medication(s) that are the same as other medications prescribed for you. Read the directions carefully, and ask your doctor or other care provider to review them with you.                 CHANGE how you take these medications      metoprolol succinate 25 MG extended release tablet  Commonly known as: TOPROL XL  Take 0.5 tablets by mouth daily  What changed:   how much to take  how to take this  when to take this  additional instructions            CONTINUE taking these medications      atorvastatin 20 MG tablet  Commonly known as: LIPITOR  TAKE ONE (1) TABLET BY MOUTH ONCE DAILY     brimonidine-timolol 0.2-0.5 % ophthalmic solution  Commonly known as: COMBIGAN     donepezil 10 MG tablet  Commonly known as: ARICEPT  TAKE ONE (1) TABLET BY MOUTH AT NIGHT     Eliquis 2.5 MG Tabs tablet  Generic drug: apixaban  TAKE ONE (1) TABLET BY MOUTH TWO TIMES DAILY     finasteride 5 MG tablet  Commonly known as: PROSCAR     fluticasone 50 MCG/ACT nasal spray  Commonly known as: FLONASE  2 sprays by Each Nostril route daily     JOINT HEALTH PO     latanoprost 0.005 % ophthalmic solution  Commonly known as: XALATAN     levothyroxine 100 MCG tablet  Commonly known as: SYNTHROID  TAKE ONE (1) TABLET BY MOUTH ONCE DAILY     sertraline 100 MG tablet  Commonly known as: ZOLOFT  TAKE ONE (1) TABLET BY MOUTH ONCE DAILY     tamsulosin 0.4 MG capsule  Commonly known as: FLOMAX  TAKE 1 CAPSULE BY MOUTH ONCE DAILY     vitamin C 1000 MG tablet            STOP taking these medications      buPROPion 150 MG extended release tablet  Commonly known as: WELLBUTRIN XL  Replaced by: buPROPion 75 MG tablet     lisinopril 10 MG tablet  Commonly known as: PRINIVIL;ZESTRIL     triamcinolone 0.1 % ointment  Commonly known as: KENALOG            ASK your doctor about these medications      clotrimazole-betamethasone 1-0.05 % cream  Commonly known as: Lotrisone  Apply topically 2 times daily. Where to Get Your Medications        These medications were sent to [facility]  [facility], [address] [id number]      Phone: [phone]   buPROPion 75 MG tablet  cefdinir 300 MG capsule  divalproex 125 MG capsule  divalproex 250 MG extended release tablet  metoprolol succinate 25 MG extended release tablet  traZODone 50 MG tablet        Objective Findings at Discharge:   /79   Pulse 79   Temp 97.8 °F (36.6 °C) (Oral)   Resp 17   Ht 5' 7.99\" (1.727 m)   Wt 139 lb 8.8 oz (63.3 kg)   SpO2 97%   BMI 21.22 kg/m²       Physical Exam:   General: NAD  Eyes: EOMI  ENT: neck supple  Cardiovascular: Regular rate. Respiratory: Clear to auscultation  Gastrointestinal: Soft, non tender  Genitourinary: no suprapubic tenderness  Musculoskeletal: No edema  Skin: warm, dry  Neuro: Alert. Psych: Mood appropriate. Labs and Imaging   No results found. CBC: No results for input(s): WBC, HGB, PLT in the last 72 hours. BMP:  No results for input(s): NA, K, CL, CO2, BUN, CREATININE, GLUCOSE in the last 72 hours. Hepatic: No results for input(s): AST, ALT, ALB, BILITOT, ALKPHOS in the last 72 hours.   Lipids:   Lab Results   Component Value Date/Time    CHOL 140 12/03/2020 02:30 PM    HDL 39 12/03/2020 02:30 PM    TRIG 87 04/09/2021 07:49 AM     Hemoglobin A1C:   Lab Results   Component Value Date/Time    LABA1C 5.7 02/24/2022 03:24 PM     TSH:   Lab Results   Component Value Date/Time    TSH 0.14 09/22/2021 04:12 PM     Troponin:   Lab Results   Component Value Date/Time    TROPONINT 0.021 01/16/2023 06:25 PM    TROPONINT 0.028 01/16/2023 06:21 AM TROPONINT 0.022 01/16/2023 01:55 AM     Lactic Acid: No results for input(s): LACTA in the last 72 hours. BNP: No results for input(s): PROBNP in the last 72 hours.   UA:  Lab Results   Component Value Date/Time    NITRU NEGATIVE 01/20/2023 01:51 PM    NITRU Negative 07/12/2022 03:07 PM    COLORU YELLOW 01/20/2023 01:51 PM    PHUR 5.5 07/12/2022 03:07 PM    [address] 01/20/2023 01:51 PM    RBCUA 117 01/20/2023 01:51 PM    MUCUS RARE 01/16/2023 06:35 AM    TRICHOMONAS NONE SEEN 01/20/2023 01:51 PM    BACTERIA MANY 01/20/2023 01:51 PM    CLARITYU TURBID 01/20/2023 01:51 PM    SPECGRAV 1.020 01/20/2023 01:51 PM    LEUKOCYTESUR LARGE NUMBER OR AMOUNT OBSERVED 01/20/2023 01:51 PM    UROBILINOGEN 0.2 01/20/2023 01:51 PM    BILIRUBINUR NEGATIVE 01/20/2023 01:51 PM    BILIRUBINUR nefative 06/09/2022 05:42 PM    BLOODU LARGE NUMBER OR AMOUNT OBSERVED 01/20/2023 01:51 PM    GLUCOSEU Negative 07/12/2022 03:07 PM    KETUA NEGATIVE 01/20/2023 01:51 PM     Urine Cultures:   Lab Results   Component Value Date/Time    LABURIN 25,000 CFU/ml 07/12/2022 03:07 PM    LABURIN 25,000 CFU/ml 07/12/2022 03:07 PM     Blood Cultures: No results found for: BC  No results found for: BLOODCULT2  Organism:   Lab Results   Component Value Date/Time    ORG Enterococcus faecalis 07/12/2022 03:07 PM    ORG Enterococcus avium 07/12/2022 03:07 PM       Time Spent Discharging patient 35 minutes    Electronically signed by Robinson Eckert MD on 1/23/2023 at 8:57 PM

## 2023-01-26 NOTE — LETTER
[facility] [address] [address]  Phone: [phone]  Fax: [phone]    Mariaa Goins MD    January 26, 2023     Walter Jackson, APRN - CNP  [address]    Patient: Michela Penaloza   MR Number: [id number]   YOB: 1930   Date of Visit: 1/26/2023       Dear Walter Jackson: Thank you for referring Kit Russell to me for evaluation/treatment. Below are the relevant portions of my assessment and plan of care. If you have questions, please do not hesitate to call me. I look forward to following Kelli Orlando along with you.     Sincerely,      Mariaa Goins MD

## 2023-01-31 ENCOUNTER — TELEPHONE (OUTPATIENT)
Dept: FAMILY MEDICINE CLINIC | Age: 88
End: 2023-01-31

## 2023-01-31 ENCOUNTER — CARE COORDINATION (OUTPATIENT)
Dept: CASE MANAGEMENT | Age: 88
End: 2023-01-31

## 2023-01-31 NOTE — CARE COORDINATION
[facility] Care Transitions Follow Up Call      Patient: Hillary Chaudhary  Patient : 3/23/1930   MRN: [id number]  Reason for Admission: Fall, CP- MSK, UTI  Discharge Date: 23 RARS: Readmission Risk Score: 15.4    Attempted to contact patient for care transition follow up. Unable to reach patient's daughter Nurys Born. Left message with contact information and request for call back.       Dora Lopez RN

## 2023-01-31 NOTE — TELEPHONE ENCOUNTER
Spoke with dtr Donna Pena confirmed pt is currently taking cefdinr finish med tonight. Informed can have home care recheck u/a 2/3/23. Per Heid CNP this could also actually just be his dementia getting worse , he may not wish to shower and prefer to withdraw into his own space. Dtr voiced understanding.

## 2023-01-31 NOTE — CARE COORDINATION
[facility] Care Transitions Follow Up Call    Care Transition Nurse contacted the  patient's daughter Nolberto Johnson  by telephone to follow up after admission on 23. Verified name and  with  daughter  as identifiers. Patient: Marco Older  Patient : 3/23/1930   MRN: [id number]  Reason for Admission:  Fall, CP- MSK, UTI  Discharge Date: 23 RARS: Readmission Risk Score: 15.4      Needs to be reviewed by the provider   Additional needs identified to be addressed with provider: Yes  Per daughter, mental state has worsened. Cusses a lot more and neglecting himself (not taking showers unless forced to). Patient does not want to get out of bed and now c/o back pain and dizziness. Method of communication with provider: chart routing. Received incoming call from patient's daughter Sanam Mcintosh. She states that her father does not have any motivation to get out of bed. He lays in bed for hours or sometimes almost the entire day. She states that he is neglecting himself (not taking showers unless forced to do so). He did take a shower today. She reports that his mental state has worsened. Cusses a lot more. He c/o dizziness and back pain. She encourages him to get out of bed but he usually refuses to. When he gets up, he gets up by himself and uses a walker when ambulating. She denies her father having any falls, syncopal or near syncopal episodes that she is aware of. She denies Ravinder Duty having any c/o chest pain/discomfort, shortness of breath. He is eating and drinking fluids but states every day is different. Some days he will eat a lot and other days have minimal intake. She is unsure of whether or not he is having s/s of UTI. States that he goes to the bathroom by himself and does not let her in. He also has a colostomy and changes the colostomy bag by himself. He is producing stools as far as she is aware. He had a follow up with PCP on 23 and with Cardiology on 23. She states that he was started on Omicef and Depakote by PCP and Midodrine by the Cardiologist.  Explained that Depakote can take a few weeks before seeing any changes. She confirmed he is taking all of his medications because she watches him take them every day. He has a urology appointment on 2/13/23 and states it will be a fight to get him to the appointment but she will try her best.  The SW made a visit today and the home health nurse is on her way between 2-3 today. Discussed when to contact his providers. She does not have any questions or needs for the CTN at this time. Will continue to follow. Addressed changes since last contact:   Patient had follow up with PCP and Cardiology. Was started on Midodrine and Omnicef  Discussed follow-up appointments. If no appointment was previously scheduled, appointment scheduling offered: Yes. Is follow up appointment scheduled within 7 days of discharge? Yes. Follow Up  Future Appointments   Date Time Provider Arthur Estrada   2/6/2023 11:30 AM SCHEDULE, [facility] NUCLEAR SPR [facility]   4/26/2023  3:00 PM Kylah Ferguson MD [facility]   6/27/2023  1:00 PM [address] [place] [facility] AWV LPN [address] [facility] [place] [facility]       Care Transition Nurse reviewed red flags with  daughter  and discussed any barriers to care and/or understanding of plan of care after discharge. Discussed appropriate site of care based on symptoms and resources available to patient including: PCP  Specialist. The  daughter  agrees to contact the PCP office for questions related to their healthcare.      Patients top risk factors for readmission: caregiver stress, functional cognitive ability, functional physical ability, falls, lack of knowledge about disease, level of motivation, medical condition-Failure to thrive, UTI, and polypharmacy  Interventions to address risk factors: Education of patient/family/caregiver/guardian to support self-management-when to contact providers    Offered patient enrollment in the Remote Patient Monitoring (RPM) program for in-home monitoring:  Did not discuss during visit . Care Transitions Subsequent and Final Call    Subsequent and Final Calls  Do you have any ongoing symptoms?: Yes  Patient-reported symptoms: Other  Have your medications changed?: Yes  Patient Reports: Started on Midodrine by Cardiology as well as Depakote and Jearl Goad started by PCP  Do you have any questions related to your medications?: No  Do you currently have any active services?: Yes  Are you currently active with any services?: Home Health  Do you have any needs or concerns that I can assist you with?: No  Care Transitions Interventions   Home Care Waiver: Completed      Hospice: Declined  Transportation Support: Declined      DME Assistance: Declined     Senior Services: Declined     Medication Assistance Program: Completed      Palliative Care: Declined     Other Interventions:             Care Transition Nurse provided contact information for future needs. Plan for follow-up call in 5-7 days based on severity of symptoms and risk factors. Plan for next call: symptom management-s/s of UTI, dizziness, back pain, falls, syncopal or near syncopal episodes, how is pt's self motivation?     Paolo Meier RN

## 2023-02-01 ENCOUNTER — TELEPHONE (OUTPATIENT)
Dept: FAMILY MEDICINE CLINIC | Age: 88
End: 2023-02-01

## 2023-02-01 NOTE — TELEPHONE ENCOUNTER
----- Message from Dylan Garland LPN sent at 7/34/4312  4:25 PM EST -----  Regarding: dementia/ uti  Call c/m hc order u/a for 2/3/23

## 2023-02-03 ENCOUNTER — TELEPHONE (OUTPATIENT)
Dept: FAMILY MEDICINE CLINIC | Age: 88
End: 2023-02-03

## 2023-02-03 ENCOUNTER — HOSPITAL ENCOUNTER (OUTPATIENT)
Age: 88
Setting detail: SPECIMEN
Discharge: HOME | End: 2023-02-03
Payer: COMMERCIAL

## 2023-02-03 DIAGNOSIS — R82.90 CLOUDY URINE: Primary | ICD-10-CM

## 2023-02-03 DIAGNOSIS — R41.0 ACUTE CONFUSION: ICD-10-CM

## 2023-02-03 PROCEDURE — 87086 URINE CULTURE/COLONY COUNT: CPT

## 2023-02-03 PROCEDURE — 81001 URINALYSIS AUTO W/SCOPE: CPT

## 2023-02-03 RX ORDER — CIPROFLOXACIN 500 MG/1
500 TABLET, FILM COATED ORAL 2 TIMES DAILY
Qty: 10 TABLET | Refills: 0 | Status: SHIPPED | OUTPATIENT
Start: 2023-02-03 | End: 2023-02-08

## 2023-02-03 NOTE — TELEPHONE ENCOUNTER
To Sana-    Patient had a urine collected today----urine was very brown and he was very confused----specimen was sent to [facility]---please be watching for the results----he will need something called in before weekend, today, if possible.     [facility] phone:   [phone]

## 2023-02-03 NOTE — PROGRESS NOTES
History of urinary tract infections. Home health care nurse describes dark urine with acute confusion. Sample processing for culture.     Serum creatinine: 1.4 mg/dL (H) 01/21/23 1031  Estimated creatinine clearance: 31 mL/min (A)

## 2023-02-04 LAB
CULTURE: NORMAL
Lab: NORMAL
SPECIMEN: NORMAL

## 2023-02-06 ENCOUNTER — PROCEDURE VISIT (OUTPATIENT)
Dept: CARDIOLOGY CLINIC | Age: 88
End: 2023-02-06

## 2023-02-06 ENCOUNTER — CARE COORDINATION (OUTPATIENT)
Dept: CASE MANAGEMENT | Age: 88
End: 2023-02-06

## 2023-02-06 DIAGNOSIS — R06.02 SOB (SHORTNESS OF BREATH): ICD-10-CM

## 2023-02-06 DIAGNOSIS — E78.00 PURE HYPERCHOLESTEROLEMIA: ICD-10-CM

## 2023-02-06 DIAGNOSIS — I10 ESSENTIAL HYPERTENSION: ICD-10-CM

## 2023-02-06 DIAGNOSIS — Z95.0 S/P BIVENTRICULAR CARDIAC PACEMAKER PROCEDURE: ICD-10-CM

## 2023-02-06 DIAGNOSIS — Z95.0 CARDIAC PACEMAKER: ICD-10-CM

## 2023-02-06 DIAGNOSIS — I44.2 COMPLETE HEART BLOCK (HCC): ICD-10-CM

## 2023-02-06 DIAGNOSIS — I71.43 INFRARENAL ABDOMINAL AORTIC ANEURYSM (AAA) WITHOUT RUPTURE: ICD-10-CM

## 2023-02-06 DIAGNOSIS — I73.9 PVD (PERIPHERAL VASCULAR DISEASE) (HCC): ICD-10-CM

## 2023-02-06 DIAGNOSIS — R94.31 ABNORMAL EKG: Primary | ICD-10-CM

## 2023-02-06 DIAGNOSIS — I48.0 PAF (PAROXYSMAL ATRIAL FIBRILLATION) (HCC): ICD-10-CM

## 2023-02-06 LAB
LV EF: 63 %
LVEF MODALITY: NORMAL

## 2023-02-06 NOTE — CARE COORDINATION
Care Transitions Outreach Attempt      1st attempt to reach for Care Transition follow up call unsuccessful. HIPAA compliant message left requesting call back to [phone]        Patient: Juana Arias Patient : 3/23/1930 MRN: [id number]    Last Discharge  Street       Date Complaint Diagnosis Description Type Department Provider    23 Chest Pain; Fall Chest pain, unspecified type . .. ED to Hosp-Admission (Discharged) (ADMITTED) Gita Nguyen MD; Lucina Pereira. ..           Noted following upcoming appointments from discharge chart review:   [facility] follow up appointment(s):   Future Appointments   Date Time Provider Arthur Estrada   2023  3:00 PM Silvia Garland MD [facility]   2023  1:00 PM [address] [place] [facility] AWV LPN [address] [place] [facility]     Non-BS follow up appointment(s): JAYCE Cabral RN [phone]

## 2023-02-07 ENCOUNTER — TELEPHONE (OUTPATIENT)
Dept: CARDIOLOGY CLINIC | Age: 88
End: 2023-02-07

## 2023-02-07 NOTE — TELEPHONE ENCOUNTER
NM Stress test done ob 2/6/2023:    Normal study. Normal perfusion study with normal distribution in all coronal, short, and    horizontal axis. Normal LV function. LVEF is 63 %. Supervising physician Dr. Elzbieta Wells . Patient's daughter answered the phone, and was given results on behalf of the patient, reminded patient of next appt with Dr. Silvino Bethea, and to call back if they had any future questions.

## 2023-02-08 ENCOUNTER — CARE COORDINATION (OUTPATIENT)
Dept: CASE MANAGEMENT | Age: 88
End: 2023-02-08

## 2023-02-08 RX ORDER — DIVALPROEX SODIUM 125 MG/1
CAPSULE, COATED PELLETS ORAL
Qty: 60 CAPSULE | Refills: 5 | Status: SHIPPED | OUTPATIENT
Start: 2023-02-08

## 2023-02-08 RX ORDER — ATORVASTATIN CALCIUM 20 MG/1
TABLET, FILM COATED ORAL
Qty: 30 TABLET | Refills: 5 | Status: SHIPPED | OUTPATIENT
Start: 2023-02-08

## 2023-02-08 RX ORDER — DIVALPROEX SODIUM 250 MG/1
TABLET, EXTENDED RELEASE ORAL
Qty: 30 TABLET | Refills: 5 | Status: SHIPPED | OUTPATIENT
Start: 2023-02-08

## 2023-02-08 NOTE — CARE COORDINATION
Care Transitions Outreach Attempt #2      Attempt #2 to reach patient's POA/daughter for transitions of care follow up. Unable to reach. Left VM requesting call back. CTN sign off if no return call received. Patient: Nuno Carballo Patient : 3/23/1930 MRN: [id number]    Last Discharge  Street       Date Complaint Diagnosis Description Type Department Provider    23 Chest Pain; Fall Chest pain, unspecified type . .. ED to Hosp-Admission (Discharged) (ADMITTED) Minda Romero MD; Helen Goyal. ..               Noted following upcoming appointments from discharge chart review:   [facility] follow up appointment(s):   Future Appointments   Date Time Provider Arthur Estrada   2023  3:00 PM Gaudencio Singh MD [facility]   2023  1:00 PM [facility] AWV LPN [facility]     Laxmi Hooker, RN BSN   Care Transitions Nurse  [phone]

## 2023-02-08 NOTE — TELEPHONE ENCOUNTER
Requested Prescriptions     Signed Prescriptions Disp Refills    atorvastatin (LIPITOR) 20 MG tablet 30 tablet 5     Sig: TAKE ONE (1) TABLET BY MOUTH ONCE DAILY     Authorizing Provider: Leeanna Fay User: ANA Mccauley    divalproex (DEPAKOTE ER) 250 MG extended release tablet 30 tablet 5     Sig: TAKE ONE (1) TABLET BY MOUTH AT BEDTIME     Authorizing Provider: Leeanna Fay User: ANA Mccauley    divalproex (DEPAKOTE SPRINKLE) 125 MG DR capsule 60 capsule 5     Sig: TAKE TWO (2) CAPSULES BY MOUTH ONCE DAILY WITH SUPPER     Authorizing Provider: Leeanna Fay User: Daya Masterson

## 2023-02-15 ENCOUNTER — TELEPHONE (OUTPATIENT)
Dept: CARDIOLOGY CLINIC | Age: 88
End: 2023-02-15

## 2023-02-16 RX ORDER — TRAZODONE HYDROCHLORIDE 50 MG/1
50 TABLET ORAL NIGHTLY PRN
Qty: 30 TABLET | Refills: 2 | Status: SHIPPED | OUTPATIENT
Start: 2023-02-16

## 2023-03-12 NOTE — Clinical Note
Discharge Plan[de-identified] Other/[facility])  Telemetry/Cardiac Monitoring Required?: Yes
No

## 2023-03-13 PROBLEM — I21.4 NSTEMI (NON-ST ELEVATED MYOCARDIAL INFARCTION) (HCC): Status: ACTIVE | Noted: 2023-01-01

## 2023-03-13 PROBLEM — I16.0 HYPERTENSIVE URGENCY: Status: ACTIVE | Noted: 2023-01-01

## 2023-03-13 NOTE — ED PROVIDER NOTES
Emergency Department Encounter    Patient: Ml Costa  MRN: [id number]  : 3/23/1930  Date of Evaluation: 3/13/2023  ED Provider:  [id number] [facility],     Triage Chief Complaint:   Hypertension    HPI:  Ml Costa is a 80 y.o. male with past medical history as listed below, including paroxysmal A-fib status post pacemaker on Eliquis, hypothyroidism, AAA status post repair who presents with hypertension. Family at bedside provides history. Patient was not acting like himself at about 7 PM, family stated that he looked like he did not feel well. He was noted to be pale and slumped over. He was conscious, however family does state that he looked like he did not feel well. His blood pressure was taken and was found to be 171/121. Patient does take 25 mg of Toprol-XL in the morning and he did take that medication this morning. He also takes midodrine. He has not missed any doses of his medications. Family states that patient is an unreliable historian, and only states that he feels \"fine. \"  He has not had any complaints today, and has otherwise been acting like himself. He is currently at his neurologic baseline. Patient is typically able to tell where he is and his name, he does not typically know the year. Of note patient does have bilateral lower extremity edema, family does state that this has been present intermittently for the last 2 to 3 years, however over the last week it has become progressively worse and more persistent than it typically is. ROS:  Review of Systems   Unable to perform ROS: Mental status change       Past Medical History:   Diagnosis Date    AAA (abdominal aortic aneurysm)     Surgery scheduled for 2014 with Dr. Kelli Vasquez.     Arthritis     generalized    Asthma     AV block, 1st degree     Back pain     Borderline hypothyroidism 2020    Bradycardia     Colon polyps     Colostomy in place [facility])     Glaucoma     H/O cardiovascular stress test 2013 cardiolite-EF56%, apical hypokinesis is seen, cannot exlude apical Tyler ischemia    History of blood transfusion     History of cardiovascular stress test 03/05/2010    No angina or ischemic EKG changes are noted with Lexiscan. The cardiolite study demonstrated normal perfusion in all segments of the myocardium with an intact left ventricular systolic function. The resting sestamibi dose is 10.8, stress is 32.5. EF 66%    History of chest x-ray 03/16/2010    The chest is considered nonacute. There is cardiomegaly noted. COPD. History of Doppler ultrasound 03/25/2010    venous doppler- Technically good venous ultrasound study negative for DVT in both lower extremities. Normal compressibility,color flow doppler pattern and spectral doppler pattern demonstrated thoughout. History of echocardiogram 03/18/2010    [place] LV dilatation with concentric hypertrophy. Low normal systolic and abnormal diastolic function. Mild mitral and trace tricuspid regurgitation. Mild aortic root and bilateral dilatation. History of nuclear stress test 02/06/2023    Normal perfusion study with normal distribution in all coronal, short, and horizontal axis. Normal LV function. LVEF is 63 %. Holter monitor, abnormal 11/10/2010    11/10/2010- 24 HR- Abnormal holter revealing some significant brasycardia's and wenckebach phenomenon. Therefore, clinical  correlation is recommend.     Hx of blood clots     Pacemaker     PAF (paroxysmal atrial fibrillation) ([address]     Peritonitis ([address]     Personal history of colonic polyps     Poor historian     Skin cancer     face    Ulcerative (chronic) proctosigmoiditis ([address]     Wears glasses      Past Surgical History:   Procedure Laterality Date    ABDOMINAL AORTIC ANEURYSM REPAIR, ENDOVASCULAR  7/1/14    ABDOMINAL EXPLORATION SURGERY  2/4/2013    exp lap, left hemicolectomy, umbilectomy    APPENDECTOMY  2/4/2013    APPENDECTOMY  2/4/2013    COLONOSCOPY  2/4/2013    COLOSTOMY  2/4/2013 CYSTOSCOPY  2/03/2014    TURP    HEMORRHOID SURGERY  2011    HERNIA REPAIR Bilateral 1940 & 1958    Ing hernia    KNEE SURGERY Right 1980s    PACEMAKER INSERTION Right 12/13/2017    BIV PPM Medtronic Percepta Quad CRT-P MRI SureScan Pacemaker    PACEMAKER PLACEMENT Right 02/25/2013    Explanted 12/13/2017    SMALL INTESTINE SURGERY N/A 8/28/2019    EXPLORATORY LAPAROTOMY, SMALL BOWEL RESECTION, LYSIS OF ADHESIONS, NEW COLOSTOMY, AND HERNIA REPAIR WITH XENMATRIX MESH performed by Hoa Joaquin MD at [facility] OR     Family History   Problem Relation Age of Onset    Stroke Mother         CVA    Heart Disease Mother     Prostate Cancer Brother     Pacemaker Brother     Cancer Brother         skin    Cancer Daughter         colon    Substance Abuse Son         Tobacco    No Known Problems Father      Social History     Socioeconomic History    Marital status:       Spouse name: Not on file    Number of children: 3    Years of education: Not on file    Highest education level: Not on file   Occupational History    Occupation: Retired   Tobacco Use    Smoking status: Never    Smokeless tobacco: Never   Vaping Use    Vaping Use: Never used   Substance and Sexual Activity    Alcohol use: No     Comment: 1 can of soda a day    Drug use: No    Sexual activity: Yes     Partners: Female     Comment:    Other Topics Concern    Not on file   Social History Narrative    Not on file     Social Determinants of Health     Financial Resource Strain: Low Risk     Difficulty of Paying Living Expenses: Not hard at all   Food Insecurity: No Food Insecurity    Worried About Running Out of Food in the Last Year: Never true    Ran Out of Food in the Last Year: Never true   Transportation Needs: Not on file   Physical Activity: Inactive    Days of Exercise per Week: 0 days    Minutes of Exercise per Session: 0 min   Stress: Not on file   Social Connections: Not on file   Intimate Partner Violence: Not on file   Housing Stability: Not on file     No current facility-administered medications for this encounter. Current Outpatient Medications   Medication Sig Dispense Refill    traZODone (DESYREL) 50 MG tablet Take 1 tablet by mouth nightly as needed for Sleep 30 tablet 2    atorvastatin (LIPITOR) 20 MG tablet TAKE ONE (1) TABLET BY MOUTH ONCE DAILY 30 tablet 5    divalproex (DEPAKOTE ER) 250 MG extended release tablet TAKE ONE (1) TABLET BY MOUTH AT BEDTIME 30 tablet 5    divalproex (DEPAKOTE SPRINKLE) 125 MG DR capsule TAKE TWO (2) CAPSULES BY MOUTH ONCE DAILY WITH SUPPER 60 capsule 5    midodrine (PROAMATINE) 2.5 MG tablet Take 1 tablet by mouth 3 times daily 90 tablet 3    buPROPion (WELLBUTRIN) 75 MG tablet Take 1 tablet by mouth daily 60 tablet 3    metoprolol succinate (TOPROL XL) 25 MG extended release tablet Take 0.5 tablets by mouth daily 30 tablet 0    ELIQUIS 2.5 MG TABS tablet TAKE ONE (1) TABLET BY MOUTH TWO TIMES DAILY 60 tablet 5    levothyroxine (SYNTHROID) 100 MCG tablet TAKE ONE (1) TABLET BY MOUTH ONCE DAILY 30 tablet 5    sertraline (ZOLOFT) 100 MG tablet TAKE ONE (1) TABLET BY MOUTH ONCE DAILY 30 tablet 5    fluticasone (FLONASE) 50 MCG/ACT nasal spray 2 sprays by Each Nostril route daily (Patient not taking: No sig reported) 16 g 0    donepezil (ARICEPT) 10 MG tablet TAKE ONE (1) TABLET BY MOUTH AT NIGHT 30 tablet 5    clotrimazole-betamethasone (LOTRISONE) 1-0.05 % cream Apply topically 2 times daily.  (Patient not taking: No sig reported) 1 each 1    finasteride (PROSCAR) 5 MG tablet       tamsulosin (FLOMAX) 0.4 MG capsule TAKE 1 CAPSULE BY MOUTH ONCE DAILY 30 capsule 5    latanoprost (XALATAN) 0.005 % ophthalmic solution       Ascorbic Acid (VITAMIN C) 1000 MG tablet Take 1,000 mg by mouth daily      brimonidine-timolol (COMBIGAN) 0.2-0.5 % ophthalmic solution Place 1 drop into both eyes every 12 hours      Glucosamine-MSM-Hyaluronic Acd (JOINT HEALTH PO) Take 1 each by mouth daily        Allergies Allergen Reactions    Codeine Anaphylaxis    Fentanyl Other (See Comments)     Hypotension        Nursing Notes Reviewed    Physical Exam:  Triage VS:    ED Triage Vitals   Enc Vitals Group      BP 03/12/23 2102 (!) 176/106      Heart Rate 03/12/23 2102 76      Resp 03/12/23 2102 28      Temp 03/12/23 2102 97.6 °F (36.4 °C)      Temp Source 03/12/23 2102 Oral      SpO2 03/12/23 2102 100 %      Weight 03/12/23 2101 149 lb (67.6 kg)      Height --       Head Circumference --       Peak Flow --       Pain Score --       Pain Loc --       Pain Edu? --       Excl. in [address]? --      Physical Exam  Vitals and nursing note reviewed. Constitutional:       General: He is not in acute distress. Appearance: Normal appearance. He is not ill-appearing or toxic-appearing. HENT:      Head: Normocephalic and atraumatic. Nose: Nose normal.      Mouth/Throat:      Mouth: Mucous membranes are moist.   Eyes:      Extraocular Movements: Extraocular movements intact. Conjunctiva/sclera: Conjunctivae normal.      Pupils: Pupils are equal, round, and reactive to light. Cardiovascular:      Rate and Rhythm: Normal rate and regular rhythm. Heart sounds: Normal heart sounds. Pulmonary:      Effort: Pulmonary effort is normal. No respiratory distress. Breath sounds: Normal breath sounds. No wheezing, rhonchi or rales. Abdominal:      General: Abdomen is flat. Bowel sounds are normal. There is no distension. Palpations: Abdomen is soft. Tenderness: There is no abdominal tenderness. There is no guarding or rebound. Musculoskeletal:         General: Normal range of motion. Right lower leg: Edema present. Left lower leg: Edema present. Comments: 2 pitting edema bilateral lower extremities. Skin:     General: Skin is warm. Capillary Refill: Capillary refill takes less than 2 seconds. Neurological:      Mental Status: He is alert. He is disoriented. GCS: GCS eye subscore is 4. GCS verbal subscore is 5. GCS motor subscore is 6. Cranial Nerves: Cranial nerves 2-12 are intact. Sensory: Sensation is intact. Motor: Motor function is intact. Coordination: Coordination is intact. Comments: She is fluid. Patient is able to follow commands. He was not able to tell me the year but was able to tell me his name, date of birth who his family members were at bedside, and where he was located.    Psychiatric:         Mood and Affect: Mood normal.            I have reviewed and interpreted all of the currently available lab results from this visit (if applicable):  Results for orders placed or performed during the hospital encounter of 03/12/23   CBC with Auto Differential   Result Value Ref Range    WBC 4.9 4.0 - 10.5 K/CU MM    RBC 3.84 (L) 4.6 - 6.2 M/CU MM    Hemoglobin 12.3 (L) 13.5 - 18.0 GM/DL    Hematocrit 38.5 (L) 42 - 52 %    .3 (H) 78 - 100 FL    MCH 32.0 (H) 27 - 31 PG    MCHC 31.9 (L) 32.0 - 36.0 %    RDW 19.5 (H) 11.7 - 14.9 %    Platelets 704 865 - 503 K/CU MM    MPV 10.4 7.5 - 11.1 FL    Differential Type AUTOMATED DIFFERENTIAL     Segs Relative 74.7 (H) 36 - 66 %    Lymphocytes % 13.2 (L) 24 - 44 %    Monocytes % 8.9 (H) 0 - 4 %    Eosinophils % 2.0 0 - 3 %    Basophils % 1.0 0 - 1 %    Segs Absolute 3.7 K/CU MM    Lymphocytes Absolute 0.7 K/CU MM    Monocytes Absolute 0.4 K/CU MM    Eosinophils Absolute 0.1 K/CU MM    Basophils Absolute 0.1 K/CU MM    Nucleated RBC % 0.0 %    Total Nucleated RBC 0.0 K/CU MM    Total Immature Neutrophil 0.01 K/CU MM    Immature Neutrophil % 0.2 0 - 0.43 %   SPECIMEN REJECTION   Result Value Ref Range    Rejected Test CMPR     Reason for Rejection UNABLE TO PERFORM TESTING:    Comprehensive Metabolic Panel   Result Value Ref Range    Sodium 138 135 - 145 MMOL/L    Potassium 4.2 3.5 - 5.1 MMOL/L    Chloride 100 99 - 110 mMol/L    CO2 29 21 - 32 MMOL/L    BUN 23 6 - 23 MG/DL    Creatinine 1.3 0.9 - 1.3 MG/DL    Est, Glom Filt Rate 52 (L) >60 mL/min/1.73m2    Glucose 108 (H) 70 - 99 MG/DL    Calcium 8.4 8.3 - 10.6 MG/DL    Albumin 3.7 3.4 - 5.0 GM/DL    Total Protein 6.9 6.4 - 8.2 GM/DL    Total Bilirubin 0.5 0.0 - 1.0 MG/DL    ALT 16 10 - 40 U/L    AST 28 15 - 37 IU/L    Alkaline Phosphatase 139 (H) 40 - 129 IU/L    Anion Gap 9 4 - 16   Troponin   Result Value Ref Range    Troponin T 0.030 (H) <0.01 NG/ML   Brain Natriuretic Peptide   Result Value Ref Range    Pro-BNP 8,856 (H) <300 PG/ML   Magnesium   Result Value Ref Range    Magnesium 1.9 1.8 - 2.4 mg/dl      Radiographs (if obtained):    [] Radiologist's Report Reviewed:  CT HEAD WO CONTRAST   Final Result   No acute intracranial abnormality. XR CHEST PORTABLE   Preliminary Result   1. Basilar atelectasis with small effusions. Chart review shows recent radiographs:  No results found. MDM:  CC/HPI Summary, DDx, ED Course, and Reassessment:   Patient is a 77-year-old male who presents with chief complaint of not feeling well and hypertension. Differential diagnosis includes hypertensive urgency, CHF, kidney disease. Patient is seen and examined. Labs and imaging obtained. Patient without leukocytosis, hemoglobin and platelets stable. Electrolytes, BUN, creatinine, ALT and AST within acceptable limits. Patient's troponin is elevated at 0.03, and BNP is elevated at 8800. Patient did have new T wave inversions on his EKG on leads II, III and aVF. Patient is given aspirin, Lasix and 5 mg of labetalol. Do think patient's troponin is most likely secondary to CHF exacerbation. Chest x-ray does show small pleural effusions patient is given Lasix for this. As patient does not have any chest pain, will hold on heparin for his elevated troponin. All labs and imaging discussed with patient and family at bedside. Patient to be admitted for further evaluation and management. They are agreeable to this plan of care.   Case discussed with hospitalist,  Lefty Ashley, who accepts admission. Patient was given the following medications:  Medications   aspirin chewable tablet 324 mg (324 mg Oral Given 3/13/23 0126)   furosemide (LASIX) injection 40 mg (40 mg IntraVENous Given 3/13/23 0129)   labetalol (NORMODYNE;TRANDATE) injection 5 mg (5 mg IntraVENous Given 3/13/23 0137)         EKG (if obtained): (All EKG's are interpreted by myself in the absence of a cardiologist)   AV dual paced rhythm, rate 80, , , QTc 516, no acute ST elevation. When compared to EKG from 1/21/2023, there does appear to be T wave inversion in 2, 3 and aVF. QTc is similar to previous. I am the Primary Clinician of Record. CRITICAL CARE NOTE:  There was a high probability of clinically significant life-threatening deterioration of the patient's condition requiring my urgent intervention due to pretense of urgency. Ordering and interpretation of labs and imaging was performed to address this. Total critical care time is AT LEAST 30 minutes. This includes vital sign monitoring, pulse oximetry monitoring, telemetry monitoring, clinical response to the IV medications, reviewing the nursing notes, consultation time, dictation/documentation time, and interpretation of the lab work. This time excludes time spent performing procedures and separately billable procedures and family discussion time. Clinical Impression:  1. Hypertensive urgency    2. Elevated troponin    3. Acute congestive heart failure, unspecified heart failure type [facility])      Disposition referral (if applicable):  No follow-up provider specified. Disposition medications (if applicable):  New Prescriptions    No medications on file       Comment: Please note this report has been produced using speech recognition software and may contain errors related to that system including errors in grammar, punctuation, and spelling, as well as words and phrases that may be inappropriate.   Efforts were made to edit the dictations.        [id number] [facility],   03/13/23 [id number]

## 2023-03-13 NOTE — PROGRESS NOTES
V2.0  [facility] Progress Note      Name:  Marco Ojeda /Age/Sex: 3/23/1930  (80 y.o. male)   MRN & CSN:  [id number] & [id number] Encounter Date/Time: 3/13/2023 7:42 AM EDT    Location:  [address] [place] [address] PCP: [facility]id[facility] [address]    Assessment and Plan:   Marco Ojeda is a 80 y.o. male with pmh of dementia with behavioral disturbance, Hypotension on midodrine, Insomnia, HLD, BPH, A. Fib on DOAC, Hypothyroidism, s/p pacemaker was admitted on 3/12/2023 who presents with NSTEMI (non-ST elevated myocardial infarction) [facility])      This patient was discussed with Dr. Kenny Ames. He was agreeable with the plan and management as dictated above. Plan:    Elevated Troponin with T wave inversions 2/2 Possible NSTEMI vs 2  Trop 0.030, 0.043   EKG: non ischemic with A. Paced and V. paced rhythm  Received aspirin 324mg PO X 1 in the ER   Recent stress test on 2023 was normal  Cardiology Consulted- Dr. Jason Smith, appreciate Recs      Acute Decompensated HFpEF: likely volume overload  Pro-BNP 8856 which is above his baseline of ~2000s  B/L LE swelling Rt > Lt +/+ pitting - not worsening over the last 6 weeks per pt's daughter   SOB present on presentation and was ongoing X 4 days   CXR shows increased vascular markings   Received lasix 40mg IV X 1, will cont daily, may need to DC home on oral diuretic as he is not currently on one   Echo 23: EF 60-65%, sclerotic aortic valve, mild/mod aortic regurgitation dilated ascending aorta 4.2 cm      HTN above goal 2/2 possibly Midodrine vs Volume Overload  Presented with elevated BP with peak at 195/102  Received labetalol 5mg IV X 1 and lasix 40 mg IV as mentioned above   Last /98  Pt. Was taking midodrine 2.5mg TID scheduled at home      Acute On Chronic Encephalopathy 2/2 ? Metabolic in the setting of Dementia, R/O Depakote toxicity, UTI, Hypothyroidism   Has been confused on-off more than usual for the last 6 weeks   More confused on the day of presentation and appeared more fatigued per family members   Not at baseline per daughter   He is AO X 3 on my exam   Urine is very dark, large RBC's and blood  Urology Consulted- has been cleared and to FU outpatient with Dr. Jones in 2-4 weeks  CT head: NEG for any acute intracranial abnormalities      Rt. Chest pacemaker in-situ     BPH  Apparently has poor urine output   On finasteride 5mg daily and tamsulosin 0.4mg daily      PAF  Currently A/V paced as seen on EKG    On metoprolol 12.5mg daily and on apixaban 2.5mg BID       Dementia with Behavioral Disturbance, Insomnia   He is on Depakote ER 250mg at bedtime and Depakote DR 250mg at supper - both in the evening   On trazodone 50mg HS PRN, wellbutrin 75mg daily, sertraline 100mg daily   On donepezil 10mg HS      Hypotension on Midodrine   On midodrin 2.5mg TID at home - he was taking it per daughter       Hypothyroidism   On levothyroxine 100mcg daily at home   Last TSH was 0.216 1/2023  TSH 2.970, Free T4 1.31     Diet ADULT DIET; Regular; Low Sodium (2 gm); No Caffeine   DVT Prophylaxis [] Lovenox, []  Heparin, [] SCDs, [] Ambulation,  [x] Eliquis, [] Xarelto  [] Coumadin   Code Status Full Code   Disposition From: Home  Expected Disposition: TBD  Estimated Date of Discharge: 1-2 days   Patient requires continued admission due to NSTEMI with ongoing Cardiology Evaluation   Surrogate Decision Maker/ POA Patient or Daughter     Subjective:     Chief Complaint: Hypertension       Rachid Armstrong is a 89 y.o. male who presents with elevated BP at home X 1 day and SOB and fatigue  X 4 days with Intermittent confusion X 6 weeks with worsening X 1 day.  Troponins are mildly elevated, EKG shows AV paced rhythm, BNP 8,856 which is far above his baseline. Will cont IV Lasix 40 mg QD, is not currently on diuretics at home. Will likely need to be DC'd home on a diuretic. Sates his chest pain is gone now and his SOB is better. He does complain  of swollen legs and feet. Review of Systems:    Ten point ROS is negative, unless otherwise noted above. Objective: Intake/Output Summary (Last 24 hours) at 3/13/2023 1354  Last data filed at 3/13/2023 1100  Gross per 24 hour   Intake 150 ml   Output 1325 ml   Net -1175 ml        Vitals:   Vitals:    03/13/23 0800   BP: (!) 158/98   Pulse: 80   Resp: 17   Temp: 98.2 °F (36.8 °C)   SpO2: 98%       Physical Exam:     General: NAD  Eyes: EOMI  ENT: neck supple  Cardiovascular: Regular rate and rhythm, Normal S1/S2, no murmurs or gallops   Respiratory: Clear to auscultation bilaterally, no rales, rhonchi or wheezes, normal oxygenation on 2 L/O2   Gastrointestinal: Soft, non tender, normal BS x 4  Genitourinary: no suprapubic tenderness  Musculoskeletal: +1-2 pitting edema of bilateral LE  Skin: warm, dry  Neuro: Alert and oriented x 3   Psych: Mood appropriate.      Medications:   Medications:    atorvastatin  40 mg Oral Nightly    buPROPion  75 mg Oral Daily    donepezil  10 mg Oral Nightly    apixaban  2.5 mg Oral BID    finasteride  5 mg Oral Daily    levothyroxine  100 mcg Oral Daily    metoprolol succinate  12.5 mg Oral Daily    sertraline  100 mg Oral Daily    tamsulosin  0.4 mg Oral Daily    sodium chloride flush  5-40 mL IntraVENous 2 times per day    [START ON 3/14/2023] aspirin  81 mg Oral Daily    [START ON 3/14/2023] furosemide  40 mg IntraVENous Daily      Infusions:    sodium chloride       PRN Meds: traZODone, 50 mg, Nightly PRN  sodium chloride flush, 5-40 mL, PRN  sodium chloride, , PRN  ondansetron, 4 mg, Q8H PRN   Or  ondansetron, 4 mg, Q6H PRN  acetaminophen, 650 mg, Q6H PRN   Or  acetaminophen, 650 mg, Q6H PRN  polyethylene glycol, 17 g, Daily PRN  labetalol, 10 mg, Q6H PRN  hydrALAZINE, 10 mg, Q6H PRN      Labs      Recent Results (from the past 24 hour(s))   CBC with Auto Differential    Collection Time: 03/12/23  9:07 PM   Result Value Ref Range    WBC 4.9 4.0 - 10.5 K/CU MM    RBC 3.84 (L) 4.6 - 6.2 M/CU MM    Hemoglobin 12.3 (L) 13.5 - 18.0 GM/DL    Hematocrit 38.5 (L) 42 - 52 %    .3 (H) 78 - 100 FL    MCH 32.0 (H) 27 - 31 PG    MCHC 31.9 (L) 32.0 - 36.0 %    RDW 19.5 (H) 11.7 - 14.9 %    Platelets 860 346 - 198 K/CU MM    MPV 10.4 7.5 - 11.1 FL    Differential Type AUTOMATED DIFFERENTIAL     Segs Relative 74.7 (H) 36 - 66 %    Lymphocytes % 13.2 (L) 24 - 44 %    Monocytes % 8.9 (H) 0 - 4 %    Eosinophils % 2.0 0 - 3 %    Basophils % 1.0 0 - 1 %    Segs Absolute 3.7 K/CU MM    Lymphocytes Absolute 0.7 K/CU MM    Monocytes Absolute 0.4 K/CU MM    Eosinophils Absolute 0.1 K/CU MM    Basophils Absolute 0.1 K/CU MM    Nucleated RBC % 0.0 %    Total Nucleated RBC 0.0 K/CU MM    Total Immature Neutrophil 0.01 K/CU MM    Immature Neutrophil % 0.2 0 - 0.43 %   SPECIMEN REJECTION    Collection Time: 03/12/23  9:07 PM   Result Value Ref Range    Rejected Test CMPR     Reason for Rejection UNABLE TO PERFORM TESTING:    EKG 12 Lead    Collection Time: 03/12/23  9:11 PM   Result Value Ref Range    Ventricular Rate 80 BPM    Atrial Rate 79 BPM    P-R Interval 176 ms    QRS Duration 134 ms    Q-T Interval 448 ms    QTc Calculation (Bazett) 516 ms    R Axis 233 degrees    T Axis 78 degrees    Diagnosis       AV dual-paced rhythm  Abnormal ECG  When compared with ECG of 20-JAN-2023 12:34,  No significant change was found     Comprehensive Metabolic Panel    Collection Time: 03/12/23 10:02 PM   Result Value Ref Range    Sodium 138 135 - 145 MMOL/L    Potassium 4.2 3.5 - 5.1 MMOL/L    Chloride 100 99 - 110 mMol/L    CO2 29 21 - 32 MMOL/L    BUN 23 6 - 23 MG/DL    Creatinine 1.3 0.9 - 1.3 MG/DL    Est, Glom Filt Rate 52 (L) >60 mL/min/1.73m2    Glucose 108 (H) 70 - 99 MG/DL    Calcium 8.4 8.3 - 10.6 MG/DL    Albumin 3.7 3.4 - 5.0 GM/DL    Total Protein 6.9 6.4 - 8.2 GM/DL    Total Bilirubin 0.5 0.0 - 1.0 MG/DL    ALT 16 10 - 40 U/L    AST 28 15 - 37 IU/L    Alkaline Phosphatase 139 (H) 40 - 129 IU/L Anion Gap 9 4 - 16   Troponin    Collection Time: 03/12/23 10:22 PM   Result Value Ref Range    Troponin T 0.030 (H) <0.01 NG/ML   Brain Natriuretic Peptide    Collection Time: 03/12/23 10:22 PM   Result Value Ref Range    Pro-BNP 8,856 (H) <300 PG/ML   Magnesium    Collection Time: 03/12/23 10:22 PM   Result Value Ref Range    Magnesium 1.9 1.8 - 2.4 mg/dl   Urinalysis    Collection Time: 03/13/23  2:34 AM   Result Value Ref Range    Color, UA YELLOW YELLOW    Clarity, UA CLEAR CLEAR    Glucose, Urine NEGATIVE NEGATIVE MG/DL    Bilirubin Urine SMALL NUMBER OR AMOUNT OBSERVED (A) NEGATIVE MG/DL    Ketones, Urine TRACE (A) NEGATIVE MG/DL    Specific Gravity, UA 1.020 1.001 - 1.035    Blood, Urine LARGE NUMBER OR AMOUNT OBSERVED (A) NEGATIVE    pH, Urine 6.5 5.0 - 8.0    Protein,  (A) NEGATIVE MG/DL    Urobilinogen, Urine 0.2 0.2 - 1.0 MG/DL    Nitrite Urine, Quantitative NEGATIVE NEGATIVE    Leukocyte Esterase, Urine SMALL NUMBER OR AMOUNT OBSERVED (A) NEGATIVE   Microscopic Urinalysis    Collection Time: 03/13/23  2:34 AM   Result Value Ref Range    RBC, UA 1,318 (H) 0 - 3 /HPF    WBC, UA NONE SEEN 0 - 2 /HPF    Bacteria, UA NEGATIVE NEGATIVE /HPF    Trichomonas, UA NONE SEEN NONE SEEN /HPF   EKG 12 lead    Collection Time: 03/13/23  6:08 AM   Result Value Ref Range    Ventricular Rate 80 BPM    Atrial Rate 81 BPM    P-R Interval 174 ms    QRS Duration 138 ms    Q-T Interval 466 ms    QTc Calculation (Bazett) 537 ms    P Axis 111 degrees    R Axis 231 degrees    T Axis 98 degrees    Diagnosis       AV dual-paced rhythm  Abnormal ECG  When compared with ECG of 12-MAR-2023 21:11,  No significant change was found     Troponin    Collection Time: 03/13/23  7:46 AM   Result Value Ref Range    Troponin T 0.043 (H) <0.01 NG/ML   CBC    Collection Time: 03/13/23  7:46 AM   Result Value Ref Range    WBC 5.5 4.0 - 10.5 K/CU MM    RBC 3.59 (L) 4.6 - 6.2 M/CU MM    Hemoglobin 11.6 (L) 13.5 - 18.0 GM/DL    Hematocrit 36.7 (L) 42 - 52 %    .2 (H) 78 - 100 FL    MCH 32.3 (H) 27 - 31 PG    MCHC 31.6 (L) 32.0 - 36.0 %    RDW 15.8 (H) 11.7 - 14.9 %    Platelets 081 682 - 024 K/CU MM    MPV 9.8 7.5 - 11.1 FL   Basic Metabolic Panel w/ Reflex to MG    Collection Time: 03/13/23  7:46 AM   Result Value Ref Range    Sodium 141 135 - 145 MMOL/L    Potassium 3.8 3.5 - 5.1 MMOL/L    Chloride 100 99 - 110 mMol/L    CO2 31 21 - 32 MMOL/L    Anion Gap 10 4 - 16    BUN 22 6 - 23 MG/DL    Creatinine 1.3 0.9 - 1.3 MG/DL    Est, Glom Filt Rate 52 (L) >60 mL/min/1.73m2    Glucose 92 70 - 99 MG/DL    Calcium 8.3 8.3 - 10.6 MG/DL   Magnesium    Collection Time: 03/13/23  7:46 AM   Result Value Ref Range    Magnesium 2.0 1.8 - 2.4 mg/dl   Phosphorus    Collection Time: 03/13/23  7:46 AM   Result Value Ref Range    Phosphorus 3.5 2.5 - 4.9 MG/DL   Lipid Panel    Collection Time: 03/13/23  7:46 AM   Result Value Ref Range    Triglycerides 83 <150 MG/DL    Cholesterol 131 <200 MG/DL    HDL 54 >40 MG/DL    LDL Calculated 60 <100 MG/DL   TSH    Collection Time: 03/13/23  7:46 AM   Result Value Ref Range    TSH, High Sensitivity 2.970 0.270 - 4.20 uIu/ml   T4, Free    Collection Time: 03/13/23  7:46 AM   Result Value Ref Range    T4 Free 1.31 0.9 - 1.8 NG/DL   Ammonia    Collection Time: 03/13/23  7:46 AM   Result Value Ref Range    Ammonia 15 (L) 16 - 60 UMOL/L   Procalcitonin    Collection Time: 03/13/23  7:46 AM   Result Value Ref Range    Procalcitonin 0.080    CK    Collection Time: 03/13/23  7:46 AM   Result Value Ref Range    Total CK 53 38 - 174 IU/L        Imaging/Diagnostics Last 24 Hours   CT HEAD WO CONTRAST    Result Date: 3/12/2023  EXAMINATION: CT OF THE HEAD WITHOUT CONTRAST  3/12/2023 11:11 pm TECHNIQUE: CT of the head was performed without the administration of intravenous contrast. Automated exposure control, iterative reconstruction, and/or weight based adjustment of the mA/kV was utilized to reduce the radiation dose to as low as reasonably achievable. COMPARISON: January 16, 2023. HISTORY: ORDERING SYSTEM PROVIDED HISTORY: AMS TECHNOLOGIST PROVIDED HISTORY: Reason for exam:->AMS Has a \"code stroke\" or \"stroke alert\" been called? ->No Decision Support Exception - unselect if not a suspected or confirmed emergency medical condition->Emergency Medical Condition (MA) Reason for Exam: AMS FINDINGS: BRAIN/VENTRICLES: There is no acute intracranial hemorrhage, mass effect or midline shift. No abnormal extra-axial fluid collection. The gray-white differentiation is maintained without evidence of an acute infarct. There is prominence of the ventricles and sulci due to global parenchymal volume loss. There are nonspecific areas of hypoattenuation within the periventricular and subcortical white matter, which likely represent chronic microvascular ischemic change. ORBITS: The visualized portion of the orbits demonstrate no acute abnormality. SINUSES: The visualized paranasal sinuses and mastoid air cells demonstrate no acute abnormality. SOFT TISSUES/SKULL: No acute abnormality of the visualized skull or soft tissues. No acute intracranial abnormality. XR CHEST PORTABLE    Result Date: 3/13/2023  EXAMINATION: ONE X-RAY VIEW OF THE CHEST 3/12/2023 11:08 pm COMPARISON: January 16, 2023 HISTORY: ORDERING SYSTEM PROVIDED HISTORY:  HTN TECHNOLOGIST PROVIDED HISTORY: Reason for Exam:  HTN Reason for Exam:  Hypertension Additional signs and symptoms:  Hypertension FINDINGS: Right chest wall pacer in place. Cardiomediastinal silhouette is enlarged. The lungs show basilar opacities with small effusions. No pneumothorax. No acute or aggressive osseous lesion. 1. Basilar atelectasis with small effusions.        Electronically signed by LIA Garcia CNP on 3/13/2023 at 1:55 PM

## 2023-03-13 NOTE — PLAN OF CARE
Problem: Discharge Planning  Goal: Discharge to home or other facility with appropriate resources  3/13/2023 0845 by Domitila Grace RN  Outcome: Progressing  3/13/2023 0646 by Harrison Mccoy RN  Outcome: Progressing     Problem: ABCDS Injury Assessment  Goal: Absence of physical injury  3/13/2023 0845 by Domitila Grace RN  Outcome: Progressing  3/13/2023 0646 by Harrison Mccoy RN  Outcome: Progressing     Problem: Skin/Tissue Integrity  Goal: Absence of new skin breakdown  Description: 1. Monitor for areas of redness and/or skin breakdown  2. Assess vascular access sites hourly  3. Every 4-6 hours minimum:  Change oxygen saturation probe site  4. Every 4-6 hours:  If on nasal continuous positive airway pressure, respiratory therapy assess nares and determine need for appliance change or resting period.   3/13/2023 0845 by Domitila Grace RN  Outcome: Progressing  3/13/2023 0646 by Harrison Mccoy RN  Outcome: Progressing     Problem: Safety - Adult  Goal: Free from fall injury  3/13/2023 0845 by Domitila Grace RN  Outcome: Progressing  3/13/2023 0646 by Harrison Mccoy RN  Outcome: Progressing

## 2023-03-13 NOTE — ED NOTES
Lab called stating chemistries were hemolyzed primary RN made aware.         Myra Callejas RN  03/12/23 8841

## 2023-03-13 NOTE — CONSULTS
[address] [facility], 3/23/1930, 3018/3018-A, 3/13/2023    History  [place]:  The primary encounter diagnosis was Hypertensive urgency. Diagnoses of Elevated troponin and Acute congestive heart failure, unspecified heart failure type [facility]) were also pertinent to this visit. Patient  has a past medical history of AAA (abdominal aortic aneurysm), Arthritis, Asthma, AV block, 1st degree, Back pain, Borderline hypothyroidism, Bradycardia, Colon polyps, Colostomy in place ([address] Glaucoma, H/O cardiovascular stress test, History of blood transfusion, History of cardiovascular stress test, History of chest x-ray, History of Doppler ultrasound, History of echocardiogram, History of nuclear stress test, Holter monitor, abnormal, Hx of blood clots, Pacemaker, PAF (paroxysmal atrial fibrillation) ([address] Peritonitis ([address] Personal history of colonic polyps, Poor historian, Skin cancer, Ulcerative (chronic) proctosigmoiditis ([address] and Wears glasses. Patient  has a past surgical history that includes hernia repair (Bilateral, [facility]); Hemorrhoid surgery (2011); Colonoscopy (2/4/2013); knee surgery (Right, 1980s); Abdominal exploration surgery (2/4/2013); Appendectomy (2/4/2013); pacemaker placement (Right, 02/25/2013); Appendectomy (2/4/2013); colostomy (2/4/2013); Cystocopy (2/03/2014); AAA repair, endovascular (7/1/14); Pacemaker insertion (Right, 12/13/2017); and Small intestine surgery (N/A, 8/28/2019). Subjective:  Patient states:  \"When you get old you just say silly things\". Pain:  Pt reports chronic R knee pain.     Communication with other providers:  Handoff to Lala RUTH  Restrictions: Fall risk, h/o dementia, tele, colostomy, general    Home Setup/Prior level of function    *Information taken from last eval 3/23/23, daughter confirmed information  Social/Functional History  Lives With: Daughter  Type of Home: House  Home Layout: Two level  Home Access: Level entry  Bathroom Shower/Tub: Walk-in showerLives With: Daughter  Type of Home: House  Home Layout: Two level, Able to Live on Main level with bedroom/bathroom  Home Access: Stairs to enter with rails  Entrance Stairs - Number of Steps: 1  Bathroom Shower/Tub: Walk-in shower  Bathroom Toilet: Standard  Bathroom Equipment: Shower chair, Grab bars in shower  Home Equipment: Mamie Dodson, Walker, rolling, Rollator  ADL Assistance: Independent (though dtr reports pt has not bathed since Artur.)  Homemaking Responsibilities: No (daughter manages household chores)  Ambulation Assistance: Independent (uses cane or one of his walkers, whichever is closest)  Transfer Assistance:  (Dtr helps with bed mobility. Pt able to transfer on his own between surfaces)  Active : No  Patient's  Info: daughter  Additional Comments: Dtr present and reported that pt needs a lot of encouragement to get out of bed, walk, or shower lately. Examination of body systems (includes body structures/functions, activity/participation limitations):  Observation:  pt is awake in semi-fowlers upon arrival  Vision:  glasses, WFL  Hearing:  Min [place]  Cardiopulmonary:  on RA, stable  Cognition: H/o dementia, A&O x2-3, see OT/SLP note for further evaluation. Musculoskeletal  ROM R/L:  WFL BLE, R knee min limited in ext ROM d/t pain. Strength R/L:  Generally 4-/5, decreased in function and endurance. Neuro:  pt reports intact LE sensation to light touch    Gait pattern: Pt demonstrates step-to pattern with RW, decreased gracie and foot clearance, increased kyphotic posture. Mobility:  Supine to sit:  SBA   Transfers: CGA-Min  Sitting balance:  SBA. Standing balance:  CGA. Gait: CGA    [facility] 6 Clicks Inpatient Mobility:  AM-PAC Inpatient Mobility Raw Score : 17    Treatment:  Bed mobility: PT cues for SUP>SIT, intermittent cues for sequencing.  Pt requires min increased time to advance LE, trunk to EOB with BUE support on bed rails SBA. Sitting balance: Pt demonstrates fair- sitting balance at EOB, light UE support on bed surface. Mild retro trunk lean in dynamic seated activity, pt recovers with cues and UE support. STS: From EOB>RW with CGA, cues for UE placement, increased time for progression of hip/trunk extension. Standing balance: With initial standing balance at EOB pt with mild A/P postural sway, UE support on RW without LOB, CGA. Gait: Pt AMB x8 ft to BR with RW, cues for path, CGA. RTS at commode with cues for use of grab bar, min decreased eccentric control, CGA. Seated balance ~3 min with SUP nearby. For dynamic sitting balance in donning depends pt required SBA + Mod assist for threading depends. Sit>stand from commode with Min A required d/t decreased LE power and difficulty with vertical transition. Standing balance at BR sink x2' with CGA in unsupported balance in hand hygiene tasks. Mild postural sway no LOB. Gait: Daughter provided chair follow, PT instructed in chair follow safety. Pt AMB an additional x25+x50 ft in hallway with RW, step-to pattern with RW, decreased gracie and foot clearance, increased kyphotic posture. RTS at recliner required d/t fatigue, CGA, cues for safe alignment with chair. Education: PT role, POC, increased time spent discussing pt's PLOF with daughter, [facility] PT and benefits of rehab    Safety: patient left in recliner with LE elevated, chair alarm, call light within reach, RN notified, gait belt used. Assessment:    Pt is a 81 y/o male admitted 3/12 with c/o  NSTEMI.   Patient with significant h/o  AAA (abdominal aortic aneurysm), Arthritis, Asthma, AV block, 1st degree, Back pain, Borderline hypothyroidism, Bradycardia, Colon polyps, Colostomy in place ([address] Glaucoma, H/O cardiovascular stress test, Holter monitor, abnormal, Hx of blood clots, Pacemaker, PAF (paroxysmal atrial fibrillation) ([address] Peritonitis ([address] Personal history of colonic polyps, Poor historian, Skin cancer, Ulcerative (chronic) proctosigmoiditis ([address] and Wears glasses. Patient  has a past surgical history that includes hernia repair (Bilateral, [address]); Hemorrhoid surgery (2011); Colonoscopy (2/4/2013); knee surgery (Right, 1980s); Abdominal exploration surgery (2/4/2013); Appendectomy (2/4/2013); pacemaker placement (Right, see chart. Per pt report pt has been performing ADLs/IADLs with assist from daughter for safety in high-level ADLs, per daughter pt able to AMB with RW and nearby SUP, overall increased time d/t cognition. At this time pt appears to be functioning below baseline. Pt is now presenting with impairments in LE strength, functional endurance, safety awareness, balance, cognition, gait, transfers. Pt would benefit from skilled PT services in order to address impairments and promote return to PLOF. PT to recommend d/c to Home with 24/7 support (daughter) and Shama  PT S4. Complexity: Moderate  Prognosis: Good, no significant barriers to participation at this time.    Plan General Plan: 2-3 times per week/week, 1 week,   Discharge Recommendations: 24 hour supervision or assist, Home with Home health PT  Equipment: defer    Goals:  Short Term Goals  Time Frame for Short Term Goals: 1 week  Short Term Goal 1: Pt will perform sup>sit from [facility] flat, x1 UE support, and SUP  Short Term Goal 2: Pt will complete sequential x5 STS at standard chair with RW, cues, SBA  Short Term Goal 3: Pt will AMB x45 ft with RW, SBA, cues for path and RW management  Short Term Goal 4: Pt will tolerate x5' standing dynamic balance training with UE support and SBA       Treatment plan:  Bed mobility, transfers, balance, gait, TA, TX, stairs    Recommendations for NURSING mobility: AMB to/from BR with RW and CGA    Time:   Time in: 11:18  Time out: 12:01  Timed treatment minutes: 30  Total time: 37    Electronically signed by:    Maureen Goldsmith, PT  7/65/1561, 8:53 PM  PT Lic #: [id number];

## 2023-03-13 NOTE — ED NOTES
ED TO INPATIENT SBAR HANDOFF    Patient Name: Ilsa Swan   :  3/23/1930  80 y.o. MRN:  [id number]  Preferred Name  Brianna Shepard  ED Room #:  ED31/ED-31  Family/Caregiver Present yes   Restraints no   Sitter no   Sepsis Risk Score Sepsis Risk Score: 1.37    Situation  Code Status: Prior No additional code details. Allergies: Codeine and Fentanyl  Weight: Patient Vitals for the past 96 hrs (Last 3 readings):   Weight   23 2101 149 lb (67.6 kg)     Arrived from: home  Chief Complaint:   Chief Complaint   Patient presents with    Hypertension     Imaging:   CT HEAD WO CONTRAST   Final Result   No acute intracranial abnormality. XR CHEST PORTABLE   Preliminary Result   1. Basilar atelectasis with small effusions. Abnormal labs:   Abnormal Labs Reviewed   CBC WITH AUTO DIFFERENTIAL - Abnormal; Notable for the following components:       Result Value    RBC 3.84 (*)     Hemoglobin 12.3 (*)     Hematocrit 38.5 (*)     .3 (*)     MCH 32.0 (*)     MCHC 31.9 (*)     RDW 19.5 (*)     Segs Relative 74.7 (*)     Lymphocytes % 13.2 (*)     Monocytes % 8.9 (*)     All other components within normal limits   COMPREHENSIVE METABOLIC PANEL - Abnormal; Notable for the following components:    Est, Glom Filt Rate 52 (*)     Glucose 108 (*)     Alkaline Phosphatase 139 (*)     All other components within normal limits   TROPONIN - Abnormal; Notable for the following components:    Troponin T 0.030 (*)     All other components within normal limits   BRAIN NATRIURETIC PEPTIDE - Abnormal; Notable for the following components:    Pro-BNP 8,856 (*)     All other components within normal limits     Critical values: yes     Abnormal Assessment Findings: pt hypertension and edema    Background  History:   Past Medical History:   Diagnosis Date    AAA (abdominal aortic aneurysm)     Surgery scheduled for 2014 with Dr. Kyra Garcia.     Arthritis     generalized    Asthma     AV block, 1st degree     Back pain     Borderline hypothyroidism 12/04/2020    Bradycardia     Colon polyps     Colostomy in place [facility])     Glaucoma     H/O cardiovascular stress test 12/27/2013    cardiolite-EF56%, apical hypokinesis is seen, cannot exlude apical Tyler ischemia    History of blood transfusion     History of cardiovascular stress test 03/05/2010    No angina or ischemic EKG changes are noted with Lexiscan. The cardiolite study demonstrated normal perfusion in all segments of the myocardium with an intact left ventricular systolic function. The resting sestamibi dose is 10.8, stress is 32.5. EF 66%    History of chest x-ray 03/16/2010    The chest is considered nonacute. There is cardiomegaly noted. COPD.  History of Doppler ultrasound 03/25/2010    venous doppler- Technically good venous ultrasound study negative for DVT in both lower extremities. Normal compressibility,color flow doppler pattern and spectral doppler pattern demonstrated thoughout.  History of echocardiogram 03/18/2010    [place]erline LV dilatation with concentric hypertrophy. Low normal systolic and abnormal diastolic function. Mild mitral and trace tricuspid regurgitation. Mild aortic root and bilateral dilatation.  History of nuclear stress test 02/06/2023    Normal perfusion study with normal distribution in all coronal, short, and horizontal axis. Normal LV function. LVEF is 63 %.  Holter monitor, abnormal 11/10/2010    11/10/2010- 24 HR- Abnormal holter revealing some significant brasycardia's and wenckebach phenomenon. Therefore, clinical  correlation is recommend.     Hx of blood clots     Pacemaker     PAF (paroxysmal atrial fibrillation) (HCC)     Peritonitis ([address]     Personal history of colonic polyps     Poor historian     Skin cancer     face    Ulcerative (chronic) proctosigmoiditis (HCC)     Wears glasses        Assessment    Vitals/MEWS: MEWS Score: 2  Level of Consciousness: Alert (0)   Vitals:    03/12/23 1140 03/13/23 0002 03/13/23 0105 03/13/23 0130   BP: (!) 170/106 (!) 173/104 (!) 152/91 (!) 161/116   Pulse: 83 86 80 83   Resp: 21 24 18 23   Temp:    97.6 °F (36.4 °C)   TempSrc:       SpO2: 96% 96% 91%    Weight:         FiO2 (%):   O2 Flow Rate:      Cardiac Rhythm:   Pain Assessment:  [x] Verbal [] Lee Caller Scale  Pain Scale: Pain Assessment  Pain Assessment: None - Denies Pain  Last documented pain score (0-10 scale)    Last documented pain medication administered: aspirin 0126  Mental Status: oriented  NIH Score: NIH     C-SSRS: Risk of Suicide: No Risk  Bedside swallow:    Femi Coma Scale (GCS): Femi Coma Scale  Eye Opening: Spontaneous  Best Verbal Response: Oriented  Best Motor Response: Obeys commands  [place] Coma Scale Score: 15  Active LDA's:   Peripheral IV 03/12/23 Distal;Left;Dorsal Forearm (Active)     PO Status: Adult clear liquid no caffeine  Pertinent or High Risk Medications/Drips: no   o If Yes, please provide details:   Pending Blood Product Administration: no     You may also review the ED PT Care Timeline found under the Summary Nursing Index tab. Recommendation    Pending orders yes  Plan for Discharge (if known):    Additional Comments: pt uses assistive devices to walk   If any further questions, please call Sending RN at [phone]    Electronically signed by: Electronically signed by Mona Burk RN on 3/13/2023 at 2:07 AM     Mona Burk RN  03/13/23 0210       Mona Burk RN  03/13/23 0215       Mona Burk RN  03/13/23 [id number]

## 2023-03-13 NOTE — CONSULTS
[facility] PHYSICAL REHABILITATION [place]  [address], [address] [facility],Third [address]  Phone: [phone]    Fax [phone]                  Dionte Mederos MD, Shane Hinojosa MD, [address] Phipps MD, Pedro Martinez, MD Connor Sandifer, MD Martina Gibson, MD Ricarda Chau, MD Carlos Alberto Alfaro, LIA Benjamin, LIA Lucas, LIA Pierre, LIA Cunningham PA-C    CARDIOLOGY CONSULT NOTE     Reason for consultation:  Elevated troponin    Referring physician:  Jaelyn Nettles MD     Primary care physician: LIA Morrow CNP      Thank you for the consult. Chief Complaints :  Chief Complaint   Patient presents with    Hypertension        History of present illness:Rachid is a 80 y. o.year old  male with a past medical history of paroxysmal atrial fibrillation, HFpEF, hypertension, complete heart block s/p Medtronic biventricular permanent pacemaker 01/2022, AAA. Patient presents with shortness of breath and fatigue x 4 days, as well as intermittent confusion over the last  6 weeks. He has been complaining of fatigue, dizziness, and coughing. Patient states that he is fairly active and is able to complete his activities of daily living. Even stated that he is able to mow his grass and garden. However it has been noted that patient is not a reliable historian    Cardiology consulted for elevated troponin. Patient is not complaining of any chest pain at this time. He has had recent cardiac work-up including a stress test on 2/6/2023 which did not show any signs of ischemia and an echo on 1/16/2023 showing preserved ejection fraction with mild to moderate aortic insufficiency. His troponins during this hospitalization have been between 0.02 and 0.04. This is a non-ACS trend. EKG is not showing AV paced rhythm without any obvious signs of ischemia    Patient is established with Dr. Flor Marina of the [facility].       Past medical history: has a past medical history of AAA (abdominal aortic aneurysm), Arthritis, Asthma, AV block, 1st degree, Back pain, Borderline hypothyroidism, Bradycardia, Colon polyps, Colostomy in place ([facility] [address] Glaucoma, H/O cardiovascular stress test, History of blood transfusion, History of cardiovascular stress test, History of chest x-ray, History of Doppler ultrasound, History of echocardiogram, History of nuclear stress test, Holter monitor, abnormal, Hx of blood clots, Pacemaker, PAF (paroxysmal atrial fibrillation) ([facility] [address] Peritonitis ([facility] [address] Personal history of colonic polyps, Poor historian, Skin cancer, Ulcerative (chronic) proctosigmoiditis ([facility] [address] and Wears glasses. Past surgical history:   has a past surgical history that includes hernia repair (Bilateral, [address] [place] [address]); Hemorrhoid surgery (2011); Colonoscopy (2/4/2013); knee surgery (Right, 1980s); Abdominal exploration surgery (2/4/2013); Appendectomy (2/4/2013); pacemaker placement (Right, 02/25/2013); Appendectomy (2/4/2013); colostomy (2/4/2013); Cystocopy (2/03/2014); AAA repair, endovascular (7/1/14); Pacemaker insertion (Right, 12/13/2017); and Small intestine surgery (N/A, 8/28/2019). Social History:   reports that he has never smoked. He has never used smokeless tobacco. He reports that he does not drink alcohol and does not use drugs.   Family history:  no family history of CAD, STROKE of DM at early age    Allergies   Allergen Reactions    Codeine Anaphylaxis    Fentanyl Other (See Comments)     Hypotension        atorvastatin (LIPITOR) tablet 40 mg, Nightly  buPROPion (WELLBUTRIN) tablet 75 mg, Daily  donepezil (ARICEPT) tablet 10 mg, Nightly  apixaban (ELIQUIS) tablet 2.5 mg, BID  finasteride (PROSCAR) tablet 5 mg, Daily  levothyroxine (SYNTHROID) tablet 100 mcg, Daily  metoprolol succinate (TOPROL XL) extended release tablet 12.5 mg, Daily  sertraline (ZOLOFT) tablet 100 mg, Daily  tamsulosin (FLOMAX) capsule 0.4 mg, Daily  traZODone (DESYREL) tablet 50 mg, Nightly PRN  sodium chloride flush 0.9 % injection 5-40 mL, 2 times per day  sodium chloride flush 0.9 % injection 5-40 mL, PRN  0.9 % sodium chloride infusion, PRN  ondansetron (ZOFRAN-ODT) disintegrating tablet 4 mg, Q8H PRN   Or  ondansetron (ZOFRAN) injection 4 mg, Q6H PRN  acetaminophen (TYLENOL) tablet 650 mg, Q6H PRN   Or  acetaminophen (TYLENOL) suppository 650 mg, Q6H PRN  polyethylene glycol (GLYCOLAX) packet 17 g, Daily PRN  [START ON 3/14/2023] aspirin chewable tablet 81 mg, Daily  labetalol (NORMODYNE;TRANDATE) injection 10 mg, Q6H PRN  hydrALAZINE (APRESOLINE) injection 10 mg, Q6H PRN  furosemide (LASIX) injection 40 mg, BID      Current Facility-Administered Medications   Medication Dose Route Frequency Provider Last Rate Last Admin    atorvastatin (LIPITOR) tablet 40 mg  40 mg Oral Nightly Andrzej Campuzano MD        buPROPion [facility]) tablet 75 mg  75 mg Oral Daily Andrzej Campuzano MD   75 mg at 03/13/23 0831    donepezil (ARICEPT) tablet 10 mg  10 mg Oral Nightly Andrzej Campuzano MD        apixaban Marene Vibha) tablet 2.5 mg  2.5 mg Oral BID Andrzej Campuzano MD   2.5 mg at 03/13/23 0821    finasteride (PROSCAR) tablet 5 mg  5 mg Oral Daily Andrzej Campuzano MD   5 mg at 03/13/23 0820    levothyroxine (SYNTHROID) tablet 100 mcg  100 mcg Oral Daily Andrzej Campuzano MD   100 mcg at 03/13/23 0631    metoprolol succinate (TOPROL XL) extended release tablet 12.5 mg  12.5 mg Oral Daily Andrzej Campuzano MD   12.5 mg at 03/13/23 0820    sertraline (ZOLOFT) tablet 100 mg  100 mg Oral Daily Andrzej Campuzano MD   100 mg at 03/13/23 0820    tamsulosin (FLOMAX) capsule 0.4 mg  0.4 mg Oral Daily Andrzej Campuzano MD   0.4 mg at 03/13/23 0820    traZODone (DESYREL) tablet 50 mg  50 mg Oral Nightly PRN Andrzejstan Campuzano MD        sodium chloride flush 0.9 % injection 5-40 mL  5-40 mL IntraVENous 2 times per day Bishnu Khan MD   10 mL at 03/13/23 7300    sodium chloride flush 0.9 % injection 5-40 mL  5-40 mL IntraVENous PRN Neeraj Santiago MD        0.9 % sodium chloride infusion   IntraVENous PRN Neeraj Santiago MD        ondansetron (ZOFRAN-ODT) disintegrating tablet 4 mg  4 mg Oral Q8H PRN Andrzej Solares MD        Or    ondansetron [facility]) injection 4 mg  4 mg IntraVENous Q6H PRN Neeraj Santiago MD        acetaminophen (TYLENOL) tablet 650 mg  650 mg Oral Q6H PRN Andrzej Solares MD        Or    acetaminophen (TYLENOL) suppository 650 mg  650 mg Rectal Q6H PRN Andrzej Solares MD        polyethylene glycol (GLYCOLAX) packet 17 g  17 g Oral Daily PRN Andrzej Solares MD        [START ON 3/14/2023] aspirin chewable tablet 81 mg  81 mg Oral Daily Andrzej Solares MD        labetalol (NORMODYNE;TRANDATE) injection 10 mg  10 mg IntraVENous Q6H PRN Neeraj Santiago MD        hydrALAZINE (APRESOLINE) injection 10 mg  10 mg IntraVENous Q6H PRN Andrzej Solares MD        furosemide (LASIX) injection 40 mg  40 mg IntraVENous BID Liudmilaalfredo Dillard, APRN - CNP           Review of Systems   Constitutional:  Positive for fatigue. Negative for diaphoresis, fever and unexpected weight change. HENT: Negative. Eyes:  Negative for visual disturbance. Respiratory:  Positive for cough and shortness of breath. Negative for chest tightness. Cardiovascular:  Negative for chest pain, palpitations and leg swelling. Gastrointestinal:  Negative for abdominal pain, diarrhea and vomiting. Endocrine: Negative for cold intolerance and heat intolerance. Musculoskeletal: Negative. Skin:  Negative for pallor and rash. Neurological:  Positive for dizziness. Negative for syncope, light-headedness and headaches. Hematological:  Does not bruise/bleed easily. Psychiatric/Behavioral:  Negative for dysphoric mood. The patient is not nervous/anxious.           Physical Examination:    Vitals:    03/13/23 0130 03/13/23 0315 03/13/23 0330 03/13/23 0800   BP: (!) 161/116 (!) 157/95  (!) 158/98   Pulse: 83 80  80   Resp: 23 19 17   Temp: 97.6 °F (36.4 °C) 97.6 °F (36.4 °C)  98.2 °F (36.8 °C)   TempSrc:  Oral  Oral   SpO2:  96%  98%   Weight:   155 lb 10.3 oz (70.6 kg)    Height:   5' 8\" (1.727 m)        Physical Exam  Constitutional:       General: He is not in acute distress. Appearance: He is not diaphoretic. HENT:      Head: Normocephalic and atraumatic. Right Ear: External ear normal.      Left Ear: External ear normal.      Nose: Nose normal.      Mouth/Throat:      Mouth: Mucous membranes are moist.   Eyes:      Extraocular Movements: Extraocular movements intact. Comments: Pupils equal and round   Neck:      Vascular: No carotid bruit. Cardiovascular:      Rate and Rhythm: Normal rate and regular rhythm. Pulses: Normal pulses. Heart sounds: S1 normal and S2 normal. No murmur heard. No friction rub. No gallop. Pulmonary:      Effort: No respiratory distress. Breath sounds: Normal breath sounds. No rales. Comments: 2 L supplemental oxygen  Chest:      Chest wall: No tenderness. Abdominal:      Palpations: Abdomen is soft. Tenderness: There is no abdominal tenderness. Musculoskeletal:      Right lower leg: No edema. Left lower leg: No edema. Skin:     General: Skin is warm and dry. Capillary Refill: Capillary refill takes less than 2 seconds. Findings: No rash. Comments: Skin turgor brisk   Neurological:      Mental Status: He is alert and oriented to person, place, and time. Mental status is at baseline. Psychiatric:         Behavior: Behavior is cooperative. Thought Content:  Thought content normal.         Judgment: Judgment normal.          Medical decision making and Data review:    Lab Review   Recent Labs     03/13/23  0746   WBC 5.5   HGB 11.6*   HCT 36.7*         Recent Labs     03/13/23  0746      K 3.8      CO2 31   PHOS 3.5   BUN 22 CREATININE 1.3     Recent Labs     03/12/23  2202   AST 28   ALT 16   BILITOT 0.5   ALKPHOS 139*     Recent Labs     03/12/23  2222 03/13/23  0746   TROPONINT 0.030* 0.043*       Recent Labs     03/12/23  2222   PROBNP 8,856*     Lab Results   Component Value Date    INR 1.24 01/16/2023    PROTIME 16.0 (H) 01/16/2023       EKG: Reviewed by me  AV paced rhythm no obvious signs of ischemia    ECHO:01/16/23   Ejection fraction is visually estimated at 60-65%. Sclerotic, but non-stenotic aortic valve. Mild to Mod aortic regurgitation is noted. Trace tricuspid regurgitation; RVSP: 22 mmHg. No evidence of any pericardial effusion. Dilated ascending aorta measuring 4.2cm. Chest Xray:03/13/23   Basilar atelectasis with small effusions. CT HEAD WO CONTRAST:03/12/23    No acute intracranial abnormality. All labs, medications and tests reviewed by myself including data  from outside source , patient and available family . Continue all other medications of all above medical condition listed as is. Impression:  Principal Problem:    NSTEMI (non-ST elevated myocardial infarction) ([facility] [address]  Resolved Problems:    * No resolved hospital problems. *      Assessment and plan: 80 y. o.year old with   Elevated troponin  -No chest pain at this time. Non-ACS trend. EKG nonischemic  -Recent stress test negative for ischemia  -Recent echo showed preserved ejection fraction with mild to moderate aortic insufficiency but no wall motion abnormalities.  -No further cardiac work-up needed at this time  -Continue aspirin  Acute on chronic HFpEF  -Patient appears volume overloaded. BNP of 8856  -Strict I's and O's and daily weights.  -Currently on Lasix 40 mg IV daily  Paroxysmal atrial fibrillation s/p permanent pacemaker  -Medtronic biventricular permanent pacemaker.   Currently AV paced  -Will interrogate pacer  -Continue Toprol 12.5 mg daily  -Continue Eliquis 2.5 mg twice daily  Hyperlipidemia  -Continue Lipitor 40 mg daily  Hypertension with history of hypotension  -Systolic blood pressure was not 190s upon admission. Received IV labetalol  -Blood pressure still poorly controlled at this time  -Discontinue midodrine  Abdominal aortic aneurysm s/p EVAR 07/2014      Thank you  much for consult and giving us the opportunity in contributing in the care of this patient. Please feel free to call me for any questions. Cayden Brown PA-C, 3/13/2023 11:51 AM     CARDIOLOGY ATTENDING ADDENDUM    MEDICAL DECISION MAKING:    Patient was seen and examined. He is clinically stable at present. I will continue with IV Lasix as above. His troponin is chronically elevated and we do not suspect any ischemia present. Would continue with metoprolol and Eliquis. HPI:  I have reviewed the HPI  And agree with above   Ravinder Rizzo is a 80 y. o.year old who and presents with had concerns including Hypertension. Chief Complaint   Patient presents with    Hypertension     Please review addendum/changes made to note above         I agree with the plan, which was planned by myself and discussed with advanced level provider. My documented MDM is a substantive portion of the supervisory note. I have seen ,spoken to  and examined this patient personally, independently of the advanced level provider. I have spent substantiate  portion of this encounter independently myself in examining patient and developing the medical management plan . I have reviewed the hospital care given to date and reviewed all pertinent labs and imaging. The plan was developed mutually at the time of the visit with the patient,  NP /PA  and myself. I have spoken with patient, nursing staff and provided written and verbal instructions . The above note has been reviewed and I agree with the assessment, diagnosis, and treatment plan with changes made by me as follows .     Bin Jackson MD

## 2023-03-13 NOTE — CONSULTS
[facility], [place], [address]   Consult Note  [facility] 1 2 3 4 5    Date: 3/13/2023   Patient: Lyubov Talley   : 3/23/1930   DOA: 3/12/2023   MRN: [id number]   ROOM#: [id number]     Reason for Consult: Patient known to [address] [place] [address]  Requesting Physician: Dr. Chiara St  Collaborating Urologist on Call at time of admission: Dr. Rowe Neat: Hypertension    History Obtained From:  electronic medical record    HISTORY OF PRESENT ILLNESS:                The patient is a 80 y.o. male with significant past medical history of paroxysmal A-fib status post pacemaker on Eliquis, hypothyroidism, AAA status post repair who presented to the ED on 2023 with hypertension. He was admitted with the diagnosis of hypertensive urgency, elevated troponin and acute congestive heart failure unspecified heart failure type ([address] Mr. Annabel Del Toro is know to our practice and is being followed by Dr. Keri Hernandez for BPH and phimosis. He was most recently seen and evaluated on 2023. He currently take Proscar 5mg daily and Flomax 0.4mg daily. His plan was to schedule for a dorsal slit at the patient's convenience (when able to hold Eliquis and Plavix), and return to the office in 10/2023 for cystoscopy and renal ultrasound. ED Provider's HPI 2023: Lyubov Talley is a 80 y.o. male with past medical history as listed below, including paroxysmal A-fib status post pacemaker on Eliquis, hypothyroidism, AAA status post repair who presents with hypertension. Family at bedside provides history. Patient was not acting like himself at about 7 PM, family stated that he looked like he did not feel well. He was noted to be pale and slumped over. He was conscious, however family does state that he looked like he did not feel well. His blood pressure was taken and was found to be 171/121. Patient does take 25 mg of Toprol-XL in the morning and he did take that medication this morning.   He also takes midodrine. He has not missed any doses of his medications. Family states that patient is an unreliable historian, and only states that he feels \"fine. \"  He has not had any complaints today, and has otherwise been acting like himself. He is currently at his neurologic baseline. Patient is typically able to tell where he is and his name, he does not typically know the year. Of note patient does have bilateral lower extremity edema, family does state that this has been present intermittently for the last 2 to 3 years, however over the last week it has become progressively worse and more persistent than it typically is. Past Medical History:        Diagnosis Date    AAA (abdominal aortic aneurysm)     Surgery scheduled for 7/1/2014 with Dr. David Izquierdo. Arthritis     generalized    Asthma     AV block, 1st degree     Back pain     Borderline hypothyroidism 12/04/2020    Bradycardia     Colon polyps     Colostomy in place [facility])     Glaucoma     H/O cardiovascular stress test 12/27/2013    cardiolite-EF56%, apical hypokinesis is seen, cannot exlude apical Tyler ischemia    History of blood transfusion     History of cardiovascular stress test 03/05/2010    No angina or ischemic EKG changes are noted with Lexiscan. The cardiolite study demonstrated normal perfusion in all segments of the myocardium with an intact left ventricular systolic function. The resting sestamibi dose is 10.8, stress is 32.5. EF 66%    History of chest x-ray 03/16/2010    The chest is considered nonacute. There is cardiomegaly noted. COPD. History of Doppler ultrasound 03/25/2010    venous doppler- Technically good venous ultrasound study negative for DVT in both lower extremities. Normal compressibility,color flow doppler pattern and spectral doppler pattern demonstrated thoughout. History of echocardiogram 03/18/2010    [place] LV dilatation with concentric hypertrophy. Low normal systolic and abnormal diastolic function.  Mild mitral and trace tricuspid regurgitation. Mild aortic root and bilateral dilatation. History of nuclear stress test 02/06/2023    Normal perfusion study with normal distribution in all coronal, short, and horizontal axis. Normal LV function. LVEF is 63 %. Holter monitor, abnormal 11/10/2010    11/10/2010- 24 HR- Abnormal holter revealing some significant brasycardia's and wenckebach phenomenon. Therefore, clinical  correlation is recommend.     Hx of blood clots     Pacemaker     PAF (paroxysmal atrial fibrillation) ([address]     Peritonitis ([address]     Personal history of colonic polyps     Poor historian     Skin cancer     face    Ulcerative (chronic) proctosigmoiditis ([address]     Wears glasses      Past Surgical History:        Procedure Laterality Date    ABDOMINAL AORTIC ANEURYSM REPAIR, ENDOVASCULAR  7/1/14    ABDOMINAL EXPLORATION SURGERY  2/4/2013    exp lap, left hemicolectomy, umbilectomy    APPENDECTOMY  2/4/2013    APPENDECTOMY  2/4/2013    COLONOSCOPY  2/4/2013    COLOSTOMY  2/4/2013    CYSTOSCOPY  2/03/2014    TURP    HEMORRHOID SURGERY  2011    HERNIA REPAIR Bilateral [address] hernia    KNEE SURGERY Right 1980s    PACEMAKER INSERTION Right 12/13/2017    BIV PPM Medtronic Percepta Quad CRT-P MRI SureScan Pacemaker    PACEMAKER PLACEMENT Right 02/25/2013    Explanted 12/13/2017    SMALL INTESTINE SURGERY N/A 8/28/2019    EXPLORATORY LAPAROTOMY, SMALL BOWEL RESECTION, LYSIS OF ADHESIONS, NEW COLOSTOMY, AND HERNIA REPAIR WITH XENMATRIX MESH performed by Priyanka Randolph MD at [facility]     Current Medications:   Current Facility-Administered Medications: atorvastatin (LIPITOR) tablet 40 mg, 40 mg, Oral, Nightly  buPROPion (WELLBUTRIN) tablet 75 mg, 75 mg, Oral, Daily  donepezil (ARICEPT) tablet 10 mg, 10 mg, Oral, Nightly  apixaban (ELIQUIS) tablet 2.5 mg, 2.5 mg, Oral, BID  finasteride (PROSCAR) tablet 5 mg, 5 mg, Oral, Daily  levothyroxine (SYNTHROID) tablet 100 mcg, 100 mcg, Oral, Daily  metoprolol succinate (TOPROL XL) extended release tablet 12.5 mg, 12.5 mg, Oral, Daily  sertraline (ZOLOFT) tablet 100 mg, 100 mg, Oral, Daily  tamsulosin (FLOMAX) capsule 0.4 mg, 0.4 mg, Oral, Daily  traZODone (DESYREL) tablet 50 mg, 50 mg, Oral, Nightly PRN  sodium chloride flush 0.9 % injection 5-40 mL, 5-40 mL, IntraVENous, 2 times per day  sodium chloride flush 0.9 % injection 5-40 mL, 5-40 mL, IntraVENous, PRN  0.9 % sodium chloride infusion, , IntraVENous, PRN  ondansetron (ZOFRAN-ODT) disintegrating tablet 4 mg, 4 mg, Oral, Q8H PRN **OR** ondansetron (ZOFRAN) injection 4 mg, 4 mg, IntraVENous, Q6H PRN  acetaminophen (TYLENOL) tablet 650 mg, 650 mg, Oral, Q6H PRN **OR** acetaminophen (TYLENOL) suppository 650 mg, 650 mg, Rectal, Q6H PRN  polyethylene glycol (GLYCOLAX) packet 17 g, 17 g, Oral, Daily PRN  [START ON 3/14/2023] aspirin chewable tablet 81 mg, 81 mg, Oral, Daily  furosemide (LASIX) injection 40 mg, 40 mg, IntraVENous, Daily  labetalol (NORMODYNE;TRANDATE) injection 10 mg, 10 mg, IntraVENous, Q6H PRN  hydrALAZINE (APRESOLINE) injection 10 mg, 10 mg, IntraVENous, Q6H PRN    Allergies:  Codeine and Fentanyl    Social History:   TOBACCO:   reports that he has never smoked. He has never used smokeless tobacco.  ETOH:   reports no history of alcohol use. DRUGS:   reports no history of drug use. Family History:       Problem Relation Age of Onset    Stroke Mother         CVA    Heart Disease Mother     Prostate Cancer Brother     Pacemaker Brother     Cancer Brother         skin    Cancer Daughter         colon    Substance Abuse Son         Tobacco    No Known Problems Father        REVIEW OF SYSTEMS:     Unable due to patient mental status. Alert and oriented x 1-person    PHYSICAL EXAM:      VITALS:  BP (!) 158/98   Pulse 80   Temp 98.2 °F (36.8 °C) (Oral)   Resp 17   Ht 5' 8\" (1.727 m)   Wt 155 lb 10.3 oz (70.6 kg)   SpO2 98%   BMI 23.67 kg/m²      TEMPERATURE:  Current - Temp: 98.2 °F (36.8 °C);  Max - Temp  Av.8 °F (36.6 °C)  Min: 97.6 °F (36.4 °C)  Max: 98.2 °F (36.8 °C)  24HR BLOOD PRESSURE RANGE:  Systolic (87HEO), AOU:933 , Min:152 , PHB:249   ; Diastolic (15CTO), NQD:335, Min:91, Max:116    8HR INTAKE OUTPUT:  In: -   Out: 225 [Urine:225]  URINARY CATHETER OUTPUT (Pak):     DRAIN/TUBE OUTPUT:        Physical Exam:  General appearance: appears stated age, cooperative, no distress, and confused  Head: Normocephalic, without obvious abnormality, atraumatic  Back: symmetric, no curvature. ROM normal. No CVA tenderness. Abdomen: soft, non-tender; bowel sounds normal; no masses,  no organomegaly  : negative CVA tenderness bilaterally; uncircumcised phallus, no lesion, phimosis; unable to completely retract foreskin; patient voiding per urinal.    DATA:    WBC:    Lab Results   Component Value Date/Time    WBC 5.5 2023 07:46 AM     Hemoglobin/Hematocrit:    Lab Results   Component Value Date/Time    HGB 11.6 2023 07:46 AM    HCT 36.7 2023 07:46 AM     BMP:    Lab Results   Component Value Date/Time     2023 07:46 AM    K 3.8 2023 07:46 AM     2023 07:46 AM    CO2 31 2023 07:46 AM    BUN 22 2023 07:46 AM    LABALBU 3.7 2023 10:02 PM    CREATININE 1.3 2023 07:46 AM    CALCIUM 8.3 2023 07:46 AM    GFRAA 57 2022 02:52 PM    LABGLOM 52 2023 07:46 AM     PT/INR:    Lab Results   Component Value Date/Time    PROTIME 16.0 2023 01:55 AM    PROTIME 11.3 2012 01:15 PM    INR 1.24 2023 01:55 AM       Imaging:  CT HEAD WO CONTRAST    Result Date: 3/12/2023  EXAMINATION: CT OF THE HEAD WITHOUT CONTRAST  3/12/2023 11:11 pm TECHNIQUE: CT of the head was performed without the administration of intravenous contrast. Automated exposure control, iterative reconstruction, and/or weight based adjustment of the mA/kV was utilized to reduce the radiation dose to as low as reasonably achievable.  COMPARISON: , 2023. HISTORY: ORDERING SYSTEM PROVIDED HISTORY: AMS TECHNOLOGIST PROVIDED HISTORY: Reason for exam:->AMS Has a \"code stroke\" or \"stroke alert\" been called? ->No Decision Support Exception - unselect if not a suspected or confirmed emergency medical condition->Emergency Medical Condition ([place]) Reason for Exam: AMS FINDINGS: BRAIN/VENTRICLES: There is no acute intracranial hemorrhage, mass effect or midline shift. No abnormal extra-axial fluid collection. The gray-white differentiation is maintained without evidence of an acute infarct. There is prominence of the ventricles and sulci due to global parenchymal volume loss. There are nonspecific areas of hypoattenuation within the periventricular and subcortical white matter, which likely represent chronic microvascular ischemic change. ORBITS: The visualized portion of the orbits demonstrate no acute abnormality. SINUSES: The visualized paranasal sinuses and mastoid air cells demonstrate no acute abnormality. SOFT TISSUES/SKULL: No acute abnormality of the visualized skull or soft tissues. No acute intracranial abnormality. XR CHEST PORTABLE    Result Date: 3/13/2023  EXAMINATION: ONE X-RAY VIEW OF THE CHEST 3/12/2023 11:08 pm COMPARISON: January 16, 2023 HISTORY: ORDERING SYSTEM PROVIDED HISTORY:  HTN TECHNOLOGIST PROVIDED HISTORY: Reason for Exam:  HTN Reason for Exam:  Hypertension Additional signs and symptoms:  Hypertension FINDINGS: Right chest wall pacer in place. Cardiomediastinal silhouette is enlarged. The lungs show basilar opacities with small effusions. No pneumothorax. No acute or aggressive osseous lesion. 1. Basilar atelectasis with small effusions. Assessment & Plan:      Kaitlin Miranda is a 80y.o. year old male admitted 3/12/2023 for Hypertension. Known to Dr. Christos Ward. Was last seen in our office 02/13/2023. He has diagnosis of BPH and phimoisis. Plan to follow 10/2023 for cystoscopy and RAINER.  Schedule dorsal slit at patient convenience when Eliquis and Plavix can be held. 1) BPH with lower urinary tract symptoms:   Currently taking Proscar 5mg and Flomax 0.4mg daily   Voiding per urinal; wears adult undergarment for intermittent incontinence   Voided 225ml clear yellow urine per urinal this AM.   PVR 0ml 03/13/2023   Will discuss treatment plan and further follow up with the family when they are present. 2) Phimosis:   Has been evaluated in the office by Dr. Mary Puri. Will schedule dorsal slit at patient and family convenience after this hospitalization. According to the patient's daughter, he has dorsal slit procedure scheduled for 03/30/2023 with Dr. Mary Puri. Pt stable from a  standpoint. Will sign off, please call with any questions. Pt to follow up in our office with Dr. Mary Puri in 2-4 wks or as needed for reevaluation. Patient seen and examined, chart reviewed.      Electronically signed by LIA Herrmann CNP on 3/13/2023 at 10:26 AM

## 2023-03-13 NOTE — CARE COORDINATION
Case Management Assessment  Initial Evaluation    Date/Time of Evaluation: 3/13/2023 12:46 PM  Assessment Completed by: Alfredo Phelps    If patient is discharged prior to next notation, then this note serves as note for discharge by case management. Patient Name: Corrine Bentley                   YOB: 1930  Diagnosis: Elevated troponin [R77.8]  NSTEMI (non-ST elevated myocardial infarction) [facility]) [I21.4]  Hypertensive urgency [I16.0]  Acute congestive heart failure, unspecified heart failure type ([facility] [I50.9]                   Date / Time: 3/12/2023  9:13 PM    Patient Admission Status: Observation   Readmission Risk (Low < 19, Mod (19-27), High > 27): Readmission Risk Score: 15.4    Current PCP: LIA Curry CNP  PCP verified by CM? Yes    Chart Reviewed: Yes      History Provided by: Patient, Child/Family  Patient Orientation: Alert and Oriented    Patient Cognition: Alert    Hospitalization in the last 30 days (Readmission):  No    If yes, Readmission Assessment in CM Navigator will be completed. Advance Directives:      Code Status: Full Code   Patient's Primary Decision Maker is: Named in [address] [place] [address]    Primary Decision Maker: [address] - [phone]    Discharge Planning:    Patient lives with: Children Type of Home: House  Primary Care Giver: Family  Patient Support Systems include: Children, Family Members   Current Financial resources: None  Current community resources: None  Current services prior to admission: None            Current DME:              Type of Home Care services:  PT, OT    ADLS  Prior functional level: Assistance with the following:, Cooking, Housework, Shopping, Mobility  Current functional level: Assistance with the following:, Cooking, Housework, Shopping, Mobility    PT AM-PAC:   /24  OT AM-PAC:   /24    Family can provide assistance at DC:  Yes  Would you like Case Management to discuss the discharge plan with any other family members/significant others, and if so, who? Yes (Daughter (rimary caregiver) was in room during discharge planning)  Plans to Return to Present Housing: Yes  Other Identified Issues/Barriers to RETURNING to current housing: none at this time  Potential Assistance needed at discharge: Home Care            Potential DME:    Patient expects to discharge to: Unknown  Plan for transportation at discharge:      Financial    Payor: Maritza Espinoza / Plan: Maritza Espinoza / Product Type: *No Product type* /     Does insurance require precert for SNF: No    Potential assistance Purchasing Medications:    Meds-to-Beds request:        [employer], [address] [facility] [address] [address]  Phone: [phone] Fax: [phone]      Notes:    Factors facilitating achievement of predicted outcomes: Family support, Pleasant, Sense of humor, Has needed Durable Medical Equipment at home, and Home is wheelchair accessible    Barriers to discharge: Confusion, Cognitive deficit, Limited safety awareness, and Unrealistic expectations    Additional Case Management Notes: Student reviewed chart and spoke with pt and daughter (Primary Decision Maker) in room. Pt had some trouble answering questions related to the past or time, daughter helped check answers and fill in the gaps based on the pt's answers. Pt lives in 1 story house with no ANSELMO with daughter. Pt's wife passed away. Pt has a 4 wheeled walker with a seat. Pt's canes have been put away in storage. Pt has PCP and insurance to help with healthcare and Rx costs. Pt is ADL dependent for cooking, cleaning, and remembering to care for himself. Pt is able to dress and bath himself, but needs a push from daughter to do it. Pt is active with [facility]. from previous visit. Yg Daniels is aware. Plan is home with [facility] with daughter. CM will follow.     The Plan for Transition of Care is related to the following treatment goals of Elevated troponin [R77.8]  NSTEM[facility] (non[facility] [I21.4]  Hypertensive urgency [I16.0]  Acute congestive heart failure, unspecified heart failure type ([facility] [G64.1]    IF APPLICABLE: The Patient and/or patient representative Wendi Huerta and his family were provided with a choice of provider and agrees with the discharge plan. Freedom of choice list with basic dialogue that supports the patient's individualized plan of care/goals and shares the quality data associated with the providers was provided to:     Patient Representative Name:       The Patient and/or Patient Representative Agree with the Discharge Plan?       Madalyn Lucas  Case Management Department  Ph: [phone] Fax: [phone]

## 2023-03-13 NOTE — H&P
History and Physical Exam    Patient:  Lzieth Richey 80 y.o. male MRN: [id number]     Date of Service: 3/13/2023    Hospital Day: 2      Chief complaint: Hypertension. X 1 day and SOB and fatigue  X 4 days with Intermittent confusion X 6 weeks with worsening X 1 day. [facility] a 80 y.[facility]. male, with a history of dementia with behavioral disturbance, Hypotension on midodrine, Insomnia, HLD, BPH, A. Fib on DOAC, Hypothyroidism, s/p pacemaker was admitted on 3/12/2023. They had concerns including Hypertension. X 1 day and SOB and fatigue  X 4 days with Intermittent confusion X 6 weeks with worsening X 1 day. Assessment  Elevated Troponin with T wave inversions 2/2 Possible NSTEMI vs 2/2 Elevated BP   Trop 0.030 on presentation   EKG with A. Paced and V paced rhythm with t wave inversions II, III, AVF and on tele  Received aspirin 324mg PO X 1 in the ER   Recent stress test on 2/6/2023 was normal    Possible Acute Decompensated HFpEF  Pro-BNP 8856 which is above his baseline of ~2000s  B/L LE swelling Rt > Lt +/+ pitting - not worsening over the last 6 weeks per pt's daughter   SOB present on presentation and was ongoing X 4 days   CXR shows increased vascular markings   Received lasix 40mg IV X 1   Echo     HTN above goal 2/2 possibly Midodrine   Presented with elevated BP with peak at 195/102  Received labetalol 5mg IV X 1 and lasix as mentioned above   Last /94  Pt. Was taking midodrine 2.5mg TID scheduled at home     Acute On Chronic Encephalopathy 2/2 ? Metabolic in the setting of Dementia, R/O Depakote toxicity, UTI, Hypothyroidism   Has been confused on-off more than usual for the last 6 weeks   More confused on the day of presentation and appeared more fatigued per family members   Not at baseline per daughter   He is AO X 2 on my exam   Urine is very dark   CT head -ve for any acute intracranial abnormalities     Rt.  Chest pacemaker in-situ    H/O BPH  Apparently has poor urine output   On finasteride 5mg daily and tamsulosin 0.4mg daily     H/O PAF  Currently A paced V paced   On metoprolol 12.5mg daily and on apixaban 2.5mg BID     H/O Dementia with Behavioral Disturbance, Insomnia   He is on Depakote ER 250mg at bedtime and Depakote DR 250mg at supper - both in the evening   On trazodone 50mg HS PRN, wellbutrin 75mg daily, sertraline 100mg daily   On donepezil 10mg HS     H/O Hypotension on Midodrine   On midodrin 2.5mg TID at home - he was taking it per daughter     H/O Hypothyroidism   On levothyroxine 100mcg daily at home   Last TSH was 0.216 1/2023      Plan  Place under observation status with tele - discussed with ER provider regarding further work up needed. He is already on apixaban for anticoagulation - continue that   Continue aspirin 81mg daily, increase atorvastatin to 40mg daily from 20mg daily   Consult cardiology   Repeat Troponin X 1   Repeat EKG in the AM and lipid panel   Send ammonia, hold depakote and obtain depakote level   Send UA and urine culture, procal   Known severe BPH - family requested uro consult - consult Dr. Carri Darling. Start on lasix 40mg IV daily for now   Start on hydralazine 10mg and Labetalol 10mg Q6H PRN for SBP >180 or DBP >110   I/O Q8H   Obtain TSH and FT4  Discussed CODE STATUS with patient and his daughter in the room- they opted for FULL CODE. # Peptic ulcer prophylaxis: -   # DVT Prophylaxis: Apixaban   #CODE STATUS: Full      Current living situation: Home  Expected Disposition: TBD  Estimated discharge date: 1-2 days. History of Present Illness   Hang Murray, a 80 y.o. male, who presented on 3/13/2023 with complaints of elevated BP at home X 1 day and SOB and fatigue  X 4 days with Intermittent confusion X 6 weeks with worsening X 1 day. Patient is not a very good historian. History was obtained from the ER physician, chart review and from the patient's daughter.     Apparently, the patient has had intermittent confusion for the last 6 weeks but it got worse over the past day. He has also had bilateral lower extremity swelling for approximately the last 6 weeks without any new worsening. He has become more short of breath and more fatigued over the last 4 days according to the daughter. She checked his blood pressure at home and found it to be elevated and therefore brought him to the emergency department. She states that the patient has been not at his baseline currently. Patient does have a history of sundowning while in the hospital.      ED Course: Initial heart rate 76, respiratory 28, blood pressure 176/106 with a peak of 195/102, >95% saturation on room air. His BMP was stable with a creatinine of 1.3 (which seems to be at baseline), his proBNP was 8856, and his initial troponin was 0.030. His LFTs showed an elevated ALP at 139, otherwise it was benign. His CBC showed a normal white count with hemoglobin of 12.3. His chest x-ray was read as bibasilar atelectasis with small effusions, however, I do see increased vascular markings suggestive of mild pulmonary edema on my read of the x-ray. He underwent a CT head without contrast which was negative for any acute intracranial abnormality. He did undergo an EKG which showed an A-paced V-paced rhythm with what appeared to be T wave inversions in II, III, aVF. There appears to be some T wave inversions in the lateral leads as well, however, it is hard to tell as it is just past the pacer line. ED Meds: Patient was given aspirin 324 mg x 1 p.o., Lasix 40 mg IV x1, labetalol 5 mg IV x1.  ED Fluids: Patient was given no fluids. Past Medical History:      Diagnosis Date    AAA (abdominal aortic aneurysm)     Surgery scheduled for 7/1/2014 with Dr. Bentley Valdivia.     Arthritis     generalized    Asthma     AV block, 1st degree     Back pain     Borderline hypothyroidism 12/04/2020    Bradycardia     Colon polyps     Colostomy in place (HCC)     Glaucoma     H/O cardiovascular stress test 12/27/2013    cardiolite-EF56%, apical hypokinesis is seen, cannot exlude apical Tyler ischemia    History of blood transfusion     History of cardiovascular stress test 03/05/2010    No angina or ischemic EKG changes are noted with Lexiscan. The cardiolite study demonstrated normal perfusion in all segments of the myocardium with an intact left ventricular systolic function. The resting sestamibi dose is 10.8, stress is 32.5. EF 66%    History of chest x-ray 03/16/2010    The chest is considered nonacute.There is cardiomegaly noted. COPD.    History of Doppler ultrasound 03/25/2010    venous doppler- Technically good venous ultrasound study negative for DVT in both lower extremities. Normal compressibility,color flow doppler pattern and spectral doppler pattern demonstrated thoughout.    History of echocardiogram 03/18/2010    Boarderline LV dilatation with concentric hypertrophy. Low normal systolic and abnormal diastolic function. Mild mitral and trace tricuspid regurgitation. Mild aortic root and bilateral dilatation.    History of nuclear stress test 02/06/2023    Normal perfusion study with normal distribution in all coronal, short, and horizontal axis. Normal LV function. LVEF is 63 %.    Holter monitor, abnormal 11/10/2010    11/10/2010- 24 HR- Abnormal holter revealing some significant brasycardia's and wenckebach phenomenon. Therefore, clinical  correlation is recommend.    Hx of blood clots     Pacemaker     PAF (paroxysmal atrial fibrillation) (HCC)     Peritonitis (HCC)     Personal history of colonic polyps     Poor historian     Skin cancer     face    Ulcerative (chronic) proctosigmoiditis (HCC)     Wears glasses        Past Surgical History:        Procedure Laterality Date    ABDOMINAL AORTIC ANEURYSM REPAIR, ENDOVASCULAR  7/1/14    ABDOMINAL EXPLORATION SURGERY  2/4/2013    exp lap, left hemicolectomy, umbilectomy    APPENDECTOMY  2/4/2013     APPENDECTOMY  2/4/2013    COLONOSCOPY  2/4/2013    COLOSTOMY  2/4/2013    CYSTOSCOPY  2/03/2014    TURP    HEMORRHOID SURGERY  2011    HERNIA REPAIR Bilateral 1940 & 1958    Ing hernia    KNEE SURGERY Right 1980s    PACEMAKER INSERTION Right 12/13/2017    BIV PPM Medtronic Percepta Quad CRT-P MRI SureScan Pacemaker    PACEMAKER PLACEMENT Right 02/25/2013    Explanted 12/13/2017    SMALL INTESTINE SURGERY N/A 8/28/2019    EXPLORATORY LAPAROTOMY, SMALL BOWEL RESECTION, LYSIS OF ADHESIONS, NEW COLOSTOMY, AND HERNIA REPAIR WITH XENMATRIX MESH performed by Humza Cedeño MD at [facility]       Medications Prior to Admission:    Prior to Admission medications    Medication Sig Start Date End Date Taking?  Authorizing Provider   traZODone (DESYREL) 50 MG tablet Take 1 tablet by mouth nightly as needed for Sleep 2/16/23   LIA Servin CNP   atorvastatin (LIPITOR) 20 MG tablet TAKE ONE (1) TABLET BY MOUTH ONCE DAILY 2/8/23   LIA Servin CNP   divalproex (DEPAKOTE ER) 250 MG extended release tablet TAKE ONE (1) TABLET BY MOUTH AT BEDTIME 2/8/23   LIA Servin CNP   divalproex (DEPAKOTE SPRINKLE) 125 MG DR capsule TAKE TWO (2) CAPSULES BY MOUTH ONCE DAILY WITH SUPPER 2/8/23   LIA Servin CNP   midodrine (PROAMATINE) 2.5 MG tablet Take 1 tablet by mouth 3 times daily 1/26/23   Eduarda Armenta MD   buPROPion [facility]) 75 MG tablet Take 1 tablet by mouth daily 1/24/23   Moni Collier MD   metoprolol succinate (TOPROL XL) 25 MG extended release tablet Take 0.5 tablets by mouth daily 1/24/23   Moni Collier MD   ELIQUIS 2.5 MG TABS tablet TAKE ONE (1) TABLET BY MOUTH TWO TIMES DAILY 1/10/23   LIA Servin CNP   levothyroxine (SYNTHROID) 100 MCG tablet TAKE ONE (1) TABLET BY MOUTH ONCE DAILY 1/10/23   LIA Servin CNP   sertraline (ZOLOFT) 100 MG tablet TAKE ONE (1) TABLET BY MOUTH ONCE DAILY 1/10/23   Malu Nab, APRN - CNP   donepezil (ARICEPT) 10 MG tablet TAKE ONE (1) TABLET BY MOUTH AT NIGHT 22   LIA Schilling - CNP   finasteride (PROSCAR) 5 MG tablet  21   Historical Provider, MD   tamsulosin (FLOMAX) 0.4 MG capsule TAKE 1 CAPSULE BY MOUTH ONCE DAILY 21   Linda Chau MD   latanoprost (XALATAN) 0.005 % ophthalmic solution  21   Historical Provider, MD   Ascorbic Acid (VITAMIN C) 1000 MG tablet Take 1,000 mg by mouth daily    Historical Provider, MD   brimonidine-timolol (COMBIGAN) 0.2-0.5 % ophthalmic solution Place 1 drop into both eyes every 12 hours    Historical Provider, MD   Glucosamine-MSM-Hyaluronic Acd ([facility]) Take 1 each by mouth daily     Historical Provider, MD       Allergies:  Codeine and Fentanyl    Social History:   TOBACCO:   reports that he has never smoked. He has never used smokeless tobacco.  ETOH:   reports no history of alcohol use. Family History:       Problem Relation Age of Onset    Stroke Mother         CVA    Heart Disease Mother     Prostate Cancer Brother     Pacemaker Brother     Cancer Brother         skin    Cancer Daughter         colon    Substance Abuse Son         Tobacco    No Known Problems Father          Review of System     Review of Systems   Unable to perform ROS: Dementia     I have reviewed all pertinent PMHx, PSHx, FamHx, SocialHx, medications, and allergies and updated history as appropriate.     Physical Exam   VITAL SIGNS:  BP (!) 161/116   Pulse 83   Temp 97.6 °F (36.4 °C)   Resp 23   Wt 149 lb (67.6 kg)   SpO2 91%   BMI 23.34 kg/m²   Tmax over 24 hours:  Temp (24hrs), Av.6 °F (36.4 °C), Min:97.6 °F (36.4 °C), Max:97.6 °F (36.4 °C)      Patient Vitals for the past 6 hrs:   BP Temp Temp src Pulse Resp SpO2 Weight   23 0130 (!) 161/116 97.6 °F (36.4 °C) -- 83 23 -- --   23 0105 (!) 152/91 -- -- 80 18 91 % --   23 0002 (!) 173/104 -- -- 86 24 96 % --   23 2259 (!) 170/106 -- -- 83 21 96 % --   23 (!) 176/106 97.6 °F (36.4 °C) Oral 76 28 100 % --   23 -- -- -- -- -- -- 149 lb (67.6 kg)         Intake/Output Summary (Last 24 hours) at 3/13/2023 0233  Last data filed at 3/12/2023 2202  Gross per 24 hour   Intake --   Output 50 ml   Net -50 ml     Wt Readings from Last 2 Encounters:   23 149 lb (67.6 kg)   23 149 lb 12.8 oz (67.9 kg)     Body mass index is 23.34 kg/m². Physical Exam  Vitals and nursing note reviewed. Constitutional:       General: He is not in acute distress. Appearance: He is not ill-appearing, toxic-appearing or diaphoretic. HENT:      Head: Normocephalic and atraumatic. Right Ear: External ear normal.      Left Ear: External ear normal.      Nose: Nose normal.      Mouth/Throat:      Mouth: Mucous membranes are moist.      Pharynx: Oropharynx is clear. Eyes:      General: No scleral icterus. Right eye: No discharge. Left eye: No discharge. Conjunctiva/sclera: Conjunctivae normal.      Pupils: Pupils are equal, round, and reactive to light. Neck:      Vascular: No JVD. Cardiovascular:      Rate and Rhythm: Normal rate and regular rhythm. Pulses: Normal pulses. Heart sounds: Normal heart sounds. No murmur heard. No friction rub. No gallop. Pulmonary:      Effort: Pulmonary effort is normal. Tachypnea present. No respiratory distress. Breath sounds: Decreased air movement present. No stridor or transmitted upper airway sounds. Rales (Minimal) present. No decreased breath sounds, wheezing or rhonchi. Chest:      Comments: Right chest pacer  Abdominal:      General: Bowel sounds are normal. There is no distension. Palpations: Abdomen is soft. There is no mass. Tenderness: There is no abdominal tenderness. There is no guarding or rebound. Hernia: No hernia is present. Musculoskeletal:      Right lower le+ Pitting Edema (Rt > Lt) present. Left lower le+ Pitting Edema present.       Comments: Knee brace Rt. knee Skin:     Capillary Refill: Capillary refill takes less than 2 seconds. Neurological:      Mental Status: He is alert. Comments: Oriented X2     Labs and Imaging Studies   Laboratory findings:  Complete Blood Count:   Recent Labs     03/12/23 2107   WBC 4.9   HGB 12.3*   HCT 38.5*           Last 3 Blood Glucose:   Recent Labs     03/12/23 2202   GLUCOSE 108*        PT/INR:    Lab Results   Component Value Date/Time    PROTIME 16.0 01/16/2023 01:55 AM    PROTIME 11.3 02/06/2012 01:15 PM    INR 1.24 01/16/2023 01:55 AM     PTT:    Lab Results   Component Value Date/Time    APTT 35.2 01/16/2023 01:55 AM       Comprehensive Metabolic Profile:   Recent Labs     03/12/23 2202      K 4.2      CO2 29   BUN 23   CREATININE 1.3   GLUCOSE 108*   CALCIUM 8.4   PROT 6.9   LABALBU 3.7   BILITOT 0.5   ALKPHOS 139*   AST 28   ALT 16      Magnesium:   Lab Results   Component Value Date/Time    MG 1.9 03/12/2023 10:22 PM     Phosphorus:   Lab Results   Component Value Date/Time    PHOS 3.2 01/18/2023 05:17 AM     Ionized Calcium:   Lab Results   Component Value Date/Time    CAION 4.36 09/02/2019 05:25 AM      Troponin: No results for input(s): TROPONINI in the last 72 hours. Lactic Acid: No results for input(s): LACTA in the last 72 hours. BNP:   Recent Labs     03/12/23 2222   PROBNP 8,856*     Lipids:   Lab Results   Component Value Date/Time    CHOL 140 12/03/2020 02:30 PM    HDL 39 12/03/2020 02:30 PM    TRIG 87 04/09/2021 07:49 AM     Hemoglobin A1C:   Lab Results   Component Value Date/Time    LABA1C 5.7 02/24/2022 03:24 PM     Urinalysis: Urine dipstick pending.    Urine Cultures:   Lab Results   Component Value Date/Time    LABURIN 25,000 CFU/ml 07/12/2022 03:07 PM    LABURIN 25,000 CFU/ml 07/12/2022 03:07 PM     Blood Cultures: No results found for: BC  No results found for: BLOODCULT2  Organism:   Lab Results   Component Value Date/Time    ORG Enterococcus faecalis 07/12/2022 03:07 PM    ORG Enterococcus avium 07/12/2022 03:07 PM     ABG: No results for input(s): PH, PCO2, PO2, HCO3, BE, O2SAT in the last 72 hours. Imaging Studies:    CT HEAD WO CONTRAST    Result Date: 3/12/2023  EXAMINATION: CT OF THE HEAD WITHOUT CONTRAST  3/12/2023 11:11 pm TECHNIQUE: CT of the head was performed without the administration of intravenous contrast. Automated exposure control, iterative reconstruction, and/or weight based adjustment of the mA/kV was utilized to reduce the radiation dose to as low as reasonably achievable. COMPARISON: January 16, 2023. HISTORY: ORDERING SYSTEM PROVIDED HISTORY: AMS TECHNOLOGIST PROVIDED HISTORY: Reason for exam:->AMS Has a \"code stroke\" or \"stroke alert\" been called? ->No Decision Support Exception - unselect if not a suspected or confirmed emergency medical condition->Emergency Medical Condition (MA) Reason for Exam: AMS FINDINGS: BRAIN/VENTRICLES: There is no acute intracranial hemorrhage, mass effect or midline shift. No abnormal extra-axial fluid collection. The gray-white differentiation is maintained without evidence of an acute infarct. There is prominence of the ventricles and sulci due to global parenchymal volume loss. There are nonspecific areas of hypoattenuation within the periventricular and subcortical white matter, which likely represent chronic microvascular ischemic change. ORBITS: The visualized portion of the orbits demonstrate no acute abnormality. SINUSES: The visualized paranasal sinuses and mastoid air cells demonstrate no acute abnormality. SOFT TISSUES/SKULL: No acute abnormality of the visualized skull or soft tissues. No acute intracranial abnormality.      XR CHEST PORTABLE    Result Date: 3/12/2023  EXAMINATION: ONE X-RAY VIEW OF THE CHEST 3/12/2023 11:08 pm COMPARISON: January 16, 2023 HISTORY: ORDERING SYSTEM PROVIDED HISTORY:  HTN TECHNOLOGIST PROVIDED HISTORY: Reason for Exam:  HTN Reason for Exam:  Hypertension Additional signs and symptoms: Hypertension FINDINGS: Right chest wall pacer in place. Cardiomediastinal silhouette is enlarged. The lungs show basilar opacities with small effusions. No pneumothorax. No acute or aggressive osseous lesion. 1. Basilar atelectasis with small effusions.        EKG: A paced, V paced rhythm with T wave inversions II, III, aVF        Electronically signed by Denita Alarcon MD on 3/13/2023 at 2:33 AM

## 2023-03-14 PROBLEM — R07.9 CHEST PAIN: Status: ACTIVE | Noted: 2023-01-01

## 2023-03-14 NOTE — PROGRESS NOTES
Orrspelsv 7, 3/23/1930, 2111/2111-A, 3/14/2023    Will hold OT eval- per charting pt had seizure in bathroom with RR and code called, has been transferred to ICU and intubated. Will cont to follow and eval when appropriate.     Red Fee, OTR/L  3/14/2023, 2:34 PM

## 2023-03-14 NOTE — DISCHARGE SUMMARY
V2.0  Discharge Summary    Name:  Ml Costa /Age/Sex: 3/23/1930 (80 y.o. male)   Admit Date: 3/12/2023  Discharge Date: 3/14/23    MRN & CSN:  [id number] & [id number] Encounter Date and Time 3/14/23 4:49 PM EDT    Attending:  Jb Rowland MD Discharging Provider: Joan Alexandre [facility] Course:     Brief HPI: Ml Costa is a 80 y.o. male with significant PMH of dementia, HLD, BPH, A-fib on oral anticoagulation, s/p pacemaker placement, hypotension-on midodrine, hypothyroidism, ,who presented with NSTEMI on 3/13. Patient has been evaluated by cardiology and ICD has been interrogated which showed episodes of PAF and Lopressor has been increased to 25 daily and calcium channel blocker was added. Lab work-up and chest x-ray showed acute on chronic decompensated heart failure and is being diuresed. Today patient had a witnessed seizure while he is on the toilet, and lost pulse, overhead CODE BLUE was called and patient was dragged to the floor, CPR was initiated. ACLS protocol was successful with ROSC. Intubated during the event and patient was transferred to ICU for further management. Stat echocardiogram was obtained which showed reduced EF 45%, CXR was obtained which showed widened mediastinum secondary to acute aortic rupture. Patient's condition was explained at length by the intensivist and given the extremely critical nature of the situation and grave prognosis patient's CODE STATUS has been changed to DNR CCA and subsequently to DNR CC. Patient passed at 1637 hrs.     Brief Problem Based Course:        in-hospital cardiac arrest, s/p ROSC  Acute Aortic rupture, widened mediastinum s/t hemomediastinum  Cardiogenic shock vs hypovolemic shock,   lactic acidosis in the setting of cardiac arrest  Presented with elevated troponin and T wave inversions  Post arrest Echo-EF 45 %  Evaluated by cardiology  ICD interrogated-episodes of PAF on pacemaker which has increased since last interrogation and increased Toprol 25 daily for better rate control, and calcium channel blocker was added  CODE STATUS was discussed with the family by intensivist and changed to DNR CCA--> [facility]  Pt passed at 1637 hrs     Acute respiratory failure s/t poor perfusion s/p intubation during code  CXR and ABG- pending  Respiratory protocol, Peridex, Pepcid  Wean ventilatory support as able  SAT/SBT trials when feasible  On propofol for sedation  Pt was extubated at 1640 hrs     New onset witnessed seizure, unknown etiology  Ceribell placed now, rEEG ordered  seizure precautions  Neurology consulted     Acute on chronic metabolic encephalopathy, as evidenced by unresponsive state, inability to speak in complete sentences  History of dementia     Acute on chronic diastolic heart failure with preserved ejection fraction  EF 60-65 % from echocardiogram on 2023-mild to moderate AR  proBNP-9K       The patient expressed appropriate understanding of, and agreement with the discharge recommendations, medications, and plan.      Consults this admission:  IP CONSULT TO CARDIOLOGY  IP CONSULT TO UROLOGY  IP CONSULT TO NEUROLOGY  IP CONSULT TO DIETITIAN    Discharge Diagnosis:   NSTEMI (non-ST elevated myocardial infarction) ([facility] [address]  Acute aortic rupture, causing cardiac arrest  Acute respiratory failure    Discharge Instruction:   Follow up appointments: n/a  Primary care physician: Elmo Cooks, APRN - CNP within 2 weeks  Diet:  n/a    Activity: n/a  Disposition: Discharged to:   []Home, []C, []SNF, []Acute Rehab, []Hospice morgue  Condition on discharge:   Labs and Tests to be Followed up as an outpatient by PCP or Specialist: n/a    Discharge Medications:        Medication List        STOP taking these medications      clotrimazole-betamethasone 1-0.05 % cream  Commonly known as: Lotrisone     fluticasone 50 MCG/ACT nasal spray  Commonly known as: FLONASE            ASK your doctor about these medications atorvastatin 20 MG tablet  Commonly known as: LIPITOR  TAKE ONE (1) TABLET BY MOUTH ONCE DAILY     brimonidine-timolol 0.2-0.5 % ophthalmic solution  Commonly known as: COMBIGAN     buPROPion 75 MG tablet  Commonly known as: WELLBUTRIN  Take 1 tablet by mouth daily     * divalproex 250 MG extended release tablet  Commonly known as: DEPAKOTE ER  TAKE ONE (1) TABLET BY MOUTH AT BEDTIME     * divalproex 125 MG DR capsule  Commonly known as: DEPAKOTE SPRINKLE  TAKE TWO (2) CAPSULES BY MOUTH ONCE DAILY WITH SUPPER     donepezil 10 MG tablet  Commonly known as: ARICEPT  TAKE ONE (1) TABLET BY MOUTH AT NIGHT     Eliquis 2.5 MG Tabs tablet  Generic drug: apixaban  TAKE ONE (1) TABLET BY MOUTH TWO TIMES DAILY     finasteride 5 MG tablet  Commonly known as: PROSCAR     [facility] PO     latanoprost 0.005 % ophthalmic solution  Commonly known as: XALATAN     levothyroxine 100 MCG tablet  Commonly known as: SYNTHROID  TAKE ONE (1) TABLET BY MOUTH ONCE DAILY     metoprolol succinate 25 MG extended release tablet  Commonly known as: TOPROL XL  Take 0.5 tablets by mouth daily     midodrine 2.5 MG tablet  Commonly known as: PROAMATINE  Take 1 tablet by mouth 3 times daily     sertraline 100 MG tablet  Commonly known as: ZOLOFT  TAKE ONE (1) TABLET BY MOUTH ONCE DAILY     tamsulosin 0.4 MG capsule  Commonly known as: FLOMAX  TAKE 1 CAPSULE BY MOUTH ONCE DAILY     traZODone 50 MG tablet  Commonly known as: DESYREL  Take 1 tablet by mouth nightly as needed for Sleep     vitamin C 1000 MG tablet           * This list has 2 medication(s) that are the same as other medications prescribed for you. Read the directions carefully, and ask your doctor or other care provider to review them with you.                  Objective Findings at Discharge:   BP (!) 66/52   Pulse 80   Temp (!) 96.4 °F (35.8 °C) (Rectal)   Resp 16   Ht 5' 8\" (1.727 m)   Wt 155 lb 10.3 oz (70.6 kg)   SpO2 (!) 84%   BMI 23.67 kg/m²       Physical Exam:     Labs and Imaging   CT HEAD WO CONTRAST    Result Date: 3/12/2023  EXAMINATION: CT OF THE HEAD WITHOUT CONTRAST  3/12/2023 11:11 pm TECHNIQUE: CT of the head was performed without the administration of intravenous contrast. Automated exposure control, iterative reconstruction, and/or weight based adjustment of the mA/kV was utilized to reduce the radiation dose to as low as reasonably achievable. COMPARISON: 2023. HISTORY: ORDERING SYSTEM PROVIDED HISTORY: AMS TECHNOLOGIST PROVIDED HISTORY: Reason for exam:->AMS Has a \"code stroke\" or \"stroke alert\" been called? ->No Decision Support Exception - unselect if not a suspected or confirmed emergency medical condition->Emergency Medical Condition (MA) Reason for Exam: AMS FINDINGS: BRAIN/VENTRICLES: There is no acute intracranial hemorrhage, mass effect or midline shift. No abnormal extra-axial fluid collection. The gray-white differentiation is maintained without evidence of an acute infarct. There is prominence of the ventricles and sulci due to global parenchymal volume loss. There are nonspecific areas of hypoattenuation within the periventricular and subcortical white matter, which likely represent chronic microvascular ischemic change. ORBITS: The visualized portion of the orbits demonstrate no acute abnormality. SINUSES: The visualized paranasal sinuses and mastoid air cells demonstrate no acute abnormality. SOFT TISSUES/SKULL: No acute abnormality of the visualized skull or soft tissues. No acute intracranial abnormality. XR CHEST PORTABLE    Result Date: 3/14/2023  EXAMINATION: ONE XRAY VIEW OF THE CHEST 3/14/2023 2:15 pm COMPARISON: 2023 at 2303 hours HISTORY: ORDERING SYSTEM PROVIDED HISTORY: post intubation TECHNOLOGIST PROVIDED HISTORY: Reason for exam:->post intubation Reason for Exam: post intubation FINDINGS: Nasogastric tube in the peripheral segmental branches of right tracheobronchial tree.   No detectable pneumothorax. Nasogastric tube removal and reinsertion followed by repeat portable chest radiograph recommended before nasogastric tube use. Endotracheal tube tip 4-4.5 cm above the mick, central vascular access catheter via right lower cervical approach noted with tip at the level of distal superior vena cava. New mediastinal widening and left apical pleural capping/thickening compared to 03/12/2023 suspicious for acute aortic rupture and hemo mediastinum. Mediastinum measures approximately 14 cm in greatest transverse dimension. Mild infiltrative change hilar and perihilar regions bilaterally worse on the left. No pulmonary edema/vascular congestion or definite cardiomegaly. Malposition nasogastric tube with tip in peripheral segmental/subsegmental branches of the right tracheobronchial tree. New mediastinal widening and left apical pleural thickening/capping compared to 03/12/2023 most consistent with acute aortic rupture. Endotracheal tube and central vascular access catheter tip projects in satisfactory position in the frontal projection. Mild infiltrative changes hilar and perihilar regions worse on the left. Comment: Findings of nasogastric tube in the peripheral segmental branches the right tracheobronchial tree without detectable pneumothorax and findings consistent with acute aortic rupture with associated mediastinal fluid/hemo mediastinum discussed with ICU intensivist at 1530 hours on 03/14/2023     XR CHEST PORTABLE    Result Date: 3/13/2023  EXAMINATION: ONE X-RAY VIEW OF THE CHEST 3/12/2023 11:08 pm COMPARISON: January 16, 2023 HISTORY: ORDERING SYSTEM PROVIDED HISTORY:  HTN TECHNOLOGIST PROVIDED HISTORY: Reason for Exam:  HTN Reason for Exam:  Hypertension Additional signs and symptoms:  Hypertension FINDINGS: Right chest wall pacer in place. Cardiomediastinal silhouette is enlarged. The lungs show basilar opacities with small effusions. No pneumothorax.  No acute or aggressive osseous lesion. 1. Basilar atelectasis with small effusions. XR ABDOMEN FOR NG/OG/NE TUBE PLACEMENT    Result Date: 3/14/2023  EXAMINATION: ONE SUPINE XRAY VIEW(S) OF THE ABDOMEN 3/14/2023 3:34 pm COMPARISON: Portable chest 03/14/2023 at 1440 hours HISTORY: ORDERING SYSTEM PROVIDED HISTORY: ngt, TECHNOLOGIST PROVIDED HISTORY: Reason for exam:->ngt, Portable? ->Yes Reason for Exam:  NG tube placement FINDINGS: No evidence of nasogastric tube on the current study. Endotracheal tube tip projects approximately 3 cm above the mick, central vascular access catheter tip is at the level of mid-distal superior vena cava. Diffusely increased opacity throughout the left lung field subjectively worse compared to prior study consistent with new/developing infiltrate. Right lung field remains clear. No detectable pneumothorax or pulmonary edema/vascular congestion however, persistent mediastinal widening and left apical pleural thickening/fluid noted suggesting acute thoracic aortic rupture. No evidence of nasogastric tube on the current study. Endotracheal tube and central vascular access catheter project in satisfactory position in the frontal projection. Increasing opacification left lung field compared to 03/14/2023 at 1440 hours consistent with developing infiltrate. Persistent findings consistent with thoracic aortic rupture again seen.        CBC:   Recent Labs     03/13/23  0746 03/14/23  0300 03/14/23  1430   WBC 5.5 6.2 7.7   HGB 11.6* 11.3* 10.3*    192 154     BMP:    Recent Labs     03/13/23  0746 03/14/23  0300 03/14/23  1430    139 142   K 3.8 3.8 2.9*    98* 99   CO2 31 29 28   BUN 22 24* 23   CREATININE 1.3 1.3 1.4*   GLUCOSE 92 105* 141*     Hepatic:   Recent Labs     03/12/23  2202   AST 28   ALT 16   BILITOT 0.5   ALKPHOS 139*     Lipids:   Lab Results   Component Value Date/Time    CHOL 131 03/13/2023 07:46 AM    CHOL 140 12/03/2020 02:30 PM    HDL 54 03/13/2023 07:46 AM    TRIG 83 03/13/2023 07:46 AM     Hemoglobin A1C:   Lab Results   Component Value Date/Time    LABA1C 5.7 02/24/2022 03:24 PM     TSH:   Lab Results   Component Value Date/Time    TSH 0.14 09/22/2021 04:12 PM     Troponin:   Lab Results   Component Value Date/Time    TROPONINT 0.065 03/14/2023 02:30 PM    TROPONINT 0.045 03/13/2023 01:52 PM    TROPONINT 0.043 03/13/2023 07:46 AM     Lactic Acid: No results for input(s): LACTA in the last 72 hours.   BNP:   Recent Labs     03/12/23 2222 03/14/23  0300   PROBNP 8,856* 9,353*     UA:  Lab Results   Component Value Date/Time    NITRU NEGATIVE 03/13/2023 02:34 AM    NITRU Negative 07/12/2022 03:07 PM    COLORU YELLOW 03/13/2023 02:34 AM    PHUR 5.5 07/12/2022 03:07 PM    WBCUA NONE SEEN 03/13/2023 02:34 AM    RBCUA 1,318 03/13/2023 02:34 AM    MUCUS RARE 01/16/2023 06:35 AM    TRICHOMONAS NONE SEEN 03/13/2023 02:34 AM    BACTERIA NEGATIVE 03/13/2023 02:34 AM    CLARITYU CLEAR 03/13/2023 02:34 AM    SPECGRAV 1.020 03/13/2023 02:34 AM    LEUKOCYTESUR SMALL NUMBER OR AMOUNT OBSERVED 03/13/2023 02:34 AM    UROBILINOGEN 0.2 03/13/2023 02:34 AM    BILIRUBINUR SMALL NUMBER OR AMOUNT OBSERVED 03/13/2023 02:34 AM    BILIRUBINUR nefative 06/09/2022 05:42 PM    BLOODU LARGE NUMBER OR AMOUNT OBSERVED 03/13/2023 02:34 AM    GLUCOSEU Negative 07/12/2022 03:07 PM    KETUA TRACE 03/13/2023 02:34 AM     Urine Cultures:   Lab Results   Component Value Date/Time    LABURIN 25,000 CFU/ml 07/12/2022 03:07 PM    LABURIN 25,000 CFU/ml 07/12/2022 03:07 PM     Blood Cultures: No results found for: BC  No results found for: BLOODCULT2  Organism:   Lab Results   Component Value Date/Time    ORG Enterococcus faecalis 07/12/2022 03:07 PM    ORG Enterococcus avium 07/12/2022 03:07 PM       Time Spent Discharging patient 35 minutes    Electronically signed by Junior Abdi PA-C on 3/14/2023 at 4:49 PM

## 2023-03-14 NOTE — CODE DOCUMENTATION
RR, then Code Blue called overhead. Upon entering patient;s room, patient was on floor between bathroom entrance, with staff present performing ACLS. Per nursing staff, family at bedside, patient was on toilet with staff present and started having seizure like activity and then staff lowered him to the floor. Neurology consulted, and Stat EEG placed per request per CC, for continuous monitoring. Did order Jenifer Grayatte for now until continuous  or neuro to see. Patient subsequently intubated per CC, and transferred to ICU. Labs, meds, and vitals reviewed again, as chart reviewed, and  patient was seen/examined  this AM and was found to be in stable condition. Patient discussed with supervising physician , cardiology [facility], and critical care.      Enedelia Venegas, Acute Care [facility]  Hospitalist [facility]

## 2023-03-14 NOTE — PROCEDURES
Central Venous Catheter Insertion Procedure Note   Procedure: Insertion of Central Venous Catheter   Procedure Clinician: Charlotte Nichols MD   Procedure Assistant: None   Indications: Cardiac arrest   Size and location: 7Fr TLC   Attempts: 1   Procedure Details:   Informed consent was obtained for the procedure, including sedation. Risks of lung perforation, hemorrhage, arrhythmia, and adverse drug reaction were discussed. Under sterile conditions the skin above the RIJ was prepped with chlorhexidine and covered with a sterile drape. Local anesthesia was applied to the skin and subcutaneous tissues. Ultrasound was used to localize the vein and an 18-gauge needle was then inserted into the vein. A drop test was performed and was negative for any evidence of arterial flow. A guide wire was then passed easily through the catheter. There were no arrhythmias. Ultrasound was used to identify the guidewire once in the vein. The catheter was then withdrawn. A 7Fr TLC was then inserted into the vessel over the guide wire. The guidewire was then removed with the tip intact, and witnessed by bedside nurse. The catheter was sutured into place. Findings: There were no changes to vital signs. Catheter was flushed with 20 mL NS. Patient tolerated the procedure well. Lung ultrasound:   Pleural sliding - yes  Lung point - no  A lines - yes  B lines - no  Consolidation? Not assessed   Pleural effusion? Not assessed     Recommendations:   CXR ordered to verify placement.

## 2023-03-14 NOTE — CONSULTS
Cardiothoracic Surgery     History & Physical    3/14/2023    Patient Name: Claudia Ferguson : 3/23/1930     ATTENDING PHYSICIAN: Dr. Minor Kasper: Dr Syed Mendiola: widened mediastinum    CARDIOLOGIST: Dr Jovan Hayes    CC:  Cardiac arrest    HPI  Claudia Ferguson is a 80 y.o. male with PMH of AAA, PAF on [address], s/p PPM, hypotension, HLD. Presented to the ED yesterday with NSTEMI. Patient has been evaluated by cardiology and ICD has been interrogated which showed episodes of PAF and Lopressor has been increased to 25 daily and calcium channel blocker was added. Lab work-up and chest x-ray showed acute on chronic decompensated heart failure and is being diuresed. Today patient had a witnessed seizure while he is on the toilet, and lost pulse, overhead CODE BLUE was called and patient was dragged to the floor, CPR was initiated. ACLS protocol was successful with ROSC. Intubated during the event and patient was transferred to ICU for further management. Stat echocardiogram was obtained which showed reduced EF 45%, CXR was obtained which showed widened mediastinum secondary to acute aortic rupture. CT surgery consulted for evaluation and possible surgical intervention. Patient too unstable to obtain chest CT. PMHx  Past Medical History:   Diagnosis Date    AAA (abdominal aortic aneurysm)     Surgery scheduled for 2014 with Dr. Rolanda Pozo. Arthritis     generalized    Asthma     AV block, 1st degree     Back pain     Borderline hypothyroidism 2020    Bradycardia     Colon polyps     Colostomy in place [facility])     Glaucoma     H/O cardiovascular stress test 2013    cardiolite-EF56%, apical hypokinesis is seen, cannot exlude apical Tyler ischemia    History of blood transfusion     History of cardiovascular stress test 2010    No angina or ischemic EKG changes are noted with Lexiscan.  The cardiolite study demonstrated normal perfusion in all segments of the myocardium with an intact left ventricular systolic function. The resting sestamibi dose is 10.8, stress is 32.5. EF 66%    History of chest x-ray 03/16/2010    The chest is considered nonacute. There is cardiomegaly noted. COPD. History of Doppler ultrasound 03/25/2010    venous doppler- Technically good venous ultrasound study negative for DVT in both lower extremities. Normal compressibility,color flow doppler pattern and spectral doppler pattern demonstrated thoughout. History of echocardiogram 03/18/2010    Boarderline LV dilatation with concentric hypertrophy. Low normal systolic and abnormal diastolic function. Mild mitral and trace tricuspid regurgitation. Mild aortic root and bilateral dilatation. History of nuclear stress test 02/06/2023    Normal perfusion study with normal distribution in all coronal, short, and horizontal axis. Normal LV function. LVEF is 63 %. Holter monitor, abnormal 11/10/2010    11/10/2010- 24 HR- Abnormal holter revealing some significant brasycardia's and wenckebach phenomenon. Therefore, clinical  correlation is recommend.     Hx of blood clots     Pacemaker     PAF (paroxysmal atrial fibrillation) ([address]     Peritonitis ([address]     Personal history of colonic polyps     Poor historian     Skin cancer     face    Ulcerative (chronic) proctosigmoiditis ([address]     Wears glasses        PSHx  Past Surgical History:   Procedure Laterality Date    ABDOMINAL AORTIC ANEURYSM REPAIR, ENDOVASCULAR  7/1/14    ABDOMINAL EXPLORATION SURGERY  2/4/2013    exp lap, left hemicolectomy, umbilectomy    APPENDECTOMY  2/4/2013    APPENDECTOMY  2/4/2013    COLONOSCOPY  2/4/2013    COLOSTOMY  2/4/2013    CYSTOSCOPY  2/03/2014    TURP    HEMORRHOID SURGERY  2011    HERNIA REPAIR Bilateral [address] hernia    KNEE SURGERY Right 1980s    PACEMAKER INSERTION Right 12/13/2017    BIV PPM Medtronic Percepta Quad CRT-P MRI SureScan Pacemaker    PACEMAKER PLACEMENT Right 02/25/2013 Explanted 12/13/2017    SMALL INTESTINE SURGERY N/A 8/28/2019    EXPLORATORY LAPAROTOMY, SMALL BOWEL RESECTION, LYSIS OF ADHESIONS, NEW COLOSTOMY, AND HERNIA REPAIR WITH XENMATRIX MESH performed by Koby Blanchard MD at [address] [place] [address]  Social History     Socioeconomic History    Marital status:       Spouse name: Not on file    Number of children: 3    Years of education: Not on file    Highest education level: Not on file   Occupational History    Occupation: Retired   Tobacco Use    Smoking status: Never    Smokeless tobacco: Never   Vaping Use    Vaping Use: Never used   Substance and Sexual Activity    Alcohol use: No     Comment: 1 can of soda a day    Drug use: No    Sexual activity: Yes     Partners: Female     Comment:    Other Topics Concern    Not on file   Social History Narrative    Not on file     Social Determinants of Health     Financial Resource Strain: Low Risk     Difficulty of Paying Living Expenses: Not hard at all   Food Insecurity: No Food Insecurity    Worried About Running Out of Food in the Last Year: Never true    Ran Out of Food in the Last Year: Never true   Transportation Needs: Not on file   Physical Activity: Inactive    Days of Exercise per Week: 0 days    Minutes of Exercise per Session: 0 min   Stress: Not on file   Social Connections: Not on file   Intimate Partner Violence: Not on file   Housing Stability: Not on file       FHx  Family History   Problem Relation Age of Onset    Stroke Mother         CVA    Heart Disease Mother     Prostate Cancer Brother     Pacemaker Brother     Cancer Brother         skin    Cancer Daughter         colon    Substance Abuse Son         Tobacco    No Known Problems Father         Allergies  Allergies   Allergen Reactions    Codeine Anaphylaxis    Fentanyl Other (See Comments)     Hypotension        Current Medications    Current Facility-Administered Medications:     [Held by provider] metoprolol succinate (TOPROL XL) extended release tablet 25 mg, 25 mg, Oral, Daily, LIA Romero CNP    [Held by provider] dilTIAZem (CARDIZEM) tablet 30 mg, 30 mg, Oral, 4 times per day, LIA Romero CNP    LORazepam (ATIVAN) 2 MG/ML injection, , , ,     chlorhexidine (PERIDEX) 0.12 % solution 15 mL, 15 mL, Mouth/Throat, BID, Anna Bay PA-C    famotidine (PEPCID) 20 mg in sodium chloride (PF) 0.9 % 10 mL injection, 20 mg, IntraVENous, Daily, Anna Bay PA-C    norepinephrine (LEVOPHED) 16 mg in sodium chloride 0.9 % 250 mL infusion, 1-60 mcg/min, IntraVENous, Continuous, Kyle Del Cid MD, Last Rate: 56.3 mL/hr at 03/14/23 1535, 60 mcg/min at 03/14/23 1535    EPINEPHrine (EPINEPHrine HCL) 0.02 mg/mL in sodium chloride 0.9 % 250 mL infusion, 1-20 mcg/min, IntraVENous, Continuous, Kyle Del Cid MD, Last Rate: 45 mL/hr at 03/14/23 1515, 15 mcg/min at 03/14/23 1515    midazolam PF (VERSED) injection 1 mg, 1 mg, IntraVENous, Q1H PRN, Kyle Del Cid MD    fentaNYL (SUBLIMAZE) injection 50 mcg, 50 mcg, IntraVENous, Q1H PRN, Kyle Del Cid MD    ketamine (KETALAR) injection 150 mg, 150 mg, IntraVENous, Once, Kyle Del Cid MD    vasopressin (VASOSTRICT) 20 units in dextrose 5% 100 mL infusion, 0.03 Units/min, IntraVENous, Continuous, Kyle Del Cid MD, Last Rate: 9 mL/hr at 03/14/23 1505, 0.03 Units/min at 03/14/23 1505    potassium chloride 20 mEq/50 mL IVPB (Central Line), 20 mEq, IntraVENous, Q2H, Kyle Del Cid MD    calcium gluconate 1,000 mg in sodium chloride 50 mL, 1,000 mg, IntraVENous, Once, Kyle Del Cid MD    levETIRAcetam (KEPPRA) 1,000 mg in sodium chloride 0.9 % 100 mL IVPB, 1,000 mg, IntraVENous, Q12H, Anna Bay PA-C    atorvastatin (LIPITOR) tablet 40 mg, 40 mg, Oral, Nightly, Munish Nelida Sandifer, MD, 40 mg at 03/13/23 2011    buPROPion [facility]) tablet 75 mg, 75 mg, Oral, Daily, Munish Nelida Sandifer, MD, 75 mg at 03/14/23 0956    donepezil (ARICEPT) tablet 10 mg, 10 mg, Oral, Nightly, Andrzej Poole Emmitt Schwab, MD, 10 mg at 03/13/23 2011    apixaban (ELIQUIS) tablet 2.5 mg, 2.5 mg, Oral, BID, Axel Robles MD, 2.5 mg at 03/14/23 0956    finasteride (PROSCAR) tablet 5 mg, 5 mg, Oral, Daily, Axel Robles MD, 5 mg at 03/14/23 5195    levothyroxine (SYNTHROID) tablet 100 mcg, 100 mcg, Oral, Daily, Axel Robles MD, 100 mcg at 03/14/23 7470    [Held by provider] sertraline (ZOLOFT) tablet 100 mg, 100 mg, Oral, Daily, Axel Robles MD, 100 mg at 03/14/23 0956    [Held by provider] tamsulosin (FLOMAX) capsule 0.4 mg, 0.4 mg, Oral, Daily, Andrzej Angel MD, 0.4 mg at 03/14/23 0956    traZODone (DESYREL) tablet 50 mg, 50 mg, Oral, Nightly PRN, Axel Robles MD, 50 mg at 03/13/23 2345    sodium chloride flush 0.9 % injection 5-40 mL, 5-40 mL, IntraVENous, 2 times per day, Axel Robles MD, 10 mL at 03/14/23 1001    sodium chloride flush 0.9 % injection 5-40 mL, 5-40 mL, IntraVENous, PRN, Andrzej Angel MD    0.9 % sodium chloride infusion, , IntraVENous, PRN, Andrzej Angel MD    ondansetron (ZOFRAN-ODT) disintegrating tablet 4 mg, 4 mg, Oral, Q8H PRN **OR** ondansetron (ZOFRAN) injection 4 mg, 4 mg, IntraVENous, Q6H PRN, Andrzej Angel MD    acetaminophen (TYLENOL) tablet 650 mg, 650 mg, Oral, Q6H PRN **OR** acetaminophen (TYLENOL) suppository 650 mg, 650 mg, Rectal, Q6H PRN, Axel Robles MD    polyethylene glycol (GLYCOLAX) packet 17 g, 17 g, Oral, Daily PRN, Axel Robles MD    aspirin chewable tablet 81 mg, 81 mg, Oral, Daily, Andrzej Angel MD, 81 mg at 03/14/23 0985    labetalol (NORMODYNE;TRANDATE) injection 10 mg, 10 mg, IntraVENous, Q6H PRN, Axel Robles MD    hydrALAZINE (APRESOLINE) injection 10 mg, 10 mg, IntraVENous, Q6H PRN, Axel Robles MD    [facility] [place] by provider] furosemide (LASIX) injection 40 mg, 40 mg, IntraVENous, Daily, LIA Andrews - CNP, 40 mg at 03/14/23 1001    Review of Systems    Unable to obtain from patient     Exam  Vital Signs:  Vitals:    03/14/23 1522   BP: (!) 76/49   Pulse: 81   Resp: 18   Temp: (!) 96.4 °F (35.8 °C)   SpO2: (!) 84%     Body mass index is 23.67 kg/m². GEN:  intubated, no sedation, vasopressor support, critically ill  Neck:  supple, no significant adenopathy  ENT: ENT exam normal, no neck nodes or sinus tenderness  Pulm/chest:  CTAB  Heart: S1 and S2 normal, no murmur, click, gallop or rub  Abd: abdomen is soft without significant tenderness, masses, organomegaly or guarding. Ext: peripheral pulses normal, no pedal edema, no clubbing or cyanosis  Neuro: GCS3. No sedation. Not following commands. Skin: normal coloration and turgor, no rashes, no suspicious skin lesions noted        Echocardiogram:  Bedside echocardiogram performed by Dr Marcelino Frausto shows underfilled LV and evidence of a possible dissection flap in ascending aorta. Pericardial effusion also noted without tamponadep physiology. 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 Given patient's age, comorbidities, coagulopathy 2/2 Eliquis administration, cardiac arrest, vasopressor support and current mental status, he is unfortunately not a surgical candidate. Discussed at bedside with critical care attending Dr Salvador Cohn. At this time, patient's condition is unfortunately unsurvivable. Recommend comfort care measures at this time. Discussed with family and they are understanding of the situation.        Yaa Corado PA-C 03/14/23 3:50 PM          New Consults 8:00AM-4:30PM: Call Office, 4:30PM to 8:00AM Surgeon on-call    HVICU or other units patient follow up: Secure chat author of this note 8:00AM-4:30PM    HVICU patient follow up: 4:30PM to 8:00AM Call or Page Surgeon on-call,     Step-down patient follow up: 4:30PM to 8:00AM Page or secure chat PA on-call

## 2023-03-14 NOTE — PROGRESS NOTES
03/14/23 4138   Encounter Summary   Encounter Overview/Reason  Initial Encounter   Service Provided For: Patient   Referral/Consult From: [facility]   Last Encounter  03/14/23  (Patient very confused, offered prayer and spiritual support. Patient stoped responding.  Staff in the room.)   Complexity of Encounter Low   Begin Time 0640   End Time  0646   Total Time Calculated 6 min   Encounter    Type Initial Screen/Assessment   Spiritual/Emotional needs   Type Spiritual Support   Assessment/Intervention/Outcome   Assessment Peaceful;Passive   Intervention Active listening;Prayer (assurance of)/[place];Nurtured Hope;Sustaining Presence/Ministry of presence   Outcome Comfort   Plan and Referrals   Plan/Referrals Continue Support (comment)  (as needed)

## 2023-03-14 NOTE — CODE DOCUMENTATION
Code Blue: CPR Note  At approximately 1310, the pt was on the toilet in the bathroom where he had witnessed seizure-like activity and then loss of consciousness with staff being at bedside patient was transition over to the floor. Unable to palpate pulse CODE BLUE was called and CPR was initiated per ACLS protocol for what appeared to be PEA arrest. He received 2 round of chest compressions, and 1 amp of epinephrine prior to achieving ROSC. Patient was moving his eyes from side to side, not responsive, with concern for seizure activity he received 2 mg of ativan. Patient was placed on backboard and transitioned over to the bed. His BP at the time was 170s/110, he was setup for intubation with RSI (Ketamine, versed and roccuronium) with successful Endotracheal intubation. Shortly after patient BP noted to have decreased, though to be due to RSI, he received 600 mcg of neosynephrine without major response, was started on IVF and received 100 mcg of epinephrine with noted improvement in his blood pressure. The above did not have a lasting effect and patients blood pressure did decrease to a point where his pulse was not detectable. CPR was re-initiated and 1 amp of epinephrine was administered with appropriate response and ROSC. Levophed and epinephrine drips were started at bedside, along with NS bolus. At this point given that he had a palpable pulse and ROSC, decision was made to gia to ICU for further care. Following resuscitation, hemodynamics improved, slighly. Bedside echo was performed as well as CXR. Additional BMP, Mag, Calcium, CBC, ABG with lactate ordered. Family was notified of pt's condition. See CPR-Code Blue Sheet in bedside chart until scanned into EMR for medication dosings and resuscitation team members present.

## 2023-03-14 NOTE — CODE DOCUMENTATION
Pulse present, levophed infusing, patient intubated and bagged by respiratory.  Transferring to ICU via bed with intensivist, RT, and critical care RN

## 2023-03-14 NOTE — PROGRESS NOTES
Brief Neurology Note:  Consult received, chart was reviewed. Prior to assessing patient, Dr. Rose provided update that patient is now Comfort Care. Consult will be cancelled. Please let us know if there are any changes and we can provide assistance.   LIA Zamarripa - CNP  Neurology  03/14/23

## 2023-03-14 NOTE — PROGRESS NOTES
Family at bedside. Dr. Marcelino Ferris to discuss, decision made for end of life measures. Orders received to change code status to [facility].

## 2023-03-14 NOTE — PROCEDURES
Critical Care Intubation Note   Pre-Intubation     Reason for consultation: Acute respiratory failure     Interventions prior to Anesthesiologist's arrival: IV access, AmbuBag     Procedure: Endotracheal intubation   [X] Airway equipment was checked. [X] Suction available.   [X] Monitors including pulse oximetry, NIBP, and ECG monitoring in place or applied. [X] The patient was preoxygenated. Ventilation   Ventilation: Easy   Cricoid Pressure: Yes   Rapid sequence induction: Yes   Cervical collar present: No   In line stabilization or cervical collar left in place: No     Induction   Induction was performed with 150 mg of ketamine, 2 mg of versed and 100 mg of Rocuronium for paralysis. Induction was smooth with minimal changes in vital signs     Endotracheal Intubation   Intubation: was successful on 1 attempt. Route: Oral   Blade:  Mac 3   Adjuncts used: None   View of cords: Grade 1 view   ETT Size: 7.5 cuffed   Depth: 24 cm at the teeth   Confirmation: Colormetric change on ETCO2 detector x6, quantitative ETCO2   Secured with: ETT Vazquez   Complications: None   Intubation performed by:  Kelle Padilla MD, Critical Care Attending

## 2023-03-14 NOTE — PROGRESS NOTES
Physical Therapy    Physical Therapy Treatment Note  Name: Lakeisha Beaulieu MRN: [id number] :   3/23/1930   Date:  3/14/2023   Admission Date: 3/12/2023 Room:  -A     Per chart review pt with RR 3/14 , required transfer to ICU and is currently on the ventilator. PT will HOLD at this time and re-eval when medically appropriate.      Electronically signed by:    Derick Barber, PT  3/14/2023, 3:12 PM

## 2023-03-14 NOTE — PROGRESS NOTES
03/14/23 1445   Encounter Summary   Encounter Overview/Reason  Crisis   Service Provided For: Family   Referral/Consult From: Nurse   Support System Children   Last Encounter  03/14/23  (Code called, family gathered at end of contreras. Patient's family known to me.  We gathered and had prayer for Rachid.)   Complexity of Encounter Low   Begin Time 0115   End Time  0147   Total Time Calculated 32 min   Encounter    Type Family Care   Crisis   Type Code Blue   Spiritual/Emotional needs   Type Spiritual Distress   Assessment/Intervention/Outcome   Assessment Anxious;Fearful;Powerlessness   Intervention Active listening;Empowerment;Discussed relationship with God;Nurtured Hope;Prayer (assurance of)/[place];Sustaining Presence/Ministry of presence   Outcome Acceptance;Comfort;Encouraged;Engaged in conversation;Expressed feelings, needs, and concerns;Expressed Gratitude   Plan and Referrals   Plan/Referrals Continue to visit, (comment)  (As needed; family instructed on how to call )

## 2023-03-14 NOTE — PROGRESS NOTES
Patient  , pronounced with this RN and Vicente RN. Daughter Helene Nixon and various other family members present and aware. Maria Teresa MACHUCA updated. [facility] (released), [facility] (denies case), and Nursing Supervisor all notified. Post mortem care completed per protocol. Transported to [facility].

## 2023-03-14 NOTE — ACP (ADVANCE CARE PLANNING)
I had a long discussion with family at bedside after review of all of the current evidence that is currently provided related to his cardiac arrest.  Patient's etiology to his arrest is likely to be due to his ruptured aortic aneurysm. Case was discussed with family and with CT surgery. Explained to them in details that his injury is non survivable. [place] decision made to transition to comfort care care on arrest until further family arrives to bedside. At which point plan would be to transition to comfort care measures only.      I spent more than 35 minutes performing ACP planning

## 2023-03-14 NOTE — PROGRESS NOTES
V2.0  [facility] Hospitalist Progress Note      Name:  Gio Cook /Age/Sex: 3/23/1930  (80 y.o. male)   MRN & CSN:  [id number] & [id number] Encounter Date/Time: 3/14/2023 9:37 AM EDT    Location:  [address] [place] [address] PCP: Sherrell Coe, [address] [facility] Day: 3    Assessment and Plan:   Gio Cook is a 80 y.o. male with pmh as noted below who presents with NSTEMI (non-ST elevated myocardial infarction) ([facility] [address]    Elevated Troponin with T wave inversions 2/2 Possible NSTEMI vs 2  Trop 0.030, 0.043   EKG: non ischemic with A. Paced and V. paced rhythm  -Pending pacer interrogation per Cards  Received aspirin 324mg PO X 1 in the ER   Recent stress test on 2023 was normal  Cardiology Consulted- Dr. Vee Session, appreciate Recs      Acute Decompensated HFpEF: likely volume overload  Pro-BNP <9000: baseline of ~2000s  B/L LE swelling  to toes, SOB present on admission; does not appear SOB on Exam.   CXR shows increased vascular markings   On lasix IV- started this visit  Echo 23: EF 60-65%, sclerotic aortic valve, mild/mod aortic regurgitation dilated ascending aorta 4.2 cm      HTN urgency  possibly 2/2 Midodrine vs Volume Overload   elevated BP on admission   Received labetalol 5mg IV X 1 and lasix 40 mg IV as mentioned above   Pt. Was taking midodrine 2.5mg TID at home     scheduled              Patient is on Metoprolol 12.5 mg daily     Acute On Chronic Encephalopathy 2/2 ? Metabolic in the setting of Dementia, R/O Depakote toxicity, UTI, Hypothyroidism   Has been confused on-off more than usual for the last 6 weeks   More confused on the day of presentation and appeared more fatigued per family members   Not at baseline per daughter   He is AO X 2 on my exam   Urology Consulted for dark urine- has been cleared and to FU outpatient with Dr. Gus Girard in 2-4 weeks  CT head: NEG for any acute intracranial abnormalities        BPH  Apparently has poor urine output   On finasteride 5mg daily and tamsulosin 0.4mg daily    PAF  Currently A/V paced as seen on EKG    On metoprolol 12.5mg daily and on apixaban 2.5mg BID      Dementia with Behavioral Disturbance, Insomnia   He is on Depakote ER 250mg at bedtime and Depakote DR 250mg at supper - both in the evening   On trazodone 50mg HS PRN, wellbutrin 75mg daily, sertraline 100mg daily   On donepezil 10mg HS      Hypotension on Midodrine- On Hold    midodrin 2.5mg TID at home     Hypothyroidism   On levothyroxine 100mcg daily at home   Last TSH was 0.216 1/2023  TSH 2.970, Free T4 1.31       Diet ADULT DIET; Regular; Low Sodium (2 gm); No Caffeine   DVT Prophylaxis [] Lovenox, []  Heparin, [] SCDs, [] Ambulation,  [x] Eliquis, [] Xarelto  [] Coumadin   Code Status Full Code   Disposition From: Home with family  Expected Disposition: Home with family  Estimated Date of Discharge: 1-2 days  Patient requires continued admission due to ongoing cardiac work up   Surrogate Decision Maker/ POA      Subjective:     Chief Complaint: Hypertension        Patient seen and examined today. No acute events noted overnight. Patient is confused, states that he is at home. Patient follows commands appropriately, and denies CP, SOB, N/V/D/Abd pain. No signs of distress. Patient is sleepy, but converses when prompted. He has remote sitter unit at bedside. Objective: Intake/Output Summary (Last 24 hours) at 3/14/2023 6423  Last data filed at 3/14/2023 0215  Gross per 24 hour   Intake 350 ml   Output 1025 ml   Net -675 ml        Vitals:   Vitals:    03/14/23 0630   BP: (!) 151/104   Pulse:    Resp:    Temp:    SpO2:        Physical Exam:     General: NAD, sleepy  Eyes: EOMI  ENT: neck supple  Cardiovascular: AV paced  Respiratory: Clear to auscultation  Gastrointestinal: Soft, non tender  Genitourinary: no suprapubic tenderness  Musculoskeletal: Non pitting edema to toes on BLE  Skin: warm, dry  Neuro: Alert. Oriented to self and year  Psych: Mood appropriate.      Medications: Medications:    atorvastatin  40 mg Oral Nightly    buPROPion  75 mg Oral Daily    donepezil  10 mg Oral Nightly    apixaban  2.5 mg Oral BID    finasteride  5 mg Oral Daily    levothyroxine  100 mcg Oral Daily    metoprolol succinate  12.5 mg Oral Daily    sertraline  100 mg Oral Daily    tamsulosin  0.4 mg Oral Daily    sodium chloride flush  5-40 mL IntraVENous 2 times per day    aspirin  81 mg Oral Daily    furosemide  40 mg IntraVENous Daily      Infusions:    sodium chloride       PRN Meds: traZODone, 50 mg, Nightly PRN  sodium chloride flush, 5-40 mL, PRN  sodium chloride, , PRN  ondansetron, 4 mg, Q8H PRN   Or  ondansetron, 4 mg, Q6H PRN  acetaminophen, 650 mg, Q6H PRN   Or  acetaminophen, 650 mg, Q6H PRN  polyethylene glycol, 17 g, Daily PRN  labetalol, 10 mg, Q6H PRN  hydrALAZINE, 10 mg, Q6H PRN        Labs      Recent Results (from the past 24 hour(s))   Troponin    Collection Time: 03/13/23  1:52 PM   Result Value Ref Range    Troponin T 0.045 (H) <0.01 NG/ML   CBC    Collection Time: 03/14/23  3:00 AM   Result Value Ref Range    WBC 6.2 4.0 - 10.5 K/CU MM    RBC 3.51 (L) 4.6 - 6.2 M/CU MM    Hemoglobin 11.3 (L) 13.5 - 18.0 GM/DL    Hematocrit 38.5 (L) 42 - 52 %    .7 (H) 78 - 100 FL    MCH 32.2 (H) 27 - 31 PG    MCHC 29.4 (L) 32.0 - 36.0 %    RDW 15.6 (H) 11.7 - 14.9 %    Platelets 079 986 - 410 K/CU MM    MPV 10.0 7.5 - 11.1 FL   Basic Metabolic Panel w/ Reflex to MG    Collection Time: 03/14/23  3:00 AM   Result Value Ref Range    Sodium 139 135 - 145 MMOL/L    Potassium 3.8 3.5 - 5.1 MMOL/L    Chloride 98 (L) 99 - 110 mMol/L    CO2 29 21 - 32 MMOL/L    Anion Gap 12 4 - 16    BUN 24 (H) 6 - 23 MG/DL    Creatinine 1.3 0.9 - 1.3 MG/DL    Est, Glom Filt Rate 52 (L) >60 mL/min/1.73m2    Glucose 105 (H) 70 - 99 MG/DL    Calcium 8.4 8.3 - 10.6 MG/DL   Magnesium    Collection Time: 03/14/23  3:00 AM   Result Value Ref Range    Magnesium 2.0 1.8 - 2.4 mg/dl   Phosphorus    Collection Time: 03/14/23 3:00 AM   Result Value Ref Range    Phosphorus 3.4 2.5 - 4.9 MG/DL   Brain Natriuretic Peptide    Collection Time: 03/14/23  3:00 AM   Result Value Ref Range    Pro-BNP 9,353 (H) <300 PG/ML        Imaging/Diagnostics Last 24 Hours   CT HEAD WO CONTRAST    Result Date: 3/12/2023  EXAMINATION: CT OF THE HEAD WITHOUT CONTRAST  3/12/2023 11:11 pm TECHNIQUE: CT of the head was performed without the administration of intravenous contrast. Automated exposure control, iterative reconstruction, and/or weight based adjustment of the mA/kV was utilized to reduce the radiation dose to as low as reasonably achievable. COMPARISON: January 16, 2023. HISTORY: ORDERING SYSTEM PROVIDED HISTORY: AMS TECHNOLOGIST PROVIDED HISTORY: Reason for exam:->AMS Has a \"code stroke\" or \"stroke alert\" been called? ->No Decision Support Exception - unselect if not a suspected or confirmed emergency medical condition->Emergency Medical Condition (MA) Reason for Exam: AMS FINDINGS: BRAIN/VENTRICLES: There is no acute intracranial hemorrhage, mass effect or midline shift. No abnormal extra-axial fluid collection. The gray-white differentiation is maintained without evidence of an acute infarct. There is prominence of the ventricles and sulci due to global parenchymal volume loss. There are nonspecific areas of hypoattenuation within the periventricular and subcortical white matter, which likely represent chronic microvascular ischemic change. ORBITS: The visualized portion of the orbits demonstrate no acute abnormality. SINUSES: The visualized paranasal sinuses and mastoid air cells demonstrate no acute abnormality. SOFT TISSUES/SKULL: No acute abnormality of the visualized skull or soft tissues. No acute intracranial abnormality.      XR CHEST PORTABLE    Result Date: 3/13/2023  EXAMINATION: ONE X-RAY VIEW OF THE CHEST 3/12/2023 11:08 pm COMPARISON: January 16, 2023 HISTORY: ORDERING SYSTEM PROVIDED HISTORY:  HTN TECHNOLOGIST PROVIDED HISTORY: Reason for Exam:  HTN Reason for Exam:  Hypertension Additional signs and symptoms:  Hypertension FINDINGS: Right chest wall pacer in place. Cardiomediastinal silhouette is enlarged. The lungs show basilar opacities with small effusions. No pneumothorax. No acute or aggressive osseous lesion. 1. Basilar atelectasis with small effusions.        Electronically signed by LIA Griffith CNP on 3/14/2023 at 9:37 AM

## 2023-03-14 NOTE — PROGRESS NOTES
This RN and FLORY Avalos went into pt.'s room as staff assist was called out by Velasquez Cr CNA. The pt.'s daughter came running out into the hallway yelling, \"Someone please help my dad! \". When RN's got into room the pt was sitting on the toilet leaned back against the wall and actively having signs of seizure. This RN initiated a rapid response. Approx. 30 seconds after RN's got to pt and were assessing him, pt became blue, limp, and stopped breathing with no palpable pulse. RN's quickly lowered pt to ground, protecting pt.'s head. Pulse was checked again with no pulse being felt, and compressions were started by Bailey. Jojo daniels was then called. Compressions continued as code team and other team members responded. This RN participated in care of pt during entire code and answered all questions asked. Pt was then stabilized and transferred to ICU. Bedside report was given to ICU nurse.  GEOFFREY

## 2023-03-14 NOTE — PROCEDURES
Arterial Catheter Insertion Procedure Note   Procedure: Insertion of Arterial Line   Procedure Clinician: Christianne Germain MD   Procedure Assistant: None   Indications: Cardiac arrest   Size and location: 5Fr Left femoral artery   Attempts: 1   Procedure Details:   Informed consent was obtained for the procedure, including sedation. Risks of hemorrhage, ischemia, infection, and adverse drug reaction were discussed. Under sterile conditions the skin above the Right femoral artery was prepped with chlorhexidine. Local anesthesia was applied to the skin and subcutaneous tissues. An Arrow kit was used to insert a 18-gauge needle into the artery. A guide wire was then passed easily through the needle without resistance. A 5Fr gauge catheter was then inserted into the vessel over the guide wire. The guidewire was then removed with the tip intact. The catheter was then secured into place. Findings: There were no changes to vital signs. Catheter was flushed with 20 mL NS. Patient tolerated the procedure well.

## 2023-03-14 NOTE — PROGRESS NOTES
Cardiology Progress Note     Today's Plan Increase toprol for better HR control   Add CCB    Admit Date:  3/12/2023    Consult reason/ Seen today for: elevated troponin     Subjective and  Overnight Events:  he reports fatigue this am  Nursing staff reports he was up most of the night       Telemetry: Av paced    Assessment / Plan:     Elevated troponin  Peak 0.030- demand leak in the setting of HF and PAF    Acute on chronic HFpEF  EF 60-65 %   BNP 9,353  Optival suggestive of volume overload  Shortness of breath improved: on RA:   IV lasix changed to daily   Monitor I/O and daily weights     PAF  Having episodes of PAF on pacemaker which has increased since last interrogation ;  increase Toprol to 25 mg daily for better rate control   Add short acting CCB- monitor BP   On eliquis which will be continued     BIV Pacemaker in situ  Analysis reviewed  ; stable leads and battery     Hypertension with history of hypotension  Bp continues to be elevated  Midodrine stopped   Add Cardizem: monitor blood pressure d/t h/o of hypotension     Hyperlipidemia  On statin - atorvastatin   No changes       History of Presenting Illness:    Chief complain on admission : 80 y. o.year old who is admitted for  Chief Complaint   Patient presents with    Hypertension        Past medical history:    has a past medical history of AAA (abdominal aortic aneurysm), Arthritis, Asthma, AV block, 1st degree, Back pain, Borderline hypothyroidism, Bradycardia, Colon polyps, Colostomy in place ([address] Glaucoma, H/O cardiovascular stress test, History of blood transfusion, History of cardiovascular stress test, History of chest x-ray, History of Doppler ultrasound, History of echocardiogram, History of nuclear stress test, Holter monitor, abnormal, Hx of blood clots, Pacemaker, PAF (paroxysmal atrial fibrillation) ([address] Peritonitis ([address] Personal history of colonic polyps, Poor historian, Skin cancer, Ulcerative (chronic) proctosigmoiditis ([address] and Wears glasses. Past surgical history:   has a past surgical history that includes hernia repair (Bilateral, [address]); Hemorrhoid surgery (2011); Colonoscopy (2/4/2013); knee surgery (Right, 1980s); Abdominal exploration surgery (2/4/2013); Appendectomy (2/4/2013); pacemaker placement (Right, 02/25/2013); Appendectomy (2/4/2013); colostomy (2/4/2013); Cystocopy (2/03/2014); AAA repair, endovascular (7/1/14); Pacemaker insertion (Right, 12/13/2017); and Small intestine surgery (N/A, 8/28/2019). Social History:   reports that he has never smoked. He has never used smokeless tobacco. He reports that he does not drink alcohol and does not use drugs. Family history:  family history includes Cancer in his brother and daughter; Heart Disease in his mother; No Known Problems in his father; Pacemaker in his brother; Prostate Cancer in his brother; Stroke in his mother; Substance Abuse in his son. Allergies   Allergen Reactions    Codeine Anaphylaxis    Fentanyl Other (See Comments)     Hypotension        Review of Systems:   All 14 systems were reviewed and are negative  Except for the positive findings which are documented     BP (!) 166/96   Pulse 80   Temp 97.5 °F (36.4 °C) (Oral)   Resp 24   Ht 5' 8\" (1.727 m)   Wt 155 lb 10.3 oz (70.6 kg)   SpO2 93%   BMI 23.67 kg/m²     Intake/Output Summary (Last 24 hours) at 3/14/2023 1002  Last data filed at 3/14/2023 0215  Gross per 24 hour   Intake 200 ml   Output 800 ml   Net -600 ml       Physical Exam:  Physical Exam  Vitals reviewed. HENT:      Mouth/Throat:      Mouth: Mucous membranes are moist.   Eyes:      Extraocular Movements: Extraocular movements intact. Cardiovascular:      Rate and Rhythm: Normal rate. Heart sounds: No murmur heard. Pulmonary:      Effort: Pulmonary effort is normal. No respiratory distress. Breath sounds: Normal breath sounds.  No wheezing. Chest:      Comments: Right sub clavicular device intact   Abdominal:      Tenderness: There is no abdominal tenderness. Musculoskeletal:      Right lower leg: No edema. Left lower leg: No edema. Skin:     General: Skin is warm and dry. Capillary Refill: Capillary refill takes less than 2 seconds. Neurological:      General: No focal deficit present. Medications:    atorvastatin  40 mg Oral Nightly    buPROPion  75 mg Oral Daily    donepezil  10 mg Oral Nightly    apixaban  2.5 mg Oral BID    finasteride  5 mg Oral Daily    levothyroxine  100 mcg Oral Daily    metoprolol succinate  12.5 mg Oral Daily    sertraline  100 mg Oral Daily    tamsulosin  0.4 mg Oral Daily    sodium chloride flush  5-40 mL IntraVENous 2 times per day    aspirin  81 mg Oral Daily    furosemide  40 mg IntraVENous Daily      sodium chloride       traZODone, sodium chloride flush, sodium chloride, ondansetron **OR** ondansetron, acetaminophen **OR** acetaminophen, polyethylene glycol, labetalol, hydrALAZINE    Lab Data:  CBC:   Recent Labs     03/12/23 2107 03/13/23  0746 03/14/23  0300   WBC 4.9 5.5 6.2   HGB 12.3* 11.6* 11.3*   HCT 38.5* 36.7* 38.5*   .3* 102.2* 109.7*    189 192     BMP:   Recent Labs     03/12/23 2202 03/13/23  0746 03/14/23  0300    141 139   K 4.2 3.8 3.8    100 98*   CO2 29 31 29   PHOS  --  3.5 3.4   BUN 23 22 24*   CREATININE 1.3 1.3 1.3     PT/INR: No results for input(s): PROTIME, INR in the last 72 hours. BNP:    Recent Labs     03/12/23  2222 03/14/23  0300   PROBNP 8,856* 9,353*     TROPONIN:   Recent Labs     03/12/23 2222 03/13/23  0746 03/13/23  1352   TROPONINT 0.030* 0.043* 0.045*              Impression:  Principal Problem:    NSTEMI (non-ST elevated myocardial infarction) ([facility] [address]  Active Problems:    Hypertensive urgency  Resolved Problems:    * No resolved hospital problems.  *       All labs, medications and tests reviewed by myself, continue all other medications of all above medical condition listed as is except for changes mentioned above. Thank you   Please call with questions. Electronically signed by LIA Broussard CNP on 3/14/2023 at 10:02 AM    CARDIOLOGY ATTENDING ADDENDUM    MEDICAL DECISION MAKING:    Atrial fibrillation  Acute HFpEF    As above add diltiazem and continue with IV diuretics for now. Hopefully this can be switched to oral tomorrow. Continue with metoprolol for now. Monitor blood pressure off midodrine and if it continues to remain high we will then go up on metoprolol further and may add another antihypertensive agent. HPI:  I have reviewed the HPI  And agree with above   Radha Mcintosh is a 80 y. o.year old who and presents with had concerns including Hypertension. Chief Complaint   Patient presents with    Hypertension     Please review addendum/changes made to note above   Interval history: He is clinically stable. He denies any symptoms at present. I agree with the plan, which was planned by myself and discussed with advanced level provider. My documented MDM is a substantive portion of the supervisory note. I have seen ,spoken to  and examined this patient personally, independently of the advanced level provider. I have spent substantiate  portion of this encounter independently myself in examining patient and developing the medical management plan . I have reviewed the hospital care given to date and reviewed all pertinent labs and imaging. The plan was developed mutually at the time of the visit with the patient,  NP /PA  and myself. I have spoken with patient, nursing staff and provided written and verbal instructions . The above note has been reviewed and I agree with the assessment, diagnosis, and treatment plan with changes made by me as follows .     Abigail Scott MD

## 2023-03-14 NOTE — PROGRESS NOTES
Dr. Jerica Hyde discussed prognosis with patients family. Patient to be DNR-CCA at this point pending family to come see patient.

## 2023-03-14 NOTE — CONSULTS
V2.0  [facility] [place]      Name:  Rachid Armstrong /Age/Sex: 3/23/1930  (89 y.o. male)   MRN & CSN:  [id number] & [id number] Encounter Date/Time: 3/14/2023 1:37 PM EDT   Location:  [address] [place] [address] PCP: LIA Rapp - CNP     Attending:Israel Rose MD  Consulting Provider: Anna Bay PA-C      Hospital Day: 3    Assessment and Recommendations   Rachid Armstrong is a 89 y.o. male with pmh of dementia, hypotension-on midodrine, HLD, BPH, A-fib on dual anticoagulants, hypothyroidism, s/p pacemaker who presents with NSTEMI (non-ST elevated myocardial infarction) (Formerly [facility])    Hospital Problems             Last Modified POA    * (Principal) NSTEMI (non-ST elevated myocardial infarction) (Formerly [facility]) 3/13/2023 Yes    Hypertensive urgency 3/13/2023 Yes       Recommendations:    in-hospital cardiac arrest, s/p ROSC  Acute Aortic rupture, widened mediastinum s/t hemomediastinum  Cardiogenic shock, lactic acidosis  Presented with elevated troponin and T wave inversions  Post arrest Echo-EF 45 %  Evaluated by cardiology  ICD interrogated-episodes of PAF on pacemaker which has increased since last interrogation and increased Toprol 25 daily for better rate control, and calcium channel blocker was added  CODE STATUS was discussed with the family by intensivist and changed to DNR CCA    Acute respiratory failure s/t poor perfusion s/p intubation during code  CXR and ABG- pending  Respiratory protocol, Peridex, Pepcid  Wean ventilatory support as able  SAT/SBT trials when feasible  On propofol for sedation    New onset witnessed seizure, unknown etiology  Ceribell placed now, rEEG ordered  seizure precautions  Neurology consulted    Acute on chronic metabolic encephalopathy, as evidenced by unresponsive state, inability to speak in complete sentences  History of dementia    Acute on chronic diastolic heart failure with preserved ejection fraction  EF 60-65 % from echocardiogram on 2023-mild to moderate  AR  proBNP-9K      Chronic conditions:  Hypotention- on midodrine  Hypothyroidism- cont levothyroxine  Dementia- on depakote  mg, donepezil 10 mg HS      Diet ADULT DIET; Regular; Low Sodium (2 gm); No Caffeine   DVT Prophylaxis [] Lovenox, []  Heparin, [] SCDs, [] Ambulation,  [x] Eliquis, [] Xarelto   Code Status Full Code   Surrogate Decision Maker/ JONATAN Pelaez Goes     History Obtained From:    electronic medical record    History of Present Illness:     Chief Complaint: NSTEMI (non-ST elevated myocardial infarction) [facility])    Zohreh Garcia is a 80 y.o. male who is seen after in hospital cardiac arrest.  Patient was admitted for fatigue, shortness of breath, hyper pretension, and intermittent confusion on 3/13. Lab work-up showed elevated troponin, acute on chronic decompensated heart failure with proBNP 9000, diuresed with Lasix. This afternoon patient had a seizure while he was on the toilet, overhead CODE SARABJIT was called, CPR / ACLS protocol initiated. ROSC obtained , intubated during cardiac arrest, and patient was transferred to ICU. Stat echocardiogram obtained, CXR showed widened mediastinum s/t ruptured aorta. Patient's status has been updated to the family at the bedside, given terminal condition with grave prognosis status has been changed to DNR CCA. Review of Systems:    Review of Systems  Patient is on mechanical ventilator, comatose state, unable to obtain ROS        Objective:      Intake/Output Summary (Last 24 hours) at 3/14/2023 1337  Last data filed at 3/14/2023 0215  Gross per 24 hour   Intake 200 ml   Output 400 ml   Net -200 ml      Vitals:   Vitals:    03/14/23 0630 03/14/23 0845 03/14/23 0956 03/14/23 1000   BP: (!) 151/104  (!) 166/96 (!) 166/96   Pulse:  80 80 80   Resp:    20   Temp:    97.1 °F (36.2 °C)   TempSrc:    Oral   SpO2:    95%   Weight:       Height:           Medications Prior to Admission     Prior to Admission medications    Medication Sig Start Date End Date Taking?  Authorizing Provider   traZODone (DESYREL) 50 MG tablet Take 1 tablet by mouth nightly as needed for Sleep 2/16/23   LIA Alvarez CNP   atorvastatin (LIPITOR) 20 MG tablet TAKE ONE (1) TABLET BY MOUTH ONCE DAILY 2/8/23   LIA Alvarez CNP   divalproex (DEPAKOTE ER) 250 MG extended release tablet TAKE ONE (1) TABLET BY MOUTH AT BEDTIME 2/8/23   LIA Alvarez CNP   divalproex (DEPAKOTE SPRINKLE) 125 MG DR capsule TAKE TWO (2) CAPSULES BY MOUTH ONCE DAILY WITH SUPPER 2/8/23   LIA Alvarez CNP   midodrine (PROAMATINE) 2.5 MG tablet Take 1 tablet by mouth 3 times daily 1/26/23   Elysia Mon MD   buPROPion [facility]) 75 MG tablet Take 1 tablet by mouth daily 1/24/23   Kathy Albert MD   metoprolol succinate (TOPROL XL) 25 MG extended release tablet Take 0.5 tablets by mouth daily 1/24/23   Kathy Albert MD   ELIQUIS 2.5 MG TABS tablet TAKE ONE (1) TABLET BY MOUTH TWO TIMES DAILY 1/10/23   LIA Alvarez CNP   levothyroxine (SYNTHROID) 100 MCG tablet TAKE ONE (1) TABLET BY MOUTH ONCE DAILY 1/10/23   LIA Alvarez CNP   sertraline (ZOLOFT) 100 MG tablet TAKE ONE (1) TABLET BY MOUTH ONCE DAILY 1/10/23   LIA Alvarez CNP   donepezil (ARICEPT) 10 MG tablet TAKE ONE (1) TABLET BY MOUTH AT NIGHT 12/27/22   LIA Alvarez CNP   finasteride (PROSCAR) 5 MG tablet  11/30/21   Historical Provider, MD   tamsulosin (FLOMAX) 0.4 MG capsule TAKE 1 CAPSULE BY MOUTH ONCE DAILY 9/22/21   Ladi Jeffers MD   latanoprost (XALATAN) 0.005 % ophthalmic solution  5/21/21   Historical Provider, MD   Ascorbic Acid (VITAMIN C) 1000 MG tablet Take 1,000 mg by mouth daily    Historical Provider, MD   brimonidine-timolol (COMBIGAN) 0.2-0.5 % ophthalmic solution Place 1 drop into both eyes every 12 hours    Historical Provider, MD   Glucosamine-MSM-Hyaluronic Acd ([address] [place]) Take 1 each by mouth daily     Historical Provider, MD       Physical Exam: Need 8 Elements   General: Patient is a 80-year-old male, critically ill-appearing, on mechanical ventilator  Eyes: pupils dilated, sluggish reaction, no corneal reflex  ENT: ET tube   cardiovascular: Regular rate. Respiratory: Mechanical breath sounds   gastrointestinal: Soft, non tender  Genitourinary: no suprapubic tenderness  Musculoskeletal: No edema  Skin: warm, dry  Neuro: Comatose, on mechanical ventilator,  Psych: Unable to assess      Past Medical History:   PMHx   Past Medical History:   Diagnosis Date    AAA (abdominal aortic aneurysm)     Surgery scheduled for 7/1/2014 with Dr. Silvano Johnson. Arthritis     generalized    Asthma     AV block, 1st degree     Back pain     Borderline hypothyroidism 12/04/2020    Bradycardia     Colon polyps     Colostomy in place [facility])     Glaucoma     H/O cardiovascular stress test 12/27/2013    cardiolite-EF56%, apical hypokinesis is seen, cannot exlude apical Tyler ischemia    History of blood transfusion     History of cardiovascular stress test 03/05/2010    No angina or ischemic EKG changes are noted with Lexiscan. The cardiolite study demonstrated normal perfusion in all segments of the myocardium with an intact left ventricular systolic function. The resting sestamibi dose is 10.8, stress is 32.5. EF 66%    History of chest x-ray 03/16/2010    The chest is considered nonacute. There is cardiomegaly noted. COPD. History of Doppler ultrasound 03/25/2010    venous doppler- Technically good venous ultrasound study negative for DVT in both lower extremities. Normal compressibility,color flow doppler pattern and spectral doppler pattern demonstrated thoughout. History of echocardiogram 03/18/2010    Board[place]line LV dilatation with concentric hypertrophy. Low normal systolic and abnormal diastolic function. Mild mitral and trace tricuspid regurgitation. Mild aortic root and bilateral dilatation.     History of nuclear stress test 02/06/2023    Normal perfusion study with normal distribution in all coronal, short, and horizontal axis. Normal LV function. LVEF is 63 %. Holter monitor, abnormal 11/10/2010    11/10/2010- 24 HR- Abnormal holter revealing some significant brasycardia's and wenckebach phenomenon. Therefore, clinical  correlation is recommend. Hx of blood clots     Pacemaker     PAF (paroxysmal atrial fibrillation) ([address]     Peritonitis ([address]     Personal history of colonic polyps     Poor historian     Skin cancer     face    Ulcerative (chronic) proctosigmoiditis ([address]     Wears glasses      PSHX:  has a past surgical history that includes hernia repair (Bilateral, [facility]); Hemorrhoid surgery (2011); Colonoscopy (2/4/2013); knee surgery (Right, 1980s); Abdominal exploration surgery (2/4/2013); Appendectomy (2/4/2013); pacemaker placement (Right, 02/25/2013); Appendectomy (2/4/2013); colostomy (2/4/2013); Cystocopy (2/03/2014); AAA repair, endovascular (7/1/14); Pacemaker insertion (Right, 12/13/2017); and Small intestine surgery (N/A, 8/28/2019). Allergies: Allergies   Allergen Reactions    Codeine Anaphylaxis    Fentanyl Other (See Comments)     Hypotension      Fam HX: family history includes Cancer in his brother and daughter; Heart Disease in his mother; No Known Problems in his father; Pacemaker in his brother; Prostate Cancer in his brother; Stroke in his mother; Substance Abuse in his son. Soc HX:   Social History     Socioeconomic History    Marital status:      Number of children: 3   Occupational History    Occupation: Retired   Tobacco Use    Smoking status: Never    Smokeless tobacco: Never   Vaping Use    Vaping Use: Never used   Substance and Sexual Activity    Alcohol use: No     Comment: 1 can of soda a day    Drug use: No    Sexual activity: Yes     Partners: Female     Comment:      Social Determinants of Health     Financial Resource Strain: Low Risk     Difficulty of Paying Living Expenses: Not hard at all   Food Insecurity: No Food Insecurity    Worried About Running Out of Food in the Last Year: Never true    Ran Out of Food in the Last Year: Never true   Physical Activity: Inactive    Days of Exercise per Week: 0 days    Minutes of Exercise per Session: 0 min       Medications:   Medications:    [START ON 3/15/2023] metoprolol succinate  25 mg Oral Daily    dilTIAZem  30 mg Oral 4 times per day    LORazepam        chlorhexidine  15 mL Mouth/Throat BID    famotidine (PEPCID) injection  20 mg IntraVENous Daily    atorvastatin  40 mg Oral Nightly    buPROPion  75 mg Oral Daily    donepezil  10 mg Oral Nightly    apixaban  2.5 mg Oral BID    finasteride  5 mg Oral Daily    levothyroxine  100 mcg Oral Daily    sertraline  100 mg Oral Daily    tamsulosin  0.4 mg Oral Daily    sodium chloride flush  5-40 mL IntraVENous 2 times per day    aspirin  81 mg Oral Daily    furosemide  40 mg IntraVENous Daily      Infusions:    sodium chloride       PRN Meds: traZODone, 50 mg, Nightly PRN  sodium chloride flush, 5-40 mL, PRN  sodium chloride, , PRN  ondansetron, 4 mg, Q8H PRN   Or  ondansetron, 4 mg, Q6H PRN  acetaminophen, 650 mg, Q6H PRN   Or  acetaminophen, 650 mg, Q6H PRN  polyethylene glycol, 17 g, Daily PRN  labetalol, 10 mg, Q6H PRN  hydrALAZINE, 10 mg, Q6H PRN        Labs and Imaging   CT HEAD WO CONTRAST    Result Date: 3/12/2023  EXAMINATION: CT OF THE HEAD WITHOUT CONTRAST  3/12/2023 11:11 pm TECHNIQUE: CT of the head was performed without the administration of intravenous contrast. Automated exposure control, iterative reconstruction, and/or weight based adjustment of the mA/kV was utilized to reduce the radiation dose to as low as reasonably achievable. COMPARISON: January 16, 2023. HISTORY: ORDERING SYSTEM PROVIDED HISTORY: AMS TECHNOLOGIST PROVIDED HISTORY: Reason for exam:->AMS Has a \"code stroke\" or \"stroke alert\" been called? ->No Decision Support Exception - unselect if not a suspected or confirmed emergency medical condition->Emergency Medical Condition (MA) Reason for Exam: AMS FINDINGS: BRAIN/VENTRICLES: There is no acute intracranial hemorrhage, mass effect or midline shift. No abnormal extra-axial fluid collection. The gray-white differentiation is maintained without evidence of an acute infarct. There is prominence of the ventricles and sulci due to global parenchymal volume loss. There are nonspecific areas of hypoattenuation within the periventricular and subcortical white matter, which likely represent chronic microvascular ischemic change. ORBITS: The visualized portion of the orbits demonstrate no acute abnormality. SINUSES: The visualized paranasal sinuses and mastoid air cells demonstrate no acute abnormality. SOFT TISSUES/SKULL: No acute abnormality of the visualized skull or soft tissues. No acute intracranial abnormality. XR CHEST PORTABLE    Result Date: 3/13/2023  EXAMINATION: ONE X-RAY VIEW OF THE CHEST 3/12/2023 11:08 pm COMPARISON: January 16, 2023 HISTORY: ORDERING SYSTEM PROVIDED HISTORY:  HTN TECHNOLOGIST PROVIDED HISTORY: Reason for Exam:  HTN Reason for Exam:  Hypertension Additional signs and symptoms:  Hypertension FINDINGS: Right chest wall pacer in place. Cardiomediastinal silhouette is enlarged. The lungs show basilar opacities with small effusions. No pneumothorax. No acute or aggressive osseous lesion. 1. Basilar atelectasis with small effusions.        CBC:   Recent Labs     03/12/23  2107 03/13/23  0746 03/14/23  0300   WBC 4.9 5.5 6.2   HGB 12.3* 11.6* 11.3*    189 192     BMP:    Recent Labs     03/12/23  2202 03/13/23  0746 03/14/23  0300    141 139   K 4.2 3.8 3.8    100 98*   CO2 29 31 29   BUN 23 22 24*   CREATININE 1.3 1.3 1.3   GLUCOSE 108* 92 105*     Hepatic:   Recent Labs     03/12/23  2202   AST 28   ALT 16   BILITOT 0.5   ALKPHOS 139*     Lipids:   Lab Results   Component Value Date/Time    CHOL 131 03/13/2023 07:46 AM    CHOL 140 12/03/2020 02:30 PM    HDL 54 03/13/2023 07:46 AM    TRIG 83 03/13/2023 07:46 AM     Hemoglobin A1C:   Lab Results   Component Value Date/Time    LABA1C 5.7 02/24/2022 03:24 PM     TSH:   Lab Results   Component Value Date/Time    TSH 0.14 09/22/2021 04:12 PM     Troponin:   Lab Results   Component Value Date/Time    TROPONINT 0.045 03/13/2023 01:52 PM    TROPONINT 0.043 03/13/2023 07:46 AM    TROPONINT 0.030 03/12/2023 10:22 PM     Lactic Acid: No results for input(s): LACTA in the last 72 hours.   BNP:   Recent Labs     03/12/23 2222 03/14/23  0300   PROBNP 8,856* 9,353*     UA:  Lab Results   Component Value Date/Time    NITRU NEGATIVE 03/13/2023 02:34 AM    NITRU Negative 07/12/2022 03:07 PM    COLORU YELLOW 03/13/2023 02:34 AM    PHUR 5.5 07/12/2022 03:07 PM    WBCUA NONE SEEN 03/13/2023 02:34 AM    RBCUA 1,318 03/13/2023 02:34 AM    MUCUS RARE 01/16/2023 06:35 AM    TRICHOMONAS NONE SEEN 03/13/2023 02:34 AM    BACTERIA NEGATIVE 03/13/2023 02:34 AM    CLARITYU CLEAR 03/13/2023 02:34 AM    SPECGRAV 1.020 03/13/2023 02:34 AM    LEUKOCYTESUR SMALL NUMBER OR AMOUNT OBSERVED 03/13/2023 02:34 AM    UROBILINOGEN 0.2 03/13/2023 02:34 AM    BILIRUBINUR SMALL NUMBER OR AMOUNT OBSERVED 03/13/2023 02:34 AM    BILIRUBINUR nefative 06/09/2022 05:42 PM    BLOODU LARGE NUMBER OR AMOUNT OBSERVED 03/13/2023 02:34 AM    GLUCOSEU Negative 07/12/2022 03:07 PM    KETUA TRACE 03/13/2023 02:34 AM     Urine Cultures:   Lab Results   Component Value Date/Time    LABURIN 25,000 CFU/ml 07/12/2022 03:07 PM    LABURIN 25,000 CFU/ml 07/12/2022 03:07 PM     Blood Cultures: No results found for: BC  No results found for: BLOODCULT2  Organism:   Lab Results   Component Value Date/Time    ORG Enterococcus faecalis 07/12/2022 03:07 PM    ORG Enterococcus avium 07/12/2022 03:07 PM       Personally reviewed Lab Studies, Imaging, and discussed with Dr. Edward Nix    Electronically signed by Davian Calderón PA-C on 3/14/2023 at 1:37 PM

## 2023-03-15 LAB
VALPROIC ACID % FREE: 17 % (ref 5–18)
VALPROIC ACID TOTAL: 42 UG/ML (ref 50–125)
VALPROIC ACID, FREE: 7 UG/ML (ref 7–23)

## 2023-12-07 NOTE — PROGRESS NOTES
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 Goal: Client will enhance personalized knowledge and insight associated with mood altering substances in 90 days   Objectives:   1) Client will remind herself of detrimental consequences in major life areas regarding AoD use in 90 days Evaluation Date: 11/10/2023 Code: Achieved 8/17/2023 Client reports her relationships were affected, her children wouldn't talk to her or trust her, she was always broke and sold everything due to drugs, I spent 13 months locked up, my health was affected I have Hep C and I am homeless. 2) Client will identify 4 to 8 benefits and gratitude's due to remaining substance free in 90 days: Evaluation Date: 11/10/2023 Code: Achieved 12/7/2023 Client reports she is grateful her grand babies are getting out of the hospital today. 3) Client will list instances when addiction has led to relationship conflicts and have led to addictive behavior in 90 days and Evaluation Date: 11/10/2023 Code: Achieved 8/24/2023 Client reports addiction caused lying, cheating, stealing from stores, and her  and her fighting about her use. 2.    Problem: Low Self-Esteem/Identify issues as a result of her self-medicating.      Goal: Client will learn to focus on her character strengths and feel better about herself in 90 days      Objectives: Physician Progress Note      Tami Hobbs  CSN #:                  [id number]  :                       3/23/1930  ADMIT DATE:       3/12/2023 9:13 PM  [address] DATE:        3/14/2023 6:44 PM  RESPONDING  PROVIDER #:        Bard Stefania NI          QUERY TEXT:    Patient admitted with chest pain. If possible, please document in the   progress notes and discharge summary if you are evaluating and/or treating any   of the following: The medical record reflects the following:  Risk Factors: HTN, HLD, CHF  Clinical Indicators: Pt had elevated troponin on admission, denies chest pain. Trop 0.030, 0.043, 0.065. No ischemia noted on EKG. Consult note dated   3/13/23, by SVEN Jean-Baptiste, cardiology consultant, \"Elevated troponin   -No chest pain at this time. Non-ACS trend. EKG nonischemic -Recent stress   test negative for ischemia -Recent echo showed preserved ejection fraction   with mild to moderate aortic insufficiency but no wall motion abnormalities.   -No further cardiac work-up needed at this time. \"  PN dated 3/14/23 by Dr. Justin Fernandes indicates pt has demand leak in setting of   Treatment: tele, EKG, ECHO, card consult, labs    Thank you,  Kulwant Blackwood, RN  [phone]  Options provided:  -- NSTEMI  -- Type 2 MI  -- Demand Ischemia with MI  -- Demand Ischemia only, no MI  -- Other - I will add my own diagnosis  -- Disagree - Not applicable / Not valid  -- Disagree - Clinically unable to determine / Unknown  -- Refer to Clinical Documentation Reviewer    PROVIDER RESPONSE TEXT:    This patient has an NSTEMI.     Query created by: Donna Ramirez on 3/16/2023 1:44 PM      Electronically signed by:  Bard Stefania NI 3/16/2023 4:41 PM

## 2025-01-01 NOTE — H&P
History and Physical  LIA Rodriguez-BC   Internal Medicine Hospitalist      Name:  Seema Mcgill /Age/Sex: 3/23/1930  (80 y.o. male)   MRN & CSN:  [id number] & [id number] Admission Date/Time: 2019  8:54 AM   Location:  ED19/ED-19 PCP: Aruna Acosta MD       Hospital Day: 1      Supervising Physician: Dr. Gissell Damon    Chief Complaint: Emesis     Assessment and Plan:   Seema Mcgill is a 80 y.o.  male who presents with Small bowel obstruction ([address]     Vomiting with Small bowel obstruction - as per CT abdomen. - admit inpatient, telemetry monitoring           - Gen Surg, Dr. Cayden Khan consulted in ED         - NG tube place in ED         - NPO/holding all home oral meds for now            - gentle IVF for hx of CHF, pt's EF 35-40%         - IV antiemetics         - IV Rocephin         - check lab works in AM    [facility] Urinary tract infection without hematuria - with pyuria, Lactate 2.6 but could be due to dehydration, no overt signs of septic.         - cont IV Rocephin         - gentle IVF         - series lactate         - pending urine cx       H/o PAF         - hold eliquis in anticipation for possible surgery     HTN         - hold oral meds (BB, ACEI)     Stable systolic CHF         - on gentle IVF but closely monitor          - hold ACE, BB as NPO         - consider consulting cardiology for clearance before surgery     DVT prophylaxis         - SCD and hold eliquis in anticipation for possible surgery      Current diagnosis and plan of management discussed with the patient and family at the time of admission in lay language who agree to the above plan and disposition of admission for further care. All concerns and questions addressed. Patient assessment and plan in conjunction with supervising physician - Dr. Sj Christianson Now Exceptions are:  Other (See Comments)    DVT Prophylaxis [] Lovenox, []  Heparin, [x] SCDs, [] Ambulation [] Long term [facility]  Patient is on Eliquis but place on hold for now for possible surgery. GI Prophylaxis [x] PPI,  [] H2 Blocker,  [] Carafate,  [] Diet,  [] No GI prophylaxis, N/A: patient is not under significant medical stress, non-ICU or is receiving a diet/tube feeds   Code Status Full CODE status, discussed with patient and family at bedside upon admission. Disposition Patient requires continued admission due to Small bowel obstruction ([address] UTI. Discharge Plan: Patient plans to return home upon discharge after seen by case management team.   MDM [] Low, [x] Moderate,[]  High  Patient's risk as above due to:      [x] One or more chronic illnesses with mild exacerbation progression      [] Two or more stable chronic illnesses      [] Undiagnosed new problem with uncertain prognosis      [] Elective major surgery      []Prescription drug management     History of Present Illness:     Principal Problem: Small bowel obstruction ([address]  Taylor Velez is a 80 y.o. male who presents to the ED from home with daughter for complaints of nausea and vomiting. Patient reports that he has similar symptoms last year and got hospitalized. He stated that he has a colostomy bag in place for the last 5 years, no changes in stool but noticed increased air in the bag and feels like bloated. Patient has pacemaker and PMH of PAF, colon polyps, and hernia. Daughter reports that she received a phone call from the patient around 2 AM, and stating that he had 2-3 episode of nonbloody, nonbilious emesis. No episode of vomiting in ED since arrival but complaining of feeling nauseated. Patient denied abdominal pain or discomfort and dysuria but c/o frequency or urgency and dribbling urine. Daughter also report patient was admitted with partial SBO last December but patient refused surgery. Pt seen and examined. Patient denies CP, palpitation, fever, chills or diaphoresis. Denies cough, SOB or difficulty breathing.  Denies any other normal speech, no lateralizing weakness. PSYC  -Awake, alert, oriented x 4. Appropriate affect. Past Medical History:      Past Medical History:   Diagnosis Date    AAA (abdominal aortic aneurysm) [facility])     Surgery scheduled for 7/1/2014 with Dr. Amanda Notice.  Arthritis     generalized    AV block, 1st degree     Back pain     Bradycardia     Colon polyps     Colostomy in place ([address]     Glaucoma     H/O cardiovascular stress test 12/27/2013    cardiolite-EF56%, apical hypokinesis is seen, cannot exlude apical Tyler ischemia    History of cardiovascular stress test 3/5/10    No angina or ischemic EKG changes are noted with Lexiscan. The cardiolite study demonstrated normal perfusion in all segments of the myocardium with an intact left ventricular systolic function. The resting sestamibi dose is 10.8, stress is 32.5. EF 66%    History of chest x-ray 3/16/10    The chest is considered nonacute. There is cardiomegaly noted. COPD.  History of Doppler ultrasound 3/25/10    venous doppler- Technically good venous ultrasound study negative for DVT in both lower extremities. Normal compressibility,color flow doppler pattern and spectral doppler pattern demonstrated thoughout.  History of echocardiogram 3/18/10    Boarderline LV dilatation with concentric hypertrophy. Low normal systolic and abnormal diastolic function. Mild mitral and trace tricuspid regurgitation. Mild aortic root and bilateral dilatation.  Holter monitor, abnormal 11/10/10    11/10/2010- 24 HR- Abnormal holter revealing some significant brasycardia's and wenckebach phenomenon. Therefore, clinical  correlation is recommend.     Ileus ([address]     Pacemaker     PAF (paroxysmal atrial fibrillation) (HCC)     Peritonitis ([address]     Personal history of colonic polyps     Poor historian     Skin cancer     face    Ulcerative (chronic) proctosigmoiditis ([address]     Wears glasses      Past Surgery History:  Patient  has a past surgical never used smokeless tobacco.  ETOH:   reports that he does not drink alcohol. Drugs:  reports that he does not use drugs. Allergies: Allergies   Allergen Reactions    Codeine Anaphylaxis    Fentanyl Other (See Comments)     Hypotension      Medications:   Medications:    cefTRIAXone (ROCEPHIN) IV  1 g Intravenous Once      Infusions:    sodium chloride       PRN Meds:   ondansetron 4 mg Q30 Min PRN   sodium chloride flush 10 mL PRN     Prior to Admission Meds:  Prior to Admission medications    Medication Sig Start Date End Date Taking?  Authorizing Provider   bimatoprost (LUMIGAN) 0.01 % SOLN ophthalmic drops Place 1 drop into both eyes nightly   Yes Historical Provider, MD   latanoprost (XALATAN) 0.005 % ophthalmic solution Place 1 drop into both eyes nightly   Yes Historical Provider, MD   apixaban (ELIQUIS) 2.5 MG TABS tablet Take 1 tablet by mouth 2 times daily 8/12/19  Yes Zabrina Kennedy MD   simvastatin (ZOCOR) 40 MG tablet Take 1 tablet by mouth nightly 7/24/19 10/22/19 Yes Britni Walker MD   metoprolol succinate (TOPROL XL) 25 MG extended release tablet Take 1 tablet by mouth daily 7/24/19 10/22/19 Yes Britni Walker MD   Ascorbic Acid (VITAMIN C) 1000 MG tablet Take 1,000 mg by mouth daily   Yes Historical Provider, MD   brimonidine-timolol (COMBIGAN) 0.2-0.5 % ophthalmic solution Place 1 drop into both eyes every 12 hours   Yes Historical Provider, MD   diltiazem (CARDIZEM 12 HR) 120 MG extended release capsule Take 1 capsule by mouth daily 6/26/19 9/24/19 Yes Britni Walker MD   sertraline (ZOLOFT) 50 MG tablet Take 1 tablet by mouth daily 6/26/19 9/24/19 Yes Britni Walker MD   tamsulosin [facility]) 0.4 MG capsule Take 1 capsule by mouth daily 6/26/19 9/24/19 Yes Britni Walker MD   omeprazole (PRILOSEC) 20 MG delayed release capsule Take 1 capsule by mouth daily 6/26/19 9/24/19 Yes Britni Walker MD   Glucosamine-MSM-Hyaluronic Acd [facility] 51 y/o M PMH 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) s/p Trach, seizures, HTN, HLD, CHF w/ ICD (initially rEF 30%->55-60% on 03/2021), MDD, Recurrent PE, Afib (on Eliquis), obesity s/p bariatric intervention presenting with R IT fracture s/p mechanical fall at [facility]    Assessment  Chronic Hypoxemic Respiratory Failure   CVA w/ residual aphasia   Seizure Disorder   HTN  HLD  HFimpEF  hx of PE   Atrial Fibrillation   Right intertrochanteric fracture   Left sided pneumonia     Plan   -Admit to SCU  -Trach Care   -f/u Ortho recommendations re; operative treatment  -Hold apixaban pending surgical plan   -c/w CTX x 5 day course (started 12/29 at [facility]). 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 Send Flu/Covid, Tracheal aspirate   -NPO  -Cardiology clearance   -Analgesia  -c/w AEDs ERNESTINA Ramirez   [facility]  8/16/2019 12:53 PM      Electronically signed by LIA Flaherty CNP on 8/16/2019 at 12:53 PM

## (undated) DEVICE — ELECTRODE ES AD CRDLSS PT RET REM POLYHESIVE

## (undated) DEVICE — GAUZE,SPONGE,4"X4",16PLY,XRAY,STRL,LF: Brand: MEDLINE

## (undated) DEVICE — DRAPE SHEET ULTRAGARD: Brand: MEDLINE

## (undated) DEVICE — PACK,BASIC,IX: Brand: MEDLINE

## (undated) DEVICE — SUTURE PERMAHAND SZ 3-0 L18IN NONABSORBABLE BLK L26MM SH C013D

## (undated) DEVICE — SUTURE PERMAHAND SZ 2-0 L17X18IN NONABSORBABLE BLK SILK SA65H

## (undated) DEVICE — Z INACTIVE USE 2535480 CLIP LIG M BLU TI HRT SHP WIRE HORZ 180 PER BX

## (undated) DEVICE — DRESSING TRNSPAR W4XL10IN FLM MIC POR SURESITE 123

## (undated) DEVICE — SPONGE GZ W4XL8IN COT WVN 12 PLY

## (undated) DEVICE — SPONGE LAP W18XL18IN WHT COT 4 PLY FLD STRUNG RADPQ DISP ST

## (undated) DEVICE — SUTURE PERMA-HAND SZ 3-0 L18IN 17 STRND NONABSORBABLE BLK SA64H

## (undated) DEVICE — LINER SUCT CANSTR 1500CC SEMI RIG W/ POR HYDROPHOBIC SHUT

## (undated) DEVICE — LINER,SEMI-RIGID,3000CC,50EA/CS: Brand: MEDLINE

## (undated) DEVICE — COUNTER NDL 30 COUNT FOAM STRP SGL MAG

## (undated) DEVICE — SUTURE CHROMIC GUT SZ 4-0 L27IN ABSRB BRN L26MM SH 1/2 CIR G121H

## (undated) DEVICE — SUTURE VCRL SZ 0 L18IN ABSRB UD L36MM CT-1 1/2 CIR J840D

## (undated) DEVICE — PENCIL ES CRD L10FT HND SWCHING ROCK SWCH W/ EDGE COAT BLDE

## (undated) DEVICE — SOLUTION IV 1000ML 0.9% SOD CHL FOR IRRIG PLAS CONT

## (undated) DEVICE — YANKAUER,FLEXIBLE HANDLE,REGLR CAPACITY: Brand: MEDLINE INDUSTRIES, INC.

## (undated) DEVICE — TUBING, SUCTION, 9/32" X 10', STRAIGHT: Brand: MEDLINE

## (undated) DEVICE — SOLUTION IV IRRIG WATER 1000ML POUR BRL 2F7114

## (undated) DEVICE — SUTURE VCRL 0 L27IN ABSRB UD CT L40MM 1/2 CIR TAPERPOINT J280H

## (undated) DEVICE — SYRINGE IRRIG 60ML SFT PLIABLE BLB EZ TO GRP 1 HND USE W/

## (undated) DEVICE — Device

## (undated) DEVICE — SUTURE PERMAHAND SZ 2-0 L18IN NONABSORBABLE BLK L26MM SH C012D

## (undated) DEVICE — SEALER ENDOSCP NANO COAT OPN DIV CRV L JAW LIGASURE IMPACT

## (undated) DEVICE — SUTURE VCRL SZ 4-0 L18IN ABSRB UD RB-1 L17MM 1/2 CIR J714D

## (undated) DEVICE — CHLORAPREP 26ML ORANGE

## (undated) DEVICE — ANESTHESIA CIRCUIT ADULT-LF: Brand: MEDLINE INDUSTRIES, INC.

## (undated) DEVICE — DUAL LUMEN STOMACH TUBE: Brand: SALEM SUMP

## (undated) DEVICE — STAPLER SKIN CLSR S STL NONABSORBABLE WIDE FIX HD HND GRP

## (undated) DEVICE — SHEET, T, LAPAROTOMY, STERILE: Brand: MEDLINE

## (undated) DEVICE — TOTAL TRAY, DB, 100% SILI FOLEY, 16FR 10: Brand: MEDLINE

## (undated) DEVICE — TOWEL,OR,DSP,ST,BLUE,STD,6/PK,12PK/CS: Brand: MEDLINE

## (undated) DEVICE — GOWN,SIRUS,POLYRNF,BRTHSLV,XLN/XL,20/CS: Brand: MEDLINE

## (undated) DEVICE — INTENDED FOR TISSUE SEPARATION, AND OTHER PROCEDURES THAT REQUIRE A SHARP SURGICAL BLADE TO PUNCTURE OR CUT.: Brand: BARD-PARKER ® STAINLESS STEEL BLADES

## (undated) DEVICE — ELECTRODE BLDE L6.5IN CAUT EXT DISP

## (undated) DEVICE — GLOVE SURG SZ 65 THK91MIL LTX FREE SYN POLYISOPRENE